# Patient Record
Sex: MALE | Race: WHITE | NOT HISPANIC OR LATINO | Employment: OTHER | URBAN - METROPOLITAN AREA
[De-identification: names, ages, dates, MRNs, and addresses within clinical notes are randomized per-mention and may not be internally consistent; named-entity substitution may affect disease eponyms.]

---

## 2017-02-06 RX ORDER — AMLODIPINE BESYLATE AND BENAZEPRIL HYDROCHLORIDE 5; 20 MG/1; MG/1
CAPSULE ORAL
Qty: 90 CAPSULE | Refills: 0 | Status: SHIPPED | OUTPATIENT
Start: 2017-02-06 | End: 2017-05-12 | Stop reason: SDUPTHER

## 2017-02-06 RX ORDER — PROPRANOLOL HYDROCHLORIDE 80 MG/1
CAPSULE, EXTENDED RELEASE ORAL
Qty: 90 CAPSULE | Refills: 0 | Status: SHIPPED | OUTPATIENT
Start: 2017-02-06 | End: 2017-05-12 | Stop reason: SDUPTHER

## 2017-02-06 RX ORDER — ATORVASTATIN CALCIUM 40 MG/1
TABLET, FILM COATED ORAL
Qty: 90 TABLET | Refills: 0 | Status: SHIPPED | OUTPATIENT
Start: 2017-02-06 | End: 2017-05-12 | Stop reason: SDUPTHER

## 2017-02-13 DIAGNOSIS — I10 BENIGN ESSENTIAL HYPERTENSION: Primary | ICD-10-CM

## 2017-02-13 DIAGNOSIS — E78.5 HYPERLIPIDEMIA, UNSPECIFIED HYPERLIPIDEMIA TYPE: ICD-10-CM

## 2017-02-15 LAB
ALBUMIN SERPL-MCNC: 4.3 G/DL (ref 3.5–5.2)
ALBUMIN/GLOB SERPL: 1.7 G/DL
ALP SERPL-CCNC: 75 U/L (ref 39–117)
ALT SERPL-CCNC: 23 U/L (ref 1–41)
AST SERPL-CCNC: 28 U/L (ref 1–40)
BILIRUB SERPL-MCNC: 1.5 MG/DL (ref 0.1–1.2)
BUN SERPL-MCNC: 12 MG/DL (ref 8–23)
BUN/CREAT SERPL: 14 (ref 7–25)
CALCIUM SERPL-MCNC: 9.8 MG/DL (ref 8.6–10.5)
CHLORIDE SERPL-SCNC: 99 MMOL/L (ref 98–107)
CHOLEST SERPL-MCNC: 138 MG/DL (ref 0–200)
CO2 SERPL-SCNC: 28.8 MMOL/L (ref 22–29)
CREAT SERPL-MCNC: 0.86 MG/DL (ref 0.76–1.27)
GLOBULIN SER CALC-MCNC: 2.6 GM/DL
GLUCOSE SERPL-MCNC: 104 MG/DL (ref 65–99)
HDLC SERPL-MCNC: 54 MG/DL (ref 40–60)
LDLC SERPL CALC-MCNC: 68 MG/DL (ref 0–100)
LDLC/HDLC SERPL: 1.26 {RATIO}
POTASSIUM SERPL-SCNC: 4.3 MMOL/L (ref 3.5–5.2)
PROT SERPL-MCNC: 6.9 G/DL (ref 6–8.5)
SODIUM SERPL-SCNC: 142 MMOL/L (ref 136–145)
TRIGL SERPL-MCNC: 79 MG/DL (ref 0–150)
VLDLC SERPL CALC-MCNC: 15.8 MG/DL (ref 5–40)

## 2017-02-22 ENCOUNTER — OFFICE VISIT (OUTPATIENT)
Dept: FAMILY MEDICINE CLINIC | Facility: CLINIC | Age: 77
End: 2017-02-22

## 2017-02-22 VITALS
HEIGHT: 68 IN | DIASTOLIC BLOOD PRESSURE: 72 MMHG | HEART RATE: 68 BPM | RESPIRATION RATE: 16 BRPM | SYSTOLIC BLOOD PRESSURE: 125 MMHG | BODY MASS INDEX: 36.68 KG/M2 | OXYGEN SATURATION: 95 % | TEMPERATURE: 98.2 F | WEIGHT: 242 LBS

## 2017-02-22 DIAGNOSIS — E78.00 HYPERCHOLESTEROLEMIA: ICD-10-CM

## 2017-02-22 DIAGNOSIS — N30.00 ACUTE CYSTITIS WITHOUT HEMATURIA: ICD-10-CM

## 2017-02-22 DIAGNOSIS — R82.90 BAD ODOR OF URINE: Primary | ICD-10-CM

## 2017-02-22 DIAGNOSIS — I10 BENIGN ESSENTIAL HYPERTENSION: ICD-10-CM

## 2017-02-22 DIAGNOSIS — E66.09 OBESITY DUE TO EXCESS CALORIES, UNSPECIFIED OBESITY SEVERITY: ICD-10-CM

## 2017-02-22 LAB
BILIRUB BLD-MCNC: NEGATIVE MG/DL
COLOR UR: YELLOW
GLUCOSE UR STRIP-MCNC: NEGATIVE MG/DL
KETONES UR QL: NEGATIVE
LEUKOCYTE EST, POC: ABNORMAL
NITRITE UR-MCNC: NEGATIVE MG/ML
PH UR: 5.5 [PH] (ref 5–8)
PROT UR STRIP-MCNC: NEGATIVE MG/DL
RBC # UR STRIP: ABNORMAL /UL
SP GR UR: 1.01 (ref 1–1.03)
UROBILINOGEN UR QL: NORMAL

## 2017-02-22 PROCEDURE — 99214 OFFICE O/P EST MOD 30 MIN: CPT | Performed by: INTERNAL MEDICINE

## 2017-02-22 PROCEDURE — 81003 URINALYSIS AUTO W/O SCOPE: CPT | Performed by: INTERNAL MEDICINE

## 2017-02-22 RX ORDER — NITROFURANTOIN 25; 75 MG/1; MG/1
100 CAPSULE ORAL 2 TIMES DAILY
Qty: 14 CAPSULE | Refills: 0 | Status: SHIPPED | OUTPATIENT
Start: 2017-02-22 | End: 2017-03-08

## 2017-02-26 PROBLEM — N30.00 ACUTE CYSTITIS: Status: ACTIVE | Noted: 2017-02-26

## 2017-02-26 NOTE — PROGRESS NOTES
Subjective   Junito Sorto is a 76 y.o. male. Patient is here today for   Chief Complaint   Patient presents with   • Hypertension     lab f/u   • Hyperlipidemia          Vitals:    02/22/17 0857   BP: 125/72   Pulse: 68   Resp: 16   Temp: 98.2 °F (36.8 °C)   SpO2: 95%       Past Medical History   Diagnosis Date   • Hyperlipidemia    • Hypertension    • Prostate cancer    • Skin cancer       No Known Allergies   Social History     Social History   • Marital status:      Spouse name: N/A   • Number of children: N/A   • Years of education: N/A     Occupational History   • Not on file.     Social History Main Topics   • Smoking status: Former Smoker   • Smokeless tobacco: Not on file   • Alcohol use Yes      Comment: SOCIAL   • Drug use: Not on file   • Sexual activity: Not on file     Other Topics Concern   • Not on file     Social History Narrative        Current Outpatient Prescriptions:   •  amLODIPine-benazepril (LOTREL 5-20) 5-20 MG per capsule, TAKE ONE CAPSULE BY MOUTH DAILY, Disp: 90 capsule, Rfl: 0  •  aspirin 81 MG tablet, Take  by mouth., Disp: , Rfl:   •  atorvastatin (LIPITOR) 40 MG tablet, TAKE ONE TABLET BY MOUTH DAILY, Disp: 90 tablet, Rfl: 0  •  Cholecalciferol (VITAMIN D3) 2000 UNITS tablet, Take  by mouth., Disp: , Rfl:   •  guaiFENesin (MUCINEX) 600 MG 12 hr tablet, Take 1,200 mg by mouth 2 (two) times a day., Disp: , Rfl:   •  Loratadine (LORADAMED PO), Take  by mouth., Disp: , Rfl:   •  propranolol LA (INDERAL LA) 80 MG 24 hr capsule, TAKE ONE CAPSULE BY MOUTH DAILY, Disp: 90 capsule, Rfl: 0  •  vitamin E 400 UNIT capsule, Take 400 Units by mouth daily., Disp: , Rfl:   •  nitrofurantoin, macrocrystal-monohydrate, (MACROBID) 100 MG capsule, Take 1 capsule by mouth 2 (Two) Times a Day., Disp: 14 capsule, Rfl: 0     Objective     HPI Comments: He is here today to follow-up his hypertension and hypercholesterolemia.    He notes urinary frequency.  He denies dysuria.    Hypertension      Hyperlipidemia          Review of Systems   Constitutional: Negative.    HENT: Negative.    Respiratory: Negative.    Cardiovascular: Negative.    Genitourinary: Positive for frequency.        He notes that his urine has bad odor.   Musculoskeletal: Negative.    Psychiatric/Behavioral: Negative.        Physical Exam   Constitutional: He is oriented to person, place, and time. He appears well-developed and well-nourished.   Neatly groomed, pleasant, BMI 36.   HENT:   Head: Normocephalic and atraumatic.   Cardiovascular: Normal rate and regular rhythm.    Pulmonary/Chest: Effort normal.   Neurological: He is alert and oriented to person, place, and time.   Psychiatric: He has a normal mood and affect. His behavior is normal. Thought content normal.   Nursing note and vitals reviewed.        Problem List Items Addressed This Visit        Cardiovascular and Mediastinum    Benign essential hypertension    Hypercholesterolemia       Digestive    Obesity       Genitourinary    Acute cystitis    Relevant Medications    nitrofurantoin, macrocrystal-monohydrate, (MACROBID) 100 MG capsule      Other Visit Diagnoses     Bad odor of urine    -  Primary    Relevant Orders    POC Urinalysis Dipstick, Automated (Completed)            PLAN  He has a urinary tract infection today.  I provided him with Macrobid.    His hypertension is well-controlled.    Assessment I asked him follow-up in 6 months and as needed.    He had a yearly Medicare wellness visit.  No Follow-up on file.

## 2017-03-08 ENCOUNTER — OFFICE VISIT (OUTPATIENT)
Dept: FAMILY MEDICINE CLINIC | Facility: CLINIC | Age: 77
End: 2017-03-08

## 2017-03-08 VITALS
OXYGEN SATURATION: 99 % | RESPIRATION RATE: 18 BRPM | WEIGHT: 242 LBS | DIASTOLIC BLOOD PRESSURE: 70 MMHG | HEIGHT: 68 IN | HEART RATE: 65 BPM | TEMPERATURE: 97.9 F | SYSTOLIC BLOOD PRESSURE: 108 MMHG | BODY MASS INDEX: 36.68 KG/M2

## 2017-03-08 DIAGNOSIS — J20.9 ACUTE BRONCHITIS, UNSPECIFIED ORGANISM: Primary | ICD-10-CM

## 2017-03-08 PROCEDURE — 99213 OFFICE O/P EST LOW 20 MIN: CPT | Performed by: NURSE PRACTITIONER

## 2017-03-08 RX ORDER — CEPHALEXIN 500 MG/1
500 CAPSULE ORAL 2 TIMES DAILY
Qty: 20 CAPSULE | Refills: 0 | Status: SHIPPED | OUTPATIENT
Start: 2017-03-08 | End: 2017-08-09

## 2017-03-08 RX ORDER — PROMETHAZINE HYDROCHLORIDE AND CODEINE PHOSPHATE 6.25; 1 MG/5ML; MG/5ML
5 SYRUP ORAL EVERY 4 HOURS PRN
Qty: 180 ML | Refills: 0 | Status: SHIPPED | OUTPATIENT
Start: 2017-03-08 | End: 2017-07-05

## 2017-03-08 NOTE — PROGRESS NOTES
Subjective   Junito Sorto is a 76 y.o. male.   Chief Complaint   Patient presents with   • Cough     PT STATES STARTED SATURDAY    • CHEST CONGESTION     Vitals:    03/08/17 1347   BP: 108/70   Pulse: 65   Resp: 18   Temp: 97.9 °F (36.6 °C)   SpO2: 99%     No LMP for male patient.    History of Present Illness  Junito is a patient of Dr Schulz who is here for an acute visit. He c/o productive cough with dark sputum and chest congestion for 6 days. He denies fever, chills, body aches, pleuritic CP. He has taken delsym and nyquil otc     The following portions of the patient's history were reviewed and updated as appropriate: allergies, current medications, past family history, past medical history, past social history, past surgical history and problem list.    Review of Systems   Constitutional: Negative for chills, fatigue and fever.   HENT: Negative for sinus pressure, sneezing and sore throat.    Respiratory: Positive for cough. Negative for choking, shortness of breath and wheezing.    Cardiovascular: Negative for chest pain, palpitations and leg swelling.   Musculoskeletal: Negative for arthralgias.       Objective   Physical Exam   Constitutional: Vital signs are normal. He appears well-developed and well-nourished. No distress.   HENT:   Right Ear: Tympanic membrane and ear canal normal.   Left Ear: Tympanic membrane and ear canal normal.   Nose: Mucosal edema and rhinorrhea present. Right sinus exhibits no maxillary sinus tenderness and no frontal sinus tenderness. Left sinus exhibits no maxillary sinus tenderness and no frontal sinus tenderness.   Mouth/Throat: Uvula is midline and oropharynx is clear and moist.   Cardiovascular: Normal rate and normal heart sounds.    Pulmonary/Chest: Effort normal and breath sounds normal. He has no wheezes. He has no rhonchi. He has no rales.   Neurological: He is alert.       Assessment/Plan   Junito was seen today for cough and chest congestion.    Diagnoses and  all orders for this visit:    Acute bronchitis, unspecified organism    Other orders  -     cephalexin (KEFLEX) 500 MG capsule; Take 1 capsule by mouth 2 (Two) Times a Day.  -     promethazine-codeine (PHENERGAN with CODEINE) 6.25-10 MG/5ML syrup; Take 5 mL by mouth Every 4 (Four) Hours As Needed for cough.    Rest and fluids  I recommend that he Discontinue Nyquil and Delsym due to his history of HTN and PAF.   Robitussin (plain) or mucinex (plain) otc for chest congestion  Follow up if your symptoms persist, worsen, or if you develop new symptoms

## 2017-03-13 ENCOUNTER — OFFICE VISIT (OUTPATIENT)
Dept: FAMILY MEDICINE CLINIC | Facility: CLINIC | Age: 77
End: 2017-03-13

## 2017-03-13 VITALS
HEIGHT: 68 IN | DIASTOLIC BLOOD PRESSURE: 82 MMHG | SYSTOLIC BLOOD PRESSURE: 134 MMHG | BODY MASS INDEX: 36.37 KG/M2 | WEIGHT: 240 LBS | RESPIRATION RATE: 16 BRPM | HEART RATE: 92 BPM | OXYGEN SATURATION: 94 %

## 2017-03-13 DIAGNOSIS — H10.9 BACTERIAL CONJUNCTIVITIS: Primary | ICD-10-CM

## 2017-03-13 PROCEDURE — 99213 OFFICE O/P EST LOW 20 MIN: CPT | Performed by: INTERNAL MEDICINE

## 2017-03-13 RX ORDER — CIPROFLOXACIN HYDROCHLORIDE 3.5 MG/ML
1 SOLUTION/ DROPS TOPICAL
Qty: 10 ML | Refills: 0 | Status: SHIPPED | OUTPATIENT
Start: 2017-03-13 | End: 2017-07-05

## 2017-03-13 NOTE — PROGRESS NOTES
Subjective   Junito Sorto is a 76 y.o. male. Patient is here today for   Chief Complaint   Patient presents with   • Cough     cough and congestion           Vitals:    03/13/17 1255   BP: 134/82   Pulse: 92   Resp: 16   SpO2: 94%       Past Medical History   Diagnosis Date   • Hyperlipidemia    • Hypertension    • Prostate cancer    • Skin cancer       No Known Allergies   Social History     Social History   • Marital status:      Spouse name: N/A   • Number of children: N/A   • Years of education: N/A     Occupational History   • Not on file.     Social History Main Topics   • Smoking status: Former Smoker   • Smokeless tobacco: Not on file   • Alcohol use Yes      Comment: SOCIAL   • Drug use: Not on file   • Sexual activity: Not on file     Other Topics Concern   • Not on file     Social History Narrative        Current Outpatient Prescriptions:   •  amLODIPine-benazepril (LOTREL 5-20) 5-20 MG per capsule, TAKE ONE CAPSULE BY MOUTH DAILY, Disp: 90 capsule, Rfl: 0  •  aspirin 81 MG tablet, Take  by mouth., Disp: , Rfl:   •  atorvastatin (LIPITOR) 40 MG tablet, TAKE ONE TABLET BY MOUTH DAILY, Disp: 90 tablet, Rfl: 0  •  cephalexin (KEFLEX) 500 MG capsule, Take 1 capsule by mouth 2 (Two) Times a Day., Disp: 20 capsule, Rfl: 0  •  Cholecalciferol (VITAMIN D3) 2000 UNITS tablet, Take  by mouth., Disp: , Rfl:   •  Dextromethorphan Polistirex (DELSYM PO), Take  by mouth., Disp: , Rfl:   •  guaiFENesin (MUCINEX) 600 MG 12 hr tablet, Take 1,200 mg by mouth 2 (two) times a day., Disp: , Rfl:   •  Loratadine (LORADAMED PO), Take  by mouth., Disp: , Rfl:   •  promethazine-codeine (PHENERGAN with CODEINE) 6.25-10 MG/5ML syrup, Take 5 mL by mouth Every 4 (Four) Hours As Needed for cough., Disp: 180 mL, Rfl: 0  •  propranolol LA (INDERAL LA) 80 MG 24 hr capsule, TAKE ONE CAPSULE BY MOUTH DAILY, Disp: 90 capsule, Rfl: 0  •  vitamin E 400 UNIT capsule, Take 400 Units by mouth daily., Disp: , Rfl:   •  ciprofloxacin  (CILOXAN) 0.3 % ophthalmic solution, Administer 1 drop into the left eye Every 2 (Two) Hours. While awake, Disp: 10 mL, Rfl: 0     Objective     HPI Comments: Please got a few days worth of anabiotic's to go after having been here on the eighth of this month to see Betzaida.  He is being treated for an acute bronchitis.    He developed some erythema and purulent drainage from his left eye.    Cough          Review of Systems   Constitutional: Negative.    HENT: Negative for facial swelling.         He is developed some erythema.  Discharge from his left eye.   Respiratory: Positive for cough.        Physical Exam   HENT:   Head: Normocephalic and atraumatic.   Eyes: Left eye exhibits no discharge.   He has some erythema and injection of his left  bulbar conjunctiva.  He has a little bit of purulent drainage as well.  He has no visual deficits.     Nursing note and vitals reviewed.        Problem List Items Addressed This Visit        Other    Bacterial conjunctivitis - Primary    Relevant Medications    ciprofloxacin (CILOXAN) 0.3 % ophthalmic solution            PLAN  He has bacterial conjunctivitis of the left eye.  I've sent a prescription for ciprofloxacin eyedrops 1 drop in the left eye every 2 hours except while asleep.  #10 pills with no refills.  No Follow-up on file.

## 2017-03-17 ENCOUNTER — TELEPHONE (OUTPATIENT)
Dept: FAMILY MEDICINE CLINIC | Facility: CLINIC | Age: 77
End: 2017-03-17

## 2017-03-17 NOTE — TELEPHONE ENCOUNTER
Spoke to patient and informed him that I could not find any previous notes from an ENT.  He said that he wanted to see Dr. Pittman and Dr. Srivastava group.  I advised him to call them and a referral will be placed.   ----- Message from Maricruz Hoang sent at 3/17/2017 10:30 AM EDT -----  PT SAID THAT DR NUNEZ WAS GOING TO LOOK IN HIS RECORDS TO FIND THE ENT HE SAW LAST. HE SAID HIS SITUATION HASNT GOT ANY BETTER AND WANTED TO KNOW IF DR NUNEZ HAD A CHANCE TO GET TO HIS FILE. HE SAID HE WOULD BE LOOKING FOR AN ENT TODAY AND ALSO WANTED TO KNOW IF HE COULD GET A REFERRAL ([facility] PREFERRED)

## 2017-03-30 ENCOUNTER — TRANSCRIBE ORDERS (OUTPATIENT)
Dept: ADMINISTRATIVE | Facility: HOSPITAL | Age: 77
End: 2017-03-30

## 2017-03-30 DIAGNOSIS — J32.0 CHRONIC MAXILLARY SINUSITIS: Primary | ICD-10-CM

## 2017-04-12 ENCOUNTER — HOSPITAL ENCOUNTER (OUTPATIENT)
Dept: CT IMAGING | Facility: HOSPITAL | Age: 77
Discharge: HOME | End: 2017-04-12
Attending: OTOLARYNGOLOGY | Admitting: OTOLARYNGOLOGY

## 2017-04-12 DIAGNOSIS — J32.0 CHRONIC MAXILLARY SINUSITIS: ICD-10-CM

## 2017-04-12 PROCEDURE — 70486 CT MAXILLOFACIAL W/O DYE: CPT

## 2017-05-12 RX ORDER — AMLODIPINE BESYLATE AND BENAZEPRIL HYDROCHLORIDE 5; 20 MG/1; MG/1
CAPSULE ORAL
Qty: 90 CAPSULE | Refills: 0 | Status: SHIPPED | OUTPATIENT
Start: 2017-05-12 | End: 2017-08-12 | Stop reason: SDUPTHER

## 2017-05-12 RX ORDER — ATORVASTATIN CALCIUM 40 MG/1
TABLET, FILM COATED ORAL
Qty: 90 TABLET | Refills: 0 | Status: SHIPPED | OUTPATIENT
Start: 2017-05-12 | End: 2017-08-09

## 2017-05-12 RX ORDER — PROPRANOLOL HYDROCHLORIDE 80 MG/1
CAPSULE, EXTENDED RELEASE ORAL
Qty: 90 CAPSULE | Refills: 0 | Status: SHIPPED | OUTPATIENT
Start: 2017-05-12 | End: 2017-08-12 | Stop reason: SDUPTHER

## 2017-07-05 ENCOUNTER — HOSPITAL ENCOUNTER (OUTPATIENT)
Dept: GENERAL RADIOLOGY | Facility: HOSPITAL | Age: 77
Discharge: HOME | End: 2017-07-05
Admitting: NURSE PRACTITIONER

## 2017-07-05 ENCOUNTER — TELEPHONE (OUTPATIENT)
Dept: FAMILY MEDICINE CLINIC | Facility: CLINIC | Age: 77
End: 2017-07-05

## 2017-07-05 ENCOUNTER — OFFICE VISIT (OUTPATIENT)
Dept: FAMILY MEDICINE CLINIC | Facility: CLINIC | Age: 77
End: 2017-07-05

## 2017-07-05 VITALS
SYSTOLIC BLOOD PRESSURE: 133 MMHG | HEIGHT: 68 IN | OXYGEN SATURATION: 95 % | TEMPERATURE: 98.3 F | RESPIRATION RATE: 16 BRPM | DIASTOLIC BLOOD PRESSURE: 83 MMHG | WEIGHT: 244.8 LBS | HEART RATE: 55 BPM | BODY MASS INDEX: 37.1 KG/M2

## 2017-07-05 DIAGNOSIS — M54.5 ACUTE LOW BACK PAIN, UNSPECIFIED BACK PAIN LATERALITY, WITH SCIATICA PRESENCE UNSPECIFIED: Primary | ICD-10-CM

## 2017-07-05 PROBLEM — H10.9 BACTERIAL CONJUNCTIVITIS: Status: RESOLVED | Noted: 2017-03-13 | Resolved: 2017-07-05

## 2017-07-05 PROBLEM — N30.00 ACUTE CYSTITIS: Status: RESOLVED | Noted: 2017-02-26 | Resolved: 2017-07-05

## 2017-07-05 PROCEDURE — 99214 OFFICE O/P EST MOD 30 MIN: CPT | Performed by: NURSE PRACTITIONER

## 2017-07-05 PROCEDURE — 72110 X-RAY EXAM L-2 SPINE 4/>VWS: CPT

## 2017-07-05 NOTE — PROGRESS NOTES
Subjective   Junito Sorto is a 76 y.o. male.   Chief Complaint   Patient presents with   • Back Pain     pt would like ref to [facility]      Vitals:    07/05/17 0936   BP: 133/83   Pulse: 55   Resp: 16   Temp: 98.3 °F (36.8 °C)   SpO2: 95%     No LMP for male patient.    History of Present Illness Back Pain: Patient presents for presents evaluation of low back problems.  Symptoms have been present for 2 weeks and include pain in lower back and down legs (aching in character; 1/10 in severity). it was 10/10 to begin with and has improved. He denies weakness, or numbness and tingling. Initial inciting event: none. Symptoms are worst: with walking. Alleviating factors identifiable by patient are sitting. Exacerbating factors identifiable by patient are standing. Treatments so far initiated by patient: Naproxen PRN Previous lower back problems: Cyst removed with Dr. Conley approximately 15 years ago.. Previous workup: none. Previous treatments: none. He is requesting a referral to Dr Conley.       The following portions of the patient's history were reviewed and updated as appropriate: allergies, current medications, past family history, past medical history, past social history, past surgical history and problem list.    Review of Systems   Constitutional: Negative for chills and fever.   HENT: Negative.    Eyes: Negative.    Respiratory: Negative.    Cardiovascular: Negative for chest pain, palpitations and leg swelling.   Genitourinary:        Reports that he has a history of prostate CA dx with needle bx in 2010  Has a follow up with urology in 2 weeks    Musculoskeletal: Positive for arthralgias and back pain. Negative for gait problem (Patient reports when pain is bad, he uses a cane.).       Objective   Physical Exam   Constitutional: He is oriented to person, place, and time. Vital signs are normal. He appears well-developed and well-nourished. No distress.   Cardiovascular: Normal rate,  regular rhythm and normal heart sounds.    Pulmonary/Chest: Effort normal and breath sounds normal.   Musculoskeletal:        Lumbar back: He exhibits pain. He exhibits no tenderness, no swelling and no spasm.   Neurological: He is alert and oriented to person, place, and time. Abnormal gait: slow gait    Skin: Skin is warm and dry. No rash noted.   Psychiatric: He has a normal mood and affect. His behavior is normal. Judgment and thought content normal.       Assessment/Plan   Junito was seen today for back pain.    Diagnoses and all orders for this visit:    Acute low back pain, unspecified back pain laterality, with sciatica presence unspecified  -     XR Spine Lumbar 4+ View  -     Ambulatory Referral to Orthopedic Surgery    Will check an xray and call with results  Suggest low back exercises and heat, tylenol or aleve if needed   Will refer to Dr Conley per patient request  Patient is to follow up if symptoms persist, worsen, or new symptoms develop.  Note entered by KOLBY Peterson student.  Reviewed by KOLBY Leung

## 2017-07-05 NOTE — TELEPHONE ENCOUNTER
Please call him and let him know that his xray showed degenerative changes of the spine but otherwise unremarkable  I put in a referral for Dr Conley. He may need an MRI if his symptoms persist or worsen

## 2017-07-26 ENCOUNTER — OFFICE VISIT (OUTPATIENT)
Dept: ORTHOPEDIC SURGERY | Facility: CLINIC | Age: 77
End: 2017-07-26

## 2017-07-26 VITALS — WEIGHT: 240 LBS | TEMPERATURE: 98.6 F | BODY MASS INDEX: 36.37 KG/M2 | HEIGHT: 68 IN

## 2017-07-26 DIAGNOSIS — M54.50 LUMBAR SPINE PAIN: Primary | ICD-10-CM

## 2017-07-26 DIAGNOSIS — M43.16 SPONDYLOLISTHESIS OF LUMBAR REGION: ICD-10-CM

## 2017-07-26 DIAGNOSIS — M48.061 SPINAL STENOSIS OF LUMBAR REGION: ICD-10-CM

## 2017-07-26 PROCEDURE — 99203 OFFICE O/P NEW LOW 30 MIN: CPT | Performed by: ORTHOPAEDIC SURGERY

## 2017-07-26 RX ORDER — TAMSULOSIN HYDROCHLORIDE 0.4 MG/1
1 CAPSULE ORAL 2 TIMES DAILY
COMMUNITY
End: 2019-12-03

## 2017-07-26 NOTE — PROGRESS NOTES
New patient or new problem visit    Chief Complaint   Patient presents with   • Lumbar Spine - Pain       HPI: He complains of back pain radiating to the right hip, not unlike pain he suffered an 2009 when I last saw him.  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  Now has the mild constant aching pain which is somewhat improved from the worst pain when he is scheduled.  Injections have helped in the past as above and he walks for exercise.    PFSH: See chart- reviewed    Review of Systems   Constitutional: Negative for chills, fever and unexpected weight change.   HENT: Positive for hearing loss and postnasal drip. Negative for trouble swallowing and voice change.    Eyes: Negative for visual disturbance.   Respiratory: Negative for cough and shortness of breath.    Cardiovascular: Negative for chest pain and leg swelling.   Gastrointestinal: Negative for abdominal pain, nausea and vomiting.   Endocrine: Negative for cold intolerance and heat intolerance.   Genitourinary: Negative for difficulty urinating, frequency and urgency.   Musculoskeletal: Positive for myalgias.   Skin: Negative for rash and wound.   Allergic/Immunologic: Negative for immunocompromised state.   Neurological: Negative for weakness and numbness.   Hematological: Does not bruise/bleed easily.   Psychiatric/Behavioral: Positive for sleep disturbance (sleep difficulties). Negative for dysphoric mood. The patient is not nervous/anxious.        PE: Constitutional: Vital signs above-noted.  Awake, alert and oriented    Psychiatric: Affect and insight do not appear grossly disturbed.    Pulmonary: Breathing is unlabored, color is good.    Skin: Warm, dry and normal turgor    Cardiac:  pedal pulses intact.  No edema.    Eyesight and hearing appear adequate for examination purposes      Musculoskeletal:  There is mild tenderness to percussion and palpation of the spine. Motion  appears undisturbed.  Posture is unremarkable to coronal and sagittal inspection.    The skin about the area is intact.  There is no palpable or visible deformity.  There is no local spasm.       Neurologic:   Reflexes are 2+ and symmetrical in the patellae and absent in the Achilles.   Motor function is undisturbed in quadriceps, EHL, and gastrocnemius      Sensation appears symmetrically intact to light touch   .  In the bilateral lower extremities there is no evidence of atrophy.   Clonus is absent..  Gait appears undisturbed.  SLR test negative      MEDICAL DECISION MAKING    XRAY: Plain film x-rays of lumbar spine from outside show retrolisthesis at L2-3 and slight spondylolisthesis at L4 5.  No comparison views are available.    Other: n/a    Impression: Lumbar spinal stenosis lumbar spondylolisthesis    Plan: I plan MRI with an eye toward epidural steroid injections since exercise and medication have failed to provide pain relief.  Both the test and injections have been scheduled.

## 2017-08-02 ENCOUNTER — HOSPITAL ENCOUNTER (OUTPATIENT)
Dept: MRI IMAGING | Facility: HOSPITAL | Age: 77
Discharge: HOME | End: 2017-08-02
Attending: ORTHOPAEDIC SURGERY | Admitting: ORTHOPAEDIC SURGERY

## 2017-08-02 DIAGNOSIS — M54.50 LUMBAR SPINE PAIN: ICD-10-CM

## 2017-08-02 PROCEDURE — 72148 MRI LUMBAR SPINE W/O DYE: CPT

## 2017-08-09 ENCOUNTER — ANESTHESIA (OUTPATIENT)
Dept: PAIN MEDICINE | Facility: HOSPITAL | Age: 77
End: 2017-08-09

## 2017-08-09 ENCOUNTER — HOSPITAL ENCOUNTER (OUTPATIENT)
Dept: GENERAL RADIOLOGY | Facility: HOSPITAL | Age: 77
Discharge: HOME | End: 2017-08-09

## 2017-08-09 ENCOUNTER — HOSPITAL ENCOUNTER (OUTPATIENT)
Dept: PAIN MEDICINE | Facility: HOSPITAL | Age: 77
Discharge: HOME | End: 2017-08-09
Attending: ORTHOPAEDIC SURGERY | Admitting: ORTHOPAEDIC SURGERY

## 2017-08-09 ENCOUNTER — ANESTHESIA EVENT (OUTPATIENT)
Dept: PAIN MEDICINE | Facility: HOSPITAL | Age: 77
End: 2017-08-09

## 2017-08-09 VITALS
DIASTOLIC BLOOD PRESSURE: 91 MMHG | SYSTOLIC BLOOD PRESSURE: 141 MMHG | TEMPERATURE: 97.9 F | RESPIRATION RATE: 16 BRPM | WEIGHT: 240 LBS | OXYGEN SATURATION: 100 % | HEART RATE: 59 BPM | BODY MASS INDEX: 36.37 KG/M2 | HEIGHT: 68 IN

## 2017-08-09 DIAGNOSIS — M54.50 LUMBAR SPINE PAIN: ICD-10-CM

## 2017-08-09 DIAGNOSIS — R52 PAIN: ICD-10-CM

## 2017-08-09 PROCEDURE — C1755 CATHETER, INTRASPINAL: HCPCS

## 2017-08-09 PROCEDURE — 0 IOPAMIDOL 41 % SOLUTION: Performed by: ANESTHESIOLOGY

## 2017-08-09 PROCEDURE — 77003 FLUOROGUIDE FOR SPINE INJECT: CPT

## 2017-08-09 PROCEDURE — 25010000002 METHYLPREDNISOLONE PER 80 MG: Performed by: ANESTHESIOLOGY

## 2017-08-09 RX ORDER — LIDOCAINE HYDROCHLORIDE 10 MG/ML
1 INJECTION, SOLUTION INFILTRATION; PERINEURAL ONCE
Status: DISCONTINUED | OUTPATIENT
Start: 2017-08-09 | End: 2017-08-10 | Stop reason: HOSPADM

## 2017-08-09 RX ORDER — METHYLPREDNISOLONE ACETATE 80 MG/ML
80 INJECTION, SUSPENSION INTRA-ARTICULAR; INTRALESIONAL; INTRAMUSCULAR; SOFT TISSUE ONCE
Status: COMPLETED | OUTPATIENT
Start: 2017-08-09 | End: 2017-08-09

## 2017-08-09 RX ORDER — SODIUM CHLORIDE 0.9 % (FLUSH) 0.9 %
1-10 SYRINGE (ML) INJECTION AS NEEDED
Status: DISCONTINUED | OUTPATIENT
Start: 2017-08-09 | End: 2017-08-10 | Stop reason: HOSPADM

## 2017-08-09 RX ORDER — ATORVASTATIN CALCIUM 40 MG/1
40 TABLET, FILM COATED ORAL DAILY
COMMUNITY
End: 2018-02-21

## 2017-08-09 RX ADMIN — METHYLPREDNISOLONE ACETATE 80 MG: 80 INJECTION, SUSPENSION INTRA-ARTICULAR; INTRALESIONAL; INTRAMUSCULAR; SOFT TISSUE at 12:54

## 2017-08-09 RX ADMIN — IOPAMIDOL 10 ML: 408 INJECTION, SOLUTION INTRATHECAL at 12:54

## 2017-08-09 NOTE — ANESTHESIA PROCEDURE NOTES
PAIN Epidural block    Start Time: 8/9/2017 12:47 PM  Stop Time: 8/9/2017 12:56 PM  Indication:at surgeon's request and procedure for pain  Performed By  Anesthesiologist: RENDER, PRABHAKAR RAY  Preanesthetic Checklist  Completed: patient identified, surgical consent, pre-op evaluation, timeout performed, risks and benefits discussed and monitors and equipment checked  Additional Notes  Post-Op Diagnosis Codes:     * Lumbar degenerative disc disease (M51.36)     * Lumbago (M54.5)     * Lumbar spondylosis (M47.816)     * Spondylolisthesis, lumbar region (M43.16)    Prep:  Pt Position:prone  Sterile Tech:sterile barrier, mask and gloves  Prep:chlorhexidine gluconate and isopropyl alcohol  Monitoring:blood pressure monitoring, continuous pulse oximetry and EKG  Procedure:  Approach:right paramedian  Guidance: fluoroscopy  Location:lumbar  Level:4-5  Needle Type:Tuohy  Needle Gauge:20 G  Aspiration:negative  Test Dose:negative  Medications:  Depomedrol:80 mg  Preservative Free Saline:2mL  Isovue:2mL  Comments:Lumbar epidural steroid injections performed at the L45 level on the right.  There is good loss resistance.  2 cc of Isovue were injected demonstrating cranial caudal spread of contrast media.  80 mg of Depo-Medrol were injected and the needle was withdrawn.  Post Assessment:  Pt Tolerance:patient tolerated the procedure well with no apparent complications  Complications:no

## 2017-08-09 NOTE — NURSING NOTE
1330 notified pt of afib on monitor, pt states has hx of it but chooses not to take bld thinner prescribed by cardiologist r/t bruising. Encouraged pt to follow up with MD r/t this. Dr Tabares shown tracy lawson, agrees to have pt report  To family md at next weeks appt. Strip given. Pt seemed open to address this with MD. vvs amb to lobby

## 2017-08-09 NOTE — H&P
[facility]    History and Physical    Patient Name: Junito Sorto  :  1940  MRN:  [id number]  Date of Admission: 2017    Subjective     Patient is a 76 y.o. male presents with chief complaint of chronic low back, hips: right and buttock pain.  Onset of symptoms was gradual starting several years ago.  Symptoms are associated/aggravated by nothing in particular. Symptoms improve with injection  He is experiencing low back pain is similar to what he had approximately 10 years ago.  This responded quite well to epidural steroid injections at that time.  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  The following portions of the patients history were reviewed and updated as appropriate: current medications, allergies, past medical history, past surgical history, past family history, past social history and problem list                Objective     Past Medical History:   Past Medical History:   Diagnosis Date   • Hyperlipidemia    • Hypertension    • Low back pain    • Prostate cancer    • Skin cancer      Past Surgical History:   Past Surgical History:   Procedure Laterality Date   • PROSTATE SURGERY  2014   • SKIN CANCER EXCISION      multiple, over that last few years   • TONSILLECTOMY      around 1950     Family History:   Family History   Problem Relation Age of Onset   • Cancer Mother      pancreatic   • Cancer Father      prostate, bladder, colon, lymphoma, leukemia   • Cancer Brother      prostate   • Cancer Paternal Grandfather      prostate     Social History:   Social History   Substance Use Topics   • Smoking status: Former Smoker     Types: Pipe   • Smokeless tobacco: Former User      Comment: only in college   • Alcohol use Yes      Comment: SOCIAL       Vital Signs Range for the last 24 hours  Temperature: Temp:  [36.6 °C (97.9 °F)] 36.6 °C (97.9 °F)   Temp Source: Temp  "src: Oral   BP: BP: (146)/(78) 146/78   Pulse: Heart Rate:  [58] 58   Respirations: Resp:  [16] 16   SPO2: SpO2:  [96 %] 96 %   O2 Amount (l/min):     O2 Devices O2 Device: room air   Weight: Weight:  [240 lb (109 kg)] 240 lb (109 kg)     Flowsheet Rows         First Filed Value    Admission Height  68\" (172.7 cm) Documented at 08/09/2017 1226    Admission Weight  240 lb (109 kg) Documented at 08/09/2017 1226          --------------------------------------------------------------------------------    Current Outpatient Prescriptions   Medication Sig Dispense Refill   • amLODIPine-benazepril (LOTREL 5-20) 5-20 MG per capsule TAKE ONE CAPSULE BY MOUTH DAILY 90 capsule 0   • aspirin 81 MG tablet Take  by mouth.     • atorvastatin (LIPITOR) 40 MG tablet Take 40 mg by mouth Daily.     • Cholecalciferol (VITAMIN D3) 2000 UNITS tablet Take  by mouth.     • guaiFENesin (MUCINEX) 600 MG 12 hr tablet Take 1,200 mg by mouth 2 (two) times a day.     • Loratadine (LORADAMED PO) Take  by mouth.     • propranolol LA (INDERAL LA) 80 MG 24 hr capsule TAKE ONE CAPSULE BY MOUTH DAILY 90 capsule 0   • tamsulosin (FLOMAX) 0.4 MG capsule 24 hr capsule Take 1 capsule by mouth Every Night.     • vitamin E 400 UNIT capsule Take 400 Units by mouth daily.       No current facility-administered medications for this encounter.        --------------------------------------------------------------------------------  Assessment/Plan      Anesthesia Evaluation     NPO Solid Status: > 8 hours  NPO Liquid Status: > 8 hours     Airway   Mallampati: II  TM distance: >3 FB  no difficulty expected  Dental      Pulmonary - negative pulmonary ROS and normal exam   (-) wheezes  Cardiovascular     Rhythm: regular    (+) hypertension, dysrhythmias, hyperlipidemia  (-) murmur, peripheral edema    PE comment: Lower extremities are warm.  There is trace pitting edema bilaterally, this is equal.  Dorsal pedal pulses are palpable.    Neuro/Psych- negative " ROS    PE Comment: Denies any sensory deficits, was unable to elicit any deficits in the lumbar dermatomes.  GI/Hepatic/Renal/Endo    (+) obesity, morbid obesity,     Musculoskeletal     (-) straight leg test  Abdominal    Substance History      OB/GYN          Other                                   Diagnosis and Plan    Treatment Plan  ASA 3      Procedures: Lumbar Epidural Steroid Injection(LESI), With fluoroscopy, Without sedation              Diagnosis     * Lumbar degenerative disc disease [M51.36]     * Lumbago [M54.5]     * Lumbar spondylosis [M47.816]     * Spondylolisthesis, lumbar region [M43.16]

## 2017-08-15 RX ORDER — PROPRANOLOL HYDROCHLORIDE 80 MG/1
CAPSULE, EXTENDED RELEASE ORAL
Qty: 90 CAPSULE | Refills: 1 | Status: SHIPPED | OUTPATIENT
Start: 2017-08-15 | End: 2018-02-03 | Stop reason: SDUPTHER

## 2017-08-15 RX ORDER — AMLODIPINE BESYLATE AND BENAZEPRIL HYDROCHLORIDE 5; 20 MG/1; MG/1
CAPSULE ORAL
Qty: 90 CAPSULE | Refills: 1 | Status: SHIPPED | OUTPATIENT
Start: 2017-08-15 | End: 2018-02-03 | Stop reason: SDUPTHER

## 2017-08-15 RX ORDER — ATORVASTATIN CALCIUM 40 MG/1
TABLET, FILM COATED ORAL
Qty: 90 TABLET | Refills: 1 | Status: SHIPPED | OUTPATIENT
Start: 2017-08-15 | End: 2017-08-30

## 2017-08-16 DIAGNOSIS — I10 BENIGN ESSENTIAL HYPERTENSION: Primary | ICD-10-CM

## 2017-08-16 DIAGNOSIS — E78.5 HYPERLIPIDEMIA, UNSPECIFIED HYPERLIPIDEMIA TYPE: ICD-10-CM

## 2017-08-22 LAB
ALBUMIN SERPL-MCNC: 4 G/DL (ref 3.5–5.2)
ALBUMIN/GLOB SERPL: 1.7 G/DL
ALP SERPL-CCNC: 65 U/L (ref 39–117)
ALT SERPL-CCNC: 19 U/L (ref 1–41)
AST SERPL-CCNC: 20 U/L (ref 1–40)
BASOPHILS # BLD AUTO: 0.01 10*3/MM3 (ref 0–0.2)
BASOPHILS NFR BLD AUTO: 0.1 % (ref 0–1.5)
BILIRUB SERPL-MCNC: 1.3 MG/DL (ref 0.1–1.2)
BUN SERPL-MCNC: 15 MG/DL (ref 8–23)
BUN/CREAT SERPL: 20 (ref 7–25)
CALCIUM SERPL-MCNC: 9.7 MG/DL (ref 8.6–10.5)
CHLORIDE SERPL-SCNC: 105 MMOL/L (ref 98–107)
CHOLEST SERPL-MCNC: 122 MG/DL (ref 0–200)
CO2 SERPL-SCNC: 27 MMOL/L (ref 22–29)
CREAT SERPL-MCNC: 0.75 MG/DL (ref 0.76–1.27)
EOSINOPHIL # BLD AUTO: 0.29 10*3/MM3 (ref 0–0.7)
EOSINOPHIL NFR BLD AUTO: 4 % (ref 0.3–6.2)
ERYTHROCYTE [DISTWIDTH] IN BLOOD BY AUTOMATED COUNT: 14.1 % (ref 11.5–14.5)
GLOBULIN SER CALC-MCNC: 2.4 GM/DL
GLUCOSE SERPL-MCNC: 94 MG/DL (ref 65–99)
HCT VFR BLD AUTO: 45.9 % (ref 40.4–52.2)
HDLC SERPL-MCNC: 57 MG/DL (ref 40–60)
HGB BLD-MCNC: 14.4 G/DL (ref 13.7–17.6)
IMM GRANULOCYTES # BLD: 0.03 10*3/MM3 (ref 0–0.03)
IMM GRANULOCYTES NFR BLD: 0.4 % (ref 0–0.5)
LDLC SERPL CALC-MCNC: 54 MG/DL (ref 0–100)
LDLC/HDLC SERPL: 0.94 {RATIO}
LYMPHOCYTES # BLD AUTO: 1.58 10*3/MM3 (ref 0.9–4.8)
LYMPHOCYTES NFR BLD AUTO: 21.8 % (ref 19.6–45.3)
MCH RBC QN AUTO: 29.8 PG (ref 27–32.7)
MCHC RBC AUTO-ENTMCNC: 31.4 G/DL (ref 32.6–36.4)
MCV RBC AUTO: 95 FL (ref 79.8–96.2)
MONOCYTES # BLD AUTO: 0.53 10*3/MM3 (ref 0.2–1.2)
MONOCYTES NFR BLD AUTO: 7.3 % (ref 5–12)
NEUTROPHILS # BLD AUTO: 4.8 10*3/MM3 (ref 1.9–8.1)
NEUTROPHILS NFR BLD AUTO: 66.4 % (ref 42.7–76)
PLATELET # BLD AUTO: 171 10*3/MM3 (ref 140–500)
POTASSIUM SERPL-SCNC: 3.9 MMOL/L (ref 3.5–5.2)
PROT SERPL-MCNC: 6.4 G/DL (ref 6–8.5)
RBC # BLD AUTO: 4.83 10*6/MM3 (ref 4.6–6)
SODIUM SERPL-SCNC: 144 MMOL/L (ref 136–145)
TRIGL SERPL-MCNC: 56 MG/DL (ref 0–150)
VLDLC SERPL CALC-MCNC: 11.2 MG/DL (ref 5–40)
WBC # BLD AUTO: 7.24 10*3/MM3 (ref 4.5–10.7)

## 2017-08-30 ENCOUNTER — OFFICE VISIT (OUTPATIENT)
Dept: FAMILY MEDICINE CLINIC | Facility: CLINIC | Age: 77
End: 2017-08-30

## 2017-08-30 VITALS
OXYGEN SATURATION: 97 % | HEIGHT: 68 IN | WEIGHT: 241 LBS | HEART RATE: 57 BPM | SYSTOLIC BLOOD PRESSURE: 130 MMHG | TEMPERATURE: 98 F | BODY MASS INDEX: 36.53 KG/M2 | DIASTOLIC BLOOD PRESSURE: 74 MMHG | RESPIRATION RATE: 16 BRPM

## 2017-08-30 DIAGNOSIS — E66.09 OBESITY DUE TO EXCESS CALORIES, UNSPECIFIED OBESITY SEVERITY: ICD-10-CM

## 2017-08-30 DIAGNOSIS — I10 BENIGN ESSENTIAL HYPERTENSION: ICD-10-CM

## 2017-08-30 DIAGNOSIS — I48.0 PAROXYSMAL ATRIAL FIBRILLATION (HCC): Primary | ICD-10-CM

## 2017-08-30 DIAGNOSIS — E78.00 HYPERCHOLESTEROLEMIA: ICD-10-CM

## 2017-08-30 PROCEDURE — 99214 OFFICE O/P EST MOD 30 MIN: CPT | Performed by: INTERNAL MEDICINE

## 2017-09-04 NOTE — PROGRESS NOTES
Subjective   Junito Sorto is a 76 y.o. male. Patient is here today for   Chief Complaint   Patient presents with   • Hypertension     lab f/u   • Hyperlipidemia   • Hyperglycemia          Vitals:    08/30/17 0922   BP: 130/74   Pulse: 57   Resp: 16   Temp: 98 °F (36.7 °C)   SpO2: 97%       Past Medical History:   Diagnosis Date   • Hyperlipidemia    • Hypertension    • Low back pain    • Prostate cancer    • Skin cancer       No Known Allergies   Social History     Social History   • Marital status:      Spouse name: N/A   • Number of children: N/A   • Years of education: N/A     Occupational History   • Not on file.     Social History Main Topics   • Smoking status: Former Smoker     Types: Pipe   • Smokeless tobacco: Former User      Comment: only in college   • Alcohol use Yes      Comment: SOCIAL   • Drug use: No   • Sexual activity: Defer     Other Topics Concern   • Not on file     Social History Narrative        Current Outpatient Prescriptions:   •  amLODIPine-benazepril (LOTREL 5-20) 5-20 MG per capsule, TAKE ONE CAPSULE BY MOUTH DAILY, Disp: 90 capsule, Rfl: 1  •  aspirin 81 MG tablet, Take  by mouth., Disp: , Rfl:   •  atorvastatin (LIPITOR) 40 MG tablet, Take 40 mg by mouth Daily., Disp: , Rfl:   •  Cholecalciferol (VITAMIN D3) 2000 UNITS tablet, Take  by mouth., Disp: , Rfl:   •  guaiFENesin (MUCINEX) 600 MG 12 hr tablet, Take 1,200 mg by mouth 2 (two) times a day., Disp: , Rfl:   •  Loratadine (LORADAMED PO), Take  by mouth., Disp: , Rfl:   •  propranolol LA (INDERAL LA) 80 MG 24 hr capsule, TAKE ONE CAPSULE BY MOUTH DAILY, Disp: 90 capsule, Rfl: 1  •  tamsulosin (FLOMAX) 0.4 MG capsule 24 hr capsule, Take 1 capsule by mouth Every Night., Disp: , Rfl:      Objective     HPI Comments:   He notes occasional irregular heartbeats.    He does have atrial fibrillation.  He denies any chest pain or dyspnea.  He has been advised by me and his cardiologist to take medication for anticoagulation in  light of his increased risk for emboli.  He does take an aspirin 81 mg daily.    Hypertension   Associated symptoms include palpitations. Pertinent negatives include no chest pain.   Hyperlipidemia   Pertinent negatives include no chest pain.   Hyperglycemia   Pertinent negatives include no chest pain.        Review of Systems   Constitutional: Negative.    HENT: Negative.    Respiratory: Negative.    Cardiovascular: Positive for palpitations. Negative for chest pain and leg swelling.   Psychiatric/Behavioral: Negative.        Physical Exam   Constitutional: He is oriented to person, place, and time. He appears well-developed and well-nourished.   Pleasant, neatly groomed, BMI 36.   HENT:   Head: Normocephalic and atraumatic.   Cardiovascular:   He has an irregularly irregular rhythm with a regular rate.   Pulmonary/Chest: Effort normal.   Neurological: He is alert and oriented to person, place, and time.   Psychiatric: He has a normal mood and affect. His behavior is normal.   Nursing note and vitals reviewed.        Problem List Items Addressed This Visit        Cardiovascular and Mediastinum    Paroxysmal atrial fibrillation - Primary    Relevant Orders    ECG 12 Lead    Benign essential hypertension    Hypercholesterolemia       Digestive    Obesity            PLAN   atrial fibrillation with a regular rate.  He will continue to take an 81 mg aspirin daily for anticoagulation.  He refuses to take other potentially more appropriate anticoagulation.    His hypertension is well-controlled.    His hypercholesterolemia is well-controlled.    He ought follow-up once yearly for a Medicare wellness visit.    I would like to see him back in about 6 months.  About a week before that visit, I would like him to get following labs: Lipid profile, comprehensive metabolic panel, CBC, and vitamin D level.    He is obese and I have encouraged weight loss via regular aerobic activity and decrease caloric intake.  No Follow-up on  file.

## 2017-10-09 ENCOUNTER — TRANSCRIBE ORDERS (OUTPATIENT)
Dept: ADMINISTRATIVE | Facility: HOSPITAL | Age: 77
End: 2017-10-09

## 2017-10-09 ENCOUNTER — LAB (OUTPATIENT)
Dept: LAB | Facility: HOSPITAL | Age: 77
End: 2017-10-09
Attending: UROLOGY

## 2017-10-09 DIAGNOSIS — C61 PROSTATE CA (HCC): ICD-10-CM

## 2017-10-09 DIAGNOSIS — C61 PROSTATE CA (HCC): Primary | ICD-10-CM

## 2017-10-09 LAB — PSA SERPL-MCNC: 4.69 NG/ML (ref 0–4)

## 2017-10-09 PROCEDURE — 36415 COLL VENOUS BLD VENIPUNCTURE: CPT

## 2017-10-09 PROCEDURE — 84153 ASSAY OF PSA TOTAL: CPT

## 2018-02-05 RX ORDER — AMLODIPINE BESYLATE AND BENAZEPRIL HYDROCHLORIDE 5; 20 MG/1; MG/1
CAPSULE ORAL
Qty: 90 CAPSULE | Refills: 0 | Status: SHIPPED | OUTPATIENT
Start: 2018-02-05 | End: 2018-05-01 | Stop reason: SDUPTHER

## 2018-02-05 RX ORDER — ATORVASTATIN CALCIUM 40 MG/1
TABLET, FILM COATED ORAL
Qty: 90 TABLET | Refills: 0 | Status: SHIPPED | OUTPATIENT
Start: 2018-02-05 | End: 2018-05-01 | Stop reason: SDUPTHER

## 2018-02-05 RX ORDER — PROPRANOLOL HYDROCHLORIDE 80 MG/1
CAPSULE, EXTENDED RELEASE ORAL
Qty: 90 CAPSULE | Refills: 0 | Status: SHIPPED | OUTPATIENT
Start: 2018-02-05 | End: 2018-05-01 | Stop reason: SDUPTHER

## 2018-02-13 ENCOUNTER — TRANSCRIBE ORDERS (OUTPATIENT)
Dept: PAIN MEDICINE | Facility: HOSPITAL | Age: 78
End: 2018-02-13

## 2018-02-13 DIAGNOSIS — M54.50 LUMBAR SPINE PAIN: Primary | ICD-10-CM

## 2018-02-16 DIAGNOSIS — E55.9 VITAMIN D DEFICIENCY: ICD-10-CM

## 2018-02-16 DIAGNOSIS — Z79.899 LONG TERM USE OF DRUG: ICD-10-CM

## 2018-02-16 DIAGNOSIS — E78.5 HYPERLIPIDEMIA, UNSPECIFIED HYPERLIPIDEMIA TYPE: Primary | ICD-10-CM

## 2018-02-21 ENCOUNTER — HOSPITAL ENCOUNTER (OUTPATIENT)
Dept: PAIN MEDICINE | Facility: HOSPITAL | Age: 78
Discharge: HOME | End: 2018-02-21
Admitting: ORTHOPAEDIC SURGERY

## 2018-02-21 ENCOUNTER — ANESTHESIA EVENT (OUTPATIENT)
Dept: PAIN MEDICINE | Facility: HOSPITAL | Age: 78
End: 2018-02-21

## 2018-02-21 ENCOUNTER — HOSPITAL ENCOUNTER (OUTPATIENT)
Dept: GENERAL RADIOLOGY | Facility: HOSPITAL | Age: 78
Discharge: HOME | End: 2018-02-21

## 2018-02-21 ENCOUNTER — ANESTHESIA (OUTPATIENT)
Dept: PAIN MEDICINE | Facility: HOSPITAL | Age: 78
End: 2018-02-21

## 2018-02-21 VITALS
OXYGEN SATURATION: 98 % | HEART RATE: 50 BPM | RESPIRATION RATE: 16 BRPM | TEMPERATURE: 98 F | SYSTOLIC BLOOD PRESSURE: 153 MMHG | DIASTOLIC BLOOD PRESSURE: 80 MMHG

## 2018-02-21 DIAGNOSIS — M54.50 LUMBAR SPINE PAIN: ICD-10-CM

## 2018-02-21 DIAGNOSIS — R52 PAIN: ICD-10-CM

## 2018-02-21 PROCEDURE — C1755 CATHETER, INTRASPINAL: HCPCS

## 2018-02-21 PROCEDURE — 77003 FLUOROGUIDE FOR SPINE INJECT: CPT

## 2018-02-21 PROCEDURE — 25010000002 METHYLPREDNISOLONE PER 80 MG: Performed by: ANESTHESIOLOGY

## 2018-02-21 PROCEDURE — 0 IOPAMIDOL 41 % SOLUTION: Performed by: ANESTHESIOLOGY

## 2018-02-21 RX ORDER — LIDOCAINE HYDROCHLORIDE 10 MG/ML
1 INJECTION, SOLUTION INFILTRATION; PERINEURAL ONCE AS NEEDED
Status: DISCONTINUED | OUTPATIENT
Start: 2018-02-21 | End: 2018-02-22 | Stop reason: HOSPADM

## 2018-02-21 RX ORDER — SODIUM CHLORIDE 0.9 % (FLUSH) 0.9 %
1-10 SYRINGE (ML) INJECTION AS NEEDED
Status: DISCONTINUED | OUTPATIENT
Start: 2018-02-21 | End: 2018-02-22 | Stop reason: HOSPADM

## 2018-02-21 RX ORDER — FENTANYL CITRATE 50 UG/ML
50 INJECTION, SOLUTION INTRAMUSCULAR; INTRAVENOUS AS NEEDED
Status: DISCONTINUED | OUTPATIENT
Start: 2018-02-21 | End: 2018-02-22 | Stop reason: HOSPADM

## 2018-02-21 RX ORDER — MIDAZOLAM HYDROCHLORIDE 1 MG/ML
1 INJECTION INTRAMUSCULAR; INTRAVENOUS AS NEEDED
Status: DISCONTINUED | OUTPATIENT
Start: 2018-02-21 | End: 2018-02-22 | Stop reason: HOSPADM

## 2018-02-21 RX ORDER — METHYLPREDNISOLONE ACETATE 80 MG/ML
80 INJECTION, SUSPENSION INTRA-ARTICULAR; INTRALESIONAL; INTRAMUSCULAR; SOFT TISSUE ONCE
Status: COMPLETED | OUTPATIENT
Start: 2018-02-21 | End: 2018-02-21

## 2018-02-21 RX ADMIN — METHYLPREDNISOLONE ACETATE 80 MG: 80 INJECTION, SUSPENSION INTRA-ARTICULAR; INTRALESIONAL; INTRAMUSCULAR; SOFT TISSUE at 14:38

## 2018-02-21 RX ADMIN — IOPAMIDOL 3 ML: 408 INJECTION, SOLUTION INTRATHECAL at 14:38

## 2018-02-21 NOTE — ANESTHESIA PROCEDURE NOTES
PAIN Epidural block    Patient location during procedure: pain clinic  Start Time: 2/21/2018 2:29 PM  Stop Time: 2/21/2018 2:42 PM  Indication:at surgeon's request and procedure for pain  Performed By  Anesthesiologist: HUSSEIN MACKEY  Preanesthetic Checklist  Completed: patient identified, site marked, surgical consent, pre-op evaluation, timeout performed, IV checked, risks and benefits discussed and monitors and equipment checked  Additional Notes  Post-Op Diagnosis Codes:     * Lumbar degenerative disc disease (M51.36)     * Lumbago (M54.5)     * Spondylolisthesis, lumbar region (M43.16)  Prep:  Pt Position:prone  Sterile Tech:cap, gloves, mask and sterile barrier  Prep:chlorhexidine gluconate and isopropyl alcohol  Monitoring:blood pressure monitoring, continuous pulse oximetry and EKG  Procedure:  Sedation: no   Approach:left paramedian  Guidance: fluoroscopy  Location:lumbar  Level:4-5  Needle Type:Anisa  Needle Gauge:17 G  Cath Depth at skin:8 cm  Aspiration:negative  Test Dose:negative  Medications:  Depomedrol:80 mg    Post Assessment:  Dressing:occlusive dressing applied  Pt Tolerance:patient tolerated the procedure well with no apparent complications  Complications:no

## 2018-02-21 NOTE — H&P
H&P  Date of Service: 2017 12:42 PM  Tomas Tabares MD   Anesthesiology   Expand All Collapse All    []Hide copied text  []Ava for attribution information     [facility]     History and Physical     Patient Name: Junito Sorto  :  1940  MRN:  [id number]  Date of Admission: 2017        Subjective         Patient is a 76 y.o. male presents with chief complaint of chronic low back, hips: right and buttock pain.  Onset of symptoms was gradual starting several years ago.  Symptoms are associated/aggravated by nothing in particular. Symptoms improve with injection  He is experiencing low back pain is similar to what he had approximately 10 years ago.  This responded quite well to epidural steroid injections at that time.  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  The following portions of the patients history were reviewed and updated as appropriate: current medications, allergies, past medical history, past surgical history, past family history, past social history and problem list                          Objective         Past Medical History:    Medical History         Past Medical History:   Diagnosis Date   • Hyperlipidemia     • Hypertension     • Low back pain     • Prostate cancer     • Skin cancer           Past Surgical History:    Surgical History          Past Surgical History:   Procedure Laterality Date   • PROSTATE SURGERY   2014   • SKIN CANCER EXCISION         multiple, over that last few years   • TONSILLECTOMY         around 1950         Family History:          Family History   Problem Relation Age of Onset   • Cancer Mother         pancreatic   • Cancer Father         prostate, bladder, colon, lymphoma, leukemia   • Cancer Brother         prostate   • Cancer Paternal Grandfather         prostate      Social History:           Social History   Substance  "Use Topics   • Smoking status: Former Smoker       Types: Pipe   • Smokeless tobacco: Former User         Comment: only in college   • Alcohol use Yes          Comment: SOCIAL         Vital Signs Range for the last 24 hours  Temperature: Temp:  [36.6 °C (97.9 °F)] 36.6 °C (97.9 °F)   Temp Source: Temp src: Oral   BP: BP: (146)/(78) 146/78   Pulse: Heart Rate:  [58] 58   Respirations: Resp:  [16] 16   SPO2: SpO2:  [96 %] 96 %   O2 Amount (l/min):    O2 Devices O2 Device: room air   Weight: Weight:  [240 lb (109 kg)] 240 lb (109 kg)      Flowsheet Rows           First Filed Value     Admission Height   68\" (172.7 cm) Documented at 08/09/2017 1226     Admission Weight   240 lb (109 kg) Documented at 08/09/2017 1226            --------------------------------------------------------------------------------      Current Medications           Current Outpatient Prescriptions   Medication Sig Dispense Refill   • amLODIPine-benazepril (LOTREL 5-20) 5-20 MG per capsule TAKE ONE CAPSULE BY MOUTH DAILY 90 capsule 0   • aspirin 81 MG tablet Take  by mouth.       • atorvastatin (LIPITOR) 40 MG tablet Take 40 mg by mouth Daily.       • Cholecalciferol (VITAMIN D3) 2000 UNITS tablet Take  by mouth.       • guaiFENesin (MUCINEX) 600 MG 12 hr tablet Take 1,200 mg by mouth 2 (two) times a day.       • Loratadine (LORADAMED PO) Take  by mouth.       • propranolol LA (INDERAL LA) 80 MG 24 hr capsule TAKE ONE CAPSULE BY MOUTH DAILY 90 capsule 0   • tamsulosin (FLOMAX) 0.4 MG capsule 24 hr capsule Take 1 capsule by mouth Every Night.       • vitamin E 400 UNIT capsule Take 400 Units by mouth daily.          No current facility-administered medications for this encounter.             --------------------------------------------------------------------------------     Assessment/Plan           Anesthesia Evaluation  NPO Solid Status: > 8 hours  NPO Liquid Status: > 8 hours      Airway   Mallampati: II  TM distance: >3 FB  no difficulty " expected  Dental      Pulmonary - negative pulmonary ROS and normal exam   (-) wheezes  Cardiovascular     Rhythm: regular  (+) hypertension, dysrhythmias, hyperlipidemia  (-) murmur, peripheral edema    PE comment: Lower extremities are warm.  There is trace pitting edema bilaterally, this is equal.  Dorsal pedal pulses are palpable.    Neuro/Psych- negative ROS    PE Comment: Denies any sensory deficits, was unable to elicit any deficits in the lumbar dermatomes.  GI/Hepatic/Renal/Endo    (+) obesity, morbid obesity,     Musculoskeletal     (-) straight leg test  Abdominal    Substance History  OB/GYN       Other                                        Diagnosis and Plan     Treatment Plan  ASA 3      Procedures: Lumbar Epidural Steroid Injection(LESI), With fluoroscopy, Without sedation           Diagnosis     * Lumbar degenerative disc disease [M51.36]     * Lumbago [M54.5]     * Lumbar spondylosis [M47.816]     * Spondylolisthesis, lumbar region [M43.16]                                            Good pain relief of over 50% for 6 months

## 2018-02-28 LAB
25(OH)D3+25(OH)D2 SERPL-MCNC: 35 NG/ML (ref 30–100)
ALBUMIN SERPL-MCNC: 4 G/DL (ref 3.5–5.2)
ALBUMIN/GLOB SERPL: 1.7 G/DL
ALP SERPL-CCNC: 74 U/L (ref 39–117)
ALT SERPL-CCNC: 18 U/L (ref 1–41)
AST SERPL-CCNC: 20 U/L (ref 1–40)
BASOPHILS # BLD AUTO: 0.03 10*3/MM3 (ref 0–0.2)
BASOPHILS NFR BLD AUTO: 0.4 % (ref 0–1.5)
BILIRUB SERPL-MCNC: 1.4 MG/DL (ref 0.1–1.2)
BUN SERPL-MCNC: 17 MG/DL (ref 8–23)
BUN/CREAT SERPL: 20 (ref 7–25)
CALCIUM SERPL-MCNC: 9.6 MG/DL (ref 8.6–10.5)
CHLORIDE SERPL-SCNC: 103 MMOL/L (ref 98–107)
CHOLEST SERPL-MCNC: 130 MG/DL (ref 0–200)
CO2 SERPL-SCNC: 28.9 MMOL/L (ref 22–29)
CREAT SERPL-MCNC: 0.85 MG/DL (ref 0.76–1.27)
EOSINOPHIL # BLD AUTO: 0.23 10*3/MM3 (ref 0–0.7)
EOSINOPHIL NFR BLD AUTO: 3.1 % (ref 0.3–6.2)
ERYTHROCYTE [DISTWIDTH] IN BLOOD BY AUTOMATED COUNT: 13.5 % (ref 11.5–14.5)
GFR SERPLBLD CREATININE-BSD FMLA CKD-EPI: 106 ML/MIN/1.73
GFR SERPLBLD CREATININE-BSD FMLA CKD-EPI: 87 ML/MIN/1.73
GLOBULIN SER CALC-MCNC: 2.4 GM/DL
GLUCOSE SERPL-MCNC: 92 MG/DL (ref 65–99)
HCT VFR BLD AUTO: 47.8 % (ref 40.4–52.2)
HDLC SERPL-MCNC: 57 MG/DL (ref 40–60)
HGB BLD-MCNC: 15.4 G/DL (ref 13.7–17.6)
IMM GRANULOCYTES # BLD: 0 10*3/MM3 (ref 0–0.03)
IMM GRANULOCYTES NFR BLD: 0 % (ref 0–0.5)
LDLC SERPL CALC-MCNC: 61 MG/DL (ref 0–100)
LDLC/HDLC SERPL: 1.07 {RATIO}
LYMPHOCYTES # BLD AUTO: 1.69 10*3/MM3 (ref 0.9–4.8)
LYMPHOCYTES NFR BLD AUTO: 22.6 % (ref 19.6–45.3)
MCH RBC QN AUTO: 29.8 PG (ref 27–32.7)
MCHC RBC AUTO-ENTMCNC: 32.2 G/DL (ref 32.6–36.4)
MCV RBC AUTO: 92.5 FL (ref 79.8–96.2)
MONOCYTES # BLD AUTO: 0.65 10*3/MM3 (ref 0.2–1.2)
MONOCYTES NFR BLD AUTO: 8.7 % (ref 5–12)
NEUTROPHILS # BLD AUTO: 4.89 10*3/MM3 (ref 1.9–8.1)
NEUTROPHILS NFR BLD AUTO: 65.2 % (ref 42.7–76)
PLATELET # BLD AUTO: 197 10*3/MM3 (ref 140–500)
POTASSIUM SERPL-SCNC: 4.2 MMOL/L (ref 3.5–5.2)
PROT SERPL-MCNC: 6.4 G/DL (ref 6–8.5)
RBC # BLD AUTO: 5.17 10*6/MM3 (ref 4.6–6)
SODIUM SERPL-SCNC: 144 MMOL/L (ref 136–145)
TRIGL SERPL-MCNC: 59 MG/DL (ref 0–150)
VLDLC SERPL CALC-MCNC: 11.8 MG/DL (ref 5–40)
WBC # BLD AUTO: 7.49 10*3/MM3 (ref 4.5–10.7)

## 2018-03-07 ENCOUNTER — OFFICE VISIT (OUTPATIENT)
Dept: FAMILY MEDICINE CLINIC | Facility: CLINIC | Age: 78
End: 2018-03-07

## 2018-03-07 VITALS
TEMPERATURE: 97.7 F | BODY MASS INDEX: 36.74 KG/M2 | SYSTOLIC BLOOD PRESSURE: 136 MMHG | HEART RATE: 57 BPM | HEIGHT: 68 IN | RESPIRATION RATE: 16 BRPM | WEIGHT: 242.4 LBS | OXYGEN SATURATION: 97 % | DIASTOLIC BLOOD PRESSURE: 77 MMHG

## 2018-03-07 DIAGNOSIS — I48.0 PAROXYSMAL ATRIAL FIBRILLATION (HCC): ICD-10-CM

## 2018-03-07 DIAGNOSIS — Z23 NEED FOR VACCINATION: Primary | ICD-10-CM

## 2018-03-07 DIAGNOSIS — E78.00 HYPERCHOLESTEROLEMIA: ICD-10-CM

## 2018-03-07 DIAGNOSIS — I10 BENIGN ESSENTIAL HYPERTENSION: ICD-10-CM

## 2018-03-07 PROCEDURE — 90670 PCV13 VACCINE IM: CPT | Performed by: INTERNAL MEDICINE

## 2018-03-07 PROCEDURE — 99214 OFFICE O/P EST MOD 30 MIN: CPT | Performed by: INTERNAL MEDICINE

## 2018-03-07 PROCEDURE — G0009 ADMIN PNEUMOCOCCAL VACCINE: HCPCS | Performed by: INTERNAL MEDICINE

## 2018-03-07 NOTE — PROGRESS NOTES
Subjective   Junito Sorto is a 77 y.o. male. Patient is here today for   Chief Complaint   Patient presents with   • Follow-up     HYPERTENSION          Vitals:    03/07/18 0821   BP: 136/77   Pulse: 57   Resp: 16   Temp: 97.7 °F (36.5 °C)   SpO2: 97%       Past Medical History:   Diagnosis Date   • Hyperlipidemia    • Hypertension    • Low back pain    • Prostate cancer    • Skin cancer       No Known Allergies   Social History     Social History   • Marital status:      Spouse name: N/A   • Number of children: N/A   • Years of education: N/A     Occupational History   • Not on file.     Social History Main Topics   • Smoking status: Former Smoker     Types: Pipe   • Smokeless tobacco: Former User      Comment: only in college   • Alcohol use Yes      Comment: SOCIAL   • Drug use: No   • Sexual activity: Defer     Other Topics Concern   • Not on file     Social History Narrative        Current Outpatient Prescriptions:   •  albuterol (PROVENTIL HFA;VENTOLIN HFA) 108 (90 Base) MCG/ACT inhaler, Inhale 2 puffs Every 4 (Four) Hours As Needed for Wheezing., Disp: 1 inhaler, Rfl: 0  •  amLODIPine-benazepril (LOTREL 5-20) 5-20 MG per capsule, TAKE ONE CAPSULE BY MOUTH DAILY, Disp: 90 capsule, Rfl: 0  •  aspirin 81 MG tablet, Take  by mouth., Disp: , Rfl:   •  atorvastatin (LIPITOR) 40 MG tablet, TAKE ONE TABLET BY MOUTH DAILY, Disp: 90 tablet, Rfl: 0  •  Cholecalciferol (VITAMIN D3) 2000 UNITS tablet, Take  by mouth., Disp: , Rfl:   •  guaiFENesin (MUCINEX) 600 MG 12 hr tablet, Take 1,200 mg by mouth 2 (two) times a day., Disp: , Rfl:   •  Loratadine (LORADAMED PO), Take  by mouth., Disp: , Rfl:   •  propranolol LA (INDERAL LA) 80 MG 24 hr capsule, TAKE ONE CAPSULE BY MOUTH DAILY, Disp: 90 capsule, Rfl: 0  •  tamsulosin (FLOMAX) 0.4 MG capsule 24 hr capsule, Take 1 capsule by mouth Every Night., Disp: , Rfl:   •  vitamin E 100 UNIT capsule, Take 100 Units by mouth Daily., Disp: , Rfl:      Objective     HPI  Comments: He is here follow-up on hypertension, and hypercholesterolemia.    Dr. Sergio Bedolla follows him for prostate cancer.    He has paroxysmal atrial fibrillation and as at risk for emboli.  He refuses to be on medication to manage this possibility (he refuses warfarin, xarelto etc.).    He has chronic lumbar spine pain with right lower extremity radicular pain.  He has had good success for management of this pain with lumbar epidural steroid injections.  He is not having pain now but if he does have pain in the future, I asked him to call me and let me know so I can refer him to the pain clinic.       Review of Systems   Constitutional: Negative.    HENT: Negative.    Respiratory: Negative.    Cardiovascular: Negative.    Musculoskeletal: Negative.    Psychiatric/Behavioral: Negative.        Physical Exam   Constitutional: He is oriented to person, place, and time. He appears well-developed and well-nourished.   HENT:   Head: Normocephalic and atraumatic.   Neck:   No carotid bruits.   Cardiovascular: Normal rate, regular rhythm and normal heart sounds.    No murmur heard.  Pulmonary/Chest: Effort normal and breath sounds normal.   Neurological: He is alert and oriented to person, place, and time.   Psychiatric: He has a normal mood and affect. His behavior is normal.   Nursing note and vitals reviewed.        Problem List Items Addressed This Visit        Cardiovascular and Mediastinum    Paroxysmal atrial fibrillation    Benign essential hypertension    Hypercholesterolemia - Primary            PLAN  He has paroxysmal atrial fibrillation and is currently in a normal sinus rhythm.    His hypertension is well-controlled.    His hypercholesterolemia is well-controlled on atorvastatin 40 mg once daily.    I asked him to follow-up in 6 months.  Usually he should have some labs prior to that visit: Lipid profile, comprehensive metabolic panel.    If he were to call and leave a message that he needed attention  regarding his back pain and right lower extremity radicular pain, I would refer him to pain clinic.  No Follow-up on file.

## 2018-05-01 RX ORDER — PROPRANOLOL HYDROCHLORIDE 80 MG/1
CAPSULE, EXTENDED RELEASE ORAL
Qty: 90 CAPSULE | Refills: 0 | OUTPATIENT
Start: 2018-05-01

## 2018-05-01 RX ORDER — ATORVASTATIN CALCIUM 40 MG/1
TABLET, FILM COATED ORAL
Qty: 90 TABLET | Refills: 0 | OUTPATIENT
Start: 2018-05-01

## 2018-05-01 RX ORDER — AMLODIPINE BESYLATE AND BENAZEPRIL HYDROCHLORIDE 5; 20 MG/1; MG/1
CAPSULE ORAL
Qty: 90 CAPSULE | Refills: 0 | Status: SHIPPED | OUTPATIENT
Start: 2018-05-01 | End: 2018-08-04 | Stop reason: SDUPTHER

## 2018-05-01 RX ORDER — ATORVASTATIN CALCIUM 40 MG/1
TABLET, FILM COATED ORAL
Qty: 90 TABLET | Refills: 0 | Status: SHIPPED | OUTPATIENT
Start: 2018-05-01 | End: 2018-08-04 | Stop reason: SDUPTHER

## 2018-05-01 RX ORDER — AMLODIPINE BESYLATE AND BENAZEPRIL HYDROCHLORIDE 5; 20 MG/1; MG/1
CAPSULE ORAL
Qty: 90 CAPSULE | Refills: 0 | OUTPATIENT
Start: 2018-05-01

## 2018-05-01 RX ORDER — PROPRANOLOL HYDROCHLORIDE 80 MG/1
CAPSULE, EXTENDED RELEASE ORAL
Qty: 90 CAPSULE | Refills: 0 | Status: SHIPPED | OUTPATIENT
Start: 2018-05-01 | End: 2018-08-04 | Stop reason: SDUPTHER

## 2018-08-06 RX ORDER — ATORVASTATIN CALCIUM 40 MG/1
TABLET, FILM COATED ORAL
Qty: 90 TABLET | Refills: 0 | Status: SHIPPED | OUTPATIENT
Start: 2018-08-06 | End: 2018-11-01 | Stop reason: SDUPTHER

## 2018-08-06 RX ORDER — AMLODIPINE BESYLATE AND BENAZEPRIL HYDROCHLORIDE 5; 20 MG/1; MG/1
CAPSULE ORAL
Qty: 90 CAPSULE | Refills: 0 | Status: SHIPPED | OUTPATIENT
Start: 2018-08-06 | End: 2018-11-01 | Stop reason: SDUPTHER

## 2018-08-06 RX ORDER — PROPRANOLOL HYDROCHLORIDE 80 MG/1
CAPSULE, EXTENDED RELEASE ORAL
Qty: 90 CAPSULE | Refills: 0 | Status: SHIPPED | OUTPATIENT
Start: 2018-08-06 | End: 2018-11-01 | Stop reason: SDUPTHER

## 2018-09-18 DIAGNOSIS — E78.5 HYPERLIPIDEMIA, UNSPECIFIED HYPERLIPIDEMIA TYPE: ICD-10-CM

## 2018-09-18 DIAGNOSIS — E78.5 HYPERLIPIDEMIA, UNSPECIFIED HYPERLIPIDEMIA TYPE: Primary | ICD-10-CM

## 2018-09-19 ENCOUNTER — OFFICE VISIT (OUTPATIENT)
Dept: FAMILY MEDICINE CLINIC | Facility: CLINIC | Age: 78
End: 2018-09-19

## 2018-09-19 VITALS
HEART RATE: 89 BPM | HEIGHT: 68 IN | BODY MASS INDEX: 36.65 KG/M2 | OXYGEN SATURATION: 96 % | DIASTOLIC BLOOD PRESSURE: 80 MMHG | RESPIRATION RATE: 16 BRPM | SYSTOLIC BLOOD PRESSURE: 130 MMHG | TEMPERATURE: 97.6 F | WEIGHT: 241.8 LBS

## 2018-09-19 DIAGNOSIS — J31.0 RHINOSINUSITIS: Primary | ICD-10-CM

## 2018-09-19 DIAGNOSIS — J32.9 RHINOSINUSITIS: Primary | ICD-10-CM

## 2018-09-19 DIAGNOSIS — I10 BENIGN ESSENTIAL HYPERTENSION: ICD-10-CM

## 2018-09-19 LAB
ALBUMIN SERPL-MCNC: 4.3 G/DL (ref 3.5–5.2)
ALBUMIN/GLOB SERPL: 1.7 G/DL
ALP SERPL-CCNC: 78 U/L (ref 39–117)
ALT SERPL-CCNC: 24 U/L (ref 1–41)
AST SERPL-CCNC: 24 U/L (ref 1–40)
BILIRUB SERPL-MCNC: 1.4 MG/DL (ref 0.1–1.2)
BUN SERPL-MCNC: 10 MG/DL (ref 8–23)
BUN/CREAT SERPL: 12 (ref 7–25)
CALCIUM SERPL-MCNC: 9.7 MG/DL (ref 8.6–10.5)
CHLORIDE SERPL-SCNC: 102 MMOL/L (ref 98–107)
CHOLEST SERPL-MCNC: 118 MG/DL (ref 0–200)
CO2 SERPL-SCNC: 28.5 MMOL/L (ref 22–29)
CREAT SERPL-MCNC: 0.83 MG/DL (ref 0.76–1.27)
GLOBULIN SER CALC-MCNC: 2.6 GM/DL
GLUCOSE SERPL-MCNC: 100 MG/DL (ref 65–99)
HDLC SERPL-MCNC: 47 MG/DL (ref 40–60)
LDLC SERPL CALC-MCNC: 57 MG/DL (ref 0–100)
LDLC/HDLC SERPL: 1.21 {RATIO}
POTASSIUM SERPL-SCNC: 4.2 MMOL/L (ref 3.5–5.2)
PROT SERPL-MCNC: 6.9 G/DL (ref 6–8.5)
SODIUM SERPL-SCNC: 143 MMOL/L (ref 136–145)
TRIGL SERPL-MCNC: 70 MG/DL (ref 0–150)
VLDLC SERPL CALC-MCNC: 14 MG/DL (ref 5–40)

## 2018-09-19 PROCEDURE — 99214 OFFICE O/P EST MOD 30 MIN: CPT | Performed by: INTERNAL MEDICINE

## 2018-09-19 RX ORDER — PROMETHAZINE HYDROCHLORIDE AND CODEINE PHOSPHATE 6.25; 1 MG/5ML; MG/5ML
5 SYRUP ORAL EVERY 4 HOURS PRN
Qty: 180 ML | Refills: 0 | Status: SHIPPED | OUTPATIENT
Start: 2018-09-19 | End: 2019-01-21

## 2018-09-19 RX ORDER — AMOXICILLIN AND CLAVULANATE POTASSIUM 875; 125 MG/1; MG/1
1 TABLET, FILM COATED ORAL 2 TIMES DAILY
Qty: 14 TABLET | Refills: 0 | Status: SHIPPED | OUTPATIENT
Start: 2018-09-19 | End: 2018-09-26

## 2018-09-23 PROBLEM — J32.9 RHINOSINUSITIS: Status: ACTIVE | Noted: 2018-09-23

## 2018-09-23 PROBLEM — J31.0 RHINOSINUSITIS: Status: ACTIVE | Noted: 2018-09-23

## 2018-09-23 NOTE — PROGRESS NOTES
Subjective   Junito Sorto is a 77 y.o. male. Patient is here today for   Chief Complaint   Patient presents with   • Cough     pt states has been going on sep 1st    • Ear Fullness   • nasal drainage   • chest congestion   • sneezing          Vitals:    09/19/18 1303   BP: 130/80   Pulse: 89   Resp: 16   Temp: 97.6 °F (36.4 °C)   SpO2: 96%       Past Medical History:   Diagnosis Date   • Hyperlipidemia    • Hypertension    • Low back pain    • Prostate cancer (CMS/HCC)    • Skin cancer       No Known Allergies   Social History     Social History   • Marital status:      Spouse name: N/A   • Number of children: N/A   • Years of education: N/A     Occupational History   • Not on file.     Social History Main Topics   • Smoking status: Former Smoker     Types: Pipe   • Smokeless tobacco: Former User      Comment: only in college   • Alcohol use Yes      Comment: SOCIAL   • Drug use: No   • Sexual activity: Defer     Other Topics Concern   • Not on file     Social History Narrative   • No narrative on file        Current Outpatient Prescriptions:   •  albuterol (PROVENTIL HFA;VENTOLIN HFA) 108 (90 Base) MCG/ACT inhaler, Inhale 2 puffs Every 4 (Four) Hours As Needed for Wheezing., Disp: 1 inhaler, Rfl: 0  •  amLODIPine-benazepril (LOTREL 5-20) 5-20 MG per capsule, TAKE ONE CAPSULE BY MOUTH DAILY, Disp: 90 capsule, Rfl: 0  •  amoxicillin-clavulanate (AUGMENTIN) 875-125 MG per tablet, Take 1 tablet by mouth 2 (Two) Times a Day., Disp: 14 tablet, Rfl: 0  •  aspirin 81 MG tablet, Take  by mouth., Disp: , Rfl:   •  atorvastatin (LIPITOR) 40 MG tablet, TAKE ONE TABLET BY MOUTH DAILY, Disp: 90 tablet, Rfl: 0  •  Cholecalciferol (VITAMIN D3) 2000 UNITS tablet, Take  by mouth., Disp: , Rfl:   •  guaiFENesin (MUCINEX) 600 MG 12 hr tablet, Take 1,200 mg by mouth 2 (two) times a day., Disp: , Rfl:   •  Loratadine (LORADAMED PO), Take  by mouth., Disp: , Rfl:   •  promethazine-codeine (PHENERGAN with CODEINE) 6.25-10  MG/5ML syrup, Take 5 mL by mouth Every 4 (Four) Hours As Needed for Cough., Disp: 180 mL, Rfl: 0  •  propranolol LA (INDERAL LA) 80 MG 24 hr capsule, TAKE ONE CAPSULE BY MOUTH DAILY, Disp: 90 capsule, Rfl: 0  •  tamsulosin (FLOMAX) 0.4 MG capsule 24 hr capsule, Take 1 capsule by mouth Every Night., Disp: , Rfl:   •  vitamin E 100 UNIT capsule, Take 100 Units by mouth Daily., Disp: , Rfl:      Objective     He has had a productive cough, nasal congestion, or sore throat etc. since the first of this month.    He denies any fevers or chills or dyspnea.      Cough     Ear Fullness    Associated symptoms include coughing.        Review of Systems   Constitutional: Negative.    HENT: Negative.    Respiratory: Positive for cough.    Cardiovascular: Negative.    Musculoskeletal: Negative.        Physical Exam   Constitutional: He is oriented to person, place, and time. He appears well-developed and well-nourished.   Pleasant, neatly groomed, BMI 36.   HENT:   Head: Normocephalic and atraumatic.   Mouth/Throat: Oropharynx is clear and moist.   He has pain on palpation of the maxillary sinuses.   Cardiovascular: Normal rate, regular rhythm and normal heart sounds.  Exam reveals no friction rub.    No murmur heard.  Pulmonary/Chest: Effort normal.   He has some coarse rhonchi which clear with coughing bilaterally.   Neurological: He is alert and oriented to person, place, and time.   Psychiatric: He has a normal mood and affect. His behavior is normal.   Nursing note and vitals reviewed.        Problem List Items Addressed This Visit        Cardiovascular and Mediastinum    Benign essential hypertension       Respiratory    Rhinosinusitis - Primary            PLAN  His hypertension is well-controlled.    He has rhinosinusitis which I believe is bacterial.  Given a prescription for Augmentin.  For the cough, he may use Phenergan with codeine cough syrup when necessary.  I cautioned him to take this sparingly.  It's main benefit  would be to decrease his coughing at night so he can sleep soundly.  I cautioned him that it may make it difficult for him to urinate, and make him constipated.  In that case, he will not use it.    Follow-up as previously arranged.  No Follow-up on file.

## 2018-09-26 ENCOUNTER — OFFICE VISIT (OUTPATIENT)
Dept: FAMILY MEDICINE CLINIC | Facility: CLINIC | Age: 78
End: 2018-09-26

## 2018-09-26 VITALS
TEMPERATURE: 97.7 F | SYSTOLIC BLOOD PRESSURE: 118 MMHG | RESPIRATION RATE: 16 BRPM | HEART RATE: 56 BPM | OXYGEN SATURATION: 95 % | WEIGHT: 241 LBS | HEIGHT: 68 IN | DIASTOLIC BLOOD PRESSURE: 84 MMHG | BODY MASS INDEX: 36.53 KG/M2

## 2018-09-26 DIAGNOSIS — E78.00 HYPERCHOLESTEROLEMIA: ICD-10-CM

## 2018-09-26 DIAGNOSIS — Z23 NEED FOR VACCINATION: Primary | ICD-10-CM

## 2018-09-26 DIAGNOSIS — I10 BENIGN ESSENTIAL HYPERTENSION: ICD-10-CM

## 2018-09-26 DIAGNOSIS — I48.0 PAROXYSMAL ATRIAL FIBRILLATION (HCC): ICD-10-CM

## 2018-09-26 PROCEDURE — G0008 ADMIN INFLUENZA VIRUS VAC: HCPCS | Performed by: INTERNAL MEDICINE

## 2018-09-26 PROCEDURE — 90662 IIV NO PRSV INCREASED AG IM: CPT | Performed by: INTERNAL MEDICINE

## 2018-09-26 PROCEDURE — 99213 OFFICE O/P EST LOW 20 MIN: CPT | Performed by: INTERNAL MEDICINE

## 2018-09-26 RX ORDER — BETAMETHASONE DIPROPIONATE 0.5 MG/G
1 CREAM TOPICAL AS NEEDED
COMMUNITY
End: 2019-07-22

## 2018-09-26 RX ORDER — DESOXIMETASONE 2.5 MG/G
1 CREAM TOPICAL AS NEEDED
COMMUNITY
End: 2019-07-22
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
## 2018-10-08 ENCOUNTER — TELEPHONE (OUTPATIENT)
Dept: FAMILY MEDICINE CLINIC | Facility: CLINIC | Age: 78
End: 2018-10-08

## 2018-10-08 DIAGNOSIS — R13.10 DYSPHAGIA, UNSPECIFIED TYPE: Primary | ICD-10-CM

## 2018-10-08 NOTE — TELEPHONE ENCOUNTER
Per Dr. Jazz Barrett Gastroenterologist Swallowing difficulties is a job for gastro. Dx: Dysphagia  ----- Message from Maricruz Robison sent at 10/5/2018  9:51 AM EDT -----  PATIENT CALLED AND SAID THAT DR NUNEZ ASKED ABOUT HOW THE PT SWALLOW AND SINCE HIS LAST VISIT HE HAS HAD A FEW EPISODES. PT WANTED TO KNOW IF DR NUNEZ COULD RECOMMINED A THROAT DR FOR HIM.     PLEASE CALL PT BACK [phone]

## 2018-10-09 ENCOUNTER — OFFICE VISIT (OUTPATIENT)
Dept: GASTROENTEROLOGY | Facility: CLINIC | Age: 78
End: 2018-10-09

## 2018-10-09 VITALS
BODY MASS INDEX: 36.68 KG/M2 | DIASTOLIC BLOOD PRESSURE: 78 MMHG | WEIGHT: 242 LBS | SYSTOLIC BLOOD PRESSURE: 118 MMHG | HEIGHT: 68 IN | TEMPERATURE: 98.3 F

## 2018-10-09 DIAGNOSIS — R13.19 ESOPHAGEAL DYSPHAGIA: Primary | ICD-10-CM

## 2018-10-09 PROCEDURE — 99203 OFFICE O/P NEW LOW 30 MIN: CPT | Performed by: INTERNAL MEDICINE

## 2018-10-17 ENCOUNTER — HOSPITAL ENCOUNTER (OUTPATIENT)
Dept: GENERAL RADIOLOGY | Facility: HOSPITAL | Age: 78
Discharge: HOME | End: 2018-10-17
Attending: INTERNAL MEDICINE | Admitting: INTERNAL MEDICINE

## 2018-10-17 DIAGNOSIS — R13.19 ESOPHAGEAL DYSPHAGIA: ICD-10-CM

## 2018-10-17 PROCEDURE — 63710000001 BARIUM SULFATE 700 MG TABLET: Performed by: INTERNAL MEDICINE

## 2018-10-17 PROCEDURE — A9270 NON-COVERED ITEM OR SERVICE: HCPCS | Performed by: INTERNAL MEDICINE

## 2018-10-17 PROCEDURE — 63710000001 SOD BICARB-CITRIC ACID-SIMETHICONE 2.21-1.53-0.04 G PACK: Performed by: INTERNAL MEDICINE

## 2018-10-17 PROCEDURE — 63710000001 BARIUM SULFATE 96 % RECONSTITUTED SUSPENSION: Performed by: INTERNAL MEDICINE

## 2018-10-17 PROCEDURE — 63710000001 BARIUM SULFATE 98 % RECONSTITUTED SUSPENSION: Performed by: INTERNAL MEDICINE

## 2018-10-17 PROCEDURE — 74220 X-RAY XM ESOPHAGUS 1CNTRST: CPT

## 2018-10-17 RX ADMIN — BARIUM SULFATE 700 MG: 700 TABLET ORAL at 08:42

## 2018-10-17 RX ADMIN — BARIUM SULFATE 135 ML: 980 POWDER, FOR SUSPENSION ORAL at 08:42

## 2018-10-17 RX ADMIN — ANTACID/ANTIFLATULENT 1 TABLET: 380; 550; 10; 10 GRANULE, EFFERVESCENT ORAL at 08:41

## 2018-10-17 RX ADMIN — BARIUM SULFATE 183 ML: 960 POWDER, FOR SUSPENSION ORAL at 08:42

## 2018-10-23 NOTE — PROGRESS NOTES
Chief Complaint   Patient presents with   • Difficulty Swallowing     Referral     Junito Sorto is a 78 y.o. male who presents with a history of intermittent dysphagia   HPI     Patient 78-year-old male with history of coronary artery disease, atrial fibrillation, hyperlipidemia, hypertension and GERD presenting with intermittent dysphagia.  Patient reports dysphagia to solids and occasionally liquids but an intermittent basis.  Patient's never had food getting caught but food feels like it gets stuck and then passes with time.  Patient reports no chest pain or palpitations no myalgias or arthralgias no dysuria polyuria no skin rashes skin lesions.  Patient also noted on labs mildly elevated bilirubin.  Patient here for further recommendations.  Patient currently not on any medications for reflux.    Past Medical History:   Diagnosis Date   • Colon polyp    • Coronary artery disease Few years    Afib   • GERD (gastroesophageal reflux disease) 2013    Mentioned as possible   • Hyperlipidemia    • Hypertension    • Low back pain    • Prostate cancer (CMS/HCC)    • Skin cancer        Current Outpatient Prescriptions:   •  amLODIPine-benazepril (LOTREL 5-20) 5-20 MG per capsule, TAKE ONE CAPSULE BY MOUTH DAILY, Disp: 90 capsule, Rfl: 0  •  aspirin 81 MG tablet, Take  by mouth., Disp: , Rfl:   •  atorvastatin (LIPITOR) 40 MG tablet, TAKE ONE TABLET BY MOUTH DAILY, Disp: 90 tablet, Rfl: 0  •  betamethasone dipropionate (DIPROLENE) 0.05 % cream, Apply  topically to the appropriate area as directed As Needed., Disp: , Rfl:   •  Cholecalciferol (VITAMIN D3) 2000 UNITS tablet, Take  by mouth., Disp: , Rfl:   •  desoximetasone (TOPICORT) 0.25 % cream, Apply  topically to the appropriate area as directed 2 (Two) Times a Day As Needed for Irritation., Disp: , Rfl:   •  guaiFENesin (MUCINEX) 600 MG 12 hr tablet, Take 400 mg by mouth 2 (Two) Times a Day., Disp: , Rfl:   •  loratadine (LORADAMED) 10 MG tablet, Take 1 tablet by  mouth Daily., Disp: , Rfl:   •  promethazine-codeine (PHENERGAN with CODEINE) 6.25-10 MG/5ML syrup, Take 5 mL by mouth Every 4 (Four) Hours As Needed for Cough., Disp: 180 mL, Rfl: 0  •  propranolol LA (INDERAL LA) 80 MG 24 hr capsule, TAKE ONE CAPSULE BY MOUTH DAILY, Disp: 90 capsule, Rfl: 0  •  tamsulosin (FLOMAX) 0.4 MG capsule 24 hr capsule, Take 2 capsules by mouth Every Night., Disp: , Rfl:   •  vitamin E 100 UNIT capsule, Take 400 Units by mouth Daily., Disp: , Rfl:   No Known Allergies  Social History     Social History   • Marital status:      Spouse name: N/A   • Number of children: N/A   • Years of education: N/A     Occupational History   • Not on file.     Social History Main Topics   • Smoking status: Former Smoker     Types: Pipe   • Smokeless tobacco: Former User      Comment: only in college   • Alcohol use Yes     3 Glasses of wine per week      Comment: SOCIAL   • Drug use: No   • Sexual activity: Not Currently     Partners: Female     Birth control/ protection: Surgical, None      Comment: 50 years ago     Other Topics Concern   • Not on file     Social History Narrative   • No narrative on file     Family History   Problem Relation Age of Onset   • Cancer Mother         pancreatic   • Cancer Father         prostate, bladder, colon, lymphoma, leukemia   • Cancer Brother         prostate   • Cancer Paternal Grandfather         prostate     Review of Systems   Constitutional: Negative.    HENT: Positive for trouble swallowing. Negative for congestion, dental problem, drooling, ear pain, facial swelling, hearing loss, nosebleeds, postnasal drip, sinus pain, sinus pressure, sore throat, tinnitus and voice change.    Eyes: Negative.    Respiratory: Negative.    Cardiovascular: Negative.    Gastrointestinal: Negative.    Musculoskeletal: Negative.    Skin: Negative.    Allergic/Immunologic: Negative.    Hematological: Negative.      Vitals:    10/09/18 1531   BP: 118/78   Temp: 98.3 °F (36.8 °C)      Physical Exam   Constitutional: He is oriented to person, place, and time. He appears well-developed and well-nourished.   HENT:   Head: Normocephalic and atraumatic.   Eyes: Pupils are equal, round, and reactive to light. Conjunctivae and EOM are normal. No scleral icterus.   Neck: Normal range of motion. No tracheal deviation present.   Cardiovascular: Normal rate and regular rhythm.  Exam reveals no gallop and no friction rub.    No murmur heard.  Pulmonary/Chest: Effort normal and breath sounds normal. No respiratory distress. He has no rales.   Abdominal: Soft. Bowel sounds are normal. He exhibits no distension and no mass. There is no tenderness. There is no rebound and no guarding.   Musculoskeletal: Normal range of motion. He exhibits no edema.   Lymphadenopathy:     He has no cervical adenopathy.   Neurological: He is alert and oriented to person, place, and time. No cranial nerve deficit.   Skin: Skin is warm and dry.   Psychiatric: He has a normal mood and affect. His behavior is normal.   Nursing note and vitals reviewed.    Diagnoses and all orders for this visit:    Esophageal dysphagia  -     FL Esophagram Complete; Future  -     Case Request; Standing  -     Case Request    Other orders  -     Follow Anesthesia Guidelines / Standing Orders; Future  -     Implement Anesthesia orders day of procedure.; Standing  -     Obtain informed consent; Standing    Is an 78-year-old male with history hypertension, coronary artery disease and hyperlipidemia presenting with intermittent dysphagia and elevated bilirubin.  We will arrange esophagram and EGD to follow to evaluate these changes.  We will also plan on testing patient's labs particularly for Gilbert's the setting of elevated isolated total bilirubin.  We'll follow-up clinically based on findings at endoscopy and labs.

## 2018-10-31 ENCOUNTER — HOSPITAL ENCOUNTER (OUTPATIENT)
Facility: HOSPITAL | Age: 78
Setting detail: HOSPITAL OUTPATIENT SURGERY
Discharge: HOME | End: 2018-10-31
Attending: INTERNAL MEDICINE | Admitting: INTERNAL MEDICINE

## 2018-10-31 ENCOUNTER — ANESTHESIA EVENT (OUTPATIENT)
Dept: GASTROENTEROLOGY | Facility: HOSPITAL | Age: 78
End: 2018-10-31

## 2018-10-31 ENCOUNTER — ANESTHESIA (OUTPATIENT)
Dept: GASTROENTEROLOGY | Facility: HOSPITAL | Age: 78
End: 2018-10-31

## 2018-10-31 VITALS
OXYGEN SATURATION: 94 % | BODY MASS INDEX: 36.42 KG/M2 | WEIGHT: 240.31 LBS | SYSTOLIC BLOOD PRESSURE: 126 MMHG | HEIGHT: 68 IN | TEMPERATURE: 98.4 F | DIASTOLIC BLOOD PRESSURE: 102 MMHG | HEART RATE: 53 BPM | RESPIRATION RATE: 16 BRPM

## 2018-10-31 DIAGNOSIS — R13.19 ESOPHAGEAL DYSPHAGIA: ICD-10-CM

## 2018-10-31 LAB
CYTO UR: NORMAL
LAB AP CASE REPORT: NORMAL
PATH REPORT.FINAL DX SPEC: NORMAL
PATH REPORT.GROSS SPEC: NORMAL

## 2018-10-31 PROCEDURE — 88312 SPECIAL STAINS GROUP 1: CPT | Performed by: INTERNAL MEDICINE

## 2018-10-31 PROCEDURE — 43239 EGD BIOPSY SINGLE/MULTIPLE: CPT | Performed by: INTERNAL MEDICINE

## 2018-10-31 PROCEDURE — 88305 TISSUE EXAM BY PATHOLOGIST: CPT | Performed by: INTERNAL MEDICINE

## 2018-10-31 PROCEDURE — 25010000002 PROPOFOL 10 MG/ML EMULSION: Performed by: ANESTHESIOLOGY

## 2018-10-31 RX ORDER — LIDOCAINE HYDROCHLORIDE 10 MG/ML
0.5 INJECTION, SOLUTION INFILTRATION; PERINEURAL ONCE AS NEEDED
Status: DISCONTINUED | OUTPATIENT
Start: 2018-10-31 | End: 2018-10-31 | Stop reason: HOSPADM

## 2018-10-31 RX ORDER — PANTOPRAZOLE SODIUM 40 MG/1
40 TABLET, DELAYED RELEASE ORAL DAILY
Qty: 90 TABLET | Refills: 3 | Status: SHIPPED | OUTPATIENT
Start: 2018-10-31 | End: 2019-07-03

## 2018-10-31 RX ORDER — SODIUM CHLORIDE 0.9 % (FLUSH) 0.9 %
3 SYRINGE (ML) INJECTION AS NEEDED
Status: DISCONTINUED | OUTPATIENT
Start: 2018-10-31 | End: 2018-10-31 | Stop reason: HOSPADM

## 2018-10-31 RX ORDER — PROPOFOL 10 MG/ML
VIAL (ML) INTRAVENOUS AS NEEDED
Status: DISCONTINUED | OUTPATIENT
Start: 2018-10-31 | End: 2018-10-31 | Stop reason: SURG

## 2018-10-31 RX ORDER — SODIUM CHLORIDE, SODIUM LACTATE, POTASSIUM CHLORIDE, CALCIUM CHLORIDE 600; 310; 30; 20 MG/100ML; MG/100ML; MG/100ML; MG/100ML
1000 INJECTION, SOLUTION INTRAVENOUS CONTINUOUS
Status: DISCONTINUED | OUTPATIENT
Start: 2018-10-31 | End: 2018-10-31 | Stop reason: HOSPADM

## 2018-10-31 RX ORDER — SUCRALFATE 1 G/1
1 TABLET ORAL 4 TIMES DAILY
Qty: 120 TABLET | Refills: 0 | Status: SHIPPED | OUTPATIENT
Start: 2018-10-31 | End: 2018-11-26 | Stop reason: SDUPTHER

## 2018-10-31 RX ORDER — PROPOFOL 10 MG/ML
VIAL (ML) INTRAVENOUS CONTINUOUS PRN
Status: DISCONTINUED | OUTPATIENT
Start: 2018-10-31 | End: 2018-10-31 | Stop reason: SURG

## 2018-10-31 RX ADMIN — PROPOFOL 100 MG: 10 INJECTION, EMULSION INTRAVENOUS at 07:56

## 2018-10-31 RX ADMIN — SODIUM CHLORIDE, POTASSIUM CHLORIDE, SODIUM LACTATE AND CALCIUM CHLORIDE 1000 ML: 600; 310; 30; 20 INJECTION, SOLUTION INTRAVENOUS at 07:12

## 2018-10-31 RX ADMIN — PROPOFOL 100 MCG/KG/MIN: 10 INJECTION, EMULSION INTRAVENOUS at 07:56

## 2018-10-31 NOTE — ANESTHESIA POSTPROCEDURE EVALUATION
Patient: Junito Sorto    Procedure Summary     Date:  10/31/18 Room / Location:   [facility]    Anesthesia Start:  0754 Anesthesia Stop:  0816    Procedure:  egd with bx (N/A Esophagus) Diagnosis:       Esophageal dysphagia      Gastritis      Esophagitis      Hiatal hernia      (Esophageal dysphagia [R13.10])    Surgeon:  Steven Cadena MD Provider:  Emilee Carmen MD    Anesthesia Type:  MAC ASA Status:  3          Anesthesia Type: MAC  Last vitals  BP   153/90 (10/31/18 0705)   Temp   36.9 °C (98.4 °F) (10/31/18 0705)   Pulse   67 (10/31/18 0705)   Resp   16 (10/31/18 0705)     SpO2   99 % (10/31/18 0705)     Post Anesthesia Care and Evaluation    Patient location during evaluation: bedside  Patient participation: complete - patient participated  Level of consciousness: awake  Pain management: adequate  Airway patency: patent  Anesthetic complications: No anesthetic complications    Cardiovascular status: acceptable  Respiratory status: acceptable  Hydration status: acceptable

## 2018-10-31 NOTE — ANESTHESIA PREPROCEDURE EVALUATION
Anesthesia Evaluation                  Airway   Mallampati: III  TM distance: >3 FB  Neck ROM: full  No difficulty expected  Dental      Comment: Permanent implants      Pulmonary - normal exam   Cardiovascular - normal exam    (+) hypertension, CAD, dysrhythmias Atrial Fib,       Neuro/Psych  GI/Hepatic/Renal/Endo    (+) obesity,       Musculoskeletal     Abdominal    Substance History      OB/GYN          Other      history of cancer                  Anesthesia Plan    ASA 3     MAC     intravenous induction   Anesthetic plan, all risks, benefits, and alternatives have been provided, discussed and informed consent has been obtained with: patient.

## 2018-11-01 RX ORDER — AMLODIPINE BESYLATE AND BENAZEPRIL HYDROCHLORIDE 5; 20 MG/1; MG/1
CAPSULE ORAL
Qty: 90 CAPSULE | Refills: 2 | Status: SHIPPED | OUTPATIENT
Start: 2018-11-01 | End: 2019-07-03

## 2018-11-01 RX ORDER — PROPRANOLOL HYDROCHLORIDE 80 MG/1
CAPSULE, EXTENDED RELEASE ORAL
Qty: 90 CAPSULE | Refills: 2 | Status: SHIPPED | OUTPATIENT
Start: 2018-11-01 | End: 2019-07-03

## 2018-11-01 RX ORDER — ATORVASTATIN CALCIUM 40 MG/1
TABLET, FILM COATED ORAL
Qty: 90 TABLET | Refills: 2 | Status: SHIPPED | OUTPATIENT
Start: 2018-11-01 | End: 2019-07-03

## 2018-11-06 ENCOUNTER — TELEPHONE (OUTPATIENT)
Dept: GASTROENTEROLOGY | Facility: CLINIC | Age: 78
End: 2018-11-06

## 2018-11-06 NOTE — TELEPHONE ENCOUNTER
----- Message from Steven Cadena MD sent at 10/30/2018  8:31 AM EDT -----  Esophagram with mild narrowing consistent with reflux.  Proceed with EGD tomorrow to dilate and improve motility.

## 2018-11-13 ENCOUNTER — TELEPHONE (OUTPATIENT)
Dept: GASTROENTEROLOGY | Facility: CLINIC | Age: 78
End: 2018-11-13

## 2018-11-13 NOTE — TELEPHONE ENCOUNTER
----- Message from Steven Cadena MD sent at 11/12/2018 12:37 PM EST -----  Patient with gastritis and esophagitis H. pylori negative.  Continue medications and follow up in 3 months to see if improvement if not will change PPI.

## 2018-11-13 NOTE — TELEPHONE ENCOUNTER
Called pt... No answer; left a message that per Dr Cadena: biopsies from his upper scope showed gastritis (inflammation of the stomach) and esophagitis (inflammation of the esophagus) but were negative H. Pylori. Advised that MD recommends to continue the pantoprazole and Carafate and follow-up in 3 months (near the end of January) to see if symptoms have improved. Advised that if they have not, will change meds. Pt to call back to make appt.

## 2018-11-26 RX ORDER — SUCRALFATE 1 G/1
TABLET ORAL
Qty: 120 TABLET | Refills: 1 | Status: SHIPPED | OUTPATIENT
Start: 2018-11-26 | End: 2019-07-03

## 2018-11-26 NOTE — TELEPHONE ENCOUNTER
----- Message from Karoline Sears sent at 11/26/2018  8:09 AM EST -----  Regarding: appt  I scheduled pt appt with Dr Cadena and he is out of [facility] so he is having his pharmacy send over a request. Thx

## 2019-01-09 ENCOUNTER — OFFICE VISIT (OUTPATIENT)
Dept: FAMILY MEDICINE CLINIC | Facility: CLINIC | Age: 79
End: 2019-01-09

## 2019-01-09 VITALS
WEIGHT: 242.2 LBS | BODY MASS INDEX: 36.71 KG/M2 | OXYGEN SATURATION: 98 % | HEIGHT: 68 IN | DIASTOLIC BLOOD PRESSURE: 76 MMHG | RESPIRATION RATE: 16 BRPM | HEART RATE: 56 BPM | SYSTOLIC BLOOD PRESSURE: 116 MMHG | TEMPERATURE: 98 F

## 2019-01-09 DIAGNOSIS — H61.21 IMPACTED CERUMEN OF RIGHT EAR: Primary | ICD-10-CM

## 2019-01-09 PROCEDURE — 99213 OFFICE O/P EST LOW 20 MIN: CPT | Performed by: INTERNAL MEDICINE

## 2019-01-11 DIAGNOSIS — R73.9 HYPERGLYCEMIA: ICD-10-CM

## 2019-01-11 DIAGNOSIS — E78.00 HYPERCHOLESTEROLEMIA: Primary | ICD-10-CM

## 2019-01-13 PROBLEM — H61.21 IMPACTED CERUMEN OF RIGHT EAR: Status: ACTIVE | Noted: 2019-01-13

## 2019-01-13 NOTE — PROGRESS NOTES
Subjective   Junito Sorto is a 78 y.o. male. Patient is here today for   Chief Complaint   Patient presents with   • Hearing Loss     PT STATES HAVING ISSUES HEARING last night right ear           Vitals:    01/09/19 1016   BP: 116/76   Pulse: 56   Resp: 16   Temp: 98 °F (36.7 °C)   SpO2: 98%       Past Medical History:   Diagnosis Date   • Colon polyp    • Coronary artery disease Few years    Afib   • GERD (gastroesophageal reflux disease) 2013    Mentioned as possible   • Hyperlipidemia    • Hypertension    • Low back pain    • Prostate cancer (CMS/HCC)    • Skin cancer       No Known Allergies   Social History     Socioeconomic History   • Marital status:      Spouse name: Not on file   • Number of children: Not on file   • Years of education: Not on file   • Highest education level: Not on file   Social Needs   • Financial resource strain: Not on file   • Food insecurity - worry: Not on file   • Food insecurity - inability: Not on file   • Transportation needs - medical: Not on file   • Transportation needs - non-medical: Not on file   Occupational History   • Not on file   Tobacco Use   • Smoking status: Former Smoker     Types: Pipe   • Smokeless tobacco: Former User   • Tobacco comment: only in college   Substance and Sexual Activity   • Alcohol use: Yes     Types: 3 Glasses of wine per week     Comment: SOCIAL   • Drug use: No   • Sexual activity: Not Currently     Partners: Female     Birth control/protection: Surgical, None     Comment: 50 years ago   Other Topics Concern   • Not on file   Social History Narrative   • Not on file        Current Outpatient Medications:   •  amLODIPine-benazepril (LOTREL 5-20) 5-20 MG per capsule, TAKE ONE CAPSULE BY MOUTH DAILY, Disp: 90 capsule, Rfl: 2  •  aspirin 81 MG tablet, Take  by mouth., Disp: , Rfl:   •  atorvastatin (LIPITOR) 40 MG tablet, TAKE ONE TABLET BY MOUTH DAILY, Disp: 90 tablet, Rfl: 2  •  betamethasone dipropionate (DIPROLENE) 0.05 % cream,  Apply  topically to the appropriate area as directed As Needed., Disp: , Rfl:   •  Cholecalciferol (VITAMIN D3) 2000 UNITS tablet, Take  by mouth., Disp: , Rfl:   •  desoximetasone (TOPICORT) 0.25 % cream, Apply  topically to the appropriate area as directed 2 (Two) Times a Day As Needed for Irritation., Disp: , Rfl:   •  guaiFENesin (MUCINEX) 600 MG 12 hr tablet, Take 400 mg by mouth 2 (Two) Times a Day., Disp: , Rfl:   •  loratadine (LORADAMED) 10 MG tablet, Take 1 tablet by mouth Daily., Disp: , Rfl:   •  pantoprazole (PROTONIX) 40 MG EC tablet, Take 1 tablet by mouth Daily., Disp: 90 tablet, Rfl: 3  •  propranolol LA (INDERAL LA) 80 MG 24 hr capsule, TAKE ONE CAPSULE BY MOUTH DAILY, Disp: 90 capsule, Rfl: 2  •  sucralfate (CARAFATE) 1 g tablet, TAKE ONE TABLET BY MOUTH FOUR TIMES A DAY DISSOLVE IN 10 ML OF WATER, Disp: 120 tablet, Rfl: 1  •  tamsulosin (FLOMAX) 0.4 MG capsule 24 hr capsule, Take 2 capsules by mouth Every Night., Disp: , Rfl:   •  vitamin E 100 UNIT capsule, Take 400 Units by mouth Daily., Disp: , Rfl:   •  promethazine-codeine (PHENERGAN with CODEINE) 6.25-10 MG/5ML syrup, Take 5 mL by mouth Every 4 (Four) Hours As Needed for Cough., Disp: 180 mL, Rfl: 0     Objective     He notes diminished hearing in his right ear.         Review of Systems   Constitutional: Negative.    HENT: Positive for hearing loss. Negative for ear discharge and ear pain.    Respiratory: Negative.    Cardiovascular: Negative.    Musculoskeletal: Negative.    Psychiatric/Behavioral: Negative.        Physical Exam   Constitutional: He is oriented to person, place, and time. He appears well-developed and well-nourished.   HENT:   Head: Normocephalic and atraumatic.   He had a cerumen impaction in his right ear.  This was cleared with irrigation and forceps.   Neurological: He is alert and oriented to person, place, and time.   Psychiatric: He has a normal mood and affect. His behavior is normal.   Nursing note and vitals  reviewed.        Problem List Items Addressed This Visit        Nervous and Auditory    Impacted cerumen of right ear - Primary            PLAN  Cerumen impaction was relieved with irrigation and forceps.    Follow-up as previously arranged.  No Follow-up on file.

## 2019-01-14 LAB
ALBUMIN SERPL-MCNC: 4.3 G/DL (ref 3.5–5.2)
ALBUMIN/GLOB SERPL: 1.5 G/DL
ALP SERPL-CCNC: 84 U/L (ref 39–117)
ALT SERPL-CCNC: 25 U/L (ref 1–41)
APPEARANCE UR: CLEAR
AST SERPL-CCNC: 24 U/L (ref 1–40)
BACTERIA #/AREA URNS HPF: NORMAL /HPF
BASOPHILS # BLD AUTO: 0.03 10*3/MM3 (ref 0–0.2)
BASOPHILS NFR BLD AUTO: 0.5 % (ref 0–1.5)
BILIRUB SERPL-MCNC: 1.2 MG/DL (ref 0.1–1.2)
BILIRUB UR QL STRIP: NEGATIVE
BUN SERPL-MCNC: 11 MG/DL (ref 8–23)
BUN/CREAT SERPL: 14.9 (ref 7–25)
CALCIUM SERPL-MCNC: 10.2 MG/DL (ref 8.6–10.5)
CASTS URNS MICRO: NORMAL
CHLORIDE SERPL-SCNC: 104 MMOL/L (ref 98–107)
CHOLEST SERPL-MCNC: 132 MG/DL (ref 0–200)
CO2 SERPL-SCNC: 29.7 MMOL/L (ref 22–29)
COLOR UR: YELLOW
CREAT SERPL-MCNC: 0.74 MG/DL (ref 0.76–1.27)
EOSINOPHIL # BLD AUTO: 0.49 10*3/MM3 (ref 0–0.7)
EOSINOPHIL NFR BLD AUTO: 7.9 % (ref 0.3–6.2)
EPI CELLS #/AREA URNS HPF: NORMAL /HPF
ERYTHROCYTE [DISTWIDTH] IN BLOOD BY AUTOMATED COUNT: 13.6 % (ref 11.5–14.5)
GLOBULIN SER CALC-MCNC: 2.9 GM/DL
GLUCOSE SERPL-MCNC: 100 MG/DL (ref 65–99)
GLUCOSE UR QL: NEGATIVE
HBA1C MFR BLD: 5.9 % (ref 4.8–5.6)
HCT VFR BLD AUTO: 45.8 % (ref 40.4–52.2)
HDLC SERPL-MCNC: 48 MG/DL (ref 40–60)
HGB BLD-MCNC: 14.6 G/DL (ref 13.7–17.6)
HGB UR QL STRIP: NEGATIVE
IMM GRANULOCYTES # BLD AUTO: 0 10*3/MM3 (ref 0–0.03)
IMM GRANULOCYTES NFR BLD AUTO: 0 % (ref 0–0.5)
KETONES UR QL STRIP: NEGATIVE
LDLC SERPL CALC-MCNC: 70 MG/DL (ref 0–100)
LDLC/HDLC SERPL: 1.47 {RATIO}
LEUKOCYTE ESTERASE UR QL STRIP: NEGATIVE
LYMPHOCYTES # BLD AUTO: 1.48 10*3/MM3 (ref 0.9–4.8)
LYMPHOCYTES NFR BLD AUTO: 23.8 % (ref 19.6–45.3)
MCH RBC QN AUTO: 29.7 PG (ref 27–32.7)
MCHC RBC AUTO-ENTMCNC: 31.9 G/DL (ref 32.6–36.4)
MCV RBC AUTO: 93.1 FL (ref 79.8–96.2)
MONOCYTES # BLD AUTO: 0.41 10*3/MM3 (ref 0.2–1.2)
MONOCYTES NFR BLD AUTO: 6.6 % (ref 5–12)
NEUTROPHILS # BLD AUTO: 3.81 10*3/MM3 (ref 1.9–8.1)
NEUTROPHILS NFR BLD AUTO: 61.2 % (ref 42.7–76)
NITRITE UR QL STRIP: NEGATIVE
PH UR STRIP: 6.5 [PH] (ref 5–8)
PLATELET # BLD AUTO: 208 10*3/MM3 (ref 140–500)
POTASSIUM SERPL-SCNC: 4.2 MMOL/L (ref 3.5–5.2)
PROT SERPL-MCNC: 7.2 G/DL (ref 6–8.5)
PROT UR QL STRIP: NEGATIVE
RBC # BLD AUTO: 4.92 10*6/MM3 (ref 4.6–6)
RBC #/AREA URNS HPF: NORMAL /HPF
SODIUM SERPL-SCNC: 145 MMOL/L (ref 136–145)
SP GR UR: 1.01 (ref 1–1.03)
TRIGL SERPL-MCNC: 68 MG/DL (ref 0–150)
UROBILINOGEN UR STRIP-MCNC: (no result) MG/DL
VLDLC SERPL CALC-MCNC: 13.6 MG/DL (ref 5–40)
WBC # BLD AUTO: 6.22 10*3/MM3 (ref 4.5–10.7)
WBC #/AREA URNS HPF: NORMAL /HPF

## 2019-01-21 ENCOUNTER — OFFICE VISIT (OUTPATIENT)
Dept: FAMILY MEDICINE CLINIC | Facility: CLINIC | Age: 79
End: 2019-01-21

## 2019-01-21 VITALS
SYSTOLIC BLOOD PRESSURE: 130 MMHG | HEIGHT: 68 IN | WEIGHT: 243.2 LBS | BODY MASS INDEX: 36.86 KG/M2 | TEMPERATURE: 97.6 F | HEART RATE: 56 BPM | DIASTOLIC BLOOD PRESSURE: 70 MMHG | RESPIRATION RATE: 16 BRPM | OXYGEN SATURATION: 96 %

## 2019-01-21 DIAGNOSIS — I10 BENIGN ESSENTIAL HYPERTENSION: ICD-10-CM

## 2019-01-21 DIAGNOSIS — E78.00 HYPERCHOLESTEROLEMIA: ICD-10-CM

## 2019-01-21 DIAGNOSIS — I48.0 PAROXYSMAL ATRIAL FIBRILLATION (HCC): Primary | ICD-10-CM

## 2019-01-21 PROCEDURE — 99214 OFFICE O/P EST MOD 30 MIN: CPT | Performed by: INTERNAL MEDICINE

## 2019-01-21 RX ORDER — METOPROLOL SUCCINATE 50 MG/1
50 TABLET, EXTENDED RELEASE ORAL DAILY
Qty: 90 TABLET | Refills: 3 | Status: SHIPPED | OUTPATIENT
Start: 2019-01-21 | End: 2019-05-29

## 2019-01-21 NOTE — PROGRESS NOTES
Subjective   Junito Sorto is a 78 y.o. male. Patient is here today for   Chief Complaint   Patient presents with   • Follow-up     hypercholesterolemia           Vitals:    01/21/19 0844   BP: 130/70   Pulse: 56   Resp: 16   Temp: 97.6 °F (36.4 °C)   SpO2: 96%       Past Medical History:   Diagnosis Date   • Colon polyp    • Coronary artery disease Few years    Afib   • GERD (gastroesophageal reflux disease) 2013    Mentioned as possible   • Hyperlipidemia    • Hypertension    • Low back pain    • Prostate cancer (CMS/HCC)    • Skin cancer       No Known Allergies   Social History     Socioeconomic History   • Marital status:      Spouse name: Not on file   • Number of children: Not on file   • Years of education: Not on file   • Highest education level: Not on file   Social Needs   • Financial resource strain: Not on file   • Food insecurity - worry: Not on file   • Food insecurity - inability: Not on file   • Transportation needs - medical: Not on file   • Transportation needs - non-medical: Not on file   Occupational History   • Not on file   Tobacco Use   • Smoking status: Former Smoker     Types: Pipe   • Smokeless tobacco: Former User   • Tobacco comment: only in college   Substance and Sexual Activity   • Alcohol use: Yes     Types: 3 Glasses of wine per week     Comment: SOCIAL   • Drug use: No   • Sexual activity: Not Currently     Partners: Female     Birth control/protection: Surgical, None     Comment: 50 years ago   Other Topics Concern   • Not on file   Social History Narrative   • Not on file        Current Outpatient Medications:   •  amLODIPine-benazepril (LOTREL 5-20) 5-20 MG per capsule, TAKE ONE CAPSULE BY MOUTH DAILY, Disp: 90 capsule, Rfl: 2  •  aspirin 81 MG tablet, Take  by mouth., Disp: , Rfl:   •  atorvastatin (LIPITOR) 40 MG tablet, TAKE ONE TABLET BY MOUTH DAILY, Disp: 90 tablet, Rfl: 2  •  betamethasone dipropionate (DIPROLENE) 0.05 % cream, Apply  topically to the  appropriate area as directed As Needed., Disp: , Rfl:   •  desoximetasone (TOPICORT) 0.25 % cream, Apply  topically to the appropriate area as directed 2 (Two) Times a Day As Needed for Irritation., Disp: , Rfl:   •  pantoprazole (PROTONIX) 40 MG EC tablet, Take 1 tablet by mouth Daily., Disp: 90 tablet, Rfl: 3  •  propranolol LA (INDERAL LA) 80 MG 24 hr capsule, TAKE ONE CAPSULE BY MOUTH DAILY, Disp: 90 capsule, Rfl: 2  •  sucralfate (CARAFATE) 1 g tablet, TAKE ONE TABLET BY MOUTH FOUR TIMES A DAY DISSOLVE IN 10 ML OF WATER, Disp: 120 tablet, Rfl: 1  •  tamsulosin (FLOMAX) 0.4 MG capsule 24 hr capsule, Take 2 capsules by mouth Every Night., Disp: , Rfl:   •  metoprolol succinate XL (TOPROL-XL) 50 MG 24 hr tablet, Take 1 tablet by mouth Daily., Disp: 90 tablet, Rfl: 3     Objective     He is here to follow-up on his hypercholesterolemia, hypertension, and atrial fibrillation.    He tells me that the cost on his propranolol was going up.  He like to try a less expensive alternative medication.         Review of Systems   Constitutional: Negative.    HENT: Negative.    Respiratory: Negative.    Cardiovascular: Negative.    Musculoskeletal: Negative.    Psychiatric/Behavioral: Negative.        Physical Exam   Constitutional: He is oriented to person, place, and time. He appears well-developed and well-nourished.   Pleasant, neatly groomed, BMI 36.   HENT:   Head: Normocephalic and atraumatic.   Cardiovascular:   Irregularly irregular rhythm with regular rate   Pulmonary/Chest: Effort normal and breath sounds normal.   Abdominal: No hernia.   Neurological: He is alert and oriented to person, place, and time.   Psychiatric: He has a normal mood and affect. His behavior is normal.   Nursing note and vitals reviewed.        Problem List Items Addressed This Visit        Cardiovascular and Mediastinum    Paroxysmal atrial fibrillation (CMS/HCC) - Primary    Relevant Medications    metoprolol succinate XL (TOPROL-XL) 50 MG  24 hr tablet    Benign essential hypertension    Relevant Medications    metoprolol succinate XL (TOPROL-XL) 50 MG 24 hr tablet    Hypercholesterolemia            PLAN  His hypertension is well-controlled.    He has excellent results on atorvastatin 40 mg daily for management of his hypercholesterolemia.  His LDL cholesterol is 70.    He has paroxysmal atrial fibrillation.  His heart rhythm today is atrial fibrillation with a regular rate.  I gave him a prescription for metoprolol ER 50 mg like him to try basis.    He should discontinue his propranolol.    Follow-up one month.      No Follow-up on file.

## 2019-02-05 ENCOUNTER — OFFICE VISIT (OUTPATIENT)
Dept: GASTROENTEROLOGY | Facility: CLINIC | Age: 79
End: 2019-02-05

## 2019-02-05 VITALS
HEIGHT: 68 IN | DIASTOLIC BLOOD PRESSURE: 78 MMHG | SYSTOLIC BLOOD PRESSURE: 122 MMHG | WEIGHT: 240.4 LBS | BODY MASS INDEX: 36.43 KG/M2 | TEMPERATURE: 98.2 F

## 2019-02-05 DIAGNOSIS — K21.00 GASTROESOPHAGEAL REFLUX DISEASE WITH ESOPHAGITIS: Primary | ICD-10-CM

## 2019-02-05 DIAGNOSIS — K44.9 HIATAL HERNIA: ICD-10-CM

## 2019-02-05 PROCEDURE — 99212 OFFICE O/P EST SF 10 MIN: CPT | Performed by: INTERNAL MEDICINE

## 2019-02-05 NOTE — PROGRESS NOTES
Chief Complaint   Patient presents with   • Follow-up       Junito Sorto is a  78 y.o. male here for a follow up visit for dysphasia.    HPI     Patient 78-year-old male with history of hypertension, hyperlipidemia and GERD with esophagitis and hiatal hernia found on endoscopy.  Patient complaining of dysphagia but since endoscopy though symptoms have resolved.  Patient on proton pump inhibitor and Carafate doing well so far.  Patient denies nausea vomiting no chest pain or heartburn noted.    Past Medical History:   Diagnosis Date   • Colon polyp    • Coronary artery disease Few years    Afib   • GERD (gastroesophageal reflux disease) 2013    Mentioned as possible   • Hyperlipidemia    • Hypertension    • Low back pain    • Prostate cancer (CMS/HCC)    • Skin cancer          Current Outpatient Medications:   •  amLODIPine-benazepril (LOTREL 5-20) 5-20 MG per capsule, TAKE ONE CAPSULE BY MOUTH DAILY, Disp: 90 capsule, Rfl: 2  •  aspirin 81 MG tablet, Take  by mouth., Disp: , Rfl:   •  atorvastatin (LIPITOR) 40 MG tablet, TAKE ONE TABLET BY MOUTH DAILY, Disp: 90 tablet, Rfl: 2  •  betamethasone dipropionate (DIPROLENE) 0.05 % cream, Apply  topically to the appropriate area as directed As Needed., Disp: , Rfl:   •  desoximetasone (TOPICORT) 0.25 % cream, Apply  topically to the appropriate area as directed As Needed for Irritation., Disp: , Rfl:   •  pantoprazole (PROTONIX) 40 MG EC tablet, Take 1 tablet by mouth Daily., Disp: 90 tablet, Rfl: 3  •  propranolol LA (INDERAL LA) 80 MG 24 hr capsule, TAKE ONE CAPSULE BY MOUTH DAILY, Disp: 90 capsule, Rfl: 2  •  tamsulosin (FLOMAX) 0.4 MG capsule 24 hr capsule, Take 2 capsules by mouth Every Night., Disp: , Rfl:   •  metoprolol succinate XL (TOPROL-XL) 50 MG 24 hr tablet, Take 1 tablet by mouth Daily., Disp: 90 tablet, Rfl: 3  •  sucralfate (CARAFATE) 1 g tablet, TAKE ONE TABLET BY MOUTH FOUR TIMES A DAY DISSOLVE IN 10 ML OF WATER, Disp: 120 tablet, Rfl: 1    No Known  Allergies    Social History     Socioeconomic History   • Marital status:      Spouse name: Not on file   • Number of children: Not on file   • Years of education: Not on file   • Highest education level: Not on file   Social Needs   • Financial resource strain: Not on file   • Food insecurity - worry: Not on file   • Food insecurity - inability: Not on file   • Transportation needs - medical: Not on file   • Transportation needs - non-medical: Not on file   Occupational History   • Not on file   Tobacco Use   • Smoking status: Former Smoker     Types: Pipe   • Smokeless tobacco: Never Used   • Tobacco comment: only in college   Substance and Sexual Activity   • Alcohol use: Yes     Types: 3 Glasses of wine per week     Comment: SOCIAL   • Drug use: No   • Sexual activity: Not Currently     Partners: Female     Birth control/protection: Surgical, None     Comment: 50 years ago   Other Topics Concern   • Not on file   Social History Narrative   • Not on file       Family History   Problem Relation Age of Onset   • Cancer Mother         pancreatic   • Liver disease Mother    • Cancer Father         prostate, bladder, colon, lymphoma, leukemia   • Colon cancer Father    • Cancer Brother         prostate   • Cancer Paternal Grandfather         prostate       Review of Systems   Constitutional: Negative.    HENT: Negative for sore throat, tinnitus, trouble swallowing and voice change.    Respiratory: Negative.    Cardiovascular: Negative.    Gastrointestinal: Negative.    Musculoskeletal: Positive for arthralgias. Negative for gait problem.   Skin: Negative.    Hematological: Negative.        Vitals:    02/05/19 1323   BP: 122/78   Temp: 98.2 °F (36.8 °C)       Physical Exam   Constitutional: He is oriented to person, place, and time. He appears well-developed and well-nourished.   HENT:   Head: Normocephalic and atraumatic.   Eyes: Pupils are equal, round, and reactive to light. No scleral icterus.    Cardiovascular: Normal rate, regular rhythm and normal heart sounds.   Pulmonary/Chest: Effort normal and breath sounds normal.   Abdominal: Soft. Bowel sounds are normal. He exhibits no distension and no mass. There is no tenderness. No hernia.   Neurological: He is alert and oriented to person, place, and time.   Skin: Skin is warm and dry. No rash noted.   Psychiatric: He has a normal mood and affect. His behavior is normal.   Vitals reviewed.      Orders Only on 01/11/2019   Component Date Value Ref Range Status   • Specific Gravity, UA 01/14/2019 1.013  1.005 - 1.030 Final   • pH, UA 01/14/2019 6.5  5.0 - 8.0 Final   • Color, UA 01/14/2019 Yellow   Final   • Appearance, UA 01/14/2019 Clear  Clear Final   • Leukocytes, UA 01/14/2019 Negative  Negative Final   • Protein 01/14/2019 Negative  Negative Final   • Glucose, UA 01/14/2019 Negative  Negative Final   • Ketones 01/14/2019 Negative  Negative Final   • Blood, UA 01/14/2019 Negative  Negative Final   • Bilirubin, UA 01/14/2019 Negative  Negative Final   • Urobilinogen, UA 01/14/2019 Comment   Final   • Nitrite, UA 01/14/2019 Negative  Negative Final   • Hemoglobin A1C 01/14/2019 5.90* 4.80 - 5.60 % Final   • Total Cholesterol 01/14/2019 132  0 - 200 mg/dL Final   • Triglycerides 01/14/2019 68  0 - 150 mg/dL Final   • HDL Cholesterol 01/14/2019 48  40 - 60 mg/dL Final   • VLDL Cholesterol 01/14/2019 13.6  5 - 40 mg/dL Final   • LDL Cholesterol  01/14/2019 70  0 - 100 mg/dL Final   • LDL/HDL Ratio 01/14/2019 1.47   Final   • Glucose 01/14/2019 100* 65 - 99 mg/dL Final   • BUN 01/14/2019 11  8 - 23 mg/dL Final   • Creatinine 01/14/2019 0.74* 0.76 - 1.27 mg/dL Final   • eGFR Non African Am 01/14/2019 102  >60 mL/min/1.73 Final   • eGFR African Am 01/14/2019 124  >60 mL/min/1.73 Final   • BUN/Creatinine Ratio 01/14/2019 14.9  7.0 - 25.0 Final   • Sodium 01/14/2019 145  136 - 145 mmol/L Final   • Potassium 01/14/2019 4.2  3.5 - 5.2 mmol/L Final   • Chloride  01/14/2019 104  98 - 107 mmol/L Final   • Total CO2 01/14/2019 29.7* 22.0 - 29.0 mmol/L Final   • Calcium 01/14/2019 10.2  8.6 - 10.5 mg/dL Final   • Total Protein 01/14/2019 7.2  6.0 - 8.5 g/dL Final   • Albumin 01/14/2019 4.30  3.50 - 5.20 g/dL Final   • Globulin 01/14/2019 2.9  gm/dL Final   • A/G Ratio 01/14/2019 1.5  g/dL Final   • Total Bilirubin 01/14/2019 1.2  0.1 - 1.2 mg/dL Final   • Alkaline Phosphatase 01/14/2019 84  39 - 117 U/L Final   • AST (SGOT) 01/14/2019 24  1 - 40 U/L Final   • ALT (SGPT) 01/14/2019 25  1 - 41 U/L Final   • WBC 01/14/2019 6.22  4.50 - 10.70 10*3/mm3 Final   • RBC 01/14/2019 4.92  4.60 - 6.00 10*6/mm3 Final   • Hemoglobin 01/14/2019 14.6  13.7 - 17.6 g/dL Final   • Hematocrit 01/14/2019 45.8  40.4 - 52.2 % Final   • MCV 01/14/2019 93.1  79.8 - 96.2 fL Final   • MCH 01/14/2019 29.7  27.0 - 32.7 pg Final   • MCHC 01/14/2019 31.9* 32.6 - 36.4 g/dL Final   • RDW 01/14/2019 13.6  11.5 - 14.5 % Final   • Platelets 01/14/2019 208  140 - 500 10*3/mm3 Final   • Neutrophil Rel % 01/14/2019 61.2  42.7 - 76.0 % Final   • Lymphocyte Rel % 01/14/2019 23.8  19.6 - 45.3 % Final   • Monocyte Rel % 01/14/2019 6.6  5.0 - 12.0 % Final   • Eosinophil Rel % 01/14/2019 7.9* 0.3 - 6.2 % Final   • Basophil Rel % 01/14/2019 0.5  0.0 - 1.5 % Final   • Neutrophils Absolute 01/14/2019 3.81  1.90 - 8.10 10*3/mm3 Final   • Lymphocytes Absolute 01/14/2019 1.48  0.90 - 4.80 10*3/mm3 Final   • Monocytes Absolute 01/14/2019 0.41  0.20 - 1.20 10*3/mm3 Final   • Eosinophils Absolute 01/14/2019 0.49  0.00 - 0.70 10*3/mm3 Final   • Basophils Absolute 01/14/2019 0.03  0.00 - 0.20 10*3/mm3 Final   • Immature Granulocyte Rel % 01/14/2019 0.0  0.0 - 0.5 % Final   • Immature Grans Absolute 01/14/2019 0.00  0.00 - 0.03 10*3/mm3 Final   • WBC, UA 01/14/2019 0-2  /HPF Final   • RBC, UA 01/14/2019 0-2  /HPF Final   • Epithelial Cells (non renal) 01/14/2019 Comment  /HPF Final   • Cast Type 01/14/2019 Comment   Final   •  Bacteria, UA 01/14/2019 Comment  None Seen /HPF Final       Junito was seen today for follow-up.    Diagnoses and all orders for this visit:    Gastroesophageal reflux disease with esophagitis    Hiatal hernia      Patient 78-year-old male with history of dysphagia status post EGD revealing reflux esophagitis and hiatal hernia.  Since endoscopy patient reports symptom-free.  At this point okay to DC the sucralfate continue Protonix indefinitely.  We'll monitor clinically patient instructed to follow-up in 1 year.

## 2019-05-20 ENCOUNTER — TELEPHONE (OUTPATIENT)
Dept: ORTHOPEDIC SURGERY | Facility: CLINIC | Age: 79
End: 2019-05-20

## 2019-05-20 DIAGNOSIS — M54.50 LUMBAR SPINE PAIN: Primary | ICD-10-CM

## 2019-05-29 ENCOUNTER — HOSPITAL ENCOUNTER (OUTPATIENT)
Dept: GENERAL RADIOLOGY | Facility: HOSPITAL | Age: 79
Discharge: HOME | End: 2019-05-29

## 2019-05-29 ENCOUNTER — ANESTHESIA EVENT (OUTPATIENT)
Dept: PAIN MEDICINE | Facility: HOSPITAL | Age: 79
End: 2019-05-29

## 2019-05-29 ENCOUNTER — HOSPITAL ENCOUNTER (OUTPATIENT)
Dept: PAIN MEDICINE | Facility: HOSPITAL | Age: 79
Discharge: HOME | End: 2019-05-29
Admitting: ANESTHESIOLOGY

## 2019-05-29 ENCOUNTER — ANESTHESIA (OUTPATIENT)
Dept: PAIN MEDICINE | Facility: HOSPITAL | Age: 79
End: 2019-05-29

## 2019-05-29 VITALS
HEIGHT: 68 IN | TEMPERATURE: 97.7 F | SYSTOLIC BLOOD PRESSURE: 132 MMHG | BODY MASS INDEX: 35.92 KG/M2 | DIASTOLIC BLOOD PRESSURE: 89 MMHG | RESPIRATION RATE: 16 BRPM | WEIGHT: 237 LBS | OXYGEN SATURATION: 96 % | HEART RATE: 53 BPM

## 2019-05-29 DIAGNOSIS — M51.36 DDD (DEGENERATIVE DISC DISEASE), LUMBAR: Primary | ICD-10-CM

## 2019-05-29 DIAGNOSIS — R52 PAIN: ICD-10-CM

## 2019-05-29 PROCEDURE — 77003 FLUOROGUIDE FOR SPINE INJECT: CPT

## 2019-05-29 PROCEDURE — 0 IOPAMIDOL 41 % SOLUTION: Performed by: ANESTHESIOLOGY

## 2019-05-29 PROCEDURE — C1755 CATHETER, INTRASPINAL: HCPCS

## 2019-05-29 PROCEDURE — 25010000002 METHYLPREDNISOLONE PER 80 MG: Performed by: ANESTHESIOLOGY

## 2019-05-29 RX ORDER — MIDAZOLAM HYDROCHLORIDE 1 MG/ML
1 INJECTION INTRAMUSCULAR; INTRAVENOUS
Status: DISCONTINUED | OUTPATIENT
Start: 2019-05-29 | End: 2019-05-30 | Stop reason: HOSPADM

## 2019-05-29 RX ORDER — LIDOCAINE HYDROCHLORIDE 10 MG/ML
1 INJECTION, SOLUTION INFILTRATION; PERINEURAL ONCE
Status: DISCONTINUED | OUTPATIENT
Start: 2019-05-29 | End: 2019-05-30 | Stop reason: HOSPADM

## 2019-05-29 RX ORDER — METHYLPREDNISOLONE ACETATE 80 MG/ML
80 INJECTION, SUSPENSION INTRA-ARTICULAR; INTRALESIONAL; INTRAMUSCULAR; SOFT TISSUE ONCE
Status: COMPLETED | OUTPATIENT
Start: 2019-05-29 | End: 2019-05-29

## 2019-05-29 RX ORDER — FENTANYL CITRATE 50 UG/ML
50 INJECTION, SOLUTION INTRAMUSCULAR; INTRAVENOUS AS NEEDED
Status: DISCONTINUED | OUTPATIENT
Start: 2019-05-29 | End: 2019-05-30 | Stop reason: HOSPADM

## 2019-05-29 RX ADMIN — METHYLPREDNISOLONE ACETATE 80 MG: 80 INJECTION, SUSPENSION INTRA-ARTICULAR; INTRALESIONAL; INTRAMUSCULAR; SOFT TISSUE at 09:45

## 2019-05-29 RX ADMIN — IOPAMIDOL 10 ML: 408 INJECTION, SOLUTION INTRATHECAL at 09:45

## 2019-05-29 NOTE — H&P
[facility]    History and Physical    Patient Name: Junito Sorto  :  1940  MRN:  [id number]  Date of Admission: 2019    Subjective     Patient is a 78 y.o. male presents with chief complaint of chronic, intermitent, moderate, 1-8/10, due to arthritis, crushing, deep low back and leg: right pain.  Onset of symptoms was gradual starting several years ago.  Symptoms are associated/aggravated by sitting, twisting or walking for more than a few minutes. Symptoms improve with pain medication and injection. LESI in past with good results.    The following portions of the patients history were reviewed and updated as appropriate: current medications, allergies, past medical history, past surgical history, past family history, past social history and problem list                Objective     Past Medical History:   Past Medical History:   Diagnosis Date   • Colon polyp    • Coronary artery disease Few years    Afib   • GERD (gastroesophageal reflux disease) 2013    Mentioned as possible   • Hyperlipidemia    • Hypertension    • Low back pain    • Prostate cancer (CMS/HCC)    • Skin cancer      Past Surgical History:   Past Surgical History:   Procedure Laterality Date   • COLONOSCOPY  Several   • ENDOSCOPY N/A 10/31/2018    LA Grade C reflux esophagitis, HH, erythematous mucosa in the antrum. Path: Gastritis, esophagitis.    • EPIDURAL BLOCK     • PROSTATE SURGERY  2014   • SKIN CANCER EXCISION      multiple, over that last few years   • TONSILLECTOMY      around 1950   • UPPER GASTROINTESTINAL ENDOSCOPY       Family History:   Family History   Problem Relation Age of Onset   • Cancer Mother         pancreatic   • Liver disease Mother    • Cancer Father         prostate, bladder, colon, lymphoma, leukemia   • Colon cancer Father    • Cancer Brother         prostate   • Cancer Paternal Grandfather         prostate     Social History:   Social History     Tobacco Use   • Smoking status: Former  Smoker     Types: Pipe   • Smokeless tobacco: Never Used   • Tobacco comment: only in college   Substance Use Topics   • Alcohol use: Yes     Types: 3 Glasses of wine per week     Comment: SOCIAL   • Drug use: No       Vital Signs Range for the last 24 hours  Temperature:     Temp Source:     BP: BP: ()/()    Pulse:     Respirations:     SPO2:     O2 Amount (l/min):     O2 Devices     Weight:           --------------------------------------------------------------------------------    Current Outpatient Medications   Medication Sig Dispense Refill   • amLODIPine-benazepril (LOTREL 5-20) 5-20 MG per capsule TAKE ONE CAPSULE BY MOUTH DAILY 90 capsule 2   • aspirin 81 MG tablet Take  by mouth.     • atorvastatin (LIPITOR) 40 MG tablet TAKE ONE TABLET BY MOUTH DAILY 90 tablet 2   • betamethasone dipropionate (DIPROLENE) 0.05 % cream Apply  topically to the appropriate area as directed As Needed.     • desoximetasone (TOPICORT) 0.25 % cream Apply  topically to the appropriate area as directed As Needed for Irritation.     • metoprolol succinate XL (TOPROL-XL) 50 MG 24 hr tablet Take 1 tablet by mouth Daily. 90 tablet 3   • pantoprazole (PROTONIX) 40 MG EC tablet Take 1 tablet by mouth Daily. 90 tablet 3   • propranolol LA (INDERAL LA) 80 MG 24 hr capsule TAKE ONE CAPSULE BY MOUTH DAILY 90 capsule 2   • sucralfate (CARAFATE) 1 g tablet TAKE ONE TABLET BY MOUTH FOUR TIMES A DAY DISSOLVE IN 10 ML OF WATER 120 tablet 1   • tamsulosin (FLOMAX) 0.4 MG capsule 24 hr capsule Take 2 capsules by mouth Every Night.       Current Facility-Administered Medications   Medication Dose Route Frequency Provider Last Rate Last Dose   • fentaNYL citrate (PF) (SUBLIMAZE) injection 50 mcg  50 mcg Intravenous PRN Andrew Guadalupe MD       • iopamidol (ISOVUE-M 200) injection 41%  3 mL Epidural Once in imaging Andrew Guadalupe MD       • lidocaine (XYLOCAINE) 1 % injection 1 mL  1 mL Intradermal Once Andrew Guadalupe MD       •  methylPREDNISolone acetate (DEPO-medrol) injection 80 mg  80 mg Intramuscular Once Andrew Guadalupe MD       • midazolam (VERSED) injection 1 mg  1 mg Intravenous Q5 Min PRN Andrew Guadalupe MD           --------------------------------------------------------------------------------  Assessment/Plan      Anesthesia Evaluation           Pain impairs ability to perform ADLs: Dressing, Driving and Sleeping  Modalities previously tried to control pain with limited effectiveness within the last 4-6 weeks: Rest and OTC medications     Airway   Mallampati: II  Dental      Pulmonary    Cardiovascular     (+) hypertension, CAD, dysrhythmias, CHF,       Neuro/Psych  GI/Hepatic/Renal/Endo    (+) obesity,       Musculoskeletal     Abdominal    Substance History      OB/GYN          Other                 Diagnosis and Plan    Treatment Plan  ASA 3      Procedures: Lumbar Epidural Steroid Injection(LESI), With fluoroscopy,       Anesthetic plan and risks discussed with patient.          Diagnosis     * Lumbar degenerative disc disease [M51.36]     * Lumbago [M54.5]     * Spondylolisthesis of lumbar region [M43.16]            CHIEF COMPLAINT:       HISTORY OF PRESENT ILLNESS:      PAST MEDICAL HISTORY:  Current Outpatient Medications on File Prior to Encounter   Medication Sig Dispense Refill   • amLODIPine-benazepril (LOTREL 5-20) 5-20 MG per capsule TAKE ONE CAPSULE BY MOUTH DAILY 90 capsule 2   • aspirin 81 MG tablet Take  by mouth.     • atorvastatin (LIPITOR) 40 MG tablet TAKE ONE TABLET BY MOUTH DAILY 90 tablet 2   • betamethasone dipropionate (DIPROLENE) 0.05 % cream Apply  topically to the appropriate area as directed As Needed.     • desoximetasone (TOPICORT) 0.25 % cream Apply  topically to the appropriate area as directed As Needed for Irritation.     • metoprolol succinate XL (TOPROL-XL) 50 MG 24 hr tablet Take 1 tablet by mouth Daily. 90 tablet 3   • pantoprazole (PROTONIX) 40 MG EC tablet Take 1 tablet by mouth  Daily. 90 tablet 3   • propranolol LA (INDERAL LA) 80 MG 24 hr capsule TAKE ONE CAPSULE BY MOUTH DAILY 90 capsule 2   • sucralfate (CARAFATE) 1 g tablet TAKE ONE TABLET BY MOUTH FOUR TIMES A DAY DISSOLVE IN 10 ML OF WATER 120 tablet 1   • tamsulosin (FLOMAX) 0.4 MG capsule 24 hr capsule Take 2 capsules by mouth Every Night.       No current facility-administered medications on file prior to encounter.        Past Medical History:   Diagnosis Date   • Colon polyp    • Coronary artery disease Few years    Afib   • GERD (gastroesophageal reflux disease) 2013    Mentioned as possible   • Hyperlipidemia    • Hypertension    • Low back pain    • Prostate cancer (CMS/HCC)    • Skin cancer          SOCIAL HISTORY:  No tobacco    REVIEW OF SYSTEMS:  No hematologic infectious or constitutional symptoms  Other review of systems non-contributory    PHYSICAL EXAM:  There were no vitals taken for this visit.  Well-developed well-nourished no acute distress  Extra ocular movements intact  Mallampati class 2 airway  Cardiac:  Regular rate and rhythm  Lungs:  Clear to auscultation bilaterally with good effort  Alert and oriented ×3  Deep tendon reflexes normal in the bilateral patella  Negative straight leg raise bilaterally  5 out of 5 strength bilateral upper and lower extremities  Lumbar spine without obvious deformities ecchymoses  Lumbar spine nontender to palpation      DIAGNOSIS:  Post-Op Diagnosis Codes:     * Lumbar degenerative disc disease [M51.36]     * Lumbago [M54.5]     * Spondylolisthesis of lumbar region [M43.16]    PLAN:  1.  Lumbar 4 epidural steroid injections, up to 3, spaced 1-2 weeks apart.  If pain control is acceptable after 1 or 2 injections, it was discussed with the patient that they may return for the subsequent injections if and when their pain returns.  The risks were discussed with the patient including failure of relief, worsening pain, Headache (post dural puncture headache), bleeding (epidural  hematoma) and infection (epidural abscess or skin infection).  2.  Physical therapy exercises at home as prescribed by physical therapy or from the pain clinic handout (given to the patient).  Continuation of these exercises every day, or multiple times per week, even when the patient has good pain relief, was stressed to the patient as a preventative measure to decrease the frequency and severity of future pain episodes.  3.  Continue pain medicines as already prescribed.  If patient not currently taking any, it is recommended to begin Acetaminophen 1000 mg po q 8 hours.  If other medicines containing Acetaminophen are currently prescribed, maintain daily dose at 3000 mg.    4.  If they can tolerate NSAIDS, it is recommended to take Ibuprofen 600 mg po q 6 hours for 7 days during pain exacerbations.  Alternatively, they may substitute an NSAID of their choice (e.g. Aleve).  This may be taken at the same time as Acetaminophen.  5.  Heat and ice to the affected area as tolerated for pain control.  It was discussed that heating pads can cause burns.  6.  Daily low impact exercise such as walking or water exercise was recommended to maintain overall health and aid in weight control.   7.  Follow up as needed for subsequent injections.  8.  Patient was counseled to abstain from tobacco products.    Target : L 4-5

## 2019-05-29 NOTE — ANESTHESIA PROCEDURE NOTES
PAIN Epidural block    Pre-sedation assessment completed: 5/29/2019 9:38 AM    Patient reassessed immediately prior to procedure    Patient location during procedure: pain clinic  Start Time: 5/29/2019 9:38 AM  Stop Time: 5/29/2019 9:46 AM  Indication:at surgeon's request and procedure for pain  Performed By  Anesthesiologist: Andrew Guadalupe MD  Preanesthetic Checklist  Completed: patient identified, site marked, surgical consent, pre-op evaluation, timeout performed, IV checked, risks and benefits discussed and monitors and equipment checked  Additional Notes  Dx:  Post-Op Diagnosis Codes:     * Lumbar degenerative disc disease (M51.36)     * Lumbago (M54.5)     * Spondylolisthesis of lumbar region (M43.16)  80 mg depomedrol in epid    Plan : return to clinic as needed  Prep:  Pt Position:prone (prone)  Sterile Tech:cap, gloves, mask and sterile barrier  Prep:chlorhexidine gluconate and isopropyl alcohol  Monitoring:blood pressure monitoring, EKG and continuous pulse oximetry  Procedure:Sedation: no     Approach:midline  Guidance: fluoroscopy and c arm pa and lat and loss of resistance  Location:lumbar  Level:4-5 (interlaminar)  Needle Type:Aerospikeanamaria  Needle Gauge:20  Aspiration:negative  Medications:  Depomedrol:80 mg  Preservative Free Saline:3mL  Isovue:2mL    Post Assessment:  Post-procedure: bandaid.  Pt Tolerance:patient tolerated the procedure well with no apparent complications  Complications:no

## 2019-06-24 ENCOUNTER — HOSPITAL ENCOUNTER (OUTPATIENT)
Facility: HOSPITAL | Age: 79
Setting detail: SURGERY ADMIT
End: 2019-06-24
Attending: UROLOGY | Admitting: UROLOGY

## 2019-07-03 ENCOUNTER — APPOINTMENT (OUTPATIENT)
Dept: PREADMISSION TESTING | Facility: HOSPITAL | Age: 79
End: 2019-07-03

## 2019-07-03 VITALS
TEMPERATURE: 97.6 F | HEIGHT: 68 IN | HEART RATE: 63 BPM | WEIGHT: 235 LBS | RESPIRATION RATE: 20 BRPM | SYSTOLIC BLOOD PRESSURE: 133 MMHG | BODY MASS INDEX: 35.61 KG/M2 | OXYGEN SATURATION: 94 % | DIASTOLIC BLOOD PRESSURE: 80 MMHG

## 2019-07-03 LAB
ANION GAP SERPL CALCULATED.3IONS-SCNC: 9.6 MMOL/L (ref 5–15)
BUN BLD-MCNC: 13 MG/DL (ref 8–23)
BUN/CREAT SERPL: 20 (ref 7–25)
CALCIUM SPEC-SCNC: 9.4 MG/DL (ref 8.6–10.5)
CHLORIDE SERPL-SCNC: 107 MMOL/L (ref 98–107)
CO2 SERPL-SCNC: 25.4 MMOL/L (ref 22–29)
CREAT BLD-MCNC: 0.65 MG/DL (ref 0.76–1.27)
DEPRECATED RDW RBC AUTO: 43.9 FL (ref 37–54)
ERYTHROCYTE [DISTWIDTH] IN BLOOD BY AUTOMATED COUNT: 13.4 % (ref 12.3–15.4)
GFR SERPL CREATININE-BSD FRML MDRD: 119 ML/MIN/1.73
GLUCOSE BLD-MCNC: 107 MG/DL (ref 65–99)
HCT VFR BLD AUTO: 43.8 % (ref 37.5–51)
HGB BLD-MCNC: 14 G/DL (ref 13–17.7)
MCH RBC QN AUTO: 28.6 PG (ref 26.6–33)
MCHC RBC AUTO-ENTMCNC: 32 G/DL (ref 31.5–35.7)
MCV RBC AUTO: 89.6 FL (ref 79–97)
PLATELET # BLD AUTO: 176 10*3/MM3 (ref 140–450)
PMV BLD AUTO: 11.1 FL (ref 6–12)
POTASSIUM BLD-SCNC: 3.8 MMOL/L (ref 3.5–5.2)
RBC # BLD AUTO: 4.89 10*6/MM3 (ref 4.14–5.8)
SODIUM BLD-SCNC: 142 MMOL/L (ref 136–145)
WBC NRBC COR # BLD: 5.85 10*3/MM3 (ref 3.4–10.8)

## 2019-07-03 PROCEDURE — 85027 COMPLETE CBC AUTOMATED: CPT | Performed by: UROLOGY

## 2019-07-03 PROCEDURE — 93010 ELECTROCARDIOGRAM REPORT: CPT | Performed by: INTERNAL MEDICINE

## 2019-07-03 PROCEDURE — 80048 BASIC METABOLIC PNL TOTAL CA: CPT | Performed by: UROLOGY

## 2019-07-03 PROCEDURE — 36415 COLL VENOUS BLD VENIPUNCTURE: CPT

## 2019-07-03 PROCEDURE — 93005 ELECTROCARDIOGRAM TRACING: CPT

## 2019-07-03 RX ORDER — AMLODIPINE BESYLATE AND BENAZEPRIL HYDROCHLORIDE 5; 20 MG/1; MG/1
1 CAPSULE ORAL EVERY MORNING
COMMUNITY
End: 2019-08-20 | Stop reason: SDUPTHER

## 2019-07-03 RX ORDER — PANTOPRAZOLE SODIUM 40 MG/1
40 TABLET, DELAYED RELEASE ORAL EVERY MORNING
COMMUNITY
End: 2019-12-03

## 2019-07-03 NOTE — DISCHARGE INSTRUCTIONS
Take the following medications the morning of surgery with a small sip of water:    Amlodipine   Propranolol   protonix    General Instructions:  • Do not eat or drink anything after midnight the night before surgery.  • Infants may have breast milk up to four hours before surgery.  • Infants drinking formula may drink formula up to six hours before surgery.   • Patients who avoid smoking, chewing tobacco and alcohol for 4 weeks prior to surgery have a reduced risk of post-operative complications.  Quit smoking as many days before surgery as you can.  • Do not smoke, use chewing tobacco or drink alcohol the day of surgery.   • If applicable bring your C-PAP/ BI-PAP machine.  • Bring any papers given to you in the doctor’s office.  • Wear clean comfortable clothes and socks.  • Do not wear contact lenses, false eyelashes or make-up.  Bring a case for your glasses.   • Bring crutches or walker if applicable.  • Remove all piercings.  Leave jewelry and any other valuables at home.  • Hair extensions with metal clips must be removed prior to surgery.  • The Pre-Admission Testing nurse will instruct you to bring medications if unable to obtain an accurate list in Pre-Admission Testing.        If you were given a blood bank ID arm band remember to bring it with you the day of surgery.    Preventing a Surgical Site Infection:  • For 2 to 3 days before surgery, avoid shaving with a razor because the razor can irritate skin and make it easier to develop an infection.    • Any areas of open skin can increase the risk of a post-operative wound infection by allowing bacteria to enter and travel throughout the body.  Notify your surgeon if you have any skin wounds / rashes even if it is not near the expected surgical site.  The area will need assessed to determine if surgery should be delayed until it is healed.  • The night prior to surgery sleep in a clean bed with clean clothing.  Do not allow pets to sleep with  you.  • Shower on the morning of surgery using a fresh bar of anti-bacterial soap (such as Dial) and clean washcloth.  Dry with a clean towel and dress in clean clothing.  • Ask your surgeon if you will be receiving antibiotics prior to surgery.  • Make sure you, your family, and all healthcare providers clean their hands with soap and water or an alcohol based hand  before caring for you or your wound.    Day of surgery:7/10/19   1000  Upon arrival, a Pre-op nurse and Anesthesiologist will review your health history, obtain vital signs, and answer questions you may have.  The only belongings needed at this time will be your home medications and if applicable your C-PAP/BI-PAP machine.  If you are staying overnight your family can leave the rest of your belongings in the car and bring them to your room later.  A Pre-op nurse will start an IV and you may receive medication in preparation for surgery, including something to help you relax.  Your family will be able to see you in the Pre-op area.  While you are in surgery your family should notify the waiting room  if they leave the waiting room area and provide a contact phone number.    Please be aware that surgery does come with discomfort.  We want to make every effort to control your discomfort so please discuss any uncontrolled symptoms with your nurse.   Your doctor will most likely have prescribed pain medications.      If you are going home after surgery you will receive individualized written care instructions before being discharged.  A responsible adult must drive you to and from the hospital on the day of your surgery and stay with you for 24 hours.    If you are staying overnight following surgery, you will be transported to your hospital room following the recovery period.  [facility] has all private rooms.    You have received a list of surgical assistants for your reference.  If you have any questions please call  Pre-Admission Testing at [phone].  Deductibles and co-payments are collected on the day of service. Please be prepared to pay the required co-pay, deductible or deposit on the day of service as defined by your plan.

## 2019-07-16 DIAGNOSIS — E78.00 HYPERCHOLESTEROLEMIA: Primary | ICD-10-CM

## 2019-07-16 DIAGNOSIS — R73.9 HYPERGLYCEMIA: ICD-10-CM

## 2019-07-17 LAB
ALBUMIN SERPL-MCNC: 4.3 G/DL (ref 3.5–5.2)
ALBUMIN/GLOB SERPL: 2.2 G/DL
ALP SERPL-CCNC: 77 U/L (ref 39–117)
ALT SERPL-CCNC: 19 U/L (ref 1–41)
APPEARANCE UR: CLEAR
AST SERPL-CCNC: 23 U/L (ref 1–40)
BACTERIA #/AREA URNS HPF: NORMAL /HPF
BASOPHILS # BLD AUTO: 0.04 10*3/MM3 (ref 0–0.2)
BASOPHILS NFR BLD AUTO: 0.7 % (ref 0–1.5)
BILIRUB SERPL-MCNC: 1.5 MG/DL (ref 0.2–1.2)
BILIRUB UR QL STRIP: NEGATIVE
BUN SERPL-MCNC: 13 MG/DL (ref 8–23)
BUN/CREAT SERPL: 17.6 (ref 7–25)
CALCIUM SERPL-MCNC: 9.4 MG/DL (ref 8.6–10.5)
CASTS URNS MICRO: NORMAL
CHLORIDE SERPL-SCNC: 103 MMOL/L (ref 98–107)
CHOLEST SERPL-MCNC: 119 MG/DL (ref 0–200)
CO2 SERPL-SCNC: 28.9 MMOL/L (ref 22–29)
COLOR UR: YELLOW
CREAT SERPL-MCNC: 0.74 MG/DL (ref 0.76–1.27)
EOSINOPHIL # BLD AUTO: 0.51 10*3/MM3 (ref 0–0.4)
EOSINOPHIL NFR BLD AUTO: 8.7 % (ref 0.3–6.2)
EPI CELLS #/AREA URNS HPF: NORMAL /HPF
ERYTHROCYTE [DISTWIDTH] IN BLOOD BY AUTOMATED COUNT: 13.8 % (ref 12.3–15.4)
GLOBULIN SER CALC-MCNC: 2 GM/DL
GLUCOSE SERPL-MCNC: 105 MG/DL (ref 65–99)
GLUCOSE UR QL: NEGATIVE
HBA1C MFR BLD: 6.1 % (ref 4.8–5.6)
HCT VFR BLD AUTO: 45.5 % (ref 37.5–51)
HDLC SERPL-MCNC: 49 MG/DL (ref 40–60)
HGB BLD-MCNC: 14.4 G/DL (ref 13–17.7)
HGB UR QL STRIP: NEGATIVE
IMM GRANULOCYTES # BLD AUTO: 0.01 10*3/MM3 (ref 0–0.05)
IMM GRANULOCYTES NFR BLD AUTO: 0.2 % (ref 0–0.5)
KETONES UR QL STRIP: NEGATIVE
LDLC SERPL CALC-MCNC: 57 MG/DL (ref 0–100)
LDLC/HDLC SERPL: 1.16 {RATIO}
LEUKOCYTE ESTERASE UR QL STRIP: NEGATIVE
LYMPHOCYTES # BLD AUTO: 1.47 10*3/MM3 (ref 0.7–3.1)
LYMPHOCYTES NFR BLD AUTO: 25 % (ref 19.6–45.3)
MCH RBC QN AUTO: 28.7 PG (ref 26.6–33)
MCHC RBC AUTO-ENTMCNC: 31.6 G/DL (ref 31.5–35.7)
MCV RBC AUTO: 90.8 FL (ref 79–97)
MONOCYTES # BLD AUTO: 0.43 10*3/MM3 (ref 0.1–0.9)
MONOCYTES NFR BLD AUTO: 7.3 % (ref 5–12)
NEUTROPHILS # BLD AUTO: 3.43 10*3/MM3 (ref 1.7–7)
NEUTROPHILS NFR BLD AUTO: 58.1 % (ref 42.7–76)
NITRITE UR QL STRIP: NEGATIVE
NRBC BLD AUTO-RTO: 0 /100 WBC (ref 0–0.2)
PH UR STRIP: 7 [PH] (ref 5–8)
PLATELET # BLD AUTO: 185 10*3/MM3 (ref 140–450)
POTASSIUM SERPL-SCNC: 4.1 MMOL/L (ref 3.5–5.2)
PROT SERPL-MCNC: 6.3 G/DL (ref 6–8.5)
PROT UR QL STRIP: NEGATIVE
RBC # BLD AUTO: 5.01 10*6/MM3 (ref 4.14–5.8)
RBC #/AREA URNS HPF: NORMAL /HPF
SODIUM SERPL-SCNC: 143 MMOL/L (ref 136–145)
SP GR UR: 1.02 (ref 1–1.03)
TRIGL SERPL-MCNC: 65 MG/DL (ref 0–150)
UROBILINOGEN UR STRIP-MCNC: (no result) MG/DL
VLDLC SERPL CALC-MCNC: 13 MG/DL
WBC # BLD AUTO: 5.89 10*3/MM3 (ref 3.4–10.8)
WBC #/AREA URNS HPF: NORMAL /HPF

## 2019-07-22 ENCOUNTER — OFFICE VISIT (OUTPATIENT)
Dept: FAMILY MEDICINE CLINIC | Facility: CLINIC | Age: 79
End: 2019-07-22

## 2019-07-22 VITALS
OXYGEN SATURATION: 98 % | HEIGHT: 66 IN | DIASTOLIC BLOOD PRESSURE: 74 MMHG | HEART RATE: 68 BPM | WEIGHT: 232 LBS | RESPIRATION RATE: 16 BRPM | SYSTOLIC BLOOD PRESSURE: 110 MMHG | BODY MASS INDEX: 37.28 KG/M2

## 2019-07-22 DIAGNOSIS — I48.0 PAROXYSMAL ATRIAL FIBRILLATION (HCC): Primary | ICD-10-CM

## 2019-07-22 DIAGNOSIS — I10 BENIGN ESSENTIAL HYPERTENSION: ICD-10-CM

## 2019-07-22 DIAGNOSIS — E66.09 CLASS 2 OBESITY DUE TO EXCESS CALORIES WITHOUT SERIOUS COMORBIDITY WITH BODY MASS INDEX (BMI) OF 37.0 TO 37.9 IN ADULT: ICD-10-CM

## 2019-07-22 DIAGNOSIS — E78.00 HYPERCHOLESTEROLEMIA: ICD-10-CM

## 2019-07-22 PROCEDURE — 99214 OFFICE O/P EST MOD 30 MIN: CPT | Performed by: INTERNAL MEDICINE

## 2019-07-22 RX ORDER — ATORVASTATIN CALCIUM 40 MG/1
TABLET, FILM COATED ORAL
Qty: 90 TABLET | Refills: 1 | Status: SHIPPED | OUTPATIENT
Start: 2019-07-22 | End: 2019-09-04

## 2019-07-23 NOTE — PROGRESS NOTES
Subjective   Junito Sorto is a 78 y.o. male. Patient is here today for   Chief Complaint   Patient presents with   • Follow-up     HYPERCHOLESTEROLEMIA          Vitals:    07/22/19 0941   BP: 110/74   Pulse: 68   Resp: 16   SpO2: 98%       Past Medical History:   Diagnosis Date   • Arthritis    • At risk for obstructive sleep apnea     5   • Atrial fibrillation (CMS/HCC)     not followed by Cardiologist just PCP   • BPH (benign prostatic hyperplasia)    • Colon polyp    • Coronary artery disease Few years    Afib   • Eczema    • GERD (gastroesophageal reflux disease) 2013    Mentioned as possible   • Hiatal hernia    • Hyperlipidemia    • Hypertension    • Low back pain    • Prostate cancer (CMS/HCC)    • Skin cancer       No Known Allergies   Social History     Socioeconomic History   • Marital status:      Spouse name: Not on file   • Number of children: Not on file   • Years of education: Not on file   • Highest education level: Not on file   Tobacco Use   • Smoking status: Former Smoker     Types: Pipe   • Smokeless tobacco: Never Used   • Tobacco comment: only in college   Substance and Sexual Activity   • Alcohol use: Yes     Types: 3 Glasses of wine per week   • Drug use: No        Current Outpatient Medications:   •  amLODIPine-benazepril (LOTREL 5-20) 5-20 MG per capsule, Take 1 capsule by mouth Every Morning., Disp: , Rfl:   •  aspirin 81 MG tablet, Take 81 mg by mouth Daily., Disp: , Rfl:   •  atorvastatin (LIPITOR) 40 MG tablet, TAKE ONE TABLET BY MOUTH DAILY, Disp: 90 tablet, Rfl: 1  •  pantoprazole (PROTONIX) 40 MG EC tablet, Take 40 mg by mouth Every Morning., Disp: , Rfl:   •  propranolol XL (INDERAL XL) 80 MG 24 hr capsule, Take 80 mg by mouth Every Morning., Disp: , Rfl:   •  tamsulosin (FLOMAX) 0.4 MG capsule 24 hr capsule, Take 1 capsule by mouth 2 (Two) Times a Day., Disp: , Rfl:      Objective     This patient has atrial fibrillation.  This is been evaluated by cardiology.  He has  elected not to treat this issue.  He does not take an anticoagulant..    He was seen recently by specialist and was to have a procedure done however this procedure was canceled because of his atrial fibrillation.    He is here to review his hypercholesterolemia and hypertension.          Review of Systems   Constitutional: Negative.    HENT: Negative.    Respiratory: Negative.  Negative for cough, choking and chest tightness.    Cardiovascular: Negative.  Negative for chest pain, palpitations and leg swelling.   Musculoskeletal: Negative.    Psychiatric/Behavioral: Negative.        Physical Exam   Constitutional: He is oriented to person, place, and time. He appears well-developed and well-nourished.   Pleasant, neatly groomed, BMI 37.   HENT:   Head: Normocephalic and atraumatic.   Cardiovascular: Normal rate and normal heart sounds.   He has an irregularly irregular rhythm with regular rate.   Pulmonary/Chest: Effort normal and breath sounds normal.   Neurological: He is alert and oriented to person, place, and time.   Psychiatric: He has a normal mood and affect. His behavior is normal. Thought content normal.   Nursing note and vitals reviewed.        Problem List Items Addressed This Visit        Cardiovascular and Mediastinum    Paroxysmal atrial fibrillation (CMS/HCC) - Primary    Benign essential hypertension    Hypercholesterolemia       Digestive    Obesity            PLAN  His hypertension is well controlled.    Hypercholesterolemia is well controlled.    He is obese and of encouraged weight loss with decrease caloric intake.    He paroxysmal atrial fibrillation.  This poses a risk for stroke from embolic event on a daily basis.  He is elected not to treat this issue.  In any surgical event under general anesthesia with therefore have a slightly elevated risk of an adverse outcome but I do not believe that this would be prohibitive.  No Follow-up on file.

## 2019-08-19 RX ORDER — AMLODIPINE BESYLATE AND BENAZEPRIL HYDROCHLORIDE 5; 20 MG/1; MG/1
CAPSULE ORAL
Qty: 90 CAPSULE | Refills: 1 | OUTPATIENT
Start: 2019-08-19

## 2019-08-19 RX ORDER — PROPRANOLOL HYDROCHLORIDE 80 MG/1
CAPSULE, EXTENDED RELEASE ORAL
Qty: 90 CAPSULE | Refills: 1 | OUTPATIENT
Start: 2019-08-19

## 2019-08-19 NOTE — TELEPHONE ENCOUNTER
Was discontinued by another physician and has not been filled since 05/21/19 needs to be seen by physician if going to restart it.

## 2019-08-20 ENCOUNTER — TELEPHONE (OUTPATIENT)
Dept: FAMILY MEDICINE CLINIC | Facility: CLINIC | Age: 79
End: 2019-08-20

## 2019-08-20 RX ORDER — PROPRANOLOL HYDROCHLORIDE 80 MG/1
CAPSULE, EXTENDED RELEASE ORAL
Qty: 90 CAPSULE | Refills: 1 | OUTPATIENT
Start: 2019-08-20

## 2019-08-20 RX ORDER — AMLODIPINE BESYLATE AND BENAZEPRIL HYDROCHLORIDE 5; 20 MG/1; MG/1
CAPSULE ORAL
Qty: 90 CAPSULE | Refills: 1 | OUTPATIENT
Start: 2019-08-20

## 2019-08-20 RX ORDER — AMLODIPINE BESYLATE AND BENAZEPRIL HYDROCHLORIDE 5; 20 MG/1; MG/1
1 CAPSULE ORAL EVERY MORNING
Qty: 90 CAPSULE | Refills: 1 | Status: SHIPPED | OUTPATIENT
Start: 2019-08-20 | End: 2020-02-24

## 2019-08-20 NOTE — TELEPHONE ENCOUNTER
Per DR. NUNEZ OK TO REFILL. PT HAD IS SENT TO [facility].     ----- Message from Stephanie Sanchez sent at 8/20/2019 10:29 AM EDT -----  Contact: [phone]  WE DENIED AMLODIPINE AND PROPANOLOL- STATING THAT ANOTHER OFFICE D/C THEM BUT PATIENT SAYS HE HAS NEVER BEEN OFF OF THERES MDS-  HAS BEEN ON THEM FOR YEARS -ONLY MED HE HAS EVER BEEN OFF OF WAS ASPIRIN-     HE IS LEAVING FOR VACATION THURS AND IF HE DOES NOT GET OK ON  REFILLS  TODAY HE WILL BE OFF OF THEM BECAUSE HE  WILL BE OUT OF THEM-SAW ENRIQUE IN July. PLEASE ADVISE

## 2019-09-04 ENCOUNTER — APPOINTMENT (OUTPATIENT)
Dept: PREADMISSION TESTING | Facility: HOSPITAL | Age: 79
End: 2019-09-04

## 2019-09-04 VITALS
HEART RATE: 54 BPM | WEIGHT: 234.5 LBS | OXYGEN SATURATION: 96 % | DIASTOLIC BLOOD PRESSURE: 81 MMHG | TEMPERATURE: 97.1 F | HEIGHT: 66 IN | SYSTOLIC BLOOD PRESSURE: 144 MMHG | RESPIRATION RATE: 20 BRPM | BODY MASS INDEX: 37.69 KG/M2

## 2019-09-04 LAB
ANION GAP SERPL CALCULATED.3IONS-SCNC: 10 MMOL/L (ref 5–15)
BUN BLD-MCNC: 11 MG/DL (ref 8–23)
BUN/CREAT SERPL: 16.9 (ref 7–25)
CALCIUM SPEC-SCNC: 9.6 MG/DL (ref 8.6–10.5)
CHLORIDE SERPL-SCNC: 104 MMOL/L (ref 98–107)
CO2 SERPL-SCNC: 27 MMOL/L (ref 22–29)
CREAT BLD-MCNC: 0.65 MG/DL (ref 0.76–1.27)
DEPRECATED RDW RBC AUTO: 45.1 FL (ref 37–54)
ERYTHROCYTE [DISTWIDTH] IN BLOOD BY AUTOMATED COUNT: 13.3 % (ref 12.3–15.4)
GFR SERPL CREATININE-BSD FRML MDRD: 119 ML/MIN/1.73
GLUCOSE BLD-MCNC: 108 MG/DL (ref 65–99)
HCT VFR BLD AUTO: 43.2 % (ref 37.5–51)
HGB BLD-MCNC: 13.7 G/DL (ref 13–17.7)
MCH RBC QN AUTO: 29.2 PG (ref 26.6–33)
MCHC RBC AUTO-ENTMCNC: 31.7 G/DL (ref 31.5–35.7)
MCV RBC AUTO: 92.1 FL (ref 79–97)
PLATELET # BLD AUTO: 183 10*3/MM3 (ref 140–450)
PMV BLD AUTO: 10.8 FL (ref 6–12)
POTASSIUM BLD-SCNC: 4.1 MMOL/L (ref 3.5–5.2)
RBC # BLD AUTO: 4.69 10*6/MM3 (ref 4.14–5.8)
SODIUM BLD-SCNC: 141 MMOL/L (ref 136–145)
WBC NRBC COR # BLD: 6.38 10*3/MM3 (ref 3.4–10.8)

## 2019-09-04 PROCEDURE — 36415 COLL VENOUS BLD VENIPUNCTURE: CPT

## 2019-09-04 PROCEDURE — 85027 COMPLETE CBC AUTOMATED: CPT | Performed by: UROLOGY

## 2019-09-04 PROCEDURE — 80048 BASIC METABOLIC PNL TOTAL CA: CPT | Performed by: UROLOGY

## 2019-09-04 RX ORDER — CEPHALEXIN 500 MG/1
500 CAPSULE ORAL 2 TIMES DAILY
Status: ON HOLD | COMMUNITY
Start: 2019-09-03 | End: 2019-09-13

## 2019-09-04 RX ORDER — ATORVASTATIN CALCIUM 40 MG/1
40 TABLET, FILM COATED ORAL NIGHTLY
COMMUNITY
End: 2020-01-16

## 2019-09-04 NOTE — DISCHARGE INSTRUCTIONS
Take the following medications the morning of surgery with a small sip of water: AMLODIPINE, PROTONIX, PROPRANOLOL AM OF SURGERY    PLEASE ARRIVE AT THE  HOSPITAL AT: 0800 AM    General Instructions:  • Do not eat or drink anything after midnight the night before surgery.  • Infants may have breast milk up to four hours before surgery.  • Infants drinking formula may drink formula up to six hours before surgery.   • Patients who avoid smoking, chewing tobacco and alcohol for 4 weeks prior to surgery have a reduced risk of post-operative complications.  Quit smoking as many days before surgery as you can.  • Do not smoke, use chewing tobacco or drink alcohol the day of surgery.   • If applicable bring your C-PAP/ BI-PAP machine.  • Bring any papers given to you in the doctor’s office.  • Wear clean comfortable clothes and socks.  • Do not wear contact lenses, false eyelashes or make-up.  Bring a case for your glasses.   • Bring crutches or walker if applicable.  • Remove all piercings.  Leave jewelry and any other valuables at home.  • Hair extensions with metal clips must be removed prior to surgery.  • The Pre-Admission Testing nurse will instruct you to bring medications if unable to obtain an accurate list in Pre-Admission Testing.        If you were given a blood bank ID arm band remember to bring it with you the day of surgery.    Preventing a Surgical Site Infection:  • For 2 to 3 days before surgery, avoid shaving with a razor because the razor can irritate skin and make it easier to develop an infection.    • Any areas of open skin can increase the risk of a post-operative wound infection by allowing bacteria to enter and travel throughout the body.  Notify your surgeon if you have any skin wounds / rashes even if it is not near the expected surgical site.  The area will need assessed to determine if surgery should be delayed until it is healed.  • The night prior to surgery sleep in a clean bed with clean  clothing.  Do not allow pets to sleep with you.  • Shower on the morning of surgery using a fresh bar of anti-bacterial soap (such as Dial) and clean washcloth.  Dry with a clean towel and dress in clean clothing.  • Ask your surgeon if you will be receiving antibiotics prior to surgery.  • Make sure you, your family, and all healthcare providers clean their hands with soap and water or an alcohol based hand  before caring for you or your wound.    Day of surgery:  Upon arrival, a Pre-op nurse and Anesthesiologist will review your health history, obtain vital signs, and answer questions you may have.  The only belongings needed at this time will be your home medications and if applicable your C-PAP/BI-PAP machine.  If you are staying overnight your family can leave the rest of your belongings in the car and bring them to your room later.  A Pre-op nurse will start an IV and you may receive medication in preparation for surgery, including something to help you relax.  Your family will be able to see you in the Pre-op area.  While you are in surgery your family should notify the waiting room  if they leave the waiting room area and provide a contact phone number.    Please be aware that surgery does come with discomfort.  We want to make every effort to control your discomfort so please discuss any uncontrolled symptoms with your nurse.   Your doctor will most likely have prescribed pain medications.      If you are going home after surgery you will receive individualized written care instructions before being discharged.  A responsible adult must drive you to and from the hospital on the day of your surgery and stay with you for 24 hours.    If you are staying overnight following surgery, you will be transported to your hospital room following the recovery period.  [facility] has all private rooms.    You have received a list of surgical assistants for your reference.  If you have  any questions please call Pre-Admission Testing at [phone].  Deductibles and co-payments are collected on the day of service. Please be prepared to pay the required co-pay, deductible or deposit on the day of service as defined by your plan.

## 2019-09-11 ENCOUNTER — ANESTHESIA (OUTPATIENT)
Dept: PERIOP | Facility: HOSPITAL | Age: 79
End: 2019-09-11

## 2019-09-11 ENCOUNTER — HOSPITAL ENCOUNTER (OUTPATIENT)
Facility: HOSPITAL | Age: 79
Discharge: HOME | End: 2019-09-13
Attending: UROLOGY | Admitting: UROLOGY

## 2019-09-11 ENCOUNTER — ANESTHESIA EVENT (OUTPATIENT)
Dept: PERIOP | Facility: HOSPITAL | Age: 79
End: 2019-09-11

## 2019-09-11 DIAGNOSIS — C61 PROSTATE CANCER (HCC): ICD-10-CM

## 2019-09-11 PROBLEM — N32.0 BLADDER OUTLET OBSTRUCTION: Status: ACTIVE | Noted: 2019-09-11

## 2019-09-11 PROCEDURE — 25010000002 PROPOFOL 10 MG/ML EMULSION: Performed by: NURSE ANESTHETIST, CERTIFIED REGISTERED

## 2019-09-11 PROCEDURE — 25010000002 DEXAMETHASONE PER 1 MG: Performed by: NURSE ANESTHETIST, CERTIFIED REGISTERED

## 2019-09-11 PROCEDURE — 25010000002 HYDROMORPHONE PER 4 MG: Performed by: NURSE ANESTHETIST, CERTIFIED REGISTERED

## 2019-09-11 PROCEDURE — 25010000002 LEVOFLOXACIN PER 250 MG: Performed by: UROLOGY

## 2019-09-11 PROCEDURE — 25010000002 FENTANYL CITRATE (PF) 100 MCG/2ML SOLUTION: Performed by: NURSE ANESTHETIST, CERTIFIED REGISTERED

## 2019-09-11 PROCEDURE — G0378 HOSPITAL OBSERVATION PER HR: HCPCS

## 2019-09-11 PROCEDURE — 88305 TISSUE EXAM BY PATHOLOGIST: CPT | Performed by: UROLOGY

## 2019-09-11 PROCEDURE — A9270 NON-COVERED ITEM OR SERVICE: HCPCS | Performed by: UROLOGY

## 2019-09-11 PROCEDURE — 25010000002 ONDANSETRON PER 1 MG: Performed by: NURSE ANESTHETIST, CERTIFIED REGISTERED

## 2019-09-11 PROCEDURE — 63710000001 HYDROCODONE-ACETAMINOPHEN 5-325 MG TABLET: Performed by: UROLOGY

## 2019-09-11 PROCEDURE — 25010000002 HYDROMORPHONE PER 4 MG: Performed by: UROLOGY

## 2019-09-11 RX ORDER — FENTANYL CITRATE 50 UG/ML
INJECTION, SOLUTION INTRAMUSCULAR; INTRAVENOUS AS NEEDED
Status: DISCONTINUED | OUTPATIENT
Start: 2019-09-11 | End: 2019-09-11 | Stop reason: SURG

## 2019-09-11 RX ORDER — PANTOPRAZOLE SODIUM 40 MG/1
40 TABLET, DELAYED RELEASE ORAL EVERY MORNING
Status: DISCONTINUED | OUTPATIENT
Start: 2019-09-12 | End: 2019-09-13 | Stop reason: HOSPADM

## 2019-09-11 RX ORDER — PROPOFOL 10 MG/ML
VIAL (ML) INTRAVENOUS AS NEEDED
Status: DISCONTINUED | OUTPATIENT
Start: 2019-09-11 | End: 2019-09-11 | Stop reason: SURG

## 2019-09-11 RX ORDER — EPHEDRINE SULFATE 50 MG/ML
5 INJECTION, SOLUTION INTRAVENOUS ONCE AS NEEDED
Status: DISCONTINUED | OUTPATIENT
Start: 2019-09-11 | End: 2019-09-11 | Stop reason: HOSPADM

## 2019-09-11 RX ORDER — LIDOCAINE HYDROCHLORIDE 20 MG/ML
INJECTION, SOLUTION INFILTRATION; PERINEURAL AS NEEDED
Status: DISCONTINUED | OUTPATIENT
Start: 2019-09-11 | End: 2019-09-11 | Stop reason: SURG

## 2019-09-11 RX ORDER — DEXAMETHASONE SODIUM PHOSPHATE 10 MG/ML
INJECTION INTRAMUSCULAR; INTRAVENOUS AS NEEDED
Status: DISCONTINUED | OUTPATIENT
Start: 2019-09-11 | End: 2019-09-11 | Stop reason: SURG

## 2019-09-11 RX ORDER — PROMETHAZINE HYDROCHLORIDE 25 MG/1
25 TABLET ORAL ONCE AS NEEDED
Status: DISCONTINUED | OUTPATIENT
Start: 2019-09-11 | End: 2019-09-11 | Stop reason: HOSPADM

## 2019-09-11 RX ORDER — EPHEDRINE SULFATE 50 MG/ML
INJECTION, SOLUTION INTRAVENOUS AS NEEDED
Status: DISCONTINUED | OUTPATIENT
Start: 2019-09-11 | End: 2019-09-11 | Stop reason: SURG

## 2019-09-11 RX ORDER — PROMETHAZINE HYDROCHLORIDE 25 MG/1
25 SUPPOSITORY RECTAL ONCE AS NEEDED
Status: DISCONTINUED | OUTPATIENT
Start: 2019-09-11 | End: 2019-09-11 | Stop reason: HOSPADM

## 2019-09-11 RX ORDER — HYDROMORPHONE HYDROCHLORIDE 1 MG/ML
0.5 INJECTION, SOLUTION INTRAMUSCULAR; INTRAVENOUS; SUBCUTANEOUS
Status: DISCONTINUED | OUTPATIENT
Start: 2019-09-11 | End: 2019-09-11 | Stop reason: HOSPADM

## 2019-09-11 RX ORDER — LABETALOL HYDROCHLORIDE 5 MG/ML
5 INJECTION, SOLUTION INTRAVENOUS
Status: DISCONTINUED | OUTPATIENT
Start: 2019-09-11 | End: 2019-09-11 | Stop reason: HOSPADM

## 2019-09-11 RX ORDER — LEVOFLOXACIN 5 MG/ML
500 INJECTION, SOLUTION INTRAVENOUS EVERY 24 HOURS
Status: DISCONTINUED | OUTPATIENT
Start: 2019-09-12 | End: 2019-09-13 | Stop reason: HOSPADM

## 2019-09-11 RX ORDER — ONDANSETRON 2 MG/ML
4 INJECTION INTRAMUSCULAR; INTRAVENOUS EVERY 6 HOURS PRN
Status: DISCONTINUED | OUTPATIENT
Start: 2019-09-11 | End: 2019-09-13 | Stop reason: HOSPADM

## 2019-09-11 RX ORDER — PROMETHAZINE HYDROCHLORIDE 25 MG/ML
6.25 INJECTION, SOLUTION INTRAMUSCULAR; INTRAVENOUS
Status: DISCONTINUED | OUTPATIENT
Start: 2019-09-11 | End: 2019-09-11 | Stop reason: HOSPADM

## 2019-09-11 RX ORDER — PROPRANOLOL HYDROCHLORIDE 80 MG/1
80 CAPSULE, EXTENDED RELEASE ORAL DAILY
Status: DISCONTINUED | OUTPATIENT
Start: 2019-09-12 | End: 2019-09-13 | Stop reason: HOSPADM

## 2019-09-11 RX ORDER — SODIUM CHLORIDE 0.9 % (FLUSH) 0.9 %
3 SYRINGE (ML) INJECTION EVERY 12 HOURS SCHEDULED
Status: DISCONTINUED | OUTPATIENT
Start: 2019-09-11 | End: 2019-09-11 | Stop reason: HOSPADM

## 2019-09-11 RX ORDER — FAMOTIDINE 10 MG/ML
20 INJECTION, SOLUTION INTRAVENOUS ONCE
Status: COMPLETED | OUTPATIENT
Start: 2019-09-11 | End: 2019-09-11

## 2019-09-11 RX ORDER — LEVOFLOXACIN 5 MG/ML
500 INJECTION, SOLUTION INTRAVENOUS ONCE
Status: COMPLETED | OUTPATIENT
Start: 2019-09-11 | End: 2019-09-11

## 2019-09-11 RX ORDER — IPRATROPIUM BROMIDE AND ALBUTEROL SULFATE 2.5; .5 MG/3ML; MG/3ML
3 SOLUTION RESPIRATORY (INHALATION) ONCE AS NEEDED
Status: DISCONTINUED | OUTPATIENT
Start: 2019-09-11 | End: 2019-09-11 | Stop reason: HOSPADM

## 2019-09-11 RX ORDER — SODIUM CHLORIDE 0.9 % (FLUSH) 0.9 %
3-10 SYRINGE (ML) INJECTION AS NEEDED
Status: DISCONTINUED | OUTPATIENT
Start: 2019-09-11 | End: 2019-09-11 | Stop reason: HOSPADM

## 2019-09-11 RX ORDER — PROMETHAZINE HYDROCHLORIDE 25 MG/ML
12.5 INJECTION, SOLUTION INTRAMUSCULAR; INTRAVENOUS ONCE AS NEEDED
Status: DISCONTINUED | OUTPATIENT
Start: 2019-09-11 | End: 2019-09-11 | Stop reason: HOSPADM

## 2019-09-11 RX ORDER — SENNA AND DOCUSATE SODIUM 50; 8.6 MG/1; MG/1
2 TABLET, FILM COATED ORAL NIGHTLY
Status: DISCONTINUED | OUTPATIENT
Start: 2019-09-11 | End: 2019-09-13 | Stop reason: HOSPADM

## 2019-09-11 RX ORDER — SODIUM CHLORIDE 9 MG/ML
75 INJECTION, SOLUTION INTRAVENOUS CONTINUOUS
Status: DISCONTINUED | OUTPATIENT
Start: 2019-09-11 | End: 2019-09-13 | Stop reason: HOSPADM

## 2019-09-11 RX ORDER — MAGNESIUM HYDROXIDE 1200 MG/15ML
LIQUID ORAL AS NEEDED
Status: DISCONTINUED | OUTPATIENT
Start: 2019-09-11 | End: 2019-09-11 | Stop reason: HOSPADM

## 2019-09-11 RX ORDER — HYDROMORPHONE HYDROCHLORIDE 1 MG/ML
0.5 INJECTION, SOLUTION INTRAMUSCULAR; INTRAVENOUS; SUBCUTANEOUS
Status: DISCONTINUED | OUTPATIENT
Start: 2019-09-11 | End: 2019-09-13 | Stop reason: HOSPADM

## 2019-09-11 RX ORDER — ONDANSETRON 4 MG/1
4 TABLET, FILM COATED ORAL EVERY 6 HOURS PRN
Status: DISCONTINUED | OUTPATIENT
Start: 2019-09-11 | End: 2019-09-13 | Stop reason: HOSPADM

## 2019-09-11 RX ORDER — HYDROCODONE BITARTRATE AND ACETAMINOPHEN 7.5; 325 MG/1; MG/1
1 TABLET ORAL ONCE AS NEEDED
Status: DISCONTINUED | OUTPATIENT
Start: 2019-09-11 | End: 2019-09-11 | Stop reason: HOSPADM

## 2019-09-11 RX ORDER — FLUMAZENIL 0.1 MG/ML
0.2 INJECTION INTRAVENOUS AS NEEDED
Status: DISCONTINUED | OUTPATIENT
Start: 2019-09-11 | End: 2019-09-11 | Stop reason: HOSPADM

## 2019-09-11 RX ORDER — NALOXONE HCL 0.4 MG/ML
0.2 VIAL (ML) INJECTION AS NEEDED
Status: DISCONTINUED | OUTPATIENT
Start: 2019-09-11 | End: 2019-09-11 | Stop reason: HOSPADM

## 2019-09-11 RX ORDER — ONDANSETRON 2 MG/ML
INJECTION INTRAMUSCULAR; INTRAVENOUS AS NEEDED
Status: DISCONTINUED | OUTPATIENT
Start: 2019-09-11 | End: 2019-09-11 | Stop reason: SURG

## 2019-09-11 RX ORDER — HYDRALAZINE HYDROCHLORIDE 20 MG/ML
5 INJECTION INTRAMUSCULAR; INTRAVENOUS
Status: DISCONTINUED | OUTPATIENT
Start: 2019-09-11 | End: 2019-09-11 | Stop reason: HOSPADM

## 2019-09-11 RX ORDER — FENTANYL CITRATE 50 UG/ML
50 INJECTION, SOLUTION INTRAMUSCULAR; INTRAVENOUS
Status: DISCONTINUED | OUTPATIENT
Start: 2019-09-11 | End: 2019-09-11 | Stop reason: HOSPADM

## 2019-09-11 RX ORDER — SODIUM CHLORIDE, SODIUM LACTATE, POTASSIUM CHLORIDE, CALCIUM CHLORIDE 600; 310; 30; 20 MG/100ML; MG/100ML; MG/100ML; MG/100ML
9 INJECTION, SOLUTION INTRAVENOUS CONTINUOUS
Status: DISCONTINUED | OUTPATIENT
Start: 2019-09-11 | End: 2019-09-11

## 2019-09-11 RX ORDER — ONDANSETRON 2 MG/ML
4 INJECTION INTRAMUSCULAR; INTRAVENOUS ONCE AS NEEDED
Status: DISCONTINUED | OUTPATIENT
Start: 2019-09-11 | End: 2019-09-11 | Stop reason: HOSPADM

## 2019-09-11 RX ORDER — DIPHENHYDRAMINE HCL 25 MG
25 CAPSULE ORAL
Status: DISCONTINUED | OUTPATIENT
Start: 2019-09-11 | End: 2019-09-11 | Stop reason: HOSPADM

## 2019-09-11 RX ORDER — LIDOCAINE HYDROCHLORIDE 10 MG/ML
0.5 INJECTION, SOLUTION EPIDURAL; INFILTRATION; INTRACAUDAL; PERINEURAL ONCE AS NEEDED
Status: DISCONTINUED | OUTPATIENT
Start: 2019-09-11 | End: 2019-09-11 | Stop reason: HOSPADM

## 2019-09-11 RX ORDER — HYDROCODONE BITARTRATE AND ACETAMINOPHEN 5; 325 MG/1; MG/1
1 TABLET ORAL EVERY 4 HOURS PRN
Status: DISCONTINUED | OUTPATIENT
Start: 2019-09-11 | End: 2019-09-13 | Stop reason: HOSPADM

## 2019-09-11 RX ORDER — NALOXONE HCL 0.4 MG/ML
0.1 VIAL (ML) INJECTION
Status: DISCONTINUED | OUTPATIENT
Start: 2019-09-11 | End: 2019-09-13 | Stop reason: HOSPADM

## 2019-09-11 RX ORDER — OXYCODONE AND ACETAMINOPHEN 7.5; 325 MG/1; MG/1
1 TABLET ORAL ONCE AS NEEDED
Status: DISCONTINUED | OUTPATIENT
Start: 2019-09-11 | End: 2019-09-11 | Stop reason: HOSPADM

## 2019-09-11 RX ORDER — ACETAMINOPHEN 325 MG/1
650 TABLET ORAL ONCE AS NEEDED
Status: DISCONTINUED | OUTPATIENT
Start: 2019-09-11 | End: 2019-09-11 | Stop reason: HOSPADM

## 2019-09-11 RX ORDER — DIPHENHYDRAMINE HYDROCHLORIDE 50 MG/ML
12.5 INJECTION INTRAMUSCULAR; INTRAVENOUS
Status: DISCONTINUED | OUTPATIENT
Start: 2019-09-11 | End: 2019-09-11 | Stop reason: HOSPADM

## 2019-09-11 RX ADMIN — EPHEDRINE SULFATE 5 MG: 50 INJECTION INTRAMUSCULAR; INTRAVENOUS; SUBCUTANEOUS at 11:00

## 2019-09-11 RX ADMIN — FENTANYL CITRATE 25 MCG: 50 INJECTION INTRAMUSCULAR; INTRAVENOUS at 10:23

## 2019-09-11 RX ADMIN — EPHEDRINE SULFATE 5 MG: 50 INJECTION INTRAMUSCULAR; INTRAVENOUS; SUBCUTANEOUS at 10:56

## 2019-09-11 RX ADMIN — LIDOCAINE HYDROCHLORIDE 100 MG: 20 INJECTION, SOLUTION INFILTRATION; PERINEURAL at 10:19

## 2019-09-11 RX ADMIN — DEXAMETHASONE SODIUM PHOSPHATE 4 MG: 10 INJECTION INTRAMUSCULAR; INTRAVENOUS at 10:23

## 2019-09-11 RX ADMIN — FENTANYL CITRATE 25 MCG: 50 INJECTION INTRAMUSCULAR; INTRAVENOUS at 10:49

## 2019-09-11 RX ADMIN — SODIUM CHLORIDE, POTASSIUM CHLORIDE, SODIUM LACTATE AND CALCIUM CHLORIDE 9 ML/HR: 600; 310; 30; 20 INJECTION, SOLUTION INTRAVENOUS at 08:44

## 2019-09-11 RX ADMIN — ONDANSETRON 4 MG: 2 INJECTION INTRAMUSCULAR; INTRAVENOUS at 10:23

## 2019-09-11 RX ADMIN — LEVOFLOXACIN 500 MG: 5 INJECTION, SOLUTION INTRAVENOUS at 09:41

## 2019-09-11 RX ADMIN — PROPOFOL 200 MG: 10 INJECTION, EMULSION INTRAVENOUS at 10:19

## 2019-09-11 RX ADMIN — HYDROMORPHONE HYDROCHLORIDE 0.5 MG: 1 INJECTION, SOLUTION INTRAMUSCULAR; INTRAVENOUS; SUBCUTANEOUS at 15:59

## 2019-09-11 RX ADMIN — HYDROMORPHONE HYDROCHLORIDE 0.5 MG: 1 INJECTION, SOLUTION INTRAMUSCULAR; INTRAVENOUS; SUBCUTANEOUS at 12:00

## 2019-09-11 RX ADMIN — FAMOTIDINE 20 MG: 10 INJECTION, SOLUTION INTRAVENOUS at 08:48

## 2019-09-11 RX ADMIN — EPHEDRINE SULFATE 5 MG: 50 INJECTION INTRAMUSCULAR; INTRAVENOUS; SUBCUTANEOUS at 10:32

## 2019-09-11 RX ADMIN — EPHEDRINE SULFATE 10 MG: 50 INJECTION INTRAMUSCULAR; INTRAVENOUS; SUBCUTANEOUS at 11:13

## 2019-09-11 RX ADMIN — SODIUM CHLORIDE 100 ML/HR: 9 INJECTION, SOLUTION INTRAVENOUS at 12:20

## 2019-09-11 RX ADMIN — FENTANYL CITRATE 25 MCG: 50 INJECTION INTRAMUSCULAR; INTRAVENOUS at 10:32

## 2019-09-11 RX ADMIN — HYDROCODONE BITARTRATE AND ACETAMINOPHEN 1 TABLET: 5; 325 TABLET ORAL at 13:43

## 2019-09-11 RX ADMIN — EPHEDRINE SULFATE 5 MG: 50 INJECTION INTRAMUSCULAR; INTRAVENOUS; SUBCUTANEOUS at 11:05

## 2019-09-11 RX ADMIN — HYDROMORPHONE HYDROCHLORIDE 0.5 MG: 1 INJECTION, SOLUTION INTRAMUSCULAR; INTRAVENOUS; SUBCUTANEOUS at 23:38

## 2019-09-11 NOTE — PLAN OF CARE
Problem: Patient Care Overview  Goal: Plan of Care Review  Outcome: Ongoing (interventions implemented as appropriate)   09/11/19 1641   Coping/Psychosocial   Plan of Care Reviewed With patient   Plan of Care Review   Progress no change   OTHER   Outcome Summary Pt doing well today. VSS. Pt complaining of some pressure. Did complain of some pain. Gave norco once and IV dilaudid once. Sats did drop with dilaudid. O2 placed on pt. TURP running moderately. Will continue to monitor.      Goal: Individualization and Mutuality  Outcome: Ongoing (interventions implemented as appropriate)   09/11/19 1152   Mutuality/Individual Preferences   What Information Would Help Us Give You More Personalized Care? [place]   Individualization   Patient Specific Preferences Pt goes by either Junito or Compa       Problem: Pain, Chronic (Adult)  Goal: Acceptable Pain/Comfort Level and Functional Ability  Outcome: Ongoing (interventions implemented as appropriate)   09/11/19 1641   Pain, Chronic (Adult)   Acceptable Pain/Comfort Level and Functional Ability making progress toward outcome       Problem: Fall Risk (Adult)  Goal: Absence of Fall  Outcome: Ongoing (interventions implemented as appropriate)   09/11/19 1641   Fall Risk (Adult)   Absence of Fall making progress toward outcome

## 2019-09-11 NOTE — ANESTHESIA PREPROCEDURE EVALUATION
Anesthesia Evaluation     Patient summary reviewed and Nursing notes reviewed   no history of anesthetic complications:  NPO Solid Status: > 8 hours  NPO Liquid Status: > 2 hours           Airway   Mallampati: III  TM distance: >3 FB  Neck ROM: full  No difficulty expected  Dental - normal exam     Pulmonary     breath sounds clear to auscultation  Cardiovascular   Exercise tolerance: good (4-7 METS)    ECG reviewed  Beta blocker given within 24 hours of surgery  Rhythm: regular  Rate: normal    (+) hypertension, dysrhythmias Atrial Fib,       Neuro/Psych  GI/Hepatic/Renal/Endo    (+) obesity,  hiatal hernia, GERD,      Musculoskeletal     Abdominal     Abdomen: soft.   Substance History      OB/GYN          Other   (+) arthritis   history of cancer                    Anesthesia Plan    ASA 3     general     intravenous induction   Anesthetic plan, all risks, benefits, and alternatives have been provided, discussed and informed consent has been obtained with: patient.    Plan discussed with CRNA.

## 2019-09-11 NOTE — ANESTHESIA POSTPROCEDURE EVALUATION
Patient: Junito Sorto    Procedure Summary     Date:  09/11/19 Room / Location:  [facility] OR 01 / [facility] MAIN OR    Anesthesia Start:  1011 Anesthesia Stop:  1128    Procedure:  CYSTOSCOPY TRANSURETHRAL RESECTION OF PROSTATE (N/A ) Diagnosis:       Prostate cancer (CMS/HCC)      Bladder outlet obstruction      (Bladder Outlet Obstruction)    Surgeon:  Chip Bedolla MD Provider:  Servando Ramos MD    Anesthesia Type:  general ASA Status:  3          Anesthesia Type: general  Last vitals  BP   121/75 (09/11/19 1145)   Temp   36.3 °C (97.4 °F) (09/11/19 1122)   Pulse   71 (09/11/19 1145)   Resp   16 (09/11/19 1145)     SpO2   98 % (09/11/19 1145)     Post Anesthesia Care and Evaluation    Patient location during evaluation: PACU  Patient participation: complete - patient participated  Level of consciousness: awake and alert  Pain management: adequate  Airway patency: patent  Anesthetic complications: No anesthetic complications    Cardiovascular status: acceptable  Respiratory status: acceptable  Hydration status: acceptable    Comments: --------------------            09/11/19               1145     --------------------   BP:       121/75     Pulse:      71       Resp:       16       Temp:                SpO2:      98%      --------------------

## 2019-09-11 NOTE — OP NOTE
CYSTOSCOPY TRANSURETHRAL RESECTION OF PROSTATE  Procedure Note    Junito Sorto  9/11/2019    Pre-op Diagnosis:   Bladder Outlet Obstruction    Post-op Diagnosis:     Post-Op Diagnosis Codes:     * Prostate cancer (CMS/[facility]) [C61]     * Bladder outlet obstruction [N32.0]    Procedure(s):  CYSTOSCOPY TRANSURETHRAL RESECTION OF PROSTATE    Surgeon(s):  Chip Bedolla MD    Anesthesia: General    Staff:   Circulator: Meg Davis RN  Scrub Person: Twin Coates  Orientee: Annabelle Landaverde    Estimated Blood Loss: 100ml    Specimens:                  Order Name Source Comment Collection Info Order Time   TISSUE PATHOLOGY EXAM Prostate  Collected By: Chip Bedolla MD 9/11/2019 11:05 AM         Drains:   Urethral Catheter 24 Fr. (Active)   Daily Indications Placement by  physician 9/11/2019 11:22 AM   Site Assessment Clean;Skin intact 9/11/2019 11:22 AM   Collection Container Standard drainage bag 9/11/2019 11:22 AM   Securement Method Securing device 9/11/2019 11:22 AM       Findings: Bladder outlet obstruction with very tight bladder neck and moderate apical disease.  Multiple cystic cavities in the prostate consistent with prior radiofrequency ablation.    Complications: None    Indications: 70-year-old gentleman with prostate carcinoma watchful management now about 5 years and it with a stable PSA of [place] 3+3 disease.  He has a history of bladder outlet obstruction.  In 2014 he underwent redo frequency ablation of his prostate with good results but he has persistent incomplete emptying and frequency.  Cystoscopy shows a very tight bladder neck and still some persistent lateral lobe obstruction.  No median lobe is noted.  Moderate recollection bladder was noted.  Now presents for TURP.    Procedure: She was taken out proceed given general anesthesia.  Placed in lithotomy.  Prepped and draped in sterile fashion.  Rectal drape was placed.  Cystoscopy was performed and the resectoscope was  placed.  ACMI gyrus resectoscope was utilized.  The bladder neck was widely resected which was the bulk of his obstruction and it was very high and elevated making it very difficult to get his resectoscope in.  Once we resected this open he was wide open at the bladder neck and then we resected the lateral lobes in a Rake fashion.  We got into multiple cystic cavities in the lateral lobes from his previous TUNA procedure.  I resected apically about as far as I felt comfortable at the level of the verumontanum.  Noted tissue on his left side that was suspicious for prostate carcinoma.  Hemostasis was achieved.  All the chips were irrigated out.  Urethral instillation of lidocaine was placed and then a 24 Scottish three-way catheter was placed.  He was awoken taken to recovery stable condition.      Chip Bedolla MD     Date: 9/11/2019  Time: 12:01 PM

## 2019-09-11 NOTE — ANESTHESIA PROCEDURE NOTES
Airway  Urgency: elective    Date/Time: 9/11/2019 10:21 AM  Airway not difficult    General Information and Staff    Patient location during procedure: OR  Anesthesiologist: Servando Ramos MD  CRNA: Yesi Garrett CRNA    Indications and Patient Condition  Indications for airway management: airway protection    Preoxygenated: yes  Mask difficulty assessment: 0 - not attempted    Final Airway Details  Final airway type: supraglottic airway      Successful airway: classic  Size 5    Number of attempts at approach: 1  Assessment: lips, teeth, and gum same as pre-op    Additional Comments  Atraumatic.

## 2019-09-11 NOTE — H&P
[facility] Surgical [facility] and Physical    Patient Care Team:  Fan Schulz MD as PCP - General  Calles, Rodriguez Sexton MD as PCP - Claims Attributed  Steven Cadena MD as Consulting Physician (Gastroenterology)    Chief complaint bladder outlet obstruction    Subjective     Patient is a 78 y.o. male presents with prostate cancer on watchful waiting for TURP due to obstructing outlet and inability to improve with medical management.     Review of Systems   Pertinent items are noted in HPI    Past Medical History:   Diagnosis Date   • Arthritis    • At risk for obstructive sleep apnea     5   • Atrial fibrillation (CMS/HCC)     not followed by Cardiologist just PCP   • BPH (benign prostatic hyperplasia)    • Colon polyp    • Coronary artery disease Few years    Afib   • Eczema    • GERD (gastroesophageal reflux disease) 2013    Mentioned as possible   • Hiatal hernia    • Hyperlipidemia    • Hypertension    • Low back pain    • Prostate cancer (CMS/HCC)    • Skin cancer      Past Surgical History:   Procedure Laterality Date   • CATARACT EXTRACTION, BILATERAL     • COLONOSCOPY  Several   • ENDOSCOPY N/A 10/31/2018    LA Grade C reflux esophagitis, HH, erythematous mucosa in the antrum. Path: Gastritis, esophagitis.    • EPIDURAL BLOCK     • ESOPHAGEAL DILATATION     • PROSTATE SURGERY  11/06/2014   • SKIN CANCER EXCISION      multiple, over that last few years   • SKIN CANCER EXCISION Right 09/03/2019    FACE,    • TONSILLECTOMY      around 1950   • UPPER GASTROINTESTINAL ENDOSCOPY  2013     Family History   Problem Relation Age of Onset   • Cancer Mother         pancreatic   • Liver disease Mother    • Cancer Father         prostate, bladder, colon, lymphoma, leukemia   • Colon cancer Father    • Cancer Brother         prostate   • Cancer Paternal Grandfather         prostate   • Malig Hyperthermia Neg Hx      Social History     Tobacco Use   • Smoking status: Former Smoker     Types: Pipe   •  "Smokeless tobacco: Never Used   • Tobacco comment: only in college   Substance Use Topics   • Alcohol use: Yes     Types: 3 Glasses of wine per week     Comment: SOCIALLY   • Drug use: No       Meds:  Medications Prior to Admission   Medication Sig Dispense Refill Last Dose   • amLODIPine-benazepril (LOTREL 5-20) 5-20 MG per capsule Take 1 capsule by mouth Every Morning. 90 capsule 1 2019 at 0600   • atorvastatin (LIPITOR) 40 MG tablet Take 40 mg by mouth Every Night.   9/10/2019 at 2100   • pantoprazole (PROTONIX) 40 MG EC tablet Take 40 mg by mouth Every Morning.   2019 at 0600   • propranolol XL (INDERAL XL) 80 MG 24 hr capsule Take 1 capsule by mouth Every Morning. 90 capsule 1 2019 at 0600   • tamsulosin (FLOMAX) 0.4 MG capsule 24 hr capsule Take 1 capsule by mouth 2 (Two) Times a Day.   9/10/2019 at 2100   • aspirin 81 MG tablet Take 81 mg by mouth Daily. LAST DOSE 19   • [] cephalexin (KEFLEX) 500 MG capsule Take 500 mg by mouth 2 (Two) Times a Day.          Allergies:  Patient has no known allergies.    Debilities:  none    Objective     Vital Signs  Temp:  [97.7 °F (36.5 °C)] 97.7 °F (36.5 °C)  Heart Rate:  [58] 58  Resp:  [18] 18  BP: (136)/(90) 136/90  No intake or output data in the 24 hours ending 19 1009  Flowsheet Rows      First Filed Value   Admission Height  167.6 cm (66\") Documented at 2019   Admission Weight  106 kg (233 lb 7 oz) Documented at 2019 0818           Physical Exam:      General Appearance:    Alert, cooperative, in no acute distress   Head:    Normocephalic, without obvious abnormality, atraumatic   Eyes:            Lids and lashes normal, conjunctivae and sclerae normal, no   icterus, no pallor, corneas clear, PERRLA   Ears:    Ears appear intact with no abnormalities noted   Throat:   No oral lesions, no thrush, oral mucosa moist   Neck:   No adenopathy, supple, trachea midline, no thyromegaly, no   carotid bruit, no JVD "   Back:     No kyphosis present, no scoliosis present, no skin lesions,      erythema or scars, no tenderness to percussion or                   palpation,   range of motion normal   Lungs:     Clear to auscultation,respirations regular, even and                  unlabored    Heart:    Regular rhythm and normal rate, normal S1 and S2, no            murmur, no gallop, no rub, no click   Chest Wall:    No abnormalities observed   Abdomen:     Normal bowel sounds, no masses, no organomegaly, soft        non-tender, non-distended, no guarding, no rebound                tenderness   Rectal:     Deferred   Extremities:   Moves all extremities well, no edema, no cyanosis, no             redness   Pulses:   Pulses palpable and equal bilaterally   Skin:   No bleeding, bruising or rash   Lymph nodes:   No palpable adenopathy   Neurologic:   Cranial nerves 2 - 12 grossly intact, sensation intact, DTR       present and equal bilaterally     Results Review:    I reviewed the patient's new clinical results.  Results for orders placed or performed in visit on 09/04/19   CBC (No Diff)   Result Value Ref Range    WBC 6.38 3.40 - 10.80 10*3/mm3    RBC 4.69 4.14 - 5.80 10*6/mm3    Hemoglobin 13.7 13.0 - 17.7 g/dL    Hematocrit 43.2 37.5 - 51.0 %    MCV 92.1 79.0 - 97.0 fL    MCH 29.2 26.6 - 33.0 pg    MCHC 31.7 31.5 - 35.7 g/dL    RDW 13.3 12.3 - 15.4 %    RDW-SD 45.1 37.0 - 54.0 fl    MPV 10.8 6.0 - 12.0 fL    Platelets 183 140 - 450 10*3/mm3   Basic Metabolic Panel   Result Value Ref Range    Glucose 108 (H) 65 - 99 mg/dL    BUN 11 8 - 23 mg/dL    Creatinine 0.65 (L) 0.76 - 1.27 mg/dL    Sodium 141 136 - 145 mmol/L    Potassium 4.1 3.5 - 5.2 mmol/L    Chloride 104 98 - 107 mmol/L    CO2 27.0 22.0 - 29.0 mmol/L    Calcium 9.6 8.6 - 10.5 mg/dL    eGFR Non African Amer 119 >60 mL/min/1.73    BUN/Creatinine Ratio 16.9 7.0 - 25.0    Anion Gap 10.0 5.0 - 15.0 mmol/L        Assessment:  Bladder Outlet Obstruction    Plan:    TURP    I  discussed the patients findings and my recommendations with patient.   Risks, complications, outcomes and alternatives discussed with the patient at the bedside and office.    Chip Bedolla MD  09/11/19  10:09 AM

## 2019-09-12 LAB
ANION GAP SERPL CALCULATED.3IONS-SCNC: 9.4 MMOL/L (ref 5–15)
BUN BLD-MCNC: 11 MG/DL (ref 8–23)
BUN/CREAT SERPL: 20.4 (ref 7–25)
CALCIUM SPEC-SCNC: 9.1 MG/DL (ref 8.6–10.5)
CHLORIDE SERPL-SCNC: 101 MMOL/L (ref 98–107)
CO2 SERPL-SCNC: 25.6 MMOL/L (ref 22–29)
CREAT BLD-MCNC: 0.54 MG/DL (ref 0.76–1.27)
DEPRECATED RDW RBC AUTO: 43.6 FL (ref 37–54)
ERYTHROCYTE [DISTWIDTH] IN BLOOD BY AUTOMATED COUNT: 13.2 % (ref 12.3–15.4)
GFR SERPL CREATININE-BSD FRML MDRD: 147 ML/MIN/1.73
GLUCOSE BLD-MCNC: 139 MG/DL (ref 65–99)
HCT VFR BLD AUTO: 42.4 % (ref 37.5–51)
HGB BLD-MCNC: 13.7 G/DL (ref 13–17.7)
MCH RBC QN AUTO: 29.1 PG (ref 26.6–33)
MCHC RBC AUTO-ENTMCNC: 32.3 G/DL (ref 31.5–35.7)
MCV RBC AUTO: 90.2 FL (ref 79–97)
PLATELET # BLD AUTO: 190 10*3/MM3 (ref 140–450)
PMV BLD AUTO: 11.1 FL (ref 6–12)
POTASSIUM BLD-SCNC: 4 MMOL/L (ref 3.5–5.2)
RBC # BLD AUTO: 4.7 10*6/MM3 (ref 4.14–5.8)
SODIUM BLD-SCNC: 136 MMOL/L (ref 136–145)
WBC NRBC COR # BLD: 8.31 10*3/MM3 (ref 3.4–10.8)

## 2019-09-12 PROCEDURE — 80048 BASIC METABOLIC PNL TOTAL CA: CPT | Performed by: UROLOGY

## 2019-09-12 PROCEDURE — 85027 COMPLETE CBC AUTOMATED: CPT | Performed by: UROLOGY

## 2019-09-12 PROCEDURE — 63710000001 PANTOPRAZOLE 40 MG TABLET DELAYED-RELEASE: Performed by: UROLOGY

## 2019-09-12 PROCEDURE — 63710000001 SENNA-DOCUSATE 8.6-50 MG TABLET: Performed by: UROLOGY

## 2019-09-12 PROCEDURE — G0378 HOSPITAL OBSERVATION PER HR: HCPCS

## 2019-09-12 PROCEDURE — 94799 UNLISTED PULMONARY SVC/PX: CPT

## 2019-09-12 PROCEDURE — 25010000002 LEVOFLOXACIN PER 250 MG: Performed by: UROLOGY

## 2019-09-12 PROCEDURE — A9270 NON-COVERED ITEM OR SERVICE: HCPCS | Performed by: UROLOGY

## 2019-09-12 RX ADMIN — SODIUM CHLORIDE 100 ML/HR: 9 INJECTION, SOLUTION INTRAVENOUS at 08:59

## 2019-09-12 RX ADMIN — LEVOFLOXACIN 500 MG: 5 INJECTION, SOLUTION INTRAVENOUS at 08:56

## 2019-09-12 RX ADMIN — PANTOPRAZOLE SODIUM 40 MG: 40 TABLET, DELAYED RELEASE ORAL at 07:00

## 2019-09-12 RX ADMIN — SENNOSIDES AND DOCUSATE SODIUM 2 TABLET: 8.6; 5 TABLET ORAL at 20:50

## 2019-09-12 NOTE — PLAN OF CARE
Problem: Patient Care Overview  Goal: Plan of Care Review   09/12/19 1412   Coping/Psychosocial   Plan of Care Reviewed With patient;spouse   Plan of Care Review   Progress improving   OTHER   Outcome Summary pt has had an uneventful day. TURP continues with NS, urine is very light pink with some yellow. pain is controlled has not asked for any prn medications. room air. has not wanted to get up to ambulate continue to encourage to do so. no complaints at this time will continue to monitor.

## 2019-09-12 NOTE — PROGRESS NOTES
"  [facility] Progress Note    Chief Complaint: Hematuria    He is a little confused today so I had some loose hallucinations.  Urine is starting to clear up just very little blood-tinged at this point.    Review of Systems:    Review of systems could not be obtained due to  patient confusion.          Vital Signs  /68 (BP Location: Right arm, Patient Position: Lying)   Pulse 60   Temp 97.4 °F (36.3 °C) (Oral)   Resp 16   Ht 167.6 cm (66\")   Wt 106 kg (233 lb 7 oz)   SpO2 93%   BMI 37.68 kg/m²     Physical Exam:   No exam performed today,     Results Review:     I reviewed the patient's new clinical results.  Lab Results (last 24 hours)     Procedure Component Value Units Date/Time    Basic Metabolic Panel [[id number]]  (Abnormal) Collected:  09/12/19 0619    Specimen:  Blood Updated:  09/12/19 0704     Glucose 139 mg/dL      BUN 11 mg/dL      Creatinine 0.54 mg/dL      Sodium 136 mmol/L      Potassium 4.0 mmol/L      Chloride 101 mmol/L      CO2 25.6 mmol/L      Calcium 9.1 mg/dL      eGFR Non African Amer 147 mL/min/1.73      BUN/Creatinine Ratio 20.4     Anion Gap 9.4 mmol/L     Narrative:       GFR Normal >60  Chronic Kidney Disease <60  Kidney Failure <15    CBC (No Diff) [[id number]]  (Normal) Collected:  09/12/19 0619    Specimen:  Blood Updated:  09/12/19 0638     WBC 8.31 10*3/mm3      RBC 4.70 10*6/mm3      Hemoglobin 13.7 g/dL      Hematocrit 42.4 %      MCV 90.2 fL      MCH 29.1 pg      MCHC 32.3 g/dL      RDW 13.2 %      RDW-SD 43.6 fl      MPV 11.1 fL      Platelets 190 10*3/mm3         Imaging Results (last 24 hours)     ** No results found for the last 24 hours. **          Medication Review:   I have personally reviewed    Current Facility-Administered Medications:   •  amLODIPine (NORVASC) 5 mg, lisinopril (PRINIVIL,ZESTRIL) 20 mg, , Oral, Q24H, Chip Bedolla MD  •  HYDROcodone-acetaminophen (NORCO) 5-325 MG per tablet 1 tablet, 1 tablet, Oral, Q4H PRN, Chip Bedolla, " MD, 1 tablet at 09/11/19 1343  •  HYDROmorphone (DILAUDID) injection 0.5 mg, 0.5 mg, Intravenous, Q2H PRN, 0.5 mg at 09/11/19 2338 **AND** naloxone (NARCAN) injection 0.1 mg, 0.1 mg, Intravenous, Q5 Min PRN, Chip Bedolla MD  •  levoFLOXacin (LEVAQUIN) 500 mg/100 mL D5W (premix) (LEVAQUIN) 500 mg, 500 mg, Intravenous, Q24H, Chip Bedolla MD, Stopped at 09/12/19 1158  •  ondansetron (ZOFRAN) tablet 4 mg, 4 mg, Oral, Q6H PRN **OR** ondansetron (ZOFRAN) injection 4 mg, 4 mg, Intravenous, Q6H PRN, Chip Bedolla MD  •  pantoprazole (PROTONIX) EC tablet 40 mg, 40 mg, Oral, QAM, Chip Bedolla MD, 40 mg at 09/12/19 0700  •  propranolol LA (INDERAL LA) 24 hr capsule 80 mg, 80 mg, Oral, Daily, Chip Bedolla MD  •  sennosides-docusate sodium (SENOKOT-S) 8.6-50 MG tablet 2 tablet, 2 tablet, Oral, Nightly, Chip Bedolla MD  •  sodium chloride 0.9 % infusion, 100 mL/hr, Intravenous, Continuous, Chip Bedolla MD, Last Rate: 100 mL/hr at 09/12/19 1817, 100 mL/hr at 09/12/19 1817    Allergies:    Patient has no known allergies.    Assessment:    Active Problems:  Patient Active Problem List   Diagnosis   • Hyperglycemia   • Obesity   • Atopic rhinitis   • Paroxysmal atrial fibrillation (CMS/HCC)   • Benign essential hypertension   • Hypercholesterolemia   • Rhinosinusitis   • Esophageal dysphagia   • Impacted cerumen of right ear   • Hiatal hernia   • Gastroesophageal reflux disease with esophagitis   • Prostate cancer (CMS/HCC)   • Bladder outlet obstruction       Postop day 1 TURP    Plan:    Weight is pathology.  Wean his irrigations down hopefully catheter out tomorrow morning.      Chip Bedolla MD    9/12/2019  7:32 PM

## 2019-09-12 NOTE — PLAN OF CARE
Problem: Patient Care Overview  Goal: Plan of Care Review  Outcome: Ongoing (interventions implemented as appropriate)   09/12/19 0616   Coping/Psychosocial   Plan of Care Reviewed With patient   Plan of Care Review   Progress no change   OTHER   Outcome Summary TURP running. OP is light pink. Pain is controlled. IVF continued. Moderate leg edema. Did not need O2 lastnight.

## 2019-09-12 NOTE — NURSING NOTE
"Falls Prevention Teach-Back Education     Junito Sorto is a 78 y.o. male admitted to [facility] on 9/11/2019  7:56 AM. The encounter diagnosis was Prostate cancer (CMS/[facility]).    Fall Risk Assessment  [facility] High Risk Falls Assessment (If Fall score is >/=13, add the Fall Risk CPG to the care plan)   Fallen in past 6 months: 0--> No  Mental Status: 0--> no mental status change  Elimination: 0--> No elimination issues  Mobility: 2--> Requires assistance- transfer, walker, etc.  Medications: 1--> Narcotics  Nurses' Clinical Judgement: 6  Total Fall Risk Score: 11  Total Fall Risk Score: 11    The patient viewed the informational video on strategies to prevent falling while hospitalized entitled \"Patient Safety: Protecting Yourself in the Hospital (Part 3)\".  The patient was able to teach back at least three things that can be done to lessen the risk for falling while in the hospital.  Additionally, the patient was able to verbalize what they need to do before getting out of bed in an effort to prevent a fall.    Nurse: Glo Curtis RN  "

## 2019-09-13 VITALS
TEMPERATURE: 97 F | HEART RATE: 60 BPM | HEIGHT: 66 IN | OXYGEN SATURATION: 96 % | RESPIRATION RATE: 16 BRPM | DIASTOLIC BLOOD PRESSURE: 82 MMHG | WEIGHT: 233.44 LBS | BODY MASS INDEX: 37.52 KG/M2 | SYSTOLIC BLOOD PRESSURE: 130 MMHG

## 2019-09-13 LAB
CYTO UR: NORMAL
LAB AP CASE REPORT: NORMAL
LAB AP DIAGNOSIS COMMENT: NORMAL
LAB AP SYNOPTIC CHECKLIST: NORMAL
PATH REPORT.FINAL DX SPEC: NORMAL
PATH REPORT.GROSS SPEC: NORMAL

## 2019-09-13 PROCEDURE — G0378 HOSPITAL OBSERVATION PER HR: HCPCS

## 2019-09-13 RX ORDER — SENNA AND DOCUSATE SODIUM 50; 8.6 MG/1; MG/1
2 TABLET, FILM COATED ORAL NIGHTLY
Qty: 60 TABLET | Refills: 0 | Status: SHIPPED | OUTPATIENT
Start: 2019-09-13 | End: 2019-12-03

## 2019-09-13 RX ORDER — CEPHALEXIN 500 MG/1
500 CAPSULE ORAL 3 TIMES DAILY
Qty: 21 CAPSULE | Refills: 0 | Status: SHIPPED | OUTPATIENT
Start: 2019-09-13 | End: 2019-09-20

## 2019-09-13 NOTE — PLAN OF CARE
Problem: Patient Care Overview  Goal: Plan of Care Review  Outcome: Ongoing (interventions implemented as appropriate)   09/13/19 0801   Coping/Psychosocial   Plan of Care Reviewed With patient   Plan of Care Review   Progress no change   OTHER   Outcome Summary Per two previous nursing notes, pt had an eventful night. He became argumentative, uncooperative, delusional, demanding and irrational. Called his wife to tell her he fell out of bed and there was smoke/dust everywhere. Both which were untrue. He apoligized this morning but then stated since it was so important to wait until 6 to take out cath we will wait until after 6. Cath removed and already voided. He answers orientation questions correctly... His mood is very different than when I previously took care of him, this new onset confusion has his wife very bothered by his condition. VSS. No IV access due to patient removal.

## 2019-09-13 NOTE — PROGRESS NOTES
Discharge Planning Assessment  [facility]     Patient Name: Junito Sorto  MRN: [id number]  Today's Date: 9/13/2019    Admit Date: 9/11/2019    Discharge Needs Assessment     Row Name 09/13/19 2089       Living Environment    Lives With  spouse    Current Living Arrangements  home/apartment/condo    Potentially Unsafe Housing Conditions  other (see comments) no concerns     Primary Care Provided by  self    Provides Primary Care For  no one    Family Caregiver if Needed  spouse    Quality of Family Relationships  involved;helpful;supportive    Able to Return to Prior Arrangements  yes       Resource/Environmental Concerns    Resource/Environmental Concerns  none    Transportation Concerns  car, none       Transition Planning    Patient/Family Anticipates Transition to  home with family    Patient/Family Anticipated Services at Transition  none    Transportation Anticipated  family or friend will provide       Discharge Needs Assessment    Readmission Within the Last 30 Days  no previous admission in last 30 days    Concerns to be Addressed  no discharge needs identified;denies needs/concerns at this time    Equipment Currently Used at Home  cane, straight;grab bar;shower chair    Anticipated Changes Related to Illness  none    Equipment Needed After Discharge  none    Offered/Gave Vendor List  yes        Discharge Plan     Row Name 09/13/19 3885       Plan    Plan  Home with spouse     Patient/Family in Agreement with Plan  yes    Plan Comments  CCP met with patient and patient's spouse, Lolita at bedside. CCP role explained and discharge planning discussed. BROOKS noted and face sheet verified. Patient's PCP is Dr. Schulz. Patient lives with his spouse, with two steps to the entrance of his home with handrail present. Patient has steps leading to the upstairs but his bedroom and bathroom are on the main level. Patient is independent with his ADLS and does not use DME but has a cane if needed. Patient has not  used home health or been to SNF. Patient's preferred pharmacy is [facility] in [place]; patient denies having trouble obtaining his medications. Patient and patient's spouse state the plan is to return home upon discharge needs and denies any HH/PT needs. Patient's spouse states they have a good support system through neighbors and Zoroastrian friends. Spouse to transport home. Vianey Mcconnell CSW            Destination      No service coordination in this encounter.      Durable Medical Equipment      No service coordination in this encounter.      Dialysis/Infusion      No service coordination in this encounter.      Home Medical Care      No service coordination in this encounter.      Therapy      No service coordination in this encounter.      Community Resources      No service coordination in this encounter.          Demographic Summary     Row Name 09/13/19 1338       General Information    Admission Type  observation    Arrived From  emergency department    Required Notices Provided  Observation Status Notice    Referral Source  admission list    Reason for Consult  discharge planning    Preferred Language  English     Used During This Interaction  no        Functional Status     Row Name 09/13/19 133       Functional Status    Usual Activity Tolerance  good    Current Activity Tolerance  good       Functional Status, IADL    Medications  independent    Meal Preparation  independent    Housekeeping  independent    Laundry  independent    Shopping  independent       Mental Status    General Appearance WDL  WDL       Mental Status Summary    Recent Changes in Mental Status/Cognitive Functioning  no changes        Psychosocial    No documentation.       Abuse/Neglect    No documentation.       Legal    No documentation.       Substance Abuse    No documentation.       Patient Forms     Row Name 09/13/19 7124       Patient Forms    Provider Choice List  Delivered    Delivered to  Patient    Method of delivery   In person            MAXI LugoW

## 2019-09-13 NOTE — NURSING NOTE
"Pt asking staff numerous times to take down TURP and not to hang IVF because he is going home. 2220 Pt found on side of bed holding catheter close to penis, with right forearm IV pulled out, dripping blood ..stating \"I am going home might as well take out catheter\" Explained to him he is not going home until DR orders and not to pull out catheter.. Pt refusing to get new IV, IVF @75 stopped. Pt on regular diet and drinking water. Pt saying pictures are moving and dust and smoke are in the room. Spoke with wife she is on her way. Now requesting to speak to an . Continuing to monitor.   "

## 2019-09-13 NOTE — DISCHARGE SUMMARY
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  He had a previous radio frequency ablation of his prostatic urethra many years ago but his disease is been progressive.  Now presents for TURP.  Hospital Course  Patient is a 78 y.o. male presented with bladder outlet obstruction.  He underwent transurethral resection of prostate.  His prostate catheter then was removed on postop day #2.  He is voiding well.  His urine is progressively clearing.  He is having good volumes and minimal restricted activity.  His pathology shows Jerry 3+3 carcinoma the prostate in 20% of the cores.  He will be sent home today with oral antibiotics and restricted activity for the next 2 weeks..  He had some transient hallucinations at nighttime that seemed to resolve promptly in the morning.    Procedures Performed  Procedure(s):  CYSTOSCOPY TRANSURETHRAL RESECTION OF PROSTATE       Consults:   Consults     No orders found from 8/13/2019 to 9/12/2019.          Pertinent Test Results: labs: Routine      Condition on Discharge: Excellent    Vital Signs  Temp:  [96.7 °F (35.9 °C)-98.3 °F (36.8 °C)] 97 °F (36.1 °C)  Heart Rate:  [57-72] 60  Resp:  [16-18] 16  BP: (130-153)/(78-86) 130/82    Physical Exam:   Joann testes up in the mind scrotum all healing well.    Discharge Disposition  Home or Self Care    Discharge Medications     Discharge Medications      New Medications      Instructions Start Date   sennosides-docusate sodium 8.6-50 MG tablet  Commonly known as:  SENOKOT-S   2 tablets, Oral, Nightly         Changes to Medications      Instructions Start Date   cephalexin 500 MG capsule  Commonly known as:  KEFLEX  What changed:  when to  take this   500 mg, Oral, 3 Times Daily         Continue These Medications      Instructions Start Date   amLODIPine-benazepril 5-20 MG per capsule  Commonly known as:  LOTREL 5-20   1 capsule, Oral, Every Morning      atorvastatin 40 MG tablet  Commonly known as:  LIPITOR   40 mg, Oral, Nightly      pantoprazole 40 MG EC tablet  Commonly known as:  PROTONIX   40 mg, Oral, Every Morning      propranolol XL 80 MG 24 hr capsule  Commonly known as:  INDERAL XL   80 mg, Oral, Every Morning      tamsulosin 0.4 MG capsule 24 hr capsule  Commonly known as:  FLOMAX   1 capsule, Oral, 2 Times Daily         Stop These Medications    aspirin 81 MG tablet            Discharge Diet: Regular    Activity at Discharge: As tolerated and outlined to the patient with his family present.    Follow-up Appointments  Future Appointments   Date Time Provider Department Center   11/26/2019  9:00 AM [facility] None   12/3/2019 10:15 AM Fan Schulz MD [facility] None     Additional Instructions for the Follow-ups that You Need to Schedule     Discharge Follow-up with Specified Provider: clarice main; 2 Weeks   As directed      To:  clarice main    Follow Up:  2 Weeks               Test Results Pending at Discharge       Chip Main MD  09/13/19  4:50 PM    Time: Discharge 15 min

## 2019-09-13 NOTE — PLAN OF CARE
Problem: Patient Care Overview  Goal: Plan of Care Review   09/13/19 1416   Coping/Psychosocial   Plan of Care Reviewed With patient   Plan of Care Review   Progress no change   OTHER   Outcome Summary pt was AAOx4 this am, only recalls a little of what happened on night shift. Very apologetic about the situation. pt is very eager to go home. is voiding on his own. has ambulated in the gracia with his wife. seems to have a little confusion but easily gets reoriented. pt is aaox4 at this time. no complaints at this time. VSS will continue to monitor.

## 2019-09-13 NOTE — NURSING NOTE
Notified DR. Walton up in room yelling for me to move out of his way carrying cath bag asking for me to cut tubes so he can go to ER that will take it out of him. OP is now back to bright red.     Wife has arrived and talked patient out of leaving.

## 2019-09-16 NOTE — PROGRESS NOTES
Case Management Discharge Note    Final Note: Home, no d/c needs identified. [facility]     Destination      No service has been selected for the patient.      Durable Medical Equipment      No service has been selected for the patient.      Dialysis/Infusion      No service has been selected for the patient.      Home Medical Care      No service has been selected for the patient.      Therapy      No service has been selected for the patient.      Community Resources      No service has been selected for the patient.        Transportation Services  Private: Car    Final Discharge Disposition Code: 01 - home or self-care

## 2019-09-19 ENCOUNTER — NURSE TRIAGE (OUTPATIENT)
Dept: CALL CENTER | Facility: HOSPITAL | Age: 79
End: 2019-09-19

## 2019-09-19 NOTE — TELEPHONE ENCOUNTER
Caller states that he had a TURP on 9/11/19.  He was discharged 9/13/19.  He states that late yesterday evening he had a period of blood in his urine, small clots or piece's of tissue, and decreased urine.  Caller states that now his urine is clear and he is voiding normally.  Instructed caller to call his urologist and speak with a nurse, verbalized understanding.  Blood was medium in color, not dark, not bright.    Reason for Disposition  • [1] Caller has NON-URGENT question AND [2] triager unable to answer question    Additional Information  • Negative: Sounds like a life-threatening emergency to the triager  • Negative: Chest pain  • Negative: Difficulty breathing  • Negative: Surgical incision symptoms and questions  • Negative: [1] Discomfort (pain, burning or stinging) when passing urine AND [2] male  • Negative: [1] Discomfort (pain, burning or stinging) when passing urine AND [2] female  • Negative: Constipation  • Negative: New or worsening leg (calf, thigh) pain  • Negative: New or worsening leg swelling  • Negative: Dizziness is severe, or persists > 24 hours after surgery  • Negative: Pain, redness, swelling, or pus at IV Site  • Negative: Symptoms arising from use of a urinary catheter (Venegas or Coude)  • Negative: Cast problems or questions  • Negative: Medication question  • Negative: [1] Widespread rash AND [2] bright red, sunburn-like  • Negative: [1] SEVERE headache AND [2] after spinal (epidural) anesthesia  • Negative: [1] Vomiting AND [2] persists > 4 hours  • Negative: [1] Vomiting AND [2] abdomen looks much more swollen than usual  • Negative: [1] Drinking very little AND [2] dehydration suspected (e.g., no urine > 12 hours, very dry mouth, very lightheaded)  • Negative: Patient sounds very sick or weak to the triager  • Negative: Sounds like a serious complication to the triager  • Negative: Fever > 100.5 F (38.1 C)  • Negative: [1] SEVERE post-op pain (e.g., excruciating, pain scale 8-10)  "AND [2] not controlled with pain medications  • Negative: [1] Caller has URGENT question AND [2] triager unable to answer question  • Negative: [1] Headache AND [2] after spinal (epidural) anesthesia AND [3] not severe  • Negative: Fever present > 3 days (72 hours)  • Negative: [1] MILD-MODERATE post-op pain (e.g., pain scale 1-7) AND [2] not controlled with pain medications    Answer Assessment - Initial Assessment Questions  1. SYMPTOM: \"What's the main symptom you're concerned about?\" (e.g., pain, fever, vomiting)      Period of small clots and blood in urine.  2. ONSET: \"When did ________  start?\"      Last night  3. SURGERY: \"What surgery was performed?\"      TURP  4. DATE of SURGERY: \"When was surgery performed?\"       9/11    5. ANESTHESIA: \" What type of anesthesia did you have?\" (e.g., general, spinal, epidural, local)      general  6. PAIN: \"Is there any pain?\" If so, ask: \"How bad is it?\"  (Scale 1-10; or mild, moderate, severe)      no  7. FEVER: \"Do you have a fever?\" If so, ask: \"What is your temperature, how was it measured, and when did it start?\"      no  8. VOMITING: \"Is there any vomiting?\" If yes, ask: \"How many times?\"      no  9. BLEEDING: \"Is there any bleeding?\" If so, ask: \"How much?\" and \"Where?\"      Not now  10. OTHER SYMPTOMS: \"Do you have any other symptoms?\" (e.g., drainage from wound, painful urination, constipation)        Had small clots    Protocols used: POST-OP SYMPTOMS AND QUESTIONS-ADULT-      "

## 2019-10-24 RX ORDER — PANTOPRAZOLE SODIUM 40 MG/1
40 TABLET, DELAYED RELEASE ORAL DAILY
Qty: 90 TABLET | Refills: 0 | Status: SHIPPED | OUTPATIENT
Start: 2019-10-24 | End: 2019-12-03

## 2019-11-25 DIAGNOSIS — E78.00 HYPERCHOLESTEROLEMIA: Primary | ICD-10-CM

## 2019-11-27 LAB
ALBUMIN SERPL-MCNC: 4.2 G/DL (ref 3.5–5.2)
ALBUMIN/GLOB SERPL: 1.8 G/DL
ALP SERPL-CCNC: 91 U/L (ref 39–117)
ALT SERPL-CCNC: 27 U/L (ref 1–41)
AST SERPL-CCNC: 30 U/L (ref 1–40)
BASOPHILS # BLD AUTO: 0.05 10*3/MM3 (ref 0–0.2)
BASOPHILS NFR BLD AUTO: 0.7 % (ref 0–1.5)
BILIRUB SERPL-MCNC: 1.2 MG/DL (ref 0.2–1.2)
BUN SERPL-MCNC: 10 MG/DL (ref 8–23)
BUN/CREAT SERPL: 13.7 (ref 7–25)
CALCIUM SERPL-MCNC: 9.7 MG/DL (ref 8.6–10.5)
CHLORIDE SERPL-SCNC: 101 MMOL/L (ref 98–107)
CHOLEST SERPL-MCNC: 126 MG/DL (ref 0–200)
CO2 SERPL-SCNC: 29.5 MMOL/L (ref 22–29)
CREAT SERPL-MCNC: 0.73 MG/DL (ref 0.76–1.27)
EOSINOPHIL # BLD AUTO: 0.53 10*3/MM3 (ref 0–0.4)
EOSINOPHIL NFR BLD AUTO: 7 % (ref 0.3–6.2)
ERYTHROCYTE [DISTWIDTH] IN BLOOD BY AUTOMATED COUNT: 13.1 % (ref 12.3–15.4)
GLOBULIN SER CALC-MCNC: 2.3 GM/DL
GLUCOSE SERPL-MCNC: 98 MG/DL (ref 65–99)
HCT VFR BLD AUTO: 44 % (ref 37.5–51)
HDLC SERPL-MCNC: 50 MG/DL (ref 40–60)
HGB BLD-MCNC: 14.7 G/DL (ref 13–17.7)
IMM GRANULOCYTES # BLD AUTO: 0.03 10*3/MM3 (ref 0–0.05)
IMM GRANULOCYTES NFR BLD AUTO: 0.4 % (ref 0–0.5)
LDLC SERPL CALC-MCNC: 62 MG/DL (ref 0–100)
LDLC/HDLC SERPL: 1.23 {RATIO}
LYMPHOCYTES # BLD AUTO: 1.41 10*3/MM3 (ref 0.7–3.1)
LYMPHOCYTES NFR BLD AUTO: 18.5 % (ref 19.6–45.3)
MCH RBC QN AUTO: 29.8 PG (ref 26.6–33)
MCHC RBC AUTO-ENTMCNC: 33.4 G/DL (ref 31.5–35.7)
MCV RBC AUTO: 89.2 FL (ref 79–97)
MONOCYTES # BLD AUTO: 0.62 10*3/MM3 (ref 0.1–0.9)
MONOCYTES NFR BLD AUTO: 8.1 % (ref 5–12)
NEUTROPHILS # BLD AUTO: 4.98 10*3/MM3 (ref 1.7–7)
NEUTROPHILS NFR BLD AUTO: 65.3 % (ref 42.7–76)
NRBC BLD AUTO-RTO: 0 /100 WBC (ref 0–0.2)
PLATELET # BLD AUTO: 201 10*3/MM3 (ref 140–450)
POTASSIUM SERPL-SCNC: 4.5 MMOL/L (ref 3.5–5.2)
PROT SERPL-MCNC: 6.5 G/DL (ref 6–8.5)
RBC # BLD AUTO: 4.93 10*6/MM3 (ref 4.14–5.8)
SODIUM SERPL-SCNC: 142 MMOL/L (ref 136–145)
TRIGL SERPL-MCNC: 72 MG/DL (ref 0–150)
VLDLC SERPL CALC-MCNC: 14.4 MG/DL
WBC # BLD AUTO: 7.62 10*3/MM3 (ref 3.4–10.8)

## 2019-12-03 ENCOUNTER — OFFICE VISIT (OUTPATIENT)
Dept: FAMILY MEDICINE CLINIC | Facility: CLINIC | Age: 79
End: 2019-12-03

## 2019-12-03 DIAGNOSIS — I48.0 PAROXYSMAL ATRIAL FIBRILLATION (HCC): ICD-10-CM

## 2019-12-03 DIAGNOSIS — E78.00 HYPERCHOLESTEROLEMIA: Primary | ICD-10-CM

## 2019-12-03 DIAGNOSIS — I10 BENIGN ESSENTIAL HYPERTENSION: ICD-10-CM

## 2019-12-03 PROCEDURE — 99214 OFFICE O/P EST MOD 30 MIN: CPT | Performed by: INTERNAL MEDICINE

## 2019-12-03 NOTE — PROGRESS NOTES
Subjective   Junito Sorto is a 79 y.o. male. Patient is here today for   Chief Complaint   Patient presents with   • Hyperlipidemia   • Hypertension          Vitals:    12/03/19 1014   BP: 122/84   Pulse: 53   Resp: 18   Temp: 98.2 °F (36.8 °C)   SpO2: 98%     Body mass index is 42.34 kg/m².      Past Medical History:   Diagnosis Date   • Arthritis    • At risk for obstructive sleep apnea     5   • Atrial fibrillation (CMS/HCC)     not followed by Cardiologist just PCP   • Bladder outlet obstruction 9/11/2019   • BPH (benign prostatic hyperplasia)    • Colon polyp    • Coronary artery disease Few years    Afib   • Eczema    • GERD (gastroesophageal reflux disease) 2013    Mentioned as possible   • Hiatal hernia    • Hyperlipidemia    • Hypertension    • Low back pain    • Prostate cancer (CMS/HCC)    • Skin cancer       No Known Allergies   Social History     Socioeconomic History   • Marital status:      Spouse name: Not on file   • Number of children: Not on file   • Years of education: Not on file   • Highest education level: Not on file   Tobacco Use   • Smoking status: Former Smoker     Types: Pipe   • Smokeless tobacco: Never Used   • Tobacco comment: only in college   Substance and Sexual Activity   • Alcohol use: Yes     Types: 3 Glasses of wine per week     Comment: SOCIALLY   • Drug use: No   • Sexual activity: Defer     Comment: 50 years ago        Current Outpatient Medications:   •  amLODIPine-benazepril (LOTREL 5-20) 5-20 MG per capsule, Take 1 capsule by mouth Every Morning., Disp: 90 capsule, Rfl: 1  •  aspirin 81 MG oral suspension, , Disp: , Rfl:   •  atorvastatin (LIPITOR) 40 MG tablet, Take 40 mg by mouth Every Night., Disp: , Rfl:   •  propranolol XL (INDERAL XL) 80 MG 24 hr capsule, Take 1 capsule by mouth Every Morning., Disp: 90 capsule, Rfl: 1     Objective      He tells me that he feels well.  He has paroxysmal atrial fibrillation and refuses to use any anticoagulation.              Review of Systems   Constitutional: Negative.    HENT: Negative.    Respiratory: Negative.    Cardiovascular: Negative.    Musculoskeletal: Negative.    Psychiatric/Behavioral: Negative.        Physical Exam   Constitutional: He is oriented to person, place, and time. He appears well-developed and well-nourished.   HENT:   Head: Normocephalic and atraumatic.   Cardiovascular: Normal rate and normal heart sounds.   He has an irregularly irregular rhythm with regular rate.   Pulmonary/Chest: Effort normal and breath sounds normal.   Neurological: He is alert and oriented to person, place, and time.   Skin: Capillary refill takes less than 2 seconds.   Psychiatric: He has a normal mood and affect. His behavior is normal. Thought content normal.   Nursing note and vitals reviewed.        Problem List Items Addressed This Visit        Cardiovascular and Mediastinum    Paroxysmal atrial fibrillation (CMS/HCC)    Benign essential hypertension    Hypercholesterolemia - Primary            PLAN    He has atrial fibrillation with a controlled ventricular response.  He is not going to be taking anticoagulation by his own choice.    He has hypertension which is well controlled.    He has hypercholesterolemia which is well controlled.    I asked him to follow-up in about 4 months .  No Follow-up on file.

## 2019-12-05 VITALS
HEART RATE: 53 BPM | TEMPERATURE: 98.2 F | WEIGHT: 233.69 LBS | OXYGEN SATURATION: 98 % | DIASTOLIC BLOOD PRESSURE: 84 MMHG | BODY MASS INDEX: 37.56 KG/M2 | SYSTOLIC BLOOD PRESSURE: 122 MMHG | RESPIRATION RATE: 18 BRPM | HEIGHT: 66 IN

## 2020-01-16 RX ORDER — ATORVASTATIN CALCIUM 40 MG/1
TABLET, FILM COATED ORAL
Qty: 90 TABLET | Refills: 0 | Status: SHIPPED | OUTPATIENT
Start: 2020-01-16 | End: 2020-04-13

## 2020-02-24 RX ORDER — PROPRANOLOL HYDROCHLORIDE 80 MG/1
CAPSULE, EXTENDED RELEASE ORAL
Qty: 90 CAPSULE | Refills: 1 | Status: SHIPPED | OUTPATIENT
Start: 2020-02-24 | End: 2020-08-10

## 2020-02-24 RX ORDER — AMLODIPINE BESYLATE AND BENAZEPRIL HYDROCHLORIDE 5; 20 MG/1; MG/1
CAPSULE ORAL
Qty: 90 CAPSULE | Refills: 1 | Status: SHIPPED | OUTPATIENT
Start: 2020-02-24 | End: 2020-08-10

## 2020-04-13 RX ORDER — ATORVASTATIN CALCIUM 40 MG/1
TABLET, FILM COATED ORAL
Qty: 90 TABLET | Refills: 0 | Status: SHIPPED | OUTPATIENT
Start: 2020-04-13 | End: 2020-07-10

## 2020-06-02 DIAGNOSIS — I10 BENIGN ESSENTIAL HYPERTENSION: Primary | ICD-10-CM

## 2020-06-02 DIAGNOSIS — E78.00 HYPERCHOLESTEROLEMIA: ICD-10-CM

## 2020-06-04 ENCOUNTER — RESULTS ENCOUNTER (OUTPATIENT)
Dept: FAMILY MEDICINE CLINIC | Facility: CLINIC | Age: 80
End: 2020-06-04

## 2020-06-04 DIAGNOSIS — I10 BENIGN ESSENTIAL HYPERTENSION: ICD-10-CM

## 2020-06-04 DIAGNOSIS — E78.00 HYPERCHOLESTEROLEMIA: ICD-10-CM

## 2020-06-04 LAB
ALBUMIN SERPL-MCNC: 4.1 G/DL (ref 3.5–5.2)
ALBUMIN/GLOB SERPL: 1.6 G/DL
ALP SERPL-CCNC: 80 U/L (ref 39–117)
ALT SERPL-CCNC: 22 U/L (ref 1–41)
AST SERPL-CCNC: 22 U/L (ref 1–40)
BASOPHILS # BLD AUTO: 0.05 10*3/MM3 (ref 0–0.2)
BASOPHILS NFR BLD AUTO: 0.9 % (ref 0–1.5)
BILIRUB SERPL-MCNC: 1.6 MG/DL (ref 0.2–1.2)
BUN SERPL-MCNC: 14 MG/DL (ref 8–23)
BUN/CREAT SERPL: 17.9 (ref 7–25)
CALCIUM SERPL-MCNC: 9.7 MG/DL (ref 8.6–10.5)
CHLORIDE SERPL-SCNC: 102 MMOL/L (ref 98–107)
CHOLEST SERPL-MCNC: 116 MG/DL (ref 0–200)
CO2 SERPL-SCNC: 27.6 MMOL/L (ref 22–29)
CREAT SERPL-MCNC: 0.78 MG/DL (ref 0.76–1.27)
EOSINOPHIL # BLD AUTO: 0.36 10*3/MM3 (ref 0–0.4)
EOSINOPHIL NFR BLD AUTO: 6.3 % (ref 0.3–6.2)
ERYTHROCYTE [DISTWIDTH] IN BLOOD BY AUTOMATED COUNT: 13.6 % (ref 12.3–15.4)
GLOBULIN SER CALC-MCNC: 2.5 GM/DL
GLUCOSE SERPL-MCNC: 111 MG/DL (ref 65–99)
HCT VFR BLD AUTO: 43.7 % (ref 37.5–51)
HDLC SERPL-MCNC: 48 MG/DL (ref 40–60)
HGB BLD-MCNC: 14.9 G/DL (ref 13–17.7)
IMM GRANULOCYTES # BLD AUTO: 0.02 10*3/MM3 (ref 0–0.05)
IMM GRANULOCYTES NFR BLD AUTO: 0.4 % (ref 0–0.5)
LDLC SERPL CALC-MCNC: 56 MG/DL (ref 0–100)
LDLC/HDLC SERPL: 1.17 {RATIO}
LYMPHOCYTES # BLD AUTO: 1.29 10*3/MM3 (ref 0.7–3.1)
LYMPHOCYTES NFR BLD AUTO: 22.6 % (ref 19.6–45.3)
MCH RBC QN AUTO: 30.4 PG (ref 26.6–33)
MCHC RBC AUTO-ENTMCNC: 34.1 G/DL (ref 31.5–35.7)
MCV RBC AUTO: 89.2 FL (ref 79–97)
MONOCYTES # BLD AUTO: 0.5 10*3/MM3 (ref 0.1–0.9)
MONOCYTES NFR BLD AUTO: 8.8 % (ref 5–12)
NEUTROPHILS # BLD AUTO: 3.49 10*3/MM3 (ref 1.7–7)
NEUTROPHILS NFR BLD AUTO: 61 % (ref 42.7–76)
NRBC BLD AUTO-RTO: 0.2 /100 WBC (ref 0–0.2)
PLATELET # BLD AUTO: 182 10*3/MM3 (ref 140–450)
POTASSIUM SERPL-SCNC: 4 MMOL/L (ref 3.5–5.2)
PROT SERPL-MCNC: 6.6 G/DL (ref 6–8.5)
RBC # BLD AUTO: 4.9 10*6/MM3 (ref 4.14–5.8)
SODIUM SERPL-SCNC: 138 MMOL/L (ref 136–145)
TRIGL SERPL-MCNC: 59 MG/DL (ref 0–150)
VLDLC SERPL CALC-MCNC: 11.8 MG/DL
WBC # BLD AUTO: 5.71 10*3/MM3 (ref 3.4–10.8)

## 2020-06-10 ENCOUNTER — OFFICE VISIT (OUTPATIENT)
Dept: FAMILY MEDICINE CLINIC | Facility: CLINIC | Age: 80
End: 2020-06-10

## 2020-06-10 VITALS
HEART RATE: 58 BPM | BODY MASS INDEX: 37.57 KG/M2 | TEMPERATURE: 98.2 F | HEIGHT: 66 IN | WEIGHT: 233.8 LBS | OXYGEN SATURATION: 99 % | DIASTOLIC BLOOD PRESSURE: 80 MMHG | SYSTOLIC BLOOD PRESSURE: 108 MMHG | RESPIRATION RATE: 18 BRPM

## 2020-06-10 DIAGNOSIS — E78.00 HYPERCHOLESTEROLEMIA: ICD-10-CM

## 2020-06-10 DIAGNOSIS — I10 BENIGN ESSENTIAL HYPERTENSION: Primary | ICD-10-CM

## 2020-06-10 DIAGNOSIS — I48.0 PAROXYSMAL ATRIAL FIBRILLATION (HCC): ICD-10-CM

## 2020-06-10 PROCEDURE — 99213 OFFICE O/P EST LOW 20 MIN: CPT | Performed by: INTERNAL MEDICINE

## 2020-06-10 RX ORDER — LORATADINE 10 MG/1
TABLET ORAL
COMMUNITY
Start: 2020-05-06 | End: 2020-12-10

## 2020-06-10 RX ORDER — ZINC GLUCONATE 50 MG
TABLET ORAL
COMMUNITY
Start: 2020-05-20 | End: 2022-02-23

## 2020-06-10 RX ORDER — ASCORBIC ACID 500 MG
500 TABLET ORAL DAILY
COMMUNITY
End: 2022-02-23

## 2020-06-16 NOTE — PROGRESS NOTES
Subjective   Junito Sorto is a 79 y.o. male. Patient is here today for   Chief Complaint   Patient presents with   • Follow-up     labs- hypertension and hyperlipidemia.           Vitals:    06/10/20 1024   BP: 108/80   Pulse: 58   Resp: 18   Temp: 98.2 °F (36.8 °C)   SpO2: 99%     Body mass index is 37.75 kg/m².      Past Medical History:   Diagnosis Date   • Arthritis    • At risk for obstructive sleep apnea     5   • Atrial fibrillation (CMS/HCC)     not followed by Cardiologist just PCP   • Bladder outlet obstruction 9/11/2019   • BPH (benign prostatic hyperplasia)    • Colon polyp    • Coronary artery disease Few years    Afib   • Eczema    • GERD (gastroesophageal reflux disease) 2013    Mentioned as possible   • Hiatal hernia    • Hyperlipidemia    • Hypertension    • Low back pain    • Prostate cancer (CMS/HCC)    • Skin cancer       No Known Allergies   Social History     Socioeconomic History   • Marital status:      Spouse name: Not on file   • Number of children: Not on file   • Years of education: Not on file   • Highest education level: Not on file   Tobacco Use   • Smoking status: Former Smoker     Types: Pipe   • Smokeless tobacco: Never Used   • Tobacco comment: only in college   Substance and Sexual Activity   • Alcohol use: Yes     Types: 3 Glasses of wine per week     Comment: SOCIALLY   • Drug use: No   • Sexual activity: Defer     Comment: 50 years ago        Current Outpatient Medications:   •  amLODIPine-benazepril (LOTREL 5-20) 5-20 MG per capsule, TAKE ONE CAPSULE BY MOUTH EVERY MORNING, Disp: 90 capsule, Rfl: 1  •  aspirin 81 MG oral suspension, , Disp: , Rfl:   •  atorvastatin (LIPITOR) 40 MG tablet, TAKE ONE TABLET BY MOUTH DAILY, Disp: 90 tablet, Rfl: 0  •  loratadine (CLARITIN) 10 MG tablet, , Disp: , Rfl:   •  propranolol LA (INDERAL LA) 80 MG 24 hr capsule, TAKE ONE CAPSULE BY MOUTH EVERY MORNING, Disp: 90 capsule, Rfl: 1  •  vitamin C (ASCORBIC ACID) 500 MG tablet, Take  500 mg by mouth Daily., Disp: , Rfl:   •  zinc gluconate 50 MG tablet, , Disp: , Rfl:      Objective     This patient has some dysphasia managed by Dr. Cadena.    2 check his blood pressure and review labs done last week.       Review of Systems   Constitutional: Negative.    HENT: Negative.    Respiratory: Negative.    Cardiovascular: Negative.    Musculoskeletal: Negative.    Psychiatric/Behavioral: Negative.        Physical Exam   Constitutional: He is oriented to person, place, and time. He appears well-developed and well-nourished.   Cardiovascular: Normal rate, regular rhythm and normal heart sounds.   Pulmonary/Chest: Effort normal and breath sounds normal.   Neurological: He is alert and oriented to person, place, and time.   Psychiatric: He has a normal mood and affect. His behavior is normal.   Nursing note and vitals reviewed.        Problem List Items Addressed This Visit        Cardiovascular and Mediastinum    Paroxysmal atrial fibrillation (CMS/HCC)    Benign essential hypertension - Primary    Hypercholesterolemia            PLAN  His hypertension is well controlled.    His hypercholesterolemia is well controlled.    He has paroxysmal atrial fibrillation.  He is not interested in taking anticoagulation for this.    Follows up with his gastroenterologist regarding his occasional dysphasia which is required esophageal dilatations in the past.  He is not having any significant dysphagia at this time.    I would like to see him back in about 6 months.  Prior to that visit, he should have the following labs done: Lipid profile, comprehensive metabolic panel, CBC, urinalysis.    He should follow-up for a Medicare wellness visit once yearly.  No follow-ups on file.

## 2020-07-10 RX ORDER — ATORVASTATIN CALCIUM 40 MG/1
TABLET, FILM COATED ORAL
Qty: 90 TABLET | Refills: 0 | Status: SHIPPED | OUTPATIENT
Start: 2020-07-10 | End: 2020-10-05

## 2020-08-10 RX ORDER — AMLODIPINE BESYLATE AND BENAZEPRIL HYDROCHLORIDE 5; 20 MG/1; MG/1
CAPSULE ORAL
Qty: 90 CAPSULE | Refills: 0 | Status: SHIPPED | OUTPATIENT
Start: 2020-08-10 | End: 2020-11-04

## 2020-08-10 RX ORDER — PROPRANOLOL HYDROCHLORIDE 80 MG/1
CAPSULE, EXTENDED RELEASE ORAL
Qty: 90 CAPSULE | Refills: 0 | Status: SHIPPED | OUTPATIENT
Start: 2020-08-10 | End: 2020-11-04

## 2020-10-05 RX ORDER — ATORVASTATIN CALCIUM 40 MG/1
TABLET, FILM COATED ORAL
Qty: 90 TABLET | Refills: 3 | Status: SHIPPED | OUTPATIENT
Start: 2020-10-05 | End: 2021-09-28

## 2020-11-04 RX ORDER — PROPRANOLOL HYDROCHLORIDE 80 MG/1
CAPSULE, EXTENDED RELEASE ORAL
Qty: 90 CAPSULE | Refills: 3 | Status: SHIPPED | OUTPATIENT
Start: 2020-11-04 | End: 2021-11-01

## 2020-11-04 RX ORDER — AMLODIPINE BESYLATE AND BENAZEPRIL HYDROCHLORIDE 5; 20 MG/1; MG/1
CAPSULE ORAL
Qty: 90 CAPSULE | Refills: 3 | Status: SHIPPED | OUTPATIENT
Start: 2020-11-04 | End: 2021-11-01

## 2020-11-30 DIAGNOSIS — I10 BENIGN ESSENTIAL HYPERTENSION: Primary | ICD-10-CM

## 2020-11-30 DIAGNOSIS — E78.00 HYPERCHOLESTEROLEMIA: ICD-10-CM

## 2020-11-30 DIAGNOSIS — R73.9 HYPERGLYCEMIA: ICD-10-CM

## 2020-12-08 LAB
ALBUMIN SERPL-MCNC: 4.1 G/DL (ref 3.5–5.2)
ALBUMIN/GLOB SERPL: 1.7 G/DL
ALP SERPL-CCNC: 101 U/L (ref 39–117)
ALT SERPL-CCNC: 27 U/L (ref 1–41)
AST SERPL-CCNC: 29 U/L (ref 1–40)
BASOPHILS # BLD AUTO: 0.04 10*3/MM3 (ref 0–0.2)
BASOPHILS NFR BLD AUTO: 0.6 % (ref 0–1.5)
BILIRUB SERPL-MCNC: 1.4 MG/DL (ref 0–1.2)
BUN SERPL-MCNC: 13 MG/DL (ref 8–23)
BUN/CREAT SERPL: 16.3 (ref 7–25)
CALCIUM SERPL-MCNC: 9.8 MG/DL (ref 8.6–10.5)
CHLORIDE SERPL-SCNC: 100 MMOL/L (ref 98–107)
CHOLEST SERPL-MCNC: 121 MG/DL (ref 0–200)
CO2 SERPL-SCNC: 34 MMOL/L (ref 22–29)
CREAT SERPL-MCNC: 0.8 MG/DL (ref 0.76–1.27)
EOSINOPHIL # BLD AUTO: 0.49 10*3/MM3 (ref 0–0.4)
EOSINOPHIL NFR BLD AUTO: 7 % (ref 0.3–6.2)
ERYTHROCYTE [DISTWIDTH] IN BLOOD BY AUTOMATED COUNT: 12.9 % (ref 12.3–15.4)
GLOBULIN SER CALC-MCNC: 2.4 GM/DL
GLUCOSE SERPL-MCNC: 106 MG/DL (ref 65–99)
HBA1C MFR BLD: 5.9 % (ref 4.8–5.6)
HCT VFR BLD AUTO: 46.1 % (ref 37.5–51)
HDLC SERPL-MCNC: 47 MG/DL (ref 40–60)
HGB BLD-MCNC: 15.5 G/DL (ref 13–17.7)
IMM GRANULOCYTES # BLD AUTO: 0.02 10*3/MM3 (ref 0–0.05)
IMM GRANULOCYTES NFR BLD AUTO: 0.3 % (ref 0–0.5)
LDLC SERPL CALC-MCNC: 59 MG/DL (ref 0–100)
LDLC/HDLC SERPL: 1.26 {RATIO}
LYMPHOCYTES # BLD AUTO: 1.32 10*3/MM3 (ref 0.7–3.1)
LYMPHOCYTES NFR BLD AUTO: 18.8 % (ref 19.6–45.3)
MCH RBC QN AUTO: 30.2 PG (ref 26.6–33)
MCHC RBC AUTO-ENTMCNC: 33.6 G/DL (ref 31.5–35.7)
MCV RBC AUTO: 89.7 FL (ref 79–97)
MONOCYTES # BLD AUTO: 0.53 10*3/MM3 (ref 0.1–0.9)
MONOCYTES NFR BLD AUTO: 7.5 % (ref 5–12)
NEUTROPHILS # BLD AUTO: 4.64 10*3/MM3 (ref 1.7–7)
NEUTROPHILS NFR BLD AUTO: 65.8 % (ref 42.7–76)
NRBC BLD AUTO-RTO: 0 /100 WBC (ref 0–0.2)
PLATELET # BLD AUTO: 230 10*3/MM3 (ref 140–450)
POTASSIUM SERPL-SCNC: 4.6 MMOL/L (ref 3.5–5.2)
PROT SERPL-MCNC: 6.5 G/DL (ref 6–8.5)
RBC # BLD AUTO: 5.14 10*6/MM3 (ref 4.14–5.8)
SODIUM SERPL-SCNC: 140 MMOL/L (ref 136–145)
TRIGL SERPL-MCNC: 74 MG/DL (ref 0–150)
UNABLE TO VOID: NORMAL
VLDLC SERPL CALC-MCNC: 15 MG/DL (ref 5–40)
WBC # BLD AUTO: 7.04 10*3/MM3 (ref 3.4–10.8)

## 2020-12-10 ENCOUNTER — OFFICE VISIT (OUTPATIENT)
Dept: FAMILY MEDICINE CLINIC | Facility: CLINIC | Age: 80
End: 2020-12-10

## 2020-12-10 VITALS
HEIGHT: 66 IN | OXYGEN SATURATION: 99 % | RESPIRATION RATE: 18 BRPM | WEIGHT: 233.8 LBS | DIASTOLIC BLOOD PRESSURE: 80 MMHG | BODY MASS INDEX: 37.57 KG/M2 | TEMPERATURE: 96.8 F | SYSTOLIC BLOOD PRESSURE: 150 MMHG | HEART RATE: 62 BPM

## 2020-12-10 DIAGNOSIS — I10 BENIGN ESSENTIAL HYPERTENSION: Primary | ICD-10-CM

## 2020-12-10 DIAGNOSIS — I10 BENIGN ESSENTIAL HYPERTENSION: ICD-10-CM

## 2020-12-10 DIAGNOSIS — E78.00 HYPERCHOLESTEROLEMIA: ICD-10-CM

## 2020-12-10 DIAGNOSIS — M54.31 SCIATICA OF RIGHT SIDE: Primary | ICD-10-CM

## 2020-12-10 LAB
APPEARANCE UR: CLEAR
BACTERIA #/AREA URNS HPF: NORMAL /HPF
BILIRUB UR QL STRIP: NEGATIVE
CASTS URNS MICRO: NORMAL
COLOR UR: YELLOW
EPI CELLS #/AREA URNS HPF: NORMAL /HPF
GLUCOSE UR QL: NEGATIVE
HGB UR QL STRIP: NEGATIVE
KETONES UR QL STRIP: NEGATIVE
LEUKOCYTE ESTERASE UR QL STRIP: NEGATIVE
NITRITE UR QL STRIP: NEGATIVE
PH UR STRIP: 6 [PH] (ref 5–8)
PROT UR QL STRIP: NEGATIVE
RBC #/AREA URNS HPF: NORMAL /HPF
SP GR UR: 1.01 (ref 1–1.03)
UROBILINOGEN UR STRIP-MCNC: NORMAL MG/DL
WBC #/AREA URNS HPF: NORMAL /HPF

## 2020-12-10 PROCEDURE — 99213 OFFICE O/P EST LOW 20 MIN: CPT | Performed by: INTERNAL MEDICINE

## 2020-12-10 NOTE — PROGRESS NOTES
Subjective   Junito Sorto is a 80 y.o. male. Patient is here today for   Chief Complaint   Patient presents with   • Hyperlipidemia     lab f/u.    • Hypertension   • Hyperglycemia          Vitals:    12/10/20 0918   BP: 150/80   Pulse: 62   Resp: 18   Temp: 96.8 °F (36 °C)   SpO2: 99%     Body mass index is 37.75 kg/m².      Past Medical History:   Diagnosis Date   • Arthritis    • At risk for obstructive sleep apnea     5   • Atrial fibrillation (CMS/HCC)     not followed by Cardiologist just PCP   • Bladder outlet obstruction 9/11/2019   • BPH (benign prostatic hyperplasia)    • Colon polyp    • Coronary artery disease Few years    Afib   • Eczema    • GERD (gastroesophageal reflux disease) 2013    Mentioned as possible   • Hiatal hernia    • Hyperlipidemia    • Hypertension    • Low back pain    • Prostate cancer (CMS/HCC)    • Skin cancer       No Known Allergies   Social History     Socioeconomic History   • Marital status:      Spouse name: Not on file   • Number of children: Not on file   • Years of education: Not on file   • Highest education level: Not on file   Tobacco Use   • Smoking status: Former Smoker     Types: Pipe   • Smokeless tobacco: Never Used   • Tobacco comment: only in college   Substance and Sexual Activity   • Alcohol use: Yes     Types: 3 Glasses of wine per week     Comment: SOCIALLY   • Drug use: No   • Sexual activity: Defer     Comment: 50 years ago        Current Outpatient Medications:   •  amLODIPine-benazepril (LOTREL 5-20) 5-20 MG per capsule, TAKE ONE CAPSULE BY MOUTH EVERY MORNING, Disp: 90 capsule, Rfl: 3  •  aspirin 81 MG oral suspension, , Disp: , Rfl:   •  atorvastatin (LIPITOR) 40 MG tablet, TAKE ONE TABLET BY MOUTH DAILY, Disp: 90 tablet, Rfl: 3  •  propranolol LA (INDERAL LA) 80 MG 24 hr capsule, TAKE ONE CAPSULE BY MOUTH EVERY MORNING, Disp: 90 capsule, Rfl: 3  •  vitamin C (ASCORBIC ACID) 500 MG tablet, Take 500 mg by mouth Daily., Disp: , Rfl:   •  zinc  gluconate 50 MG tablet, , Disp: , Rfl:      Objective     This patient is here to follow-up on labs from last week.    He tells me that he feels well, except that he has some chronic lumbar spine pain with right lower extremity radicular pain (sciatica).    He has had some good luck with lumbar epidural steroid injections at [facility] pain management.  He requested referral.        Hyperlipidemia  Pertinent negatives include no chest pain or leg pain.   Hypertension  Pertinent negatives include no chest pain or headaches.   Hyperglycemia  Pertinent negatives include no abdominal pain, chest pain, fever, headaches, numbness or weakness.   Back Pain  The current episode started more than 1 year ago. The problem occurs constantly. The problem has been waxing and waning since onset. The pain is present in the lumbar spine. The quality of the pain is described as aching. The pain radiates to the right thigh. The pain is at a severity of 5/10. The pain is the same all the time. The symptoms are aggravated by position, standing and stress. Stiffness is present in the morning. Pertinent negatives include no abdominal pain, bladder incontinence, bowel incontinence, chest pain, dysuria, fever, headaches, leg pain, numbness, paresis, paresthesias, pelvic pain, perianal numbness, tingling, weakness or weight loss. Risk factors include history of cancer, lack of exercise, obesity and sedentary lifestyle.        Review of Systems   Constitutional: Negative.  Negative for fever and weight loss.   HENT: Negative.    Respiratory: Negative.    Cardiovascular: Negative.  Negative for chest pain.   Gastrointestinal: Negative for abdominal pain and bowel incontinence.   Genitourinary: Negative for bladder incontinence, dysuria and pelvic pain.   Musculoskeletal: Positive for back pain.   Neurological: Negative for tingling, weakness, numbness, headaches and paresthesias.   Psychiatric/Behavioral: Negative.        Physical Exam  Vitals  signs and nursing note reviewed.   Constitutional:       Appearance: Normal appearance.      Comments: Pleasant, neatly groomed, in no distress.   Neck:      Vascular: No carotid bruit.   Cardiovascular:      Rate and Rhythm: Normal rate and regular rhythm.      Heart sounds: Normal heart sounds. No murmur. No gallop.    Pulmonary:      Effort: No respiratory distress.      Breath sounds: Normal breath sounds. No wheezing or rales.   Neurological:      Mental Status: He is alert and oriented to person, place, and time.   Psychiatric:         Mood and Affect: Mood normal.         Behavior: Behavior normal.         Thought Content: Thought content normal.           Problems Addressed this Visit        Cardiovascular and Mediastinum    Benign essential hypertension    Hypercholesterolemia       Nervous and Auditory    Sciatica of right side - Primary    Relevant Orders    Ambulatory Referral to Pain Management      Diagnoses       Codes Comments    Sciatica of right side    -  Primary ICD-10-CM: M54.31  ICD-9-CM: 724.3     Hypercholesterolemia     ICD-10-CM: E78.00  ICD-9-CM: 272.0     Benign essential hypertension     ICD-10-CM: I10  ICD-9-CM: 401.1             PLAN  His hypercholesterolemia is well controlled atorvastatin 40 mg daily.    He has essential hypertension which is well controlled.    I have made a referral for him to follow-up with [facility] pain clinic for their management of his lumbar spine pain with right lower extremity sciatica.    I asked him to follow-up in about 4 months.  Fasting labs prior to that visit should include: Lipid profile, comprehensive metabolic panel, CBC, urinalysis,  No follow-ups on file.

## 2020-12-23 ENCOUNTER — HOSPITAL ENCOUNTER (OUTPATIENT)
Dept: PAIN MEDICINE | Facility: HOSPITAL | Age: 80
Discharge: HOME | End: 2020-12-23

## 2020-12-23 ENCOUNTER — ANESTHESIA (OUTPATIENT)
Dept: PAIN MEDICINE | Facility: HOSPITAL | Age: 80
End: 2020-12-23

## 2020-12-23 ENCOUNTER — HOSPITAL ENCOUNTER (OUTPATIENT)
Dept: GENERAL RADIOLOGY | Facility: HOSPITAL | Age: 80
Discharge: HOME | End: 2020-12-23

## 2020-12-23 ENCOUNTER — ANESTHESIA EVENT (OUTPATIENT)
Dept: PAIN MEDICINE | Facility: HOSPITAL | Age: 80
End: 2020-12-23

## 2020-12-23 VITALS
HEART RATE: 45 BPM | RESPIRATION RATE: 16 BRPM | WEIGHT: 230 LBS | HEIGHT: 66 IN | DIASTOLIC BLOOD PRESSURE: 92 MMHG | BODY MASS INDEX: 36.96 KG/M2 | TEMPERATURE: 97.3 F | OXYGEN SATURATION: 98 % | SYSTOLIC BLOOD PRESSURE: 141 MMHG

## 2020-12-23 DIAGNOSIS — M54.31 SCIATICA OF RIGHT SIDE: Primary | ICD-10-CM

## 2020-12-23 DIAGNOSIS — R52 PAIN: ICD-10-CM

## 2020-12-23 PROCEDURE — 0 IOPAMIDOL 41 % SOLUTION: Performed by: ANESTHESIOLOGY

## 2020-12-23 PROCEDURE — 77003 FLUOROGUIDE FOR SPINE INJECT: CPT

## 2020-12-23 PROCEDURE — C1755 CATHETER, INTRASPINAL: HCPCS

## 2020-12-23 PROCEDURE — 25010000002 METHYLPREDNISOLONE PER 80 MG: Performed by: ANESTHESIOLOGY

## 2020-12-23 RX ORDER — FENTANYL CITRATE 50 UG/ML
50 INJECTION, SOLUTION INTRAMUSCULAR; INTRAVENOUS AS NEEDED
Status: DISCONTINUED | OUTPATIENT
Start: 2020-12-23 | End: 2020-12-24 | Stop reason: HOSPADM

## 2020-12-23 RX ORDER — LIDOCAINE HYDROCHLORIDE 10 MG/ML
1 INJECTION, SOLUTION INFILTRATION; PERINEURAL ONCE AS NEEDED
Status: DISCONTINUED | OUTPATIENT
Start: 2020-12-23 | End: 2020-12-24 | Stop reason: HOSPADM

## 2020-12-23 RX ORDER — SODIUM CHLORIDE 0.9 % (FLUSH) 0.9 %
1-10 SYRINGE (ML) INJECTION AS NEEDED
Status: DISCONTINUED | OUTPATIENT
Start: 2020-12-23 | End: 2020-12-24 | Stop reason: HOSPADM

## 2020-12-23 RX ORDER — METHYLPREDNISOLONE ACETATE 80 MG/ML
80 INJECTION, SUSPENSION INTRA-ARTICULAR; INTRALESIONAL; INTRAMUSCULAR; SOFT TISSUE ONCE
Status: COMPLETED | OUTPATIENT
Start: 2020-12-23 | End: 2020-12-23

## 2020-12-23 RX ORDER — MIDAZOLAM HYDROCHLORIDE 1 MG/ML
1 INJECTION INTRAMUSCULAR; INTRAVENOUS AS NEEDED
Status: DISCONTINUED | OUTPATIENT
Start: 2020-12-23 | End: 2020-12-24 | Stop reason: HOSPADM

## 2020-12-23 RX ADMIN — IOPAMIDOL 2 ML: 408 INJECTION, SOLUTION INTRATHECAL at 12:38

## 2020-12-23 RX ADMIN — METHYLPREDNISOLONE ACETATE 80 MG: 80 INJECTION, SUSPENSION INTRA-ARTICULAR; INTRALESIONAL; INTRAMUSCULAR; SOFT TISSUE at 12:38

## 2020-12-23 NOTE — ANESTHESIA PROCEDURE NOTES
PAIN Epidural block    Pre-sedation assessment completed: 12/23/2020 12:25 PM    Patient reassessed immediately prior to procedure    Patient location during procedure: pain clinic  Indication:procedure for pain  Performed By  Anesthesiologist: Cuong Bedolla MD  Preanesthetic Checklist  Completed: patient identified, site marked, surgical consent, pre-op evaluation, timeout performed, IV checked, risks and benefits discussed and monitors and equipment checked  Additional Notes  Performed under fluoroscopy  Post-Op Diagnosis Codes:     * Lumbar degenerative disc disease (M51.36)     * Lumbar radiculopathy (M54.16)     * Spondylolisthesis of lumbar region (M43.16)  Prep:  Pt Position:prone  Sterile Tech:cap, gloves, mask and sterile barrier  Prep:chlorhexidine gluconate and isopropyl alcohol  Monitoring:blood pressure monitoring, continuous pulse oximetry and EKG  Procedure:Sedation: no     Approach:right paramedian  Guidance: fluoroscopy  Location:lumbar (Intralaminar)  Level:4-5  Needle Type:Tuohy  Needle Gauge:20 G  Aspiration:negative  Medications:  Depomedrol:80 mg  Preservative Free Saline:4mL  Isovue:3mL  Comments:Needle placement confirmed with epidural spread of contrast injection.  Post Assessment:  Post-procedure: Bandaid.  Pt Tolerance:patient tolerated the procedure well with no apparent complications  Complications:no

## 2020-12-23 NOTE — H&P
[facility]    History and Physical    Patient Name: Junito Sorto  :  1940  MRN:  [id number]  Date of Admission: 2020    Subjective     Patient is a 80 y.o. male presents with chief complaint of chronic low back and right hip pain.  Onset of the current symptoms was gradual starting a few months ago.  Symptoms are associated/aggravated by sitting, twisting or walking for more than a few minutes. Symptoms improve with pain medication and injection. His last epidural was in May of 2019 with good results, here today to start a new series..    The following portions of the patients history were reviewed and updated as appropriate: current medications, allergies, past medical history, past surgical history, past family history, past social history and problem list                Objective     Past Medical History:   Past Medical History:   Diagnosis Date   • Arthritis    • At risk for obstructive sleep apnea     5   • Atrial fibrillation (CMS/HCC)     not followed by Cardiologist just PCP   • Bladder outlet obstruction 2019   • BPH (benign prostatic hyperplasia)    • Colon polyp    • Coronary artery disease Few years    Afib   • Eczema    • GERD (gastroesophageal reflux disease) 2013    Mentioned as possible   • Hiatal hernia    • Hyperlipidemia    • Hypertension    • Low back pain    • Prostate cancer (CMS/HCC)    • Skin cancer      Past Surgical History:   Past Surgical History:   Procedure Laterality Date   • CATARACT EXTRACTION, BILATERAL     • COLONOSCOPY  Several   • CYSTOSCOPY TRANSURETHRAL RESECTION OF PROSTATE N/A 2019    Procedure: CYSTOSCOPY TRANSURETHRAL RESECTION OF PROSTATE;  Surgeon: Chip Bedolla MD;  Location: [facility];  Service: Urology   • ENDOSCOPY N/A 10/31/2018    LA Grade C reflux esophagitis, HH, erythematous mucosa in the antrum. Path: Gastritis, esophagitis.    • EPIDURAL BLOCK     • ESOPHAGEAL DILATATION     • PROSTATE SURGERY  2014   • SKIN  CANCER EXCISION      multiple, over that last few years   • SKIN CANCER EXCISION Right 09/03/2019    FACE,    • TONSILLECTOMY      around 1950   • UPPER GASTROINTESTINAL ENDOSCOPY  2013     Family History:   Family History   Problem Relation Age of Onset   • Cancer Mother         pancreatic   • Liver disease Mother    • Cancer Father         prostate, bladder, colon, lymphoma, leukemia   • Colon cancer Father    • Cancer Brother         prostate   • Cancer Paternal Grandfather         prostate   • Malig Hyperthermia Neg Hx      Social History:   Social History     Tobacco Use   • Smoking status: Former Smoker     Types: Pipe   • Smokeless tobacco: Never Used   • Tobacco comment: only in college   Substance Use Topics   • Alcohol use: Yes     Types: 3 Glasses of wine per week     Comment: SOCIALLY   • Drug use: No       Vital Signs Range for the last 24 hours  Temperature:     Temp Source:     BP:     Pulse:     Respirations:     SPO2:     O2 Amount (l/min):     O2 Devices     Weight:           --------------------------------------------------------------------------------    Current Outpatient Medications   Medication Sig Dispense Refill   • amLODIPine-benazepril (LOTREL 5-20) 5-20 MG per capsule TAKE ONE CAPSULE BY MOUTH EVERY MORNING 90 capsule 3   • aspirin 81 MG oral suspension      • atorvastatin (LIPITOR) 40 MG tablet TAKE ONE TABLET BY MOUTH DAILY 90 tablet 3   • propranolol LA (INDERAL LA) 80 MG 24 hr capsule TAKE ONE CAPSULE BY MOUTH EVERY MORNING 90 capsule 3   • vitamin C (ASCORBIC ACID) 500 MG tablet Take 500 mg by mouth Daily.     • zinc gluconate 50 MG tablet        No current facility-administered medications for this encounter.        --------------------------------------------------------------------------------  Assessment/Plan      Anesthesia Evaluation           Pain impairs ability to perform ADLs: Dressing, Driving and Sleeping  Modalities previously tried to control pain with limited  effectiveness within the last 4-6 weeks: Rest and OTC medications     Airway   Mallampati: II  Dental - normal exam     Pulmonary - normal exam   Cardiovascular - normal exam    (+) hypertension, CAD, dysrhythmias, CHF ,       Neuro/Psych  normal reflexes and symmetric  (-) strength not normal in all 4 extremities, left straight leg raise test, right straight leg raise test  GI/Hepatic/Renal/Endo    (+) obesity,       Musculoskeletal     Abdominal    Substance History      OB/GYN          Other                   Diagnosis and Plan      Treatment Plan  ASA 3      Procedures: Lumbar Epidural Steroid Injection(LESI), With fluoroscopy,       Anesthetic plan and risks discussed with patient.          Diagnosis     * Lumbar degenerative disc disease [M51.36]     * Lumbar radiculopathy [M54.16]     * Spondylolisthesis of lumbar region [M43.16]            CHIEF COMPLAINT:       HISTORY OF PRESENT ILLNESS:      PAST MEDICAL HISTORY:  Current Outpatient Medications on File Prior to Encounter   Medication Sig Dispense Refill   • amLODIPine-benazepril (LOTREL 5-20) 5-20 MG per capsule TAKE ONE CAPSULE BY MOUTH EVERY MORNING 90 capsule 3   • aspirin 81 MG oral suspension      • atorvastatin (LIPITOR) 40 MG tablet TAKE ONE TABLET BY MOUTH DAILY 90 tablet 3   • propranolol LA (INDERAL LA) 80 MG 24 hr capsule TAKE ONE CAPSULE BY MOUTH EVERY MORNING 90 capsule 3   • vitamin C (ASCORBIC ACID) 500 MG tablet Take 500 mg by mouth Daily.     • zinc gluconate 50 MG tablet        No current facility-administered medications on file prior to encounter.        Past Medical History:   Diagnosis Date   • Arthritis    • At risk for obstructive sleep apnea     5   • Atrial fibrillation (CMS/HCC)     not followed by Cardiologist just PCP   • Bladder outlet obstruction 9/11/2019   • BPH (benign prostatic hyperplasia)    • Colon polyp    • Coronary artery disease Few years    Afib   • Eczema    • GERD (gastroesophageal reflux disease) 2013     Mentioned as possible   • Hiatal hernia    • Hyperlipidemia    • Hypertension    • Low back pain    • Prostate cancer (CMS/HCC)    • Skin cancer          SOCIAL HISTORY:  No tobacco    REVIEW OF SYSTEMS:  No hematologic infectious or constitutional symptoms  Other review of systems non-contributory    PHYSICAL EXAM:  There were no vitals taken for this visit.  Well-developed well-nourished no acute distress  Extra ocular movements intact  Mallampati class 2 airway  Cardiac:  Regular rate and rhythm  Lungs:  Clear to auscultation bilaterally with good effort  Alert and oriented ×3  Deep tendon reflexes normal in the bilateral patella  Negative straight leg raise bilaterally  5 out of 5 strength bilateral upper and lower extremities  Lumbar spine without obvious deformities ecchymoses  Lumbar spine nontender to palpation      DIAGNOSIS:  Post-Op Diagnosis Codes:     * Lumbar degenerative disc disease [M51.36]     * Lumbar radiculopathy [M54.16]     * Spondylolisthesis of lumbar region [M43.16]    PLAN:  1.  Lumbar 4 epidural steroid injections, up to 3, spaced 1-2 weeks apart.  If pain control is acceptable after 1 or 2 injections, it was discussed with the patient that they may return for the subsequent injections if and when their pain returns.  The risks were discussed with the patient including failure of relief, worsening pain, Headache (post dural puncture headache), bleeding (epidural hematoma) and infection (epidural abscess or skin infection).  2.  Physical therapy exercises at home as prescribed by physical therapy or from the pain clinic handout (given to the patient).  Continuation of these exercises every day, or multiple times per week, even when the patient has good pain relief, was stressed to the patient as a preventative measure to decrease the frequency and severity of future pain episodes.  3.  Continue pain medicines as already prescribed.  If patient not currently taking any, it is recommended to  begin Acetaminophen 1000 mg po q 8 hours.  If other medicines containing Acetaminophen are currently prescribed, maintain daily dose at 3000 mg.    4.  If they can tolerate NSAIDS, it is recommended to take Ibuprofen 600 mg po q 6 hours for 7 days during pain exacerbations.  Alternatively, they may substitute an NSAID of their choice (e.g. Aleve).  This may be taken at the same time as Acetaminophen.  5.  Heat and ice to the affected area as tolerated for pain control.  It was discussed that heating pads can cause burns.  6.  Daily low impact exercise such as walking or water exercise was recommended to maintain overall health and aid in weight control.   7.  Follow up as needed for subsequent injections.  8.  Patient was counseled to abstain from tobacco products.    [employer] : L 4-5

## 2021-02-15 ENCOUNTER — LAB (OUTPATIENT)
Dept: LAB | Facility: HOSPITAL | Age: 81
End: 2021-02-15

## 2021-02-15 ENCOUNTER — TRANSCRIBE ORDERS (OUTPATIENT)
Dept: ADMINISTRATIVE | Facility: HOSPITAL | Age: 81
End: 2021-02-15

## 2021-02-15 DIAGNOSIS — C61 MALIGNANT NEOPLASM OF PROSTATE (HCC): Primary | ICD-10-CM

## 2021-02-15 DIAGNOSIS — C61 MALIGNANT NEOPLASM OF PROSTATE (HCC): ICD-10-CM

## 2021-02-15 LAB — PSA SERPL-MCNC: 3.62 NG/ML (ref 0–4)

## 2021-02-15 PROCEDURE — 36415 COLL VENOUS BLD VENIPUNCTURE: CPT

## 2021-02-15 PROCEDURE — 84153 ASSAY OF PSA TOTAL: CPT

## 2021-03-04 DIAGNOSIS — I10 BENIGN ESSENTIAL HYPERTENSION: Primary | ICD-10-CM

## 2021-03-04 DIAGNOSIS — E78.00 HYPERCHOLESTEROLEMIA: ICD-10-CM

## 2021-04-02 LAB
ALBUMIN SERPL-MCNC: 4 G/DL (ref 3.5–5.2)
ALBUMIN/GLOB SERPL: 1.5 G/DL
ALP SERPL-CCNC: 94 U/L (ref 39–117)
ALT SERPL-CCNC: 21 U/L (ref 1–41)
APPEARANCE UR: CLEAR
AST SERPL-CCNC: 24 U/L (ref 1–40)
BACTERIA #/AREA URNS HPF: ABNORMAL /HPF
BASOPHILS # BLD AUTO: 0.05 10*3/MM3 (ref 0–0.2)
BASOPHILS NFR BLD AUTO: 0.7 % (ref 0–1.5)
BILIRUB SERPL-MCNC: 1.4 MG/DL (ref 0–1.2)
BILIRUB UR QL STRIP: NEGATIVE
BUN SERPL-MCNC: 12 MG/DL (ref 8–23)
BUN/CREAT SERPL: 17.9 (ref 7–25)
CALCIUM SERPL-MCNC: 9.7 MG/DL (ref 8.6–10.5)
CASTS URNS MICRO: ABNORMAL
CHLORIDE SERPL-SCNC: 101 MMOL/L (ref 98–107)
CHOLEST SERPL-MCNC: 111 MG/DL (ref 0–200)
CO2 SERPL-SCNC: 32 MMOL/L (ref 22–29)
COLOR UR: YELLOW
CREAT SERPL-MCNC: 0.67 MG/DL (ref 0.76–1.27)
EOSINOPHIL # BLD AUTO: 0.52 10*3/MM3 (ref 0–0.4)
EOSINOPHIL NFR BLD AUTO: 7.2 % (ref 0.3–6.2)
EPI CELLS #/AREA URNS HPF: ABNORMAL /HPF
ERYTHROCYTE [DISTWIDTH] IN BLOOD BY AUTOMATED COUNT: 13.1 % (ref 12.3–15.4)
GLOBULIN SER CALC-MCNC: 2.6 GM/DL
GLUCOSE SERPL-MCNC: 99 MG/DL (ref 65–99)
GLUCOSE UR QL: NEGATIVE
HCT VFR BLD AUTO: 45 % (ref 37.5–51)
HDLC SERPL-MCNC: 48 MG/DL (ref 40–60)
HGB BLD-MCNC: 15 G/DL (ref 13–17.7)
HGB UR QL STRIP: NEGATIVE
IMM GRANULOCYTES # BLD AUTO: 0.03 10*3/MM3 (ref 0–0.05)
IMM GRANULOCYTES NFR BLD AUTO: 0.4 % (ref 0–0.5)
KETONES UR QL STRIP: NEGATIVE
LDLC SERPL CALC-MCNC: 50 MG/DL (ref 0–100)
LDLC/HDLC SERPL: 1.07 {RATIO}
LEUKOCYTE ESTERASE UR QL STRIP: ABNORMAL
LYMPHOCYTES # BLD AUTO: 1.23 10*3/MM3 (ref 0.7–3.1)
LYMPHOCYTES NFR BLD AUTO: 17.1 % (ref 19.6–45.3)
MCH RBC QN AUTO: 30.8 PG (ref 26.6–33)
MCHC RBC AUTO-ENTMCNC: 33.3 G/DL (ref 31.5–35.7)
MCV RBC AUTO: 92.4 FL (ref 79–97)
MONOCYTES # BLD AUTO: 0.52 10*3/MM3 (ref 0.1–0.9)
MONOCYTES NFR BLD AUTO: 7.2 % (ref 5–12)
NEUTROPHILS # BLD AUTO: 4.84 10*3/MM3 (ref 1.7–7)
NEUTROPHILS NFR BLD AUTO: 67.4 % (ref 42.7–76)
NITRITE UR QL STRIP: NEGATIVE
NRBC BLD AUTO-RTO: 0 /100 WBC (ref 0–0.2)
PH UR STRIP: 6.5 [PH] (ref 5–8)
PLATELET # BLD AUTO: 217 10*3/MM3 (ref 140–450)
POTASSIUM SERPL-SCNC: 4.6 MMOL/L (ref 3.5–5.2)
PROT SERPL-MCNC: 6.6 G/DL (ref 6–8.5)
PROT UR QL STRIP: NEGATIVE
RBC # BLD AUTO: 4.87 10*6/MM3 (ref 4.14–5.8)
RBC #/AREA URNS HPF: ABNORMAL /HPF
SODIUM SERPL-SCNC: 143 MMOL/L (ref 136–145)
SP GR UR: 1.02 (ref 1–1.03)
TRIGL SERPL-MCNC: 58 MG/DL (ref 0–150)
UROBILINOGEN UR STRIP-MCNC: ABNORMAL MG/DL
VLDLC SERPL CALC-MCNC: 13 MG/DL (ref 5–40)
WBC # BLD AUTO: 7.19 10*3/MM3 (ref 3.4–10.8)
WBC #/AREA URNS HPF: ABNORMAL /HPF

## 2021-04-08 ENCOUNTER — OFFICE VISIT (OUTPATIENT)
Dept: FAMILY MEDICINE CLINIC | Facility: CLINIC | Age: 81
End: 2021-04-08

## 2021-04-08 VITALS
SYSTOLIC BLOOD PRESSURE: 130 MMHG | DIASTOLIC BLOOD PRESSURE: 86 MMHG | WEIGHT: 234.8 LBS | RESPIRATION RATE: 18 BRPM | TEMPERATURE: 97.1 F | OXYGEN SATURATION: 97 % | BODY MASS INDEX: 37.73 KG/M2 | HEART RATE: 69 BPM | HEIGHT: 66 IN

## 2021-04-08 DIAGNOSIS — I48.0 PAROXYSMAL ATRIAL FIBRILLATION (HCC): ICD-10-CM

## 2021-04-08 DIAGNOSIS — I10 BENIGN ESSENTIAL HYPERTENSION: ICD-10-CM

## 2021-04-08 DIAGNOSIS — E78.00 HYPERCHOLESTEROLEMIA: Primary | ICD-10-CM

## 2021-04-08 PROCEDURE — 99214 OFFICE O/P EST MOD 30 MIN: CPT | Performed by: INTERNAL MEDICINE

## 2021-04-08 NOTE — PROGRESS NOTES
Subjective   Junito Sorto is a 80 y.o. male. Patient is here today for   Chief Complaint   Patient presents with   • Hypertension     HYPERCHOLESTEROLEMIA- FOLLOW UP LABS          Vitals:    04/08/21 1011   BP: 130/86   Pulse: 69   Resp: 18   Temp: 97.1 °F (36.2 °C)   SpO2: 97%     Body mass index is 37.92 kg/m².      Past Medical History:   Diagnosis Date   • Arthritis    • At risk for obstructive sleep apnea     5   • Atrial fibrillation (CMS/HCC)     not followed by Cardiologist just PCP   • Bladder outlet obstruction 9/11/2019   • BPH (benign prostatic hyperplasia)    • Colon polyp    • Coronary artery disease Few years    Afib   • Eczema    • GERD (gastroesophageal reflux disease) 2013    Mentioned as possible   • Hiatal hernia    • Hyperlipidemia    • Hypertension    • Low back pain    • Prostate cancer (CMS/HCC)    • Skin cancer       Allergies   Allergen Reactions   • Dilaudid [Hydromorphone Hcl] Hallucinations      Social History     Socioeconomic History   • Marital status:      Spouse name: Not on file   • Number of children: Not on file   • Years of education: Not on file   • Highest education level: Not on file   Tobacco Use   • Smoking status: Former Smoker     Types: Pipe   • Smokeless tobacco: Never Used   • Tobacco comment: only in college   Substance and Sexual Activity   • Alcohol use: Yes     Types: 3 Glasses of wine per week     Comment: SOCIALLY   • Drug use: No   • Sexual activity: Defer     Comment: 50 years ago        Current Outpatient Medications:   •  amLODIPine-benazepril (LOTREL 5-20) 5-20 MG per capsule, TAKE ONE CAPSULE BY MOUTH EVERY MORNING, Disp: 90 capsule, Rfl: 3  •  aspirin 81 MG oral suspension, , Disp: , Rfl:   •  atorvastatin (LIPITOR) 40 MG tablet, TAKE ONE TABLET BY MOUTH DAILY, Disp: 90 tablet, Rfl: 3  •  propranolol LA (INDERAL LA) 80 MG 24 hr capsule, TAKE ONE CAPSULE BY MOUTH EVERY MORNING, Disp: 90 capsule, Rfl: 3  •  vitamin C (ASCORBIC ACID) 500 MG  tablet, Take 500 mg by mouth Daily., Disp: , Rfl:   •  zinc gluconate 50 MG tablet, , Disp: , Rfl:      Objective     He is here to follow-up on labs from last week and hypertension.    He tells me that he feels well actually although reviewing his review systems prior to today's visit suggested that he was having some back pain.    Hypertension         Review of Systems   Constitutional: Negative.    HENT: Negative.    Respiratory: Negative.    Cardiovascular: Negative.    Musculoskeletal: Negative.    Psychiatric/Behavioral: Negative.        Physical Exam  Vitals and nursing note reviewed.   Constitutional:       Appearance: Normal appearance.      Comments: Pleasant, neatly groomed, no distress.   Cardiovascular:      Rate and Rhythm: Regular rhythm.      Pulses: Normal pulses.      Heart sounds: Normal heart sounds.   Pulmonary:      Effort: No respiratory distress.      Breath sounds: Normal breath sounds. No wheezing or rales.   Neurological:      Mental Status: He is alert and oriented to person, place, and time.   Psychiatric:         Mood and Affect: Mood normal.         Thought Content: Thought content normal.           Problems Addressed this Visit        Cardiac and Vasculature    Paroxysmal atrial fibrillation (CMS/HCC)    Benign essential hypertension    Hypercholesterolemia - Primary      Diagnoses       Codes Comments    Hypercholesterolemia    -  Primary ICD-10-CM: E78.00  ICD-9-CM: 272.0     Benign essential hypertension     ICD-10-CM: I10  ICD-9-CM: 401.1     Paroxysmal atrial fibrillation (CMS/HCC)     ICD-10-CM: I48.0  ICD-9-CM: 427.31             PLAN  He has a history of paroxysmal atrial fibrillation.  It is been suggested to him and has been suggested by his cardiologist and myself to take a medication for this history to avoid strokes of course.  In the past he has refused to take medication for anticoagulation.  We did not discuss that today.  Nevertheless, his heart rhythm today is  regular he is not in atrial fibrillation.    Hypertension is well controlled.    His hypercholesterolemia with great control: Atorvastatin 40 mg daily.    I asked him to follow-up in about 6 months to see how is getting along.  No follow-ups on file.

## 2021-06-14 ENCOUNTER — OFFICE VISIT (OUTPATIENT)
Dept: FAMILY MEDICINE CLINIC | Facility: CLINIC | Age: 81
End: 2021-06-14

## 2021-06-14 VITALS
BODY MASS INDEX: 37.92 KG/M2 | HEART RATE: 46 BPM | RESPIRATION RATE: 18 BRPM | HEIGHT: 66 IN | SYSTOLIC BLOOD PRESSURE: 136 MMHG | DIASTOLIC BLOOD PRESSURE: 80 MMHG | TEMPERATURE: 99.1 F

## 2021-06-14 DIAGNOSIS — I48.0 PAROXYSMAL ATRIAL FIBRILLATION (HCC): ICD-10-CM

## 2021-06-14 DIAGNOSIS — J30.9 ALLERGIC RHINITIS, UNSPECIFIED SEASONALITY, UNSPECIFIED TRIGGER: Primary | ICD-10-CM

## 2021-06-14 PROCEDURE — 99214 OFFICE O/P EST MOD 30 MIN: CPT | Performed by: INTERNAL MEDICINE

## 2021-06-14 RX ORDER — DIPHENOXYLATE HYDROCHLORIDE AND ATROPINE SULFATE 2.5; .025 MG/1; MG/1
1 TABLET ORAL DAILY
COMMUNITY
End: 2022-02-23

## 2021-06-14 NOTE — PROGRESS NOTES
Subjective   Junito Sorto is a 80 y.o. male. Patient is here today for   Chief Complaint   Patient presents with   • Cough     SOME SOB BUT FEELING BETTER          Vitals:    06/14/21 1133   BP: 136/80   Pulse: (!) 46   Resp: 18   Temp: 99.1 °F (37.3 °C)     Body mass index is 37.92 kg/m².      Past Medical History:   Diagnosis Date   • Arthritis    • At risk for obstructive sleep apnea     5   • Atrial fibrillation (CMS/HCC)     not followed by Cardiologist just PCP   • Bladder outlet obstruction 9/11/2019   • BPH (benign prostatic hyperplasia)    • Colon polyp    • Coronary artery disease Few years    Afib   • Eczema    • GERD (gastroesophageal reflux disease) 2013    Mentioned as possible   • Hiatal hernia    • Hyperlipidemia    • Hypertension    • Low back pain    • Prostate cancer (CMS/HCC)    • Skin cancer       Allergies   Allergen Reactions   • Dilaudid [Hydromorphone Hcl] Hallucinations      Social History     Socioeconomic History   • Marital status:      Spouse name: Not on file   • Number of children: Not on file   • Years of education: Not on file   • Highest education level: Not on file   Tobacco Use   • Smoking status: Former Smoker     Types: Pipe   • Smokeless tobacco: Never Used   • Tobacco comment: only in college   Substance and Sexual Activity   • Alcohol use: Yes     Types: 3 Glasses of wine per week     Comment: SOCIALLY   • Drug use: No   • Sexual activity: Defer     Comment: 50 years ago        Current Outpatient Medications:   •  amLODIPine-benazepril (LOTREL 5-20) 5-20 MG per capsule, TAKE ONE CAPSULE BY MOUTH EVERY MORNING, Disp: 90 capsule, Rfl: 3  •  aspirin 81 MG oral suspension, , Disp: , Rfl:   •  atorvastatin (LIPITOR) 40 MG tablet, TAKE ONE TABLET BY MOUTH DAILY, Disp: 90 tablet, Rfl: 3  •  multivitamin (MULTI-VITAMIN DAILY PO), Take 1 tablet by mouth Daily., Disp: , Rfl:   •  propranolol LA (INDERAL LA) 80 MG 24 hr capsule, TAKE ONE CAPSULE BY MOUTH EVERY MORNING,  Disp: 90 capsule, Rfl: 3  •  vitamin C (ASCORBIC ACID) 500 MG tablet, Take 500 mg by mouth Daily., Disp: , Rfl:   •  zinc gluconate 50 MG tablet, , Disp: , Rfl:      Objective     Few days ago this patient had a cough and a runny nose and cough was productive.  He had some wheezing.  This started about 5 days ago and now the symptoms seem to resolved.    He denies any shortness of breath, fevers, chills.    Shortness of Breath  This is a recurrent problem. The current episode started more than 1 month ago. The problem occurs intermittently. The problem has been gradually worsening. Associated symptoms include coryza, rhinorrhea, sputum production and wheezing. Pertinent negatives include no abdominal pain, chest pain, claudication, ear pain, fever, headaches, hemoptysis, leg pain, leg swelling, neck pain, PND, rash, sore throat, swollen glands, syncope or vomiting. The symptoms are aggravated by exercise, any activity and pollens.        Review of Systems   Constitutional: Negative for chills and fever.   HENT: Positive for rhinorrhea. Negative for ear pain and sore throat.    Respiratory: Positive for sputum production and wheezing. Negative for hemoptysis and shortness of breath.    Cardiovascular: Negative for chest pain, claudication, leg swelling, syncope and PND.   Gastrointestinal: Negative for abdominal pain and vomiting.   Musculoskeletal: Negative for neck pain.   Skin: Negative for rash.   Neurological: Negative for headaches.       Physical Exam  Constitutional:       Appearance: Normal appearance.   HENT:      Head: Normocephalic and atraumatic.   Cardiovascular:      Rate and Rhythm: Normal rate.      Heart sounds: Normal heart sounds. No murmur heard.   No gallop.       Comments: He has an irregularly irregular rhythm with regular rate.  Pulmonary:      Effort: No respiratory distress.      Breath sounds: Normal breath sounds. No wheezing or rales.   Neurological:      General: No focal deficit  present.      Mental Status: He is alert and oriented to person, place, and time.   Psychiatric:         Mood and Affect: Mood normal.         Behavior: Behavior normal.         Thought Content: Thought content normal.           Problems Addressed this Visit        Allergies and Adverse Reactions    Allergic rhinitis - Primary       Cardiac and Vasculature    Paroxysmal atrial fibrillation (CMS/HCC)      Diagnoses       Codes Comments    Allergic rhinitis, unspecified seasonality, unspecified trigger    -  Primary ICD-10-CM: J30.9  ICD-9-CM: 477.9     Paroxysmal atrial fibrillation (CMS/HCC)     ICD-10-CM: I48.0  ICD-9-CM: 427.31             PLAN  He is feeling better.  Either he had a viral URI with me is gradually improving I think the most likely scenario or his allergies have been transiently worse.  He does take loratadine over-the-counter as needed.  Takes guaifenesin on top of that as needed.    In any case, his symptoms have basically resolved now.  No follow-ups on file.

## 2021-09-28 RX ORDER — ATORVASTATIN CALCIUM 40 MG/1
TABLET, FILM COATED ORAL
Qty: 90 TABLET | Refills: 3 | Status: SHIPPED | OUTPATIENT
Start: 2021-09-28 | End: 2022-09-26

## 2021-10-08 DIAGNOSIS — E78.00 HYPERCHOLESTEROLEMIA: Primary | ICD-10-CM

## 2021-10-12 LAB
ALBUMIN SERPL-MCNC: 4.3 G/DL (ref 3.5–5.2)
ALBUMIN/GLOB SERPL: 2.2 G/DL
ALP SERPL-CCNC: 86 U/L (ref 39–117)
ALT SERPL-CCNC: 23 U/L (ref 1–41)
APPEARANCE UR: CLEAR
AST SERPL-CCNC: 26 U/L (ref 1–40)
BACTERIA #/AREA URNS HPF: NORMAL /HPF
BASOPHILS # BLD AUTO: 0.03 10*3/MM3 (ref 0–0.2)
BASOPHILS NFR BLD AUTO: 0.5 % (ref 0–1.5)
BILIRUB SERPL-MCNC: 1.3 MG/DL (ref 0–1.2)
BILIRUB UR QL STRIP: NEGATIVE
BUN SERPL-MCNC: 12 MG/DL (ref 8–23)
BUN/CREAT SERPL: 15.8 (ref 7–25)
CALCIUM SERPL-MCNC: 10 MG/DL (ref 8.6–10.5)
CASTS URNS MICRO: NORMAL
CHLORIDE SERPL-SCNC: 102 MMOL/L (ref 98–107)
CHOLEST SERPL-MCNC: 134 MG/DL (ref 0–200)
CO2 SERPL-SCNC: 29.8 MMOL/L (ref 22–29)
COLOR UR: YELLOW
CREAT SERPL-MCNC: 0.76 MG/DL (ref 0.76–1.27)
EOSINOPHIL # BLD AUTO: 0.35 10*3/MM3 (ref 0–0.4)
EOSINOPHIL NFR BLD AUTO: 5.5 % (ref 0.3–6.2)
EPI CELLS #/AREA URNS HPF: NORMAL /HPF
ERYTHROCYTE [DISTWIDTH] IN BLOOD BY AUTOMATED COUNT: 13.7 % (ref 12.3–15.4)
GLOBULIN SER CALC-MCNC: 2 GM/DL
GLUCOSE SERPL-MCNC: 104 MG/DL (ref 65–99)
GLUCOSE UR QL: NEGATIVE
HCT VFR BLD AUTO: 43.6 % (ref 37.5–51)
HDLC SERPL-MCNC: 51 MG/DL (ref 40–60)
HGB BLD-MCNC: 14.7 G/DL (ref 13–17.7)
HGB UR QL STRIP: NEGATIVE
IMM GRANULOCYTES # BLD AUTO: 0.02 10*3/MM3 (ref 0–0.05)
IMM GRANULOCYTES NFR BLD AUTO: 0.3 % (ref 0–0.5)
KETONES UR QL STRIP: NEGATIVE
LDLC SERPL CALC-MCNC: 68 MG/DL (ref 0–100)
LDLC/HDLC SERPL: 1.34 {RATIO}
LEUKOCYTE ESTERASE UR QL STRIP: ABNORMAL
LYMPHOCYTES # BLD AUTO: 1.35 10*3/MM3 (ref 0.7–3.1)
LYMPHOCYTES NFR BLD AUTO: 21.1 % (ref 19.6–45.3)
MCH RBC QN AUTO: 29.8 PG (ref 26.6–33)
MCHC RBC AUTO-ENTMCNC: 33.7 G/DL (ref 31.5–35.7)
MCV RBC AUTO: 88.3 FL (ref 79–97)
MONOCYTES # BLD AUTO: 0.58 10*3/MM3 (ref 0.1–0.9)
MONOCYTES NFR BLD AUTO: 9 % (ref 5–12)
NEUTROPHILS # BLD AUTO: 4.08 10*3/MM3 (ref 1.7–7)
NEUTROPHILS NFR BLD AUTO: 63.6 % (ref 42.7–76)
NITRITE UR QL STRIP: NEGATIVE
NRBC BLD AUTO-RTO: 0 /100 WBC (ref 0–0.2)
PH UR STRIP: 6 [PH] (ref 5–8)
PLATELET # BLD AUTO: 191 10*3/MM3 (ref 140–450)
POTASSIUM SERPL-SCNC: 4.3 MMOL/L (ref 3.5–5.2)
PROT SERPL-MCNC: 6.3 G/DL (ref 6–8.5)
PROT UR QL STRIP: NEGATIVE
RBC # BLD AUTO: 4.94 10*6/MM3 (ref 4.14–5.8)
RBC #/AREA URNS HPF: NORMAL /HPF
SODIUM SERPL-SCNC: 142 MMOL/L (ref 136–145)
SP GR UR: 1.01 (ref 1–1.03)
TRIGL SERPL-MCNC: 73 MG/DL (ref 0–150)
UROBILINOGEN UR STRIP-MCNC: ABNORMAL MG/DL
VLDLC SERPL CALC-MCNC: 15 MG/DL (ref 5–40)
WBC # BLD AUTO: 6.41 10*3/MM3 (ref 3.4–10.8)
WBC #/AREA URNS HPF: NORMAL /HPF

## 2021-10-15 ENCOUNTER — OFFICE VISIT (OUTPATIENT)
Dept: FAMILY MEDICINE CLINIC | Facility: CLINIC | Age: 81
End: 2021-10-15

## 2021-10-15 VITALS
HEART RATE: 57 BPM | SYSTOLIC BLOOD PRESSURE: 130 MMHG | TEMPERATURE: 97.1 F | BODY MASS INDEX: 36.31 KG/M2 | WEIGHT: 239.6 LBS | RESPIRATION RATE: 18 BRPM | HEIGHT: 68 IN | DIASTOLIC BLOOD PRESSURE: 82 MMHG | OXYGEN SATURATION: 97 %

## 2021-10-15 DIAGNOSIS — I10 BENIGN ESSENTIAL HYPERTENSION: ICD-10-CM

## 2021-10-15 DIAGNOSIS — I48.0 PAROXYSMAL ATRIAL FIBRILLATION (HCC): Primary | ICD-10-CM

## 2021-10-15 DIAGNOSIS — E78.00 HYPERCHOLESTEROLEMIA: ICD-10-CM

## 2021-10-15 PROCEDURE — 99214 OFFICE O/P EST MOD 30 MIN: CPT | Performed by: INTERNAL MEDICINE

## 2021-10-15 NOTE — PROGRESS NOTES
Subjective   Junito Sorto is a 81 y.o. male. Patient is here today for   Chief Complaint   Patient presents with   • Hypertension     lab follow up           Vitals:    10/15/21 0953   BP: 130/82   Pulse: 57   Resp: 18   Temp: 97.1 °F (36.2 °C)   SpO2: 97%     Body mass index is 36.44 kg/m².      Past Medical History:   Diagnosis Date   • Arthritis    • At risk for obstructive sleep apnea     5   • Atrial fibrillation (HCC)     not followed by Cardiologist just PCP   • Bladder outlet obstruction 9/11/2019   • BPH (benign prostatic hyperplasia)    • Colon polyp    • Coronary artery disease Few years    Afib   • Eczema    • GERD (gastroesophageal reflux disease) 2013    Mentioned as possible   • Hiatal hernia    • Hyperlipidemia    • Hypertension    • Low back pain    • Prostate cancer (HCC)    • Skin cancer       Allergies   Allergen Reactions   • Dilaudid [Hydromorphone Hcl] Hallucinations      Social History     Socioeconomic History   • Marital status:    Tobacco Use   • Smoking status: Former Smoker     Types: Pipe   • Smokeless tobacco: Never Used   • Tobacco comment: only in college   Vaping Use   • Vaping Use: Never used   Substance and Sexual Activity   • Alcohol use: Yes     Types: 3 Glasses of wine per week     Comment: SOCIALLY   • Drug use: No   • Sexual activity: Defer     Comment: 50 years ago        Current Outpatient Medications:   •  amLODIPine-benazepril (LOTREL 5-20) 5-20 MG per capsule, TAKE ONE CAPSULE BY MOUTH EVERY MORNING, Disp: 90 capsule, Rfl: 3  •  aspirin 81 MG oral suspension, , Disp: , Rfl:   •  atorvastatin (LIPITOR) 40 MG tablet, TAKE ONE TABLET BY MOUTH DAILY, Disp: 90 tablet, Rfl: 3  •  fluticasone (Flonase) 50 MCG/ACT nasal spray, 2 sprays into the nostril(s) as directed by provider Daily., Disp: 15.8 mL, Rfl: 0  •  guaiFENesin 200 MG tablet, Take 400 mg by mouth Every 4 (Four) Hours As Needed for Cough., Disp: , Rfl:   •  multivitamin (MULTI-VITAMIN DAILY PO), Take 1  tablet by mouth Daily., Disp: , Rfl:   •  propranolol LA (INDERAL LA) 80 MG 24 hr capsule, TAKE ONE CAPSULE BY MOUTH EVERY MORNING, Disp: 90 capsule, Rfl: 3  •  vitamin C (ASCORBIC ACID) 500 MG tablet, Take 500 mg by mouth Daily., Disp: , Rfl:   •  zinc gluconate 50 MG tablet, , Disp: , Rfl:      Objective     He is here to follow-up on hypertension and some labs he had done last year.    He tells me he feels well.    Hypertension         Review of Systems   Constitutional: Negative.    HENT: Negative.    Respiratory: Negative.    Cardiovascular: Negative.    Musculoskeletal: Negative.    Psychiatric/Behavioral: Negative.        Physical Exam  Vitals and nursing note reviewed.   Constitutional:       Appearance: Normal appearance. He is obese.      Comments: Pleasant, neatly groomed, in no distress.   Cardiovascular:      Rate and Rhythm: Rhythm irregular.      Heart sounds: Normal heart sounds. No murmur heard.  No gallop.    Pulmonary:      Effort: No respiratory distress.      Breath sounds: Normal breath sounds. No wheezing or rales.   Neurological:      Mental Status: He is alert and oriented to person, place, and time.   Psychiatric:         Mood and Affect: Mood normal.         Behavior: Behavior normal.         Thought Content: Thought content normal.           Problems Addressed this Visit        Cardiac and Vasculature    Paroxysmal atrial fibrillation (HCC) - Primary    Benign essential hypertension    Hypercholesterolemia      Diagnoses       Codes Comments    Paroxysmal atrial fibrillation (HCC)    -  Primary ICD-10-CM: I48.0  ICD-9-CM: 427.31     Hypercholesterolemia     ICD-10-CM: E78.00  ICD-9-CM: 272.0     Benign essential hypertension     ICD-10-CM: I10  ICD-9-CM: 401.1             PLAN  He and I reviewed his labs. He has excellent control of his hypercholesterolemia.    He has atrial fibrillation today with controlled ventricular response. He refuses to take anticoagulation. He does not feel that  the potential benefits justify the potential risks.    His hypertension is well controlled.    I asked him to follow-up in about 6 months. Fasting labs prior to that visit should include: Lipid profile, comprehensive metabolic panel, CBC, urinalysis.  No follow-ups on file.  Answers for HPI/ROS submitted by the patient on 10/13/2021  What is the primary reason for your visit?: Other  Please describe your symptoms.: 6month check up and blood test review. Feel generally good but stamina declining., Back pain almost to next injection level.  Have you had these symptoms before?: Yes  How long have you been having these symptoms?: Greater than 2 weeks  Please list any medications you are currently taking for this condition.: Same Rx as usual with naproxen as needed. Taking for years.  Please describe any probable cause for these symptoms. : .? Aging back

## 2021-11-01 RX ORDER — PROPRANOLOL HYDROCHLORIDE 80 MG/1
CAPSULE, EXTENDED RELEASE ORAL
Qty: 90 CAPSULE | Refills: 3 | Status: SHIPPED | OUTPATIENT
Start: 2021-11-01 | End: 2022-04-04 | Stop reason: SDUPTHER

## 2021-11-01 RX ORDER — AMLODIPINE BESYLATE AND BENAZEPRIL HYDROCHLORIDE 5; 20 MG/1; MG/1
CAPSULE ORAL
Qty: 90 CAPSULE | Refills: 3 | Status: SHIPPED | OUTPATIENT
Start: 2021-11-01 | End: 2022-03-09 | Stop reason: HOSPADM

## 2021-12-15 ENCOUNTER — ANESTHESIA EVENT (OUTPATIENT)
Dept: PAIN MEDICINE | Facility: HOSPITAL | Age: 81
End: 2021-12-15

## 2021-12-15 ENCOUNTER — HOSPITAL ENCOUNTER (OUTPATIENT)
Dept: PAIN MEDICINE | Facility: HOSPITAL | Age: 81
Discharge: HOME | End: 2021-12-15

## 2021-12-15 ENCOUNTER — HOSPITAL ENCOUNTER (OUTPATIENT)
Dept: GENERAL RADIOLOGY | Facility: HOSPITAL | Age: 81
Discharge: HOME | End: 2021-12-15

## 2021-12-15 ENCOUNTER — ANESTHESIA (OUTPATIENT)
Dept: PAIN MEDICINE | Facility: HOSPITAL | Age: 81
End: 2021-12-15

## 2021-12-15 VITALS
TEMPERATURE: 97.3 F | SYSTOLIC BLOOD PRESSURE: 139 MMHG | OXYGEN SATURATION: 94 % | RESPIRATION RATE: 16 BRPM | HEART RATE: 75 BPM | DIASTOLIC BLOOD PRESSURE: 97 MMHG

## 2021-12-15 DIAGNOSIS — R52 PAIN: ICD-10-CM

## 2021-12-15 DIAGNOSIS — M43.16 SPONDYLOLISTHESIS OF LUMBAR REGION: Primary | ICD-10-CM

## 2021-12-15 PROCEDURE — 25010000002 METHYLPREDNISOLONE PER 80 MG: Performed by: ANESTHESIOLOGY

## 2021-12-15 PROCEDURE — 77003 FLUOROGUIDE FOR SPINE INJECT: CPT

## 2021-12-15 PROCEDURE — 0 IOPAMIDOL 41 % SOLUTION: Performed by: ANESTHESIOLOGY

## 2021-12-15 RX ORDER — METHYLPREDNISOLONE ACETATE 80 MG/ML
80 INJECTION, SUSPENSION INTRA-ARTICULAR; INTRALESIONAL; INTRAMUSCULAR; SOFT TISSUE ONCE
Status: COMPLETED | OUTPATIENT
Start: 2021-12-15 | End: 2021-12-15

## 2021-12-15 RX ORDER — LIDOCAINE HYDROCHLORIDE 10 MG/ML
1 INJECTION, SOLUTION INFILTRATION; PERINEURAL ONCE AS NEEDED
Status: DISCONTINUED | OUTPATIENT
Start: 2021-12-15 | End: 2021-12-16 | Stop reason: HOSPADM

## 2021-12-15 RX ORDER — MIDAZOLAM HYDROCHLORIDE 1 MG/ML
1 INJECTION INTRAMUSCULAR; INTRAVENOUS AS NEEDED
Status: DISCONTINUED | OUTPATIENT
Start: 2021-12-15 | End: 2021-12-16 | Stop reason: HOSPADM

## 2021-12-15 RX ORDER — SODIUM CHLORIDE 0.9 % (FLUSH) 0.9 %
1-10 SYRINGE (ML) INJECTION AS NEEDED
Status: DISCONTINUED | OUTPATIENT
Start: 2021-12-15 | End: 2021-12-16 | Stop reason: HOSPADM

## 2021-12-15 RX ORDER — FENTANYL CITRATE 50 UG/ML
50 INJECTION, SOLUTION INTRAMUSCULAR; INTRAVENOUS AS NEEDED
Status: DISCONTINUED | OUTPATIENT
Start: 2021-12-15 | End: 2021-12-16 | Stop reason: HOSPADM

## 2021-12-15 RX ORDER — METHYLPREDNISOLONE ACETATE 80 MG/ML
INJECTION, SUSPENSION INTRA-ARTICULAR; INTRALESIONAL; INTRAMUSCULAR; SOFT TISSUE
Status: COMPLETED | OUTPATIENT
Start: 2021-12-15 | End: 2021-12-15

## 2021-12-15 RX ADMIN — METHYLPREDNISOLONE ACETATE 80 MG: 80 INJECTION, SUSPENSION INTRA-ARTICULAR; INTRALESIONAL; INTRAMUSCULAR; SOFT TISSUE at 10:55

## 2021-12-15 RX ADMIN — IOPAMIDOL 2 ML: 408 INJECTION, SOLUTION INTRATHECAL at 10:54

## 2021-12-15 RX ADMIN — METHYLPREDNISOLONE ACETATE 80 MG: 80 INJECTION, SUSPENSION INTRA-ARTICULAR; INTRALESIONAL; INTRAMUSCULAR; SOFT TISSUE at 10:46

## 2021-12-15 NOTE — H&P
[facility]    History and Physical    Patient Name: Junito Sorto  :  1940  MRN:  [id number]  Date of Admission: 12/15/2021    Subjective     Patient is a 81 y.o. male presents with chief complaint of chronic, severe low back and hips: right pain.  Onset of symptoms was gradual starting many year ago.  Symptoms are associated/aggravated by activity. Symptoms improve with injection - more than 50% relief following prior lesi 20.  Pt does report the pain has changed in the last year..  the epidurals do not provide as profound or as immediate relief as they once did.  He would like to see an orthopedic surgeon for further evaluation.  Today he presents for lesi.      The following portions of the patients history were reviewed and updated as appropriate: current medications, allergies, past medical history, past surgical history, past family history, past social history and problem list                Objective     Past Medical History:   Past Medical History:   Diagnosis Date   • Arthritis    • At risk for obstructive sleep apnea     5   • Atrial fibrillation (HCC)     not followed by Cardiologist just PCP   • Bladder outlet obstruction 2019   • BPH (benign prostatic hyperplasia)    • Colon polyp    • Coronary artery disease Few years    Afib   • Eczema    • GERD (gastroesophageal reflux disease) 2013    Mentioned as possible   • Hiatal hernia    • Hyperlipidemia    • Hypertension    • Low back pain    • Prostate cancer (HCC)    • Skin cancer      Past Surgical History:   Past Surgical History:   Procedure Laterality Date   • CATARACT EXTRACTION, BILATERAL     • COLONOSCOPY  Several   • CYSTOSCOPY TRANSURETHRAL RESECTION OF PROSTATE N/A 2019    Procedure: CYSTOSCOPY TRANSURETHRAL RESECTION OF PROSTATE;  Surgeon: Chip Bedolla MD;  Location: [facility];  Service: Urology   • ENDOSCOPY N/A 10/31/2018    LA Grade C reflux esophagitis, HH, erythematous mucosa in the antrum.  Path: Gastritis, esophagitis.    • EPIDURAL BLOCK     • ESOPHAGEAL DILATATION     • PROSTATE SURGERY  11/06/2014   • SKIN CANCER EXCISION      multiple, over that last few years   • SKIN CANCER EXCISION Right 09/03/2019    FACE,    • TONSILLECTOMY      around 1950   • UPPER GASTROINTESTINAL ENDOSCOPY  2013     Family History:   Family History   Problem Relation Age of Onset   • Cancer Mother         pancreatic   • Liver disease Mother    • Cancer Father         prostate, bladder, colon, lymphoma, leukemia   • Colon cancer Father    • Cancer Brother         prostate   • Cancer Paternal Grandfather         prostate   • Malig Hyperthermia Neg Hx      Social History:   Social History     Socioeconomic History   • Marital status:    Tobacco Use   • Smoking status: Former Smoker     Types: Pipe   • Smokeless tobacco: Never Used   • Tobacco comment: only in college   Vaping Use   • Vaping Use: Never used   Substance and Sexual Activity   • Alcohol use: Yes     Types: 3 Glasses of wine per week     Comment: SOCIALLY   • Drug use: No   • Sexual activity: Defer     Comment: 50 years ago       Vital Signs Range for the last 24 hours  Temperature:     Temp Source:     BP:     Pulse:     Respirations:     SPO2:     O2 Amount (l/min):     O2 Devices     Weight:           --------------------------------------------------------------------------------    Current Outpatient Medications   Medication Sig Dispense Refill   • amLODIPine-benazepril (LOTREL 5-20) 5-20 MG per capsule TAKE ONE CAPSULE BY MOUTH EVERY MORNING 90 capsule 3   • aspirin 81 MG oral suspension      • atorvastatin (LIPITOR) 40 MG tablet TAKE ONE TABLET BY MOUTH DAILY 90 tablet 3   • fluticasone (Flonase) 50 MCG/ACT nasal spray 2 sprays into the nostril(s) as directed by provider Daily. 15.8 mL 0   • guaiFENesin 200 MG tablet Take 400 mg by mouth Every 4 (Four) Hours As Needed for Cough.     • multivitamin (MULTI-VITAMIN DAILY PO) Take 1 tablet by mouth  Daily.     • propranolol LA (INDERAL LA) 80 MG 24 hr capsule TAKE ONE CAPSULE BY MOUTH EVERY MORNING 90 capsule 3   • vitamin C (ASCORBIC ACID) 500 MG tablet Take 500 mg by mouth Daily.     • zinc gluconate 50 MG tablet        No current facility-administered medications for this encounter.       --------------------------------------------------------------------------------  Assessment/Plan      Anesthesia Evaluation     Patient summary reviewed and Nursing notes reviewed   no history of anesthetic complications:               Airway   Mallampati: II  Dental      Pulmonary    (+) a smoker Former,   Cardiovascular   Exercise tolerance: good (4-7 METS)    (+) hypertension, CAD, dysrhythmias Atrial Fib, hyperlipidemia,       Neuro/Psych  (+) numbness,     GI/Hepatic/Renal/Endo    (+) obesity,  hiatal hernia, GERD,      Musculoskeletal     Abdominal    Substance History - negative use     OB/GYN negative ob/gyn ROS         Other   arthritis,    history of cancer               Diagnosis and Plan    Treatment Plan  ASA 3   Patient has had previous injection/procedure   Procedures: Lumbar Epidural Steroid Injection(LESI), With fluoroscopy,       Anesthetic plan and risks discussed with patient.          Diagnosis     * DDD (degenerative disc disease), lumbar [M51.36]     * Lumbar radiculopathy [M54.16]     * Spondylolisthesis, lumbar region [M43.16]

## 2022-01-19 ENCOUNTER — OFFICE VISIT (OUTPATIENT)
Dept: FAMILY MEDICINE CLINIC | Facility: CLINIC | Age: 82
End: 2022-01-19

## 2022-01-19 VITALS
HEART RATE: 62 BPM | HEIGHT: 68 IN | SYSTOLIC BLOOD PRESSURE: 120 MMHG | BODY MASS INDEX: 34.1 KG/M2 | RESPIRATION RATE: 18 BRPM | DIASTOLIC BLOOD PRESSURE: 84 MMHG | TEMPERATURE: 96.9 F | WEIGHT: 225 LBS | OXYGEN SATURATION: 96 %

## 2022-01-19 DIAGNOSIS — H65.112 ACUTE MUCOID OTITIS MEDIA OF LEFT EAR: Primary | ICD-10-CM

## 2022-01-19 DIAGNOSIS — R05.9 COUGH: ICD-10-CM

## 2022-01-19 PROBLEM — H61.21 IMPACTED CERUMEN OF RIGHT EAR: Status: RESOLVED | Noted: 2019-01-13 | Resolved: 2022-01-19

## 2022-01-19 PROCEDURE — 99213 OFFICE O/P EST LOW 20 MIN: CPT | Performed by: NURSE PRACTITIONER

## 2022-01-19 RX ORDER — PREDNISONE 20 MG/1
20 TABLET ORAL 2 TIMES DAILY
Qty: 8 TABLET | Refills: 0 | Status: SHIPPED | OUTPATIENT
Start: 2022-01-19 | End: 2022-01-23

## 2022-01-19 RX ORDER — BENZONATATE 100 MG/1
100 CAPSULE ORAL 2 TIMES DAILY PRN
Qty: 20 CAPSULE | Refills: 0 | Status: SHIPPED | OUTPATIENT
Start: 2022-01-19 | End: 2022-02-23

## 2022-01-19 RX ORDER — AMOXICILLIN AND CLAVULANATE POTASSIUM 875; 125 MG/1; MG/1
1 TABLET, FILM COATED ORAL 2 TIMES DAILY
Qty: 20 TABLET | Refills: 0 | Status: SHIPPED | OUTPATIENT
Start: 2022-01-19 | End: 2022-01-29

## 2022-01-19 NOTE — PROGRESS NOTES
Subjective     Junito Sorto is a 81 y.o.. male.     Cough  This is a new problem. Episode onset: 10 DAYS. The problem has been unchanged. The cough is non-productive. Associated symptoms include nasal congestion, postnasal drip, rhinorrhea and shortness of breath (chronic, not new, no changes). Pertinent negatives include no chills, ear congestion, ear pain, fever, headaches or sore throat. Treatments tried: cough syrup otc, mucinex, loratadine.       The following portions of the patient's history were reviewed and updated as appropriate: allergies, current medications, past family history, past medical history, past social history, past surgical history and problem list.    Past Medical History:   Diagnosis Date   • Arthritis    • At risk for obstructive sleep apnea     5   • Atrial fibrillation (HCC)     not followed by Cardiologist just PCP   • Bladder outlet obstruction 9/11/2019   • BPH (benign prostatic hyperplasia)    • Colon polyp    • Coronary artery disease Few years    Afib   • Eczema    • GERD (gastroesophageal reflux disease) 2013    Mentioned as possible   • Hiatal hernia    • Hyperlipidemia    • Hypertension    • Low back pain    • Prostate cancer (HCC)    • Skin cancer        Past Surgical History:   Procedure Laterality Date   • CATARACT EXTRACTION, BILATERAL     • COLONOSCOPY  Several   • CYSTOSCOPY TRANSURETHRAL RESECTION OF PROSTATE N/A 9/11/2019    Procedure: CYSTOSCOPY TRANSURETHRAL RESECTION OF PROSTATE;  Surgeon: Chip Bedolla MD;  Location: [facility];  Service: Urology   • ENDOSCOPY N/A 10/31/2018    LA Grade C reflux esophagitis, HH, erythematous mucosa in the antrum. Path: Gastritis, esophagitis.    • EPIDURAL BLOCK     • ESOPHAGEAL DILATATION     • PROSTATE SURGERY  11/06/2014   • SKIN CANCER EXCISION      multiple, over that last few years   • SKIN CANCER EXCISION Right 09/03/2019    FACE,    • TONSILLECTOMY      around 1950   • UPPER GASTROINTESTINAL ENDOSCOPY  2013  "      Review of Systems   Constitutional: Negative for chills and fever.   HENT: Positive for congestion, postnasal drip and rhinorrhea. Negative for ear pain and sore throat.    Respiratory: Positive for cough and shortness of breath (chronic, not new, no changes).    Gastrointestinal: Negative for diarrhea, nausea and vomiting.   Neurological: Negative for headaches.       Allergies   Allergen Reactions   • Dilaudid [Hydromorphone Hcl] Hallucinations       Objective     Vitals:    01/19/22 1157   BP: 120/84   BP Location: Right arm   Patient Position: Sitting   Pulse: 62   Resp: 18   Temp: 96.9 °F (36.1 °C)   TempSrc: Oral   SpO2: 96%   Weight: 102 kg (225 lb)   Height: 172.7 cm (67.99\")     Body mass index is 34.22 kg/m².    Physical Exam  Vitals reviewed.   HENT:      Head: Normocephalic.      Right Ear: Tympanic membrane normal. No middle ear effusion. Tympanic membrane is not erythematous.      Left Ear: A middle ear effusion (cloudy, yellow) is present. Tympanic membrane is erythematous.      Nose: Congestion present.      Mouth/Throat:      Mouth: Mucous membranes are moist.      Pharynx: Oropharyngeal exudate (clear pnd) present. No pharyngeal swelling or posterior oropharyngeal erythema.   Eyes:      Pupils: Pupils are equal, round, and reactive to light.   Cardiovascular:      Rate and Rhythm: Normal rate and regular rhythm.   Pulmonary:      Effort: Pulmonary effort is normal. No respiratory distress.      Breath sounds: Normal breath sounds. No stridor. No wheezing, rhonchi or rales.   Musculoskeletal:         General: Normal range of motion.   Lymphadenopathy:      Cervical: No cervical adenopathy.   Neurological:      Mental Status: He is alert and oriented to person, place, and time.   Psychiatric:         Behavior: Behavior normal.           Current Outpatient Medications:   •  amLODIPine-benazepril (LOTREL 5-20) 5-20 MG per capsule, TAKE ONE CAPSULE BY MOUTH EVERY MORNING, Disp: 90 capsule, Rfl: " 3  •  atorvastatin (LIPITOR) 40 MG tablet, TAKE ONE TABLET BY MOUTH DAILY, Disp: 90 tablet, Rfl: 3  •  fluticasone (Flonase) 50 MCG/ACT nasal spray, 2 sprays into the nostril(s) as directed by provider Daily., Disp: 15.8 mL, Rfl: 0  •  guaiFENesin 200 MG tablet, Take 400 mg by mouth Every 4 (Four) Hours As Needed for Cough., Disp: , Rfl:   •  multivitamin (MULTI-VITAMIN DAILY PO), Take 1 tablet by mouth Daily., Disp: , Rfl:   •  propranolol LA (INDERAL LA) 80 MG 24 hr capsule, TAKE ONE CAPSULE BY MOUTH EVERY MORNING, Disp: 90 capsule, Rfl: 3  •  amoxicillin-clavulanate (Augmentin) 875-125 MG per tablet, Take 1 tablet by mouth 2 (Two) Times a Day for 10 days., Disp: 20 tablet, Rfl: 0  •  aspirin 81 MG oral suspension, , Disp: , Rfl:   •  benzonatate (Tessalon Perles) 100 MG capsule, Take 1 capsule by mouth 2 (Two) Times a Day As Needed for Cough., Disp: 20 capsule, Rfl: 0  •  predniSONE (DELTASONE) 20 MG tablet, Take 1 tablet by mouth 2 (Two) Times a Day for 4 days., Disp: 8 tablet, Rfl: 0  •  vitamin C (ASCORBIC ACID) 500 MG tablet, Take 500 mg by mouth Daily., Disp: , Rfl:   •  zinc gluconate 50 MG tablet, , Disp: , Rfl:       Assessment/Plan   Diagnoses and all orders for this visit:    1. Acute mucoid otitis media of left ear (Primary)  -     amoxicillin-clavulanate (Augmentin) 875-125 MG per tablet; Take 1 tablet by mouth 2 (Two) Times a Day for 10 days.  Dispense: 20 tablet; Refill: 0    2. Cough  -     COVID-19,[facility] ROUTINE, NP/OP SWAB IN TRANSPORT MEDIA OR ESWAB 72 HR TAT - Swab, Nasopharynx  -     predniSONE (DELTASONE) 20 MG tablet; Take 1 tablet by mouth 2 (Two) Times a Day for 4 days.  Dispense: 8 tablet; Refill: 0  -     benzonatate (Tessalon Perles) 100 MG capsule; Take 1 capsule by mouth 2 (Two) Times a Day As Needed for Cough.  Dispense: 20 capsule; Refill: 0        Patient Instructions   Drink plenty of fluids-water preferably, eat a heart healthy diet, get plenty of sleep and do warm salt water  gargles twice a day until feeling better; pt informed to stay in quarantine until results of covid19 testing back. Pt verb. Understanding.      Return if symptoms worsen or fail to improve.

## 2022-01-20 LAB
LABCORP SARS-COV-2, NAA 2 DAY TAT: NORMAL
SARS-COV-2 RNA RESP QL NAA+PROBE: NOT DETECTED

## 2022-02-23 ENCOUNTER — OFFICE VISIT (OUTPATIENT)
Dept: GASTROENTEROLOGY | Facility: CLINIC | Age: 82
End: 2022-02-23

## 2022-02-23 VITALS — HEIGHT: 68 IN | BODY MASS INDEX: 34.74 KG/M2 | WEIGHT: 229.2 LBS | TEMPERATURE: 97.2 F

## 2022-02-23 DIAGNOSIS — R13.10 DYSPHAGIA, UNSPECIFIED TYPE: Primary | ICD-10-CM

## 2022-02-23 DIAGNOSIS — R10.13 DYSPEPSIA: ICD-10-CM

## 2022-02-23 DIAGNOSIS — Z87.19 HISTORY OF ESOPHAGITIS: ICD-10-CM

## 2022-02-23 PROCEDURE — 99214 OFFICE O/P EST MOD 30 MIN: CPT | Performed by: NURSE PRACTITIONER

## 2022-02-23 RX ORDER — LORATADINE 10 MG/1
1 TABLET ORAL DAILY PRN
COMMUNITY
Start: 2022-10-22

## 2022-02-23 RX ORDER — PANTOPRAZOLE SODIUM 40 MG/1
40 TABLET, DELAYED RELEASE ORAL 2 TIMES DAILY
Qty: 60 TABLET | Refills: 5 | Status: SHIPPED | OUTPATIENT
Start: 2022-02-23 | End: 2022-03-15 | Stop reason: ALTCHOICE

## 2022-02-23 NOTE — PROGRESS NOTES
Chief Complaint   Patient presents with   • Difficulty Swallowing       HPI    Junito Sorto is a  81 y.o. male here for a follow up visit for dysphagia.    This patient has a history of GERD with esophagitis and hiatal hernia found on endoscopy.    Follows w/ Dr. Cadena, new to me.    Reviewed EGD 2018 Grade C reflux esophagitis, small hiatal hernia, gastritis.    For several months of esophageal dysphagia with solid foods relieved with drinking water.  Daily dyspepsia.  Occasional globus sensation.  No nausea, vomiting, poor appetite.  Intentional weight loss as he has been eating smaller portion to meals in an attempt to lose weight.  Current BMI 34.86.    No lower GI complaints.    Past Medical History:   Diagnosis Date   • Arthritis    • At risk for obstructive sleep apnea     5   • Atrial fibrillation (HCC)     not followed by Cardiologist just PCP   • Bladder outlet obstruction 9/11/2019   • BPH (benign prostatic hyperplasia)    • Colon polyp    • Coronary artery disease Few years    Afib   • Dysphagia    • Eczema    • GERD (gastroesophageal reflux disease) 2013    Mentioned as possible   • Hiatal hernia    • Hyperlipidemia    • Hypertension    • Low back pain    • Prostate cancer (HCC)    • Skin cancer        Past Surgical History:   Procedure Laterality Date   • CATARACT EXTRACTION, BILATERAL     • COLONOSCOPY  unknown   • CYSTOSCOPY TRANSURETHRAL RESECTION OF PROSTATE N/A 9/11/2019    Procedure: CYSTOSCOPY TRANSURETHRAL RESECTION OF PROSTATE;  Surgeon: Chip Bedolla MD;  Location: [facility];  Service: Urology   • ENDOSCOPY N/A 10/31/2018    LA Grade C reflux esophagitis, HH, erythematous mucosa in the antrum. Path: Gastritis, esophagitis.    • EPIDURAL BLOCK     • ESOPHAGEAL DILATATION     • PROSTATE SURGERY  11/06/2014   • SKIN CANCER EXCISION      multiple, over that last few years   • SKIN CANCER EXCISION Right 09/03/2019    FACE,    • TONSILLECTOMY      around 1950   • UPPER  GASTROINTESTINAL ENDOSCOPY  2013       Scheduled Meds:     Continuous Infusions: No current facility-administered medications for this visit.      PRN Meds:     Allergies   Allergen Reactions   • Dilaudid [Hydromorphone Hcl] Hallucinations       Social History     Socioeconomic History   • Marital status:    Tobacco Use   • Smoking status: Former Smoker     Types: Pipe   • Smokeless tobacco: Never Used   • Tobacco comment: only in college   Vaping Use   • Vaping Use: Never used   Substance and Sexual Activity   • Alcohol use: Yes     Types: 3 Glasses of wine per week     Comment: SOCIALLY   • Drug use: No   • Sexual activity: Defer     Comment: 50 years ago       Family History   Problem Relation Age of Onset   • Cancer Mother         pancreatic   • Liver disease Mother    • Cancer Father         prostate, bladder, colon, lymphoma, leukemia   • Colon cancer Father    • Cancer Brother         prostate   • Cancer Paternal Grandfather         prostate   • Malig Hyperthermia Neg Hx        Review of Systems   Constitutional: Negative for activity change, appetite change, fatigue, fever and unexpected weight change.   HENT: Positive for trouble swallowing.    Respiratory: Negative for apnea, cough, choking, chest tightness, shortness of breath and wheezing.    Cardiovascular: Negative for chest pain, palpitations and leg swelling.   Gastrointestinal: Negative for abdominal distention, abdominal pain, anal bleeding, blood in stool, constipation, diarrhea, nausea, rectal pain and vomiting.       Vitals:    02/23/22 0932   Temp: 97.2 °F (36.2 °C)       Physical Exam  Constitutional:       Appearance: He is well-developed.   Abdominal:      General: Bowel sounds are normal. There is no distension.      Palpations: Abdomen is soft. There is no mass.      Tenderness: There is no abdominal tenderness. There is no guarding.      Hernia: No hernia is present.   Skin:     General: Skin is warm and dry.      Capillary Refill:  Capillary refill takes less than 2 seconds.   Neurological:      Mental Status: He is alert and oriented to person, place, and time.   Psychiatric:         Behavior: Behavior normal.     Assessment    Diagnoses and all orders for this visit:    1. Dysphagia, unspecified type (Primary)  -     FL esophagram complete; Future    2. Dyspepsia  -     FL esophagram complete; Future    3. History of esophagitis  -     FL esophagram complete; Future    Other orders  -     pantoprazole (PROTONIX) 40 MG EC tablet; Take 1 tablet by mouth 2 (Two) Times a Day.  Dispense: 60 tablet; Refill: 5    Plan    Arrange esophagram to rule out stricture, ring, narrowing subsequently is sent of possible esophagitis given patient's history of grade C esophagitis.  Start Protonix 40 mg p.o. twice daily in the interim.  Antireflux measures and dietary modifications reviewed. Low acid diet reviewed. Keep head of bed elevated. Stop eating/drinking at least 3 hours prior to bedtime. Eliminate caffeine and carbonated beverages.  Weight loss encouraged if BMI over 25.  Follow-up and further recommendations pending the aforementioned work-up.          KOLBY Zapata  [facility]  [address] [place] [address]  Office: [phone]

## 2022-03-03 ENCOUNTER — APPOINTMENT (OUTPATIENT)
Dept: GENERAL RADIOLOGY | Facility: HOSPITAL | Age: 82
End: 2022-03-03

## 2022-03-03 ENCOUNTER — HOSPITAL ENCOUNTER (INPATIENT)
Facility: HOSPITAL | Age: 82
LOS: 6 days | Discharge: HOME | End: 2022-03-09
Attending: EMERGENCY MEDICINE | Admitting: STUDENT IN AN ORGANIZED HEALTH CARE EDUCATION/TRAINING PROGRAM

## 2022-03-03 DIAGNOSIS — R09.02 HYPOXIA: ICD-10-CM

## 2022-03-03 DIAGNOSIS — J18.9 PNEUMONIA OF RIGHT LOWER LOBE DUE TO INFECTIOUS ORGANISM: Primary | ICD-10-CM

## 2022-03-03 LAB
ALBUMIN SERPL-MCNC: 3.6 G/DL (ref 3.5–5.2)
ALBUMIN/GLOB SERPL: 0.9 G/DL
ALP SERPL-CCNC: 92 U/L (ref 39–117)
ALT SERPL W P-5'-P-CCNC: 23 U/L (ref 1–41)
ANION GAP SERPL CALCULATED.3IONS-SCNC: 11.7 MMOL/L (ref 5–15)
ANION GAP SERPL CALCULATED.3IONS-SCNC: 13 MMOL/L (ref 5–15)
AST SERPL-CCNC: 29 U/L (ref 1–40)
B PARAPERT DNA SPEC QL NAA+PROBE: NOT DETECTED
B PERT DNA SPEC QL NAA+PROBE: NOT DETECTED
BASOPHILS # BLD AUTO: 0.02 10*3/MM3 (ref 0–0.2)
BASOPHILS # BLD AUTO: 0.04 10*3/MM3 (ref 0–0.2)
BASOPHILS NFR BLD AUTO: 0.1 % (ref 0–1.5)
BASOPHILS NFR BLD AUTO: 0.2 % (ref 0–1.5)
BILIRUB SERPL-MCNC: 1.6 MG/DL (ref 0–1.2)
BUN SERPL-MCNC: 14 MG/DL (ref 8–23)
BUN SERPL-MCNC: 15 MG/DL (ref 8–23)
BUN/CREAT SERPL: 23.1 (ref 7–25)
BUN/CREAT SERPL: 23.7 (ref 7–25)
C PNEUM DNA NPH QL NAA+NON-PROBE: NOT DETECTED
CALCIUM SPEC-SCNC: 9.1 MG/DL (ref 8.6–10.5)
CALCIUM SPEC-SCNC: 9.8 MG/DL (ref 8.6–10.5)
CHLORIDE SERPL-SCNC: 101 MMOL/L (ref 98–107)
CHLORIDE SERPL-SCNC: 103 MMOL/L (ref 98–107)
CO2 SERPL-SCNC: 24.3 MMOL/L (ref 22–29)
CO2 SERPL-SCNC: 25 MMOL/L (ref 22–29)
CREAT SERPL-MCNC: 0.59 MG/DL (ref 0.76–1.27)
CREAT SERPL-MCNC: 0.65 MG/DL (ref 0.76–1.27)
D-LACTATE SERPL-SCNC: 1.7 MMOL/L (ref 0.5–2)
DEPRECATED RDW RBC AUTO: 41.7 FL (ref 37–54)
DEPRECATED RDW RBC AUTO: 45.3 FL (ref 37–54)
EGFRCR SERPLBLD CKD-EPI 2021: 94.7 ML/MIN/1.73
EGFRCR SERPLBLD CKD-EPI 2021: 97.5 ML/MIN/1.73
EOSINOPHIL # BLD AUTO: 0.01 10*3/MM3 (ref 0–0.4)
EOSINOPHIL # BLD AUTO: 0.37 10*3/MM3 (ref 0–0.4)
EOSINOPHIL NFR BLD AUTO: 0 % (ref 0.3–6.2)
EOSINOPHIL NFR BLD AUTO: 2.5 % (ref 0.3–6.2)
ERYTHROCYTE [DISTWIDTH] IN BLOOD BY AUTOMATED COUNT: 13 % (ref 12.3–15.4)
ERYTHROCYTE [DISTWIDTH] IN BLOOD BY AUTOMATED COUNT: 13.3 % (ref 12.3–15.4)
FLUAV SUBTYP SPEC NAA+PROBE: NOT DETECTED
FLUBV RNA ISLT QL NAA+PROBE: NOT DETECTED
GLOBULIN UR ELPH-MCNC: 4 GM/DL
GLUCOSE SERPL-MCNC: 131 MG/DL (ref 65–99)
GLUCOSE SERPL-MCNC: 182 MG/DL (ref 65–99)
HADV DNA SPEC NAA+PROBE: NOT DETECTED
HCOV 229E RNA SPEC QL NAA+PROBE: NOT DETECTED
HCOV HKU1 RNA SPEC QL NAA+PROBE: NOT DETECTED
HCOV NL63 RNA SPEC QL NAA+PROBE: NOT DETECTED
HCOV OC43 RNA SPEC QL NAA+PROBE: NOT DETECTED
HCT VFR BLD AUTO: 39.7 % (ref 37.5–51)
HCT VFR BLD AUTO: 43.8 % (ref 37.5–51)
HGB BLD-MCNC: 13.1 G/DL (ref 13–17.7)
HGB BLD-MCNC: 14.7 G/DL (ref 13–17.7)
HMPV RNA NPH QL NAA+NON-PROBE: NOT DETECTED
HPIV1 RNA ISLT QL NAA+PROBE: NOT DETECTED
HPIV2 RNA SPEC QL NAA+PROBE: NOT DETECTED
HPIV3 RNA NPH QL NAA+PROBE: NOT DETECTED
HPIV4 P GENE NPH QL NAA+PROBE: NOT DETECTED
IMM GRANULOCYTES # BLD AUTO: 0.05 10*3/MM3 (ref 0–0.05)
IMM GRANULOCYTES # BLD AUTO: 0.11 10*3/MM3 (ref 0–0.05)
IMM GRANULOCYTES NFR BLD AUTO: 0.3 % (ref 0–0.5)
IMM GRANULOCYTES NFR BLD AUTO: 0.5 % (ref 0–0.5)
L PNEUMO1 AG UR QL IA: NEGATIVE
LYMPHOCYTES # BLD AUTO: 0.45 10*3/MM3 (ref 0.7–3.1)
LYMPHOCYTES # BLD AUTO: 0.57 10*3/MM3 (ref 0.7–3.1)
LYMPHOCYTES NFR BLD AUTO: 2.1 % (ref 19.6–45.3)
LYMPHOCYTES NFR BLD AUTO: 3.9 % (ref 19.6–45.3)
M PNEUMO IGG SER IA-ACNC: NOT DETECTED
MAGNESIUM SERPL-MCNC: 1.7 MG/DL (ref 1.6–2.4)
MAGNESIUM SERPL-MCNC: 1.9 MG/DL (ref 1.6–2.4)
MCH RBC QN AUTO: 29.5 PG (ref 26.6–33)
MCH RBC QN AUTO: 30 PG (ref 26.6–33)
MCHC RBC AUTO-ENTMCNC: 33 G/DL (ref 31.5–35.7)
MCHC RBC AUTO-ENTMCNC: 33.6 G/DL (ref 31.5–35.7)
MCV RBC AUTO: 88 FL (ref 79–97)
MCV RBC AUTO: 90.8 FL (ref 79–97)
MONOCYTES # BLD AUTO: 0.36 10*3/MM3 (ref 0.1–0.9)
MONOCYTES # BLD AUTO: 0.43 10*3/MM3 (ref 0.1–0.9)
MONOCYTES NFR BLD AUTO: 2 % (ref 5–12)
MONOCYTES NFR BLD AUTO: 2.5 % (ref 5–12)
NEUTROPHILS NFR BLD AUTO: 13.24 10*3/MM3 (ref 1.7–7)
NEUTROPHILS NFR BLD AUTO: 20.36 10*3/MM3 (ref 1.7–7)
NEUTROPHILS NFR BLD AUTO: 90.7 % (ref 42.7–76)
NEUTROPHILS NFR BLD AUTO: 95.2 % (ref 42.7–76)
NRBC BLD AUTO-RTO: 0 /100 WBC (ref 0–0.2)
NRBC BLD AUTO-RTO: 0 /100 WBC (ref 0–0.2)
NT-PROBNP SERPL-MCNC: 808 PG/ML (ref 0–1800)
PHOSPHATE SERPL-MCNC: 2.9 MG/DL (ref 2.5–4.5)
PLATELET # BLD AUTO: 208 10*3/MM3 (ref 140–450)
PLATELET # BLD AUTO: 245 10*3/MM3 (ref 140–450)
PMV BLD AUTO: 10.7 FL (ref 6–12)
PMV BLD AUTO: 11 FL (ref 6–12)
POTASSIUM SERPL-SCNC: 3.6 MMOL/L (ref 3.5–5.2)
POTASSIUM SERPL-SCNC: 3.9 MMOL/L (ref 3.5–5.2)
PROCALCITONIN SERPL-MCNC: 0.07 NG/ML (ref 0–0.25)
PROT SERPL-MCNC: 7.6 G/DL (ref 6–8.5)
QT INTERVAL: 347 MS
RBC # BLD AUTO: 4.37 10*6/MM3 (ref 4.14–5.8)
RBC # BLD AUTO: 4.98 10*6/MM3 (ref 4.14–5.8)
RHINOVIRUS RNA SPEC NAA+PROBE: NOT DETECTED
RSV RNA NPH QL NAA+NON-PROBE: NOT DETECTED
S PNEUM AG SPEC QL LA: NEGATIVE
SARS-COV-2 RNA NPH QL NAA+NON-PROBE: NOT DETECTED
SODIUM SERPL-SCNC: 139 MMOL/L (ref 136–145)
SODIUM SERPL-SCNC: 139 MMOL/L (ref 136–145)
TROPONIN T SERPL-MCNC: <0.01 NG/ML (ref 0–0.03)
WBC NRBC COR # BLD: 14.61 10*3/MM3 (ref 3.4–10.8)
WBC NRBC COR # BLD: 21.4 10*3/MM3 (ref 3.4–10.8)

## 2022-03-03 PROCEDURE — 94799 UNLISTED PULMONARY SVC/PX: CPT

## 2022-03-03 PROCEDURE — 83880 ASSAY OF NATRIURETIC PEPTIDE: CPT | Performed by: EMERGENCY MEDICINE

## 2022-03-03 PROCEDURE — 84145 PROCALCITONIN (PCT): CPT | Performed by: EMERGENCY MEDICINE

## 2022-03-03 PROCEDURE — 84100 ASSAY OF PHOSPHORUS: CPT | Performed by: STUDENT IN AN ORGANIZED HEALTH CARE EDUCATION/TRAINING PROGRAM

## 2022-03-03 PROCEDURE — 80053 COMPREHEN METABOLIC PANEL: CPT | Performed by: EMERGENCY MEDICINE

## 2022-03-03 PROCEDURE — 0202U NFCT DS 22 TRGT SARS-COV-2: CPT | Performed by: EMERGENCY MEDICINE

## 2022-03-03 PROCEDURE — 85025 COMPLETE CBC W/AUTO DIFF WBC: CPT | Performed by: STUDENT IN AN ORGANIZED HEALTH CARE EDUCATION/TRAINING PROGRAM

## 2022-03-03 PROCEDURE — 83735 ASSAY OF MAGNESIUM: CPT | Performed by: EMERGENCY MEDICINE

## 2022-03-03 PROCEDURE — 94640 AIRWAY INHALATION TREATMENT: CPT

## 2022-03-03 PROCEDURE — 94760 N-INVAS EAR/PLS OXIMETRY 1: CPT

## 2022-03-03 PROCEDURE — 36415 COLL VENOUS BLD VENIPUNCTURE: CPT | Performed by: STUDENT IN AN ORGANIZED HEALTH CARE EDUCATION/TRAINING PROGRAM

## 2022-03-03 PROCEDURE — 87899 AGENT NOS ASSAY W/OPTIC: CPT | Performed by: STUDENT IN AN ORGANIZED HEALTH CARE EDUCATION/TRAINING PROGRAM

## 2022-03-03 PROCEDURE — 93005 ELECTROCARDIOGRAM TRACING: CPT | Performed by: EMERGENCY MEDICINE

## 2022-03-03 PROCEDURE — 83735 ASSAY OF MAGNESIUM: CPT | Performed by: STUDENT IN AN ORGANIZED HEALTH CARE EDUCATION/TRAINING PROGRAM

## 2022-03-03 PROCEDURE — 71045 X-RAY EXAM CHEST 1 VIEW: CPT

## 2022-03-03 PROCEDURE — 99284 EMERGENCY DEPT VISIT MOD MDM: CPT

## 2022-03-03 PROCEDURE — 83605 ASSAY OF LACTIC ACID: CPT | Performed by: EMERGENCY MEDICINE

## 2022-03-03 PROCEDURE — 25010000002 AMPICILLIN-SULBACTAM PER 1.5 G: Performed by: NURSE PRACTITIONER

## 2022-03-03 PROCEDURE — 87040 BLOOD CULTURE FOR BACTERIA: CPT | Performed by: EMERGENCY MEDICINE

## 2022-03-03 PROCEDURE — 93010 ELECTROCARDIOGRAM REPORT: CPT | Performed by: INTERNAL MEDICINE

## 2022-03-03 PROCEDURE — 94761 N-INVAS EAR/PLS OXIMETRY MLT: CPT

## 2022-03-03 PROCEDURE — 92610 EVALUATE SWALLOWING FUNCTION: CPT

## 2022-03-03 PROCEDURE — 85025 COMPLETE CBC W/AUTO DIFF WBC: CPT | Performed by: EMERGENCY MEDICINE

## 2022-03-03 PROCEDURE — 84484 ASSAY OF TROPONIN QUANT: CPT | Performed by: EMERGENCY MEDICINE

## 2022-03-03 PROCEDURE — 25010000002 CEFTRIAXONE PER 250 MG: Performed by: EMERGENCY MEDICINE

## 2022-03-03 PROCEDURE — 25010000002 ENOXAPARIN PER 10 MG: Performed by: STUDENT IN AN ORGANIZED HEALTH CARE EDUCATION/TRAINING PROGRAM

## 2022-03-03 RX ORDER — SODIUM CHLORIDE 0.9 % (FLUSH) 0.9 %
10 SYRINGE (ML) INJECTION AS NEEDED
Status: DISCONTINUED | OUTPATIENT
Start: 2022-03-03 | End: 2022-03-09 | Stop reason: HOSPADM

## 2022-03-03 RX ORDER — IPRATROPIUM BROMIDE AND ALBUTEROL SULFATE 2.5; .5 MG/3ML; MG/3ML
3 SOLUTION RESPIRATORY (INHALATION)
Status: DISCONTINUED | OUTPATIENT
Start: 2022-03-03 | End: 2022-03-09 | Stop reason: HOSPADM

## 2022-03-03 RX ORDER — ASPIRIN 81 MG/1
81 TABLET, CHEWABLE ORAL DAILY
Status: DISCONTINUED | OUTPATIENT
Start: 2022-03-03 | End: 2022-03-09 | Stop reason: HOSPADM

## 2022-03-03 RX ORDER — NITROGLYCERIN 0.4 MG/1
0.4 TABLET SUBLINGUAL
Status: DISCONTINUED | OUTPATIENT
Start: 2022-03-03 | End: 2022-03-09 | Stop reason: HOSPADM

## 2022-03-03 RX ORDER — SODIUM CHLORIDE 0.9 % (FLUSH) 0.9 %
10 SYRINGE (ML) INJECTION EVERY 12 HOURS SCHEDULED
Status: DISCONTINUED | OUTPATIENT
Start: 2022-03-03 | End: 2022-03-09 | Stop reason: HOSPADM

## 2022-03-03 RX ORDER — ONDANSETRON 2 MG/ML
4 INJECTION INTRAMUSCULAR; INTRAVENOUS EVERY 6 HOURS PRN
Status: DISCONTINUED | OUTPATIENT
Start: 2022-03-03 | End: 2022-03-09 | Stop reason: HOSPADM

## 2022-03-03 RX ORDER — PANTOPRAZOLE SODIUM 40 MG/1
40 TABLET, DELAYED RELEASE ORAL 2 TIMES DAILY
Status: DISCONTINUED | OUTPATIENT
Start: 2022-03-03 | End: 2022-03-09 | Stop reason: HOSPADM

## 2022-03-03 RX ORDER — PROPRANOLOL HYDROCHLORIDE 80 MG/1
80 CAPSULE, EXTENDED RELEASE ORAL DAILY
Status: DISCONTINUED | OUTPATIENT
Start: 2022-03-03 | End: 2022-03-09 | Stop reason: HOSPADM

## 2022-03-03 RX ORDER — ATORVASTATIN CALCIUM 20 MG/1
40 TABLET, FILM COATED ORAL NIGHTLY
Status: DISCONTINUED | OUTPATIENT
Start: 2022-03-03 | End: 2022-03-09 | Stop reason: HOSPADM

## 2022-03-03 RX ORDER — ASPIRIN 81 MG/1
81 TABLET, CHEWABLE ORAL DAILY
COMMUNITY

## 2022-03-03 RX ORDER — ACETAMINOPHEN 325 MG/1
650 TABLET ORAL EVERY 4 HOURS PRN
Status: DISCONTINUED | OUTPATIENT
Start: 2022-03-03 | End: 2022-03-09 | Stop reason: HOSPADM

## 2022-03-03 RX ORDER — FLUTICASONE PROPIONATE 50 MCG
2 SPRAY, SUSPENSION (ML) NASAL DAILY
Status: DISCONTINUED | OUTPATIENT
Start: 2022-03-03 | End: 2022-03-09 | Stop reason: HOSPADM

## 2022-03-03 RX ORDER — CHOLECALCIFEROL (VITAMIN D3) 125 MCG
5 CAPSULE ORAL NIGHTLY PRN
Status: DISCONTINUED | OUTPATIENT
Start: 2022-03-03 | End: 2022-03-09 | Stop reason: HOSPADM

## 2022-03-03 RX ORDER — CETIRIZINE HYDROCHLORIDE 10 MG/1
10 TABLET ORAL DAILY
Status: DISCONTINUED | OUTPATIENT
Start: 2022-03-03 | End: 2022-03-09 | Stop reason: HOSPADM

## 2022-03-03 RX ORDER — ALBUTEROL SULFATE 2.5 MG/3ML
2.5 SOLUTION RESPIRATORY (INHALATION) ONCE
Status: COMPLETED | OUTPATIENT
Start: 2022-03-03 | End: 2022-03-03

## 2022-03-03 RX ADMIN — AMPICILLIN SODIUM AND SULBACTAM SODIUM 3 G: 2; 1 INJECTION, POWDER, FOR SOLUTION INTRAMUSCULAR; INTRAVENOUS at 20:11

## 2022-03-03 RX ADMIN — ASPIRIN 81 MG: 81 TABLET, CHEWABLE ORAL at 16:02

## 2022-03-03 RX ADMIN — PROPRANOLOL HYDROCHLORIDE 80 MG: 80 CAPSULE, EXTENDED RELEASE ORAL at 16:03

## 2022-03-03 RX ADMIN — PANTOPRAZOLE SODIUM 40 MG: 40 TABLET, DELAYED RELEASE ORAL at 20:11

## 2022-03-03 RX ADMIN — Medication 10 ML: at 20:13

## 2022-03-03 RX ADMIN — Medication 10 ML: at 13:56

## 2022-03-03 RX ADMIN — Medication 10 ML: at 07:53

## 2022-03-03 RX ADMIN — ENOXAPARIN SODIUM 40 MG: 100 INJECTION SUBCUTANEOUS at 13:56

## 2022-03-03 RX ADMIN — CETIRIZINE HYDROCHLORIDE 10 MG: 10 TABLET ORAL at 16:02

## 2022-03-03 RX ADMIN — IPRATROPIUM BROMIDE AND ALBUTEROL SULFATE 3 ML: 2.5; .5 SOLUTION RESPIRATORY (INHALATION) at 15:34

## 2022-03-03 RX ADMIN — CEFTRIAXONE 1 G: 1 INJECTION, POWDER, FOR SOLUTION INTRAMUSCULAR; INTRAVENOUS at 09:34

## 2022-03-03 RX ADMIN — IPRATROPIUM BROMIDE AND ALBUTEROL SULFATE 3 ML: 2.5; .5 SOLUTION RESPIRATORY (INHALATION) at 20:20

## 2022-03-03 RX ADMIN — AMPICILLIN SODIUM AND SULBACTAM SODIUM 3 G: 2; 1 INJECTION, POWDER, FOR SOLUTION INTRAMUSCULAR; INTRAVENOUS at 16:03

## 2022-03-03 RX ADMIN — ATORVASTATIN CALCIUM 40 MG: 20 TABLET, FILM COATED ORAL at 20:11

## 2022-03-03 RX ADMIN — ALBUTEROL SULFATE 2.5 MG: 2.5 SOLUTION RESPIRATORY (INHALATION) at 07:39

## 2022-03-03 RX ADMIN — FLUTICASONE PROPIONATE 2 SPRAY: 50 SPRAY, METERED NASAL at 16:03

## 2022-03-03 RX ADMIN — LISINOPRIL: 20 TABLET ORAL at 16:05

## 2022-03-03 NOTE — THERAPY EVALUATION
Acute Care - Speech Language Pathology   Swallow Initial Evaluation [facility]     Patient Name: Junito Sorto  : 1940  MRN: [id number]  Today's Date: 3/3/2022               Admit Date: 3/3/2022    Visit Dx:     ICD-10-CM ICD-9-CM   1. Pneumonia of right lower lobe due to infectious organism  J18.9 486   2. Hypoxia  R09.02 799.02     Patient Active Problem List   Diagnosis   • Hyperglycemia   • Obesity   • Allergic rhinitis   • Paroxysmal atrial fibrillation (HCC)   • Benign essential hypertension   • HLD (hyperlipidemia)   • Esophageal dysphagia   • Hiatal hernia   • Gastroesophageal reflux disease with esophagitis   • Prostate cancer (HCC)   • Bladder outlet obstruction   • Sciatica of right side   • Pneumonia of right lower lobe due to infectious organism   • Hypoxia     Past Medical History:   Diagnosis Date   • Arthritis    • At risk for obstructive sleep apnea     5   • Atrial fibrillation (HCC)     not followed by Cardiologist just PCP   • Bladder outlet obstruction 2019   • BPH (benign prostatic hyperplasia)    • Colon polyp    • Coronary artery disease Few years    Afib   • Dysphagia    • Eczema    • GERD (gastroesophageal reflux disease) 2013    Mentioned as possible   • Hiatal hernia    • Hyperlipidemia    • Hypertension    • Low back pain    • Prostate cancer (HCC)    • Skin cancer      Past Surgical History:   Procedure Laterality Date   • CATARACT EXTRACTION, BILATERAL     • COLONOSCOPY  unknown   • CYSTOSCOPY TRANSURETHRAL RESECTION OF PROSTATE N/A 2019    Procedure: CYSTOSCOPY TRANSURETHRAL RESECTION OF PROSTATE;  Surgeon: Chip Bedolla MD;  Location: [facility];  Service: Urology   • ENDOSCOPY N/A 10/31/2018    LA Grade C reflux esophagitis, HH, erythematous mucosa in the antrum. Path: Gastritis, esophagitis.    • EPIDURAL BLOCK     • ESOPHAGEAL DILATATION     • PROSTATE SURGERY  2014   • SKIN CANCER EXCISION      multiple, over that last few years   • SKIN  CANCER EXCISION Right 09/03/2019    FACE,    • TONSILLECTOMY      around 1950   • UPPER GASTROINTESTINAL ENDOSCOPY  2013       SLP Recommendation and Plan    Discussed plan of care with pt and wife. No overt s/s aspiration at bedside, extensive review and written handout provided for GERD precautions.     Will await esophagram results, and dependent on presence/absence of oropharyngeal difficulties available to perform VFSS, therapy, or sign off as clinically indicated.     Recommend: regular and thin liquid diet (pt will self avoid difficult consistencies as cognition intact). Medication: with thin liquids. Compensations: GERD precautions which include: consumption of small meals, utilization of thin liquid wash or extra sauces/gravies as indicated, remaining upright for all PO consumption and at least 30 minutes s/p, avoid eating at reasonable time frame based on sleep schedule.    SWALLOW EVALUATION (last 72 hours)     SLP Adult Swallow Evaluation     Row Name 03/03/22 1500       Rehab Evaluation    Document Type evaluation  -AB    Subjective Information no complaints  -AB    Patient Observations alert; cooperative; agree to therapy  -AB    Patient/Family/Caregiver Comments/Observations Pt is alert, cooperative, pleasant. Wife at bedside. Both report eager to discharge home following admit this morning  -AB    Patient Effort good  -AB    Symptoms Noted During/After Treatment none  -AB            General Information    Patient Profile Reviewed yes  -AB    Pertinent History Of Current Problem 82 yo male admitted from home with SOA d/t pna of RLL d/t infectious organism, aspiration pna.  Pt endorses known GI history of reflux, hiatal hernia, EGD with dilation, esophagitis, solid dysphagia aided by water (eats softer foods at home). Esophagram previously ordered on 2.23.22 for OP completion. Both pt and wife endorse no oropharyngeal dysphagia.  -AB    Current Method of Nutrition regular textures; thin liquids  -AB     Precautions/Limitations, Vision WFL  -AB    Precautions/Limitations, Hearing WFL  -AB    Prior Level of Function-Communication WFL  -AB    Prior Level of Function-Swallowing regular textures; thin liquids; esophageal concerns  -AB    Plans/Goals Discussed with patient; agreed upon  -AB    Barriers to Rehab medically complex  -AB    Patient's Goals for Discharge --  eliminate baseline chronic cough  -AB    Family Goals for Discharge --  eliminate baseline chronic cough  -AB            Oral Motor Structure and Function    Oral Lesions or Structural Abnormalities and/or variants none  -AB    Dentition Assessment natural, present and adequate  maxillary bridge  -AB    Secretion Management WNL/WFL  -AB    Mucosal Quality moist, healthy  -AB    Gag Response --  did not assess  -AB    Volitional Swallow WFL  -AB    Volitional Cough weak; non-productive  -AB            Oral Musculature and Cranial Nerve Assessment    Oral Motor General Assessment WFL  -AB            Clinical Swallow Eval    Clinical Swallow Evaluation Summary Bedside swallow evaluation completed. Pt endorses known GI history of reflux, hiatal hernia, EGD with dilation, esophagitis, solid dysphagia aided by water (eats softer foods at home). Esophagram previously ordered on 2.23.22 for OP completion. Both pt and wife endorse no oropharyngeal dysphagia. Pt assessed with: thins by cup/straw, mechanical soft/mixed consistency, regular solids. Declines puree solids. Oral phase unremarkable. Mastication and A-P transport timely and efficient. Pharyngeal phase with no overt s/s aspiration. Palpation suggests timely initiation and adequate hyolaryngeal elevation. Dry, nonproductive cough noted x3 throughout eval, does not appear directly following PO - pt reports chronic, dry cough at baseline. Discussed plan of care with pt and wife. No overt s/s aspiration at bedside, extensive review and written handout provided for GERD precautions. Will await esophagram  results, and dependent on presence/absence of oropharyngeal difficulties available to perform VFSS, therapy, or sign off as clinically indicated. Recommend: regular and thin liquid diet (pt will self avoid difficult consistencies as cognition intact). Medication: with thin liquids. Compensations: GERD precautions which include: consumption of small meals, utilization of thin liquid wash or extra sauces/gravies as indicated, remaining upright for all PO consumption and at least 30 minutes s/p, avoid eating at reasonable time frame based on sleep schedule.  -AB            SLP Evaluation Clinical Impression    SLP Swallowing Diagnosis functional oral phase; R/O pharyngeal dysphagia; esophageal dysphagia  -AB    Functional Impact risk of aspiration/pneumonia  -AB    Rehab Potential/Prognosis, Swallowing re-evaluate goals as necessary  -AB    Swallow Criteria for Skilled Therapeutic Interventions Met --  will determine following additional imaging and workup  -AB            SLP Treatment Clinical Impressions    Care Plan Review evaluation/treatment results reviewed; care plan/treatment goals reviewed; risks/benefits reviewed; current/potential barriers reviewed; patient/other agree to care plan  -AB    Care Plan Review, Other Participant(s) spouse  -AB            Recommendations    Therapy Frequency (Swallow) --  will determine following additional imaging and workup  -AB    SLP Diet Recommendation regular textures; thin liquids  -AB    Recommended Diagnostics --  dedicated esophageal assessment  -AB    Recommended Precautions and Strategies upright posture during/after eating; small bites of food and sips of liquid; reflux precautions; general aspiration precautions  -AB    Oral Care Recommendations Oral Care before breakfast, after meals and PRN  -AB    SLP Rec. for Method of Medication Administration with thin liquids  -AB    Monitor for Signs of Aspiration yes; notify SLP if any concerns  -AB    Anticipated Discharge  Disposition (SLP) home  -AB    Demonstrates Need for Referral to Another Service dedicated esophageal assessment  esophagram planned for next date  -AB            Swallow Goals (SLP)    Oral Nutrition/Hydration Goal Selection (SLP) --  will determine following additional imaging and workup  -AB          User Key  (r) = Recorded By, (t) = Taken By, (c) = Cosigned By    Initials Name Effective Dates    Danielle Darling MS CCC-SLP 08/01/21 -                 EDUCATION  The patient has been educated in the following areas:   Dysphagia (Swallowing Impairment).         SLP Outcome Measures (last 72 hours)     SLP Outcome Measures     Row Name 03/03/22 1500             SLP Outcome Measures    Outcome Measure Used? Adult NOMS  -AB              Adult FCM Scores    FCM Chosen Swallowing  -AB      Swallowing FCM Score 6  -AB            User Key  (r) = Recorded By, (t) = Taken By, (c) = Cosigned By    Initials Name Effective Dates    Danielle Darling MS CCC-SLP 08/01/21 -                  Time Calculation:    Time Calculation- SLP     Row Name 03/03/22 1548             Time Calculation- SLP    SLP Start Time 1500  -AB      SLP Received On 03/03/22  -AB            User Key  (r) = Recorded By, (t) = Taken By, (c) = Cosigned By    Initials Name Provider Type    AB Danielle Jasso, MS CCC-SLP Speech and Language Pathologist                Therapy Charges for Today     Code Description Service Date Service Provider Modifiers Qty    [id number]  ST EVAL ORAL PHARYNG SWALLOW 4 3/3/2022 Danielle Jasso MS CCC-SLP GN 1             PPE:  Patient was not wearing a face mask during this therapy encounter. Therapist used appropriate personal protective equipment including mask, eye protection and gloves.  Mask used was standard procedure mask. Appropriate PPE was worn during the entire therapy session. The patient coughed during this evaluation. Therapist was within 6 feet for 15 minutes or more during the evaluation. Hand  hygiene was completed before and after therapy session. Patient is not in enhanced droplet precautions.       Danielle Jasso, MS CCC-SLP  3/3/2022

## 2022-03-03 NOTE — PLAN OF CARE
Goal Outcome Evaluation:                 Bedside swallow evaluation completed. Pt endorses known GI history of reflux, hiatal hernia, EGD with dilation, esophagitis, solid dysphagia aided by water (eats softer foods at home). Esophagram previously ordered on 2.23.22 for OP completion. Both pt and wife endorse no oropharyngeal dysphagia. Pt assessed with: thins by cup/straw, mechanical soft/mixed consistency, regular solids. Declines puree solids. Oral phase unremarkable. Mastication and A-P transport timely and efficient. Pharyngeal phase with no overt s/s aspiration. Palpation suggests timely initiation and adequate hyolaryngeal elevation. Dry, nonproductive cough noted x3 throughout eval, does not appear directly following PO - pt reports chronic, dry cough at baseline.     Discussed plan of care with pt and wife. No overt s/s aspiration at bedside, extensive review and written handout provided for GERD precautions.     Will await esophagram results, and dependent on presence/absence of oropharyngeal difficulties available to perform VFSS, therapy, or sign off as clinically indicated.     Recommend: regular and thin liquid diet (pt will self avoid difficult consistencies as cognition intact). Medication: with thin liquids. Compensations: GERD precautions which include: consumption of small meals, utilization of thin liquid wash or extra sauces/gravies as indicated, remaining upright for all PO consumption and at least 30 minutes s/p, avoid eating at reasonable time frame based on sleep schedule.

## 2022-03-03 NOTE — ED NOTES
"Nursing report ED to floor  Junito Sorto  81 y.o.  male    HPI :   Chief Complaint   Patient presents with   • Shortness of Breath   • Cough   • Generalized Body Aches       Admitting doctor:   Kia Hernandez DO    Admitting diagnosis:   The primary encounter diagnosis was Pneumonia of right lower lobe due to infectious organism. A diagnosis of Hypoxia was also pertinent to this visit.    Code status:   Current Code Status     Date Active Code Status Order ID Comments User Context       3/3/2022 1151 CPR (Attempt to Resuscitate) [id number]  Kia Hernandez DO ED     Advance Care Planning Activity      Questions for Current Code Status     Question Answer    Code Status (Patient has no pulse and is not breathing) CPR (Attempt to Resuscitate)    Medical Interventions (Patient has pulse or is breathing) Full Support          Allergies:   Dilaudid [hydromorphone hcl]    Intake and Output  No intake or output data in the 24 hours ending 03/03/22 1232    Weight:       03/03/22  0753   Weight: 99.8 kg (220 lb)       Most recent vitals:   Vitals:    03/03/22 0739 03/03/22 0745 03/03/22 0753 03/03/22 0755   BP:    163/91   BP Location:    Left arm   Patient Position:    Lying   Pulse: 107 80 92    Resp: 28  18    Temp:       SpO2: (!) 88% 97% 91%    Weight:   99.8 kg (220 lb)    Height:   170.2 cm (67\")        Active LDAs/IV Access:   Lines, Drains & Airways     Active LDAs     Name Placement date Placement time Site Days    Peripheral IV 03/03/22 0753 Anterior; Left Hand 03/03/22  0753  Hand  less than 1    Peripheral IV 03/03/22 0851 Right Forearm 03/03/22  0851  Forearm  less than 1                Labs (abnormal labs have a star):   Labs Reviewed   COMPREHENSIVE METABOLIC PANEL - Abnormal; Notable for the following components:       Result Value    Glucose 131 (*)     Creatinine 0.65 (*)     Total Bilirubin 1.6 (*)     All other components within normal limits    Narrative:     GFR Normal >60  Chronic Kidney " Disease <60  Kidney Failure <15     CBC WITH AUTO DIFFERENTIAL - Abnormal; Notable for the following components:    WBC 14.61 (*)     Neutrophil % 90.7 (*)     Lymphocyte % 3.9 (*)     Monocyte % 2.5 (*)     Neutrophils, Absolute 13.24 (*)     Lymphocytes, Absolute 0.57 (*)     All other components within normal limits   RESPIRATORY PANEL PCR W/ COVID-19 (SARS-COV-2) VISHAL/QUIANA/EDGAR/PAD/COR/MAD/DAVID IN-HOUSE, NP [facility]AB IN [facility], 3-4 HR TAT - Normal    Narrative:     In the setting of a positive respiratory panel with a viral infection PLUS a negative procalcitonin without other underlying concern for bacterial infection, consider observing off antibiotics or discontinuation of antibiotics and continue supportive care. If the respiratory panel is positive for atypical bacterial infection (Bordetella pertussis, Chlamydophila pneumoniae, or Mycoplasma pneumoniae), consider antibiotic de-escalation to target atypical bacterial infection.   TROPONIN (IN-HOUSE) - Normal    Narrative:     Troponin T Reference Range:  <= 0.03 ng/mL-   Negative for AMI  >0.03 ng/mL-     Abnormal for myocardial necrosis.  Clinicians would have to utilize clinical acumen, EKG, Troponin and serial changes to determine if it is an Acute Myocardial Infarction or myocardial injury due to an underlying chronic condition.       Results may be falsely decreased if patient taking Biotin.     BNP (IN-HOUSE) - Normal    Narrative:     Among patients with dyspnea, NT-proBNP is highly sensitive for the detection of acute congestive heart failure. In addition NT-proBNP of <300 pg/ml effectively rules out acute congestive heart failure with 99% negative predictive value.    Results may be falsely decreased if patient taking Biotin.     MAGNESIUM - Normal   LACTIC ACID, PLASMA - Normal   PROCALCITONIN - Normal    Narrative:     As a Marker for Sepsis (Non-Neonates):    1. <0.5 ng/mL represents a low risk of severe sepsis and/or septic shock.  2. >2 ng/mL  "represents a high risk of severe sepsis and/or septic shock.    As a Marker for Lower Respiratory Tract Infections that require antibiotic therapy:    PCT on Admission    Antibiotic Therapy       6-12 Hrs later    >0.5                Strongly Recommended  >0.25 - <0.5        Recommended   0.1 - 0.25          Discouraged              Remeasure/reassess PCT  <0.1                Strongly Discouraged     Remeasure/reassess PCT    As 28 day mortality risk marker: \"Change in Procalcitonin Result\" (>80% or <=80%) if Day 0 (or Day 1) and Day 4 values are available. Refer to http://www.[email or web address] [email or web address] Controlpct-calculator.com    Change in PCT <=80%  A decrease of PCT levels below or equal to 80% defines a positive change in PCT test result representing a higher risk for 28-day all-cause mortality of patients diagnosed with severe sepsis for septic shock.    Change in PCT >80%  A decrease of PCT levels of more than 80% defines a negative change in PCT result representing a lower risk for 28-day all-cause mortality of patients diagnosed with severe sepsis or septic shock.      BLOOD CULTURE   BLOOD CULTURE   GASTROINTESTINAL PANEL, PCR   LEGIONELLA ANTIGEN, URINE   RESPIRATORY CULTURE   STREP PNEUMO AG, URINE OR CSF   BASIC METABOLIC PANEL   MAGNESIUM   PHOSPHORUS   CBC WITH AUTO DIFFERENTIAL   CBC AND DIFFERENTIAL    Narrative:     The following orders were created for panel order CBC & Differential.  Procedure                               Abnormality         Status                     ---------                               -----------         ------                     CBC Auto Differential[[id number]]        Abnormal            Final result                 Please view results for these tests on the individual orders.   CBC AND DIFFERENTIAL    Narrative:     The following orders were created for panel order CBC & Differential.  Procedure                               Abnormality         Status                     ---------                 "               -----------         ------                     CBC Auto Differential[[id number]]                                                         Please view results for these tests on the individual orders.       EKG:   ECG 12 Lead   Final Result   HEART RATE= 96  bpm   RR Interval= 623  ms   AZ Interval=   ms   P Horizontal Axis=   deg   P Front Axis=   deg   QRSD Interval= 93  ms   QT Interval= 347  ms   QRS Axis= -55  deg   T Wave Axis= 76  deg   - ABNORMAL ECG -   Atrial fibrillation   Inferior infarct, old   Anterior infarct, old   PVCs and ventricular couplets seen, new   Electronically Signed By: Steve Prater ([facility]) 03-Mar-2022 11:21:08   Date and Time of Study: 2022-03-03 07:20:11          Meds given in ED:   Medications   sodium chloride 0.9 % flush 10 mL (10 mL Intravenous Given 3/3/22 8343)   sodium chloride 0.9 % flush 10 mL (has no administration in time range)   sodium chloride 0.9 % flush 10 mL (has no administration in time range)   nitroglycerin (NITROSTAT) SL tablet 0.4 mg (has no administration in time range)   acetaminophen (TYLENOL) tablet 650 mg (has no administration in time range)   melatonin tablet 5 mg (has no administration in time range)   ondansetron (ZOFRAN) injection 4 mg (has no administration in time range)   enoxaparin (LOVENOX) syringe 40 mg (has no administration in time range)   cefTRIAXone (ROCEPHIN) 1 g in sodium chloride 0.9 % 100 mL IVPB-VTB (has no administration in time range)   albuterol (PROVENTIL) nebulizer solution 0.083% 2.5 mg/3mL (2.5 mg Nebulization Given 3/3/22 9039)   cefTRIAXone (ROCEPHIN) 1 g in sodium chloride 0.9 % 100 mL IVPB-VTB (0 g Intravenous Stopped 3/3/22 1004)       Imaging results:  No radiology results for the last day    Ambulatory status:   - up with assist      Social issues:   Social History     Socioeconomic History   • Marital status:    Tobacco Use   • Smoking status: Former Smoker     Types: Pipe   • Smokeless tobacco: Never Used    • Tobacco comment: only in college   Vaping Use   • Vaping Use: Never used   Substance and Sexual Activity   • Alcohol use: Yes     Types: 3 Glasses of wine per week     Comment: SOCIALLY   • Drug use: No   • Sexual activity: Defer     Comment: 50 years ago       NIH Stroke Scale:        Nursing report ED to floor:       Emilee Brar, RN  03/03/22 2609

## 2022-03-03 NOTE — ED TRIAGE NOTES
Pt arrived to ER via pv with wife with c/o of coughing, weakness, body aches, and elevated blood pressure. Pt also c/o SOA that started yesterday.   Pt placed in wheelchair & mask during triage. This RN in appropriate PPE during care.

## 2022-03-03 NOTE — H&P
Patient Name:  Junito Sorto  YOB: 1940  MRN:  [id number]  Admit Date:  3/3/2022  Patient Care Team:  Fan Schulz MD as PCP - General  Steven Cadena MD as Consulting Physician (Gastroenterology)      Subjective   History Present Illness     Chief Complaint   Patient presents with   • Shortness of Breath   • Cough   • Generalized Body Aches       Mr. Sorto is a 81 y.o. with a history of atrial fibrillation (not on any AC), hypertension, hyperlipidemia and GERD that presents to [facility] complaining of shortness of breath.  Patient states he has had a chronic cough for years.  He was recently evaluated by gastroenterology who diagnosed him with acid reflux and initiated Protonix.  He has only been on this medication for some short time and is not noticed any relief.  Cough is productive sometimes producing green to yellow sputum.  Reports mild chills and subjective fever this morning.  He is typically not on oxygen at home though is currently requiring 3 L - 96% on room air.  He has complaints of wheezing though there are none present on auscultation.  He denies any history of COPD.  He was also recently diagnosed with an upper respiratory infection and was put on a Z-Adrián and nose spray with some relief though symptoms have returned. white count mildly elevated at 14, calcitonin and lactate normal,.      History of Present Illness  Review of Systems   Constitutional: Negative for chills and fever.   HENT: Positive for trouble swallowing. Negative for congestion and sore throat.    Eyes: Negative for discharge and itching.   Respiratory: Positive for cough, shortness of breath and wheezing. Negative for chest tightness.    Cardiovascular: Negative for chest pain, palpitations and leg swelling.   Gastrointestinal: Negative for abdominal pain, nausea and vomiting.   Endocrine: Negative for cold intolerance and heat intolerance.   Genitourinary: Negative for  difficulty urinating and dysuria.   Musculoskeletal: Negative for back pain and gait problem.   Skin: Negative for color change and pallor.   Allergic/Immunologic: Negative for environmental allergies and food allergies.   Neurological: Negative for dizziness and headaches.   Hematological: Negative for adenopathy. Does not bruise/bleed easily.   Psychiatric/Behavioral: Negative for agitation and confusion.        Personal History     Past Medical History:   Diagnosis Date   • Arthritis    • At risk for obstructive sleep apnea     5   • Atrial fibrillation (HCC)     not followed by Cardiologist just PCP   • Bladder outlet obstruction 9/11/2019   • BPH (benign prostatic hyperplasia)    • Colon polyp    • Coronary artery disease Few years    Afib   • Dysphagia    • Eczema    • GERD (gastroesophageal reflux disease) 2013    Mentioned as possible   • Hiatal hernia    • Hyperlipidemia    • Hypertension    • Low back pain    • Prostate cancer (HCC)    • Skin cancer      Past Surgical History:   Procedure Laterality Date   • CATARACT EXTRACTION, BILATERAL     • COLONOSCOPY  unknown   • CYSTOSCOPY TRANSURETHRAL RESECTION OF PROSTATE N/A 9/11/2019    Procedure: CYSTOSCOPY TRANSURETHRAL RESECTION OF PROSTATE;  Surgeon: Chip Bedolla MD;  Location: [facility];  Service: Urology   • ENDOSCOPY N/A 10/31/2018    LA Grade C reflux esophagitis, HH, erythematous mucosa in the antrum. Path: Gastritis, esophagitis.    • EPIDURAL BLOCK     • ESOPHAGEAL DILATATION     • PROSTATE SURGERY  11/06/2014   • SKIN CANCER EXCISION      multiple, over that last few years   • SKIN CANCER EXCISION Right 09/03/2019    FACE,    • TONSILLECTOMY      around 1950   • UPPER GASTROINTESTINAL ENDOSCOPY  2013     Family History   Problem Relation Age of Onset   • Cancer Mother         pancreatic   • Liver disease Mother    • Cancer Father         prostate, bladder, colon, lymphoma, leukemia   • Colon cancer Father    • Cancer Brother          prostate   • Cancer Paternal Grandfather         prostate   • Malig Hyperthermia Neg Hx      Social History     Tobacco Use   • Smoking status: Former Smoker     Types: Pipe   • Smokeless tobacco: Never Used   • Tobacco comment: only in college   Vaping Use   • Vaping Use: Never used   Substance Use Topics   • Alcohol use: Yes     Types: 3 Glasses of wine per week     Comment: SOCIALLY   • Drug use: No     No current facility-administered medications on file prior to encounter.     Current Outpatient Medications on File Prior to Encounter   Medication Sig Dispense Refill   • Albuterol Sulfate (PROAIR HFA IN) Inhale 2 puffs As Needed.     • amLODIPine-benazepril (LOTREL 5-20) 5-20 MG per capsule TAKE ONE CAPSULE BY MOUTH EVERY MORNING (Patient taking differently: Take 1 capsule by mouth Daily.) 90 capsule 3   • aspirin 81 MG chewable tablet Chew 81 mg Daily.     • atorvastatin (LIPITOR) 40 MG tablet TAKE ONE TABLET BY MOUTH DAILY (Patient taking differently: Take 40 mg by mouth Every Night.) 90 tablet 3   • fluticasone (Flonase) 50 MCG/ACT nasal spray 2 sprays into the nostril(s) as directed by provider Daily. 15.8 mL 0   • guaiFENesin 200 MG tablet Take 400 mg by mouth Every 4 (Four) Hours As Needed for Cough.     • loratadine (Loradamed) 10 MG tablet Take 10 mg by mouth Daily.     • pantoprazole (PROTONIX) 40 MG EC tablet Take 1 tablet by mouth 2 (Two) Times a Day. 60 tablet 5   • propranolol LA (INDERAL LA) 80 MG 24 hr capsule TAKE ONE CAPSULE BY MOUTH EVERY MORNING (Patient taking differently: Take 80 mg by mouth Daily.) 90 capsule 3   • [DISCONTINUED] aspirin 81 MG oral suspension 81 mg.       Allergies   Allergen Reactions   • Dilaudid [Hydromorphone Hcl] Hallucinations       Objective    Objective     Vital Signs  Temp:  [98 °F (36.7 °C)-98.6 °F (37 °C)] 98 °F (36.7 °C)  Heart Rate:  [] 79  Resp:  [18-28] 22  BP: (163-168)/(85-91) 168/85  SpO2:  [88 %-97 %] 93 %  on  Flow (L/min):  [4] 4;   Device  (Oxygen Therapy): nasal cannula  Body mass index is 34.14 kg/m².    Physical Exam  Vitals and nursing note reviewed. Exam conducted with a chaperone present.   Constitutional:       Appearance: He is obese. He is ill-appearing.   HENT:      Head: Normocephalic and atraumatic.   Eyes:      Extraocular Movements: Extraocular movements intact.      Conjunctiva/sclera: Conjunctivae normal.   Cardiovascular:      Rate and Rhythm: Normal rate. Rhythm irregularly irregular.   Pulmonary:      Effort: Pulmonary effort is normal. No respiratory distress.      Breath sounds: Normal breath sounds.   Abdominal:      General: Bowel sounds are normal. There is no distension.      Palpations: Abdomen is soft.      Tenderness: There is no abdominal tenderness.   Musculoskeletal:         General: No swelling. Normal range of motion.      Cervical back: Normal range of motion and neck supple.   Skin:     General: Skin is warm and dry.   Neurological:      Mental Status: He is alert and oriented to person, place, and time. Mental status is at baseline.   Psychiatric:         Mood and Affect: Mood normal.         Behavior: Behavior normal.         Results Review:  I reviewed the patient's new clinical results.  I reviewed the patient's new imaging results and agree with the interpretation.  I reviewed the patient's other test results and agree with the interpretation  I personally viewed and interpreted the patient's EKG/Telemetry data  Discussed with ED provider.    Lab Results (last 24 hours)     Procedure Component Value Units Date/Time    CBC & Differential [[id number]]  (Abnormal) Collected: 03/03/22 0753    Specimen: Blood Updated: 03/03/22 0880    Narrative:      The following orders were created for panel order CBC & Differential.  Procedure                               Abnormality         Status                     ---------                               -----------         ------                     CBC Auto  Differential[[id number]]        Abnormal            Final result                 Please view results for these tests on the individual orders.    Comprehensive Metabolic Panel [[id number]]  (Abnormal) Collected: 03/03/22 0753    Specimen: Blood Updated: 03/03/22 0837     Glucose 131 mg/dL      BUN 15 mg/dL      Creatinine 0.65 mg/dL      Sodium 139 mmol/L      Potassium 3.9 mmol/L      Comment: Slight hemolysis detected by analyzer. Results may be affected.        Chloride 103 mmol/L      CO2 24.3 mmol/L      Calcium 9.8 mg/dL      Total Protein 7.6 g/dL      Albumin 3.60 g/dL      ALT (SGPT) 23 U/L      AST (SGOT) 29 U/L      Comment: Slight hemolysis detected by analyzer. Results may be affected.        Alkaline Phosphatase 92 U/L      Total Bilirubin 1.6 mg/dL      Globulin 4.0 gm/dL      A/G Ratio 0.9 g/dL      BUN/Creatinine Ratio 23.1     Anion Gap 11.7 mmol/L      eGFR 94.7 mL/min/1.73      Comment: National Kidney Foundation and American Society of Nephrology (ASN) Task Force recommended calculation based on the Chronic Kidney Disease Epidemiology Collaboration (CKD-EPI) equation refit without adjustment for race.       Narrative:      GFR Normal >60  Chronic Kidney Disease <60  Kidney Failure <15      Respiratory Panel PCR w/COVID-19(SARS-CoV-2) VISHAL/QUIANA/EDGAR/PAD/COR/MAD/DAVID In-House, NP Swab in UTM/VTM, 3-4 HR TAT - Swab, Nasopharynx [[id number]]  (Normal) Collected: 03/03/22 0753    Specimen: Swab from Nasopharynx Updated: 03/03/22 0900     ADENOVIRUS, PCR Not Detected     Coronavirus 229E Not Detected     Coronavirus HKU1 Not Detected     Coronavirus NL63 Not Detected     Coronavirus OC43 Not Detected     COVID19 Not Detected     Human Metapneumovirus Not Detected     Human Rhinovirus/Enterovirus Not Detected     Influenza A PCR Not Detected     Influenza B PCR Not Detected     Parainfluenza Virus 1 Not Detected     Parainfluenza Virus 2 Not Detected     Parainfluenza Virus 3 Not Detected     Parainfluenza  Virus 4 Not Detected     RSV, PCR Not Detected     Bordetella pertussis pcr Not Detected     Bordetella parapertussis PCR Not Detected     Chlamydophila pneumoniae PCR Not Detected     Mycoplasma pneumo by PCR Not Detected    Narrative:      In the setting of a positive respiratory panel with a viral infection PLUS a negative procalcitonin without other underlying concern for bacterial infection, consider observing off antibiotics or discontinuation of antibiotics and continue supportive care. If the respiratory panel is positive for atypical bacterial infection (Bordetella pertussis, Chlamydophila pneumoniae, or Mycoplasma pneumoniae), consider antibiotic de-escalation to target atypical bacterial infection.    Troponin [[id number]]  (Normal) Collected: 03/03/22 0753    Specimen: Blood Updated: 03/03/22 0836     Troponin T <0.010 ng/mL     Narrative:      Troponin T Reference Range:  <= 0.03 ng/mL-   Negative for AMI  >0.03 ng/mL-     Abnormal for myocardial necrosis.  Clinicians would have to utilize clinical acumen, EKG, Troponin and serial changes to determine if it is an Acute Myocardial Infarction or myocardial injury due to an underlying chronic condition.       Results may be falsely decreased if patient taking Biotin.      BNP [[id number]]  (Normal) Collected: 03/03/22 0753    Specimen: Blood Updated: 03/03/22 0834     proBNP 808.0 pg/mL     Narrative:      Among patients with dyspnea, NT-proBNP is highly sensitive for the detection of acute congestive heart failure. In addition NT-proBNP of <300 pg/ml effectively rules out acute congestive heart failure with 99% negative predictive value.    Results may be falsely decreased if patient taking Biotin.      CBC Auto Differential [[id number]]  (Abnormal) Collected: 03/03/22 0753    Specimen: Blood Updated: 03/03/22 0815     WBC 14.61 10*3/mm3      RBC 4.98 10*6/mm3      Hemoglobin 14.7 g/dL      Hematocrit 43.8 %      MCV 88.0 fL      MCH 29.5 pg      MCHC 33.6  "g/dL      RDW 13.0 %      RDW-SD 41.7 fl      MPV 10.7 fL      Platelets 245 10*3/mm3      Neutrophil % 90.7 %      Lymphocyte % 3.9 %      Monocyte % 2.5 %      Eosinophil % 2.5 %      Basophil % 0.1 %      Immature Grans % 0.3 %      Neutrophils, Absolute 13.24 10*3/mm3      Lymphocytes, Absolute 0.57 10*3/mm3      Monocytes, Absolute 0.36 10*3/mm3      Eosinophils, Absolute 0.37 10*3/mm3      Basophils, Absolute 0.02 10*3/mm3      Immature Grans, Absolute 0.05 10*3/mm3      nRBC 0.0 /100 WBC     Magnesium [[id number]]  (Normal) Collected: 03/03/22 0753    Specimen: Blood Updated: 03/03/22 0836     Magnesium 1.9 mg/dL     Procalcitonin [[id number]]  (Normal) Collected: 03/03/22 0753    Specimen: Blood Updated: 03/03/22 1019     Procalcitonin 0.07 ng/mL     Narrative:      As a Marker for Sepsis (Non-Neonates):    1. <0.5 ng/mL represents a low risk of severe sepsis and/or septic shock.  2. >2 ng/mL represents a high risk of severe sepsis and/or septic shock.    As a Marker for Lower Respiratory Tract Infections that require antibiotic therapy:    PCT on Admission    Antibiotic Therapy       6-12 Hrs later    >0.5                Strongly Recommended  >0.25 - <0.5        Recommended   0.1 - 0.25          Discouraged              Remeasure/reassess PCT  <0.1                Strongly Discouraged     Remeasure/reassess PCT    As 28 day mortality risk marker: \"Change in Procalcitonin Result\" (>80% or <=80%) if Day 0 (or Day 1) and Day 4 values are available. Refer to http://www.[facility]-pct-calculator.com    Change in PCT <=80%  A decrease of PCT levels below or equal to 80% defines a positive change in PCT test result representing a higher risk for 28-day all-cause mortality of patients diagnosed with severe sepsis for septic shock.    Change in PCT >80%  A decrease of PCT levels of more than 80% defines a negative change in PCT result representing a lower risk for 28-day all-cause mortality of patients diagnosed with " severe sepsis or septic shock.       Blood Culture - Blood, Arm, Right [[id number]] Collected: 03/03/22 0850    Specimen: Blood from Arm, Right Updated: 03/03/22 0857    Lactic Acid, Plasma [[id number]]  (Normal) Collected: 03/03/22 0851    Specimen: Blood Updated: 03/03/22 0946     Lactate 1.7 mmol/L     Blood Culture - Blood, Arm, Left [[id number]] Collected: 03/03/22 0934    Specimen: Blood from Arm, Left Updated: 03/03/22 0942          Imaging Results (Last 24 Hours)     Procedure Component Value Units Date/Time    XR Chest 1 View [[id number]] Collected: 03/03/22 0802     Updated: 03/03/22 0806    Narrative:      ONE VIEW PORTABLE CHEST     HISTORY: Cough and shortness of breath.     FINDINGS: There is cardiomegaly unchanged from 06/26/2016. There is  slight vascular congestion as well as somewhat more localized  peribronchial thickening and haziness at the right lung base suspicious  for an area of developing pneumonia.     This report was finalized on 3/3/2022 8:02 AM by Dr. Issa Lebron M.D.                 ECG 12 Lead   Final Result   HEART RATE= 96  bpm   RR Interval= 623  ms   MN Interval=   ms   P Horizontal Axis=   deg   P Front Axis=   deg   QRSD Interval= 93  ms   QT Interval= 347  ms   QRS Axis= -55  deg   T Wave Axis= 76  deg   - ABNORMAL ECG -   Atrial fibrillation   Inferior infarct, old   Anterior infarct, old   PVCs and ventricular couplets seen, new   Electronically Signed By: Steve Prater ([facility]) 03-Mar-2022 11:21:08   Date and Time of Study: 2022-03-03 07:20:11           Assessment/Plan     Active Hospital Problems    Diagnosis  POA   • **Pneumonia of right lower lobe due to infectious organism [J18.9]  Yes   • Hypoxia [R09.02]  Unknown   • Gastroesophageal reflux disease with esophagitis [K21.00]  Yes   • Esophageal dysphagia [R13.19]  Yes   • Paroxysmal atrial fibrillation (HCC) [I48.0]  Yes   • Benign essential hypertension [I10]  Yes   • HLD (hyperlipidemia) [E78.5]  Yes   • Obesity [E66.9]   Yes      Resolved Hospital Problems   No resolved problems to display.     · Aspiration pneumonia/dysphagia: Received ceftriaxone, we will switch to Unasyn while inpatient.  White count slightly elevated, monitor closely.  We will ask speech therapy to evaluate given history of dysphagia and recent coughing/choking spell.  Wean O2 as tolerated.  Blood cultures pending.  Add duo nebs. Check esophagram.  · GERD: She recently seen by GI in whom diagnosed with reflux and started him on Protonix-continue.  We discussed reflux precautions.  · HTN: Stable, resume home meds when available.  · Blood sugar elevated on initial labs, last A1c was fine a year ago, repeat.  · I discussed the patient's findings and my recommendations with patient, family and nursing staff.    VTE Prophylaxis - SCDs.  Code Status - Full code.       KOLBY Penn  [facility]  03/03/22  14:22 EST

## 2022-03-03 NOTE — ED PROVIDER NOTES
EMERGENCY DEPARTMENT ENCOUNTER    Room Number:  10/10  Date of encounter:  3/3/2022  PCP: Fan Schulz MD  Patient Care Team:  Fan Schulz MD as PCP - General  Steven Cadena MD as Consulting Physician (Gastroenterology)   Historian: Patient    HPI:  Chief Complaint: Shortness of breath and cough  A complete HPI/ROS/PMH/PSH/SH/FH are unobtainable due to: Nothing    Context: Junito Sorto is a 81 y.o. male who presents to the ED c/o shortness of breath and cough.  He reports that he has had a cough for the last several weeks to months.  He reports he seen his primary care doctor.  He states he has been referred to ear nose and throat but does not see them for another 2 to 3 weeks.  He reports that this morning he has been coughing and got some shortness of breath.  He states that the shortness of breath is not normal for his cough.  He denies chest pain.  He denies fevers.  He reports his cough is productive.  He has not taken any medicine for his symptoms today.  He denies fever.  He states he is vaccinated against COVID.    Prior record review: GI note 2/23/2022 for difficulty swallowing.  Esophagram ordered.    PAST MEDICAL HISTORY  Active Ambulatory Problems     Diagnosis Date Noted   • Hyperglycemia 02/16/2016   • Obesity 02/25/2016   • Allergic rhinitis 03/28/2016   • Paroxysmal atrial fibrillation (HCC) 03/28/2016   • Benign essential hypertension 03/28/2016   • Hypercholesterolemia 03/28/2016   • Rhinosinusitis 09/23/2018   • Esophageal dysphagia 10/09/2018   • Hiatal hernia 02/05/2019   • Gastroesophageal reflux disease with esophagitis 02/05/2019   • Prostate cancer (HCC) 09/11/2019   • Bladder outlet obstruction 09/11/2019   • Sciatica of right side 12/10/2020     Resolved Ambulatory Problems     Diagnosis Date Noted   • Hypertension 02/25/2016   • Acute cystitis 02/26/2017   • Bacterial conjunctivitis 03/13/2017   • Impacted cerumen of right ear 01/13/2019     Past Medical History:    Diagnosis Date   • Arthritis    • At risk for obstructive sleep apnea    • Atrial fibrillation (HCC)    • BPH (benign prostatic hyperplasia)    • Colon polyp    • Coronary artery disease Few years   • Dysphagia    • Eczema    • GERD (gastroesophageal reflux disease) 2013   • Hyperlipidemia    • Low back pain    • Skin cancer        The patient has a COVID HM Topic on their chart, and they are fully vaccinated.    PAST SURGICAL HISTORY  Past Surgical History:   Procedure Laterality Date   • CATARACT EXTRACTION, BILATERAL     • COLONOSCOPY  unknown   • CYSTOSCOPY TRANSURETHRAL RESECTION OF PROSTATE N/A 9/11/2019    Procedure: CYSTOSCOPY TRANSURETHRAL RESECTION OF PROSTATE;  Surgeon: Chip Bedolla MD;  Location: [facility];  Service: Urology   • ENDOSCOPY N/A 10/31/2018    LA Grade C reflux esophagitis, HH, erythematous mucosa in the antrum. Path: Gastritis, esophagitis.    • EPIDURAL BLOCK     • ESOPHAGEAL DILATATION     • PROSTATE SURGERY  11/06/2014   • SKIN CANCER EXCISION      multiple, over that last few years   • SKIN CANCER EXCISION Right 09/03/2019    FACE,    • TONSILLECTOMY      around 1950   • UPPER GASTROINTESTINAL ENDOSCOPY  2013         FAMILY HISTORY  Family History   Problem Relation Age of Onset   • Cancer Mother         pancreatic   • Liver disease Mother    • Cancer Father         prostate, bladder, colon, lymphoma, leukemia   • Colon cancer Father    • Cancer Brother         prostate   • Cancer Paternal Grandfather         prostate   • Malig Hyperthermia Neg Hx          SOCIAL HISTORY  Social History     Socioeconomic History   • Marital status:    Tobacco Use   • Smoking status: Former Smoker     Types: Pipe   • Smokeless tobacco: Never Used   • Tobacco comment: only in college   Vaping Use   • Vaping Use: Never used   Substance and Sexual Activity   • Alcohol use: Yes     Types: 3 Glasses of wine per week     Comment: SOCIALLY   • Drug use: No   • Sexual activity: Defer      Comment: 50 years ago         ALLERGIES  Dilaudid [hydromorphone hcl]        REVIEW OF SYSTEMS  Review of Systems   No chest pain, positive shortness of breath, negative nausea, negative vomiting, negative abdominal pain, positive shortness of breath, negative palpitations  All systems reviewed and negative except for those discussed in HPI.       PHYSICAL EXAM    I have reviewed the triage vital signs and nursing notes.    ED Triage Vitals   Temp Heart Rate Resp BP SpO2   03/03/22 0702 03/03/22 0701 03/03/22 0701 -- 03/03/22 0701   98.6 °F (37 °C) 89 24  90 %      Temp src Heart Rate Source Patient Position BP Location FiO2 (%)   -- -- -- -- --              Physical Exam  GENERAL: Awake, alert, no acute distress, persistently coughing  SKIN: Warm, dry  HENT: Normocephalic, atraumatic.  Posterior pharynx clear.  No stridor or drooling.  EYES: no scleral icterus  CV: regular rhythm, regular rate  RESPIRATORY: normal effort, lungs clear  ABDOMEN: soft, nontender, nondistended  MUSCULOSKELETAL: no deformity, no peripheral edema  NEURO: alert, moves all extremities, follows commands          LAB RESULTS  Recent Results (from the past 24 hour(s))   ECG 12 Lead    Collection Time: 03/03/22  7:20 AM   Result Value Ref Range    QT Interval 347 ms   Comprehensive Metabolic Panel    Collection Time: 03/03/22  7:53 AM    Specimen: Blood   Result Value Ref Range    Glucose 131 (H) 65 - 99 mg/dL    BUN 15 8 - 23 mg/dL    Creatinine 0.65 (L) 0.76 - 1.27 mg/dL    Sodium 139 136 - 145 mmol/L    Potassium 3.9 3.5 - 5.2 mmol/L    Chloride 103 98 - 107 mmol/L    CO2 24.3 22.0 - 29.0 mmol/L    Calcium 9.8 8.6 - 10.5 mg/dL    Total Protein 7.6 6.0 - 8.5 g/dL    Albumin 3.60 3.50 - 5.20 g/dL    ALT (SGPT) 23 1 - 41 U/L    AST (SGOT) 29 1 - 40 U/L    Alkaline Phosphatase 92 39 - 117 U/L    Total Bilirubin 1.6 (H) 0.0 - 1.2 mg/dL    Globulin 4.0 gm/dL    A/G Ratio 0.9 g/dL    BUN/Creatinine Ratio 23.1 7.0 - 25.0    Anion Gap 11.7 5.0 -  15.0 mmol/L    eGFR 94.7 >60.0 mL/min/1.73   Respiratory Panel PCR w/COVID-19(SARS-CoV-2) VISHAL/QUIANA/EDGAR/PAD/COR/MAD/DAVID In-House, NP Swab in UTM/VTM, 3-4 HR TAT - Swab, Nasopharynx    Collection Time: 03/03/22  7:53 AM    Specimen: Nasopharynx; Swab   Result Value Ref Range    ADENOVIRUS, PCR Not Detected Not Detected    Coronavirus 229E Not Detected Not Detected    Coronavirus HKU1 Not Detected Not Detected    Coronavirus NL63 Not Detected Not Detected    Coronavirus OC43 Not Detected Not Detected    COVID19 Not Detected Not Detected - Ref. Range    Human Metapneumovirus Not Detected Not Detected    Human Rhinovirus/Enterovirus Not Detected Not Detected    Influenza A PCR Not Detected Not Detected    Influenza B PCR Not Detected Not Detected    Parainfluenza Virus 1 Not Detected Not Detected    Parainfluenza Virus 2 Not Detected Not Detected    Parainfluenza Virus 3 Not Detected Not Detected    Parainfluenza Virus 4 Not Detected Not Detected    RSV, PCR Not Detected Not Detected    Bordetella pertussis pcr Not Detected Not Detected    Bordetella parapertussis PCR Not Detected Not Detected    Chlamydophila pneumoniae PCR Not Detected Not Detected    Mycoplasma pneumo by PCR Not Detected Not Detected   Troponin    Collection Time: 03/03/22  7:53 AM    Specimen: Blood   Result Value Ref Range    Troponin T <0.010 0.000 - 0.030 ng/mL   BNP    Collection Time: 03/03/22  7:53 AM    Specimen: Blood   Result Value Ref Range    proBNP 808.0 0.0-1,800.0 pg/mL   CBC Auto Differential    Collection Time: 03/03/22  7:53 AM    Specimen: Blood   Result Value Ref Range    WBC 14.61 (H) 3.40 - 10.80 10*3/mm3    RBC 4.98 4.14 - 5.80 10*6/mm3    Hemoglobin 14.7 13.0 - 17.7 g/dL    Hematocrit 43.8 37.5 - 51.0 %    MCV 88.0 79.0 - 97.0 fL    MCH 29.5 26.6 - 33.0 pg    MCHC 33.6 31.5 - 35.7 g/dL    RDW 13.0 12.3 - 15.4 %    RDW-SD 41.7 37.0 - 54.0 fl    MPV 10.7 6.0 - 12.0 fL    Platelets 245 140 - 450 10*3/mm3    Neutrophil % 90.7 (H)  42.7 - 76.0 %    Lymphocyte % 3.9 (L) 19.6 - 45.3 %    Monocyte % 2.5 (L) 5.0 - 12.0 %    Eosinophil % 2.5 0.3 - 6.2 %    Basophil % 0.1 0.0 - 1.5 %    Immature Grans % 0.3 0.0 - 0.5 %    Neutrophils, Absolute 13.24 (H) 1.70 - 7.00 10*3/mm3    Lymphocytes, Absolute 0.57 (L) 0.70 - 3.10 10*3/mm3    Monocytes, Absolute 0.36 0.10 - 0.90 10*3/mm3    Eosinophils, Absolute 0.37 0.00 - 0.40 10*3/mm3    Basophils, Absolute 0.02 0.00 - 0.20 10*3/mm3    Immature Grans, Absolute 0.05 0.00 - 0.05 10*3/mm3    nRBC 0.0 0.0 - 0.2 /100 WBC   Magnesium    Collection Time: 03/03/22  7:53 AM    Specimen: Blood   Result Value Ref Range    Magnesium 1.9 1.6 - 2.4 mg/dL       Ordered the above labs and independently reviewed the results.        RADIOLOGY  XR Chest 1 View    Result Date: 3/3/2022  ONE VIEW PORTABLE CHEST  HISTORY: Cough and shortness of breath.  FINDINGS: There is cardiomegaly unchanged from 06/26/2016. There is slight vascular congestion as well as somewhat more localized peribronchial thickening and haziness at the right lung base suspicious for an area of developing pneumonia.  This report was finalized on 3/3/2022 8:02 AM by Dr. Issa Lebron M.D.        I ordered the above noted radiological studies. Reviewed by me and discussed with radiologist.  See dictation for official radiology interpretation.      PROCEDURES    Procedures      MEDICATIONS GIVEN IN ER    Medications   sodium chloride 0.9 % flush 10 mL (10 mL Intravenous Given 3/3/22 0753)   cefTRIAXone (ROCEPHIN) 1 g in sodium chloride 0.9 % 100 mL IVPB-VTB (has no administration in time range)   albuterol (PROVENTIL) nebulizer solution 0.083% 2.5 mg/3mL (2.5 mg Nebulization Given 3/3/22 3622)         PROGRESS, DATA ANALYSIS, CONSULTS, AND MEDICAL DECISION MAKING    All labs have been independently reviewed by me.  All radiology studies have been reviewed by me and discussed with radiologist dictating the report.   EKG's independently viewed and interpreted by  me.  Discussion below represents my analysis of pertinent findings related to patient's condition, differential diagnosis, treatment plan and final disposition.    Differential diagnosis includes but is not limited to pneumonia, lung cancer, ENT cancer, viral syndrome, congestive heart failure, non-STEMI, pneumothorax.    ED Course as of 03/03/22 0933   u Mar 03, 2022   0818 EKG          EKG time: 720  Rhythm/Rate: Atrial fibrillation, rate 96  P waves and SD: Absent  QRS, axis: Narrow QRS, left axis, occasional ventricular ectopy  ST and T waves: No acute    Interpreted Contemporaneously by me, independently viewed  New ectopy changed compared to prior 7/3/2018   [TR]   0819 He currently has a 4 L nasal cannula oxygen requirement which is new. [TR]   0844 I reviewed work-up and findings with the patient at the bedside.  Answered all questions.  He has a right basilar pneumonia, leukocytosis, oxygen requirement which is new.  Plan admission.  He is agreeable. [TR]   0932 Speaking with Dr. Sifuentes with [facility].  Will admit. [TR]      ED Course User Index  [TR] Talib Mejia MD           PPE: The patient wore a mask and I wore an N95 mask throughout the entire patient encounter.  I wore goggles.      AS OF 09:33 EST VITALS:    BP - 163/91  HR - 92  TEMP - 98.6 °F (37 °C)  O2 SATS - 91%        DIAGNOSIS  Final diagnoses:   Pneumonia of right lower lobe due to infectious organism   Hypoxia         DISPOSITION  ED Disposition     ED Disposition Condition Comment    Decision to Admit  Level of Care: Telemetry [5]   Diagnosis: Pneumonia of right lower lobe due to infectious organism [[id number]]   Admitting Physician: TREVOR SIFUENTES [[id number]]   Attending Physician: TREVOR SIFUENTES [[id number]]   Certification: I Certify That Inpatient Hospital Services Are Medically Necessary For Greater Than 2 Midnights                  Talib Mejia MD  03/03/22 0901

## 2022-03-04 ENCOUNTER — APPOINTMENT (OUTPATIENT)
Dept: GENERAL RADIOLOGY | Facility: HOSPITAL | Age: 82
End: 2022-03-04

## 2022-03-04 LAB
ANION GAP SERPL CALCULATED.3IONS-SCNC: 9 MMOL/L (ref 5–15)
BASOPHILS # BLD MANUAL: 0.08 10*3/MM3 (ref 0–0.2)
BASOPHILS NFR BLD MANUAL: 1 % (ref 0–1.5)
BUN SERPL-MCNC: 16 MG/DL (ref 8–23)
BUN/CREAT SERPL: 25 (ref 7–25)
CALCIUM SPEC-SCNC: 9.3 MG/DL (ref 8.6–10.5)
CHLORIDE SERPL-SCNC: 105 MMOL/L (ref 98–107)
CO2 SERPL-SCNC: 26 MMOL/L (ref 22–29)
CREAT SERPL-MCNC: 0.64 MG/DL (ref 0.76–1.27)
DEPRECATED RDW RBC AUTO: 44.8 FL (ref 37–54)
EGFRCR SERPLBLD CKD-EPI 2021: 95.1 ML/MIN/1.73
EOSINOPHIL # BLD MANUAL: 0.08 10*3/MM3 (ref 0–0.4)
EOSINOPHIL NFR BLD MANUAL: 1 % (ref 0.3–6.2)
ERYTHROCYTE [DISTWIDTH] IN BLOOD BY AUTOMATED COUNT: 13.4 % (ref 12.3–15.4)
GLUCOSE SERPL-MCNC: 92 MG/DL (ref 65–99)
HCT VFR BLD AUTO: 39.2 % (ref 37.5–51)
HGB BLD-MCNC: 13 G/DL (ref 13–17.7)
LYMPHOCYTES # BLD MANUAL: 0.84 10*3/MM3 (ref 0.7–3.1)
LYMPHOCYTES NFR BLD MANUAL: 1 % (ref 5–12)
MAGNESIUM SERPL-MCNC: 1.9 MG/DL (ref 1.6–2.4)
MCH RBC QN AUTO: 30 PG (ref 26.6–33)
MCHC RBC AUTO-ENTMCNC: 33.2 G/DL (ref 31.5–35.7)
MCV RBC AUTO: 90.5 FL (ref 79–97)
MONOCYTES # BLD: 0.08 10*3/MM3 (ref 0.1–0.9)
NEUTROPHILS # BLD AUTO: 7.24 10*3/MM3 (ref 1.7–7)
NEUTROPHILS NFR BLD MANUAL: 86.9 % (ref 42.7–76)
PHOSPHATE SERPL-MCNC: 2.3 MG/DL (ref 2.5–4.5)
PLAT MORPH BLD: NORMAL
PLATELET # BLD AUTO: 176 10*3/MM3 (ref 140–450)
PMV BLD AUTO: 10.7 FL (ref 6–12)
POTASSIUM SERPL-SCNC: 3.5 MMOL/L (ref 3.5–5.2)
RBC # BLD AUTO: 4.33 10*6/MM3 (ref 4.14–5.8)
RBC MORPH BLD: NORMAL
SODIUM SERPL-SCNC: 140 MMOL/L (ref 136–145)
VARIANT LYMPHS NFR BLD MANUAL: 10.1 % (ref 19.6–45.3)
WBC MORPH BLD: NORMAL
WBC NRBC COR # BLD: 8.33 10*3/MM3 (ref 3.4–10.8)

## 2022-03-04 PROCEDURE — 25010000002 ENOXAPARIN PER 10 MG: Performed by: STUDENT IN AN ORGANIZED HEALTH CARE EDUCATION/TRAINING PROGRAM

## 2022-03-04 PROCEDURE — 74221 X-RAY XM ESOPHAGUS 2CNTRST: CPT

## 2022-03-04 PROCEDURE — 83735 ASSAY OF MAGNESIUM: CPT | Performed by: STUDENT IN AN ORGANIZED HEALTH CARE EDUCATION/TRAINING PROGRAM

## 2022-03-04 PROCEDURE — 36415 COLL VENOUS BLD VENIPUNCTURE: CPT | Performed by: STUDENT IN AN ORGANIZED HEALTH CARE EDUCATION/TRAINING PROGRAM

## 2022-03-04 PROCEDURE — 85025 COMPLETE CBC W/AUTO DIFF WBC: CPT | Performed by: STUDENT IN AN ORGANIZED HEALTH CARE EDUCATION/TRAINING PROGRAM

## 2022-03-04 PROCEDURE — 87205 SMEAR GRAM STAIN: CPT | Performed by: STUDENT IN AN ORGANIZED HEALTH CARE EDUCATION/TRAINING PROGRAM

## 2022-03-04 PROCEDURE — 94799 UNLISTED PULMONARY SVC/PX: CPT

## 2022-03-04 PROCEDURE — 87070 CULTURE OTHR SPECIMN AEROBIC: CPT | Performed by: STUDENT IN AN ORGANIZED HEALTH CARE EDUCATION/TRAINING PROGRAM

## 2022-03-04 PROCEDURE — 84100 ASSAY OF PHOSPHORUS: CPT | Performed by: STUDENT IN AN ORGANIZED HEALTH CARE EDUCATION/TRAINING PROGRAM

## 2022-03-04 PROCEDURE — 80048 BASIC METABOLIC PNL TOTAL CA: CPT | Performed by: STUDENT IN AN ORGANIZED HEALTH CARE EDUCATION/TRAINING PROGRAM

## 2022-03-04 PROCEDURE — 94640 AIRWAY INHALATION TREATMENT: CPT

## 2022-03-04 PROCEDURE — 85007 BL SMEAR W/DIFF WBC COUNT: CPT | Performed by: STUDENT IN AN ORGANIZED HEALTH CARE EDUCATION/TRAINING PROGRAM

## 2022-03-04 PROCEDURE — 94761 N-INVAS EAR/PLS OXIMETRY MLT: CPT

## 2022-03-04 PROCEDURE — 25010000002 AMPICILLIN-SULBACTAM PER 1.5 G: Performed by: NURSE PRACTITIONER

## 2022-03-04 RX ORDER — AMLODIPINE BESYLATE 5 MG/1
5 TABLET ORAL
Status: DISCONTINUED | OUTPATIENT
Start: 2022-03-05 | End: 2022-03-09 | Stop reason: HOSPADM

## 2022-03-04 RX ORDER — BUDESONIDE AND FORMOTEROL FUMARATE DIHYDRATE 160; 4.5 UG/1; UG/1
2 AEROSOL RESPIRATORY (INHALATION)
Status: DISCONTINUED | OUTPATIENT
Start: 2022-03-04 | End: 2022-03-09 | Stop reason: HOSPADM

## 2022-03-04 RX ADMIN — PANTOPRAZOLE SODIUM 40 MG: 40 TABLET, DELAYED RELEASE ORAL at 20:30

## 2022-03-04 RX ADMIN — IPRATROPIUM BROMIDE AND ALBUTEROL SULFATE 3 ML: 2.5; .5 SOLUTION RESPIRATORY (INHALATION) at 11:22

## 2022-03-04 RX ADMIN — IPRATROPIUM BROMIDE AND ALBUTEROL SULFATE 3 ML: 2.5; .5 SOLUTION RESPIRATORY (INHALATION) at 21:02

## 2022-03-04 RX ADMIN — FLUTICASONE PROPIONATE 2 SPRAY: 50 SPRAY, METERED NASAL at 08:19

## 2022-03-04 RX ADMIN — BARIUM SULFATE 135 ML: 980 POWDER, FOR SUSPENSION ORAL at 11:09

## 2022-03-04 RX ADMIN — PROPRANOLOL HYDROCHLORIDE 80 MG: 80 CAPSULE, EXTENDED RELEASE ORAL at 08:19

## 2022-03-04 RX ADMIN — LISINOPRIL: 20 TABLET ORAL at 08:19

## 2022-03-04 RX ADMIN — PANTOPRAZOLE SODIUM 40 MG: 40 TABLET, DELAYED RELEASE ORAL at 08:19

## 2022-03-04 RX ADMIN — AMPICILLIN SODIUM AND SULBACTAM SODIUM 3 G: 2; 1 INJECTION, POWDER, FOR SOLUTION INTRAMUSCULAR; INTRAVENOUS at 20:30

## 2022-03-04 RX ADMIN — BARIUM SULFATE 183 ML: 960 POWDER, FOR SUSPENSION ORAL at 11:09

## 2022-03-04 RX ADMIN — CETIRIZINE HYDROCHLORIDE 10 MG: 10 TABLET ORAL at 08:19

## 2022-03-04 RX ADMIN — AMPICILLIN SODIUM AND SULBACTAM SODIUM 3 G: 2; 1 INJECTION, POWDER, FOR SOLUTION INTRAMUSCULAR; INTRAVENOUS at 01:45

## 2022-03-04 RX ADMIN — Medication 10 ML: at 22:53

## 2022-03-04 RX ADMIN — BARIUM SULFATE 700 MG: 700 TABLET ORAL at 11:09

## 2022-03-04 RX ADMIN — ATORVASTATIN CALCIUM 40 MG: 20 TABLET, FILM COATED ORAL at 20:30

## 2022-03-04 RX ADMIN — ASPIRIN 81 MG: 81 TABLET, CHEWABLE ORAL at 08:19

## 2022-03-04 RX ADMIN — AMPICILLIN SODIUM AND SULBACTAM SODIUM 3 G: 2; 1 INJECTION, POWDER, FOR SOLUTION INTRAMUSCULAR; INTRAVENOUS at 14:29

## 2022-03-04 RX ADMIN — ENOXAPARIN SODIUM 40 MG: 100 INJECTION SUBCUTANEOUS at 13:01

## 2022-03-04 RX ADMIN — IPRATROPIUM BROMIDE AND ALBUTEROL SULFATE 3 ML: 2.5; .5 SOLUTION RESPIRATORY (INHALATION) at 07:19

## 2022-03-04 RX ADMIN — AMPICILLIN SODIUM AND SULBACTAM SODIUM 3 G: 2; 1 INJECTION, POWDER, FOR SOLUTION INTRAMUSCULAR; INTRAVENOUS at 08:19

## 2022-03-04 RX ADMIN — BUDESONIDE AND FORMOTEROL FUMARATE DIHYDRATE 2 PUFF: 160; 4.5 AEROSOL RESPIRATORY (INHALATION) at 21:06

## 2022-03-04 RX ADMIN — IPRATROPIUM BROMIDE AND ALBUTEROL SULFATE 3 ML: 2.5; .5 SOLUTION RESPIRATORY (INHALATION) at 15:09

## 2022-03-04 NOTE — PLAN OF CARE
"Goal Outcome Evaluation:              Outcome Summary: Pt, family and staff deny any difficulties with oral or pharyngeal stages of the swallow.  Esophagram negative for penetration or aspiration.  Also noted on esophagram \"tertiary wave formation causing intraesophageal reflux and stasis\".   Will s/o as no oropharyngeal s/s.  "

## 2022-03-04 NOTE — PROGRESS NOTES
I saw this patient last month for esophageal dysphagia with a history of grade C reflux esophagitis.  I sent him for an esophagram but it looks like he is also admitted for pneumonia currently.  Looks like he needs an EGD.  Do you want me to have him follow-up in the office after he gets out of the hospital to see how he is doing?

## 2022-03-04 NOTE — PLAN OF CARE
Problem: Adult Inpatient Plan of Care  Goal: Plan of Care Review  Outcome: Ongoing, Progressing  Flowsheets (Taken 3/3/2022 2327)  Progress: improving  Plan of Care Reviewed With: patient  Outcome Summary: Vitals as documented, afebrile. Alert, oriented and cooperative. Calls for assistance. Bed alrm activated for safety. Voids per urinal, specimen sent. No s/sx of distress. Denies pain. O2 @ 4 LPM NC maintaining saturations. A. Fib on monitor.  Goal: Patient-Specific Goal (Individualized)  Outcome: Ongoing, Progressing  Goal: Absence of Hospital-Acquired Illness or Injury  Outcome: Ongoing, Progressing  Intervention: Identify and Manage Fall Risk  Description: Perform standard risk assessment with a validated tool or comprehensive approach appropriate to the patient on admission; reassess fall risk frequently, with change in status or transfer to another level of care.  Communicate fall injury risk to interprofessional healthcare team.  Determine need for increased observation, equipment and environmental modification, such as low bed and signage, as well as supportive, nonskid footwear.  Adjust safety measures to individual developmental age, stage and identified risk factors.  Reinforce the importance of safety and physical activity with patient and family.  Perform regular intentional rounding to assess need for position change, pain assessment, personal needs, including assistance with toileting.  Recent Flowsheet Documentation  Taken 3/3/2022 2200 by Nia Moreno, RN  Safety Promotion/Fall Prevention:   assistive device/personal items within reach   clutter free environment maintained   fall prevention program maintained   nonskid shoes/slippers when out of bed   room organization consistent   safety round/check completed  Taken 3/3/2022 1932 by Nia Moreno, RN  Safety Promotion/Fall Prevention:   assistive device/personal items within reach   clutter free environment maintained   fall prevention  program maintained   lighting adjusted   nonskid shoes/slippers when out of bed   room organization consistent   safety round/check completed  Intervention: Prevent Skin Injury  Description: Assess skin risk on admission and at regular intervals throughout hospital stay.  Keep all areas of skin (especially folds) clean and dry.  Maintain adequate skin hydration.  Relieve and redistribute pressure and protect bony prominences; implement measures based on patient-specific risk factors.  Match turning and repositioning schedule to clinical condition.  Encourage weight shift frequently; assist with reposition if unable to complete independently.  Float heels off bed. Avoid pressure on the Achilles tendon.  Keep skin free from extended contact with medical devices.  Use aids (e.g., slide boards, mechanical lift) during transfer.  Recent Flowsheet Documentation  Taken 3/3/2022 2200 by Nia Moreno RN  Body Position:   position changed independently   neutral head position   neutral body alignment   tilted, left  Taken 3/3/2022 1932 by Nia Moreno RN  Body Position:   position changed independently   tilted, right   neutral body alignment   neutral head position  Intervention: Prevent and Manage VTE (venous thromboembolism) Risk  Description: Assess for VTE risk.  Encourage/assist with early ambulation.  Initiate and maintain compression or other therapy, as indicated based on identified risk in accordance with organizational protocol/provider order.  Encourage both active and passive leg exercises while in bed, if unable to ambulate.  Recent Flowsheet Documentation  Taken 3/3/2022 1932 by Nia Moreno RN  VTE Prevention/Management:   bilateral   dorsiflexion/plantar flexion performed  Intervention: Prevent Infection  Description: Maintain skin and mucous membrane integrity; promote hand, oral and pulmonary hygiene.  Optimize fluid balance, nutrition, sleep and glycemic control to maximize infection  resistance.  Identify potential sources of infection early to prevent or mitigate progression of infection (e.g., wound, lines, devices).  Evaluate ongoing need for invasive devices; remove promptly when no longer indicated.  Recent Flowsheet Documentation  Taken 3/3/2022 2200 by Nia Moreno RN  Infection Prevention:   environmental surveillance performed   equipment surfaces disinfected   hand hygiene promoted   personal protective equipment utilized   rest/sleep promoted   single patient room provided   visitors restricted/screened  Taken 3/3/2022 1932 by Nia Moreno RN  Infection Prevention:   environmental surveillance performed   equipment surfaces disinfected   hand hygiene promoted   personal protective equipment utilized   rest/sleep promoted   visitors restricted/screened   single patient room provided  Goal: Optimal Comfort and Wellbeing  Outcome: Ongoing, Progressing  Intervention: Provide Person-Centered Care  Description: Use a family-focused approach to care.  Develop trust and rapport by proactively providing information, encouraging questions, addressing concerns and offering reassurance.  Acknowledge emotional response to hospitalization.  Recognize and utilize personal coping strategies.  Honor spiritual and cultural preferences.  Recent Flowsheet Documentation  Taken 3/3/2022 2200 by Nia Moreno RN  Trust Relationship/Rapport:   care explained   emotional support provided   choices provided   empathic listening provided   questions answered   questions encouraged   reassurance provided   thoughts/feelings acknowledged  Taken 3/3/2022 1932 by Nia Moreno RN  Trust Relationship/Rapport:   care explained   choices provided   emotional support provided   questions answered   questions encouraged   empathic listening provided   reassurance provided   thoughts/feelings acknowledged  Goal: Readiness for Transition of Care  Outcome: Ongoing, Progressing     Problem: Fluid Imbalance  (Pneumonia)  Goal: Fluid Balance  Outcome: Ongoing, Progressing     Problem: Infection (Pneumonia)  Goal: Resolution of Infection Signs and Symptoms  Outcome: Ongoing, Progressing  Intervention: Prevent Infection Progression  Description: Implement transmission-based precautions and isolation, as indicated, to prevent spread of infection.  Obtain cultures prior to initiating antimicrobial therapy. Do not delay treatment for laboratory results in the presence of high suspicion.  Administer ordered antimicrobial therapy promptly; reassess need regularly.  Identify and manage signs of early sepsis.  Provide fever-reduction and comfort measures.  Recent Flowsheet Documentation  Taken 3/3/2022 2200 by Nia Moreno RN  Isolation Precautions: protective environment maintained     Problem: Respiratory Compromise (Pneumonia)  Goal: Effective Oxygenation and Ventilation  Outcome: Ongoing, Progressing  Intervention: Promote Airway Secretion Clearance  Description: Assess the effectiveness of pulmonary hygiene and ability to perform airway clearance techniques.  Promote early mobility/ambulation; match to ability and tolerance.  Encourage deep breathing and lung expansion therapy to prevent atelectasis (e.g., incentive spirometry, positive airway pressure).  Anticipate the need to splint chest or abdominal wall with cough to minimize discomfort; assist if needed.  Initiate cough-enhancement and airway-clearance techniques with instruction (e.g., active cycle breathing, positive expiratory pressure, suction); consider mechanical insufflation-exsufflation in the presence of neuromuscular weakness.  Consider pharmacologic therapy (e.g., systemic corticosteroid, beta-2 agonist, mucolytic, antimicrobial) to improve mucus clearance, inflammation, cough response and air flow.  Initiate inspiratory muscle training to decrease the risk of pulmonary complications.  Recent Flowsheet Documentation  Taken 3/3/2022 1932 by Nia Moreno  RN  Cough And Deep Breathing: done independently per patient  Intervention: Optimize Oxygenation and Ventilation  Description: Establish oxygenation and ventilation parameters and goals; consider home baseline values for chronic cardiac and lung conditions.  Anticipate noninvasive and invasive monitoring (e.g., pulse oximetry, end-tidal carbon dioxide, blood gases, cardiovascular).  Maintain optimal position to relieve discomfort, breathlessness and ventilation/perfusion mismatch.  Provide oxygen therapy judiciously to avoid hyperoxemia; adjust to achieve oxygenation goal.  Monitor fluid balance closely to minimize the risk of fluid overload.  Implement noninvasive or invasive positive pressure to enhance alveolar ventilation.  Recent Flowsheet Documentation  Taken 3/3/2022 2200 by Nia Moreno, RN  Head of Bed (HOB): HOB at 20-30 degrees  Taken 3/3/2022 1932 by Nia Moreno, RN  Head of Bed (\[facility] [place] [facility]\""): HOB at 30-45 degrees   Goal Outcome Evaluation:  Plan of Care Reviewed With: patient        Progress: improving  Outcome Summary: Vitals as documented, afebrile. Alert, oriented and cooperative. Calls for assistance. Bed alrm activated for safety. Voids per urinal, specimen sent. No s/sx of distress. Denies pain. O2 @ 4 LPM NC maintaining saturations. A. Fib on monitor.

## 2022-03-04 NOTE — PLAN OF CARE
Problem: Adult Inpatient Plan of Care  Goal: Readiness for Transition of Care  Outcome: Ongoing, Not Progressing  Intervention: Mutually Develop Transition Plan  Recent Flowsheet Documentation  Taken 3/3/2022 1300 by Gemma Ceja RN  Equipment Currently Used at Home: none  Transportation Anticipated: family or friend will provide  Patient/Family Anticipated Services at Transition: none  Patient/Family Anticipates Transition to: home with family     Problem: Adult Inpatient Plan of Care  Goal: Plan of Care Review  Outcome: Ongoing, Progressing  Goal: Patient-Specific Goal (Individualized)  Outcome: Ongoing, Progressing  Goal: Absence of Hospital-Acquired Illness or Injury  Outcome: Ongoing, Progressing  Intervention: Identify and Manage Fall Risk  Recent Flowsheet Documentation  Taken 3/3/2022 1800 by Gemma Ceja RN  Safety Promotion/Fall Prevention:   activity supervised   assistive device/personal items within reach   clutter free environment maintained   fall prevention program maintained   nonskid shoes/slippers when out of bed   safety round/check completed  Taken 3/3/2022 1600 by Gemma Ceja RN  Safety Promotion/Fall Prevention:   activity supervised   assistive device/personal items within reach   clutter free environment maintained   fall prevention program maintained   room organization consistent  Taken 3/3/2022 1321 by Gemma Ceja RN  Safety Promotion/Fall Prevention:   activity supervised   assistive device/personal items within reach   clutter free environment maintained   fall prevention program maintained   nonskid shoes/slippers when out of bed  Intervention: Prevent and Manage VTE (venous thromboembolism) Risk  Recent Flowsheet Documentation  Taken 3/3/2022 1321 by Gemma Ceja RN  VTE Prevention/Management:   bilateral   dorsiflexion/plantar flexion performed  Intervention: Prevent Infection  Recent Flowsheet Documentation  Taken 3/3/2022 1800 by Gemma Ceja  RN  Infection Prevention: single patient room provided  Taken 3/3/2022 1600 by Gemma Ceja RN  Infection Prevention: single patient room provided  Taken 3/3/2022 1321 by Gemma Ceja RN  Infection Prevention: single patient room provided  Goal: Optimal Comfort and Wellbeing  Outcome: Ongoing, Progressing  Intervention: Provide Person-Centered Care  Recent Flowsheet Documentation  Taken 3/3/2022 1321 by Gemma Ceja RN  Trust Relationship/Rapport:   choices provided   care explained     Problem: Fluid Imbalance (Pneumonia)  Goal: Fluid Balance  Outcome: Ongoing, Progressing     Problem: Infection (Pneumonia)  Goal: Resolution of Infection Signs and Symptoms  Outcome: Ongoing, Progressing     Problem: Respiratory Compromise (Pneumonia)  Goal: Effective Oxygenation and Ventilation  Outcome: Ongoing, Progressing     Problem: Adult Inpatient Plan of Care  Goal: Plan of Care Review  Outcome: Ongoing, Progressing     Problem: Adult Inpatient Plan of Care  Goal: Readiness for Transition of Care  Outcome: Ongoing, Not Progressing  Intervention: Mutually Develop Transition Plan  Recent Flowsheet Documentation  Taken 3/3/2022 1300 by Gemma Ceja RN  Equipment Currently Used at Home: none  Transportation Anticipated: family or friend will provide  Patient/Family Anticipated Services at Transition: none  Patient/Family Anticipates Transition to: home with family   Goal Outcome Evaluation:

## 2022-03-04 NOTE — CASE MANAGEMENT/SOCIAL WORK
Discharge Planning Assessment  [facility]     Patient Name: Junito Sorto  MRN: [id number]  Today's Date: 3/4/2022    Admit Date: 3/3/2022     Discharge Needs Assessment     Row Name 03/04/22 1759       Living Environment    Lives With spouse    Name(s) of Who Lives With Patient wife Lolita Sorto [phone]    Current Living Arrangements home/apartment/condo    Primary Care Provided by self; spouse/significant other    Provides Primary Care For no one, unable/limited ability to care for self    Family Caregiver if Needed spouse    Family Caregiver Names wife Lolita Sorto [phone]    Quality of Family Relationships helpful; involved; supportive    Able to Return to Prior Arrangements yes       Resource/Environmental Concerns    Resource/Environmental Concerns home accessibility    Home Accessibility Concerns stairs to enter home  2 steps with 1 handrail to enter their 2 story home with everything that they need on the main level of the home.       Transition Planning    Patient/Family Anticipates Transition to home with family    Patient/Family Anticipated Services at Transition none    Transportation Anticipated family or friend will provide       Discharge Needs Assessment    Equipment Currently Used at Home none    Concerns to be Addressed discharge planning    Anticipated Changes Related to Illness none    Equipment Needed After Discharge nebulizer; oxygen    Current Discharge Risk physical impairment               Discharge Plan     Row Name 03/04/22 2498       Plan    Plan Plans home with assistance from his spouse. Follow to see if the patient will need home O2, IV antibiotics, Lovenox or a nebulizer.    Provided Post Acute Provider List? N/A    N/A Provider List Comment Denies HH/SNF needs.    Provided Post Acute Provider Quality & Resource List? N/A    N/A Quality & Resource List Comment Denies HH/SNF needs.    Patient/Family in Agreement with Plan yes    Plan Comments Met with the patient  and his wife Lolita Sorto [phone]; explained role of CCP, verified facesheet, checked IMM and discussed discharge planning needs.  The patient plans to return home upon d/c with assistance from his spouse, uses no DME, has 2 steps with 1 handrail to enter their 2 story home with everything that they need on the main level of the home. The patient’s PCP is Fan Schulz, pharmacy is [facility] and he denies any trouble remembering to take his medication or with affording his medication.  The patient denies any HH/SNF history, was encouraged to bring his POA documents, states that he drives himself to appointments and his wife will transport him home upon d/c. The patient is currently on 4 liters of O2 and they requested a list of companies that can provide O2 in case needed upon d/c.  CCP will follow to see if the patient will need O2 upon d/c and will see if he needs any IV antibiotics as he is currently on IV Unasyn or if he will need Lovenox or a nebulizer upon d/c.  LESA Fan              Continued Care and Services - Admitted Since 3/3/2022    Coordination has not been started for this encounter.       Expected Discharge Date and Time     Expected Discharge Date Expected Discharge Time    Mar 7, 2022          Demographic Summary     Row Name 03/04/22 1343       General Information    Admission Type inpatient    Arrived From home    Referral Source admission list    Reason for Consult discharge planning    Preferred Language English     Used During This Interaction no       Contact Information    Permission Granted to Share Info With family/designee               Functional Status     Row Name 03/04/22 3072       Functional Status    Usual Activity Tolerance good    Current Activity Tolerance moderate       Functional Status, IADL    Medications independent    Meal Preparation independent    Housekeeping independent    Laundry independent    Shopping independent        Mental Status    General Appearance WDL WDL       Mental Status Summary    Recent Changes in Mental Status/Cognitive Functioning no changes               Psychosocial    No documentation.                Abuse/Neglect    No documentation.                Legal    No documentation.                Substance Abuse    No documentation.                Patient Forms    No documentation.                   LESA Treviño

## 2022-03-04 NOTE — PROGRESS NOTES
"DAILY PROGRESS NOTE  [facility]    Patient Identification:  Name: Junito Sorto  Age: 81 y.o.  Sex: male  :  1940  MRN: [id number]         Primary Care Physician: Fan Schulz MD      Subjective  Patient with no acute complaints.  Reviewed his history again.  He has a very longstanding history of a chronic dry persistent cough.  He states he came in because he is having coughing spasms after which he could not catch his breath.    No sputum production.  No fever sweats or chills.  He does note that the aerosol treatments that he has been receiving are helping a great deal.  He does not recall how long he has been on the Lotrel except that its been \"a long time\".    Objective:  General Appearance:  Comfortable, well-appearing, in no acute distress and not in pain (Obese.).    Vital signs: (most recent): Blood pressure 125/83, pulse 73, temperature 97.6 °F (36.4 °C), temperature source Oral, resp. rate 18, height 170.2 cm (67\"), weight 98.9 kg (218 lb), SpO2 97 %.    Lungs:  Normal effort and normal respiratory rate.  He is not in respiratory distress.  No stridor.  There are wheezes.  No rales, decreased breath sounds or rhonchi.    Heart: Normal rate.  Regular rhythm.    Extremities: There is no dependent edema.    Neurological: Patient is alert and oriented to person, place and time.    Skin:  Warm and dry.                Vital signs in last 24 hours:  Temp:  [96.9 °F (36.1 °C)-98 °F (36.7 °C)] 97.6 °F (36.4 °C)  Heart Rate:  [62-79] 73  Resp:  [16-19] 18  BP: (113-128)/(71-89) 125/83    Intake/Output:    Intake/Output Summary (Last 24 hours) at 3/4/2022 1700  Last data filed at 3/4/2022 1253  Gross per 24 hour   Intake 340 ml   Output 900 ml   Net -560 ml         Results from last 7 days   Lab Units 22  0822 22  1509 22  0753   WBC 10*3/mm3 8.33 21.40* 14.61*   HEMOGLOBIN g/dL 13.0 13.1 14.7   PLATELETS 10*3/mm3 176 208 245     Results from last 7 days   Lab " Units 03/04/22  0822 03/03/22  1509 03/03/22  0753   SODIUM mmol/L 140 139 139   POTASSIUM mmol/L 3.5 3.6 3.9   CHLORIDE mmol/L 105 101 103   CO2 mmol/L 26.0 25.0 24.3   BUN mg/dL 16 14 15   CREATININE mg/dL 0.64* 0.59* 0.65*   GLUCOSE mg/dL 92 182* 131*   Estimated Creatinine Clearance: 81.1 mL/min (A) (by C-G formula based on SCr of 0.64 mg/dL (L)).  Results from last 7 days   Lab Units 03/04/22  0822 03/03/22  1509 03/03/22  0753   CALCIUM mg/dL 9.3 9.1 9.8   ALBUMIN g/dL  --   --  3.60   MAGNESIUM mg/dL 1.9 1.7 1.9   PHOSPHORUS mg/dL 2.3* 2.9  --      Results from last 7 days   Lab Units 03/03/22  0753   ALBUMIN g/dL 3.60   BILIRUBIN mg/dL 1.6*   ALK PHOS U/L 92   AST (SGOT) U/L 29   ALT (SGPT) U/L 23       Assessment:  Pulmonary infiltrate: Possible pneumonia.  There was a leukocytosis on presentation that resolved complete within 24 hours.  Very possibly stress-induced.  Procalcitonin level is well within normal limits.  No sputum production.  Continue antibiotics for the time being, recheck procalcitonin in the morning.  Would favor de-escalating antibiotics quickly.  Chronic cough: Consider possible side effect of ACE inhibitor.  Very possible underlying COPD.  Discontinue ACE inhibitor's.  Continue bronchodilators.  Pulmonary consultation.  GERD/presbyesophagus: Continue PPI treatment.  Okay for outpatient follow-up.  GI input appreciated.  Paroxysmal atrial fibrillation: Continue rate control.  Patiently presently not on anticoagulants.  Consider switching to a more beta-1 selective beta-blocker noting no wheezes.  Hypertension:  Obesity:  Hypoxemia: Lowest O2 sat IC on record is 91%.  Patient is presently on room air again.    All problems new to this examiner.    Plan:  Please see above.    Trace Su MD  3/4/2022  17:00 EST

## 2022-03-04 NOTE — NURSING NOTE
URINE COLLECTED AND SENT TO LAB BEGINNING OF SHIFT. AWAITING SPUTUM SPECIMEN. CUP AT BEDSIDE. UNABLE TO EXPEL. EDUCATED. WILL CALL WHEN SPECIMEN AVAILABLE.

## 2022-03-05 LAB
ANION GAP SERPL CALCULATED.3IONS-SCNC: 8 MMOL/L (ref 5–15)
BASOPHILS # BLD AUTO: 0.05 10*3/MM3 (ref 0–0.2)
BASOPHILS NFR BLD AUTO: 0.6 % (ref 0–1.5)
BUN SERPL-MCNC: 14 MG/DL (ref 8–23)
BUN/CREAT SERPL: 20.6 (ref 7–25)
CALCIUM SPEC-SCNC: 9.4 MG/DL (ref 8.6–10.5)
CHLORIDE SERPL-SCNC: 103 MMOL/L (ref 98–107)
CO2 SERPL-SCNC: 29 MMOL/L (ref 22–29)
CREAT SERPL-MCNC: 0.68 MG/DL (ref 0.76–1.27)
DEPRECATED RDW RBC AUTO: 43.5 FL (ref 37–54)
EGFRCR SERPLBLD CKD-EPI 2021: 93.4 ML/MIN/1.73
EOSINOPHIL # BLD AUTO: 0.87 10*3/MM3 (ref 0–0.4)
EOSINOPHIL NFR BLD AUTO: 10.5 % (ref 0.3–6.2)
ERYTHROCYTE [DISTWIDTH] IN BLOOD BY AUTOMATED COUNT: 13.3 % (ref 12.3–15.4)
GLUCOSE SERPL-MCNC: 107 MG/DL (ref 65–99)
HCT VFR BLD AUTO: 36.6 % (ref 37.5–51)
HGB BLD-MCNC: 12 G/DL (ref 13–17.7)
IMM GRANULOCYTES # BLD AUTO: 0.02 10*3/MM3 (ref 0–0.05)
IMM GRANULOCYTES NFR BLD AUTO: 0.2 % (ref 0–0.5)
LYMPHOCYTES # BLD AUTO: 0.9 10*3/MM3 (ref 0.7–3.1)
LYMPHOCYTES NFR BLD AUTO: 10.9 % (ref 19.6–45.3)
MAGNESIUM SERPL-MCNC: 1.8 MG/DL (ref 1.6–2.4)
MCH RBC QN AUTO: 29.2 PG (ref 26.6–33)
MCHC RBC AUTO-ENTMCNC: 32.8 G/DL (ref 31.5–35.7)
MCV RBC AUTO: 89.1 FL (ref 79–97)
MONOCYTES # BLD AUTO: 0.55 10*3/MM3 (ref 0.1–0.9)
MONOCYTES NFR BLD AUTO: 6.7 % (ref 5–12)
NEUTROPHILS NFR BLD AUTO: 5.88 10*3/MM3 (ref 1.7–7)
NEUTROPHILS NFR BLD AUTO: 71.1 % (ref 42.7–76)
NRBC BLD AUTO-RTO: 0 /100 WBC (ref 0–0.2)
PHOSPHATE SERPL-MCNC: 2.9 MG/DL (ref 2.5–4.5)
PLATELET # BLD AUTO: 198 10*3/MM3 (ref 140–450)
PMV BLD AUTO: 10.8 FL (ref 6–12)
POTASSIUM SERPL-SCNC: 3.5 MMOL/L (ref 3.5–5.2)
RBC # BLD AUTO: 4.11 10*6/MM3 (ref 4.14–5.8)
SODIUM SERPL-SCNC: 140 MMOL/L (ref 136–145)
TSH SERPL DL<=0.05 MIU/L-ACNC: 2.13 UIU/ML (ref 0.27–4.2)
WBC NRBC COR # BLD: 8.27 10*3/MM3 (ref 3.4–10.8)

## 2022-03-05 PROCEDURE — 94761 N-INVAS EAR/PLS OXIMETRY MLT: CPT

## 2022-03-05 PROCEDURE — 94799 UNLISTED PULMONARY SVC/PX: CPT

## 2022-03-05 PROCEDURE — 25010000002 ENOXAPARIN PER 10 MG: Performed by: STUDENT IN AN ORGANIZED HEALTH CARE EDUCATION/TRAINING PROGRAM

## 2022-03-05 PROCEDURE — 85025 COMPLETE CBC W/AUTO DIFF WBC: CPT | Performed by: STUDENT IN AN ORGANIZED HEALTH CARE EDUCATION/TRAINING PROGRAM

## 2022-03-05 PROCEDURE — 80048 BASIC METABOLIC PNL TOTAL CA: CPT | Performed by: STUDENT IN AN ORGANIZED HEALTH CARE EDUCATION/TRAINING PROGRAM

## 2022-03-05 PROCEDURE — 25010000002 AMPICILLIN-SULBACTAM PER 1.5 G: Performed by: NURSE PRACTITIONER

## 2022-03-05 PROCEDURE — 84443 ASSAY THYROID STIM HORMONE: CPT | Performed by: HOSPITALIST

## 2022-03-05 PROCEDURE — 84100 ASSAY OF PHOSPHORUS: CPT | Performed by: STUDENT IN AN ORGANIZED HEALTH CARE EDUCATION/TRAINING PROGRAM

## 2022-03-05 PROCEDURE — 83735 ASSAY OF MAGNESIUM: CPT | Performed by: STUDENT IN AN ORGANIZED HEALTH CARE EDUCATION/TRAINING PROGRAM

## 2022-03-05 RX ORDER — AMOXICILLIN 250 MG
2 CAPSULE ORAL DAILY
Status: DISCONTINUED | OUTPATIENT
Start: 2022-03-05 | End: 2022-03-09 | Stop reason: HOSPADM

## 2022-03-05 RX ADMIN — PANTOPRAZOLE SODIUM 40 MG: 40 TABLET, DELAYED RELEASE ORAL at 09:29

## 2022-03-05 RX ADMIN — CETIRIZINE HYDROCHLORIDE 10 MG: 10 TABLET ORAL at 09:29

## 2022-03-05 RX ADMIN — AMPICILLIN SODIUM AND SULBACTAM SODIUM 3 G: 2; 1 INJECTION, POWDER, FOR SOLUTION INTRAMUSCULAR; INTRAVENOUS at 14:21

## 2022-03-05 RX ADMIN — AMPICILLIN SODIUM AND SULBACTAM SODIUM 3 G: 2; 1 INJECTION, POWDER, FOR SOLUTION INTRAMUSCULAR; INTRAVENOUS at 20:28

## 2022-03-05 RX ADMIN — ASPIRIN 81 MG: 81 TABLET, CHEWABLE ORAL at 09:29

## 2022-03-05 RX ADMIN — AMPICILLIN SODIUM AND SULBACTAM SODIUM 3 G: 2; 1 INJECTION, POWDER, FOR SOLUTION INTRAMUSCULAR; INTRAVENOUS at 03:26

## 2022-03-05 RX ADMIN — PANTOPRAZOLE SODIUM 40 MG: 40 TABLET, DELAYED RELEASE ORAL at 20:29

## 2022-03-05 RX ADMIN — DOCUSATE SODIUM 50MG AND SENNOSIDES 8.6MG 2 TABLET: 8.6; 5 TABLET, FILM COATED ORAL at 09:38

## 2022-03-05 RX ADMIN — IPRATROPIUM BROMIDE AND ALBUTEROL SULFATE 3 ML: 2.5; .5 SOLUTION RESPIRATORY (INHALATION) at 15:57

## 2022-03-05 RX ADMIN — ATORVASTATIN CALCIUM 40 MG: 20 TABLET, FILM COATED ORAL at 20:28

## 2022-03-05 RX ADMIN — Medication 10 ML: at 20:29

## 2022-03-05 RX ADMIN — Medication 10 ML: at 09:30

## 2022-03-05 RX ADMIN — FLUTICASONE PROPIONATE 2 SPRAY: 50 SPRAY, METERED NASAL at 09:29

## 2022-03-05 RX ADMIN — AMLODIPINE BESYLATE 5 MG: 5 TABLET ORAL at 09:29

## 2022-03-05 RX ADMIN — BUDESONIDE AND FORMOTEROL FUMARATE DIHYDRATE 2 PUFF: 160; 4.5 AEROSOL RESPIRATORY (INHALATION) at 21:52

## 2022-03-05 RX ADMIN — ENOXAPARIN SODIUM 40 MG: 100 INJECTION SUBCUTANEOUS at 12:51

## 2022-03-05 RX ADMIN — BUDESONIDE AND FORMOTEROL FUMARATE DIHYDRATE 2 PUFF: 160; 4.5 AEROSOL RESPIRATORY (INHALATION) at 07:00

## 2022-03-05 RX ADMIN — PROPRANOLOL HYDROCHLORIDE 80 MG: 80 CAPSULE, EXTENDED RELEASE ORAL at 09:29

## 2022-03-05 RX ADMIN — IPRATROPIUM BROMIDE AND ALBUTEROL SULFATE 3 ML: 2.5; .5 SOLUTION RESPIRATORY (INHALATION) at 11:12

## 2022-03-05 RX ADMIN — AMPICILLIN SODIUM AND SULBACTAM SODIUM 3 G: 2; 1 INJECTION, POWDER, FOR SOLUTION INTRAMUSCULAR; INTRAVENOUS at 09:28

## 2022-03-05 NOTE — PROGRESS NOTES
"DAILY PROGRESS NOTE  [facility]    Patient Identification:  Name: Junito Sorto  Age: 81 y.o.  Sex: male  :  1940  MRN: [id number]         Primary Care Physician: Fan Schulz MD      Subjective  Overall feels about the same but now he notes both a dry and wet cough.    Objective:  General Appearance:  Comfortable, well-appearing, in no acute distress and not in pain.    Vital signs: (most recent): Blood pressure 138/86, pulse 74, temperature 97.6 °F (36.4 °C), temperature source Oral, resp. rate 18, height 170.2 cm (67\"), weight 98.9 kg (218 lb), SpO2 98 %.    Lungs:  Normal effort and normal respiratory rate.  He is not in respiratory distress.  No stridor.  There are rales.  No decreased breath sounds, wheezes or rhonchi.  (Bibasilar rales with the right more prominent than the left. He does sound more wet today than he did yesterday.  On the head to JVD on the right.)  Heart: Normal rate.  Regular rhythm.    Extremities: There is no dependent edema.    Neurological: Patient is alert and oriented to person, place and time.    Skin:  Warm and dry.                Vital signs in last 24 hours:  Temp:  [97.2 °F (36.2 °C)-98.5 °F (36.9 °C)] 97.6 °F (36.4 °C)  Heart Rate:  [70-97] 74  Resp:  [18] 18  BP: (122-150)/(84-92) 138/86    Intake/Output:    Intake/Output Summary (Last 24 hours) at 3/5/2022 1840  Last data filed at 3/5/2022 1300  Gross per 24 hour   Intake 240 ml   Output 600 ml   Net -360 ml         Results from last 7 days   Lab Units 22  0618 22  0822 22  1509 22  0753   WBC 10*3/mm3 8.27 8.33 21.40* 14.61*   HEMOGLOBIN g/dL 12.0* 13.0 13.1 14.7   PLATELETS 10*3/mm3 198 176 208 245     Results from last 7 days   Lab Units 22  0618 22  0822 22  1509 22  0753   SODIUM mmol/L 140 140 139 139   POTASSIUM mmol/L 3.5 3.5 3.6 3.9   CHLORIDE mmol/L 103 105 101 103   CO2 mmol/L 29.0 26.0 25.0 24.3   BUN mg/dL 14 16 14 15   CREATININE " mg/dL 0.68* 0.64* 0.59* 0.65*   GLUCOSE mg/dL 107* 92 182* 131*   Estimated Creatinine Clearance: 81.1 mL/min (A) (by C-G formula based on SCr of 0.68 mg/dL (L)).  Results from last 7 days   Lab Units 03/05/22  0618 03/04/22  0822 03/03/22  1509 03/03/22  0753   CALCIUM mg/dL 9.4 9.3 9.1 9.8   ALBUMIN g/dL  --   --   --  3.60   MAGNESIUM mg/dL 1.8 1.9 1.7 1.9   PHOSPHORUS mg/dL 2.9 2.3* 2.9  --      Results from last 7 days   Lab Units 03/03/22  0753   ALBUMIN g/dL 3.60   BILIRUBIN mg/dL 1.6*   ALK PHOS U/L 92   AST (SGOT) U/L 29   ALT (SGPT) U/L 23       Assessment:    Pneumonia of right lower lobe due to infectious organism    Obesity    Paroxysmal atrial fibrillation (HCC)    Benign essential hypertension    HLD (hyperlipidemia)    Esophageal dysphagia    Gastroesophageal reflux disease with esophagitis    Hypoxia  Pulmonary infiltrate: Possible pneumonia. Pulmonary infiltrate with transient leukocytosis. Procalcitonin level normal. No fever sweats or chills. Presently on IV antibiotics from Unasyn. Recheck procalcitonin level in the morning.  Chronic cough:  ACE inhibitor discontinued. Bronchodilators been administered. Not a great deal of change per patient.   GERD/presbyesophagus: Continue PPI treatment.  Okay for outpatient follow-up.  GI input appreciated.  Paroxysmal atrial fibrillation: Continue rate control.  Presently not on anticoagulants.  Consider switching to a more beta-1 selective beta-blocker noting the wheezes.  Hypertension:  Obesity:  Hypoxemia:  Patient back on O2 at 1.5 L.    Plan:  Please see above.  Could fluid overload be playing a role? Minimal if any JVD and BNP is within normal limits. He has had an evaluation for atrial fibrillation in past but I cannot find an echocardiogram. We will recheck the BNP in the morning.  Discussed with patient and wife at bedside.    Trace Su MD  3/5/2022  18:40 EST

## 2022-03-05 NOTE — PLAN OF CARE
Problem: Adult Inpatient Plan of Care  Goal: Plan of Care Review  Outcome: Ongoing, Progressing  Flowsheets (Taken 3/5/2022 1800)  Progress: improving  Plan of Care Reviewed With: patient  Outcome Evaluation: VSS, pt on iv antibiotics,  Pt currently on 1L of O2,  pt is trending in positive way.  Hopefully home tomarrow.  Unclear right now if he will requir home o2, might need walking pulse ox before discharge.  Will continue to monitor.  Goal: Patient-Specific Goal (Individualized)  Outcome: Ongoing, Progressing  Goal: Absence of Hospital-Acquired Illness or Injury  Outcome: Ongoing, Progressing  Intervention: Identify and Manage Fall Risk  Recent Flowsheet Documentation  Taken 3/5/2022 1742 by Heather Dumont RN  Safety Promotion/Fall Prevention: activity supervised  Taken 3/5/2022 1600 by Heather Dumont RN  Safety Promotion/Fall Prevention: activity supervised  Taken 3/5/2022 1400 by Heather Dumont RN  Safety Promotion/Fall Prevention: safety round/check completed  Taken 3/5/2022 1200 by Heather Dumont RN  Safety Promotion/Fall Prevention:   activity supervised   safety round/check completed  Taken 3/5/2022 1000 by Heather Dumont RN  Safety Promotion/Fall Prevention:   activity supervised   safety round/check completed  Taken 3/5/2022 0909 by Heather Dumont RN  Safety Promotion/Fall Prevention: safety round/check completed  Intervention: Prevent Skin Injury  Recent Flowsheet Documentation  Taken 3/5/2022 1742 by Heather Dumont RN  Body Position: position changed independently  Taken 3/5/2022 1600 by Heather Dumont RN  Body Position: position changed independently  Taken 3/5/2022 1400 by Heather Dumont RN  Body Position: position changed independently  Taken 3/5/2022 1200 by Heather Dumont RN  Body Position: position changed independently  Taken 3/5/2022 1000 by Heather Dumont RN  Body Position: position changed independently  Taken 3/5/2022 0909 by Heather Dumont  RN  Body Position: supine, legs elevated  Intervention: Prevent and Manage VTE (Venous Thromboembolism) Risk  Recent Flowsheet Documentation  Taken 3/5/2022 1742 by Heather Dumont RN  Activity Management: up in chair  Taken 3/5/2022 1600 by Heather Dumont RN  Activity Management: up in chair  Taken 3/5/2022 1400 by Heather Dumont RN  Activity Management: up in chair  Taken 3/5/2022 1200 by Heather Dumont RN  Activity Management: up in chair  Taken 3/5/2022 1000 by Heather Dumont RN  Activity Management:   ambulated to bathroom   ambulated in room  Taken 3/5/2022 0909 by Heather Dumont RN  Activity Management: activity adjusted per tolerance  Intervention: Prevent Infection  Recent Flowsheet Documentation  Taken 3/5/2022 1742 by Heather Dumont RN  Infection Prevention: single patient room provided  Taken 3/5/2022 1600 by Heather Dumont RN  Infection Prevention: single patient room provided  Taken 3/5/2022 1400 by Heather Dumont RN  Infection Prevention: single patient room provided  Taken 3/5/2022 1000 by Heather Dumont RN  Infection Prevention: single patient room provided  Taken 3/5/2022 0909 by Heather Dumont RN  Infection Prevention: single patient room provided  Goal: Optimal Comfort and Wellbeing  Outcome: Ongoing, Progressing  Goal: Readiness for Transition of Care  Outcome: Ongoing, Progressing     Problem: Fluid Imbalance (Pneumonia)  Goal: Fluid Balance  Outcome: Ongoing, Progressing     Problem: Infection (Pneumonia)  Goal: Resolution of Infection Signs and Symptoms  Outcome: Ongoing, Progressing     Problem: Respiratory Compromise (Pneumonia)  Goal: Effective Oxygenation and Ventilation  Outcome: Ongoing, Progressing  Intervention: Promote Airway Secretion Clearance  Recent Flowsheet Documentation  Taken 3/5/2022 1400 by Heather Dumont RN  Cough And Deep Breathing: done independently per patient  Taken 3/5/2022 0909 by Heather Dumont RN  Cough And Deep  Breathing: done independently per patient  Intervention: Optimize Oxygenation and Ventilation  Recent Flowsheet Documentation  Taken 3/5/2022 1742 by Heather Dumont RN  Head of Bed (HOB) Positioning: HOB at 20-30 degrees  Taken 3/5/2022 1600 by Heather Dumont RN  Head of Bed (HOB) Positioning: HOB at 20-30 degrees  Taken 3/5/2022 1200 by Heather Dumont RN  Head of Bed (HOB) Positioning: HOB elevated  Taken 3/5/2022 1000 by Heather Dumont RN  Head of Bed (HOB) Positioning: HOB at 20-30 degrees  Taken 3/5/2022 0909 by Heather Dumont RN  Head of Bed (HOB) Positioning: HOB at 30-45 degrees     Problem: Fall Injury Risk  Goal: Absence of Fall and Fall-Related Injury  Outcome: Ongoing, Progressing  Intervention: Identify and Manage Contributors  Recent Flowsheet Documentation  Taken 3/5/2022 1742 by Heather Dumont RN  Medication Review/Management: medications reviewed  Taken 3/5/2022 1600 by Heather Dumont RN  Medication Review/Management: medications reviewed  Taken 3/5/2022 1400 by Heather Dumont RN  Medication Review/Management: medications reviewed  Taken 3/5/2022 1200 by Heather Dumont RN  Medication Review/Management:   medications reviewed   dosing adjusted   high-risk medications identified  Taken 3/5/2022 1000 by Heather Dumont RN  Medication Review/Management: medications reviewed  Taken 3/5/2022 0909 by Heather Dumont RN  Medication Review/Management: medications reviewed  Intervention: Promote Injury-Free Environment  Recent Flowsheet Documentation  Taken 3/5/2022 1742 by Heather Dumont RN  Safety Promotion/Fall Prevention: activity supervised  Taken 3/5/2022 1600 by Heather Dumont RN  Safety Promotion/Fall Prevention: activity supervised  Taken 3/5/2022 1400 by Heather Dumont RN  Safety Promotion/Fall Prevention: safety round/check completed  Taken 3/5/2022 1200 by Heather Dumont RN  Safety Promotion/Fall Prevention:   activity supervised   safety  round/check completed  Taken 3/5/2022 1000 by Heather Dumont RN  Safety Promotion/Fall Prevention:   activity supervised   safety round/check completed  Taken 3/5/2022 0909 by Heather Dumont RN  Safety Promotion/Fall Prevention: safety round/check completed     Problem: Adjustment to Illness (Heart Failure)  Goal: Optimal Coping  Outcome: Ongoing, Progressing  Intervention: Support Psychosocial Response  Recent Flowsheet Documentation  Taken 3/5/2022 0909 by Heather Dumont RN  Family/Support System Care: self-care encouraged     Problem: Cardiac Output Decreased (Heart Failure)  Goal: Optimal Cardiac Output  Outcome: Ongoing, Progressing     Problem: Dysrhythmia (Heart Failure)  Goal: Stable Heart Rate and Rhythm  Outcome: Ongoing, Progressing     Problem: Fluid Imbalance (Heart Failure)  Goal: Fluid Balance  Outcome: Ongoing, Progressing     Problem: Functional Ability Impaired (Heart Failure)  Goal: Optimal Functional Ability  Outcome: Ongoing, Progressing  Intervention: Optimize Functional Ability  Recent Flowsheet Documentation  Taken 3/5/2022 1742 by Heather Dumont RN  Activity Management: up in chair  Taken 3/5/2022 1600 by Heather Dumont RN  Activity Management: up in chair  Taken 3/5/2022 1400 by Heather Dumont RN  Activity Management: up in chair  Taken 3/5/2022 1200 by Heather Dumont RN  Activity Management: up in chair  Taken 3/5/2022 1000 by Heather Dumont RN  Activity Management:   ambulated to bathroom   ambulated in room  Taken 3/5/2022 0909 by Heather Dumont RN  Activity Management: activity adjusted per tolerance     Problem: Oral Intake Inadequate (Heart Failure)  Goal: Optimal Nutrition Intake  Outcome: Ongoing, Progressing     Problem: Respiratory Compromise (Heart Failure)  Goal: Effective Oxygenation and Ventilation  Outcome: Ongoing, Progressing  Intervention: Promote Airway Secretion Clearance  Recent Flowsheet Documentation  Taken 3/5/2022 1400 by  Heather Dumont, RN  Cough And Deep Breathing: done independently per patient  Taken 3/5/2022 0909 by Heather Dumont, RN  Cough And Deep Breathing: done independently per patient     Problem: Sleep Disordered Breathing (Heart Failure)  Goal: Effective Breathing Pattern During Sleep  Outcome: Ongoing, Progressing     Problem: Dysrhythmia  Goal: Normalized Cardiac Rhythm  Outcome: Ongoing, Progressing   Goal Outcome Evaluation:  Plan of Care Reviewed With: patient        Progress: improving  Outcome Evaluation: VSS, pt on iv antibiotics,  Pt currently on 1L of O2,  pt is trending in positive way.  Hopefully home tom[place].  Unclear right now if he will requir home o2, might need walking pulse ox before discharge.  Will continue to monitor.

## 2022-03-05 NOTE — PLAN OF CARE
Goal Outcome Evaluation:           Progress: no change  Outcome Evaluation: VSS. Pt has frequent dry cough. No complaint of pain this shift. Pt sat in chair until ready for bed.  Will continue to monitor.  Problem: Adult Inpatient Plan of Care  Goal: Plan of Care Review  Recent Flowsheet Documentation  Taken 3/5/2022 0341 by Marcela Abarca, RN  Progress: no change  Outcome Evaluation: VSS. Pt has frequent dry cough. No complaint of pain this shift. Pt sat in chair until ready for bed.  Will continue to monitor.  3/5/2022 0046 by Marcela Abarca RN  Outcome: Unable to Meet, Plan Revised  Goal: Patient-Specific Goal (Individualized)  Outcome: Unable to Meet, Plan Revised  Goal: Absence of Hospital-Acquired Illness or Injury  Outcome: Unable to Meet, Plan Revised  Goal: Optimal Comfort and Wellbeing  Outcome: Unable to Meet, Plan Revised  Goal: Readiness for Transition of Care  Outcome: Unable to Meet, Plan Revised     Problem: Fluid Imbalance (Pneumonia)  Goal: Fluid Balance  Outcome: Unable to Meet, Plan Revised     Problem: Infection (Pneumonia)  Goal: Resolution of Infection Signs and Symptoms  Outcome: Unable to Meet, Plan Revised     Problem: Respiratory Compromise (Pneumonia)  Goal: Effective Oxygenation and Ventilation  Outcome: Unable to Meet, Plan Revised     Problem: Fall Injury Risk  Goal: Absence of Fall and Fall-Related Injury  Outcome: Unable to Meet, Plan Revised

## 2022-03-05 NOTE — PROGRESS NOTES
[facility] INPATIENT PROGRESS NOTE         [address]    3/5/2022      PATIENT IDENTIFICATION:  Name: Junito Sorto ADMIT: 3/3/2022   : 1940  PCP: Fan Schulz MD    MRN: [id number] LOS: 2 days   AGE/SEX: 81 y.o. male  ROOM: [facility]                     LOS 2    Reason for visit: Follow-up pneumonia      SUBJECTIVE:      Says that he feels his breathing has improved compared to yesterday.  Cough has improved.  Saturations on 3 L at time of visit 89%.  Scattered coarse breath sounds.  No significant wheezing on auscultation.  He complains of concentrated and darker urine.  He is worried about this related to his known history of prostate problems.  I told him if this worsens we can always get his urologist, Dr. Sergio Bedolla involved.  I am seeing the patient for the first time today.  All patient problems are new to me.      Objective   OBJECTIVE:    Vital Sign Min/Max for last 24 hours  Temp  Min: 96.9 °F (36.1 °C)  Max: 98.5 °F (36.9 °C)   BP  Min: 122/84  Max: 145/92   Pulse  Min: 70  Max: 97   Resp  Min: 16  Max: 18   SpO2  Min: 92 %  Max: 98 %   No data recorded   No data recorded                         Body mass index is 34.14 kg/m².    Intake/Output Summary (Last 24 hours) at 3/5/2022 0957  Last data filed at 3/5/2022 0500  Gross per 24 hour   Intake 480 ml   Output 1200 ml   Net -720 ml         Exam:  GEN:  No distress, appears stated age  EYES:   PERRL, anicteric sclerae  ENT:    External ears/nose normal, OP clear  NECK:  No adenopathy, midline trachea  LUNGS: Normal chest on inspection, palpation and coarse breath sounds bilaterally  on auscultation  CV:  Normal S1S2, without murmur  ABD:  Nontender, nondistended, no hepatosplenomegaly, +BS  EXT:  No edema.  No cyanosis or clubbing.  No mottling and normal cap refill.    Assessment     Scheduled meds:  amLODIPine, 5 mg, Oral, Q24H  ampicillin-sulbactam, 3 g, Intravenous, Q6H  aspirin, 81 mg, Oral, Daily  atorvastatin, 40  mg, Oral, Nightly  budesonide-formoterol, 2 puff, Inhalation, BID - RT  cetirizine, 10 mg, Oral, Daily  enoxaparin, 40 mg, Subcutaneous, Q24H  fluticasone, 2 spray, Nasal, Daily  ipratropium-albuterol, 3 mL, Nebulization, 4x Daily - RT  pantoprazole, 40 mg, Oral, BID  propranolol LA, 80 mg, Oral, Daily  senna-docusate sodium, 2 tablet, Oral, Daily  sodium chloride, 10 mL, Intravenous, Q12H      IV meds:                         Data Review:  Results from last 7 days   Lab Units 03/05/22  0618 03/04/22  0822 03/03/22  1509 03/03/22  0753   SODIUM mmol/L 140 140 139 139   POTASSIUM mmol/L 3.5 3.5 3.6 3.9   CHLORIDE mmol/L 103 105 101 103   CO2 mmol/L 29.0 26.0 25.0 24.3   BUN mg/dL 14 16 14 15   CREATININE mg/dL 0.68* 0.64* 0.59* 0.65*   GLUCOSE mg/dL 107* 92 182* 131*   CALCIUM mg/dL 9.4 9.3 9.1 9.8         Estimated Creatinine Clearance: 81.1 mL/min (A) (by C-G formula based on SCr of 0.68 mg/dL (L)).  Results from last 7 days   Lab Units 03/05/22  0618 03/04/22  0822 03/03/22  1509 03/03/22  0753   WBC 10*3/mm3 8.27 8.33 21.40* 14.61*   HEMOGLOBIN g/dL 12.0* 13.0 13.1 14.7   PLATELETS 10*3/mm3 198 176 208 245         Results from last 7 days   Lab Units 03/03/22  0753   ALT (SGPT) U/L 23   AST (SGOT) U/L 29         Results from last 7 days   Lab Units 03/03/22  0851 03/03/22  0753   PROCALCITONIN ng/mL  --  0.07   LACTATE mmol/L 1.7  --          No results found for: HGBA1C, POCGLU    Chest x-ray 3/3 reviewed shows cardiomegaly unchanged from previous imaging.  Mild vascular congestion and airspace disease right lung base consistent with pneumonia.          Microbiology reviewed              Active Hospital Problems    Diagnosis  POA   • **Pneumonia of right lower lobe due to infectious organism [J18.9]  Yes   • Hypoxia [R09.02]  Yes   • Gastroesophageal reflux disease with esophagitis [K21.00]  Yes   • Esophageal dysphagia [R13.19]  Yes   • Paroxysmal atrial fibrillation (HCC) [I48.0]  Yes   • Benign essential  hypertension [I10]  Yes   • HLD (hyperlipidemia) [E78.5]  Yes   • Obesity [E66.9]  Yes      Resolved Hospital Problems   No resolved problems to display.         ASSESSMENT:    Right lower lobe pneumonia  Asthma: Worse secondary to pneumonia  GERD  Reflux esophagitis  History of prostate cancer        PLAN:    Encourage pulmonary toilet.  Continue antibiotics.  Continue Symbicort for asthma and scheduled bronchodilators to help with clearance as well.      Aden Ocasio MD  Pulmonary and Critical Care Medicine  [facility], [facility]  3/5/2022    09:57 EST

## 2022-03-05 NOTE — CONSULTS
Referring Provider: Dr. Su  Reason for Consultation: Pneumonia/wheezing    Patient Care Team:  Fan Schulz MD as PCP - General  Steven Cadena MD as Consulting Physician (Gastroenterology)    Chief complaint:   Shortness of breath and cough    History of present illness:    Subjective   This is an 81-year-old male patient, former minimal smoker (few years in the college).  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  However, he described episodes of what it looks like bronchitis occurring frequently, about 2-4 times a year usually treated with Z-Adrián and steroid and sometimes inhalers.  He has personal history of allergy, previously seen by allergist about 3 years ago and family history of both asthma and allergy.  He had elevated eosinophils levels in the past.    He presented to the hospital yesterday for shortness of breath and cough.  He said that it originally started around [place] when he was diagnosed with respiratory illness and treated with 2 courses of antibiotics and steroid.  Symptoms improved but then as soon as he stops treatment, his symptoms came back and has been gradually getting worse lately.  He could not tell me whether there was an acute flareup lately and it seems like his symptoms are gradually getting worse.  He had cough, mostly dry, not producing phlegm and associated with shortness of breath on activities and wheezing.  Symptoms improved with the use of nebulizers since admission but he continues to have significant dry cough.    He denies symptoms of reflux but reported prior endoscopy showing esophagitis and signs consistent with reflux.  He also describes an event in the past when he had a piece of meat stuck in his esophagus requiring endoscopy but no symptoms of dysphagia usually and no choking or aspiration.  Has esophagogram was negative for aspiration.      Labs  reviewed:  Respiratory culture negative; urine strep and Legionella antigen negative; pro-Don negative; WBC 21 with neutrophils of 95% on presentation.  WBC today 8.  Bicarb 25    Review of Systems  Constitutional: No fever or chills.   ENMT: No sinus congestion  Cardiovascular: No chest pain, palpitation or legs swelling.    Respiratory: See above  Gastrointestinal: No constipation, nausea vomiting or abdominal pain but reported recent issues with diarrhea last week.  Neurology: No headache, weakness, numbness or dizziness.   Musculoskeletal: No joints pain, stiffness or swelling.   Psychiatry: No depression.  Genitourinary: No dysuria or frequent urination  Endo: No weight changes. No cold or warm intolerance.  Lymphatic: No swollen glands.  Integumentary: No rash.    History  Past Medical History:   Diagnosis Date   • Arthritis    • At risk for obstructive sleep apnea     5   • Atrial fibrillation (HCC)     not followed by Cardiologist just PCP   • Bladder outlet obstruction 9/11/2019   • BPH (benign prostatic hyperplasia)    • Colon polyp    • Coronary artery disease Few years    Afib   • Dysphagia    • Eczema    • GERD (gastroesophageal reflux disease) 2013    Mentioned as possible   • Hiatal hernia    • Hyperlipidemia    • Hypertension    • Low back pain    • Prostate cancer (HCC)    • Skin cancer    ,   Past Surgical History:   Procedure Laterality Date   • CATARACT EXTRACTION, BILATERAL     • COLONOSCOPY  unknown   • CYSTOSCOPY TRANSURETHRAL RESECTION OF PROSTATE N/A 9/11/2019    Procedure: CYSTOSCOPY TRANSURETHRAL RESECTION OF PROSTATE;  Surgeon: Chip Bedolla MD;  [facility];  Service: Urology   • ENDOSCOPY N/A 10/31/2018    LA Grade C reflux esophagitis, HH, erythematous mucosa in the antrum. Path: Gastritis, esophagitis.    • EPIDURAL BLOCK     • ESOPHAGEAL DILATATION     • PROSTATE SURGERY  11/06/2014   • SKIN CANCER EXCISION      multiple, over that last few years   • SKIN CANCER  EXCISION Right 09/03/2019    FACE,    • TONSILLECTOMY      around 1950   • UPPER GASTROINTESTINAL ENDOSCOPY  2013   ,   Family History   Problem Relation Age of Onset   • Cancer Mother         pancreatic   • Liver disease Mother    • Cancer Father         prostate, bladder, colon, lymphoma, leukemia   • Colon cancer Father    • Cancer Brother         prostate   • Cancer Paternal Grandfather         prostate   • Malig Hyperthermia Neg Hx    ,   Social History     Socioeconomic History   • Marital status:    Tobacco Use   • Smoking status: Former Smoker     Types: Pipe   • Smokeless tobacco: Never Used   • Tobacco comment: only in college   Vaping Use   • Vaping Use: Never used   Substance and Sexual Activity   • Alcohol use: Yes     Types: 3 Glasses of wine per week     Comment: SOCIALLY   • Drug use: No   • Sexual activity: Defer     Comment: 50 years ago     E-cigarette/Vaping   • E-cigarette/Vaping Use Never User      E-cigarette/Vaping Substances     E-cigarette/Vaping Devices       ,   Medications Prior to Admission   Medication Sig Dispense Refill Last Dose   • Albuterol Sulfate (PROAIR HFA IN) Inhale 2 puffs As Needed.   3/2/2022 at Unknown time   • amLODIPine-benazepril (LOTREL 5-20) 5-20 MG per capsule TAKE ONE CAPSULE BY MOUTH EVERY MORNING (Patient taking differently: Take 1 capsule by mouth Daily.) 90 capsule 3 3/2/2022 at Unknown time   • aspirin 81 MG chewable tablet Chew 81 mg Daily.   3/2/2022 at Unknown time   • atorvastatin (LIPITOR) 40 MG tablet TAKE ONE TABLET BY MOUTH DAILY (Patient taking differently: Take 40 mg by mouth Every Night.) 90 tablet 3 3/2/2022 at Unknown time   • fluticasone (Flonase) 50 MCG/ACT nasal spray 2 sprays into the nostril(s) as directed by provider Daily. 15.8 mL 0 3/3/2022 at Unknown time   • guaiFENesin 200 MG tablet Take 400 mg by mouth Every 4 (Four) Hours As Needed for Cough.   3/3/2022 at Unknown time   • loratadine (Loradamed) 10 MG tablet Take 10 mg by mouth  Daily.   3/3/2022 at Unknown time   • pantoprazole (PROTONIX) 40 MG EC tablet Take 1 tablet by mouth 2 (Two) Times a Day. 60 tablet 5 3/2/2022 at Unknown time   • propranolol LA (INDERAL LA) 80 MG 24 hr capsule TAKE ONE CAPSULE BY MOUTH EVERY MORNING (Patient taking differently: Take 80 mg by mouth Daily.) 90 capsule 3 3/2/2022 at Unknown time   , Scheduled Meds:  [START ON 3/5/2022] amLODIPine, 5 mg, Oral, Q24H  ampicillin-sulbactam, 3 g, Intravenous, Q6H  aspirin, 81 mg, Oral, Daily  atorvastatin, 40 mg, Oral, Nightly  budesonide-formoterol, 2 puff, Inhalation, BID - RT  cetirizine, 10 mg, Oral, Daily  enoxaparin, 40 mg, Subcutaneous, Q24H  fluticasone, 2 spray, Nasal, Daily  ipratropium-albuterol, 3 mL, Nebulization, 4x Daily - RT  pantoprazole, 40 mg, Oral, BID  propranolol LA, 80 mg, Oral, Daily  sodium chloride, 10 mL, Intravenous, Q12H    , Continuous Infusions:    and Allergies:  Dilaudid [hydromorphone hcl]    Objective     Vital Signs   Temp:  [96.9 °F (36.1 °C)-98 °F (36.7 °C)] 97.6 °F (36.4 °C)  Heart Rate:  [62-79] 73  Resp:  [16-19] 18  BP: (113-128)/(71-89) 125/83    PPE used per hospital policy    Physical Exam:  Constitutional: Not in acute distress.  Eyes: Injected conjunctivae, EOMI. pupils equal reactive to light.  ENMT: Mcneil 3. No oral thrush.  Moist tongue.  External ears normal.  Neck: Large. Trachea midline. No thyromegaly  Heart: RRR, no murmur  Lungs: Equal but diminished air entry throughout.  Coarse breath sounds at the right base.  No wheezing.  No labored breathing.     Abdomen: Obese. Soft. No tenderness or dullness. No HSM.  Extremities: No cyanosis, clubbing or pitting edema.  Warm extremities and well-perfused.  Neuro: Conscious, alert, oriented x3.  Strength 5/5 in arms.  Psych: Appropriate mood and affect.    Integumentary: No rash.  Normal skin turgor  Lymphatic: No palpable cervical or supraclavicular lymph nodes.      Diagnostic imaging:  I personally and independently  reviewed the following images:     CXR 3/22: Right lower lobe consolidation.      Assessment   1. Right lower lobe pneumonia  2. Recurrent bronchitis/asthma  3. Suspected asthma  4. GERD  5. Reflux esophagitis, grade C    Recommendations:    Antibiotic therapy for pneumonia: Currently on Unasyn.  Agree with this choice but could also de-escalate to Rocephin as there is no evidence of aspiration by work-up.  On discharge, can use third-generation cephalosporin.    DuoNeb 4 times a day and Symbicort 2 puffs twice daily.  On discharge, can continue Symbicort and DuoNeb as needed until seen at the office.  He will require PFT as outpatient.  I discussed that with him and he is agreeable.    Treatment of reflux with omeprazole.  Recommend to continue as outpatient as untreated reflux could precipitate asthma    Thank you for the consultation.  Okay to discharge    Essence Argueta MD  03/04/22  19:14 EST

## 2022-03-06 LAB
BACTERIA SPEC RESP CULT: NORMAL
GRAM STN SPEC: NORMAL
MRSA DNA SPEC QL NAA+PROBE: NORMAL
PROCALCITONIN SERPL-MCNC: 0.5 NG/ML (ref 0–0.25)

## 2022-03-06 PROCEDURE — 94664 DEMO&/EVAL PT USE INHALER: CPT

## 2022-03-06 PROCEDURE — 87205 SMEAR GRAM STAIN: CPT | Performed by: INTERNAL MEDICINE

## 2022-03-06 PROCEDURE — 94799 UNLISTED PULMONARY SVC/PX: CPT

## 2022-03-06 PROCEDURE — 87641 MR-STAPH DNA AMP PROBE: CPT | Performed by: HOSPITALIST

## 2022-03-06 PROCEDURE — 25010000002 ENOXAPARIN PER 10 MG: Performed by: STUDENT IN AN ORGANIZED HEALTH CARE EDUCATION/TRAINING PROGRAM

## 2022-03-06 PROCEDURE — 94761 N-INVAS EAR/PLS OXIMETRY MLT: CPT

## 2022-03-06 PROCEDURE — 84145 PROCALCITONIN (PCT): CPT | Performed by: HOSPITALIST

## 2022-03-06 PROCEDURE — 25010000002 VANCOMYCIN PER 500 MG: Performed by: HOSPITALIST

## 2022-03-06 PROCEDURE — 25010000002 AMPICILLIN-SULBACTAM PER 1.5 G: Performed by: NURSE PRACTITIONER

## 2022-03-06 PROCEDURE — 25010000002 CEFTRIAXONE PER 250 MG: Performed by: HOSPITALIST

## 2022-03-06 PROCEDURE — 87070 CULTURE OTHR SPECIMN AEROBIC: CPT | Performed by: INTERNAL MEDICINE

## 2022-03-06 RX ORDER — VANCOMYCIN HYDROCHLORIDE 1 G/200ML
1000 INJECTION, SOLUTION INTRAVENOUS EVERY 12 HOURS
Status: DISCONTINUED | OUTPATIENT
Start: 2022-03-06 | End: 2022-03-07

## 2022-03-06 RX ADMIN — PANTOPRAZOLE SODIUM 40 MG: 40 TABLET, DELAYED RELEASE ORAL at 20:03

## 2022-03-06 RX ADMIN — ASPIRIN 81 MG: 81 TABLET, CHEWABLE ORAL at 11:32

## 2022-03-06 RX ADMIN — IPRATROPIUM BROMIDE AND ALBUTEROL SULFATE 3 ML: 2.5; .5 SOLUTION RESPIRATORY (INHALATION) at 12:24

## 2022-03-06 RX ADMIN — IPRATROPIUM BROMIDE AND ALBUTEROL SULFATE 3 ML: 2.5; .5 SOLUTION RESPIRATORY (INHALATION) at 15:55

## 2022-03-06 RX ADMIN — Medication 10 ML: at 11:34

## 2022-03-06 RX ADMIN — BUDESONIDE AND FORMOTEROL FUMARATE DIHYDRATE 2 PUFF: 160; 4.5 AEROSOL RESPIRATORY (INHALATION) at 20:06

## 2022-03-06 RX ADMIN — ENOXAPARIN SODIUM 40 MG: 100 INJECTION SUBCUTANEOUS at 11:31

## 2022-03-06 RX ADMIN — ATORVASTATIN CALCIUM 40 MG: 20 TABLET, FILM COATED ORAL at 20:03

## 2022-03-06 RX ADMIN — BUDESONIDE AND FORMOTEROL FUMARATE DIHYDRATE 2 PUFF: 160; 4.5 AEROSOL RESPIRATORY (INHALATION) at 08:41

## 2022-03-06 RX ADMIN — CEFTRIAXONE 1 G: 1 INJECTION, POWDER, FOR SOLUTION INTRAMUSCULAR; INTRAVENOUS at 16:23

## 2022-03-06 RX ADMIN — CETIRIZINE HYDROCHLORIDE 10 MG: 10 TABLET ORAL at 11:32

## 2022-03-06 RX ADMIN — Medication 10 ML: at 20:04

## 2022-03-06 RX ADMIN — FLUTICASONE PROPIONATE 2 SPRAY: 50 SPRAY, METERED NASAL at 11:33

## 2022-03-06 RX ADMIN — VANCOMYCIN HYDROCHLORIDE 1000 MG: 1 INJECTION, SOLUTION INTRAVENOUS at 17:52

## 2022-03-06 RX ADMIN — AMPICILLIN SODIUM AND SULBACTAM SODIUM 3 G: 2; 1 INJECTION, POWDER, FOR SOLUTION INTRAMUSCULAR; INTRAVENOUS at 11:32

## 2022-03-06 RX ADMIN — AMLODIPINE BESYLATE 5 MG: 5 TABLET ORAL at 11:32

## 2022-03-06 RX ADMIN — PROPRANOLOL HYDROCHLORIDE 80 MG: 80 CAPSULE, EXTENDED RELEASE ORAL at 11:32

## 2022-03-06 RX ADMIN — PANTOPRAZOLE SODIUM 40 MG: 40 TABLET, DELAYED RELEASE ORAL at 11:32

## 2022-03-06 RX ADMIN — AMPICILLIN SODIUM AND SULBACTAM SODIUM 3 G: 2; 1 INJECTION, POWDER, FOR SOLUTION INTRAMUSCULAR; INTRAVENOUS at 03:10

## 2022-03-06 NOTE — PROGRESS NOTES
"[facility] Clinical Pharmacy Services: Vancomycin Pharmacokinetic Initial Consult Note    Junito Sorto is a 81 y.o. male who is on day 1/5 of pharmacy to dose vancomycin.    Indication: PNA  Consulting Provider: Trace Su MD   Planned Duration of Therapy: 5 days  Loading Dose Ordered or Given: None  MRSA PCR performed: Ordered; Result: pending  Culture/Source: respiratory cx - GPC clusters 1+   Target: -600 mg/L.hr   Other Antimicrobials: Ceftriaxone     Vitals/Labs  Ht: 170.2 cm (67\"); Wt: 98.9 kg (218 lb)  Temp Readings from Last 1 Encounters:   03/06/22 97.6 °F (36.4 °C) (Oral)    Estimated Creatinine Clearance: 81.1 mL/min (A) (by C-G formula based on SCr of 0.68 mg/dL (L)).       Results from last 7 days   Lab Units 03/05/22  0618 03/04/22  0822 03/03/22  1509   CREATININE mg/dL 0.68* 0.64* 0.59*   WBC 10*3/mm3 8.27 8.33 21.40*     Assessment/Plan:    1. Vancomycin Dose:   1000 mg IV every  12  Hours. No loading dose given as Junito is not hypotensive or febrile.   2. Predictive AUC level for the dose ordered is 400-475 mg/L.hr, which is within the target of 400-600 mg/L.hr   3. Serum levels will be deferred until result of MRSA nares returns. No current concern for nephrotoxicity in the interim.      Pharmacy will follow patient's kidney function and will adjust doses and obtain levels as necessary. Thank you for involving pharmacy in this patient's care. Please contact pharmacy with any questions or concerns.                           Virgilio Becker, PharmD, [facility]  Clinical Infectious Diseases Pharmacy Specialist     "

## 2022-03-06 NOTE — PROGRESS NOTES
[facility] INPATIENT PROGRESS NOTE         [address]    3/6/2022      PATIENT IDENTIFICATION:  Name: Junito Sorto ADMIT: 3/3/2022   : 1940  PCP: Fan Schulz MD    MRN: [id number] LOS: 3 days   AGE/SEX: 81 y.o. male  ROOM: [facility]                     LOS 3    Reason for visit: Follow-up pneumonia      SUBJECTIVE:      No new complaints.  Still with cough.  No cardiac chest pain, nausea or vomiting.  Oxygen down to 1.5 L/min.  Nursing noted that patient coughed up thick sputum with blood in it.  Sending for culture.    Objective   OBJECTIVE:    Vital Sign Min/Max for last 24 hours  Temp  Min: 97.2 °F (36.2 °C)  Max: 98.2 °F (36.8 °C)   BP  Min: 138/86  Max: 155/98   Pulse  Min: 72  Max: 84   Resp  Min: 18  Max: 20   SpO2  Min: 91 %  Max: 98 %   No data recorded   No data recorded                         Body mass index is 34.14 kg/m².    Intake/Output Summary (Last 24 hours) at 3/6/2022 0824  Last data filed at 3/6/2022 0519  Gross per 24 hour   Intake 480 ml   Output --   Net 480 ml         Exam:  GEN:  No distress, appears stated age  EYES:   PERRL, anicteric sclerae  ENT:    External ears/nose normal, OP clear  NECK:  No adenopathy, midline trachea  LUNGS: Normal chest on inspection, palpation and coarse breath sounds bilaterally  on auscultation  CV:  Normal S1S2, without murmur  ABD:  Nontender, nondistended, no hepatosplenomegaly, +BS  EXT:  No edema.  No cyanosis or clubbing.  No mottling and normal cap refill.    Assessment     Scheduled meds:  amLODIPine, 5 mg, Oral, Q24H  ampicillin-sulbactam, 3 g, Intravenous, Q6H  aspirin, 81 mg, Oral, Daily  atorvastatin, 40 mg, Oral, Nightly  budesonide-formoterol, 2 puff, Inhalation, BID - RT  cetirizine, 10 mg, Oral, Daily  enoxaparin, 40 mg, Subcutaneous, Q24H  fluticasone, 2 spray, Nasal, Daily  ipratropium-albuterol, 3 mL, Nebulization, 4x Daily - RT  pantoprazole, 40 mg, Oral, BID  propranolol LA, 80 mg, Oral,  Daily  senna-docusate sodium, 2 tablet, Oral, Daily  sodium chloride, 10 mL, Intravenous, Q12H      IV meds:                         Data Review:  Results from last 7 days   Lab Units 03/05/22  0618 03/04/22  0822 03/03/22  1509 03/03/22  0753   SODIUM mmol/L 140 140 139 139   POTASSIUM mmol/L 3.5 3.5 3.6 3.9   CHLORIDE mmol/L 103 105 101 103   CO2 mmol/L 29.0 26.0 25.0 24.3   BUN mg/dL 14 16 14 15   CREATININE mg/dL 0.68* 0.64* 0.59* 0.65*   GLUCOSE mg/dL 107* 92 182* 131*   CALCIUM mg/dL 9.4 9.3 9.1 9.8         Estimated Creatinine Clearance: 81.1 mL/min (A) (by C-G formula based on SCr of 0.68 mg/dL (L)).  Results from last 7 days   Lab Units 03/05/22  0618 03/04/22  0822 03/03/22  1509 03/03/22  0753   WBC 10*3/mm3 8.27 8.33 21.40* 14.61*   HEMOGLOBIN g/dL 12.0* 13.0 13.1 14.7   PLATELETS 10*3/mm3 198 176 208 245         Results from last 7 days   Lab Units 03/03/22  0753   ALT (SGPT) U/L 23   AST (SGOT) U/L 29         Results from last 7 days   Lab Units 03/06/22  0633 03/03/22  0851 03/03/22  0753   PROCALCITONIN ng/mL 0.50*  --  0.07   LACTATE mmol/L  --  1.7  --          No results found for: HGBA1C, POCGLU    Chest x-ray 3/3 reviewed shows cardiomegaly unchanged from previous imaging.  Mild vascular congestion and airspace disease right lung base consistent with pneumonia.      Esophagram 3/4 reviewed: No aspiration or penetration noted.  Significant intraesophageal reflux and stasis.  Previous narrowing of the distal esophagus is no longer present.  History of esophageal dilation.        Microbiology reviewed  Scant growth of normal respiratory mohini on sputum culture.            Active Hospital Problems    Diagnosis  POA   • **Pneumonia of right lower lobe due to infectious organism [J18.9]  Yes   • Hypoxia [R09.02]  Yes   • Gastroesophageal reflux disease with esophagitis [K21.00]  Yes   • Esophageal dysphagia [R13.19]  Yes   • Paroxysmal atrial fibrillation (HCC) [I48.0]  Yes   • Benign essential  hypertension [I10]  Yes   • HLD (hyperlipidemia) [E78.5]  Yes   • Obesity [E66.9]  Yes      Resolved Hospital Problems   No resolved problems to display.         ASSESSMENT:    Right lower lobe pneumonia  Asthma exacerbation: secondary to pneumonia  GERD  Reflux esophagitis  History of prostate cancer  Hemoptysis      PLAN:    Encourage pulmonary toilet.  Continue antibiotics.  Recommend repeat CT scan in 6 to 8 weeks to assure resolution.  Continue Symbicort for asthma and scheduled bronchodilators to help with clearance as well.  Noted patient coughed up thick sputum with blood in it this morning per nursing staff.  Sending for culture.      Aden Ocasio MD  Pulmonary and Critical Care Medicine  [facility], [facility]  3/6/2022    08:24 EST

## 2022-03-06 NOTE — PLAN OF CARE
Problem: Adult Inpatient Plan of Care  Goal: Plan of Care Review  Recent Flowsheet Documentation  Taken 3/6/2022 0350 by Faviola Martinez RN  Progress: improving  Plan of Care Reviewed With: patient  Outcome Evaluation: VSS, IV ABX, O2 @ 1L NC. Encouraged pulmonary hygiene. No c/o pain on current shift. Will continue to monitor.  Goal: Absence of Hospital-Acquired Illness or Injury  Intervention: Identify and Manage Fall Risk  Recent Flowsheet Documentation  Taken 3/6/2022 0125 by Faviola Martinez RN  Safety Promotion/Fall Prevention:   activity supervised   assistive device/personal items within reach   clutter free environment maintained   fall prevention program maintained   lighting adjusted   nonskid shoes/slippers when out of bed   room organization consistent   safety round/check completed  Taken 3/6/2022 0034 by Faviola Martinez RN  Safety Promotion/Fall Prevention:   activity supervised   assistive device/personal items within reach   clutter free environment maintained   fall prevention program maintained   lighting adjusted   nonskid shoes/slippers when out of bed   room organization consistent   safety round/check completed  Taken 3/5/2022 2213 by Faviola Martinez RN  Safety Promotion/Fall Prevention:   activity supervised   assistive device/personal items within reach   clutter free environment maintained   fall prevention program maintained   lighting adjusted   nonskid shoes/slippers when out of bed   room organization consistent   safety round/check completed  Taken 3/5/2022 2027 by Faviola Martinez RN  Safety Promotion/Fall Prevention:   activity supervised   assistive device/personal items within reach   clutter free environment maintained   fall prevention program maintained   lighting adjusted   nonskid shoes/slippers when out of bed   room organization consistent   safety round/check completed  Intervention: Prevent Skin Injury  Recent Flowsheet Documentation  Taken 3/6/2022 0125 by Faviola Martinez  RN  Body Position: (up in chair)   other (see comments)   legs elevated  Skin Protection:   adhesive use limited   tubing/devices free from skin contact  Taken 3/5/2022 2027 by Faviola Martinez RN  Body Position:   position changed independently   supine, legs elevated  Skin Protection:   adhesive use limited   tubing/devices free from skin contact  Intervention: Prevent and Manage VTE (venous thromboembolism) Risk  Recent Flowsheet Documentation  Taken 3/5/2022 2027 by Faviola Martinez RN  VTE Prevention/Management:   bilateral   dorsiflexion/plantar flexion performed  Intervention: Prevent Infection  Recent Flowsheet Documentation  Taken 3/6/2022 0125 by Faviola Martinez RN  Infection Prevention:   hand hygiene promoted   personal protective equipment utilized   single patient room provided   rest/sleep promoted  Taken 3/6/2022 0034 by Faviola Martinez RN  Infection Prevention:   hand hygiene promoted   personal protective equipment utilized   rest/sleep promoted   single patient room provided  Taken 3/5/2022 2213 by Faviola Martinez RN  Infection Prevention:   hand hygiene promoted   personal protective equipment utilized   rest/sleep promoted   single patient room provided  Taken 3/5/2022 2027 by Faviola Martinez RN  Infection Prevention:   hand hygiene promoted   personal protective equipment utilized   rest/sleep promoted   single patient room provided  Goal: Optimal Comfort and Wellbeing  Intervention: Provide Person-Centered Care  Recent Flowsheet Documentation  Taken 3/6/2022 0125 by Faviola Martinez RN  Trust Relationship/Rapport: care explained  Taken 3/5/2022 2027 by Faviola Martinez RN  Trust Relationship/Rapport:   care explained   choices provided   emotional support provided   empathic listening provided   questions answered   questions encouraged   reassurance provided   thoughts/feelings acknowledged   Goal Outcome Evaluation:  Plan of Care Reviewed With: patient        Progress: improving  Outcome Evaluation:  VSS, IV ABX, O2 @ 1L NC. Encouraged pulmonary hygiene. No c/o pain on current shift. Will continue to monitor.

## 2022-03-06 NOTE — PROGRESS NOTES
"DAILY PROGRESS NOTE  [facility]    Patient Identification:  Name: Junito Sorto  Age: 81 y.o.  Sex: male  :  1940  MRN: [id number]         Primary Care Physician: Fan Schulz MD      Subjective  Patient coughed up thick purulent appearing sputum this morning.  Overall feeling about the same.    Objective:  General Appearance:  Comfortable, well-appearing, in no acute distress and not in pain (Obese).    Vital signs: (most recent): Blood pressure 164/92, pulse 69, temperature 97.6 °F (36.4 °C), temperature source Oral, resp. rate 16, height 170.2 cm (67\"), weight 98.9 kg (218 lb), SpO2 92 %.    Lungs:  Normal effort and normal respiratory rate.  He is not in respiratory distress.  No stridor.  There are rales and rhonchi.  No decreased breath sounds or wheezes.  (A few basilar rales.  Scattered rhonchi.)  Heart: Normal rate.  Regular rhythm.    Extremities: There is no dependent edema.    Neurological: Patient is alert and oriented to person, place and time.    Skin:  Warm and dry.                Vital signs in last 24 hours:  Temp:  [97.4 °F (36.3 °C)-98.2 °F (36.8 °C)] 97.6 °F (36.4 °C)  Heart Rate:  [68-84] 69  Resp:  [16-20] 16  BP: (129-164)/(82-98) 164/92    Intake/Output:    Intake/Output Summary (Last 24 hours) at 3/6/2022 1538  Last data filed at 3/6/2022 1300  Gross per 24 hour   Intake 720 ml   Output --   Net 720 ml         Results from last 7 days   Lab Units 22  0618 22  0822 22  1509 22  0753   WBC 10*3/mm3 8.27 8.33 21.40* 14.61*   HEMOGLOBIN g/dL 12.0* 13.0 13.1 14.7   PLATELETS 10*3/mm3 198 176 208 245     Results from last 7 days   Lab Units 22  0618 22  0822 22  1509 22  0753   SODIUM mmol/L 140 140 139 139   POTASSIUM mmol/L 3.5 3.5 3.6 3.9   CHLORIDE mmol/L 103 105 101 103   CO2 mmol/L 29.0 26.0 25.0 24.3   BUN mg/dL 14 16 14 15   CREATININE mg/dL 0.68* 0.64* 0.59* 0.65*   GLUCOSE mg/dL 107* 92 182* 131* "   Estimated Creatinine Clearance: 81.1 mL/min (A) (by C-G formula based on SCr of 0.68 mg/dL (L)).  Results from last 7 days   Lab Units 03/05/22  0618 03/04/22  0822 03/03/22  1509 03/03/22  0753   CALCIUM mg/dL 9.4 9.3 9.1 9.8   ALBUMIN g/dL  --   --   --  3.60   MAGNESIUM mg/dL 1.8 1.9 1.7 1.9   PHOSPHORUS mg/dL 2.9 2.3* 2.9  --      Results from last 7 days   Lab Units 03/03/22  0753   ALBUMIN g/dL 3.60   BILIRUBIN mg/dL 1.6*   ALK PHOS U/L 92   AST (SGOT) U/L 29   ALT (SGPT) U/L 23       Assessment:  Pulmonary infiltrate -evolving pneumonia: Procalcitonin level a little on the high side today.  No fever and WBCs continue to be normal.  There was purulent sputum production this morning and the Gram stain shows gram-positive cocci in clusters.  Will check MRSA screening and go ahead and initiate vancomycin pending culture results.  Chronic cough:  ACE inhibitor discontinued. Bronchodilators been administered. Not a great deal of change per patient.   GERD/presbyesophagus: Continue PPI treatment.  Okay for outpatient follow-up.  GI input appreciated.  Paroxysmal atrial fibrillation: Continue rate control.  Presently not on anticoagulants.  Consider switching to a more beta-1 selective beta-blocker noting the wheezes.  Hypertension:  Obesity:  Hypoxemia:      Plan:  Please see above.  Discussed with patient and wife at bedside.  Discussed with RN.    Trace Su MD  3/6/2022  15:38 EST

## 2022-03-07 LAB
ANION GAP SERPL CALCULATED.3IONS-SCNC: 9.9 MMOL/L (ref 5–15)
BUN SERPL-MCNC: 11 MG/DL (ref 8–23)
BUN/CREAT SERPL: 18.3 (ref 7–25)
CALCIUM SPEC-SCNC: 9.6 MG/DL (ref 8.6–10.5)
CHLORIDE SERPL-SCNC: 106 MMOL/L (ref 98–107)
CO2 SERPL-SCNC: 26.1 MMOL/L (ref 22–29)
CREAT SERPL-MCNC: 0.6 MG/DL (ref 0.76–1.27)
EGFRCR SERPLBLD CKD-EPI 2021: 97 ML/MIN/1.73
GLUCOSE SERPL-MCNC: 138 MG/DL (ref 65–99)
MAGNESIUM SERPL-MCNC: 1.9 MG/DL (ref 1.6–2.4)
POTASSIUM SERPL-SCNC: 3.6 MMOL/L (ref 3.5–5.2)
QT INTERVAL: 368 MS
SODIUM SERPL-SCNC: 142 MMOL/L (ref 136–145)

## 2022-03-07 PROCEDURE — 94799 UNLISTED PULMONARY SVC/PX: CPT

## 2022-03-07 PROCEDURE — 80048 BASIC METABOLIC PNL TOTAL CA: CPT | Performed by: HOSPITALIST

## 2022-03-07 PROCEDURE — 25010000002 VANCOMYCIN PER 500 MG: Performed by: HOSPITALIST

## 2022-03-07 PROCEDURE — 94761 N-INVAS EAR/PLS OXIMETRY MLT: CPT

## 2022-03-07 PROCEDURE — 93005 ELECTROCARDIOGRAM TRACING: CPT | Performed by: HOSPITALIST

## 2022-03-07 PROCEDURE — 25010000002 CEFTRIAXONE PER 250 MG: Performed by: HOSPITALIST

## 2022-03-07 PROCEDURE — 94664 DEMO&/EVAL PT USE INHALER: CPT

## 2022-03-07 PROCEDURE — 25010000002 ENOXAPARIN PER 10 MG: Performed by: STUDENT IN AN ORGANIZED HEALTH CARE EDUCATION/TRAINING PROGRAM

## 2022-03-07 PROCEDURE — 83735 ASSAY OF MAGNESIUM: CPT | Performed by: HOSPITALIST

## 2022-03-07 PROCEDURE — 25010000002 VANCOMYCIN 10 G RECONSTITUTED SOLUTION: Performed by: HOSPITALIST

## 2022-03-07 PROCEDURE — 93010 ELECTROCARDIOGRAM REPORT: CPT | Performed by: INTERNAL MEDICINE

## 2022-03-07 RX ADMIN — CEFTRIAXONE 1 G: 1 INJECTION, POWDER, FOR SOLUTION INTRAMUSCULAR; INTRAVENOUS at 15:38

## 2022-03-07 RX ADMIN — VANCOMYCIN HYDROCHLORIDE 1250 MG: 10 INJECTION, POWDER, LYOPHILIZED, FOR SOLUTION INTRAVENOUS at 16:40

## 2022-03-07 RX ADMIN — ENOXAPARIN SODIUM 40 MG: 100 INJECTION SUBCUTANEOUS at 11:15

## 2022-03-07 RX ADMIN — ATORVASTATIN CALCIUM 40 MG: 20 TABLET, FILM COATED ORAL at 20:34

## 2022-03-07 RX ADMIN — PROPRANOLOL HYDROCHLORIDE 80 MG: 80 CAPSULE, EXTENDED RELEASE ORAL at 08:51

## 2022-03-07 RX ADMIN — Medication 10 ML: at 20:34

## 2022-03-07 RX ADMIN — BUDESONIDE AND FORMOTEROL FUMARATE DIHYDRATE 2 PUFF: 160; 4.5 AEROSOL RESPIRATORY (INHALATION) at 19:56

## 2022-03-07 RX ADMIN — ASPIRIN 81 MG: 81 TABLET, CHEWABLE ORAL at 08:51

## 2022-03-07 RX ADMIN — Medication 10 ML: at 08:51

## 2022-03-07 RX ADMIN — AMLODIPINE BESYLATE 5 MG: 5 TABLET ORAL at 08:51

## 2022-03-07 RX ADMIN — IPRATROPIUM BROMIDE AND ALBUTEROL SULFATE 3 ML: 2.5; .5 SOLUTION RESPIRATORY (INHALATION) at 15:26

## 2022-03-07 RX ADMIN — BUDESONIDE AND FORMOTEROL FUMARATE DIHYDRATE 2 PUFF: 160; 4.5 AEROSOL RESPIRATORY (INHALATION) at 08:20

## 2022-03-07 RX ADMIN — VANCOMYCIN HYDROCHLORIDE 1000 MG: 1 INJECTION, SOLUTION INTRAVENOUS at 04:30

## 2022-03-07 RX ADMIN — IPRATROPIUM BROMIDE AND ALBUTEROL SULFATE 3 ML: 2.5; .5 SOLUTION RESPIRATORY (INHALATION) at 11:38

## 2022-03-07 RX ADMIN — PANTOPRAZOLE SODIUM 40 MG: 40 TABLET, DELAYED RELEASE ORAL at 08:50

## 2022-03-07 RX ADMIN — PANTOPRAZOLE SODIUM 40 MG: 40 TABLET, DELAYED RELEASE ORAL at 20:34

## 2022-03-07 RX ADMIN — DOCUSATE SODIUM 50MG AND SENNOSIDES 8.6MG 2 TABLET: 8.6; 5 TABLET, FILM COATED ORAL at 08:50

## 2022-03-07 RX ADMIN — CETIRIZINE HYDROCHLORIDE 10 MG: 10 TABLET ORAL at 08:51

## 2022-03-07 RX ADMIN — FLUTICASONE PROPIONATE 2 SPRAY: 50 SPRAY, METERED NASAL at 08:51

## 2022-03-07 NOTE — PROGRESS NOTES
[facility] INPATIENT PROGRESS NOTE         [address]    3/7/2022      PATIENT IDENTIFICATION:  Name: Junito Sorto ADMIT: 3/3/2022   : 1940  PCP: Fan Schulz MD    MRN: [id number] LOS: 4 days   AGE/SEX: 81 y.o. male  ROOM: [facility]                     LOS 4    Reason for visit: Follow-up pneumonia      SUBJECTIVE:      Denies chest pain.  Says that his cough is improved.  No longer has any productive sputum.  Mild scattered coarse breath sounds at bases.  No wheezing.  Nonlabored.    Objective   OBJECTIVE:    Vital Sign Min/Max for last 24 hours  Temp  Min: 97.4 °F (36.3 °C)  Max: 98.1 °F (36.7 °C)   BP  Min: 129/90  Max: 164/92   Pulse  Min: 69  Max: 85   Resp  Min: 16  Max: 20   SpO2  Min: 90 %  Max: 100 %   No data recorded   No data recorded                         Body mass index is 34.14 kg/m².    Intake/Output Summary (Last 24 hours) at 3/7/2022 1040  Last data filed at 3/7/2022 0900  Gross per 24 hour   Intake 1330 ml   Output 400 ml   Net 930 ml         Exam:  GEN:  No distress, appears stated age  EYES:   PERRL, anicteric sclerae  ENT:    External ears/nose normal, OP clear  NECK:  No adenopathy, midline trachea  LUNGS: Normal chest on inspection, palpation and coarse breath sounds bilaterally  on auscultation  CV:  Normal S1S2, without murmur  ABD:  Nontender, nondistended, no hepatosplenomegaly, +BS  EXT:  No edema.  No cyanosis or clubbing.  No mottling and normal cap refill.    Assessment     Scheduled meds:  amLODIPine, 5 mg, Oral, Q24H  aspirin, 81 mg, Oral, Daily  atorvastatin, 40 mg, Oral, Nightly  budesonide-formoterol, 2 puff, Inhalation, BID - RT  cefTRIAXone, 1 g, Intravenous, Q24H  cetirizine, 10 mg, Oral, Daily  enoxaparin, 40 mg, Subcutaneous, Q24H  fluticasone, 2 spray, Nasal, Daily  ipratropium-albuterol, 3 mL, Nebulization, 4x Daily - RT  pantoprazole, 40 mg, Oral, BID  propranolol LA, 80 mg, Oral, Daily  senna-docusate sodium, 2 tablet, Oral,  Daily  sodium chloride, 10 mL, Intravenous, Q12H  vancomycin, 1,000 mg, Intravenous, Q12H      IV meds:                      Pharmacy to dose vancomycin,       Data Review:  Results from last 7 days   Lab Units 03/05/22  0618 03/04/22  0822 03/03/22  1509 03/03/22  0753   SODIUM mmol/L 140 140 139 139   POTASSIUM mmol/L 3.5 3.5 3.6 3.9   CHLORIDE mmol/L 103 105 101 103   CO2 mmol/L 29.0 26.0 25.0 24.3   BUN mg/dL 14 16 14 15   CREATININE mg/dL 0.68* 0.64* 0.59* 0.65*   GLUCOSE mg/dL 107* 92 182* 131*   CALCIUM mg/dL 9.4 9.3 9.1 9.8         Estimated Creatinine Clearance: 81.1 mL/min (A) (by C-G formula based on SCr of 0.68 mg/dL (L)).  Results from last 7 days   Lab Units 03/05/22  0618 03/04/22  0822 03/03/22  1509 03/03/22  0753   WBC 10*3/mm3 8.27 8.33 21.40* 14.61*   HEMOGLOBIN g/dL 12.0* 13.0 13.1 14.7   PLATELETS 10*3/mm3 198 176 208 245         Results from last 7 days   Lab Units 03/03/22  0753   ALT (SGPT) U/L 23   AST (SGOT) U/L 29         Results from last 7 days   Lab Units 03/06/22  0633 03/03/22  0851 03/03/22  0753   PROCALCITONIN ng/mL 0.50*  --  0.07   LACTATE mmol/L  --  1.7  --          No results found for: HGBA1C, POCGLU    Chest x-ray 3/3 reviewed shows cardiomegaly unchanged from previous imaging.  Mild vascular congestion and airspace disease right lung base consistent with pneumonia.      Esophagram 3/4 reviewed: No aspiration or penetration noted.  Significant intraesophageal reflux and stasis.  Previous narrowing of the distal esophagus is no longer present.  History of esophageal dilation.        Microbiology reviewed  Scant growth of normal respiratory mohini on sputum culture from 3/4.  Repeat sputum culture 3/6 shows 1+ gram-positive cocci in clusters.  MRSA screen negative.              Active Hospital Problems    Diagnosis  POA   • **Pneumonia of right lower lobe due to infectious organism [J18.9]  Yes   • Hypoxia [R09.02]  Yes   • Gastroesophageal reflux disease with esophagitis  [K21.00]  Yes   • Esophageal dysphagia [R13.19]  Yes   • Paroxysmal atrial fibrillation (HCC) [I48.0]  Yes   • Benign essential hypertension [I10]  Yes   • HLD (hyperlipidemia) [E78.5]  Yes   • Obesity [E66.9]  Yes      Resolved Hospital Problems   No resolved problems to display.         ASSESSMENT:    Right lower lobe pneumonia  Asthma exacerbation: secondary to pneumonia  GERD  Reflux esophagitis  History of prostate cancer  Hemoptysis      PLAN:    Encourage pulmonary toilet.  Continue antibiotics.  Recommend repeat CT scan in 6 to 8 weeks to assure resolution.  Continue Symbicort for asthma and scheduled bronchodilators to help with clearance as well.        Aden Ocasio MD  Pulmonary and Critical Care Medicine  [facility]  3/7/2022    10:40 EST

## 2022-03-07 NOTE — PROGRESS NOTES
"[facility] Clinical Pharmacy Services: Vancomycin Monitoring Note    Junito Sorto is a 81 y.o. male who is on day 2/5 of pharmacy to dose vancomycin for pneumonia.    Previous Vancomycin Dose: 1000 mg IV every  12  hours  Updated Cultures and Sensitivities:   - BCx x2 sets 3/3 NGTD   - RPP, S pneumo Ag, Legionella Ag 3/3 all negative  - MRSA PCR negative  - SCx 3/6 NGTD; Gram stain shows Rare (1+) Gram positive cocci in clusters        Vitals/Labs  Ht: 170.2 cm (67\"); Wt: 98.9 kg (218 lb)   Temp Readings from Last 1 Encounters:   03/07/22 97.1 °F (36.2 °C) (Oral)     Estimated Creatinine Clearance: 81.1 mL/min (A) (by C-G formula based on SCr of 0.6 mg/dL (L)).        Results from last 7 days   Lab Units 03/07/22  1007 03/05/22  0618 03/04/22  0822 03/03/22  1509   CREATININE mg/dL 0.60* 0.68* 0.64* 0.59*   WBC 10*3/mm3  --  8.27 8.33 21.40*     Assessment/Plan    Current Vancomycin Dose: 1000 mg IV every  12  hours; provides a predicted  mg/L.hr with only a 51% chance of obtaining a therapeutic AUC. Increasing the dose to 1250 mg (~12.6 mg/kg) IV q12h provides predicted AUC of 502 mg/L.hr with a 73% chance of obtaining a therapeutic AUC.   Next Level Date and Time: Vanc Trough on 3/8 at 1400  We will continue to monitor patient changes and renal function     Thank you for involving pharmacy in this patient's care. Please contact pharmacy with any questions or concerns.       Jovanni Baker, PharmD  Clinical Pharmacist          "

## 2022-03-07 NOTE — PROGRESS NOTES
"DAILY PROGRESS NOTE  [facility]    Patient Identification:  Name: Junito Sorto  Age: 81 y.o.  Sex: male  :  1940  MRN: [id number]         Primary Care Physician: Fan Schulz MD      Subjective  Patient with no new complaints today.  States he overall is feeling better.  Cough improved.  There is a message on my secure chat when a log in this morning that he had a run of nonsustained VT last night.  Asymptomatic.    Objective:  General Appearance:  Comfortable, well-appearing, in no acute distress and not in pain (Obese).    Vital signs: (most recent): Blood pressure 143/84, pulse 63, temperature 97.2 °F (36.2 °C), temperature source Oral, resp. rate 18, height 170.2 cm (67\"), weight 98.9 kg (218 lb), SpO2 94 %.    Lungs:  Normal effort and normal respiratory rate.  He is not in respiratory distress.  No stridor.  There are rhonchi.  No rales, decreased breath sounds or wheezes.  (Only a rare rhonchi today.  No rales.  Much improved from yesterday.  O2 sat 95% on 5 L nasal cannula.)  Heart: Normal rate.  Irregular rhythm.    Extremities: There is no dependent edema.    Neurological: Patient is alert and oriented to person, place and time.    Skin:  Warm and dry.                Vital signs in last 24 hours:  Temp:  [97.1 °F (36.2 °C)-98.1 °F (36.7 °C)] 97.2 °F (36.2 °C)  Heart Rate:  [63-85] 63  Resp:  [16-20] 18  BP: (135-157)/(84-93) 143/84    Intake/Output:    Intake/Output Summary (Last 24 hours) at 3/7/2022 1842  Last data filed at 3/7/2022 1700  Gross per 24 hour   Intake 1330 ml   Output 400 ml   Net 930 ml         Results from last 7 days   Lab Units 22  0618 22  0822 22  1509 22  0753   WBC 10*3/mm3 8.27 8.33 21.40* 14.61*   HEMOGLOBIN g/dL 12.0* 13.0 13.1 14.7   PLATELETS 10*3/mm3 198 176 208 245     Results from last 7 days   Lab Units 22  1007 22  0618 22  0822 22  1509 22  0753   SODIUM mmol/L 142 140 140 139 139 "   POTASSIUM mmol/L 3.6 3.5 3.5 3.6 3.9   CHLORIDE mmol/L 106 103 105 101 103   CO2 mmol/L 26.1 29.0 26.0 25.0 24.3   BUN mg/dL 11 14 16 14 15   CREATININE mg/dL 0.60* 0.68* 0.64* 0.59* 0.65*   GLUCOSE mg/dL 138* 107* 92 182* 131*   Estimated Creatinine Clearance: 81.1 mL/min (A) (by C-G formula based on SCr of 0.6 mg/dL (L)).  Results from last 7 days   Lab Units 03/07/22  1007 03/05/22  0618 03/04/22  0822 03/03/22  1509 03/03/22  0753   CALCIUM mg/dL 9.6 9.4 9.3 9.1 9.8   ALBUMIN g/dL  --   --   --   --  3.60   MAGNESIUM mg/dL 1.9 1.8 1.9 1.7 1.9   PHOSPHORUS mg/dL  --  2.9 2.3* 2.9  --      Results from last 7 days   Lab Units 03/03/22  0753   ALBUMIN g/dL 3.60   BILIRUBIN mg/dL 1.6*   ALK PHOS U/L 92   AST (SGOT) U/L 29   ALT (SGPT) U/L 23       Assessment:  Pulmonary infiltrate -evolving pneumonia:  Gram-positive in clusters on sputum Gram stain.  Sputum culture however shows no staph aureus to be present.  MRSA screen also negative.  Will discontinue vancomycin and continue with Rocephin.  Chronic cough:  ACE inhibitor discontinued. Bronchodilators been administered.  Improved today   GERD/presbyesophagus: Continue PPI treatment.  Patient is to follow-up with Dr. Cadena after discharge.    Paroxysmal atrial fibrillation: Continue rate control.  Presently not on anticoagulants.    Nonsustained VT: EKG nonacute.  Potassium and magnesium normal.  I will bring his cardiologist and on the case.  Hypertension:  Obesity:  Hypoxemia: Improving.      Plan:  Please see above.    Trcae Su MD  3/7/2022  18:42 EST

## 2022-03-07 NOTE — PLAN OF CARE
Goal Outcome Evaluation:  Plan of Care Reviewed With: patient        Progress: improving  Outcome Evaluation: VSS, requiring 2 L oxygen. No complaints of pain. Ambulated in gracia with stand-by assist. IV abx cont'd as ordered. Will continue to provide supportive care.

## 2022-03-07 NOTE — NURSING NOTE
3/6/22    1845    At 1800 monitor tech informed this nurse that at 1610 pt had 13 beat run of V-tach.  Pt was asymptomatic. No reports of pain or racing heart.   Called A at 1845 to update on run of vtach.  Spoke with KOLBY javed for A and was instructed to continue to monitor patient and call if there are further runs of V-tach    Heather Dumont RN.

## 2022-03-07 NOTE — PLAN OF CARE
Goal Outcome Evaluation:  Plan of Care Reviewed With: patient        Progress: improving  Outcome Evaluation: Vitals stable. Pt remains on 1L o2. No c/o pain from pt during shift. Up with assist x1. IV abx given as ordered. Will continue with current plan of care.

## 2022-03-07 NOTE — PROGRESS NOTES
Please make sure this patient has a follow-up with Dr. Cadena after he gets out of the hospital for pneumonia to discuss esophagram .

## 2022-03-08 LAB
ANION GAP SERPL CALCULATED.3IONS-SCNC: 10 MMOL/L (ref 5–15)
BACTERIA SPEC AEROBE CULT: NORMAL
BACTERIA SPEC AEROBE CULT: NORMAL
BACTERIA SPEC RESP CULT: NO GROWTH
BUN SERPL-MCNC: 11 MG/DL (ref 8–23)
BUN/CREAT SERPL: 20 (ref 7–25)
CALCIUM SPEC-SCNC: 10 MG/DL (ref 8.6–10.5)
CHLORIDE SERPL-SCNC: 104 MMOL/L (ref 98–107)
CO2 SERPL-SCNC: 28 MMOL/L (ref 22–29)
CREAT SERPL-MCNC: 0.55 MG/DL (ref 0.76–1.27)
EGFRCR SERPLBLD CKD-EPI 2021: 99.6 ML/MIN/1.73
GLUCOSE SERPL-MCNC: 125 MG/DL (ref 65–99)
GRAM STN SPEC: NORMAL
GRAM STN SPEC: NORMAL
MAGNESIUM SERPL-MCNC: 1.7 MG/DL (ref 1.6–2.4)
POTASSIUM SERPL-SCNC: 3.8 MMOL/L (ref 3.5–5.2)
SODIUM SERPL-SCNC: 142 MMOL/L (ref 136–145)
TROPONIN T SERPL-MCNC: <0.01 NG/ML (ref 0–0.03)

## 2022-03-08 PROCEDURE — 94761 N-INVAS EAR/PLS OXIMETRY MLT: CPT

## 2022-03-08 PROCEDURE — 80048 BASIC METABOLIC PNL TOTAL CA: CPT | Performed by: HOSPITALIST

## 2022-03-08 PROCEDURE — 94799 UNLISTED PULMONARY SVC/PX: CPT

## 2022-03-08 PROCEDURE — 84484 ASSAY OF TROPONIN QUANT: CPT | Performed by: HOSPITALIST

## 2022-03-08 PROCEDURE — 99222 1ST HOSP IP/OBS MODERATE 55: CPT | Performed by: INTERNAL MEDICINE

## 2022-03-08 PROCEDURE — 25010000002 CEFTRIAXONE PER 250 MG: Performed by: HOSPITALIST

## 2022-03-08 PROCEDURE — 94760 N-INVAS EAR/PLS OXIMETRY 1: CPT

## 2022-03-08 PROCEDURE — 83735 ASSAY OF MAGNESIUM: CPT | Performed by: HOSPITALIST

## 2022-03-08 PROCEDURE — 25010000002 ENOXAPARIN PER 10 MG: Performed by: STUDENT IN AN ORGANIZED HEALTH CARE EDUCATION/TRAINING PROGRAM

## 2022-03-08 PROCEDURE — 94664 DEMO&/EVAL PT USE INHALER: CPT

## 2022-03-08 RX ADMIN — ATORVASTATIN CALCIUM 40 MG: 20 TABLET, FILM COATED ORAL at 20:15

## 2022-03-08 RX ADMIN — Medication 10 ML: at 08:51

## 2022-03-08 RX ADMIN — FLUTICASONE PROPIONATE 2 SPRAY: 50 SPRAY, METERED NASAL at 08:51

## 2022-03-08 RX ADMIN — ASPIRIN 81 MG: 81 TABLET, CHEWABLE ORAL at 08:51

## 2022-03-08 RX ADMIN — BUDESONIDE AND FORMOTEROL FUMARATE DIHYDRATE 2 PUFF: 160; 4.5 AEROSOL RESPIRATORY (INHALATION) at 09:12

## 2022-03-08 RX ADMIN — Medication 10 ML: at 21:00

## 2022-03-08 RX ADMIN — AMLODIPINE BESYLATE 5 MG: 5 TABLET ORAL at 08:51

## 2022-03-08 RX ADMIN — IPRATROPIUM BROMIDE AND ALBUTEROL SULFATE 3 ML: 2.5; .5 SOLUTION RESPIRATORY (INHALATION) at 12:04

## 2022-03-08 RX ADMIN — PANTOPRAZOLE SODIUM 40 MG: 40 TABLET, DELAYED RELEASE ORAL at 20:15

## 2022-03-08 RX ADMIN — ENOXAPARIN SODIUM 40 MG: 100 INJECTION SUBCUTANEOUS at 11:15

## 2022-03-08 RX ADMIN — CEFTRIAXONE 1 G: 1 INJECTION, POWDER, FOR SOLUTION INTRAMUSCULAR; INTRAVENOUS at 15:55

## 2022-03-08 RX ADMIN — BUDESONIDE AND FORMOTEROL FUMARATE DIHYDRATE 2 PUFF: 160; 4.5 AEROSOL RESPIRATORY (INHALATION) at 20:53

## 2022-03-08 RX ADMIN — PANTOPRAZOLE SODIUM 40 MG: 40 TABLET, DELAYED RELEASE ORAL at 08:51

## 2022-03-08 RX ADMIN — CETIRIZINE HYDROCHLORIDE 10 MG: 10 TABLET ORAL at 08:51

## 2022-03-08 RX ADMIN — IPRATROPIUM BROMIDE AND ALBUTEROL SULFATE 3 ML: 2.5; .5 SOLUTION RESPIRATORY (INHALATION) at 16:11

## 2022-03-08 RX ADMIN — PROPRANOLOL HYDROCHLORIDE 80 MG: 80 CAPSULE, EXTENDED RELEASE ORAL at 08:51

## 2022-03-08 NOTE — CONSULTS
Patient Name: Junito Sorto  :1940  81 y.o.    Date of Admission: 3/3/2022  Date of Consultation:  22  Encounter Provider: Steve Prater III, MD  Place of Service: [facility] CARDIOLOGY  Referring Provider: Kia Hernandez DO  Patient Care Team:  Fan Schulz MD as PCP - General  Steven Cadena MD as Consulting Physician (Gastroenterology)      Chief complaint:shortness of breath and cough    History of Present Illness: Junito Sorto is a 81 year old pt with a history of obesity, PAF not on AC, HTN, HLD and GERD.     We have been asked to see him for atrial fibrillation and an episode of wide-complex tachycardia, irregular, that occurred on the sixth.  There is concerned about nonsustained ventricular tachycardia.    Patient has a history of chronic atrial fibrillation.  He was seen by Dr. Talbot in  in , he has not seen a cardiologist since.  At that point he had been started on Eliquis.  His A. fib has been managed by his primary care ever since, patient has been treated with aspirin only.  He does not recall being on the blood thinner since he saw Dr. Talbot.    Patient was admitted with a complaint of shortness of breath and cough, cough is productive of purulent sputum.  Symptoms originally started around [place], he was diagnosed with respiratory illness and treated with Z-Adrián, he then had worsening shortness of breath and cough.  In emergency room he was found to have an elevated white count and was felt to have a right basilar pneumonia.  He is admitted for IV antibiotics.  EKG in the emergency room demonstrated atrial fibrillation with controlled response.            Past Medical History:   Diagnosis Date   • Arthritis    • At risk for obstructive sleep apnea     5   • Atrial fibrillation (HCC)     not followed by Cardiologist just PCP   • Bladder outlet obstruction 2019   • BPH (benign prostatic hyperplasia)    • Colon polyp     • Coronary artery disease Few years    Afib   • Dysphagia    • Eczema    • GERD (gastroesophageal reflux disease) 2013    Mentioned as possible   • Hiatal hernia    • Hyperlipidemia    • Hypertension    • Low back pain    • Prostate cancer (HCC)    • Skin cancer        Past Surgical History:   Procedure Laterality Date   • CATARACT EXTRACTION, BILATERAL     • COLONOSCOPY  unknown   • CYSTOSCOPY TRANSURETHRAL RESECTION OF PROSTATE N/A 9/11/2019    Procedure: CYSTOSCOPY TRANSURETHRAL RESECTION OF PROSTATE;  Surgeon: Chip Bedolla MD;  Location: [facility];  Service: Urology   • ENDOSCOPY N/A 10/31/2018    LA Grade C reflux esophagitis, HH, erythematous mucosa in the antrum. Path: Gastritis, esophagitis.    • EPIDURAL BLOCK     • ESOPHAGEAL DILATATION     • PROSTATE SURGERY  11/06/2014   • SKIN CANCER EXCISION      multiple, over that last few years   • SKIN CANCER EXCISION Right 09/03/2019    FACE,    • TONSILLECTOMY      around 1950   • UPPER GASTROINTESTINAL ENDOSCOPY  2013         Prior to Admission medications    Medication Sig Start Date End Date Taking? Authorizing Provider   Albuterol Sulfate (PROAIR HFA IN) Inhale 2 puffs As Needed.   Yes ProviderAfasneh MD   amLODIPine-benazepril (LOTREL 5-20) 5-20 MG per capsule TAKE ONE CAPSULE BY MOUTH EVERY MORNING  Patient taking differently: Take 1 capsule by mouth Daily. 11/1/21  Yes Fan Schulz MD   aspirin 81 MG chewable tablet Chew 81 mg Daily.   Yes ProviderAfsaneh MD   atorvastatin (LIPITOR) 40 MG tablet TAKE ONE TABLET BY MOUTH DAILY  Patient taking differently: Take 40 mg by mouth Every Night. 9/28/21  Yes Fan Schulz MD   fluticasone (Flonase) 50 MCG/ACT nasal spray 2 sprays into the nostril(s) as directed by provider Daily. 7/2/21  Yes Jovanni Elizabeth MD   guaiFENesin 200 MG tablet Take 400 mg by mouth Every 4 (Four) Hours As Needed for Cough.   Yes Emergency, Nurse Epic, RN   loratadine (Loradamed) 10 MG tablet Take  10 mg by mouth Daily.   Yes Provider, MD Afsaneh   pantoprazole (PROTONIX) 40 MG EC tablet Take 1 tablet by mouth 2 (Two) Times a Day. 2/23/22  Yes Jasmin Silverio APRN   propranolol LA (INDERAL LA) 80 MG 24 hr capsule TAKE ONE CAPSULE BY MOUTH EVERY MORNING  Patient taking differently: Take 80 mg by mouth Daily. 11/1/21  Yes Fan Schulz MD       Allergies   Allergen Reactions   • Dilaudid [Hydromorphone Hcl] Hallucinations       Social History     Socioeconomic History   • Marital status:    Tobacco Use   • Smoking status: Former Smoker     Types: Pipe   • Smokeless tobacco: Never Used   • Tobacco comment: only in college   Vaping Use   • Vaping Use: Never used   Substance and Sexual Activity   • Alcohol use: Yes     Types: 3 Glasses of wine per week     Comment: SOCIALLY   • Drug use: No   • Sexual activity: Defer     Comment: 50 years ago       Family History   Problem Relation Age of Onset   • Cancer Mother         pancreatic   • Liver disease Mother    • Cancer Father         prostate, bladder, colon, lymphoma, leukemia   • Colon cancer Father    • Cancer Brother         prostate   • Cancer Paternal Grandfather         prostate   • Malig Hyperthermia Neg Hx        REVIEW OF SYSTEMS:   All systems reviewed.  Pertinent positives identified in HPI.  All other systems are negative.      Objective:     Vitals:    03/07/22 2300 03/08/22 0615 03/08/22 0712 03/08/22 0912   BP: 153/88  143/98    BP Location: Left arm  Left arm    Patient Position: Lying  Sitting    Pulse: 72 65  75   Resp: 18  18 18   Temp: 98.3 °F (36.8 °C)  97.5 °F (36.4 °C)    TempSrc: Oral  Oral    SpO2: 92% 91%  95%   Weight:       Height:         Body mass index is 34.14 kg/m².    Physical Exam:  General Appearance:    Alert, cooperative, in no acute distress   Head:    Normocephalic, without obvious abnormality   Eyes:            Lids and lashes normal, conjunctivae and sclerae normal, no icterus, no pallor, corneas clear    Ears:    Ears appear intact with no abnormalities noted   Throat:   No oral lesions, oral mucosa moist   Neck:   No adenopathy, supple, trachea midline, no thyromegaly, no carotid bruit, no JVD   Back:     No kyphosis present, no erythema or scars, no tenderness to palpation    Lungs:    Coarse rhonchi, coarse cough,respirations regular, even and unlabored    Heart:    irregular rhythm and normal rate, normal S1 and S2, no murmur, no gallop, no rub, no click   Chest Wall:    No abnormalities observed   Abdomen:     Normal bowel sounds, no masses, no organomegaly, soft        non-tender, non-distended, no guarding   Extremities:   Moves all extremities well, no edema, no cyanosis, no redness   Pulses:  Bilateral carotids brisk   Skin:  Psychiatric:   No bleeding or rash    Alert and oriented, normal mood and affect         Lab Review:     Results from last 7 days   Lab Units 03/08/22  0002 03/03/22  1509 03/03/22  0753   SODIUM mmol/L 142   < > 139   POTASSIUM mmol/L 3.8   < > 3.9   CHLORIDE mmol/L 104   < > 103   CO2 mmol/L 28.0   < > 24.3   BUN mg/dL 11   < > 15   CREATININE mg/dL 0.55*   < > 0.65*   CALCIUM mg/dL 10.0   < > 9.8   BILIRUBIN mg/dL  --   --  1.6*   ALK PHOS U/L  --   --  92   ALT (SGPT) U/L  --   --  23   AST (SGOT) U/L  --   --  29   GLUCOSE mg/dL 125*   < > 131*    < > = values in this interval not displayed.     Results from last 7 days   Lab Units 03/08/22  0002 03/03/22  0753   TROPONIN T ng/mL <0.010 <0.010     Results from last 7 days   Lab Units 03/05/22  0618   WBC 10*3/mm3 8.27   HEMOGLOBIN g/dL 12.0*   HEMATOCRIT % 36.6*   PLATELETS 10*3/mm3 198         Results from last 7 days   Lab Units 03/08/22  0002   MAGNESIUM mg/dL 1.7                               I personally viewed and interpreted the patient's EKG/Telemetry data.      Current Facility-Administered Medications:   •  acetaminophen (TYLENOL) tablet 650 mg, 650 mg, Oral, Q4H PRN, Kia Hernandez DO  •  amLODIPine (NORVASC)  tablet 5 mg, 5 mg, Oral, Q24H, Trace Su MD, 5 mg at 03/08/22 0851  •  aspirin chewable tablet 81 mg, 81 mg, Oral, Daily, Kia Ruiz APRN, 81 mg at 03/08/22 0851  •  atorvastatin (LIPITOR) tablet 40 mg, 40 mg, Oral, Nightly, Kia Ruiz APRN, 40 mg at 03/07/22 2034  •  budesonide-formoterol (SYMBICORT) 160-4.5 MCG/ACT inhaler 2 puff, 2 puff, Inhalation, BID - RT, Trace Su MD, 2 puff at 03/08/22 0912  •  cefTRIAXone (ROCEPHIN) 1 g in sodium chloride 0.9 % 100 mL IVPB-VTB, 1 g, Intravenous, Q24H, Trace Su MD, Last Rate: 200 mL/hr at 03/07/22 1538, 1 g at 03/07/22 1538  •  cetirizine (zyrTEC) tablet 10 mg, 10 mg, Oral, Daily, Kia Ruiz APRN, 10 mg at 03/08/22 0851  •  enoxaparin (LOVENOX) syringe 40 mg, 40 mg, Subcutaneous, Q24H, Kia Hernandez DO, 40 mg at 03/07/22 1115  •  fluticasone (FLONASE) 50 MCG/ACT nasal spray 2 spray, 2 spray, Nasal, Daily, Kia Ruiz APRN, 2 spray at 03/08/22 0851  •  ipratropium-albuterol (DUO-NEB) nebulizer solution 3 mL, 3 mL, Nebulization, 4x Daily - RT, Kia Ruiz APRN, 3 mL at 03/07/22 1526  •  melatonin tablet 5 mg, 5 mg, Oral, Nightly PRN, Kia Hernandez DO  •  nitroglycerin (NITROSTAT) SL tablet 0.4 mg, 0.4 mg, Sublingual, Q5 Min PRN, Kia Hernandez DO  •  ondansetron (ZOFRAN) injection 4 mg, 4 mg, Intravenous, Q6H PRN, Kia Hernandez, DO  •  pantoprazole (PROTONIX) EC tablet 40 mg, 40 mg, Oral, BID, Kia Ruiz APRN, 40 mg at 03/08/22 0851  •  propranolol LA (INDERAL LA) 24 hr capsule 80 mg, 80 mg, Oral, Daily, Kia Ruiz APRN, 80 mg at 03/08/22 0851  •  sennosides-docusate (PERICOLACE) 8.6-50 MG per tablet 2 tablet, 2 tablet, Oral, Daily, Trace Su MD, 2 tablet at 03/07/22 0850  •  [COMPLETED] Insert peripheral IV, , , Once **AND** sodium chloride 0.9 % flush 10 mL, 10 mL, Intravenous, PRN, Talib Mejia MD, 10 mL at 03/03/22 8276  •  sodium chloride  0.9 % flush 10 mL, 10 mL, Intravenous, Q12H, Kia Hernandez DO, 10 mL at 03/08/22 0851  •  sodium chloride 0.9 % flush 10 mL, 10 mL, Intravenous, PRN, Kia Hernandez DO, 10 mL at 03/06/22 1134    CHADS-VASc Risk Assessment            3 Total Score    1 Hypertension    2 Age >/= 75        Criteria that do not apply:    CHF    DM    PRIOR STROKE/TIA/THROMBO    Vascular Disease    Age 65-74    Sex: Female          Assessment and Plan:       Active Hospital Problems    Diagnosis  POA   • **Pneumonia of right lower lobe due to infectious organism [J18.9]  Yes   • Hypoxia [R09.02]  Yes   • Gastroesophageal reflux disease with esophagitis [K21.00]  Yes   • Esophageal dysphagia [R13.19]  Yes   • Paroxysmal atrial fibrillation (HCC) [I48.0]  Yes   • Benign essential hypertension [I10]  Yes   • HLD (hyperlipidemia) [E78.5]  Yes   • Obesity [E66.9]  Yes      Resolved Hospital Problems   No resolved problems to display.     1.  Community-acquired right lower lobe pneumonia  2.  Persistent atrial fibrillation, prior history of A. fib, probably chronic-  has been managed by his primary.  Has not had an assessment of ejection fraction for years, we will obtain an echocardiogram  3.  Wide-complex tachycardia, I reviewed his telemetry strips, wide-complex tachycardia appears to be aberrancy with irregularity.  He does have a fair number of variant complexes seen throughout with identical morphology.  We will await the echocardiogram  4.  Patient is CWI4OT5-DTBs over 3, he meets guideline recommendations for chronic anticoagulation.  He was treated with chronic anticoagulation back in 2014 in 2015 but has been treated with aspirin since.  5.  Abnormal esophagram with significant tertiary wave formation with intraesophageal reflux and stasis.  Follow-up with GI.    Steve Prater III, MD  03/08/22  09:44 EST

## 2022-03-08 NOTE — CASE MANAGEMENT/SOCIAL WORK
Continued Stay Note  [facility]     Patient Name: Junito Sorto  MRN: [id number]  Today's Date: 3/8/2022    Admit Date: 3/3/2022     Discharge Plan     Row Name 03/08/22 1556       Plan    Plan Home with spouse    Patient/Family in Agreement with Plan yes    Plan Comments Met with pt and wife at bedside who confirms plan to return home at discharge.  Might need home O2 and will use [place] if needed.  CCP continues to follow for needs.  AYESHA Suarez RN               Discharge Codes    No documentation.               Expected Discharge Date and Time     Expected Discharge Date Expected Discharge Time    Mar 8, 2022             Carole Suarez, RN

## 2022-03-08 NOTE — PROGRESS NOTES
[facility] INPATIENT PROGRESS NOTE         [address]    3/8/2022      PATIENT IDENTIFICATION:  Name: Junito Sorto ADMIT: 3/3/2022   : 1940  PCP: Fan Schulz MD    MRN: [id number] LOS: 5 days   AGE/SEX: 81 y.o. male  ROOM: [facility]                     LOS 5    Reason for visit: Follow-up pneumonia      SUBJECTIVE:      No new issues overnight.    Objective   OBJECTIVE:    Vital Sign Min/Max for last 24 hours  Temp  Min: 97.1 °F (36.2 °C)  Max: 98.3 °F (36.8 °C)   BP  Min: 140/84  Max: 153/88   Pulse  Min: 63  Max: 82   Resp  Min: 18  Max: 20   SpO2  Min: 91 %  Max: 95 %   No data recorded   No data recorded                         Body mass index is 34.14 kg/m².    Intake/Output Summary (Last 24 hours) at 3/8/2022 0806  Last data filed at 3/8/2022 0600  Gross per 24 hour   Intake 1140 ml   Output 1700 ml   Net -560 ml         Exam:  GEN:  No distress, appears stated age  NECK:  No adenopathy, midline trachea  LUNGS: Normal chest on inspection, palpation and diminished breath sounds bilaterally  on auscultation  CV:  Normal S1S2, without murmur      Assessment     Scheduled meds:  amLODIPine, 5 mg, Oral, Q24H  aspirin, 81 mg, Oral, Daily  atorvastatin, 40 mg, Oral, Nightly  budesonide-formoterol, 2 puff, Inhalation, BID - RT  cefTRIAXone, 1 g, Intravenous, Q24H  cetirizine, 10 mg, Oral, Daily  enoxaparin, 40 mg, Subcutaneous, Q24H  fluticasone, 2 spray, Nasal, Daily  ipratropium-albuterol, 3 mL, Nebulization, 4x Daily - RT  pantoprazole, 40 mg, Oral, BID  propranolol LA, 80 mg, Oral, Daily  senna-docusate sodium, 2 tablet, Oral, Daily  sodium chloride, 10 mL, Intravenous, Q12H      IV meds:                         Data Review:  Results from last 7 days   Lab Units 22  0002 22  1007 22  0618 22  0822 22  1509   SODIUM mmol/L 142 142 140 140 139   POTASSIUM mmol/L 3.8 3.6 3.5 3.5 3.6   CHLORIDE mmol/L 104 106 103 105 101   CO2 mmol/L 28.0 26.1 29.0 26.0  25.0   BUN mg/dL 11 11 14 16 14   CREATININE mg/dL 0.55* 0.60* 0.68* 0.64* 0.59*   GLUCOSE mg/dL 125* 138* 107* 92 182*   CALCIUM mg/dL 10.0 9.6 9.4 9.3 9.1         Estimated Creatinine Clearance: 81.1 mL/min (A) (by C-G formula based on SCr of 0.55 mg/dL (L)).  Results from last 7 days   Lab Units 03/05/22  0618 03/04/22  0822 03/03/22  1509 03/03/22  0753   WBC 10*3/mm3 8.27 8.33 21.40* 14.61*   HEMOGLOBIN g/dL 12.0* 13.0 13.1 14.7   PLATELETS 10*3/mm3 198 176 208 245         Results from last 7 days   Lab Units 03/03/22  0753   ALT (SGPT) U/L 23   AST (SGOT) U/L 29         Results from last 7 days   Lab Units 03/06/22  0633 03/03/22  0851 03/03/22  0753   PROCALCITONIN ng/mL 0.50*  --  0.07   LACTATE mmol/L  --  1.7  --          No results found for: HGBA1C, POCGLU    Chest x-ray 3/3 reviewed shows cardiomegaly unchanged from previous imaging.  Mild vascular congestion and airspace disease right lung base consistent with pneumonia.      Esophagram 3/4 reviewed: No aspiration or penetration noted.  Significant intraesophageal reflux and stasis.  Previous narrowing of the distal esophagus is no longer present.  History of esophageal dilation.        Microbiology reviewed  Scant growth of normal respiratory mohini on sputum culture from 3/4.  Repeat sputum culture 3/6 shows 1+ gram-positive cocci in clusters.  MRSA screen negative.              Active Hospital Problems    Diagnosis  POA   • **Pneumonia of right lower lobe due to infectious organism [J18.9]  Yes   • Hypoxia [R09.02]  Yes   • Gastroesophageal reflux disease with esophagitis [K21.00]  Yes   • Esophageal dysphagia [R13.19]  Yes   • Paroxysmal atrial fibrillation (HCC) [I48.0]  Yes   • Benign essential hypertension [I10]  Yes   • HLD (hyperlipidemia) [E78.5]  Yes   • Obesity [E66.9]  Yes      Resolved Hospital Problems   No resolved problems to display.         ASSESSMENT:    Right lower lobe pneumonia  Asthma exacerbation: secondary to  pneumonia  GERD  Reflux esophagitis  History of prostate cancer  Hemoptysis      PLAN:    Encourage pulmonary toilet.  Continue antibiotics.  Recommend repeat CT scan in 6 to 8 weeks to assure resolution.  Continue Symbicort for asthma and scheduled bronchodilators to help with clearance as well.  Making good progress from pulmonary standpoint and following peripherally for pulmonary issues.      Aden Ocasio MD  Pulmonary and Critical Care Medicine  [facility], [facility]  3/8/2022    08:06 EST

## 2022-03-08 NOTE — PLAN OF CARE
Goal Outcome Evaluation:  Plan of Care Reviewed With: patient  Progress: improving  Outcome Evaluation: VSS. On 1L o2. No c/o pain from pt. Up in chair during shift. Pt appears to be resting well throughout the night. Cardiology consult today. Will continue current plan of care.

## 2022-03-08 NOTE — PLAN OF CARE
Problem: Fluid Imbalance (Pneumonia)  Goal: Fluid Balance  Outcome: Ongoing, Progressing     Problem: Fall Injury Risk  Goal: Absence of Fall and Fall-Related Injury  Outcome: Ongoing, Progressing   Goal Outcome Evaluation:  Plan of Care Reviewed With: patient        Progress: improving  Outcome Evaluation: VSS, requiring 1-2 L oxygen. Up in chair for all of shift, ambulated in hallway with stand-by assist. Awaiting ECHO. Will continue to monitor.

## 2022-03-08 NOTE — PROGRESS NOTES
Name: Junito Sorto ADMIT: 3/3/2022   : 1940  PCP: Fan Schulz MD    MRN: [id number] LOS: 5 days   AGE/SEX: 81 y.o. male  ROOM: [facility]     Subjective   Subjective   Patient seen at bedside.       Objective   Objective   Vital Signs  Temp:  [97.2 °F (36.2 °C)-98.3 °F (36.8 °C)] 97.5 °F (36.4 °C)  Heart Rate:  [65-82] 78  Resp:  [18] 18  BP: (140-153)/(84-98) 143/98  SpO2:  [91 %-98 %] 98 %  on  Flow (L/min):  [1-2] 1;   Device (Oxygen Therapy): room air  Body mass index is 34.14 kg/m².  Physical Exam  General, awake and alert.  Head and ENT, normocephalic and atraumatic.  Lungs, symmetric expansion, equal air entry bilaterally.  Heart, regular rate and rhythm.  Abdomen, soft and nontender.  Extremities, no clubbing or cyanosis.  Neuro, no focal deficits.  Skin: Warm and no rash.  Psych, normal mood and affect.  Musculoskeletal, joint examination is grossly normal.    Results Review     I reviewed the patient's new clinical results.  Results from last 7 days   Lab Units 22  0618 22  0822 22  1509 22  0753   WBC 10*3/mm3 8.27 8.33 21.40* 14.61*   HEMOGLOBIN g/dL 12.0* 13.0 13.1 14.7   PLATELETS 10*3/mm3 198 176 208 245     Results from last 7 days   Lab Units 22  0002 22  1007 22  0618 22  0822   SODIUM mmol/L 142 142 140 140   POTASSIUM mmol/L 3.8 3.6 3.5 3.5   CHLORIDE mmol/L 104 106 103 105   CO2 mmol/L 28.0 26.1 29.0 26.0   BUN mg/dL 11 11 14 16   CREATININE mg/dL 0.55* 0.60* 0.68* 0.64*   GLUCOSE mg/dL 125* 138* 107* 92     Results from last 7 days   Lab Units 22  0753   ALBUMIN g/dL 3.60   BILIRUBIN mg/dL 1.6*   ALK PHOS U/L 92   AST (SGOT) U/L 29   ALT (SGPT) U/L 23     Results from last 7 days   Lab Units 22  0002 22  1007 22  0618 22  0822 22  1509 22  0753   CALCIUM mg/dL 10.0 9.6 9.4 9.3 9.1 9.8   ALBUMIN g/dL  --   --   --   --   --  3.60   MAGNESIUM mg/dL 1.7 1.9 1.8 1.9 1.7 1.9   PHOSPHORUS  mg/dL  --   --  2.9 2.3* 2.9  --      Results from last 7 days   Lab Units 03/06/22  0633 03/03/22  0851 03/03/22  0753   PROCALCITONIN ng/mL 0.50*  --  0.07   LACTATE mmol/L  --  1.7  --      COVID19   Date Value Ref Range Status   03/03/2022 Not Detected Not Detected - Ref. Range Final     SARS-CoV-2, RONALDO   Date Value Ref Range Status   01/19/2022 Not Detected Not Detected Final     Comment:     This nucleic acid amplification test was developed and its performance  characteristics determined by [employer]. Nucleic acid  amplification tests include RT-PCR and TMA. This test has not been  FDA cleared or approved. This test has been authorized by FDA under  an Emergency Use Authorization (EUA). This test is only authorized  for the duration of time the declaration that circumstances exist  justifying the authorization of the emergency use of in vitro  diagnostic tests for detection of SARS-CoV-2 virus and/or diagnosis  of COVID-19 infection under section 564(b)(1) of the Act, 21 U.S.C.  360bbb-3(b) (1), unless the authorization is terminated or revoked  sooner.  When diagnostic testing is negative, the possibility of a false  negative result should be considered in the context of a patient's  recent exposures and the presence of clinical signs and symptoms  consistent with COVID-19. An individual without symptoms of COVID-19  and who is not shedding SARS-CoV-2 virus would expect to have a  negative (not detected) result in this assay.       No results found for: HGBA1C, POCGLU    FL Esophagram Complete Double-Contrast  FLUOROSCOPIC ESOPHAGRAM COMPLETE DOUBLE CONTRAST     CLINICAL INFORMATION: Dysphagia.     COMPARISON: 10/17/2018     FLUOROSCOPY TIME: 1 minute 40 seconds     NUMBER OF IMAGES: 160     Rapid sequence imaging of the esophagus performed while swallowing thin  and thickened liquid barium. Additionally a 13 mm diameter barium tablet  was given to the patient at the end the examination with  water.     FINDINGS: The valleculae and piriform sinuses have satisfactory  appearance, no penetration or aspiration during the examination. There  was fairly extensive tertiary wave formation of the mid and distal  esophagus causing intraesophageal reflux. There was stasis of barium  within the esophagus. There was mucosal irregularity seen, esophagitis  not excluded. The area of distal esophageal narrowing on the previous  examination is no longer present. History of esophageal dilatation. With  the patient supine, no gastroesophageal reflux or hiatal hernia seen.     The patient was able swallow a barium tablet without difficulty, transit  through the esophagus and GE junction without delay.     CONCLUSION: No aspiration or penetration. There is fairly significant  tertiary wave formation causing intraesophageal reflux and stasis. There  is mild mucosal irregularity of the distal esophagus, esophagitis not  excluded. Barium tablet passed freely through the esophagus and GE  junction without delay. Area of narrowing involving the distal esophagus  on the previous examination is no longer present. History of esophageal  dilatation.     This report was finalized on 3/4/2022 11:40 AM by Dr. Ken Johnson M.D.       Scheduled Medications  amLODIPine, 5 mg, Oral, Q24H  aspirin, 81 mg, Oral, Daily  atorvastatin, 40 mg, Oral, Nightly  budesonide-formoterol, 2 puff, Inhalation, BID - RT  cefTRIAXone, 1 g, Intravenous, Q24H  cetirizine, 10 mg, Oral, Daily  enoxaparin, 40 mg, Subcutaneous, Q24H  fluticasone, 2 spray, Nasal, Daily  ipratropium-albuterol, 3 mL, Nebulization, 4x Daily - RT  pantoprazole, 40 mg, Oral, BID  propranolol LA, 80 mg, Oral, Daily  senna-docusate sodium, 2 tablet, Oral, Daily  sodium chloride, 10 mL, Intravenous, Q12H    Infusions   Diet  Diet Regular       Assessment/Plan     Active Hospital Problems    Diagnosis  POA   • **Pneumonia of right lower lobe due to infectious organism [J18.9]  Yes   •  Hypoxia [R09.02]  Yes   • Gastroesophageal reflux disease with esophagitis [K21.00]  Yes   • Esophageal dysphagia [R13.19]  Yes   • Paroxysmal atrial fibrillation (HCC) [I48.0]  Yes   • Benign essential hypertension [I10]  Yes   • HLD (hyperlipidemia) [E78.5]  Yes   • Obesity [E66.9]  Yes      Resolved Hospital Problems   No resolved problems to display.       81 y.o. male admitted with Pneumonia of right lower lobe due to infectious organism.    Assessment and plan:  1.  Pneumonia of right lower lobe due to infectious organism, continue Rocephin.  Pulmonary on board and following.  Repeat CT scan recommended in 6 to 8 weeks.    2.  Asthma exacerbation.  Continue breathing treatments.  Continue supplemental oxygen as needed.    3.  Gastroesophageal reflux disease/presbyesophagus, continue PPI therapy.  Follow-up with GI on discharge.    4.  Paroxysmal atrial fibrillation, patient is currently rate controlled.  He is on propranolol.  Patient not on anticoagulation.  Cardiology on board and following.    5.  Wide-complex tachycardia, cardiology on board.  Echo has been ordered.    6.  History of prostate cancer, chronic condition.    7.  CODE STATUS is full code.  Further plans based hospital course.      Wilfrido Acevedo MD  [facility]  03/08/22  15:17 EST

## 2022-03-09 ENCOUNTER — APPOINTMENT (OUTPATIENT)
Dept: CARDIOLOGY | Facility: HOSPITAL | Age: 82
End: 2022-03-09

## 2022-03-09 ENCOUNTER — READMISSION MANAGEMENT (OUTPATIENT)
Dept: CALL CENTER | Facility: HOSPITAL | Age: 82
End: 2022-03-09

## 2022-03-09 VITALS
RESPIRATION RATE: 18 BRPM | DIASTOLIC BLOOD PRESSURE: 91 MMHG | HEART RATE: 67 BPM | BODY MASS INDEX: 34.21 KG/M2 | TEMPERATURE: 97.7 F | WEIGHT: 218 LBS | HEIGHT: 67 IN | SYSTOLIC BLOOD PRESSURE: 144 MMHG | OXYGEN SATURATION: 93 %

## 2022-03-09 LAB
ANION GAP SERPL CALCULATED.3IONS-SCNC: 7 MMOL/L (ref 5–15)
AORTIC DIMENSIONLESS INDEX: 0.4 (DI)
ASCENDING AORTA: 3.7 CM
BASOPHILS # BLD AUTO: 0.05 10*3/MM3 (ref 0–0.2)
BASOPHILS NFR BLD AUTO: 0.7 % (ref 0–1.5)
BH CV ECHO MEAS - ACS: 1.94 CM
BH CV ECHO MEAS - AO MAX PG: 9.5 MMHG
BH CV ECHO MEAS - AO MEAN PG: 5.2 MMHG
BH CV ECHO MEAS - AO ROOT DIAM: 4.2 CM
BH CV ECHO MEAS - AO V2 MAX: 154.2 CM/SEC
BH CV ECHO MEAS - AO V2 VTI: 29.1 CM
BH CV ECHO MEAS - AVA(I,D): 2.07 CM2
BH CV ECHO MEAS - CONTRAST EF 4CH: 45 CM2
BH CV ECHO MEAS - EDV(CUBED): 171.2 ML
BH CV ECHO MEAS - EDV(MOD-SP2): 100 ML
BH CV ECHO MEAS - EDV(MOD-SP4): 132 ML
BH CV ECHO MEAS - EF(MOD-BP): 45.2 %
BH CV ECHO MEAS - EF(MOD-SP2): 47 %
BH CV ECHO MEAS - EF(MOD-SP4): 45.5 %
BH CV ECHO MEAS - ESV(CUBED): 66.8 ML
BH CV ECHO MEAS - ESV(MOD-SP2): 53 ML
BH CV ECHO MEAS - ESV(MOD-SP4): 72 ML
BH CV ECHO MEAS - FS: 26.9 %
BH CV ECHO MEAS - IVS/LVPW: 1 CM
BH CV ECHO MEAS - IVSD: 1.12 CM
BH CV ECHO MEAS - LV MASS(C)D: 252.8 GRAMS
BH CV ECHO MEAS - LV MAX PG: 1.72 MMHG
BH CV ECHO MEAS - LV MEAN PG: 0.91 MMHG
BH CV ECHO MEAS - LV V1 MAX: 65.6 CM/SEC
BH CV ECHO MEAS - LV V1 VTI: 13.1 CM
BH CV ECHO MEAS - LVIDD: 5.6 CM
BH CV ECHO MEAS - LVIDS: 4.1 CM
BH CV ECHO MEAS - LVOT AREA: 4.6 CM2
BH CV ECHO MEAS - LVOT DIAM: 2.42 CM
BH CV ECHO MEAS - LVPWD: 1.12 CM
BH CV ECHO MEAS - MV DEC SLOPE: 346.6 CM/SEC2
BH CV ECHO MEAS - MV MAX PG: 4.3 MMHG
BH CV ECHO MEAS - MV MEAN PG: 1.15 MMHG
BH CV ECHO MEAS - MV V2 VTI: 26.4 CM
BH CV ECHO MEAS - MVA(VTI): 2.28 CM2
BH CV ECHO MEAS - PA ACC TIME: 0.08 SEC
BH CV ECHO MEAS - PA PR(ACCEL): 44.5 MMHG
BH CV ECHO MEAS - PA V2 MAX: 89.2 CM/SEC
BH CV ECHO MEAS - RAP SYSTOLE: 3 MMHG
BH CV ECHO MEAS - RV MAX PG: 1.5 MMHG
BH CV ECHO MEAS - RV V1 MAX: 61.3 CM/SEC
BH CV ECHO MEAS - RV V1 VTI: 8.5 CM
BH CV ECHO MEAS - RVOT DIAM: 2.5 CM
BH CV ECHO MEAS - RVSP: 21.6 MMHG
BH CV ECHO MEAS - SV(LVOT): 60.3 ML
BH CV ECHO MEAS - SV(MOD-SP2): 47 ML
BH CV ECHO MEAS - SV(MOD-SP4): 60 ML
BH CV ECHO MEAS - SV(RVOT): 42.2 ML
BH CV ECHO MEAS - TAPSE (>1.6): 1.9 CM
BH CV ECHO MEAS - TR MAX PG: 18.6 MMHG
BH CV ECHO MEAS - TR MAX VEL: 215.8 CM/SEC
BH CV VAS BP RIGHT ARM: NORMAL MMHG
BH CV XLRA - RV BASE: 4 CM
BH CV XLRA - RV LENGTH: 7.2 CM
BH CV XLRA - TDI S': 14.3 CM/SEC
BUN SERPL-MCNC: 11 MG/DL (ref 8–23)
BUN/CREAT SERPL: 20.4 (ref 7–25)
CALCIUM SPEC-SCNC: 9.5 MG/DL (ref 8.6–10.5)
CHLORIDE SERPL-SCNC: 103 MMOL/L (ref 98–107)
CO2 SERPL-SCNC: 30 MMOL/L (ref 22–29)
CREAT SERPL-MCNC: 0.54 MG/DL (ref 0.76–1.27)
DEPRECATED RDW RBC AUTO: 44.5 FL (ref 37–54)
EGFRCR SERPLBLD CKD-EPI 2021: 100.1 ML/MIN/1.73
EOSINOPHIL # BLD AUTO: 0.5 10*3/MM3 (ref 0–0.4)
EOSINOPHIL NFR BLD AUTO: 7.4 % (ref 0.3–6.2)
ERYTHROCYTE [DISTWIDTH] IN BLOOD BY AUTOMATED COUNT: 13.6 % (ref 12.3–15.4)
GLUCOSE SERPL-MCNC: 87 MG/DL (ref 65–99)
HCT VFR BLD AUTO: 40.1 % (ref 37.5–51)
HGB BLD-MCNC: 13.4 G/DL (ref 13–17.7)
IMM GRANULOCYTES # BLD AUTO: 0.02 10*3/MM3 (ref 0–0.05)
IMM GRANULOCYTES NFR BLD AUTO: 0.3 % (ref 0–0.5)
LEFT ATRIUM VOLUME INDEX: 65.2 ML/M2
LYMPHOCYTES # BLD AUTO: 1.13 10*3/MM3 (ref 0.7–3.1)
LYMPHOCYTES NFR BLD AUTO: 16.8 % (ref 19.6–45.3)
MAXIMAL PREDICTED HEART RATE: 139 BPM
MCH RBC QN AUTO: 29.9 PG (ref 26.6–33)
MCHC RBC AUTO-ENTMCNC: 33.4 G/DL (ref 31.5–35.7)
MCV RBC AUTO: 89.5 FL (ref 79–97)
MONOCYTES # BLD AUTO: 0.59 10*3/MM3 (ref 0.1–0.9)
MONOCYTES NFR BLD AUTO: 8.8 % (ref 5–12)
NEUTROPHILS NFR BLD AUTO: 4.44 10*3/MM3 (ref 1.7–7)
NEUTROPHILS NFR BLD AUTO: 66 % (ref 42.7–76)
NRBC BLD AUTO-RTO: 0 /100 WBC (ref 0–0.2)
PLATELET # BLD AUTO: 219 10*3/MM3 (ref 140–450)
PMV BLD AUTO: 10.9 FL (ref 6–12)
POTASSIUM SERPL-SCNC: 3.7 MMOL/L (ref 3.5–5.2)
RBC # BLD AUTO: 4.48 10*6/MM3 (ref 4.14–5.8)
SINUS: 3.5 CM
SODIUM SERPL-SCNC: 140 MMOL/L (ref 136–145)
STJ: 3.5 CM
STRESS TARGET HR: 118 BPM
WBC NRBC COR # BLD: 6.73 10*3/MM3 (ref 3.4–10.8)

## 2022-03-09 PROCEDURE — 94799 UNLISTED PULMONARY SVC/PX: CPT

## 2022-03-09 PROCEDURE — 93306 TTE W/DOPPLER COMPLETE: CPT

## 2022-03-09 PROCEDURE — 99232 SBSQ HOSP IP/OBS MODERATE 35: CPT | Performed by: NURSE PRACTITIONER

## 2022-03-09 PROCEDURE — 94761 N-INVAS EAR/PLS OXIMETRY MLT: CPT

## 2022-03-09 PROCEDURE — 80048 BASIC METABOLIC PNL TOTAL CA: CPT | Performed by: HOSPITALIST

## 2022-03-09 PROCEDURE — 94664 DEMO&/EVAL PT USE INHALER: CPT

## 2022-03-09 PROCEDURE — 25010000002 ENOXAPARIN PER 10 MG: Performed by: STUDENT IN AN ORGANIZED HEALTH CARE EDUCATION/TRAINING PROGRAM

## 2022-03-09 PROCEDURE — 25010000002 PERFLUTREN (DEFINITY) 8.476 MG IN SODIUM CHLORIDE (PF) 0.9 % 10 ML INJECTION: Performed by: INTERNAL MEDICINE

## 2022-03-09 PROCEDURE — 25010000002 CEFTRIAXONE PER 250 MG: Performed by: HOSPITALIST

## 2022-03-09 PROCEDURE — 93306 TTE W/DOPPLER COMPLETE: CPT | Performed by: INTERNAL MEDICINE

## 2022-03-09 PROCEDURE — 85025 COMPLETE CBC W/AUTO DIFF WBC: CPT | Performed by: INTERNAL MEDICINE

## 2022-03-09 RX ORDER — ALBUTEROL SULFATE 90 UG/1
2 AEROSOL, METERED RESPIRATORY (INHALATION) EVERY 6 HOURS PRN
Qty: 2 G | Refills: 0 | Status: SHIPPED | OUTPATIENT
Start: 2022-03-09 | End: 2022-04-08

## 2022-03-09 RX ORDER — CEFDINIR 300 MG/1
300 CAPSULE ORAL 2 TIMES DAILY
Qty: 14 CAPSULE | Refills: 0 | Status: ON HOLD | OUTPATIENT
Start: 2022-03-09 | End: 2022-03-17

## 2022-03-09 RX ORDER — AMLODIPINE BESYLATE 5 MG/1
5 TABLET ORAL
Qty: 30 TABLET | Refills: 0 | Status: SHIPPED | OUTPATIENT
Start: 2022-03-10 | End: 2022-03-11 | Stop reason: SDUPTHER

## 2022-03-09 RX ADMIN — Medication 10 ML: at 09:14

## 2022-03-09 RX ADMIN — PANTOPRAZOLE SODIUM 40 MG: 40 TABLET, DELAYED RELEASE ORAL at 09:13

## 2022-03-09 RX ADMIN — PERFLUTREN 1.5 ML: 6.52 INJECTION, SUSPENSION INTRAVENOUS at 11:50

## 2022-03-09 RX ADMIN — BUDESONIDE AND FORMOTEROL FUMARATE DIHYDRATE 2 PUFF: 160; 4.5 AEROSOL RESPIRATORY (INHALATION) at 08:01

## 2022-03-09 RX ADMIN — CEFTRIAXONE 1 G: 1 INJECTION, POWDER, FOR SOLUTION INTRAMUSCULAR; INTRAVENOUS at 16:30

## 2022-03-09 RX ADMIN — IPRATROPIUM BROMIDE AND ALBUTEROL SULFATE 3 ML: 2.5; .5 SOLUTION RESPIRATORY (INHALATION) at 16:05

## 2022-03-09 RX ADMIN — ASPIRIN 81 MG: 81 TABLET, CHEWABLE ORAL at 09:13

## 2022-03-09 RX ADMIN — AMLODIPINE BESYLATE 5 MG: 5 TABLET ORAL at 09:13

## 2022-03-09 RX ADMIN — ENOXAPARIN SODIUM 40 MG: 100 INJECTION SUBCUTANEOUS at 12:00

## 2022-03-09 RX ADMIN — FLUTICASONE PROPIONATE 2 SPRAY: 50 SPRAY, METERED NASAL at 09:13

## 2022-03-09 RX ADMIN — CETIRIZINE HYDROCHLORIDE 10 MG: 10 TABLET ORAL at 09:13

## 2022-03-09 RX ADMIN — PROPRANOLOL HYDROCHLORIDE 80 MG: 80 CAPSULE, EXTENDED RELEASE ORAL at 09:13

## 2022-03-09 NOTE — DISCHARGE SUMMARY
[facility]    Date of Discharge:  3/9/2022    PCP: Fan Schulz MD    Discharge Diagnosis:   Active Hospital Problems    Diagnosis  POA   • **Pneumonia of right lower lobe due to infectious organism [J18.9]  Yes   • Hypoxia [R09.02]  Yes   • Gastroesophageal reflux disease with esophagitis [K21.00]  Yes   • Esophageal dysphagia [R13.19]  Yes   • Paroxysmal atrial fibrillation (HCC) [I48.0]  Yes   • Benign essential hypertension [I10]  Yes   • HLD (hyperlipidemia) [E78.5]  Yes   • Obesity [E66.9]  Yes      Resolved Hospital Problems   No resolved problems to display.          Consults     Date and Time Order Name Status Description    3/7/2022  6:40 PM Inpatient Cardiology Consult Completed     3/4/2022  5:12 PM Inpatient Pulmonology Consult Completed     3/3/2022  8:42 AM [facility] (on-call MD unless specified) Details          Hospital Course  81 y.o. male with past medical history significant for gastroesophageal reflux disease, atrial fibrillation, was admitted to the hospital, he has been diagnosed with pneumonia of right lower lobe due to infectious organism, we have continued Rocephin therapy.  Pulmonary has been following the patient, they have recommended a repeat CT scan in 6 to 8 weeks.  Patient also had asthma exacerbation, breathing treatments were continued, he is feeling better.  Patient does have history of gastroesophageal reflux disease, we will continue PPI therapy.  Patient to follow-up with PCP for further GI work-up.  Patient also has paroxysmal atrial fibrillation, he is currently rate controlled.  Patient is on propanolol, he refuses anticoagulation, cardiology had evaluated the patient.  Since patient has clinically improved, he can be transitioned to oral antibiotics today.  I have seen and examined patient at bedside, total time spent on discharge and management is more than 30 minutes.  Plan of care was discussed with the patient and  he verbalized understanding.      Condition on Discharge: Improved.     Temp:  [97.3 °F (36.3 °C)-97.9 °F (36.6 °C)] 97.7 °F (36.5 °C)  Heart Rate:  [67-82] 67  Resp:  [16-18] 18  BP: (135-151)/(89-97) 144/91  Body mass index is 34.14 kg/m².    Physical Exam   General, awake and alert.  Head and ENT, normocephalic and atraumatic.  Lungs, symmetric expansion, equal air entry bilaterally.  Heart, regular rate and rhythm.  Abdomen, soft and nontender.  Extremities, no clubbing or cyanosis.  Neuro, no focal deficits.  Skin: Warm and no rash.  Psych, normal mood and affect.  Musculoskeletal, joint examination is grossly normal.    Disposition: Home or Self Care       Discharge Medications      New Medications      Instructions Start Date   amLODIPine 5 MG tablet  Commonly known as: NORVASC   5 mg, Oral, Every 24 Hours Scheduled   Start Date: March 10, 2022     cefdinir 300 MG capsule  Commonly known as: OMNICEF   300 mg, Oral, 2 Times Daily         Changes to Medications      Instructions Start Date   atorvastatin 40 MG tablet  Commonly known as: LIPITOR  What changed: when to take this   TAKE ONE TABLET BY MOUTH DAILY      propranolol LA 80 MG 24 hr capsule  Commonly known as: INDERAL LA  What changed: when to take this   TAKE ONE CAPSULE BY MOUTH EVERY MORNING         Continue These Medications      Instructions Start Date   aspirin 81 MG chewable tablet   81 mg, Oral, Daily      fluticasone 50 MCG/ACT nasal spray  Commonly known as: Flonase   2 sprays, Nasal, Daily      guaiFENesin 200 MG tablet   400 mg, Oral, Every 4 Hours PRN      Loradamed 10 MG tablet  Generic drug: loratadine   10 mg, Oral, Daily      pantoprazole 40 MG EC tablet  Commonly known as: PROTONIX   40 mg, Oral, 2 Times Daily      PROAIR HFA IN   2 puffs, Inhalation, As Needed         Stop These Medications    amLODIPine-benazepril 5-20 MG per capsule  Commonly known as: LOTREL 5-20             Additional Instructions for the Follow-ups that You Need  to Schedule     Discharge Follow-up with PCP   As directed       Currently Documented PCP:    Fan Schulz MD    PCP Phone Number:    [phone]     Follow Up Details: Follow-up with PCP in 7 days.         Discharge Follow-up with Specialty: Follow-up with pulmonary and cardiology in 2 to 3 weeks.   As directed      Specialty: Follow-up with pulmonary and cardiology in 2 to 3 weeks.            Follow-up Information     Fan Schulz MD .    Specialty: Internal Medicine  Why: Follow-up with PCP in 7 days.  Contact information:  [address] [place] [address]  [phone]                           Wilfrido Acevedo MD  03/09/22  17:13 EST    Discharge time spent greater than 30 minutes.

## 2022-03-09 NOTE — PLAN OF CARE
Goal Outcome Evaluation:   VSS. 02@ 1LPM. Remains in chronic A fib. Up with standby assist. Sleeping in chair. Echo in AM. Will continue to monitor.

## 2022-03-09 NOTE — CASE MANAGEMENT/SOCIAL WORK
Continued Stay Note  [facility]     Patient Name: Junito Sorto  MRN: [id number]  Today's Date: 3/9/2022    Admit Date: 3/3/2022     Discharge Plan     Row Name 03/09/22 4886       Plan    Plan Comments Pt O2 sat 88% room air at rest. 2L placed back on patient and O2 sat increased to 92%  C. Daniela MARROQUIN               Discharge Codes    No documentation.               Expected Discharge Date and Time     Expected Discharge Date Expected Discharge Time    Mar 9, 2022             Carole Suarez RN

## 2022-03-09 NOTE — PLAN OF CARE
Goal Outcome Evaluation:   VSS, A&O, A-fib rate controlled, 2 LPM via NC    Patient had echo this shift and plan is to d/c home with spouse

## 2022-03-09 NOTE — PROGRESS NOTES
[facility] INPATIENT PROGRESS NOTE         [address]    3/9/2022      PATIENT IDENTIFICATION:  Name: Junito Sorto ADMIT: 3/3/2022   : 1940  PCP: Fan Schulz MD    MRN: [id number] LOS: 6 days   AGE/SEX: 81 y.o. male  ROOM: [facility]                     LOS 6    Reason for visit: Follow-up pneumonia      SUBJECTIVE:      No new issues overnight.    Objective   OBJECTIVE:    Vital Sign Min/Max for last 24 hours  Temp  Min: 97.3 °F (36.3 °C)  Max: 97.9 °F (36.6 °C)   BP  Min: 135/89  Max: 151/97   Pulse  Min: 67  Max: 82   Resp  Min: 16  Max: 18   SpO2  Min: 92 %  Max: 98 %   No data recorded   No data recorded                         Body mass index is 34.14 kg/m².  No intake or output data in the 24 hours ending 22 0807      Exam:  GEN:  No distress, appears stated age  NECK:  No adenopathy, midline trachea  LUNGS: Normal chest on inspection, palpation and diminished breath sounds bilaterally  on auscultation  CV:  Normal S1S2, without murmur      Assessment     Scheduled meds:  amLODIPine, 5 mg, Oral, Q24H  aspirin, 81 mg, Oral, Daily  atorvastatin, 40 mg, Oral, Nightly  budesonide-formoterol, 2 puff, Inhalation, BID - RT  cefTRIAXone, 1 g, Intravenous, Q24H  cetirizine, 10 mg, Oral, Daily  enoxaparin, 40 mg, Subcutaneous, Q24H  fluticasone, 2 spray, Nasal, Daily  ipratropium-albuterol, 3 mL, Nebulization, 4x Daily - RT  pantoprazole, 40 mg, Oral, BID  propranolol LA, 80 mg, Oral, Daily  senna-docusate sodium, 2 tablet, Oral, Daily  sodium chloride, 10 mL, Intravenous, Q12H      IV meds:                         Data Review:  Results from last 7 days   Lab Units 22  0706 22  0002 22  1007 22  0618 22  0822   SODIUM mmol/L 140 142 142 140 140   POTASSIUM mmol/L 3.7 3.8 3.6 3.5 3.5   CHLORIDE mmol/L 103 104 106 103 105   CO2 mmol/L 30.0* 28.0 26.1 29.0 26.0   BUN mg/dL 11 11 11 14 16   CREATININE mg/dL 0.54* 0.55* 0.60* 0.68* 0.64*   GLUCOSE mg/dL  87 125* 138* 107* 92   CALCIUM mg/dL 9.5 10.0 9.6 9.4 9.3         Estimated Creatinine Clearance: 81.1 mL/min (A) (by C-G formula based on SCr of 0.54 mg/dL (L)).  Results from last 7 days   Lab Units 03/05/22  0618 03/04/22  0822 03/03/22  1509 03/03/22  0753   WBC 10*3/mm3 8.27 8.33 21.40* 14.61*   HEMOGLOBIN g/dL 12.0* 13.0 13.1 14.7   PLATELETS 10*3/mm3 198 176 208 245         Results from last 7 days   Lab Units 03/03/22  0753   ALT (SGPT) U/L 23   AST (SGOT) U/L 29         Results from last 7 days   Lab Units 03/06/22  0633 03/03/22  0851 03/03/22  0753   PROCALCITONIN ng/mL 0.50*  --  0.07   LACTATE mmol/L  --  1.7  --          No results found for: HGBA1C, POCGLU    Chest x-ray 3/3 reviewed shows cardiomegaly unchanged from previous imaging.  Mild vascular congestion and airspace disease right lung base consistent with pneumonia.      Esophagram 3/4 reviewed: No aspiration or penetration noted.  Significant intraesophageal reflux and stasis.  Previous narrowing of the distal esophagus is no longer present.  History of esophageal dilation.        Microbiology reviewed  Scant growth of normal respiratory mohini on sputum culture from 3/4.  Repeat sputum culture 3/6 shows 1+ gram-positive cocci in clusters.  MRSA screen negative.              Active Hospital Problems    Diagnosis  POA   • **Pneumonia of right lower lobe due to infectious organism [J18.9]  Yes   • Hypoxia [R09.02]  Yes   • Gastroesophageal reflux disease with esophagitis [K21.00]  Yes   • Esophageal dysphagia [R13.19]  Yes   • Paroxysmal atrial fibrillation (HCC) [I48.0]  Yes   • Benign essential hypertension [I10]  Yes   • HLD (hyperlipidemia) [E78.5]  Yes   • Obesity [E66.9]  Yes      Resolved Hospital Problems   No resolved problems to display.         ASSESSMENT:    Right lower lobe pneumonia  Asthma exacerbation: secondary to pneumonia  GERD  Reflux esophagitis  History of prostate cancer  Hemoptysis  Chronic A-fib       PLAN:    Encourage  pulmonary toilet.  Continue antibiotics.  Recommend repeat CT scan in 6 to 8 weeks to assure resolution.  Continue Symbicort for asthma and scheduled bronchodilators to help with clearance as well.  Making good progress from pulmonary standpoint and following peripherally for pulmonary issues.  2D echo pending.          Aden Ocasio MD  Pulmonary and Critical Care Medicine  [facility]  3/9/2022    08:07 EST

## 2022-03-09 NOTE — PROGRESS NOTES
"    Patient Name: Junito Sorto  :1940  81 y.o.      Patient Care Team:  Fan Schulz MD as PCP - General  Steven Cadena MD as Consulting Physician (Gastroenterology)    Chief Complaint: follow up atrial fibrillation    Interval History: he is sitting up in the chair. Remains on oxygen. Asymptomatic from AF. No heart racing or palpitations.       Objective   Vital Signs  Temp:  [97.3 °F (36.3 °C)-97.9 °F (36.6 °C)] 97.7 °F (36.5 °C)  Heart Rate:  [67-82] 71  Resp:  [16-18] 18  BP: (135-151)/(89-99) 139/97  No intake or output data in the 24 hours ending 22 1036  Flowsheet Rows    Flowsheet Row First Filed Value   Admission Height 170.2 cm (67\") Documented at 2022 0753   Admission Weight 99.8 kg (220 lb) Documented at 2022 0753          Physical Exam:   General Appearance:    Alert, cooperative, in no acute distress   Lungs:     Clear to auscultation.  Normal respiratory effort and rate.      Heart:    irregular rhythm and normal rate, normal S1 and S2, no murmurs, gallops or rubs.     Chest Wall:    No abnormalities observed   Abdomen:     Soft, nontender, positive bowel sounds.     Extremities:   no cyanosis, clubbing or edema.  No marked joint deformities.  Adequate musculoskeletal strength.       Results Review:    Results from last 7 days   Lab Units 22  0706   SODIUM mmol/L 140   POTASSIUM mmol/L 3.7   CHLORIDE mmol/L 103   CO2 mmol/L 30.0*   BUN mg/dL 11   CREATININE mg/dL 0.54*   GLUCOSE mg/dL 87   CALCIUM mg/dL 9.5     Results from last 7 days   Lab Units 22  0002 22  0753   TROPONIN T ng/mL <0.010 <0.010     Results from last 7 days   Lab Units 22  0706   WBC 10*3/mm3 6.73   HEMOGLOBIN g/dL 13.4   HEMATOCRIT % 40.1   PLATELETS 10*3/mm3 219         Results from last 7 days   Lab Units 22  0002   MAGNESIUM mg/dL 1.7                   Medication Review:   amLODIPine, 5 mg, Oral, Q24H  aspirin, 81 mg, Oral, Daily  atorvastatin, 40 mg, Oral, " Nightly  budesonide-formoterol, 2 puff, Inhalation, BID - RT  cefTRIAXone, 1 g, Intravenous, Q24H  cetirizine, 10 mg, Oral, Daily  enoxaparin, 40 mg, Subcutaneous, Q24H  fluticasone, 2 spray, Nasal, Daily  ipratropium-albuterol, 3 mL, Nebulization, 4x Daily - RT  pantoprazole, 40 mg, Oral, BID  propranolol LA, 80 mg, Oral, Daily  senna-docusate sodium, 2 tablet, Oral, Daily  sodium chloride, 10 mL, Intravenous, Q12H              Assessment/Plan   1. Community acquired right lower lobe pneumonia  2. Persistent atrial fibrillation, probably chronic. His PCP, Dr. Schulz has been following this and documents well that patient refuses anticoagulation. He remains on aspirin alone. His HR is well controlled. He is on propranolol. Echocardiogram pending.   3. Wide complex tachycardia consistent with atrial fibrillation with aberrancy.   4. Abnormal esophagram wit significant tertiary wave formation with intraesophageal reflux and stasis. Outpatient follow up with Dr. Cadena planned.   5. Hypertension   6. Asthma exacerbation due to #1    Will review echocardiogram when available. Should be done today.   HR well controlled. Continue current regimen.   Declines AC.     KOLBY Carr  [facility]  03/09/22  10:36 EST

## 2022-03-10 ENCOUNTER — TRANSITIONAL CARE MANAGEMENT TELEPHONE ENCOUNTER (OUTPATIENT)
Dept: CALL CENTER | Facility: HOSPITAL | Age: 82
End: 2022-03-10

## 2022-03-10 NOTE — OUTREACH NOTE
Call Center TCM Note    Flowsheet Row Responses   [facility] patient discharged from? [place]   Does the patient have one of the following disease processes/diagnoses(primary or secondary)? COPD/Pneumonia   Was the primary reason for admission: Pneumonia   TCM attempt successful? Yes   Call start time 1611   Call end time 1623   Discharge diagnosis Pneumonia    Person spoke with today (if not patient) and relationship Patient   Meds reviewed with patient/caregiver? Yes   Is the patient having any side effects they believe may be caused by any medication additions or changes? No   Does the patient have all medications ordered at discharge? Yes   Is the patient taking all medications as directed (includes completed medication regime)? Yes   Does the patient have a primary care provider?  Yes   Does the patient have an appointment with their PCP or specialist within 7 days of discharge? Yes   Comments regarding PCP hospital fu appt on 3/11/22 at 2:15 PM   Has the patient kept scheduled appointments due by today? N/A   What DME was ordered? [employer]2 - [place] 2LO2   Pulse Ox monitoring Intermittent   Pulse Ox device source Patient   O2 Sat comments 97% RA,  2LO2 PRN   O2 Sat: education provided Sat levels, Monitoring frequency, When to seek care   O2 Sat education comments sats remaining below 90% call 911/go to ED   Psychosocial issues? No   Did the patient receive a copy of their discharge instructions? Yes   Nursing interventions Reviewed instructions with patient   What is the patient's perception of their health status since discharge? Improving   Nursing Interventions Nurse provided patient education   Are the patient's immunizations up to date?  Yes   Nursing interventions Educated on importance of maintaining up to date immunizations as advised by provider   If the patient is a current smoker, are they able to teach back resources for cessation? Not a smoker   Is the patient/caregiver able to teach back the  hierarchy of who to call/visit for symptoms/problems? PCP, Specialist, Home health nurse, Urgent Care, ED, 911 Yes   Is the patient/caregiver able to teach back signs and symptoms of worsening condition: Fever/chills, Shortness of breath, Chest pain   Is the patient/caregiver able to teach back importance of completing antibiotic course of treatment? Yes   TCM call completed? Yes   Wrap up additional comments Pt states he is doing better. Pt verified PCP hospital fu appt on 3/11/22. No questions/concerns.           Maria Guadalupe Sidhu RN    3/10/2022, 16:23 EST

## 2022-03-10 NOTE — OUTREACH NOTE
Prep Survey    Flowsheet Row Responses   [facility] patient discharged from? [place]   Is LACE score < 7 ? No   Emergency Room discharge w/ pulse ox? No   Eligibility [facility]   Date of Admission 03/03/22   Date of Discharge 03/09/22   Discharge Disposition Home or Self Care   Discharge diagnosis Pneumonia    Does the patient have one of the following disease processes/diagnoses(primary or secondary)? COPD/Pneumonia   Does the patient have Home health ordered? No   Is there a DME ordered? Yes   What DME was ordered? [employer] - [place]    Prep survey completed? Yes          SHUKRI NUNEZ - Registered Nurse

## 2022-03-11 ENCOUNTER — OFFICE VISIT (OUTPATIENT)
Dept: FAMILY MEDICINE CLINIC | Facility: CLINIC | Age: 82
End: 2022-03-11

## 2022-03-11 VITALS
HEIGHT: 67 IN | TEMPERATURE: 96.9 F | BODY MASS INDEX: 35.63 KG/M2 | SYSTOLIC BLOOD PRESSURE: 128 MMHG | OXYGEN SATURATION: 98 % | HEART RATE: 63 BPM | WEIGHT: 227 LBS | DIASTOLIC BLOOD PRESSURE: 74 MMHG

## 2022-03-11 DIAGNOSIS — Z09 HOSPITAL DISCHARGE FOLLOW-UP: ICD-10-CM

## 2022-03-11 DIAGNOSIS — I10 BENIGN ESSENTIAL HYPERTENSION: ICD-10-CM

## 2022-03-11 DIAGNOSIS — J18.9 PNEUMONIA OF RIGHT LOWER LOBE DUE TO INFECTIOUS ORGANISM: Primary | ICD-10-CM

## 2022-03-11 PROCEDURE — 99214 OFFICE O/P EST MOD 30 MIN: CPT | Performed by: NURSE PRACTITIONER

## 2022-03-11 RX ORDER — AMLODIPINE BESYLATE 5 MG/1
5 TABLET ORAL
Qty: 30 TABLET | Refills: 5 | Status: SHIPPED | OUTPATIENT
Start: 2022-03-11 | End: 2022-03-21 | Stop reason: HOSPADM

## 2022-03-11 RX ORDER — BUDESONIDE AND FORMOTEROL FUMARATE DIHYDRATE 160; 4.5 UG/1; UG/1
2 AEROSOL RESPIRATORY (INHALATION)
COMMUNITY
End: 2022-03-21 | Stop reason: HOSPADM

## 2022-03-11 NOTE — PROGRESS NOTES
Subjective   Junito Sorto is a 81 y.o. male. Patient is here today for   Chief Complaint   Patient presents with   • Hospital Follow Up Visit   • Pneumonia       (Not on file)-  Risk for Readmission (LACE) Score: 9 (3/9/2022  6:01 AM)           Vitals:    03/11/22 1419   BP: 128/74   Pulse: 63   Temp: 96.9 °F (36.1 °C)   SpO2: 98%     The following portions of the patient's history were reviewed and updated as appropriate: allergies, current medications, past family history, past medical history, past social history, past surgical history and problem list.    Past Medical History:   Diagnosis Date   • Arthritis    • At risk for obstructive sleep apnea     5   • Atrial fibrillation (HCC)     not followed by Cardiologist just PCP   • Bladder outlet obstruction 9/11/2019   • BPH (benign prostatic hyperplasia)    • Colon polyp    • Coronary artery disease Few years    Afib   • Dysphagia    • Eczema    • GERD (gastroesophageal reflux disease) 2013    Mentioned as possible   • Hiatal hernia    • Hyperlipidemia    • Hypertension    • Low back pain    • Prostate cancer (HCC)    • Skin cancer       Allergies   Allergen Reactions   • Dilaudid [Hydromorphone Hcl] Hallucinations      Social History     Socioeconomic History   • Marital status:    Tobacco Use   • Smoking status: Former Smoker     Types: Pipe   • Smokeless tobacco: Never Used   • Tobacco comment: only in college   Vaping Use   • Vaping Use: Never used   Substance and Sexual Activity   • Alcohol use: Yes     Types: 3 Glasses of wine per week     Comment: SOCIALLY   • Drug use: No   • Sexual activity: Defer     Comment: 50 years ago        Current Outpatient Medications:   •  albuterol sulfate HFA (ProAir HFA) 108 (90 Base) MCG/ACT inhaler, Inhale 2 puffs Every 6 (Six) Hours As Needed for Shortness of Air or Wheezing for up to 30 days., Disp: 2 g, Rfl: 0  •  amLODIPine (NORVASC) 5 MG tablet, Take 1 tablet by mouth Daily., Disp: 30 tablet, Rfl: 5  •   aspirin 81 MG chewable tablet, Chew 81 mg Daily., Disp: , Rfl:   •  atorvastatin (LIPITOR) 40 MG tablet, TAKE ONE TABLET BY MOUTH DAILY (Patient taking differently: Take 40 mg by mouth Every Night.), Disp: 90 tablet, Rfl: 3  •  budesonide-formoterol (SYMBICORT) 160-4.5 MCG/ACT inhaler, Inhale 2 puffs 2 (Two) Times a Day., Disp: , Rfl:   •  cefdinir (OMNICEF) 300 MG capsule, Take 1 capsule by mouth 2 (Two) Times a Day., Disp: 14 capsule, Rfl: 0  •  fluticasone (Flonase) 50 MCG/ACT nasal spray, 2 sprays into the nostril(s) as directed by provider Daily., Disp: 15.8 mL, Rfl: 0  •  guaiFENesin 200 MG tablet, Take 400 mg by mouth Every 4 (Four) Hours As Needed for Cough., Disp: , Rfl:   •  loratadine (CLARITIN) 10 MG tablet, Take 10 mg by mouth Daily., Disp: , Rfl:   •  pantoprazole (PROTONIX) 40 MG EC tablet, Take 1 tablet by mouth 2 (Two) Times a Day., Disp: 60 tablet, Rfl: 5  •  propranolol LA (INDERAL LA) 80 MG 24 hr capsule, TAKE ONE CAPSULE BY MOUTH EVERY MORNING (Patient taking differently: Take 80 mg by mouth Daily.), Disp: 90 capsule, Rfl: 3     Objective     Pt here today for hospital follow up from [facility] from 3/3-3/9 for pneumonia of right lower lobe; given Rocephin IV antibiotic and breathing treatments, and followed by Pulmonary while in hospital. Pt taken off of Lotrel while in hospital and started on amlodipine 5 mg daily. Pt was discharged on Cefdinir and pt stating he has 6 more days on his script. Pt was discharge on oxygen 2L n/c. Pt stating he has an appt with Pulmonary on April 25. Pt denies having a cardiologist appt set up yet. Pt stating he is going to see his GI first before seeing his cardiologist. Pt stating he is feeling better.          Review of Systems   Constitutional: Negative.  Negative for fever.   HENT: Positive for rhinorrhea (today). Negative for congestion, ear pain and sore throat.    Respiratory: Positive for cough (tickle in throat). Negative for shortness of  breath.    Cardiovascular: Negative.  Negative for chest pain.   Gastrointestinal: Negative.    Neurological: Negative.  Negative for headaches.       Physical Exam  Vitals reviewed.   HENT:      Head: Normocephalic.      Right Ear: Tympanic membrane normal.      Left Ear: Tympanic membrane normal.      Nose: Congestion present.      Mouth/Throat:      Mouth: Mucous membranes are moist.      Pharynx: Oropharyngeal exudate (clear pnd) present. No pharyngeal swelling or posterior oropharyngeal erythema.   Eyes:      Pupils: Pupils are equal, round, and reactive to light.   Cardiovascular:      Rate and Rhythm: Normal rate and regular rhythm.   Pulmonary:      Effort: No accessory muscle usage or respiratory distress.      Breath sounds: Rales (fine to right lower base) present. No decreased breath sounds, wheezing or rhonchi.   Musculoskeletal:      Comments: In wheel chair   Lymphadenopathy:      Cervical: No cervical adenopathy.   Neurological:      Mental Status: He is alert and oriented to person, place, and time.   Psychiatric:         Behavior: Behavior normal.         ASSESSMENT      Problems Addressed this Visit        Cardiac and Vasculature    Benign essential hypertension    Relevant Medications    amLODIPine (NORVASC) 5 MG tablet       Pulmonary and Pneumonias    Pneumonia of right lower lobe due to infectious organism - Primary    Relevant Medications    budesonide-formoterol (SYMBICORT) 160-4.5 MCG/ACT inhaler    Other Relevant Orders    XR Chest PA & Lateral      Other Visit Diagnoses     Hospital discharge follow-up          Diagnoses       Codes Comments    Pneumonia of right lower lobe due to infectious organism    -  Primary ICD-10-CM: J18.9  ICD-9-CM: 486     Benign essential hypertension     ICD-10-CM: I10  ICD-9-CM: 401.1     Hospital discharge follow-up     ICD-10-CM: Z09  ICD-9-CM: V67.59           Current outpatient and discharge medications have been reconciled for the patient.  Reviewed by:  KOLBY Turner      PLAN    Patient Instructions   Rest, drink plenty of fluids, eat a heart healthy diet  Continue cefdinir as prescribed  Ordering chest xray to be done around 3/30/22  Call/rto if having any new symptoms and/or worsening  Pt and pt's wife verb. Understanding.     Return if symptoms worsen or fail to improve, for Dr. Schulz as needed/as recommended.

## 2022-03-11 NOTE — PATIENT INSTRUCTIONS
Rest, drink plenty of fluids, eat a heart healthy diet  Continue cefdinir as prescribed  Ordering chest xray to be done around 3/30/22  Call/rto if having any new symptoms and/or worsening  Pt and pt's wife verb. Understanding.

## 2022-03-14 ENCOUNTER — TELEPHONE (OUTPATIENT)
Dept: GASTROENTEROLOGY | Facility: CLINIC | Age: 82
End: 2022-03-14

## 2022-03-14 NOTE — TELEPHONE ENCOUNTER
Patient called. Advised as per Jasmin's note.   F/u visit with Dr. Cadena scheduled for 3/15@6400.

## 2022-03-14 NOTE — TELEPHONE ENCOUNTER
----- Message from Madyson Walton sent at 3/14/2022 11:24 AM EDT -----  Regarding: pt question  Contact: [phone]  Pt is stating he had a esophogram in the hospital on 03/09 and he wants to know the results and if he needs a office visit.

## 2022-03-15 ENCOUNTER — OFFICE VISIT (OUTPATIENT)
Dept: GASTROENTEROLOGY | Facility: CLINIC | Age: 82
End: 2022-03-15

## 2022-03-15 ENCOUNTER — TELEPHONE (OUTPATIENT)
Dept: GASTROENTEROLOGY | Facility: CLINIC | Age: 82
End: 2022-03-15

## 2022-03-15 VITALS — WEIGHT: 225.8 LBS | HEIGHT: 67 IN | TEMPERATURE: 97.1 F | BODY MASS INDEX: 35.44 KG/M2

## 2022-03-15 DIAGNOSIS — R13.19 ESOPHAGEAL DYSPHAGIA: Primary | ICD-10-CM

## 2022-03-15 DIAGNOSIS — K21.00 GASTROESOPHAGEAL REFLUX DISEASE WITH ESOPHAGITIS WITHOUT HEMORRHAGE: ICD-10-CM

## 2022-03-15 PROCEDURE — 99213 OFFICE O/P EST LOW 20 MIN: CPT | Performed by: INTERNAL MEDICINE

## 2022-03-15 RX ORDER — OMEPRAZOLE 40 MG/1
40 CAPSULE, DELAYED RELEASE ORAL DAILY
Qty: 90 CAPSULE | Refills: 3 | Status: SHIPPED | OUTPATIENT
Start: 2022-03-15 | End: 2022-10-11

## 2022-03-15 NOTE — PROGRESS NOTES
Chief Complaint   Patient presents with   • Difficulty Swallowing   • Cough       Junito Sorto is a  81 y.o. male here for a follow up visit for reflux esophagitis with dysphagia.    HPI     Patient 81-year-old male with history of esophageal dysphagia admitted to the hospital for pneumonia.  Patient with history of hypertension and hyperlipidemia underwent esophagram while in the hospital.  His cough is improved though still persists.  Patient still having episodic dysphagia but esophagram showed no stricture only distal tertiary contractions suggesting esophagitis here for further recommendations.    Past Medical History:   Diagnosis Date   • Arthritis    • At risk for obstructive sleep apnea     5   • Atrial fibrillation (HCC)     not followed by Cardiologist just PCP   • Bladder outlet obstruction 09/11/2019   • BPH (benign prostatic hyperplasia)    • Colon polyp    • Coronary artery disease Few years    Afib   • Dysphagia    • Eczema    • GERD (gastroesophageal reflux disease) 2013    Mentioned as possible   • Hernia     Your office reported hiatal   • Hiatal hernia    • Hyperlipidemia    • Hypertension    • Low back pain    • Prostate cancer (HCC)    • Skin cancer          Current Outpatient Medications:   •  albuterol sulfate HFA (ProAir HFA) 108 (90 Base) MCG/ACT inhaler, Inhale 2 puffs Every 6 (Six) Hours As Needed for Shortness of Air or Wheezing for up to 30 days., Disp: 2 g, Rfl: 0  •  amLODIPine (NORVASC) 5 MG tablet, Take 1 tablet by mouth Daily., Disp: 30 tablet, Rfl: 5  •  aspirin 81 MG chewable tablet, Chew 81 mg Daily., Disp: , Rfl:   •  atorvastatin (LIPITOR) 40 MG tablet, TAKE ONE TABLET BY MOUTH DAILY (Patient taking differently: Take 40 mg by mouth Every Night.), Disp: 90 tablet, Rfl: 3  •  budesonide-formoterol (SYMBICORT) 160-4.5 MCG/ACT inhaler, Inhale 2 puffs 2 (Two) Times a Day., Disp: , Rfl:   •  cefdinir (OMNICEF) 300 MG capsule, Take 1 capsule by mouth 2 (Two) Times a Day., Disp:  14 capsule, Rfl: 0  •  fluticasone (Flonase) 50 MCG/ACT nasal spray, 2 sprays into the nostril(s) as directed by provider Daily., Disp: 15.8 mL, Rfl: 0  •  guaiFENesin 200 MG tablet, Take 400 mg by mouth Every 4 (Four) Hours As Needed for Cough., Disp: , Rfl:   •  loratadine (CLARITIN) 10 MG tablet, Take 10 mg by mouth Daily., Disp: , Rfl:   •  propranolol LA (INDERAL LA) 80 MG 24 hr capsule, TAKE ONE CAPSULE BY MOUTH EVERY MORNING (Patient taking differently: Take 80 mg by mouth Daily.), Disp: 90 capsule, Rfl: 3  •  omeprazole (priLOSEC) 40 MG capsule, Take 1 capsule by mouth Daily., Disp: 90 capsule, Rfl: 3    Allergies   Allergen Reactions   • Dilaudid [Hydromorphone Hcl] Hallucinations       Social History     Socioeconomic History   • Marital status:    Tobacco Use   • Smoking status: Former Smoker     Types: Pipe   • Smokeless tobacco: Never Used   • Tobacco comment: only in college   Vaping Use   • Vaping Use: Never used   Substance and Sexual Activity   • Alcohol use: Yes     Alcohol/week: 5.0 standard drinks     Types: 5 Glasses of wine per week     Comment: SOCIALLY   • Drug use: No   • Sexual activity: Defer     Comment: 50 years ago       Family History   Problem Relation Age of Onset   • Cancer Mother         pancreatic   • Liver disease Mother    • Cancer Father         prostate, bladder, colon, lymphoma, leukemia   • Colon cancer Father    • Cancer Brother         prostate   • Cancer Paternal Grandfather         prostate   • Malig Hyperthermia Neg Hx        Review of Systems   Constitutional: Negative.    HENT: Positive for sore throat, trouble swallowing and voice change.    Respiratory: Negative.    Cardiovascular: Negative.    Gastrointestinal: Negative.    Musculoskeletal: Positive for arthralgias.   Skin: Negative.    Hematological: Negative.        Vitals:    03/15/22 1043   Temp: 97.1 °F (36.2 °C)       Physical Exam  Vitals reviewed.   Constitutional:       Appearance: Normal  appearance. He is well-developed.   HENT:      Head: Normocephalic and atraumatic.   Eyes:      General: No scleral icterus.     Pupils: Pupils are equal, round, and reactive to light.   Cardiovascular:      Rate and Rhythm: Normal rate and regular rhythm.      Heart sounds: Normal heart sounds.   Pulmonary:      Effort: Pulmonary effort is normal. No respiratory distress.      Breath sounds: No wheezing or rales.   Abdominal:      General: Bowel sounds are normal. There is no distension.      Palpations: Abdomen is soft. There is no mass.      Tenderness: There is no abdominal tenderness.      Hernia: No hernia is present.   Musculoskeletal:         General: No swelling or tenderness. Normal range of motion.   Skin:     General: Skin is warm and dry.      Coloration: Skin is not jaundiced.      Findings: No rash.   Neurological:      General: No focal deficit present.      Mental Status: He is alert and oriented to person, place, and time.      Cranial Nerves: No cranial nerve deficit.   Psychiatric:         Behavior: Behavior normal.         Thought Content: Thought content normal.         Judgment: Judgment normal.         No results displayed because visit has over 200 results.      Office Visit on 01/19/2022   Component Date Value Ref Range Status   • SARS-CoV-2, RONALDO 01/19/2022 Not Detected  Not Detected Final   • [facility] SARS-COV-2, RONALDO 2 DAY TAT 01/19/2022 Performed   Final       Diagnoses and all orders for this visit:    1. Esophageal dysphagia (Primary)    2. Gastroesophageal reflux disease with esophagitis without hemorrhage    Other orders  -     omeprazole (priLOSEC) 40 MG capsule; Take 1 capsule by mouth Daily.  Dispense: 90 capsule; Refill: 3      Patient 81-year-old male with history of esophageal dysphagia underwent esophagram while in the hospital for pneumonia.  Patient found with tertiary contractions distally without obstruction.  Patient still reporting intermittent dysphagia to solids, would  recommend proceeding with EGD but in the meantime will change PPI to see if we can get better response to therapy.  Tertiary contractions suggest inflammatory irritability.  This may be related to his dysphagia causing intermittent esophageal spasms.  We will follow-up clinically based on response to therapy and EGD findings.

## 2022-03-15 NOTE — TELEPHONE ENCOUNTER
ROLAN patient in office for EGD. Scheduled 04/27/2022 with arrival time 7:00am. Prep packet handed to patient. Also advised arrival time may vary based on [facility] guidelines. ROLAN Costello

## 2022-03-17 ENCOUNTER — TELEPHONE (OUTPATIENT)
Dept: FAMILY MEDICINE CLINIC | Facility: CLINIC | Age: 82
End: 2022-03-17

## 2022-03-17 ENCOUNTER — READMISSION MANAGEMENT (OUTPATIENT)
Dept: CALL CENTER | Facility: HOSPITAL | Age: 82
End: 2022-03-17

## 2022-03-17 ENCOUNTER — APPOINTMENT (OUTPATIENT)
Dept: GENERAL RADIOLOGY | Facility: HOSPITAL | Age: 82
End: 2022-03-17

## 2022-03-17 ENCOUNTER — HOSPITAL ENCOUNTER (INPATIENT)
Facility: HOSPITAL | Age: 82
LOS: 4 days | Discharge: HOME | End: 2022-03-21
Attending: EMERGENCY MEDICINE | Admitting: STUDENT IN AN ORGANIZED HEALTH CARE EDUCATION/TRAINING PROGRAM

## 2022-03-17 ENCOUNTER — APPOINTMENT (OUTPATIENT)
Dept: CT IMAGING | Facility: HOSPITAL | Age: 82
End: 2022-03-17

## 2022-03-17 DIAGNOSIS — J18.9 PNEUMONIA DUE TO INFECTIOUS ORGANISM, UNSPECIFIED LATERALITY, UNSPECIFIED PART OF LUNG: Primary | ICD-10-CM

## 2022-03-17 DIAGNOSIS — R09.02 HYPOXEMIA: ICD-10-CM

## 2022-03-17 DIAGNOSIS — R13.19 ESOPHAGEAL DYSPHAGIA: ICD-10-CM

## 2022-03-17 DIAGNOSIS — K21.00 GASTROESOPHAGEAL REFLUX DISEASE WITH ESOPHAGITIS WITHOUT HEMORRHAGE: ICD-10-CM

## 2022-03-17 DIAGNOSIS — A41.9 SEPSIS, DUE TO UNSPECIFIED ORGANISM, UNSPECIFIED WHETHER ACUTE ORGAN DYSFUNCTION PRESENT: ICD-10-CM

## 2022-03-17 PROBLEM — K21.9 GERD (GASTROESOPHAGEAL REFLUX DISEASE): Status: ACTIVE | Noted: 2019-02-05

## 2022-03-17 PROBLEM — R06.01 ORTHOPNEA: Status: ACTIVE | Noted: 2022-03-17

## 2022-03-17 PROBLEM — J96.01 ACUTE RESPIRATORY FAILURE WITH HYPOXIA (HCC): Status: ACTIVE | Noted: 2022-03-17

## 2022-03-17 LAB
ALBUMIN SERPL-MCNC: 3.5 G/DL (ref 3.5–5.2)
ALBUMIN/GLOB SERPL: 0.9 G/DL
ALP SERPL-CCNC: 88 U/L (ref 39–117)
ALT SERPL W P-5'-P-CCNC: 31 U/L (ref 1–41)
ANION GAP SERPL CALCULATED.3IONS-SCNC: 10.9 MMOL/L (ref 5–15)
ANION GAP SERPL CALCULATED.3IONS-SCNC: 8 MMOL/L (ref 5–15)
AST SERPL-CCNC: 21 U/L (ref 1–40)
B PARAPERT DNA SPEC QL NAA+PROBE: NOT DETECTED
B PERT DNA SPEC QL NAA+PROBE: NOT DETECTED
BASOPHILS # BLD AUTO: 0.05 10*3/MM3 (ref 0–0.2)
BASOPHILS NFR BLD AUTO: 0.3 % (ref 0–1.5)
BILIRUB SERPL-MCNC: 1.4 MG/DL (ref 0–1.2)
BILIRUB UR QL STRIP: NEGATIVE
BUN SERPL-MCNC: 13 MG/DL (ref 8–23)
BUN SERPL-MCNC: 15 MG/DL (ref 8–23)
BUN/CREAT SERPL: 22.4 (ref 7–25)
BUN/CREAT SERPL: 24.5 (ref 7–25)
C PNEUM DNA NPH QL NAA+NON-PROBE: NOT DETECTED
CALCIUM SPEC-SCNC: 8.9 MG/DL (ref 8.6–10.5)
CALCIUM SPEC-SCNC: 9.5 MG/DL (ref 8.6–10.5)
CHLORIDE SERPL-SCNC: 102 MMOL/L (ref 98–107)
CHLORIDE SERPL-SCNC: 104 MMOL/L (ref 98–107)
CLARITY UR: CLEAR
CO2 SERPL-SCNC: 26 MMOL/L (ref 22–29)
CO2 SERPL-SCNC: 26.1 MMOL/L (ref 22–29)
COLOR UR: YELLOW
CREAT SERPL-MCNC: 0.53 MG/DL (ref 0.76–1.27)
CREAT SERPL-MCNC: 0.67 MG/DL (ref 0.76–1.27)
D DIMER PPP FEU-MCNC: 0.65 MCGFEU/ML (ref 0–0.49)
D-LACTATE SERPL-SCNC: 1.8 MMOL/L (ref 0.5–2)
DEPRECATED RDW RBC AUTO: 41.5 FL (ref 37–54)
DEPRECATED RDW RBC AUTO: 41.8 FL (ref 37–54)
EGFRCR SERPLBLD CKD-EPI 2021: 100.7 ML/MIN/1.73
EGFRCR SERPLBLD CKD-EPI 2021: 93.8 ML/MIN/1.73
EOSINOPHIL # BLD AUTO: 0.11 10*3/MM3 (ref 0–0.4)
EOSINOPHIL NFR BLD AUTO: 0.7 % (ref 0.3–6.2)
ERYTHROCYTE [DISTWIDTH] IN BLOOD BY AUTOMATED COUNT: 12.9 % (ref 12.3–15.4)
ERYTHROCYTE [DISTWIDTH] IN BLOOD BY AUTOMATED COUNT: 13 % (ref 12.3–15.4)
FLUAV SUBTYP SPEC NAA+PROBE: NOT DETECTED
FLUBV RNA ISLT QL NAA+PROBE: NOT DETECTED
GLOBULIN UR ELPH-MCNC: 3.7 GM/DL
GLUCOSE SERPL-MCNC: 121 MG/DL (ref 65–99)
GLUCOSE SERPL-MCNC: 143 MG/DL (ref 65–99)
GLUCOSE UR STRIP-MCNC: NEGATIVE MG/DL
HADV DNA SPEC NAA+PROBE: NOT DETECTED
HCOV 229E RNA SPEC QL NAA+PROBE: NOT DETECTED
HCOV HKU1 RNA SPEC QL NAA+PROBE: NOT DETECTED
HCOV NL63 RNA SPEC QL NAA+PROBE: NOT DETECTED
HCOV OC43 RNA SPEC QL NAA+PROBE: NOT DETECTED
HCT VFR BLD AUTO: 37.6 % (ref 37.5–51)
HCT VFR BLD AUTO: 40.3 % (ref 37.5–51)
HGB BLD-MCNC: 12.7 G/DL (ref 13–17.7)
HGB BLD-MCNC: 13.7 G/DL (ref 13–17.7)
HGB UR QL STRIP.AUTO: NEGATIVE
HMPV RNA NPH QL NAA+NON-PROBE: NOT DETECTED
HPIV1 RNA ISLT QL NAA+PROBE: NOT DETECTED
HPIV2 RNA SPEC QL NAA+PROBE: NOT DETECTED
HPIV3 RNA NPH QL NAA+PROBE: NOT DETECTED
HPIV4 P GENE NPH QL NAA+PROBE: NOT DETECTED
IMM GRANULOCYTES # BLD AUTO: 0.07 10*3/MM3 (ref 0–0.05)
IMM GRANULOCYTES NFR BLD AUTO: 0.4 % (ref 0–0.5)
KETONES UR QL STRIP: NEGATIVE
LEUKOCYTE ESTERASE UR QL STRIP.AUTO: NEGATIVE
LYMPHOCYTES # BLD AUTO: 0.45 10*3/MM3 (ref 0.7–3.1)
LYMPHOCYTES NFR BLD AUTO: 2.8 % (ref 19.6–45.3)
M PNEUMO IGG SER IA-ACNC: NOT DETECTED
MCH RBC QN AUTO: 30.1 PG (ref 26.6–33)
MCH RBC QN AUTO: 30.3 PG (ref 26.6–33)
MCHC RBC AUTO-ENTMCNC: 33.8 G/DL (ref 31.5–35.7)
MCHC RBC AUTO-ENTMCNC: 34 G/DL (ref 31.5–35.7)
MCV RBC AUTO: 89.1 FL (ref 79–97)
MCV RBC AUTO: 89.2 FL (ref 79–97)
MONOCYTES # BLD AUTO: 0.46 10*3/MM3 (ref 0.1–0.9)
MONOCYTES NFR BLD AUTO: 2.9 % (ref 5–12)
MRSA DNA SPEC QL NAA+PROBE: NORMAL
NEUTROPHILS NFR BLD AUTO: 14.83 10*3/MM3 (ref 1.7–7)
NEUTROPHILS NFR BLD AUTO: 92.9 % (ref 42.7–76)
NITRITE UR QL STRIP: NEGATIVE
NRBC BLD AUTO-RTO: 0 /100 WBC (ref 0–0.2)
NT-PROBNP SERPL-MCNC: 825 PG/ML (ref 0–1800)
PH UR STRIP.AUTO: 6.5 [PH] (ref 5–8)
PLATELET # BLD AUTO: 245 10*3/MM3 (ref 140–450)
PLATELET # BLD AUTO: 262 10*3/MM3 (ref 140–450)
PMV BLD AUTO: 10.6 FL (ref 6–12)
PMV BLD AUTO: 10.7 FL (ref 6–12)
POTASSIUM SERPL-SCNC: 3.5 MMOL/L (ref 3.5–5.2)
POTASSIUM SERPL-SCNC: 3.7 MMOL/L (ref 3.5–5.2)
PROCALCITONIN SERPL-MCNC: 0.19 NG/ML (ref 0–0.25)
PROT SERPL-MCNC: 7.2 G/DL (ref 6–8.5)
PROT UR QL STRIP: NEGATIVE
QT INTERVAL: 349 MS
RBC # BLD AUTO: 4.22 10*6/MM3 (ref 4.14–5.8)
RBC # BLD AUTO: 4.52 10*6/MM3 (ref 4.14–5.8)
RHINOVIRUS RNA SPEC NAA+PROBE: NOT DETECTED
RSV RNA NPH QL NAA+NON-PROBE: NOT DETECTED
SARS-COV-2 RNA NPH QL NAA+NON-PROBE: NOT DETECTED
SODIUM SERPL-SCNC: 138 MMOL/L (ref 136–145)
SODIUM SERPL-SCNC: 139 MMOL/L (ref 136–145)
SP GR UR STRIP: 1.02 (ref 1–1.03)
TROPONIN T SERPL-MCNC: <0.01 NG/ML (ref 0–0.03)
UROBILINOGEN UR QL STRIP: NORMAL
WBC NRBC COR # BLD: 15.97 10*3/MM3 (ref 3.4–10.8)
WBC NRBC COR # BLD: 18.25 10*3/MM3 (ref 3.4–10.8)

## 2022-03-17 PROCEDURE — 25010000002 CEFTRIAXONE PER 250 MG: Performed by: PHYSICIAN ASSISTANT

## 2022-03-17 PROCEDURE — 81003 URINALYSIS AUTO W/O SCOPE: CPT | Performed by: PHYSICIAN ASSISTANT

## 2022-03-17 PROCEDURE — 25010000002 ENOXAPARIN PER 10 MG: Performed by: NURSE PRACTITIONER

## 2022-03-17 PROCEDURE — 99285 EMERGENCY DEPT VISIT HI MDM: CPT

## 2022-03-17 PROCEDURE — 85025 COMPLETE CBC W/AUTO DIFF WBC: CPT | Performed by: PHYSICIAN ASSISTANT

## 2022-03-17 PROCEDURE — 93005 ELECTROCARDIOGRAM TRACING: CPT | Performed by: EMERGENCY MEDICINE

## 2022-03-17 PROCEDURE — 85027 COMPLETE CBC AUTOMATED: CPT | Performed by: NURSE PRACTITIONER

## 2022-03-17 PROCEDURE — 84484 ASSAY OF TROPONIN QUANT: CPT | Performed by: PHYSICIAN ASSISTANT

## 2022-03-17 PROCEDURE — 71250 CT THORAX DX C-: CPT

## 2022-03-17 PROCEDURE — 99223 1ST HOSP IP/OBS HIGH 75: CPT | Performed by: INTERNAL MEDICINE

## 2022-03-17 PROCEDURE — 25010000002 PIPERACILLIN SOD-TAZOBACTAM PER 1 G: Performed by: STUDENT IN AN ORGANIZED HEALTH CARE EDUCATION/TRAINING PROGRAM

## 2022-03-17 PROCEDURE — 71045 X-RAY EXAM CHEST 1 VIEW: CPT

## 2022-03-17 PROCEDURE — 25010000002 VANCOMYCIN 10 G RECONSTITUTED SOLUTION: Performed by: PHYSICIAN ASSISTANT

## 2022-03-17 PROCEDURE — 87040 BLOOD CULTURE FOR BACTERIA: CPT | Performed by: PHYSICIAN ASSISTANT

## 2022-03-17 PROCEDURE — 84145 PROCALCITONIN (PCT): CPT | Performed by: PHYSICIAN ASSISTANT

## 2022-03-17 PROCEDURE — 94799 UNLISTED PULMONARY SVC/PX: CPT

## 2022-03-17 PROCEDURE — 80053 COMPREHEN METABOLIC PANEL: CPT | Performed by: PHYSICIAN ASSISTANT

## 2022-03-17 PROCEDURE — 0202U NFCT DS 22 TRGT SARS-COV-2: CPT | Performed by: PHYSICIAN ASSISTANT

## 2022-03-17 PROCEDURE — 94761 N-INVAS EAR/PLS OXIMETRY MLT: CPT

## 2022-03-17 PROCEDURE — 94640 AIRWAY INHALATION TREATMENT: CPT

## 2022-03-17 PROCEDURE — 87641 MR-STAPH DNA AMP PROBE: CPT | Performed by: STUDENT IN AN ORGANIZED HEALTH CARE EDUCATION/TRAINING PROGRAM

## 2022-03-17 PROCEDURE — 83605 ASSAY OF LACTIC ACID: CPT | Performed by: PHYSICIAN ASSISTANT

## 2022-03-17 PROCEDURE — 85379 FIBRIN DEGRADATION QUANT: CPT | Performed by: NURSE PRACTITIONER

## 2022-03-17 PROCEDURE — 93010 ELECTROCARDIOGRAM REPORT: CPT | Performed by: INTERNAL MEDICINE

## 2022-03-17 PROCEDURE — 36415 COLL VENOUS BLD VENIPUNCTURE: CPT

## 2022-03-17 PROCEDURE — 83880 ASSAY OF NATRIURETIC PEPTIDE: CPT | Performed by: NURSE PRACTITIONER

## 2022-03-17 RX ORDER — PROPRANOLOL HYDROCHLORIDE 80 MG/1
80 CAPSULE, EXTENDED RELEASE ORAL DAILY
Status: DISCONTINUED | OUTPATIENT
Start: 2022-03-17 | End: 2022-03-21 | Stop reason: HOSPADM

## 2022-03-17 RX ORDER — PANTOPRAZOLE SODIUM 40 MG/1
40 TABLET, DELAYED RELEASE ORAL EVERY MORNING
Status: DISCONTINUED | OUTPATIENT
Start: 2022-03-17 | End: 2022-03-21 | Stop reason: HOSPADM

## 2022-03-17 RX ORDER — ACETAMINOPHEN 325 MG/1
650 TABLET ORAL EVERY 4 HOURS PRN
Status: DISCONTINUED | OUTPATIENT
Start: 2022-03-17 | End: 2022-03-21 | Stop reason: HOSPADM

## 2022-03-17 RX ORDER — GUAIFENESIN 600 MG/1
600 TABLET, EXTENDED RELEASE ORAL EVERY 12 HOURS SCHEDULED
Status: DISCONTINUED | OUTPATIENT
Start: 2022-03-17 | End: 2022-03-21 | Stop reason: HOSPADM

## 2022-03-17 RX ORDER — CETIRIZINE HYDROCHLORIDE 10 MG/1
5 TABLET ORAL DAILY
Status: DISCONTINUED | OUTPATIENT
Start: 2022-03-17 | End: 2022-03-21 | Stop reason: HOSPADM

## 2022-03-17 RX ORDER — ACETAMINOPHEN 160 MG/5ML
650 SOLUTION ORAL EVERY 4 HOURS PRN
Status: DISCONTINUED | OUTPATIENT
Start: 2022-03-17 | End: 2022-03-21 | Stop reason: HOSPADM

## 2022-03-17 RX ORDER — ALBUTEROL SULFATE 2.5 MG/3ML
2.5 SOLUTION RESPIRATORY (INHALATION) EVERY 6 HOURS PRN
Status: DISCONTINUED | OUTPATIENT
Start: 2022-03-17 | End: 2022-03-21 | Stop reason: HOSPADM

## 2022-03-17 RX ORDER — FLUTICASONE PROPIONATE 50 MCG
2 SPRAY, SUSPENSION (ML) NASAL DAILY
Status: DISCONTINUED | OUTPATIENT
Start: 2022-03-17 | End: 2022-03-21 | Stop reason: HOSPADM

## 2022-03-17 RX ORDER — ASPIRIN 81 MG/1
81 TABLET, CHEWABLE ORAL DAILY
Status: DISCONTINUED | OUTPATIENT
Start: 2022-03-17 | End: 2022-03-21 | Stop reason: HOSPADM

## 2022-03-17 RX ORDER — ACETAMINOPHEN 650 MG/1
650 SUPPOSITORY RECTAL EVERY 4 HOURS PRN
Status: DISCONTINUED | OUTPATIENT
Start: 2022-03-17 | End: 2022-03-21 | Stop reason: HOSPADM

## 2022-03-17 RX ORDER — NITROGLYCERIN 0.4 MG/1
0.4 TABLET SUBLINGUAL
Status: DISCONTINUED | OUTPATIENT
Start: 2022-03-17 | End: 2022-03-21 | Stop reason: HOSPADM

## 2022-03-17 RX ORDER — BUDESONIDE AND FORMOTEROL FUMARATE DIHYDRATE 160; 4.5 UG/1; UG/1
2 AEROSOL RESPIRATORY (INHALATION)
Status: DISCONTINUED | OUTPATIENT
Start: 2022-03-17 | End: 2022-03-18

## 2022-03-17 RX ORDER — SODIUM CHLORIDE 0.9 % (FLUSH) 0.9 %
10 SYRINGE (ML) INJECTION AS NEEDED
Status: DISCONTINUED | OUTPATIENT
Start: 2022-03-17 | End: 2022-03-21 | Stop reason: HOSPADM

## 2022-03-17 RX ORDER — FUROSEMIDE 40 MG/1
40 TABLET ORAL DAILY
Status: DISCONTINUED | OUTPATIENT
Start: 2022-03-17 | End: 2022-03-21 | Stop reason: HOSPADM

## 2022-03-17 RX ORDER — ONDANSETRON 2 MG/ML
4 INJECTION INTRAMUSCULAR; INTRAVENOUS EVERY 6 HOURS PRN
Status: DISCONTINUED | OUTPATIENT
Start: 2022-03-17 | End: 2022-03-21 | Stop reason: HOSPADM

## 2022-03-17 RX ORDER — CALCIUM CARBONATE 200(500)MG
2 TABLET,CHEWABLE ORAL 2 TIMES DAILY PRN
Status: DISCONTINUED | OUTPATIENT
Start: 2022-03-17 | End: 2022-03-21 | Stop reason: HOSPADM

## 2022-03-17 RX ORDER — ONDANSETRON 4 MG/1
4 TABLET, FILM COATED ORAL EVERY 6 HOURS PRN
Status: DISCONTINUED | OUTPATIENT
Start: 2022-03-17 | End: 2022-03-21 | Stop reason: HOSPADM

## 2022-03-17 RX ORDER — SODIUM CHLORIDE 9 MG/ML
100 INJECTION, SOLUTION INTRAVENOUS CONTINUOUS
Status: DISCONTINUED | OUTPATIENT
Start: 2022-03-17 | End: 2022-03-17

## 2022-03-17 RX ORDER — ACETAMINOPHEN 500 MG
1000 TABLET ORAL ONCE
Status: COMPLETED | OUTPATIENT
Start: 2022-03-17 | End: 2022-03-17

## 2022-03-17 RX ORDER — SODIUM CHLORIDE 9 MG/ML
50 INJECTION, SOLUTION INTRAVENOUS CONTINUOUS
Status: DISCONTINUED | OUTPATIENT
Start: 2022-03-17 | End: 2022-03-17

## 2022-03-17 RX ORDER — ATORVASTATIN CALCIUM 20 MG/1
40 TABLET, FILM COATED ORAL NIGHTLY
Status: DISCONTINUED | OUTPATIENT
Start: 2022-03-17 | End: 2022-03-21 | Stop reason: HOSPADM

## 2022-03-17 RX ORDER — SODIUM CHLORIDE 0.9 % (FLUSH) 0.9 %
10 SYRINGE (ML) INJECTION EVERY 12 HOURS SCHEDULED
Status: DISCONTINUED | OUTPATIENT
Start: 2022-03-17 | End: 2022-03-21 | Stop reason: HOSPADM

## 2022-03-17 RX ADMIN — ACETAMINOPHEN 1000 MG: 500 TABLET ORAL at 04:22

## 2022-03-17 RX ADMIN — TAZOBACTAM SODIUM AND PIPERACILLIN SODIUM 3.38 G: 375; 3 INJECTION, SOLUTION INTRAVENOUS at 14:50

## 2022-03-17 RX ADMIN — ENOXAPARIN SODIUM 40 MG: 100 INJECTION SUBCUTANEOUS at 21:44

## 2022-03-17 RX ADMIN — FUROSEMIDE 40 MG: 40 TABLET ORAL at 14:50

## 2022-03-17 RX ADMIN — BUDESONIDE AND FORMOTEROL FUMARATE DIHYDRATE 2 PUFF: 160; 4.5 AEROSOL RESPIRATORY (INHALATION) at 20:26

## 2022-03-17 RX ADMIN — GUAIFENESIN 600 MG: 600 TABLET, EXTENDED RELEASE ORAL at 22:37

## 2022-03-17 RX ADMIN — CETIRIZINE HYDROCHLORIDE 5 MG: 10 TABLET ORAL at 12:25

## 2022-03-17 RX ADMIN — SODIUM CHLORIDE 100 ML/HR: 9 INJECTION, SOLUTION INTRAVENOUS at 05:06

## 2022-03-17 RX ADMIN — Medication 10 ML: at 09:04

## 2022-03-17 RX ADMIN — PANTOPRAZOLE SODIUM 40 MG: 40 TABLET, DELAYED RELEASE ORAL at 12:25

## 2022-03-17 RX ADMIN — TAZOBACTAM SODIUM AND PIPERACILLIN SODIUM 3.38 G: 375; 3 INJECTION, SOLUTION INTRAVENOUS at 21:44

## 2022-03-17 RX ADMIN — CEFTRIAXONE 1 G: 1 INJECTION, POWDER, FOR SOLUTION INTRAMUSCULAR; INTRAVENOUS at 04:27

## 2022-03-17 RX ADMIN — ATORVASTATIN CALCIUM 40 MG: 20 TABLET, FILM COATED ORAL at 21:44

## 2022-03-17 RX ADMIN — ASPIRIN 81 MG: 81 TABLET, CHEWABLE ORAL at 12:25

## 2022-03-17 RX ADMIN — PROPRANOLOL HYDROCHLORIDE 80 MG: 80 CAPSULE, EXTENDED RELEASE ORAL at 12:25

## 2022-03-17 RX ADMIN — GUAIFENESIN 600 MG: 600 TABLET, EXTENDED RELEASE ORAL at 14:50

## 2022-03-17 RX ADMIN — SODIUM CHLORIDE 500 ML: 9 INJECTION, SOLUTION INTRAVENOUS at 04:23

## 2022-03-17 RX ADMIN — VANCOMYCIN HYDROCHLORIDE 2000 MG: 10 INJECTION, POWDER, LYOPHILIZED, FOR SOLUTION INTRAVENOUS at 04:57

## 2022-03-17 RX ADMIN — FLUTICASONE PROPIONATE 2 SPRAY: 50 SPRAY, METERED NASAL at 12:25

## 2022-03-17 RX ADMIN — Medication 10 ML: at 21:45

## 2022-03-17 NOTE — ED NOTES
Pt to triage from home with c/o short of air - pt recently admitted for pneumonia.  Pt also with fever at triage.  Pt provided with mask in triage. Triage personnel wore appropriate PPE

## 2022-03-17 NOTE — OUTREACH NOTE
COPD/PN Week 2 Survey    Flowsheet Row Responses   [facility] facility patient discharged from? [facility]   Does the patient have one of the following disease processes/diagnoses(primary or secondary)? COPD/Pneumonia   Was the primary reason for admission: Pneumonia   Week 2 attempt successful? No   Revoke Readmitted          EFRAÍN CHURCH - Registered Nurse

## 2022-03-17 NOTE — PLAN OF CARE
Goal Outcome Evaluation:              Discussed with RN. Patient passed swallow screen. During previous admission, clinical swallow eval completed 3/3/22 recommending regular and thins. Esophagram completed 3/3/22 reporting no penetration or aspiration. SLP to s/o at this time, please re-consult as warranted.

## 2022-03-17 NOTE — ED PROVIDER NOTES
MD ATTESTATION NOTE    The KATLIN and I have discussed this patient's history, physical exam, and treatment plan.    I provided a substantive portion of the care of this patient. I personally performed the physical exam, in its entirety. The attached note describes my personal findings.      Junito Sorto is a 81 y.o. male who presents to the ED c/o shortness of breath and fever.  Patient recently discharged in the hospital after a bout of pneumonia.  Patient was discharged on Omnicef he took his last dose this morning.  States that over the course today has gotten more short of breath.  Patient states that he checked his pulse ox with a home oximeter and could not get above 89%.  Patient reports he has had worse coughing.  States that previously he was feeling better after discharge but is gotten worse today.      On exam:  GENERAL: Mild distressed  HENT: nares patent  EYES: no scleral icterus  CV: regular rhythm, regular rate  RESPIRATORY: normal effort, coarse breath sounds bilaterally  ABDOMEN: soft, nontender nondistended  MUSCULOSKELETAL: no deformity  NEURO: alert, moves all extremities, follows commands  SKIN: warm, dry    Labs  Recent Results (from the past 24 hour(s))   Comprehensive Metabolic Panel    Collection Time: 03/17/22  3:55 AM    Specimen: Blood   Result Value Ref Range    Glucose 143 (H) 65 - 99 mg/dL    BUN 15 8 - 23 mg/dL    Creatinine 0.67 (L) 0.76 - 1.27 mg/dL    Sodium 139 136 - 145 mmol/L    Potassium 3.5 3.5 - 5.2 mmol/L    Chloride 102 98 - 107 mmol/L    CO2 26.1 22.0 - 29.0 mmol/L    Calcium 9.5 8.6 - 10.5 mg/dL    Total Protein 7.2 6.0 - 8.5 g/dL    Albumin 3.50 3.50 - 5.20 g/dL    ALT (SGPT) 31 1 - 41 U/L    AST (SGOT) 21 1 - 40 U/L    Alkaline Phosphatase 88 39 - 117 U/L    Total Bilirubin 1.4 (H) 0.0 - 1.2 mg/dL    Globulin 3.7 gm/dL    A/G Ratio 0.9 g/dL    BUN/Creatinine Ratio 22.4 7.0 - 25.0    Anion Gap 10.9 5.0 - 15.0 mmol/L    eGFR 93.8 >60.0 mL/min/1.73   Lactic Acid, Plasma     Collection Time: 03/17/22  3:55 AM    Specimen: Blood   Result Value Ref Range    Lactate 1.8 0.5 - 2.0 mmol/L   Procalcitonin    Collection Time: 03/17/22  3:55 AM    Specimen: Blood   Result Value Ref Range    Procalcitonin 0.19 0.00 - 0.25 ng/mL   Troponin    Collection Time: 03/17/22  3:55 AM    Specimen: Blood   Result Value Ref Range    Troponin T <0.010 0.000 - 0.030 ng/mL   CBC Auto Differential    Collection Time: 03/17/22  3:55 AM    Specimen: Blood   Result Value Ref Range    WBC 15.97 (H) 3.40 - 10.80 10*3/mm3    RBC 4.52 4.14 - 5.80 10*6/mm3    Hemoglobin 13.7 13.0 - 17.7 g/dL    Hematocrit 40.3 37.5 - 51.0 %    MCV 89.2 79.0 - 97.0 fL    MCH 30.3 26.6 - 33.0 pg    MCHC 34.0 31.5 - 35.7 g/dL    RDW 12.9 12.3 - 15.4 %    RDW-SD 41.8 37.0 - 54.0 fl    MPV 10.6 6.0 - 12.0 fL    Platelets 262 140 - 450 10*3/mm3    Neutrophil % 92.9 (H) 42.7 - 76.0 %    Lymphocyte % 2.8 (L) 19.6 - 45.3 %    Monocyte % 2.9 (L) 5.0 - 12.0 %    Eosinophil % 0.7 0.3 - 6.2 %    Basophil % 0.3 0.0 - 1.5 %    Immature Grans % 0.4 0.0 - 0.5 %    Neutrophils, Absolute 14.83 (H) 1.70 - 7.00 10*3/mm3    Lymphocytes, Absolute 0.45 (L) 0.70 - 3.10 10*3/mm3    Monocytes, Absolute 0.46 0.10 - 0.90 10*3/mm3    Eosinophils, Absolute 0.11 0.00 - 0.40 10*3/mm3    Basophils, Absolute 0.05 0.00 - 0.20 10*3/mm3    Immature Grans, Absolute 0.07 (H) 0.00 - 0.05 10*3/mm3    nRBC 0.0 0.0 - 0.2 /100 WBC   Respiratory Panel PCR w/COVID-19(SARS-CoV-2) VISHLA/QUIANA/EDGAR/PAD/COR/MAD/DAVID In-House, NP Swab in UTM/VTM, 3-4 HR TAT - Swab, Nasopharynx    Collection Time: 03/17/22  4:10 AM    Specimen: Nasopharynx; Swab   Result Value Ref Range    ADENOVIRUS, PCR Not Detected Not Detected    Coronavirus 229E Not Detected Not Detected    Coronavirus HKU1 Not Detected Not Detected    Coronavirus NL63 Not Detected Not Detected    Coronavirus OC43 Not Detected Not Detected    COVID19 Not Detected Not Detected - Ref. Range    Human Metapneumovirus Not Detected Not  Detected    Human Rhinovirus/Enterovirus Not Detected Not Detected    Influenza A PCR Not Detected Not Detected    Influenza B PCR Not Detected Not Detected    Parainfluenza Virus 1 Not Detected Not Detected    Parainfluenza Virus 2 Not Detected Not Detected    Parainfluenza Virus 3 Not Detected Not Detected    Parainfluenza Virus 4 Not Detected Not Detected    RSV, PCR Not Detected Not Detected    Bordetella pertussis pcr Not Detected Not Detected    Bordetella parapertussis PCR Not Detected Not Detected    Chlamydophila pneumoniae PCR Not Detected Not Detected    Mycoplasma pneumo by PCR Not Detected Not Detected   ECG 12 Lead    Collection Time: 03/17/22  4:13 AM   Result Value Ref Range    QT Interval 349 ms   CBC (No Diff)    Collection Time: 03/17/22  6:43 AM    Specimen: Blood   Result Value Ref Range    WBC 18.25 (H) 3.40 - 10.80 10*3/mm3    RBC 4.22 4.14 - 5.80 10*6/mm3    Hemoglobin 12.7 (L) 13.0 - 17.7 g/dL    Hematocrit 37.6 37.5 - 51.0 %    MCV 89.1 79.0 - 97.0 fL    MCH 30.1 26.6 - 33.0 pg    MCHC 33.8 31.5 - 35.7 g/dL    RDW 13.0 12.3 - 15.4 %    RDW-SD 41.5 37.0 - 54.0 fl    MPV 10.7 6.0 - 12.0 fL    Platelets 245 140 - 450 10*3/mm3       Radiology  XR Chest 1 View    Result Date: 3/17/2022  Patient: VICTOR MANUEL HOLLEY  Time Out: 05:56 Exam(s): FILM CXR 1 VIEW EXAM:   XR Chest, 1 View CLINICAL HISTORY:    Reason for exam: SOA. TECHNIQUE:   Frontal view of the chest. COMPARISON: 3 3 2022 FINDINGS:   Lungs: Mild bilateral perihilar reticular opacities.   Pleural space:  Unremarkable.  No pneumothorax.   Heart: Mild cardiomegaly.   Mediastinum:  Unremarkable.   Bones joints:  Unremarkable. IMPRESSION:     Cardiomegaly with mild bilateral perihilar reticular opacities compatible with interstitial pulmonary edema.    Electronically signed by Hernan Galarza M.D. on 03-17-22 at 0556      Medical Decision Making:  ED Course as of 03/17/22 0705   Thu Mar 17, 2022   0342 Temp(!): 102.3 °F (39.1 °C) [RC]   9350  Heart Rate: 75 [RC]   0342 Resp: 18 [RC]   0342 SpO2(!): 89 %  2L NC [RC]   0350 Patient is approximately, septic, and by my eye appears to still have a right lower lobe pneumonia.  That said, the chest x-ray is slightly improved from3 March 2022.  The patient will obviously need to be admitted.  I will place a call to the hospitalist at this time as he appears stable for a telemetry bed. [RC]   0503 Discussed patient's case with KOLBY Carolina with [facility].  To admit the patient to Dr. Hernandez's care for further management. [RC]      ED Course User Index  [RC] Hoang Cano III, PA           PPE: Both the patient and I wore a surgical mask throughout the entire patient encounter. I wore protective goggles.     Diagnosis  Final diagnoses:   Pneumonia due to infectious organism, unspecified laterality, unspecified part of lung   Hypoxemia   Sepsis, due to unspecified organism, unspecified whether acute organ dysfunction present (HCC)        Kenyon Carrero MD  03/17/22 0705

## 2022-03-17 NOTE — PROGRESS NOTES
"[facility] Clinical Pharmacy Services: Enoxaparin Consult    Junito Sorto has a pharmacy consult to dose prophylactic enoxaparin per KOLBY Dc with A's request.     Indication: VTE prophylaxis  Home Anticoagulation: None     Relevant clinical data and objective history reviewed:  81 y.o. male 170.2 cm (67\") 103 kg (227 lb 1.2 oz)   Body mass index is 35.56 kg/m².  Estimated Creatinine Clearance: 82.9 mL/min (A) (by C-G formula based on SCr of 0.53 mg/dL (L)).    Assessment/Plan  -Will start patient on 40mg subcutaneous every 24 hours, adjusted for renal function.   -Consult order will be discontinued but pharmacy will continue to follow.     Annabelle Diaz, PharmD, [facility]  Clinical Pharmacy Specialist, Emergency Medicine   Phone: [phone]      "

## 2022-03-17 NOTE — ED PROVIDER NOTES
EMERGENCY DEPARTMENT ENCOUNTER    Room Number:  05/05  Date of encounter:  3/17/2022  PCP: Fan Schulz MD  Historian: Patient      HPI:  Chief Complaint: Shortness of breath  A complete HPI/ROS/PMH/PSH/SH/FH are unobtainable due to: Nothing    Context: Junito Sorto is a 81 y.o. male who presents to the ED c/o shortness of breath and fever.  Patient states he was recently admitted to the hospital for pneumonia.  When he was discharged he was sent home with a course of Omnicef.  The antibiotic was completed today.  He states over the past 2 days he has felt significantly more short of breath than normal and a few hours prior to arrival noticed he had a fever of 102.3.  He denies chest pain but states he does have a deep pain in the center of his back that is worse with coughing.  He is here for further evaluation.    Reviewing the patient's chart he was admitted for right lower lobe pneumonia and asthma exacerbation from 3/3/2022 to 3/9/2022.  While in the hospital he was evaluated by cardiology for wide-complex tachycardia and atrial fibrillation.  His respiratory culture grew out gram-positive cocci in cluster.  While in the hospital he was treated with vancomycin and Rocephin.  He was discharged home on Omnicef.      PAST MEDICAL HISTORY  Active Ambulatory Problems     Diagnosis Date Noted   • Hyperglycemia 02/16/2016   • Obesity 02/25/2016   • Allergic rhinitis 03/28/2016   • Paroxysmal atrial fibrillation (HCC) 03/28/2016   • Benign essential hypertension 03/28/2016   • HLD (hyperlipidemia) 03/28/2016   • Esophageal dysphagia 10/09/2018   • Hiatal hernia 02/05/2019   • Gastroesophageal reflux disease with esophagitis 02/05/2019   • Prostate cancer (HCC) 09/11/2019   • Bladder outlet obstruction 09/11/2019   • Sciatica of right side 12/10/2020   • Pneumonia of right lower lobe due to infectious organism 03/03/2022   • Hypoxia 03/03/2022     Resolved Ambulatory Problems     Diagnosis Date Noted   •  Hypertension 02/25/2016   • Acute cystitis 02/26/2017   • Bacterial conjunctivitis 03/13/2017   • Impacted cerumen of right ear 01/13/2019     Past Medical History:   Diagnosis Date   • Arthritis    • At risk for obstructive sleep apnea    • Atrial fibrillation (HCC)    • BPH (benign prostatic hyperplasia)    • Colon polyp    • Coronary artery disease Few years   • Dysphagia    • Eczema    • GERD (gastroesophageal reflux disease) 2013   • Hernia    • Hyperlipidemia    • Low back pain    • Skin cancer          PAST SURGICAL HISTORY  Past Surgical History:   Procedure Laterality Date   • CATARACT EXTRACTION, BILATERAL     • COLONOSCOPY  unknown   • CYSTOSCOPY TRANSURETHRAL RESECTION OF PROSTATE N/A 9/11/2019    Procedure: CYSTOSCOPY TRANSURETHRAL RESECTION OF PROSTATE;  Surgeon: Chip Bedolla MD;  Location: [facility];  Service: Urology   • ENDOSCOPY N/A 10/31/2018    LA Grade C reflux esophagitis, HH, erythematous mucosa in the antrum. Path: Gastritis, esophagitis.    • EPIDURAL BLOCK     • ESOPHAGEAL DILATATION     • PROSTATE SURGERY  11/06/2014   • SKIN CANCER EXCISION      multiple, over that last few years   • SKIN CANCER EXCISION Right 09/03/2019    FACE,    • TONSILLECTOMY      around 1950   • UPPER GASTROINTESTINAL ENDOSCOPY  2013         FAMILY HISTORY  Family History   Problem Relation Age of Onset   • Cancer Mother         pancreatic   • Liver disease Mother    • Cancer Father         prostate, bladder, colon, lymphoma, leukemia   • Colon cancer Father    • Cancer Brother         prostate   • Cancer Paternal Grandfather         prostate   • Malig Hyperthermia Neg Hx          SOCIAL HISTORY  Social History     Socioeconomic History   • Marital status:    Tobacco Use   • Smoking status: Former Smoker     Types: Pipe   • Smokeless tobacco: Never Used   • Tobacco comment: only in college   Vaping Use   • Vaping Use: Never used   Substance and Sexual Activity   • Alcohol use: Yes      Alcohol/week: 5.0 standard drinks     Types: 5 Glasses of wine per week     Comment: SOCIALLY   • Drug use: No   • Sexual activity: Defer     Comment: 50 years ago         ALLERGIES  Dilaudid [hydromorphone hcl]        REVIEW OF SYSTEMS  Review of Systems   Constitutional: Positive for chills, fatigue and fever.   HENT: Negative.    Eyes: Negative.    Respiratory: Positive for cough and shortness of breath.    Cardiovascular: Negative for chest pain and palpitations.   Gastrointestinal: Positive for nausea. Negative for abdominal pain and vomiting.   Endocrine: Negative.    Genitourinary: Negative.    Musculoskeletal: Negative.    Skin: Negative.    Neurological: Positive for weakness.   Psychiatric/Behavioral: Negative.         All systems reviewed and negative except for those discussed in HPI.       PHYSICAL EXAM    I have reviewed the triage vital signs and nursing notes.    ED Triage Vitals [03/17/22 0335]   Temp Heart Rate Resp BP SpO2   (!) 102.3 °F (39.1 °C) 75 18 -- (!) 89 %      Temp src Heart Rate Source Patient Position BP Location FiO2 (%)   Tympanic Monitor -- -- --       Physical Exam  GENERAL: Chronically ill-appearing  HENT: nares patent  EYES: no scleral icterus  CV: regular rhythm, regular rate  RESPIRATORY: Questionable rales/rhonchi at the base of the right lung, slightly tachypnea.  Sats 95% on 2 L O2 NC  ABDOMEN: soft, nontender  MUSCULOSKELETAL: no deformity  NEURO: alert, moves all extremities, follows commands  SKIN: warm, dry        LAB RESULTS  Recent Results (from the past 24 hour(s))   Comprehensive Metabolic Panel    Collection Time: 03/17/22  3:55 AM    Specimen: Blood   Result Value Ref Range    Glucose 143 (H) 65 - 99 mg/dL    BUN 15 8 - 23 mg/dL    Creatinine 0.67 (L) 0.76 - 1.27 mg/dL    Sodium 139 136 - 145 mmol/L    Potassium 3.5 3.5 - 5.2 mmol/L    Chloride 102 98 - 107 mmol/L    CO2 26.1 22.0 - 29.0 mmol/L    Calcium 9.5 8.6 - 10.5 mg/dL    Total Protein 7.2 6.0 - 8.5 g/dL     Albumin 3.50 3.50 - 5.20 g/dL    ALT (SGPT) 31 1 - 41 U/L    AST (SGOT) 21 1 - 40 U/L    Alkaline Phosphatase 88 39 - 117 U/L    Total Bilirubin 1.4 (H) 0.0 - 1.2 mg/dL    Globulin 3.7 gm/dL    A/G Ratio 0.9 g/dL    BUN/Creatinine Ratio 22.4 7.0 - 25.0    Anion Gap 10.9 5.0 - 15.0 mmol/L    eGFR 93.8 >60.0 mL/min/1.73   Lactic Acid, Plasma    Collection Time: 03/17/22  3:55 AM    Specimen: Blood   Result Value Ref Range    Lactate 1.8 0.5 - 2.0 mmol/L   Procalcitonin    Collection Time: 03/17/22  3:55 AM    Specimen: Blood   Result Value Ref Range    Procalcitonin 0.19 0.00 - 0.25 ng/mL   Troponin    Collection Time: 03/17/22  3:55 AM    Specimen: Blood   Result Value Ref Range    Troponin T <0.010 0.000 - 0.030 ng/mL   CBC Auto Differential    Collection Time: 03/17/22  3:55 AM    Specimen: Blood   Result Value Ref Range    WBC 15.97 (H) 3.40 - 10.80 10*3/mm3    RBC 4.52 4.14 - 5.80 10*6/mm3    Hemoglobin 13.7 13.0 - 17.7 g/dL    Hematocrit 40.3 37.5 - 51.0 %    MCV 89.2 79.0 - 97.0 fL    MCH 30.3 26.6 - 33.0 pg    MCHC 34.0 31.5 - 35.7 g/dL    RDW 12.9 12.3 - 15.4 %    RDW-SD 41.8 37.0 - 54.0 fl    MPV 10.6 6.0 - 12.0 fL    Platelets 262 140 - 450 10*3/mm3    Neutrophil % 92.9 (H) 42.7 - 76.0 %    Lymphocyte % 2.8 (L) 19.6 - 45.3 %    Monocyte % 2.9 (L) 5.0 - 12.0 %    Eosinophil % 0.7 0.3 - 6.2 %    Basophil % 0.3 0.0 - 1.5 %    Immature Grans % 0.4 0.0 - 0.5 %    Neutrophils, Absolute 14.83 (H) 1.70 - 7.00 10*3/mm3    Lymphocytes, Absolute 0.45 (L) 0.70 - 3.10 10*3/mm3    Monocytes, Absolute 0.46 0.10 - 0.90 10*3/mm3    Eosinophils, Absolute 0.11 0.00 - 0.40 10*3/mm3    Basophils, Absolute 0.05 0.00 - 0.20 10*3/mm3    Immature Grans, Absolute 0.07 (H) 0.00 - 0.05 10*3/mm3    nRBC 0.0 0.0 - 0.2 /100 WBC   Respiratory Panel PCR w/COVID-19(SARS-CoV-2) VISHAL/QUIANA/EDGAR/PAD/COR/MAD/DAVID In-House, NP Swab in [facility], 3-4 HR TAT - Swab, Nasopharynx    Collection Time: 03/17/22  4:10 AM    Specimen: Nasopharynx; Swab   Result  Value Ref Range    ADENOVIRUS, PCR Not Detected Not Detected    Coronavirus 229E Not Detected Not Detected    Coronavirus HKU1 Not Detected Not Detected    Coronavirus NL63 Not Detected Not Detected    Coronavirus OC43 Not Detected Not Detected    COVID19 Not Detected Not Detected - Ref. Range    Human Metapneumovirus Not Detected Not Detected    Human Rhinovirus/Enterovirus Not Detected Not Detected    Influenza A PCR Not Detected Not Detected    Influenza B PCR Not Detected Not Detected    Parainfluenza Virus 1 Not Detected Not Detected    Parainfluenza Virus 2 Not Detected Not Detected    Parainfluenza Virus 3 Not Detected Not Detected    Parainfluenza Virus 4 Not Detected Not Detected    RSV, PCR Not Detected Not Detected    Bordetella pertussis pcr Not Detected Not Detected    Bordetella parapertussis PCR Not Detected Not Detected    Chlamydophila pneumoniae PCR Not Detected Not Detected    Mycoplasma pneumo by PCR Not Detected Not Detected   ECG 12 Lead    Collection Time: 03/17/22  4:13 AM   Result Value Ref Range    QT Interval 349 ms       Ordered the above labs and independently reviewed the results.        RADIOLOGY  XR Chest 1 View    Result Date: 3/17/2022  Patient: VICTOR MANUEL HOLLEY  Time Out: 05:56 Exam(s): FILM CXR 1 VIEW EXAM:   XR Chest, 1 View CLINICAL HISTORY:    Reason for exam: SOA. TECHNIQUE:   Frontal view of the chest. COMPARISON: 3 3 2022 FINDINGS:   Lungs: Mild bilateral perihilar reticular opacities.   Pleural space:  Unremarkable.  No pneumothorax.   Heart: Mild cardiomegaly.   Mediastinum:  Unremarkable.   Bones joints:  Unremarkable. IMPRESSION:     Cardiomegaly with mild bilateral perihilar reticular opacities compatible with interstitial pulmonary edema.    Electronically signed by Hernan Galarza M.D. on 03-17-22 at 0556      I ordered the above noted radiological studies. Reviewed by me and discussed with radiologist.  See dictation for official radiology  interpretation.      PROCEDURES    Procedures      MEDICATIONS GIVEN IN ER    Medications   vancomycin 2000 mg/500 mL 0.9% NS IVPB ([facility]) (2,000 mg Intravenous New Bag 3/17/22 0457)   sodium chloride 0.9 % flush 10 mL (has no administration in time range)   sodium chloride 0.9 % flush 10 mL (has no administration in time range)   nitroglycerin (NITROSTAT) SL tablet 0.4 mg (has no administration in time range)   sodium chloride 0.9 % infusion (100 mL/hr Intravenous New Bag 3/17/22 0506)   acetaminophen (TYLENOL) tablet 650 mg (has no administration in time range)     Or   acetaminophen (TYLENOL) 160 MG/5ML solution 650 mg (has no administration in time range)     Or   acetaminophen (TYLENOL) suppository 650 mg (has no administration in time range)   ondansetron (ZOFRAN) tablet 4 mg (has no administration in time range)     Or   ondansetron (ZOFRAN) injection 4 mg (has no administration in time range)   calcium carbonate (TUMS) chewable tablet 500 mg (200 mg elemental) (has no administration in time range)   cefTRIAXone (ROCEPHIN) 1 g in sodium chloride 0.9 % 100 mL IVPB-VTB (has no administration in time range)   Pharmacy to dose vancomycin (has no administration in time range)   sodium chloride 0.9 % bolus 500 mL (0 mL Intravenous Stopped 3/17/22 0457)   cefTRIAXone (ROCEPHIN) 1 g in sodium chloride 0.9 % 100 mL IVPB-VTB (0 g Intravenous Stopped 3/17/22 0457)   acetaminophen (TYLENOL) tablet 1,000 mg (1,000 mg Oral Given 3/17/22 0422)         PROGRESS, DATA ANALYSIS, CONSULTS, AND MEDICAL DECISION MAKING    All labs have been independently reviewed by me.  All radiology studies have been reviewed by me and discussed with radiologist dictating the report.   EKG's independently viewed and interpreted by me.  Discussion below represents my analysis of pertinent findings related to patient's condition, differential diagnosis, treatment plan and final disposition.    DDx includes but is not limited to: Aspiration  pneumonia, recurrent pneumonia, CHF, ACS, viral infection, bacteremia, septicemia.  Will obtain CBC, CMP, troponin, EKG, portable chest x-ray, blood cultures, procalcitonin, lactic acid, respiratory viral panel to further evaluate.    ED Course as of 03/17/22 0601   u Mar 17, 2022   0342 Temp(!): 102.3 °F (39.1 °C) [RC]   0342 Heart Rate: 75 [RC]   0342 Resp: 18 [RC]   0342 SpO2(!): 89 %  2L NC [RC]   0350 Patient is approximately, septic, and by my eye appears to still have a right lower lobe pneumonia.  That said, the chest x-ray is slightly improved from3 March 2022.  The patient will obviously need to be admitted.  I will place a call to the hospitalist at this time as he appears stable for a telemetry bed. [RC]   0503 Discussed patient's case with KOLBY Carolina with [facility].  To admit the patient to Dr. Hernandez's care for further management. [RC]      ED Course User Index  [RC] Hoang Cano III, PA           PPE: The patient wore a surgical mask throughout the entire patient encounter. I wore an N95.    AS OF 06:01 EDT VITALS:    BP - 135/76  HR - 75  TEMP - 99.1 °F (37.3 °C) (Oral)  O2 SATS - 94%        DIAGNOSIS  Final diagnoses:   Pneumonia due to infectious organism, unspecified laterality, unspecified part of lung   Hypoxemia   Sepsis, due to unspecified organism, unspecified whether acute organ dysfunction present (HCC)         DISPOSITION  ADMISSION    Discussed treatment plan and reason for admission with pt/family and admitting physician.  Pt/family voiced understanding of the plan for admission for further testing/treatment as needed.              Hoang Cano III, PA  03/17/22 0601

## 2022-03-17 NOTE — TELEPHONE ENCOUNTER
THE PATIENT'S EMERGENCY CONTACT STATES THAT THE PATIENT WAS READMITTED TO THE HOSPITAL AS OF TODAY, 03/17/2022. THE PATIENT HAS HAD AN X-RAY AND CT SCAN COMPLETED. ADVISE, IF NEEDED, BY CALLING [phone].

## 2022-03-17 NOTE — H&P
Patient Name:  Junito Sorto  YOB: 1940  MRN:  [id number]  Admit Date:  3/17/2022  Patient Care Team:  Fan Schulz MD as PCP - General  Steven Cadena MD as Consulting Physician (Gastroenterology)      Subjective   History Present Illness     Chief Complaint   Patient presents with   • Shortness of Breath   • Fever       Mr. Sorto is a 81 y.o. former smoker with a history of hypertension, hyperlipidemia, prostate cancer, GERD, CAD, paroxysmal atrial fibrillation, BPH who presents to [facility] ER chief complaint of shortness of breath, fever and admitted for acute respiratory failure with hypoxia most likely secondary to pneumonia due to infectious organism.    Recent hospital admission (3/3/2022--3/9/2022) for pneumonia right lower lobe improved with IV ceftriaxone and transition to cefdinir 300 mg p.o. twice daily reportedly completed after discharge.  Doing well until approximately 0001 on 3/17/2022 and also noted orthopnea, productive cough, fever; therefore, sought [facility] ER for further evaluation.      T-max 102.3, O2 saturation 89% on room air improved to 97% on 2 L nasal cannula, BP acceptable, hemodynamically stable, unremarkable serum lactate 1.8, procalcitonin 0.19.  Chest x-ray concerning for interstitial pulmonary edema.  WBC 15,000 increased to 18 with neutrophil percent 92.  SLP evaluated patient noting intact swallow and recommend regular consistency and thin liquid diet.  Patient received empiric antibiotic therapy IV ceftriaxone and vancomycin.    Recommendation pending hospital course.  Details below in assessment/plan.     History of Present Illness  Review of Systems   Constitutional: Positive for fever. Negative for chills.   HENT: Positive for congestion. Negative for rhinorrhea.    Respiratory: Positive for cough and shortness of breath.    Cardiovascular: Positive for leg swelling. Negative for chest pain.   Gastrointestinal: Negative for abdominal pain,  constipation, diarrhea, nausea and vomiting.   Endocrine: Negative for polydipsia, polyphagia and polyuria.   Genitourinary: Negative for decreased urine volume, difficulty urinating, dysuria, flank pain and urgency.   Musculoskeletal: Positive for gait problem (2/2 generalized weakness). Negative for myalgias.   Skin: Negative for rash and wound.   Neurological: Positive for weakness (generalized ). Negative for dizziness, light-headedness and headaches.   Psychiatric/Behavioral: Negative for confusion and hallucinations.        Personal History     Past Medical History:   Diagnosis Date   • Arthritis    • At risk for obstructive sleep apnea     5   • Atrial fibrillation (HCC)     not followed by Cardiologist just PCP   • Bladder outlet obstruction 09/11/2019   • BPH (benign prostatic hyperplasia)    • Colon polyp    • Coronary artery disease Few years    Afib   • Dysphagia    • Eczema    • GERD (gastroesophageal reflux disease) 2013    Mentioned as possible   • Hernia     Your office reported hiatal   • Hiatal hernia    • Hyperlipidemia    • Hypertension    • Low back pain    • Prostate cancer (HCC)    • Skin cancer      Past Surgical History:   Procedure Laterality Date   • CATARACT EXTRACTION, BILATERAL     • COLONOSCOPY  unknown   • CYSTOSCOPY TRANSURETHRAL RESECTION OF PROSTATE N/A 9/11/2019    Procedure: CYSTOSCOPY TRANSURETHRAL RESECTION OF PROSTATE;  Surgeon: Chip Bedolla MD;  Location: [facility];  Service: Urology   • ENDOSCOPY N/A 10/31/2018    LA Grade C reflux esophagitis, HH, erythematous mucosa in the antrum. Path: Gastritis, esophagitis.    • EPIDURAL BLOCK     • ESOPHAGEAL DILATATION     • PROSTATE SURGERY  11/06/2014   • SKIN CANCER EXCISION      multiple, over that last few years   • SKIN CANCER EXCISION Right 09/03/2019    FACE,    • TONSILLECTOMY      around 1950   • UPPER GASTROINTESTINAL ENDOSCOPY  2013     Family History   Problem Relation Age of Onset   • Cancer Mother          pancreatic   • Liver disease Mother    • Cancer Father         prostate, bladder, colon, lymphoma, leukemia   • Colon cancer Father    • Cancer Brother         prostate   • Cancer Paternal Grandfather         prostate   • Malig Hyperthermia Neg Hx      Social History     Tobacco Use   • Smoking status: Former Smoker     Types: Pipe   • Smokeless tobacco: Never Used   • Tobacco comment: only in college   Vaping Use   • Vaping Use: Never used   Substance Use Topics   • Alcohol use: Yes     Alcohol/week: 5.0 standard drinks     Types: 5 Glasses of wine per week     Comment: SOCIALLY   • Drug use: No     No current facility-administered medications on file prior to encounter.     Current Outpatient Medications on File Prior to Encounter   Medication Sig Dispense Refill   • albuterol sulfate HFA (ProAir HFA) 108 (90 Base) MCG/ACT inhaler Inhale 2 puffs Every 6 (Six) Hours As Needed for Shortness of Air or Wheezing for up to 30 days. 2 g 0   • amLODIPine (NORVASC) 5 MG tablet Take 1 tablet by mouth Daily. 30 tablet 5   • aspirin 81 MG chewable tablet Chew 81 mg Daily.     • atorvastatin (LIPITOR) 40 MG tablet TAKE ONE TABLET BY MOUTH DAILY (Patient taking differently: Take 40 mg by mouth Every Night.) 90 tablet 3   • budesonide-formoterol (SYMBICORT) 160-4.5 MCG/ACT inhaler Inhale 2 puffs 2 (Two) Times a Day.     • fluticasone (Flonase) 50 MCG/ACT nasal spray 2 sprays into the nostril(s) as directed by provider Daily. 15.8 mL 0   • guaiFENesin 200 MG tablet Take 400 mg by mouth Every 4 (Four) Hours As Needed for Cough.     • loratadine (CLARITIN) 10 MG tablet Take 10 mg by mouth Daily.     • omeprazole (priLOSEC) 40 MG capsule Take 1 capsule by mouth Daily. 90 capsule 3   • propranolol LA (INDERAL LA) 80 MG 24 hr capsule TAKE ONE CAPSULE BY MOUTH EVERY MORNING (Patient taking differently: Take 80 mg by mouth Daily.) 90 capsule 3   • [DISCONTINUED] cefdinir (OMNICEF) 300 MG capsule Take 1 capsule by mouth 2 (Two) Times a  Day. 14 capsule 0     Allergies   Allergen Reactions   • Dilaudid [Hydromorphone Hcl] Hallucinations       Objective    Objective     Vital Signs  Temp:  [99.1 °F (37.3 °C)-102.3 °F (39.1 °C)] 99.1 °F (37.3 °C)  Heart Rate:  [66-82] 75  Resp:  [18-20] 20  BP: (105-158)/(74-96) 123/81  SpO2:  [89 %-97 %] 97 %  on  Flow (L/min):  [2-4] 2;   Device (Oxygen Therapy): nasal cannula  Body mass index is 35.56 kg/m².    Physical Exam  Constitutional:       General: He is not in acute distress.     Appearance: He is obese. He is ill-appearing. He is not toxic-appearing.      Comments: Generally weak, elderly   HENT:      Head: Normocephalic and atraumatic.   Eyes:      Extraocular Movements: Extraocular movements intact.      Conjunctiva/sclera: Conjunctivae normal.   Cardiovascular:      Rate and Rhythm: Normal rate. Rhythm irregular.      Heart sounds: Normal heart sounds.   Pulmonary:      Effort: Pulmonary effort is normal.      Breath sounds: Rales (BLL) present.   Abdominal:      General: Bowel sounds are normal.      Palpations: Abdomen is soft.   Musculoskeletal:         General: No tenderness.      Cervical back: Normal range of motion and neck supple.      Right lower leg: Edema (1-2+ pitting edema) present.      Left lower leg: Edema present.   Skin:     General: Skin is warm and dry.   Neurological:      Mental Status: He is alert and oriented to person, place, and time.      Motor: Weakness present.   Psychiatric:         Behavior: Behavior normal.         Thought Content: Thought content normal.         Results Review:  I reviewed the patient's new clinical results.  I reviewed the patient's new imaging results and agree with the interpretation.  I reviewed the patient's other test results and agree with the interpretation  I personally viewed and interpreted the patient's EKG/Telemetry data  Discussed with ED provider.    Lab Results (last 24 hours)     Procedure Component Value Units Date/Time    CBC &  Differential [[id number]]  (Abnormal) Collected: 03/17/22 0355    Specimen: Blood Updated: 03/17/22 0411    Narrative:      The following orders were created for panel order CBC & Differential.  Procedure                               Abnormality         Status                     ---------                               -----------         ------                     CBC Auto Differential[[id number]]        Abnormal            Final result                 Please view results for these tests on the individual orders.    Comprehensive Metabolic Panel [[id number]]  (Abnormal) Collected: 03/17/22 0355    Specimen: Blood Updated: 03/17/22 0433     Glucose 143 mg/dL      BUN 15 mg/dL      Creatinine 0.67 mg/dL      Sodium 139 mmol/L      Potassium 3.5 mmol/L      Chloride 102 mmol/L      CO2 26.1 mmol/L      Calcium 9.5 mg/dL      Total Protein 7.2 g/dL      Albumin 3.50 g/dL      ALT (SGPT) 31 U/L      AST (SGOT) 21 U/L      Alkaline Phosphatase 88 U/L      Total Bilirubin 1.4 mg/dL      Globulin 3.7 gm/dL      A/G Ratio 0.9 g/dL      BUN/Creatinine Ratio 22.4     Anion Gap 10.9 mmol/L      eGFR 93.8 mL/min/1.73      Comment: National Kidney Foundation and American Society of Nephrology (ASN) Task Force recommended calculation based on the Chronic Kidney Disease Epidemiology Collaboration (CKD-EPI) equation refit without adjustment for race.       Narrative:      GFR Normal >60  Chronic Kidney Disease <60  Kidney Failure <15      Lactic Acid, Plasma [[id number]]  (Normal) Collected: 03/17/22 0355    Specimen: Blood Updated: 03/17/22 0431     Lactate 1.8 mmol/L     Procalcitonin [[id number]]  (Normal) Collected: 03/17/22 0355    Specimen: Blood Updated: 03/17/22 0440     Procalcitonin 0.19 ng/mL     Narrative:      As a Marker for Sepsis (Non-Neonates):    1. <0.5 ng/mL represents a low risk of severe sepsis and/or septic shock.  2. >2 ng/mL represents a high risk of severe sepsis and/or septic shock.    As a Marker for Lower  "Respiratory Tract Infections that require antibiotic therapy:    PCT on Admission    Antibiotic Therapy       6-12 Hrs later    >0.5                Strongly Recommended  >0.25 - <0.5        Recommended   0.1 - 0.25          Discouraged              Remeasure/reassess PCT  <0.1                Strongly Discouraged     Remeasure/reassess PCT    As 28 day mortality risk marker: \"Change in Procalcitonin Result\" (>80% or <=80%) if Day 0 (or Day 1) and Day 4 values are available. Refer to http://[email or web address] [email or web address]-pct-[email or web address]    Change in PCT <=80%  A decrease of PCT levels below or equal to 80% defines a positive change in PCT test result representing a higher risk for 28-day all-cause mortality of patients diagnosed with severe sepsis for septic shock.    Change in PCT >80%  A decrease of PCT levels of more than 80% defines a negative change in PCT result representing a lower risk for 28-day all-cause mortality of patients diagnosed with severe sepsis or septic shock.       Troponin [[id number]]  (Normal) Collected: 03/17/22 0355    Specimen: Blood Updated: 03/17/22 0440     Troponin T <0.010 ng/mL     Narrative:      Troponin T Reference Range:  <= 0.03 ng/mL-   Negative for AMI  >0.03 ng/mL-     Abnormal for myocardial necrosis.  Clinicians would have to utilize clinical acumen, EKG, Troponin and serial changes to determine if it is an Acute Myocardial Infarction or myocardial injury due to an underlying chronic condition.       Results may be falsely decreased if patient taking Biotin.      CBC Auto Differential [[id number]]  (Abnormal) Collected: 03/17/22 0355    Specimen: Blood Updated: 03/17/22 0411     WBC 15.97 10*3/mm3      RBC 4.52 10*6/mm3      Hemoglobin 13.7 g/dL      Hematocrit 40.3 %      MCV 89.2 fL      MCH 30.3 pg      MCHC 34.0 g/dL      RDW 12.9 %      RDW-SD 41.8 fl      MPV 10.6 fL      Platelets 262 10*3/mm3      Neutrophil % 92.9 %      Lymphocyte % 2.8 %      Monocyte % 2.9 %      Eosinophil % 0.7 % "      Basophil % 0.3 %      Immature Grans % 0.4 %      Neutrophils, Absolute 14.83 10*3/mm3      Lymphocytes, Absolute 0.45 10*3/mm3      Monocytes, Absolute 0.46 10*3/mm3      Eosinophils, Absolute 0.11 10*3/mm3      Basophils, Absolute 0.05 10*3/mm3      Immature Grans, Absolute 0.07 10*3/mm3      nRBC 0.0 /100 WBC     Blood Culture - Blood, Arm, Left [[id number]] Collected: 03/17/22 0402    Specimen: Blood from Arm, Left Updated: 03/17/22 0407    Respiratory Panel PCR w/COVID-19(SARS-CoV-2) VISHAL/QUIANA/EDGAR/PAD/COR/MAD/DAVID In-House, NP Swab in UTM/VTM, 3-4 HR TAT - Swab, Nasopharynx [[id number]]  (Normal) Collected: 03/17/22 0410    Specimen: Swab from Nasopharynx Updated: 03/17/22 0519     ADENOVIRUS, PCR Not Detected     Coronavirus 229E Not Detected     Coronavirus HKU1 Not Detected     Coronavirus NL63 Not Detected     Coronavirus OC43 Not Detected     COVID19 Not Detected     Human Metapneumovirus Not Detected     Human Rhinovirus/Enterovirus Not Detected     Influenza A PCR Not Detected     Influenza B PCR Not Detected     Parainfluenza Virus 1 Not Detected     Parainfluenza Virus 2 Not Detected     Parainfluenza Virus 3 Not Detected     Parainfluenza Virus 4 Not Detected     RSV, PCR Not Detected     Bordetella pertussis pcr Not Detected     Bordetella parapertussis PCR Not Detected     Chlamydophila pneumoniae PCR Not Detected     Mycoplasma pneumo by PCR Not Detected    Narrative:      In the setting of a positive respiratory panel with a viral infection PLUS a negative procalcitonin without other underlying concern for bacterial infection, consider observing off antibiotics or discontinuation of antibiotics and continue supportive care. If the respiratory panel is positive for atypical bacterial infection (Bordetella pertussis, Chlamydophila pneumoniae, or Mycoplasma pneumoniae), consider antibiotic de-escalation to target atypical bacterial infection.    Blood Culture - Blood, Arm, Right [[id number]]  Collected: 03/17/22 0426    Specimen: Blood from Arm, Right Updated: 03/17/22 0430    Basic Metabolic Panel [[id number]]  (Abnormal) Collected: 03/17/22 0643    Specimen: Blood Updated: 03/17/22 0706     Glucose 121 mg/dL      BUN 13 mg/dL      Creatinine 0.53 mg/dL      Sodium 138 mmol/L      Potassium 3.7 mmol/L      Chloride 104 mmol/L      CO2 26.0 mmol/L      Calcium 8.9 mg/dL      BUN/Creatinine Ratio 24.5     Anion Gap 8.0 mmol/L      eGFR 100.7 mL/min/1.73      Comment: National Kidney Foundation and American Society of Nephrology (ASN) Task Force recommended calculation based on the Chronic Kidney Disease Epidemiology Collaboration (CKD-EPI) equation refit without adjustment for race.       Narrative:      GFR Normal >60  Chronic Kidney Disease <60  Kidney Failure <15      CBC (No Diff) [[id number]]  (Abnormal) Collected: 03/17/22 0643    Specimen: Blood Updated: 03/17/22 0652     WBC 18.25 10*3/mm3      RBC 4.22 10*6/mm3      Hemoglobin 12.7 g/dL      Hematocrit 37.6 %      MCV 89.1 fL      MCH 30.1 pg      MCHC 33.8 g/dL      RDW 13.0 %      RDW-SD 41.5 fl      MPV 10.7 fL      Platelets 245 10*3/mm3     BNP [[id number]]  (Normal) Collected: 03/17/22 0643    Specimen: Blood Updated: 03/17/22 1131     proBNP 825.0 pg/mL     Narrative:      Among patients with dyspnea, NT-proBNP is highly sensitive for the detection of acute congestive heart failure. In addition NT-proBNP of <300 pg/ml effectively rules out acute congestive heart failure with 99% negative predictive value.    Results may be falsely decreased if patient taking Biotin.      Urinalysis With Microscopic If Indicated (No Culture) - Urine, Clean Catch [[id number]]  (Normal) Collected: 03/17/22 1025    Specimen: Urine, Clean Catch Updated: 03/17/22 1046     Color, UA Yellow     Appearance, UA Clear     pH, UA 6.5     Specific Gravity, UA 1.019     Glucose, UA Negative     Ketones, UA Negative     Bilirubin, UA Negative     Blood, UA Negative      Protein, UA Negative     Leuk Esterase, UA Negative     Nitrite, UA Negative     Urobilinogen, UA 1.0 E.U./dL    Narrative:      Urine microscopic not indicated.          Imaging Results (Last 24 Hours)     Procedure Component Value Units Date/Time    XR Chest 1 View [[id number]] Collected: 03/17/22 0557     Updated: 03/17/22 0557    Narrative:        Patient: VICTOR MANUEL HOLLEY  Time Out: 05:56  Exam(s): FILM CXR 1 VIEW     EXAM:    XR Chest, 1 View    CLINICAL HISTORY:     Reason for exam: SOA.    TECHNIQUE:    Frontal view of the chest.    COMPARISON:  3 3 2022    FINDINGS:    Lungs: Mild bilateral perihilar reticular opacities.    Pleural space:  Unremarkable.  No pneumothorax.    Heart: Mild cardiomegaly.    Mediastinum:  Unremarkable.    Bones joints:  Unremarkable.    IMPRESSION:       Cardiomegaly with mild bilateral perihilar reticular opacities compatible   with interstitial pulmonary edema.    Impression:          Electronically signed by Hernan Galarza M.D. on 03-17-22 at 0556          Results for orders placed during the hospital encounter of 03/03/22    Adult Transthoracic Echo Complete W/ Cont if Necessary Per Protocol    Interpretation Summary  · Left ventricular ejection fraction appears to be 46 - 50%. Left ventricular systolic function is low normal.  · Left ventricular diastolic function was indeterminate.  · Left atrial volume is severely increased.  · The right atrial cavity is moderately dilated.      ECG 12 Lead   Final Result   HEART RATE= 78  bpm   RR Interval= 766  ms   MT Interval=   ms   P Horizontal Axis=   deg   P Front Axis=   deg   QRSD Interval= 102  ms   QT Interval= 349  ms   QRS Axis= -46  deg   T Wave Axis= 71  deg   - ABNORMAL ECG -   Atrial fibrillation   Inferior infarct, old   Probable anteroseptal infarct, old   Nonspecific T abnormalities, lateral leads   NO SIGNIFICANT CHANGE FROM PREVIOUS ECG   Electronically Signed By: Steve Prater ([facility]) 17-Mar-2022 08:45:21   Date and  Time of Study: 2022-03-17 04:13:42           Assessment/Plan     Active Hospital Problems    Diagnosis  POA   • **Pneumonia due to infectious organism [J18.9]  Yes   • Acute respiratory failure with hypoxia (HCC) [J96.01]  Yes   • Orthopnea [R06.01]  Yes   • GERD (gastroesophageal reflux disease) [K21.9]  Yes   • Esophageal dysphagia [R13.19]  Yes   • Benign essential hypertension [I10]  Yes   • Paroxysmal atrial fibrillation (HCC) [I48.0]  Yes   • Obesity [E66.9]  Yes      Resolved Hospital Problems   No resolved problems to display.       Mr. Sorto is a 81 y.o. former smoker with a history of hypertension, hyperlipidemia, prostate cancer, GERD, CAD, paroxysmal atrial fibrillation, BPH who presents to [facility] [place], operated by [facility] ER chief complaint of shortness of breath, fever and admitted for acute respiratory failure with hypoxia most likely secondary to pneumonia due to infectious organism.      Pneumonia due to infectious organism  T-max on 102.3  Suspect recurrent pneumonia ? Silent aspiration (see SLP recommendation below)  Ordering CT chest   Consult ID  Continue empiric antibiotic IV ceftriaxone, vancomycin vs switch to Zosyn   MRSA screen ordered  BC x2 pending results  Negative respiratory viral panel to include COVID-19  Mucinex      Acute respiratory failure with hypoxia (HCC) / orthopnea  Admits to sedentary lifestyle   No anticoagulation or history of DVT/PE  Ordering D-dimer  No evidence of tachycardia; however, chronic usage of beta-blocker noted  Uses recliner for upright position with rest  BLE pitting edema 1-2+  Ordering proBNP (Echo LVEF 46-50% ? HFrEF vs HFpEF 3/9)  Stop IVF given hemodynamic stability & unremarkable lactate  Symbicort BID, Flonase      Paroxysmal atrial fibrillation (HCC)  Rate controlled with BB continued on admission with hold parameters      Benign essential hypertension  BP acceptable  Continue BB & hold amlodipine for now to avoid hypotension      Esophageal dysphagia  Previous admission  abnormal esophagram with significant tertiary wave formation with intraesophageal reflux and stasis  SLP passed bedside swallow  Diet regular, thin      GERD (gastroesophageal reflux disease)  PPI      Obesity  BMI 35  Complicating his problems        · I discussed the patient's findings and my recommendations with Dr. Harris.    VTE Prophylaxis - enoxaparin  Code Status - CPR       KOLBY Saravia  [facility]  03/17/22  12:30 EDT

## 2022-03-17 NOTE — CONSULTS
"Referring Provider: KOLBY Montiel    Reason for Consultation: recurrent pneumonia    History of present illness:  Junito Sorto is a 81 y.o. with PMH of esophageal dysphagia who I am asked to evaluate and give opinion for recurrent pneumonia. History is obtained from the patient, his wife, and review of the old medical records which I summarize/synthesize as follows: He says he worked as a  and had many toxic exposures to his lungs during his career but didn't really begin having respiratory symptoms (mostly SOA with exertion) until about a year ago.     He had a recent admission from 3/3-3/9/22 for shortness of breath and productive cough. CXR was concerning for developing pneumonia. He was treatd with Unasyn, then vancomycin and ceftriaxone, and ultimately discharged on cefdinir which he just finished yesterday. Swallow study didn't show any overt aspiration. Blood and sputum cultures were negative. RPP was negative. MRSA nares screen was negative. He was seen by pulmonary during that stay. He says he actually improved and was feeling mostly back to baseline.     However, he returned to the ER about 10 hours ago with shortness of breath and fever to 102 F.  No alleviating factors. He had some chest pain that was worse w/ coughing. In the ER he was febrile to 102 F. Labs notable for WBC 15, Procal 0.19, and RPP negative. CXR showed \"Cardiomegaly with mild bilateral perihilar reticular opacities compatible with interstitial pulmonary edema.\" He was started on IV vancomycin and ceftriaxone. ID consulted for further recommendations. His fever curve has improved. He is requiring 3L NC currently.     Past Medical History:   Diagnosis Date   • Arthritis    • At risk for obstructive sleep apnea     5   • Atrial fibrillation (HCC)     not followed by Cardiologist just PCP   • Bladder outlet obstruction 09/11/2019   • BPH (benign prostatic hyperplasia)    • Colon polyp    • Coronary artery disease Few years    " Afib   • Dysphagia    • Eczema    • GERD (gastroesophageal reflux disease) 2013    Mentioned as possible   • Hernia     Your office reported hiatal   • Hiatal hernia    • Hyperlipidemia    • Hypertension    • Low back pain    • Prostate cancer (HCC)    • Skin cancer        Past Surgical History:   Procedure Laterality Date   • CATARACT EXTRACTION, BILATERAL     • COLONOSCOPY  unknown   • CYSTOSCOPY TRANSURETHRAL RESECTION OF PROSTATE N/A 9/11/2019    Procedure: CYSTOSCOPY TRANSURETHRAL RESECTION OF PROSTATE;  Surgeon: Chip Bedolla MD;  Location: [facility];  Service: Urology   • ENDOSCOPY N/A 10/31/2018    LA Grade C reflux esophagitis, HH, erythematous mucosa in the antrum. Path: Gastritis, esophagitis.    • EPIDURAL BLOCK     • ESOPHAGEAL DILATATION     • PROSTATE SURGERY  11/06/2014   • SKIN CANCER EXCISION      multiple, over that last few years   • SKIN CANCER EXCISION Right 09/03/2019    FACE,    • TONSILLECTOMY      around 1950   • UPPER GASTROINTESTINAL ENDOSCOPY  2013       Social History:    Retired   Lives in [place], [place]    Family History:  No 1st degree relatives w/ unusual lung infections    Antibiotic allergies and intolerances:  None    Medications:    Current Facility-Administered Medications:   •  acetaminophen (TYLENOL) tablet 650 mg, 650 mg, Oral, Q4H PRN **OR** acetaminophen (TYLENOL) 160 MG/5ML solution 650 mg, 650 mg, Oral, Q4H PRN **OR** acetaminophen (TYLENOL) suppository 650 mg, 650 mg, Rectal, Q4H PRN, Debbi Llanos APRN  •  albuterol (PROVENTIL) nebulizer solution 0.083% 2.5 mg/3mL, 2.5 mg, Nebulization, Q6H PRN, Sid Capone APRN  •  aspirin chewable tablet 81 mg, 81 mg, Oral, Daily, Sid Capone APRN, 81 mg at 03/17/22 1225  •  atorvastatin (LIPITOR) tablet 40 mg, 40 mg, Oral, Nightly, Sid Capone APRN  •  budesonide-formoterol (SYMBICORT) 160-4.5 MCG/ACT inhaler 2 puff, 2 puff, Inhalation, BID - RT, Sid Capone APRN  •  calcium  carbonate (TUMS) chewable tablet 500 mg (200 mg elemental), 2 tablet, Oral, BID PRN, Debbi Llanos APRN  •  [START ON 3/18/2022] cefTRIAXone (ROCEPHIN) 1 g in sodium chloride 0.9 % 100 mL IVPB-VTB, 1 g, Intravenous, Q24H, Debbi Llanos APRN  •  cetirizine (zyrTEC) tablet 5 mg, 5 mg, Oral, Daily, Sid Capone APRN, 5 mg at 03/17/22 1225  •  enoxaparin (LOVENOX) syringe 40 mg, 40 mg, Subcutaneous, Nightly, Sid Capone APRN  •  fluticasone (FLONASE) 50 MCG/ACT nasal spray 2 spray, 2 spray, Nasal, Daily, Sid Capone APRN, 2 spray at 03/17/22 1225  •  guaiFENesin (MUCINEX) 12 hr tablet 600 mg, 600 mg, Oral, Q12H, Sid Capone APRN  •  nitroglycerin (NITROSTAT) SL tablet 0.4 mg, 0.4 mg, Sublingual, Q5 Min PRN, Debbi Llanos APRN  •  ondansetron (ZOFRAN) tablet 4 mg, 4 mg, Oral, Q6H PRN **OR** ondansetron (ZOFRAN) injection 4 mg, 4 mg, Intravenous, Q6H PRN, Debbi Llanos APRN  •  pantoprazole (PROTONIX) EC tablet 40 mg, 40 mg, Oral, QAM, Sid Capone APRN, 40 mg at 03/17/22 1225  •  Pharmacy to dose vancomycin, , Does not apply, Continuous PRN, Debbi Llanos APRN  •  propranolol LA (INDERAL LA) 24 hr capsule 80 mg, 80 mg, Oral, Daily, Sid Capone APRN, 80 mg at 03/17/22 1225  •  sodium chloride 0.9 % flush 10 mL, 10 mL, Intravenous, Q12H, Debbi Llanos APRN, 10 mL at 03/17/22 0904  •  sodium chloride 0.9 % flush 10 mL, 10 mL, Intravenous, PRN, Debbi Llanos APRN  •  vancomycin 1250 mg/250 mL 0.9% NS IVPB ([facility]), 1,250 mg, Intravenous, Q12H, Debbi Llanos APRN    Review of Systems  All systems were reviewed and are negative unless otherwise stated above in the HPI    Objective   Vital Signs   Temp:  [99.1 °F (37.3 °C)-102.3 °F (39.1 °C)] 99.1 °F (37.3 °C)  Heart Rate:  [66-82] 75  Resp:  [18-20] 20  BP: (105-158)/(74-96) 123/81    Physical Exam:   General: awake, alert, very nice, coughing  Head: atraumatic  Eyes:  no scleral  icterus  ENT: no thrush; NC in nares  Neck: Supple  Cardiovascular: NR, RR,  no LE edema  Respiratory: Lungs w/ BLL rales; no wheezing; on 3L NC  GI: Abdomen is soft, nontender, nondistended, normal bowel sounds in all four quadrants  :  no Venegas catheter  Musculoskeletal: normal musculature  Skin: No rashes, lesions, or embolic phenomenon  Neurological: Alert and oriented x 3  Psychiatric: Normal mood and affect   Vasc: no cyanosis; PIV w/o erythema    Labs:     Lab Results   Component Value Date    WBC 18.25 (H) 03/17/2022    HGB 12.7 (L) 03/17/2022    HCT 37.6 03/17/2022    MCV 89.1 03/17/2022     03/17/2022       Lab Results   Component Value Date    GLUCOSE 121 (H) 03/17/2022    BUN 13 03/17/2022    CREATININE 0.53 (L) 03/17/2022    EGFRIFNONA 98 10/11/2021    EGFRIFAFRI 119 10/11/2021    BCR 24.5 03/17/2022    CO2 26.0 03/17/2022    CALCIUM 8.9 03/17/2022    PROTENTOTREF 6.3 10/11/2021    ALBUMIN 3.50 03/17/2022    LABIL2 2.2 10/11/2021    AST 21 03/17/2022    ALT 31 03/17/2022     UA clear    Procal 0.19 (previous value was 0.50)    Microbiology:  3/3 RPP: negative  3/3 BCx: negative  3/3 Strep and Legionella Ag: negative  3/4 SpCx: negative  3/6 SpCx: negative  3/6 MRSA nares: negative  3/17 RPP: negative  3/17 BCx: NGTD  3/17 MRSA nares: negative    Radiology (personally reviewed image and report):  CXR 3/17: IMPRESSION: Cardiomegaly with mild bilateral perihilar reticular opacities compatible   with interstitial pulmonary edema.    Assessment/Plan   1. Bilateral lower lobe pneumonia (by exam)  2. Fever in adult  3. Leukocytosis  4. Shortness of breath  5. Chest pain  6. Esophageal dysphagia  7. Paroxysmal atrial fibrillation    Noted plans for CT chest, and I will follow-up those results. Stop vancomycin since MRSA nares negative. Primary team stopped ceftriaxone and then started Zosyn with concerns for aspiration pneumonia. I agree with that plan. They also ordered a sputum culture if he  can produce one. I'll repeat a procal in 24-48 hours for trend.     ID will follow.

## 2022-03-17 NOTE — PROGRESS NOTES
"[facility] Clinical Pharmacy Services: Vancomycin Pharmacokinetic Initial Consult Note    Junito Sorto is a 81 y.o. male who is on day 1 of pharmacy to dose vancomycin.    Indication: Pneumonia  Consulting Provider: KOLBY Carolina with [facility]  Planned Duration of Therapy: 5 days  Loading Dose Ordered or Given: 2000 mg on 3/17 at 0457  MRSA PCR performed: Ordered per protocol, pending collection  Culture/Source: Pneumonia, Blood cultures pending  Target: -600 mg/L.hr   Other Antimicrobials: Ceftriaxone    Vitals/Labs  Ht: 170.2 cm (67\"); Wt: 102 kg (225 lb 12.8 oz)  Temp Readings from Last 1 Encounters:   03/17/22 99.1 °F (37.3 °C) (Oral)    Estimated Creatinine Clearance: 82.5 mL/min (A) (by C-G formula based on SCr of 0.53 mg/dL (L)).     Results from last 7 days   Lab Units 03/17/22  0643 03/17/22  0355   CREATININE mg/dL 0.53* 0.67*   WBC 10*3/mm3 18.25* 15.97*     Assessment/Plan:    Vancomycin Dose:   1250 mg IV every  12  hours  Predictive AUC level for the dose ordered is 477 mg/L.hr, which is within the target of 400-600 mg/L.hr  Vanc Trough has been ordered on 3/18 at 0530 (prior to 3rd overall dose)  MRSA nasal PCR swab ordered. If negative, would recommend discontinuation of vancomycin    Pharmacy will follow patient's kidney function and will adjust doses and obtain levels as necessary. Thank you for involving pharmacy in this patient's care. Please contact pharmacy with any questions or concerns.                           Annabelle Diaz, PharmD, [facility]  Clinical Pharmacy Specialist, Emergency Medicine   Phone: [phone]    "

## 2022-03-18 ENCOUNTER — APPOINTMENT (OUTPATIENT)
Dept: GENERAL RADIOLOGY | Facility: HOSPITAL | Age: 82
End: 2022-03-18

## 2022-03-18 LAB
ANION GAP SERPL CALCULATED.3IONS-SCNC: 5.7 MMOL/L (ref 5–15)
BASOPHILS # BLD AUTO: 0.04 10*3/MM3 (ref 0–0.2)
BASOPHILS NFR BLD AUTO: 0.4 % (ref 0–1.5)
BUN SERPL-MCNC: 13 MG/DL (ref 8–23)
BUN/CREAT SERPL: 18.1 (ref 7–25)
CALCIUM SPEC-SCNC: 9.4 MG/DL (ref 8.6–10.5)
CHLORIDE SERPL-SCNC: 104 MMOL/L (ref 98–107)
CO2 SERPL-SCNC: 29.3 MMOL/L (ref 22–29)
CREAT SERPL-MCNC: 0.72 MG/DL (ref 0.76–1.27)
DEPRECATED RDW RBC AUTO: 42.9 FL (ref 37–54)
EGFRCR SERPLBLD CKD-EPI 2021: 91.8 ML/MIN/1.73
EOSINOPHIL # BLD AUTO: 0.37 10*3/MM3 (ref 0–0.4)
EOSINOPHIL NFR BLD AUTO: 3.9 % (ref 0.3–6.2)
ERYTHROCYTE [DISTWIDTH] IN BLOOD BY AUTOMATED COUNT: 13 % (ref 12.3–15.4)
GLUCOSE SERPL-MCNC: 88 MG/DL (ref 65–99)
HCT VFR BLD AUTO: 35.9 % (ref 37.5–51)
HGB BLD-MCNC: 11.7 G/DL (ref 13–17.7)
IMM GRANULOCYTES # BLD AUTO: 0.02 10*3/MM3 (ref 0–0.05)
IMM GRANULOCYTES NFR BLD AUTO: 0.2 % (ref 0–0.5)
LYMPHOCYTES # BLD AUTO: 1.15 10*3/MM3 (ref 0.7–3.1)
LYMPHOCYTES NFR BLD AUTO: 12.3 % (ref 19.6–45.3)
MAGNESIUM SERPL-MCNC: 2 MG/DL (ref 1.6–2.4)
MCH RBC QN AUTO: 29.6 PG (ref 26.6–33)
MCHC RBC AUTO-ENTMCNC: 32.6 G/DL (ref 31.5–35.7)
MCV RBC AUTO: 90.9 FL (ref 79–97)
MONOCYTES # BLD AUTO: 0.48 10*3/MM3 (ref 0.1–0.9)
MONOCYTES NFR BLD AUTO: 5.1 % (ref 5–12)
NEUTROPHILS NFR BLD AUTO: 7.32 10*3/MM3 (ref 1.7–7)
NEUTROPHILS NFR BLD AUTO: 78.1 % (ref 42.7–76)
NRBC BLD AUTO-RTO: 0 /100 WBC (ref 0–0.2)
PHOSPHATE SERPL-MCNC: 2.3 MG/DL (ref 2.5–4.5)
PLATELET # BLD AUTO: 218 10*3/MM3 (ref 140–450)
PMV BLD AUTO: 10.9 FL (ref 6–12)
POTASSIUM SERPL-SCNC: 3.3 MMOL/L (ref 3.5–5.2)
RBC # BLD AUTO: 3.95 10*6/MM3 (ref 4.14–5.8)
SODIUM SERPL-SCNC: 139 MMOL/L (ref 136–145)
WBC NRBC COR # BLD: 9.38 10*3/MM3 (ref 3.4–10.8)

## 2022-03-18 PROCEDURE — 80048 BASIC METABOLIC PNL TOTAL CA: CPT | Performed by: STUDENT IN AN ORGANIZED HEALTH CARE EDUCATION/TRAINING PROGRAM

## 2022-03-18 PROCEDURE — 85025 COMPLETE CBC W/AUTO DIFF WBC: CPT | Performed by: STUDENT IN AN ORGANIZED HEALTH CARE EDUCATION/TRAINING PROGRAM

## 2022-03-18 PROCEDURE — 99222 1ST HOSP IP/OBS MODERATE 55: CPT | Performed by: PHYSICIAN ASSISTANT

## 2022-03-18 PROCEDURE — 99232 SBSQ HOSP IP/OBS MODERATE 35: CPT | Performed by: INTERNAL MEDICINE

## 2022-03-18 PROCEDURE — 84100 ASSAY OF PHOSPHORUS: CPT | Performed by: STUDENT IN AN ORGANIZED HEALTH CARE EDUCATION/TRAINING PROGRAM

## 2022-03-18 PROCEDURE — 83735 ASSAY OF MAGNESIUM: CPT | Performed by: STUDENT IN AN ORGANIZED HEALTH CARE EDUCATION/TRAINING PROGRAM

## 2022-03-18 PROCEDURE — 25010000002 PIPERACILLIN SOD-TAZOBACTAM PER 1 G: Performed by: STUDENT IN AN ORGANIZED HEALTH CARE EDUCATION/TRAINING PROGRAM

## 2022-03-18 PROCEDURE — 97162 PT EVAL MOD COMPLEX 30 MIN: CPT

## 2022-03-18 PROCEDURE — 25010000002 ENOXAPARIN PER 10 MG: Performed by: NURSE PRACTITIONER

## 2022-03-18 PROCEDURE — 97530 THERAPEUTIC ACTIVITIES: CPT

## 2022-03-18 RX ORDER — AMOXICILLIN 250 MG
2 CAPSULE ORAL ONCE
Status: COMPLETED | OUTPATIENT
Start: 2022-03-18 | End: 2022-03-18

## 2022-03-18 RX ADMIN — ASPIRIN 81 MG: 81 TABLET, CHEWABLE ORAL at 09:16

## 2022-03-18 RX ADMIN — GUAIFENESIN 600 MG: 600 TABLET, EXTENDED RELEASE ORAL at 21:35

## 2022-03-18 RX ADMIN — DOCUSATE SODIUM 50MG AND SENNOSIDES 8.6MG 2 TABLET: 8.6; 5 TABLET, FILM COATED ORAL at 16:19

## 2022-03-18 RX ADMIN — CETIRIZINE HYDROCHLORIDE 5 MG: 10 TABLET ORAL at 09:16

## 2022-03-18 RX ADMIN — ATORVASTATIN CALCIUM 40 MG: 20 TABLET, FILM COATED ORAL at 20:18

## 2022-03-18 RX ADMIN — PANTOPRAZOLE SODIUM 40 MG: 40 TABLET, DELAYED RELEASE ORAL at 06:57

## 2022-03-18 RX ADMIN — FLUTICASONE PROPIONATE 2 SPRAY: 50 SPRAY, METERED NASAL at 09:16

## 2022-03-18 RX ADMIN — TAZOBACTAM SODIUM AND PIPERACILLIN SODIUM 3.38 G: 375; 3 INJECTION, SOLUTION INTRAVENOUS at 13:30

## 2022-03-18 RX ADMIN — TAZOBACTAM SODIUM AND PIPERACILLIN SODIUM 3.38 G: 375; 3 INJECTION, SOLUTION INTRAVENOUS at 04:45

## 2022-03-18 RX ADMIN — POTASSIUM PHOSPHATE, MONOBASIC AND POTASSIUM PHOSPHATE, DIBASIC 21 MMOL: 224; 236 INJECTION, SOLUTION, CONCENTRATE INTRAVENOUS at 17:38

## 2022-03-18 RX ADMIN — Medication 10 ML: at 20:18

## 2022-03-18 RX ADMIN — TAZOBACTAM SODIUM AND PIPERACILLIN SODIUM 3.38 G: 375; 3 INJECTION, SOLUTION INTRAVENOUS at 21:35

## 2022-03-18 RX ADMIN — ENOXAPARIN SODIUM 40 MG: 100 INJECTION SUBCUTANEOUS at 20:18

## 2022-03-18 RX ADMIN — GUAIFENESIN 600 MG: 600 TABLET, EXTENDED RELEASE ORAL at 09:16

## 2022-03-18 RX ADMIN — Medication 10 ML: at 09:17

## 2022-03-18 NOTE — THERAPY EVALUATION
Patient Name: Junito Sorto  : 1940    MRN: [id number]                              Today's Date: 3/18/2022       Admit Date: 3/17/2022    Visit Dx:     ICD-10-CM ICD-9-CM   1. Pneumonia due to infectious organism, unspecified laterality, unspecified part of lung  J18.9 486   2. Hypoxemia  R09.02 799.02   3. Sepsis, due to unspecified organism, unspecified whether acute organ dysfunction present (HCC)  A41.9 038.9     995.91     Patient Active Problem List   Diagnosis   • Hyperglycemia   • Obesity   • Allergic rhinitis   • Paroxysmal atrial fibrillation (HCC)   • Benign essential hypertension   • HLD (hyperlipidemia)   • Esophageal dysphagia   • Hiatal hernia   • GERD (gastroesophageal reflux disease)   • Prostate cancer (HCC)   • Bladder outlet obstruction   • Sciatica of right side   • Pneumonia of right lower lobe due to infectious organism   • Hypoxia   • Pneumonia due to infectious organism   • Acute respiratory failure with hypoxia (HCC)   • Orthopnea     Past Medical History:   Diagnosis Date   • Arthritis    • At risk for obstructive sleep apnea     5   • Atrial fibrillation (HCC)     not followed by Cardiologist just PCP   • Bladder outlet obstruction 2019   • BPH (benign prostatic hyperplasia)    • Colon polyp    • Coronary artery disease Few years    Afib   • Dysphagia    • Eczema    • GERD (gastroesophageal reflux disease) 2013    Mentioned as possible   • Hernia     Your office reported hiatal   • Hiatal hernia    • Hyperlipidemia    • Hypertension    • Low back pain    • Prostate cancer (HCC)    • Skin cancer      Past Surgical History:   Procedure Laterality Date   • CATARACT EXTRACTION, BILATERAL     • COLONOSCOPY  unknown   • CYSTOSCOPY TRANSURETHRAL RESECTION OF PROSTATE N/A 2019    Procedure: CYSTOSCOPY TRANSURETHRAL RESECTION OF PROSTATE;  Surgeon: Chip Bedolla MD;  Location: [facility];  Service: Urology   • ENDOSCOPY N/A 10/31/2018    LA Grade C reflux  esophagitis, HH, erythematous mucosa in the antrum. Path: Gastritis, esophagitis.    • EPIDURAL BLOCK     • ESOPHAGEAL DILATATION     • PROSTATE SURGERY  11/06/2014   • SKIN CANCER EXCISION      multiple, over that last few years   • SKIN CANCER EXCISION Right 09/03/2019    FACE,    • TONSILLECTOMY      around 1950   • UPPER GASTROINTESTINAL ENDOSCOPY  2013      General Information     Row Name 03/18/22 1700          Physical Therapy Time and Intention    Document Type evaluation  -CF     Mode of Treatment individual therapy;physical therapy  -CF     Row Name 03/18/22 1700          General Information    Patient Profile Reviewed yes  -CF     Prior Level of Function independent:  no AD, 1 fall last week  -CF     Existing Precautions/Restrictions fall  -CF     Barriers to Rehab none identified  -CF     Row Name 03/18/22 1700          Living Environment    People in Home spouse  -CF     Row Name 03/18/22 1700          Home Main Entrance    Number of Stairs, Main Entrance none  -CF     Row Name 03/18/22 1700          Stairs Within Home, Primary    Number of Stairs, Within Home, Primary none  -CF     Row Name 03/18/22 1700          Cognition    Orientation Status (Cognition) oriented x 4  -CF     Row Name 03/18/22 1700          Safety Issues, Functional Mobility    Safety Issues Affecting Function (Mobility) insight into deficits/self-awareness  -CF     Impairments Affecting Function (Mobility) endurance/activity tolerance;strength  -CF           User Key  (r) = Recorded By, (t) = Taken By, (c) = Cosigned By    Initials Name Provider Type    CF Dominga Rojas, KRISTI Physical Therapist               Mobility     Row Name 03/18/22 1702          Bed Mobility    Bed Mobility supine-sit;sit-supine  -CF     Supine-Sit [place] (Bed Mobility) modified independence  -CF     Sit-Supine [place] (Bed Mobility) modified independence  -CF     Assistive Device (Bed Mobility) head of bed elevated  -CF     Row Name 03/18/22  1704          Sit-Stand Transfer    Sit-Stand [place] (Transfers) standby assist  -CF     Row Name 03/18/22 1704          Gait/Stairs (Locomotion)    [place] Level (Gait) standby assist  -CF     Distance in Feet (Gait) 30'  -CF     Deviations/Abnormal Patterns (Gait) femi decreased;gait speed decreased  -CF     Comment, (Gait/Stairs) Sats ranged 87-90% with activity on 2L  -CF           User Key  (r) = Recorded By, (t) = Taken By, (c) = Cosigned By    Initials Name Provider Type    CF Dominga Rojas PT Physical Therapist               Obj/Interventions     Row Name 03/18/22 1705          Range of Motion Comprehensive    General Range of Motion bilateral lower extremity ROM WFL  -CF     Row Name 03/18/22 1705          Strength Comprehensive (MMT)    General Manual Muscle Testing (MMT) Assessment no strength deficits identified  -CF     Comment, General Manual Muscle Testing (MMT) Assessment BLE 5/5  -CF     Row Name 03/18/22 1705          Balance    Balance Assessment sitting static balance;sitting dynamic balance;standing dynamic balance  -CF     Static Sitting Balance independent  -CF     Dynamic Sitting Balance independent  -CF     Dynamic Standing Balance supervision  -CF     Comment, Balance no loss of balance  -CF           User Key  (r) = Recorded By, (t) = Taken By, (c) = Cosigned By    Initials Name Provider Type    CF Dominga Rojas PT Physical Therapist               Goals/Plan     Row Name 03/18/22 1709          Transfer Goal 1 (PT)    Activity/Assistive Device (Transfer Goal 1, PT) sit-to-stand/stand-to-sit;bed-to-chair/chair-to-bed  -CF     [place] Level/Cues Needed (Transfer Goal 1, PT) independent  -CF     Time Frame (Transfer Goal 1, PT) 1 week  -CF     Row Name 03/18/22 1709          Gait Training Goal 1 (PT)    Activity/Assistive Device (Gait Training Goal 1, PT) gait (walking locomotion)  -CF     [place] Level (Gait Training Goal 1, PT) supervision required  -CF      Distance (Gait Training Goal 1, PT) 250'  -CF     Time Frame (Gait Training Goal 1, PT) 1 week  -CF           User Key  (r) = Recorded By, (t) = Taken By, (c) = Cosigned By    Initials Name Provider Type    CF Dominga Rojas, PT Physical Therapist               Clinical Impression     Row Name 03/18/22 1706          Pain    Pretreatment Pain Rating 0/10 - no pain  -CF     Posttreatment Pain Rating 0/10 - no pain  -CF     Row Name 03/18/22 1706          Plan of Care Review    Plan of Care Reviewed With patient  -CF     Outcome Evaluation Pt seen for PT evaluation this PM. Pt admitted with worsening SOA and fever. Pt recently admitted with PNA and placed on 2L O2 at home, he reports primarily wearing at night. Pt reports 1 fall at home last week but typically is independent and requires no AD. Upon exam, pt limited by O2 - sats reading 87-90% with standing tasks. No overt loss of balance noted with transfers and ambulation. Pt may benefit from 1-2 more PT sessions to ambulate further and increase activity tolerance. Anticipate pt requiring O2 with mobility at this time. PT plans to follow up saturday.  -CF     Row Name 03/18/22 1706          Therapy Assessment/Plan (PT)    Rehab Potential (PT) good, to achieve stated therapy goals  -CF     Criteria for Skilled Interventions Met (PT) yes  -CF     Predicted Duration of Therapy Intervention (PT) 1 week  -CF     Row Name 03/18/22 1706          Vital Signs    Pre SpO2 (%) 90  -CF     O2 Delivery Pre Treatment nasal cannula  2L  -CF     Intra SpO2 (%) 87  -CF     O2 Delivery Intra Treatment nasal cannula  -CF     Post SpO2 (%) 90  -CF     O2 Delivery Post Treatment nasal cannula  -CF     Row Name 03/18/22 1706          Positioning and Restraints    Pre-Treatment Position in bed  -CF     Post Treatment Position bed  declined chair  -CF     In Bed notified nsg;call light within reach;encouraged to call for assist;exit alarm on;fowlers;with family/caregiver  -CF            User Key  (r) = Recorded By, (t) = Taken By, (c) = Cosigned By    Initials Name Provider Type     Dominga Rojas PT Physical Therapist               Outcome Measures     Row Name 03/18/22 1710          How much help from another person do you currently need...    Turning from your back to your side while in flat bed without using bedrails? 4  -CF     Moving from lying on back to sitting on the side of a flat bed without bedrails? 4  -CF     Moving to and from a bed to a chair (including a wheelchair)? 3  -CF     Standing up from a chair using your arms (e.g., wheelchair, bedside chair)? 3  -CF     Climbing 3-5 steps with a railing? 3  -CF     To walk in hospital room? 3  -CF     AM-PAC 6 Clicks Score (PT) 20  -CF     Row Name 03/18/22 1710          Functional Assessment    Outcome Measure Options AM-PAC 6 Clicks Basic Mobility (PT)  -CF           User Key  (r) = Recorded By, (t) = Taken By, (c) = Cosigned By    Initials Name Provider Type    CF Dominga Rojas PT Physical Therapist                             Physical Therapy Education                 Title: PT OT SLP Therapies (Done)     Topic: Physical Therapy (Done)     Point: Mobility training (Done)     Learning Progress Summary           Patient Acceptance, E, VU,NR by  at 3/18/2022 1710                   Point: Home exercise program (Done)     Learning Progress Summary           Patient Acceptance, E, VU,NR by CF at 3/18/2022 1710                   Point: Body mechanics (Done)     Learning Progress Summary           Patient Acceptance, E, VU,NR by CF at 3/18/2022 1710                   Point: Precautions (Done)     Learning Progress Summary           Patient Acceptance, E, VU,NR by  at 3/18/2022 1710                               User Key     Initials Effective Dates Name Provider Type Discipline    CF 06/16/21 -  Dominga Rojas PT Physical Therapist PT              PT Recommendation and Plan  Planned Therapy Interventions (PT): balance  training, bed mobility training, gait training, ROM (range of motion), strengthening, patient/family education, transfer training  Plan of Care Reviewed With: patient  Outcome Evaluation: Pt seen for PT evaluation this PM. Pt admitted with worsening SOA and fever. Pt recently admitted with PNA and placed on 2L O2 at home, he reports primarily wearing at night. Pt reports 1 fall at home last week but typically is independent and requires no AD. Upon exam, pt limited by O2 - sats reading 87-90% with standing tasks. No overt loss of balance noted with transfers and ambulation. Pt may benefit from 1-2 more PT sessions to ambulate further and increase activity tolerance. Anticipate pt requiring O2 with mobility at this time. PT plans to follow up saturday.     Time Calculation:    PT Charges     Row Name 03/18/22 1658             Time Calculation    Start Time 1405  -CF      Stop Time 1425  -      Time Calculation (min) 20 min  -CF      PT Received On 03/18/22  -CF      PT - Next Appointment 03/19/22  -      PT Goal Re-Cert Due Date 03/25/22  -              Time Calculation- PT    Total Timed Code Minutes- PT 12 minute(s)  -CF            User Key  (r) = Recorded By, (t) = Taken By, (c) = Cosigned By    Initials Name Provider Type    CF Dominga Rojas, KRISTI Physical Therapist              Therapy Charges for Today     Code Description Service Date Service Provider Modifiers Qty    [id number]  PT EVAL MOD COMPLEXITY 2 3/18/2022 Dominga Rojas, PT GP 1    [id number]  PT THERAPEUTIC ACT EA 15 MIN 3/18/2022 Dominga Rojas, PT GP 1          PT G-Codes  Outcome Measure Options: AM-PAC 6 Clicks Basic Mobility (PT)  AM-PAC 6 Clicks Score (PT): 20    Dominga Rojas PT  3/18/2022

## 2022-03-18 NOTE — PROGRESS NOTES
Name: Junito Sorto ADMIT: 3/17/2022   : 1940  PCP: Fan Schulz MD    MRN: [id number] LOS: 1 days   AGE/SEX: 81 y.o. male  ROOM: [facility]     Subjective   Subjective       Review of Systems     Objective   Objective   Vital Signs  Temp:  [98.1 °F (36.7 °C)-98.6 °F (37 °C)] 98.5 °F (36.9 °C)  Heart Rate:  [53-66] 53  Resp:  [16-18] 17  BP: (107-121)/(64-81) 107/64  SpO2:  [93 %-96 %] 93 %  on  Flow (L/min):  [2-3] 2;   Device (Oxygen Therapy): nasal cannula  Body mass index is 33.45 kg/m².  Physical Exam  General Alert and oriented x3, no acute distress.  Ill-appearing, obese  Eyes PERRL, EOMI, anicteric sclera  Lungs : Rales bilateral bases, no wheezing, on 2 L nasal cannula.  CV Irregular rate and rhythm, no murmurs rubs or gallops.  AbdomenSoft, nontender, nondistended.  Normoactive bowel sounds  Extremities 1+ bilateral lower extremity edema, cyanosis.      Results Review     I reviewed the patient's new clinical results.  Results from last 7 days   Lab Units 22  0739 22  0643 22  0355   WBC 10*3/mm3 9.38 18.25* 15.97*   HEMOGLOBIN g/dL 11.7* 12.7* 13.7   PLATELETS 10*3/mm3 218 245 262     Results from last 7 days   Lab Units 22  0739 22  0643 22  0355   SODIUM mmol/L 139 138 139   POTASSIUM mmol/L 3.3* 3.7 3.5   CHLORIDE mmol/L 104 104 102   CO2 mmol/L 29.3* 26.0 26.1   BUN mg/dL 13 13 15   CREATININE mg/dL 0.72* 0.53* 0.67*   GLUCOSE mg/dL 88 121* 143*   Estimated Creatinine Clearance: 92.2 mL/min (A) (by C-G formula based on SCr of 0.72 mg/dL (L)).  Results from last 7 days   Lab Units 22  0355   ALBUMIN g/dL 3.50   BILIRUBIN mg/dL 1.4*   ALK PHOS U/L 88   AST (SGOT) U/L 21   ALT (SGPT) U/L 31     Results from last 7 days   Lab Units 22  0739 22  0643 22  0355   CALCIUM mg/dL 9.4 8.9 9.5   ALBUMIN g/dL  --   --  3.50   MAGNESIUM mg/dL 2.0  --   --    PHOSPHORUS mg/dL 2.3*  --   --      Results from last 7 days   Lab Units  03/17/22  0355   PROCALCITONIN ng/mL 0.19   LACTATE mmol/L 1.8     COVID19   Date Value Ref Range Status   03/17/2022 Not Detected Not Detected - Ref. Range Final   03/03/2022 Not Detected Not Detected - Ref. Range Final     No results found for: HGBA1C, POCGLU    CT Chest Without Contrast Diagnostic  Narrative: CT CHEST WO CONTRAST DIAGNOSTIC-     CLINICAL HISTORY: Pneumonia. A fusion or abscess is suspected. Abnormal  chest x-ray.     TECHNIQUE: Spiral CT images were obtained through the chest without IV  contrast and were reconstructed in 3 mm thick slices.     Radiation dose reduction techniques were utilized, including automated  exposure control and exposure modulation based on body size.     COMPARISON: Chest x-ray dated 03/17/2022.     FINDINGS: Lung window images demonstrate mild patchy airspace  infiltrates within both lungs, more prominent on the right than the left  that are suspicious for pneumonia due to COVID 19 infection. Correlation  with clinical findings is recommended. No focal areas of dense  consolidation are identified. There are no discrete lung masses. There  are no pleural effusions. There multiple mildly enlarged mediastinal  lymph nodes that are likely benign reactive lymph nodes. These measure  up to 11 mm in short axis. The heart is mildly enlarged. Images through  the upper abdomen show no significant abnormality.     Impression: Minimal patchy bilateral groundglass infiltrate suspicious  for pneumonia due to COVID 19 infection. Mild mediastinal  lymphadenopathy, likely reactive.     This report was finalized on 3/17/2022 4:43 PM by Dr. Francesco Hackett M.D.     XR Chest 1 View  Narrative: Patient: VICTOR MANUEL HOLLEY  Time Out: 05:56  Exam(s): FILM CXR 1 VIEW     EXAM:    XR Chest, 1 View    CLINICAL HISTORY:     Reason for exam: SOA.    TECHNIQUE:    Frontal view of the chest.    COMPARISON:  3 3 2022    FINDINGS:    Lungs: Mild bilateral perihilar reticular opacities.    Pleural space:   Unremarkable.  No pneumothorax.    Heart: Mild cardiomegaly.    Mediastinum:  Unremarkable.    Bones joints:  Unremarkable.    IMPRESSION:       Cardiomegaly with mild bilateral perihilar reticular opacities compatible   with interstitial pulmonary edema.  Impression: Electronically signed by Hernan Galarza M.D. on 03-17-22 at 0556    Scheduled Medications  aspirin, 81 mg, Oral, Daily  atorvastatin, 40 mg, Oral, Nightly  budesonide-formoterol, 2 puff, Inhalation, BID - RT  cetirizine, 5 mg, Oral, Daily  enoxaparin, 40 mg, Subcutaneous, Nightly  fluticasone, 2 spray, Nasal, Daily  furosemide, 40 mg, Oral, Daily  guaiFENesin, 600 mg, Oral, Q12H  pantoprazole, 40 mg, Oral, QAM  piperacillin-tazobactam, 3.375 g, Intravenous, Q8H  potassium phosphate, 21 mmol, Intravenous, Once  propranolol LA, 80 mg, Oral, Daily  senna-docusate sodium, 2 tablet, Oral, Once  sodium chloride, 10 mL, Intravenous, Q12H    Infusions   Diet  Diet Regular; Thin       Assessment/Plan     Active Hospital Problems    Diagnosis  POA   • **Pneumonia due to infectious organism [J18.9]  Yes   • Acute respiratory failure with hypoxia (HCC) [J96.01]  Yes   • Orthopnea [R06.01]  Yes   • GERD (gastroesophageal reflux disease) [K21.9]  Yes   • Esophageal dysphagia [R13.19]  Yes   • Benign essential hypertension [I10]  Yes   • Paroxysmal atrial fibrillation (HCC) [I48.0]  Yes   • Obesity [E66.9]  Yes      Resolved Hospital Problems   No resolved problems to display.         #Bilateral lower lobe pneumonia/hypoxemia   -Patient was recently admitted for pneumonia and discharged home on Omnicef.   -Oxygen saturation dropped to 89% on room air   -Cr chest showed Cardiomegaly with mild bilateral perihilar reticular opacities compatible    with interstitial pulmonary edema.   -White blood cell count was 18.25, procalcitonin was normal.  T-max was 102.3 on admission   -Concern for recurrent aspiration causing pneumonia.    SLP-During previous admission, clinical  swallow eval completed 3/3/22 recommending regular and thins. Esophagram completed 3/3/22 reporting no penetration or aspiration.   -CT chest 03/18-Minimal patchy bilateral groundglass infiltrate suspicious for pneumonia due to COVID 19 infection. Mild mediastinal lymphadenopathy, likely reactive.  -pm Zzosyn 03/17  -Respiratory viral count negative   -MRSA nares negative  -blood culture negative to date  -Pulmonology consulted secondary to history of recurrent pneumonia, COPD.  Follows up with outpatient pulmonology.      #Diastolic heart failure/orthopnea:    proBNP was 825   -Echocardiogram 03/09-Left ventricular ejection fraction appears to be 46 - 50%. Left ventricular systolic function is low normal.   -Chest x-ray concerning for pulmonary edema   -DC IV fluids, start on p.o. Lasix daily 40 mg daily.       #COPD without exacerbation: Continue home inhalers:  Pulmonology consult.  #Paroxysmal atrial fibrillation:   EKG shows atrial fib, no ischemic changes.  Continue beta-blocker with hold parameters.  Not on anticoagulation at home.  On Lovenox for DVT prophylaxis.   #Esophageal dysphagia:  Esophagram on previous admission showed significant tertiary of information with intraesophageal reflux and stasis.  Regular thin diet ordered.    #GERD:  Continue PPI   #Hyperlipidemia: Continue atorvastatin   #Hypophosphatemia/hypokalemia.  Replete as needed.    CODE STATUS: Full code  DVT prophylaxis: Lovenox  Disposition: To be determined    Campbell Harris MD  [facility]  03/18/22  14:16 EDT

## 2022-03-18 NOTE — CONSULTS
[facility], [place], operated by [facility]  Initial Inpatient Consult Note    Referring Provider: Dr. Harris    Reason for Consultation: Dysphagia with history of esophageal stricture    Subjective     History of present illness:      Thank you for allowing us to participate in the care of this patient.    81 y.o. male patient of Dr. Cadena admitted with pneumonia and hypoxemia.  He has a past medical history significant for GERD, esophageal stricture, atrial fibrillation (not on anticoagulant), hypertension. Complaints of dysphagia with solids but not liquids. Typically gets stuck and within 10 minutes and some water will clear. He also complains of long time time dry cough and hoarseness. He was just prescribed omeprazole on 3/15 but wasn't able to start it.  Scheduled to undergo EGD with Dr. Cadena on 4/27/2022.  He denies abdominal pain, nausea, vomiting, melena, hematochezia.  He does have intermittent constipation for which he takes Ivis-Colace as needed.    History of prostate cancer s/p prostatectomy.     Scheduled for EGD with Dr. Cadena on 4/27/2022    Esophagram completed 3/4/2022 during previous admission showed significant tertiary wave formation with intraesophageal reflux and stasis, mucosal irregularity at the distal esophagus but the barium tablet passed through freely.  No evidence of previous distal esophageal narrowing.    EGD completed in October 2018 with hiatal hernia, grade C esophagitis. Last colonoscopy December 2011 with pancolonic diverticulosis and a small rectal polyp which was removed: Recommended repeat in 5 years    Past Medical History:  Past Medical History:   Diagnosis Date   • Arthritis    • At risk for obstructive sleep apnea     5   • Atrial fibrillation (HCC)     not followed by Cardiologist just PCP   • Bladder outlet obstruction 09/11/2019   • BPH (benign prostatic hyperplasia)    • Colon polyp    • Coronary artery disease Few years    Afib   • Dysphagia    • Eczema    • GERD  (gastroesophageal reflux disease) 2013    Mentioned as possible   • Hernia     Your office reported hiatal   • Hiatal hernia    • Hyperlipidemia    • Hypertension    • Low back pain    • Prostate cancer (HCC)    • Skin cancer      Past Surgical History:  Past Surgical History:   Procedure Laterality Date   • CATARACT EXTRACTION, BILATERAL     • COLONOSCOPY  unknown   • CYSTOSCOPY TRANSURETHRAL RESECTION OF PROSTATE N/A 9/11/2019    Procedure: CYSTOSCOPY TRANSURETHRAL RESECTION OF PROSTATE;  Surgeon: Chip Bedolla MD;  Location: [facility];  Service: Urology   • ENDOSCOPY N/A 10/31/2018    LA Grade C reflux esophagitis, HH, erythematous mucosa in the antrum. Path: Gastritis, esophagitis.    • EPIDURAL BLOCK     • ESOPHAGEAL DILATATION     • PROSTATE SURGERY  11/06/2014   • SKIN CANCER EXCISION      multiple, over that last few years   • SKIN CANCER EXCISION Right 09/03/2019    FACE,    • TONSILLECTOMY      around 1950   • UPPER GASTROINTESTINAL ENDOSCOPY  2013      Social History:   Social History     Tobacco Use   • Smoking status: Former Smoker     Types: Pipe   • Smokeless tobacco: Never Used   • Tobacco comment: only in college   Substance Use Topics   • Alcohol use: Yes     Alcohol/week: 5.0 standard drinks     Types: 5 Glasses of wine per week     Comment: SOCIALLY      Family History:  Family History   Problem Relation Age of Onset   • Cancer Mother         pancreatic   • Liver disease Mother    • Cancer Father         prostate, bladder, colon, lymphoma, leukemia   • Colon cancer Father    • Cancer Brother         prostate   • Cancer Paternal Grandfather         prostate   • Malig Hyperthermia Neg Hx        Home Meds:  Medications Prior to Admission   Medication Sig Dispense Refill Last Dose   • albuterol sulfate HFA (ProAir HFA) 108 (90 Base) MCG/ACT inhaler Inhale 2 puffs Every 6 (Six) Hours As Needed for Shortness of Air or Wheezing for up to 30 days. 2 g 0 3/16/2022 at Unknown time   •  amLODIPine (NORVASC) 5 MG tablet Take 1 tablet by mouth Daily. 30 tablet 5 3/16/2022 at Unknown time   • aspirin 81 MG chewable tablet Chew 81 mg Daily.   3/16/2022 at Unknown time   • atorvastatin (LIPITOR) 40 MG tablet TAKE ONE TABLET BY MOUTH DAILY (Patient taking differently: Take 40 mg by mouth Every Night.) 90 tablet 3 3/16/2022 at Unknown time   • budesonide-formoterol (SYMBICORT) 160-4.5 MCG/ACT inhaler Inhale 2 puffs 2 (Two) Times a Day.   3/16/2022 at Unknown time   • fluticasone (Flonase) 50 MCG/ACT nasal spray 2 sprays into the nostril(s) as directed by provider Daily. 15.8 mL 0 3/16/2022 at Unknown time   • guaiFENesin 200 MG tablet Take 400 mg by mouth Every 4 (Four) Hours As Needed for Cough.   3/16/2022 at Unknown time   • loratadine (CLARITIN) 10 MG tablet Take 10 mg by mouth Daily.   3/16/2022 at Unknown time   • omeprazole (priLOSEC) 40 MG capsule Take 1 capsule by mouth Daily. 90 capsule 3 3/16/2022 at Unknown time   • propranolol LA (INDERAL LA) 80 MG 24 hr capsule TAKE ONE CAPSULE BY MOUTH EVERY MORNING (Patient taking differently: Take 80 mg by mouth Daily.) 90 capsule 3 3/16/2022 at Unknown time     Current Meds:   aspirin, 81 mg, Oral, Daily  atorvastatin, 40 mg, Oral, Nightly  budesonide-formoterol, 2 puff, Inhalation, BID - RT  cetirizine, 5 mg, Oral, Daily  enoxaparin, 40 mg, Subcutaneous, Nightly  fluticasone, 2 spray, Nasal, Daily  furosemide, 40 mg, Oral, Daily  guaiFENesin, 600 mg, Oral, Q12H  pantoprazole, 40 mg, Oral, QAM  piperacillin-tazobactam, 3.375 g, Intravenous, Q8H  propranolol LA, 80 mg, Oral, Daily  sodium chloride, 10 mL, Intravenous, Q12H      Allergies:  Allergies   Allergen Reactions   • Dilaudid [Hydromorphone Hcl] Hallucinations     Review of Systems   Constitutional:   + fevers tmax 102, chills  Eye:   No recent visual problems, eye discharge, eye pain, redness   HENT:   No ear pain, nasal congestion, sore throat, voice changes   Respiratory:   + Shortness of  breath, cough  Cardiovascular:   No Chest pain, palpitations, syncope + orthopnea  Gastrointestinal:   No nausea, vomiting, diarrhea  + dysphagia, occasional constipation  Genitourinary:   No hematuria, dysuria, incontinence + urinary frequency  Hema/Lymph:   Negative for bruising tendency, swollen lymph glands, nosebleeds, history of anticoagulation   Endocrine:   Negative for excessive thirst, excessive hunger, excessive urination,   Musculoskeletal:   No back pain, neck pain, joint pain, muscle pain, decreased range of motion   Integumentary:   No rash, pruritus, abrasions   Neurologic: No weakness, numbness, frequent headaches   Psychiatric:   No anxiety, depression, mood change    Objective     Vital Signs  Temp:  [98.1 °F (36.7 °C)-98.6 °F (37 °C)] 98.5 °F (36.9 °C)  Heart Rate:  [53-66] 53  Resp:  [16-18] 17  BP: (107-121)/(64-81) 107/64  Physical Exam:   Constitutional:   Well developed, well nourished. No acute distress.   Head:   Atraumatic, normocephalic.   Neck:   Supple and symmetric. Trachea midline.   ENT:   External ears and nose without lesion. Hearing grossly intact.   Eyes:   Pupils equal and round. Sclerae anicteric. Conjunctivae clear.   Respiratory:   Quiet, even, non-labored breathing. 2L O2 via nasal cannula.  Cardiovascular:   Regular rate and rhythm. No murmur, gallop or rub appreciated.   Gastrointestinal:   Soft, nondistended, nontender. No rebound or guarding present. Bowel sounds present in all 4 quadrants.   Integumentary:   Skin warm and dry, not  jaundiced.   Neurological:   Alert and oriented to person, place and time. Fluid of speech.   Musculoskeletal:   Active range of motion all 4 extremities. No rashes or edema.   Psychological:   Appropriate mood and affect. Cooperative.    Results Review:   I reviewed the patient's new clinical results.    Results from last 7 days   Lab Units 03/18/22  0739 03/17/22  0643 03/17/22  0355   WBC 10*3/mm3 9.38 18.25* 15.97*   HEMOGLOBIN g/dL  11.7* 12.7* 13.7   HEMATOCRIT % 35.9* 37.6 40.3   PLATELETS 10*3/mm3 218 245 262     Results from last 7 days   Lab Units 03/18/22  0739 03/17/22  0643 03/17/22  0355   SODIUM mmol/L 139 138 139   POTASSIUM mmol/L 3.3* 3.7 3.5   CHLORIDE mmol/L 104 104 102   CO2 mmol/L 29.3* 26.0 26.1   BUN mg/dL 13 13 15   CREATININE mg/dL 0.72* 0.53* 0.67*   CALCIUM mg/dL 9.4 8.9 9.5   BILIRUBIN mg/dL  --   --  1.4*   ALK PHOS U/L  --   --  88   ALT (SGPT) U/L  --   --  31   AST (SGOT) U/L  --   --  21   GLUCOSE mg/dL 88 121* 143*         No results found for: LIPASE    Radiology:  CT Chest Without Contrast Diagnostic   Final Result   Minimal patchy bilateral groundglass infiltrate suspicious   for pneumonia due to COVID 19 infection. Mild mediastinal   lymphadenopathy, likely reactive.       This report was finalized on 3/17/2022 4:43 PM by Dr. Francesco Hackett M.D.          XR Chest 1 View   Final Result         Electronically signed by Hernan Galarza M.D. on 03-17-22 at 0556          Assessment/Plan   Patient Active Problem List   Diagnosis   • Hyperglycemia   • Obesity   • Allergic rhinitis   • Paroxysmal atrial fibrillation (HCC)   • Benign essential hypertension   • HLD (hyperlipidemia)   • Esophageal dysphagia   • Hiatal hernia   • GERD (gastroesophageal reflux disease)   • Prostate cancer (HCC)   • Bladder outlet obstruction   • Sciatica of right side   • Pneumonia of right lower lobe due to infectious organism   • Hypoxia   • Pneumonia due to infectious organism   • Acute respiratory failure with hypoxia (HCC)   • Orthopnea       Assessment:  1. Dysphagia  2. GERD  3. Bilateral lower lobe pneumonia pneumonia  4. Hypertension  5. Paroxysmal atrial fibrillation not requiring anticoagulation  6. Hyperlipidemia  7. COPD    Plan:  · Continue 40 mg Protonix daily.  · Diet per SLP recommendations.  · Currently on Lovenox for DVT prophylaxis.  · Tentatively plan for EGD Sunday.  · Antibiotic regimen per infectious  disease.      I discussed the patient's findings and my recommendations with patient and Dr. Ortiz.    Joshuaon dictation used throughout this note.            SYLVESTER Reyes  [facility]  [address] [place] [address]  Office: [phone]

## 2022-03-18 NOTE — PLAN OF CARE
Goal Outcome Evaluation:  Plan of Care Reviewed With: patient        Progress: no change  Outcome Evaluation: Patient admitted to the floor this evening with pneumonia. Has had no c/o pain thorugh the night. Mild SOA with exertion. Has trace-mild edema in BLE. Up with stand-by assist to bathroom otherwise patient will use urinal. IV antibiotics as scheduled. Lovenox scheduled. Currently on 2 L O2, was on 3 upon arrival to floor. Dry cough that hasn't been productive so far, still need sputum sample. VSS. Will continue to monitor.

## 2022-03-18 NOTE — CASE MANAGEMENT/SOCIAL WORK
Discharge Planning Assessment  [facility]     Patient Name: Junito Sorto  MRN: [id number]  Today's Date: 3/18/2022    Admit Date: 3/17/2022     Discharge Needs Assessment     Row Name 03/18/22 1003       Living Environment    People in Home spouse    Current Living Arrangements home    Primary Care Provided by self    Provides Primary Care For no one    Family Caregiver if Needed spouse    Able to Return to Prior Arrangements yes       Resource/Environmental Concerns    Resource/Environmental Concerns home accessibility    Home Accessibility Concerns stairs to enter home       Transition Planning    Patient/Family Anticipates Transition to home with family    Patient/Family Anticipated Services at Transition none    Transportation Anticipated family or friend will provide       Discharge Needs Assessment    Equipment Currently Used at Home oxygen    Concerns to be Addressed discharge planning    Equipment Needed After Discharge none               Discharge Plan     Row Name 03/18/22 1004       Plan    Plan Home, fmaily to transport    Provided Post Acute Provider List? Refused    Provided Post Acute Provider Quality & Resource List? Refused    Patient/Family in Agreement with Plan yes    Plan Comments CSW spoke to patient at bedside; explained role, verified facesheet, and discussed d/c plan. Plan is to return home, family to transport. Patient is enrolled in meds to beds. He uses 2L continuous O2 supplied by [employer]. CSW answered questions patient had regarding his oxygen supply. CSW provided 6S CCP contact information if any oxygen supply needs arise. He lives in a 2 level home with 2 steps to enter and a handrail. He denies any HH or DME needs. CCP to follow for any recommendations/needs that arise pending treatment. LESA Coulter              Continued Care and Services - Admitted Since 3/17/2022    Coordination has not been started for this encounter.     Selected Continued Care - Prior Encounters  Includes selections from prior encounters from 12/17/2021 to 3/18/2022    Discharged on 3/9/2022 Admission date: 3/3/2022 - Discharge disposition: Home or Self Care    Durable Medical Equipment     Service Provider Selected Services Address Phone Fax Patient Preferred    [facility]  Durable Medical Equipment [id number]  [address], [facility] [address] [phone] --                       Demographic Summary     Row Name 03/18/22 1002       General Information    Admission Type inpatient    Arrived From home    Referral Source admission list    Reason for Consult discharge planning    Preferred Language English               Functional Status     Row Name 03/18/22 1003       Functional Status    Usual Activity Tolerance moderate    Current Activity Tolerance moderate       Functional Status, IADL    Medications independent    Meal Preparation independent    Housekeeping independent    Laundry independent    Shopping independent       Mental Status    General Appearance WDL WDL               Psychosocial    No documentation.                Abuse/Neglect    No documentation.                Legal    No documentation.                Substance Abuse    No documentation.                Patient Forms    No documentation.                   LESA RAY

## 2022-03-18 NOTE — CONSULTS
Referring Provider: Dr. Harris  Reason for Consultation: Pneumonia    Patient Care Team:  Fan Schulz MD as PCP - General  Steven Cadena MD as Consulting Physician (Gastroenterology)    Chief complaint:   Shortness of breath    History of present illness:    Subjective   This is an 81-year-old male patient, former minimal smoker (few years in the college).  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  However, he described episodes of what it looks like bronchitis occurring frequently, about 2-4 times a year usually treated with Z-Adrián and steroid and sometimes inhalers.  He has personal history of allergy, previously seen by allergist about 3 years ago and family history of both asthma and allergy.  He had elevated eosinophils levels in the past.    He denies symptoms of reflux but reported prior endoscopy showing esophagitis and signs consistent with reflux.  He also describes an event in the past when he had a piece of meat stuck in his esophagus requiring endoscopy but no symptoms of dysphagia usually and no choking or aspiration.  Has esophagogram was negative for aspiration.    He was hospitalized in early March for symptoms of shortness of breath and finding of right lower lobe pneumonia treated with antibiotics.    He said that he felt better after discharge and was doing well and then 4 days later, he had recurrence of shortness of breath and mild cough without sputum.  He also had a fever at home.  Over here, he had a temperature of 102.  He was started on antibiotics with Zosyn for aspiration and he feels better so far.  His SPO2 is currently 95-97% on 2 L oxygen.    He denies recent issues with choking or aspiration and denies symptoms of reflux as above.    On last visit, he was discharged on Symbicort which she continued at home.    Labs reviewed:  WBC 18 K on admission than 9.3K today; bicarb 29;  creatinine 0.7; MRSA swab and RVP negative.;  proBNP normal; hemoglobin 11.7    Data: Echo 3/9/2022: EF 46-50%; LA severely increased    Review of Systems  Constitutional: No fever or chills.   ENMT: No sinus congestion  Cardiovascular: Chest tightness..  Note retrosternal radiating chest pain.  No palpitation.  Respiratory: See above.  Gastrointestinal: No constipation, diarrhea or abdominal pain.  No nausea or vomiting  Neurology: No headache, weakness, numbness or dizziness.   Musculoskeletal: No joints pain, stiffness or swelling.   Psychiatry: No depression.  Genitourinary: No dysuria or frequent urination  Endo: No weight changes. No cold or warm intolerance.  Lymphatic: No swollen glands.  Integumentary: No rash.    History  Past Medical History:   Diagnosis Date   • Arthritis    • At risk for obstructive sleep apnea     5   • Atrial fibrillation (HCC)     not followed by Cardiologist just PCP   • Bladder outlet obstruction 09/11/2019   • BPH (benign prostatic hyperplasia)    • Colon polyp    • Coronary artery disease Few years    Afib   • Dysphagia    • Eczema    • GERD (gastroesophageal reflux disease) 2013    Mentioned as possible   • Hernia     Your office reported hiatal   • Hiatal hernia    • Hyperlipidemia    • Hypertension    • Low back pain    • Prostate cancer (HCC)    • Skin cancer    ,   Past Surgical History:   Procedure Laterality Date   • CATARACT EXTRACTION, BILATERAL     • COLONOSCOPY  unknown   • CYSTOSCOPY TRANSURETHRAL RESECTION OF PROSTATE N/A 9/11/2019    Procedure: CYSTOSCOPY TRANSURETHRAL RESECTION OF PROSTATE;  Surgeon: Chip Bedolla MD;  Location: [facility];  Service: Urology   • ENDOSCOPY N/A 10/31/2018    LA Grade C reflux esophagitis, HH, erythematous mucosa in the antrum. Path: Gastritis, esophagitis.    • EPIDURAL BLOCK     • ESOPHAGEAL DILATATION     • PROSTATE SURGERY  11/06/2014   • SKIN CANCER EXCISION      multiple, over that last few years   • SKIN CANCER  EXCISION Right 09/03/2019    FACE,    • TONSILLECTOMY      around 1950   • UPPER GASTROINTESTINAL ENDOSCOPY  2013   ,   Family History   Problem Relation Age of Onset   • Cancer Mother         pancreatic   • Liver disease Mother    • Cancer Father         prostate, bladder, colon, lymphoma, leukemia   • Colon cancer Father    • Cancer Brother         prostate   • Cancer Paternal Grandfather         prostate   • Malig Hyperthermia Neg Hx    ,   Social History     Socioeconomic History   • Marital status:    Tobacco Use   • Smoking status: Former Smoker     Types: Pipe   • Smokeless tobacco: Never Used   • Tobacco comment: only in college   Vaping Use   • Vaping Use: Never used   Substance and Sexual Activity   • Alcohol use: Yes     Alcohol/week: 5.0 standard drinks     Types: 5 Glasses of wine per week     Comment: SOCIALLY   • Drug use: No   • Sexual activity: Defer     Comment: 50 years ago     E-cigarette/Vaping   • E-cigarette/Vaping Use Never User      E-cigarette/Vaping Substances   • Nicotine No    • THC No    • CBD No    • Flavoring No      E-cigarette/Vaping Devices   • Disposable No    • Pre-filled or Refillable Cartridge No    • Refillable Tank No    • Pre-filled Pod No        ,   Medications Prior to Admission   Medication Sig Dispense Refill Last Dose   • albuterol sulfate HFA (ProAir HFA) 108 (90 Base) MCG/ACT inhaler Inhale 2 puffs Every 6 (Six) Hours As Needed for Shortness of Air or Wheezing for up to 30 days. 2 g 0 3/16/2022 at Unknown time   • amLODIPine (NORVASC) 5 MG tablet Take 1 tablet by mouth Daily. 30 tablet 5 3/16/2022 at Unknown time   • aspirin 81 MG chewable tablet Chew 81 mg Daily.   3/16/2022 at Unknown time   • atorvastatin (LIPITOR) 40 MG tablet TAKE ONE TABLET BY MOUTH DAILY (Patient taking differently: Take 40 mg by mouth Every Night.) 90 tablet 3 3/16/2022 at Unknown time   • budesonide-formoterol (SYMBICORT) 160-4.5 MCG/ACT inhaler Inhale 2 puffs 2 (Two) Times a Day.    3/16/2022 at Unknown time   • fluticasone (Flonase) 50 MCG/ACT nasal spray 2 sprays into the nostril(s) as directed by provider Daily. 15.8 mL 0 3/16/2022 at Unknown time   • guaiFENesin 200 MG tablet Take 400 mg by mouth Every 4 (Four) Hours As Needed for Cough.   3/16/2022 at Unknown time   • loratadine (CLARITIN) 10 MG tablet Take 10 mg by mouth Daily.   3/16/2022 at Unknown time   • omeprazole (priLOSEC) 40 MG capsule Take 1 capsule by mouth Daily. 90 capsule 3 3/16/2022 at Unknown time   • propranolol LA (INDERAL LA) 80 MG 24 hr capsule TAKE ONE CAPSULE BY MOUTH EVERY MORNING (Patient taking differently: Take 80 mg by mouth Daily.) 90 capsule 3 3/16/2022 at Unknown time   , Scheduled Meds:  aspirin, 81 mg, Oral, Daily  atorvastatin, 40 mg, Oral, Nightly  budesonide-formoterol, 2 puff, Inhalation, BID - RT  cetirizine, 5 mg, Oral, Daily  enoxaparin, 40 mg, Subcutaneous, Nightly  fluticasone, 2 spray, Nasal, Daily  furosemide, 40 mg, Oral, Daily  guaiFENesin, 600 mg, Oral, Q12H  pantoprazole, 40 mg, Oral, QAM  piperacillin-tazobactam, 3.375 g, Intravenous, Q8H  potassium phosphate, 21 mmol, Intravenous, Once  propranolol LA, 80 mg, Oral, Daily  senna-docusate sodium, 2 tablet, Oral, Once  sodium chloride, 10 mL, Intravenous, Q12H    , Continuous Infusions:    and Allergies:  Dilaudid [hydromorphone hcl]    Objective     Vital Signs   Temp:  [98.1 °F (36.7 °C)-98.6 °F (37 °C)] 98.5 °F (36.9 °C)  Heart Rate:  [53-66] 53  Resp:  [16-18] 17  BP: (107-121)/(64-81) 107/64    PPE used per hospital policy    Physical Exam:  Constitutional: Not in acute distress.  Eyes: Injected conjunctivae, EOMI. pupils equal reactive to light.  ENMT: Mcneil 3. No oral thrush.  moist tongue  Neck: Large. Trachea midline. No thyromegaly  Heart: RRR, no murmur  Lungs: Mild right basilar crackles.  No wheezing.  No use of accessory muscles.  Equal breath sounds bilaterally  Abdomen: Obese. Soft. No tenderness or dullness. No  HSM.  Extremities: No cyanosis, clubbing but trace pitting edema in distal legs.  Warm extremities and well-perfused.  Neuro: Conscious, alert, oriented x3.  Strength 5/5 in arms.  Psych: Appropriate mood and affect.    Integumentary: No rash.  Normal skin turgor  Lymphatic: No palpable cervical or supraclavicular lymph nodes.      Diagnostic imaging:  I personally and independently reviewed the following images:   CT chest 3/17/2022: Groundglass opacities, very subtle mostly in the right upper lobe and right lower lobe superior segment, in a central location.  No pleural effusion.  Subtle mediastinal adenopathies        Assessment     1. Bilateral groundglass infiltrates, subtle: Unclear etiology.  Not typical for volume overload neither for bacterial pneumonia.  Atypical pneumonia, aspiration pneumonitis and may be ILD are on the differential.   2. Borderline enlarged mediastinal adenopathies, likely reactive  3. Recurrent bronchitis/asthma ?  4. GERD/Reflux esophagitis, grade C  5. Anemia    Recommendations:    · GI evaluation.  Agree with EGD.  Could also consider pH metry.  Among the differential is silent aspiration of acid reflux at night  · Antibiotics per ID.  Seems to be improving  · If reflux/aspiration is ruled out then he may require evaluation for ILD but this is usually extensive and could require lung biopsy and therefore it is deferred to the last resort.  Also pretest probability for this is low.  · Recommend head of bed elevation/wedge pillow on discharge  · Discontinue Symbicort as steroid could increase the risk of pneumonia.  He will need a PFT as outpatient with pre and postbronchodilator testing to evaluate the possibility of asthma and if no evidence of asthma then should permanently discontinue inhaled steroid    Thank you for the consult.      Essence Argueta MD  03/18/22  15:30 EDT

## 2022-03-18 NOTE — PROGRESS NOTES
LOS: 1 day     Chief Complaint:  Follow-up pneumonia    Interval History:  He is feeling better today. Fever is resolved. He is on 2L NC. His WBC is down to 9k. CT chest did show some LL changes but nothing overwhelming.     ROS: no n/v/d    Vital Signs  Temp:  [98.1 °F (36.7 °C)-98.6 °F (37 °C)] 98.5 °F (36.9 °C)  Heart Rate:  [53-66] 53  Resp:  [16-18] 17  BP: (107-121)/(64-81) 107/64    Physical Exam:  General: awake, alert, very nice, sitting up in chair  Eyes:  no scleral icterus  ENT: NC in nares  Cardiovascular: bradycardic  Respiratory: Lungs w/ improved BLL rales; no wheezing; on 2L NC  GI: Abdomen is soft, nontender, nondistended  :  no Venegas catheter  Musculoskeletal: normal musculature  Skin: No rashes  Neurological: Alert and oriented x 3  Psychiatric: Normal mood and affect   Vasc: PIV w/o erythema    Antibiotics:  •  piperacillin-tazobactam (ZOSYN) 3.375 g in iso-osmotic dextrose 50 ml (premix), 3.375 g, Intravenous, Q8H    LABS:  CBC, BMP, micro reviewed today  Lab Results   Component Value Date    WBC 9.38 03/18/2022    HGB 11.7 (L) 03/18/2022    HCT 35.9 (L) 03/18/2022    MCV 90.9 03/18/2022     03/18/2022     Lab Results   Component Value Date    GLUCOSE 88 03/18/2022    BUN 13 03/18/2022    CREATININE 0.72 (L) 03/18/2022    EGFRIFNONA 98 10/11/2021    EGFRIFAFRI 119 10/11/2021    BCR 18.1 03/18/2022    CO2 29.3 (H) 03/18/2022    CALCIUM 9.4 03/18/2022    PROTENTOTREF 6.3 10/11/2021    ALBUMIN 3.50 03/17/2022    LABIL2 2.2 10/11/2021    AST 21 03/17/2022    ALT 31 03/17/2022     UA clear    Procal 0.19 (previous value was 0.50)     Microbiology:  3/3 RPP: negative  3/3 BCx: negative  3/3 Strep and Legionella Ag: negative  3/4 SpCx: negative  3/6 SpCx: negative  3/6 MRSA nares: negative  3/17 RPP: negative  3/17 BCx: NGTD  3/17 MRSA nares: negative    Radiology (personally reviewed report):   CT chest with minimal patchy BLL infiltrates and mild mediastinal  adenopathy    Assessment/Plan   1. Bilateral lower lobe pneumonia - fairly minimal on CT  2. Fever in adult - resolved  3. Leukocytosis - resolved  4. Shortness of breath and acute hypoxic respiratory failure - better  5. Chest pain  6. Esophageal dysphagia  7. Paroxysmal atrial fibrillation     He is improving with Zosyn 3.375 g q8h. He is now afebrile with a normal WBC. CT chest did show some BLL changes but all things considered pretty minimal. I recommend that we treat him with Zosyn through 3/23/22 to complete a 7-day course. He says he has outpatient follow-up with pulmonary which I think is a good idea given his underlying lung disease.     ID will follow and see again on 3/21/22 so please call ID MD on call at [phone] if any ID questions or concerns in the interim.

## 2022-03-18 NOTE — PLAN OF CARE
Goal Outcome Evaluation:  Plan of Care Reviewed With: patient           Outcome Evaluation: Pt seen for PT evaluation this PM. Pt admitted with worsening SOA and fever. Pt recently admitted with PNA and placed on 2L O2 at home, he reports primarily wearing at night. Pt reports 1 fall at home last week but typically is independent and requires no AD. Upon exam, pt limited by O2 - sats reading 87-90% with standing tasks. No overt loss of balance noted with transfers and ambulation. Pt may benefit from 1-2 more PT sessions to ambulate further and increase activity tolerance. Anticipate pt requiring O2 with mobility at this time. PT plans to follow up saturday.

## 2022-03-19 LAB
ANION GAP SERPL CALCULATED.3IONS-SCNC: 9 MMOL/L (ref 5–15)
BASOPHILS # BLD AUTO: 0.05 10*3/MM3 (ref 0–0.2)
BASOPHILS NFR BLD AUTO: 0.8 % (ref 0–1.5)
BUN SERPL-MCNC: 13 MG/DL (ref 8–23)
BUN/CREAT SERPL: 18.8 (ref 7–25)
CALCIUM SPEC-SCNC: 9.3 MG/DL (ref 8.6–10.5)
CHLORIDE SERPL-SCNC: 104 MMOL/L (ref 98–107)
CO2 SERPL-SCNC: 27 MMOL/L (ref 22–29)
CREAT SERPL-MCNC: 0.69 MG/DL (ref 0.76–1.27)
DEPRECATED RDW RBC AUTO: 42.7 FL (ref 37–54)
EGFRCR SERPLBLD CKD-EPI 2021: 93 ML/MIN/1.73
EOSINOPHIL # BLD AUTO: 0.44 10*3/MM3 (ref 0–0.4)
EOSINOPHIL NFR BLD AUTO: 6.7 % (ref 0.3–6.2)
ERYTHROCYTE [DISTWIDTH] IN BLOOD BY AUTOMATED COUNT: 13 % (ref 12.3–15.4)
FERRITIN SERPL-MCNC: 354 NG/ML (ref 30–400)
GLUCOSE SERPL-MCNC: 88 MG/DL (ref 65–99)
HCT VFR BLD AUTO: 36.1 % (ref 37.5–51)
HGB BLD-MCNC: 11.9 G/DL (ref 13–17.7)
IMM GRANULOCYTES # BLD AUTO: 0.02 10*3/MM3 (ref 0–0.05)
IMM GRANULOCYTES NFR BLD AUTO: 0.3 % (ref 0–0.5)
IRON 24H UR-MRATE: 60 MCG/DL (ref 59–158)
IRON SATN MFR SERPL: 27 % (ref 20–50)
LYMPHOCYTES # BLD AUTO: 1.05 10*3/MM3 (ref 0.7–3.1)
LYMPHOCYTES NFR BLD AUTO: 16 % (ref 19.6–45.3)
MAGNESIUM SERPL-MCNC: 2 MG/DL (ref 1.6–2.4)
MCH RBC QN AUTO: 29.6 PG (ref 26.6–33)
MCHC RBC AUTO-ENTMCNC: 33 G/DL (ref 31.5–35.7)
MCV RBC AUTO: 89.8 FL (ref 79–97)
MONOCYTES # BLD AUTO: 0.48 10*3/MM3 (ref 0.1–0.9)
MONOCYTES NFR BLD AUTO: 7.3 % (ref 5–12)
NEUTROPHILS NFR BLD AUTO: 4.52 10*3/MM3 (ref 1.7–7)
NEUTROPHILS NFR BLD AUTO: 68.9 % (ref 42.7–76)
NRBC BLD AUTO-RTO: 0 /100 WBC (ref 0–0.2)
PHOSPHATE SERPL-MCNC: 3 MG/DL (ref 2.5–4.5)
PLATELET # BLD AUTO: 232 10*3/MM3 (ref 140–450)
PMV BLD AUTO: 10.9 FL (ref 6–12)
POTASSIUM SERPL-SCNC: 3.6 MMOL/L (ref 3.5–5.2)
RBC # BLD AUTO: 4.02 10*6/MM3 (ref 4.14–5.8)
SODIUM SERPL-SCNC: 140 MMOL/L (ref 136–145)
TIBC SERPL-MCNC: 224 MCG/DL (ref 298–536)
TRANSFERRIN SERPL-MCNC: 150 MG/DL (ref 200–360)
WBC NRBC COR # BLD: 6.56 10*3/MM3 (ref 3.4–10.8)

## 2022-03-19 PROCEDURE — 83735 ASSAY OF MAGNESIUM: CPT | Performed by: STUDENT IN AN ORGANIZED HEALTH CARE EDUCATION/TRAINING PROGRAM

## 2022-03-19 PROCEDURE — 84100 ASSAY OF PHOSPHORUS: CPT | Performed by: STUDENT IN AN ORGANIZED HEALTH CARE EDUCATION/TRAINING PROGRAM

## 2022-03-19 PROCEDURE — 99232 SBSQ HOSP IP/OBS MODERATE 35: CPT | Performed by: INTERNAL MEDICINE

## 2022-03-19 PROCEDURE — 25010000002 PIPERACILLIN SOD-TAZOBACTAM PER 1 G: Performed by: STUDENT IN AN ORGANIZED HEALTH CARE EDUCATION/TRAINING PROGRAM

## 2022-03-19 PROCEDURE — 80048 BASIC METABOLIC PNL TOTAL CA: CPT | Performed by: STUDENT IN AN ORGANIZED HEALTH CARE EDUCATION/TRAINING PROGRAM

## 2022-03-19 PROCEDURE — 83540 ASSAY OF IRON: CPT | Performed by: STUDENT IN AN ORGANIZED HEALTH CARE EDUCATION/TRAINING PROGRAM

## 2022-03-19 PROCEDURE — 85025 COMPLETE CBC W/AUTO DIFF WBC: CPT | Performed by: STUDENT IN AN ORGANIZED HEALTH CARE EDUCATION/TRAINING PROGRAM

## 2022-03-19 PROCEDURE — 84466 ASSAY OF TRANSFERRIN: CPT | Performed by: STUDENT IN AN ORGANIZED HEALTH CARE EDUCATION/TRAINING PROGRAM

## 2022-03-19 PROCEDURE — 82728 ASSAY OF FERRITIN: CPT | Performed by: STUDENT IN AN ORGANIZED HEALTH CARE EDUCATION/TRAINING PROGRAM

## 2022-03-19 RX ADMIN — TAZOBACTAM SODIUM AND PIPERACILLIN SODIUM 3.38 G: 375; 3 INJECTION, SOLUTION INTRAVENOUS at 13:11

## 2022-03-19 RX ADMIN — Medication 10 ML: at 09:53

## 2022-03-19 RX ADMIN — PROPRANOLOL HYDROCHLORIDE 80 MG: 80 CAPSULE, EXTENDED RELEASE ORAL at 09:53

## 2022-03-19 RX ADMIN — PANTOPRAZOLE SODIUM 40 MG: 40 TABLET, DELAYED RELEASE ORAL at 06:07

## 2022-03-19 RX ADMIN — CETIRIZINE HYDROCHLORIDE 5 MG: 10 TABLET ORAL at 09:53

## 2022-03-19 RX ADMIN — ATORVASTATIN CALCIUM 40 MG: 20 TABLET, FILM COATED ORAL at 20:14

## 2022-03-19 RX ADMIN — GUAIFENESIN 600 MG: 600 TABLET, EXTENDED RELEASE ORAL at 09:52

## 2022-03-19 RX ADMIN — FLUTICASONE PROPIONATE 2 SPRAY: 50 SPRAY, METERED NASAL at 09:53

## 2022-03-19 RX ADMIN — GUAIFENESIN 600 MG: 600 TABLET, EXTENDED RELEASE ORAL at 20:14

## 2022-03-19 RX ADMIN — Medication 10 ML: at 20:15

## 2022-03-19 RX ADMIN — TAZOBACTAM SODIUM AND PIPERACILLIN SODIUM 3.38 G: 375; 3 INJECTION, SOLUTION INTRAVENOUS at 20:14

## 2022-03-19 RX ADMIN — ASPIRIN 81 MG: 81 TABLET, CHEWABLE ORAL at 09:52

## 2022-03-19 RX ADMIN — TAZOBACTAM SODIUM AND PIPERACILLIN SODIUM 3.38 G: 375; 3 INJECTION, SOLUTION INTRAVENOUS at 03:58

## 2022-03-19 NOTE — PROGRESS NOTES
[facility]  Inpatient Progress Note    Reason for Follow Up: Dysphagia    Subjective     Interval History:   No overnight events    Current Facility-Administered Medications:   •  acetaminophen (TYLENOL) tablet 650 mg, 650 mg, Oral, Q4H PRN **OR** acetaminophen (TYLENOL) 160 MG/5ML solution 650 mg, 650 mg, Oral, Q4H PRN **OR** acetaminophen (TYLENOL) suppository 650 mg, 650 mg, Rectal, Q4H PRN, Debbi Llanos APRN  •  albuterol (PROVENTIL) nebulizer solution 0.083% 2.5 mg/3mL, 2.5 mg, Nebulization, Q6H PRN, Sid Capone APRN  •  aspirin chewable tablet 81 mg, 81 mg, Oral, Daily, Sid Capone APRN, 81 mg at 03/19/22 0952  •  atorvastatin (LIPITOR) tablet 40 mg, 40 mg, Oral, Nightly, Sid Capone APRN, 40 mg at 03/18/22 2018  •  calcium carbonate (TUMS) chewable tablet 500 mg (200 mg elemental), 2 tablet, Oral, BID PRN, Debbi Llanos APRN  •  cetirizine (zyrTEC) tablet 5 mg, 5 mg, Oral, Daily, Sid Capone APRN, 5 mg at 03/19/22 0953  •  enoxaparin (LOVENOX) syringe 40 mg, 40 mg, Subcutaneous, Nightly, Sid Capone APRN, 40 mg at 03/18/22 2018  •  fluticasone (FLONASE) 50 MCG/ACT nasal spray 2 spray, 2 spray, Nasal, Daily, Sid Capone APRN, 2 spray at 03/19/22 0953  •  furosemide (LASIX) tablet 40 mg, 40 mg, Oral, Daily, Campbell Harris MD, 40 mg at 03/17/22 1450  •  guaiFENesin (MUCINEX) 12 hr tablet 600 mg, 600 mg, Oral, Q12H, Sid Capone APRN, 600 mg at 03/19/22 0952  •  nitroglycerin (NITROSTAT) SL tablet 0.4 mg, 0.4 mg, Sublingual, Q5 Min PRN, Debbi Llanos APRN  •  ondansetron (ZOFRAN) tablet 4 mg, 4 mg, Oral, Q6H PRN **OR** ondansetron (ZOFRAN) injection 4 mg, 4 mg, Intravenous, Q6H PRN, Debbi Llanos APRN  •  pantoprazole (PROTONIX) EC tablet 40 mg, 40 mg, Oral, Liborio BROOKS Jamie A, APRN, 40 mg at 03/19/22 0607  •  piperacillin-tazobactam (ZOSYN) 3.375 g in iso-osmotic dextrose 50 ml (premix), 3.375 g, Intravenous, Q8H,  Campbell Harris MD, 3.375 g at 03/19/22 0358  •  propranolol LA (INDERAL LA) 24 hr capsule 80 mg, 80 mg, Oral, Daily, Sid Capone APRN, 80 mg at 03/19/22 0953  •  sodium chloride 0.9 % flush 10 mL, 10 mL, Intravenous, Q12H, Debbi Llanos APRN, 10 mL at 03/19/22 0953  •  sodium chloride 0.9 % flush 10 mL, 10 mL, Intravenous, PRN, Debbi Llanos APRN  Review of Systems:    There is weakness fatigue all other systems reviewed and negative    Objective     Vital Signs  Temp:  [97.4 °F (36.3 °C)-98.5 °F (36.9 °C)] 97.4 °F (36.3 °C)  Heart Rate:  [53-75] 72  Resp:  [16-18] 18  BP: (116-139)/(76-85) 133/85  Body mass index is 33.45 kg/m².    Intake/Output Summary (Last 24 hours) at 3/19/2022 1311  Last data filed at 3/19/2022 1000  Gross per 24 hour   Intake 550 ml   Output --   Net 550 ml     I/O this shift:  In: 330 [P.O.:330]  Out: -      Physical Exam:   General: patient awake, alert and cooperative   Eyes: Normal lids and lashes, no scleral icterus   Neck: supple, normal ROM   Skin: warm and dry, not jaundiced   Cardiovascular: regular rhythm and rate, no murmurs auscultated   Pulm: clear to auscultation bilaterally, regular and unlabored   Abdomen: soft, nontender, nondistended; normal bowel sounds   Extremities: no rash or edema   Psychiatric: Normal mood and behavior; memory intact     Results Review:     I reviewed the patient's new clinical results.    Results from last 7 days   Lab Units 03/19/22  0613 03/18/22  0739 03/17/22  0643   WBC 10*3/mm3 6.56 9.38 18.25*   HEMOGLOBIN g/dL 11.9* 11.7* 12.7*   HEMATOCRIT % 36.1* 35.9* 37.6   PLATELETS 10*3/mm3 232 218 245     Results from last 7 days   Lab Units 03/19/22  0613 03/18/22  0739 03/17/22  0643 03/17/22  0355   SODIUM mmol/L 140 139 138 139   POTASSIUM mmol/L 3.6 3.3* 3.7 3.5   CHLORIDE mmol/L 104 104 104 102   CO2 mmol/L 27.0 29.3* 26.0 26.1   BUN mg/dL 13 13 13 15   CREATININE mg/dL 0.69* 0.72* 0.53* 0.67*   CALCIUM mg/dL 9.3 9.4 8.9  9.5   BILIRUBIN mg/dL  --   --   --  1.4*   ALK PHOS U/L  --   --   --  88   ALT (SGPT) U/L  --   --   --  31   AST (SGOT) U/L  --   --   --  21   GLUCOSE mg/dL 88 88 121* 143*         No results found for: LIPASE    Radiology:  CT Chest Without Contrast Diagnostic   Final Result   Minimal patchy bilateral groundglass infiltrate suspicious   for pneumonia due to COVID 19 infection. Mild mediastinal   lymphadenopathy, likely reactive.       This report was finalized on 3/17/2022 4:43 PM by Dr. Francesco Hackett M.D.          XR Chest 1 View   Final Result         Electronically signed by Hernan Galarza M.D. on 03-17-22 at 0556          Assessment/Plan     Patient Active Problem List   Diagnosis   • Hyperglycemia   • Obesity   • Allergic rhinitis   • Paroxysmal atrial fibrillation (HCC)   • Benign essential hypertension   • HLD (hyperlipidemia)   • Esophageal dysphagia   • Hiatal hernia   • GERD (gastroesophageal reflux disease)   • Prostate cancer (HCC)   • Bladder outlet obstruction   • Sciatica of right side   • Pneumonia of right lower lobe due to infectious organism   • Hypoxia   • Pneumonia due to infectious organism   • Acute respiratory failure with hypoxia (HCC)   • Orthopnea     Assessment:  1. Dysphagia  2. GERD  3. Bilateral lower lobe pneumonia pneumonia  4. Hypertension  5. Paroxysmal atrial fibrillation not requiring anticoagulation  6. Hyperlipidemia  7. COPD     Plan:  · Continue 40 mg Protonix daily.  · Diet per SLP recommendations.  · Currently on Lovenox for DVT prophylaxis.  · EGD Sunday  · Hold Lovenox Sunday  · Antibiotic regimen per infectious disease    I discussed the patients findings and my recommendations with patient and nursing staff.    Tony Ramos MD

## 2022-03-19 NOTE — PROGRESS NOTES
ID note for pneumonia  Subjective: New patient to me.  Says he is feeling pretty well.  Tolerating the antibiotics without side effects but he still little bit tired of having to be hooked up to them.  He is not having fevers or chills or night sweats.  Still on 2 L  Objective: Afebrile, no acute distress, lungs are clear to auscultation normally with normal pulmonary effort    White count 6.56, hemoglobin 11.9, platelets 232  Creatinine 0.69  Microbiology reviewed and all negative to date    A/p  1.  Bilateral lower lobe pneumonia  2.  Acute hypoxic respiratory failure  3.  Esophageal dysphagia    GI and pulmonary evaluation ongoing for possible silent aspiration.  Doing well from an infectious perspective with normal white count and minimal oxygen requirement.  He remains on Zosyn with an anticipated stop date of 3/23/2022 to complete 7-day course.  Can transition to oral antibiotics at discharged prior to that date

## 2022-03-19 NOTE — PROGRESS NOTES
"                                              LOS: 2 days   Patient Care Team:  Fan Schulz MD as PCP - General  Steven Cadena MD as Consulting Physician (Gastroenterology)    Chief Complaint:  F/up pneumonia    Subjective   Interval History  Has minimal cough but no sputum.  On 2 L oxygen.  Reported no shortness of breath at rest    REVIEW OF SYSTEMS:   .     GASTROINTESTINAL: No anorexia, nausea, vomiting or diarrhea. No abdominal pain.  CONSTITUTIONAL: No fever or chills.     Ventilator/Non-Invasive Ventilation Settings (From admission, onward)            None                Physical Exam:     Vital Signs  Temp:  [97.4 °F (36.3 °C)-97.7 °F (36.5 °C)] 97.5 °F (36.4 °C)  Heart Rate:  [66-75] 66  Resp:  [16-18] 18  BP: (109-139)/(66-85) 109/66    Intake/Output Summary (Last 24 hours) at 3/19/2022 1538  Last data filed at 3/19/2022 1323  Gross per 24 hour   Intake 810 ml   Output --   Net 810 ml     Flowsheet Rows    Flowsheet Row First Filed Value   Admission Height 170.2 cm (67\") Documented at 03/17/2022 0335   Admission Weight 102 kg (225 lb 12.8 oz) Documented at 03/17/2022 0548          PPE used per hospital policy    General Appearance:   Alert, cooperative, in no acute distress   ENMT:  Mallampati score 3, moist mucous membrane   Eyes:  Pupils equal and reactive to light. EOMI   Neck:   Large. Trachea midline. No thyromegaly.   Lungs:    Equal but diminished air entry throughout.  Coarse breath sounds at the left base.  No wheezing.  No use of accessory muscles    Heart:   Regular rhythm and normal rate, normal S1 and S2, no         murmur   Skin:   No rash or ecchymosis   Abdomen:    Obese. Soft. No tenderness. No HSM.   Neuro/psych:  Conscious, alert, oriented x3. Strength 5/5 in upper and lower  ext.  Appropriate mood and affect   Extremities:  No cyanosis, clubbing or edema.  Warm extremities and well-perfused          Results Review:        Results from last 7 days   Lab Units 03/19/22  0613 " 03/18/22  0739 03/17/22  0643   SODIUM mmol/L 140 139 138   POTASSIUM mmol/L 3.6 3.3* 3.7   CHLORIDE mmol/L 104 104 104   CO2 mmol/L 27.0 29.3* 26.0   BUN mg/dL 13 13 13   CREATININE mg/dL 0.69* 0.72* 0.53*   GLUCOSE mg/dL 88 88 121*   CALCIUM mg/dL 9.3 9.4 8.9     Results from last 7 days   Lab Units 03/17/22  0355   TROPONIN T ng/mL <0.010     Results from last 7 days   Lab Units 03/19/22  0613 03/18/22  0739 03/17/22  0643   WBC 10*3/mm3 6.56 9.38 18.25*   HEMOGLOBIN g/dL 11.9* 11.7* 12.7*   HEMATOCRIT % 36.1* 35.9* 37.6   PLATELETS 10*3/mm3 232 218 245         Results from last 7 days   Lab Units 03/17/22  0643   PROBNP pg/mL 825.0       Results from last 7 days   Lab Units 03/17/22  1513   D DIMER QUANT MCGFEU/mL 0.65*                     I reviewed the patient's new clinical results.        Medication Review:   aspirin, 81 mg, Oral, Daily  atorvastatin, 40 mg, Oral, Nightly  cetirizine, 5 mg, Oral, Daily  enoxaparin, 40 mg, Subcutaneous, Nightly  fluticasone, 2 spray, Nasal, Daily  furosemide, 40 mg, Oral, Daily  guaiFENesin, 600 mg, Oral, Q12H  pantoprazole, 40 mg, Oral, QAM  piperacillin-tazobactam, 3.375 g, Intravenous, Q8H  propranolol LA, 80 mg, Oral, Daily  sodium chloride, 10 mL, Intravenous, Q12H            Assessment   1. Bilateral groundglass infiltrates, subtle: Unclear etiology.  Not typical for volume overload neither for bacterial pneumonia.  Atypical pneumonia, aspiration pneumonitis and may be ILD are on the differential.   2. Borderline enlarged mediastinal adenopathies, likely reactive  3. Recurrent bronchitis/asthma ?  4. GERD/Reflux esophagitis, grade C  5. Anemia      Plan     · Zosyn for pneumonia per ID.  · EGD tomorrow.  · I-S  · Head of bed elevation to prevent aspiration of acid reflux.  · DVT prophylaxis with Lovenox  · Continue off Symbicort as inhaled steroid could be associated with recurrent pneumonia    Discussed with family at bedside      Essence Argueta MD  03/19/22  15:38  EDT          This note was dictated utilizing Dragon dictation

## 2022-03-19 NOTE — PLAN OF CARE
Goal Outcome Evaluation:  Plan of Care Reviewed With: patient        Progress: improving   VSS. Pt still on 2L O2. No c/o pain. Pt up with stand-by assist when pump is hooked up and up ad usha when it is not. Pt on zosyn IV for pna. Had 2.5 sec pause earlier but is resting comfortably now. No other issues. Will CTM.

## 2022-03-19 NOTE — PROGRESS NOTES
Name: Junito Sorto ADMIT: 3/17/2022   : 1940  PCP: Fan Schulz MD    MRN: [id number] LOS: 2 days   AGE/SEX: 81 y.o. male  ROOM: [facility]     Subjective   Subjective   Patient seen this morning.  No new events overnight.  Remains on 2 L.  Sitting up at the side of the bed.  Plan for EGD by GI tomorrow.    Review of Systems   As above  Objective   Objective   Vital Signs  Temp:  [97.4 °F (36.3 °C)-98.5 °F (36.9 °C)] 97.4 °F (36.3 °C)  Heart Rate:  [53-75] 72  Resp:  [16-18] 18  BP: (116-139)/(76-85) 133/85  SpO2:  [93 %-95 %] 93 %  on  Flow (L/min):  [2] 2;   Device (Oxygen Therapy): nasal cannula  Body mass index is 33.45 kg/m².  Physical Exam  General Alert and oriented x3, no acute distress.  Ill-appearing, obese  Eyes PERRL, EOMI, anicteric sclera  Lungs : Rales bilateral bases, no wheezing, on 2 L nasal cannula.  CV Irregular rate and rhythm, no murmurs rubs or gallops.  AbdomenSoft, nontender, nondistended.  Normoactive bowel sounds  Extremities 1+ bilateral lower extremity edema, cyanosis.      Results Review     I reviewed the patient's new clinical results.  Results from last 7 days   Lab Units 22  0622  0739 22  0643 22  0355   WBC 10*3/mm3 6.56 9.38 18.25* 15.97*   HEMOGLOBIN g/dL 11.9* 11.7* 12.7* 13.7   PLATELETS 10*3/mm3 232 218 245 262     Results from last 7 days   Lab Units 22  0622  0739 22  0643 22  0355   SODIUM mmol/L 140 139 138 139   POTASSIUM mmol/L 3.6 3.3* 3.7 3.5   CHLORIDE mmol/L 104 104 104 102   CO2 mmol/L 27.0 29.3* 26.0 26.1   BUN mg/dL 13 13 13 15   CREATININE mg/dL 0.69* 0.72* 0.53* 0.67*   GLUCOSE mg/dL 88 88 121* 143*   Estimated Creatinine Clearance: 96.2 mL/min (A) (by C-G formula based on SCr of 0.69 mg/dL (L)).  Results from last 7 days   Lab Units 22  0355   ALBUMIN g/dL 3.50   BILIRUBIN mg/dL 1.4*   ALK PHOS U/L 88   AST (SGOT) U/L 21   ALT (SGPT) U/L 31     Results from last 7 days   Lab Units  03/19/22  0613 03/18/22  0739 03/17/22  0643 03/17/22  0355   CALCIUM mg/dL 9.3 9.4 8.9 9.5   ALBUMIN g/dL  --   --   --  3.50   MAGNESIUM mg/dL 2.0 2.0  --   --    PHOSPHORUS mg/dL 3.0 2.3*  --   --      Results from last 7 days   Lab Units 03/17/22  0355   PROCALCITONIN ng/mL 0.19   LACTATE mmol/L 1.8     COVID19   Date Value Ref Range Status   03/17/2022 Not Detected Not Detected - Ref. Range Final   03/03/2022 Not Detected Not Detected - Ref. Range Final     No results found for: HGBA1C, POCGLU    CT Chest Without Contrast Diagnostic  Narrative: CT CHEST WO CONTRAST DIAGNOSTIC-     CLINICAL HISTORY: Pneumonia. A fusion or abscess is suspected. Abnormal  chest x-ray.     TECHNIQUE: Spiral CT images were obtained through the chest without IV  contrast and were reconstructed in 3 mm thick slices.     Radiation dose reduction techniques were utilized, including automated  exposure control and exposure modulation based on body size.     COMPARISON: Chest x-ray dated 03/17/2022.     FINDINGS: Lung window images demonstrate mild patchy airspace  infiltrates within both lungs, more prominent on the right than the left  that are suspicious for pneumonia due to COVID 19 infection. Correlation  with clinical findings is recommended. No focal areas of dense  consolidation are identified. There are no discrete lung masses. There  are no pleural effusions. There multiple mildly enlarged mediastinal  lymph nodes that are likely benign reactive lymph nodes. These measure  up to 11 mm in short axis. The heart is mildly enlarged. Images through  the upper abdomen show no significant abnormality.     Impression: Minimal patchy bilateral groundglass infiltrate suspicious  for pneumonia due to COVID 19 infection. Mild mediastinal  lymphadenopathy, likely reactive.     This report was finalized on 3/17/2022 4:43 PM by Dr. Francesco Hackett M.D.     XR Chest 1 View  Narrative: Patient: VICTOR MANUEL HOLLEY  Time Out: 05:56  Exam(s): FILM  CXR 1 VIEW     EXAM:    XR Chest, 1 View    CLINICAL HISTORY:     Reason for exam: SOA.    TECHNIQUE:    Frontal view of the chest.    COMPARISON:  3 3 2022    FINDINGS:    Lungs: Mild bilateral perihilar reticular opacities.    Pleural space:  Unremarkable.  No pneumothorax.    Heart: Mild cardiomegaly.    Mediastinum:  Unremarkable.    Bones joints:  Unremarkable.    IMPRESSION:       Cardiomegaly with mild bilateral perihilar reticular opacities compatible   with interstitial pulmonary edema.  Impression: Electronically signed by Hernan Galarza M.D. on 03-17-22 at 0556    Scheduled Medications  aspirin, 81 mg, Oral, Daily  atorvastatin, 40 mg, Oral, Nightly  cetirizine, 5 mg, Oral, Daily  enoxaparin, 40 mg, Subcutaneous, Nightly  fluticasone, 2 spray, Nasal, Daily  furosemide, 40 mg, Oral, Daily  guaiFENesin, 600 mg, Oral, Q12H  pantoprazole, 40 mg, Oral, QAM  piperacillin-tazobactam, 3.375 g, Intravenous, Q8H  propranolol LA, 80 mg, Oral, Daily  sodium chloride, 10 mL, Intravenous, Q12H    Infusions   Diet  Diet Regular; Thin       Assessment/Plan     Active Hospital Problems    Diagnosis  POA   • **Pneumonia due to infectious organism [J18.9]  Yes   • Acute respiratory failure with hypoxia (HCC) [J96.01]  Yes   • Orthopnea [R06.01]  Yes   • GERD (gastroesophageal reflux disease) [K21.9]  Yes   • Esophageal dysphagia [R13.19]  Yes   • Benign essential hypertension [I10]  Yes   • Paroxysmal atrial fibrillation (HCC) [I48.0]  Yes   • Obesity [E66.9]  Yes      Resolved Hospital Problems   No resolved problems to display.         #Bilateral lower lobe pneumonia/hypoxemia   -Patient was recently admitted for pneumonia and discharged home on Omnicef.   -Oxygen saturation dropped to 89% on room air   -Cr chest showed Cardiomegaly with mild bilateral perihilar reticular opacities compatible    with interstitial pulmonary edema.   -White blood cell count was 18.25, procalcitonin was normal.  T-max was 102.3 on admission    -Concern for recurrent aspiration causing pneumonia.    SLP-During previous admission, clinical swallow eval completed 3/3/22 recommending regular and thins. Esophagram completed 3/3/22 reporting no penetration or aspiration.   -CT chest 03/18-Minimal patchy bilateral groundglass infiltrate suspicious for pneumonia due to COVID 19 infection. Mild mediastinal lymphadenopathy, likely reactive.  -On Zosyn 03/17-ID following, appreciate recs.  -Respiratory viral count negative   -MRSA nares negative  -blood culture negative to date  -Pulmonology consulted secondary to history of recurrent pneumonia, COPD.  -Home Symbicort discontinued 03/18 per pulmonology as steroid could increase the risk of pneumonia. will need a PFT as outpatient with pre and postbronchodilator testing to evaluate the possibility of asthma and if no evidence of asthma then should permanently discontinue inhaled steroid     #Esophageal dysphagia:  Esophagram on previous admission showed significant tertiary of information with intraesophageal reflux and stasis.  Regular thin diet ordered  GI consulted due to this dysphagia history of esophageal stricture,, plan for EGD 03/20      #Diastolic heart failure/orthopnea:    proBNP was 825   -Echocardiogram 03/09-Left ventricular ejection fraction appears to be 46 - 50%. Left ventricular systolic function is low normal.   -Chest x-ray concerning for pulmonary edema   -DC IV fluids, start on p.o. Lasix daily 40 mg daily.       #Asthma without exacerbation. Continue home inhalers:  Pulmonology consult.  Home Symbicort discontinued as it can increase recurrent pneumonia.  Will need outpatient PFT to evaluate lung function if negative Symbicort may need to be discontinued permanently.  #Paroxysmal atrial fibrillation:   EKG shows atrial fib, no ischemic changes.  Continue beta-blocker with hold parameters.  Not on anticoagulation at home, patient states he was placed on Eliquis years ago however stopped  taking it due to bruising..  On Lovenox for DVT prophylaxis.       #GERD:  Continue PPI   #Hyperlipidemia: Continue atorvastatin   #Hypophosphatemia/hypokalemia.  Replete as needed.    CODE STATUS: Full code  DVT prophylaxis: Lovenox  Disposition: To be determined    Campbell Harris MD  [facility]  03/19/22  09:57 EDT

## 2022-03-20 ENCOUNTER — ANESTHESIA EVENT (OUTPATIENT)
Dept: GASTROENTEROLOGY | Facility: HOSPITAL | Age: 82
End: 2022-03-20

## 2022-03-20 ENCOUNTER — ANESTHESIA (OUTPATIENT)
Dept: GASTROENTEROLOGY | Facility: HOSPITAL | Age: 82
End: 2022-03-20

## 2022-03-20 LAB
ALBUMIN SERPL-MCNC: 3.3 G/DL (ref 3.5–5.2)
ALBUMIN/GLOB SERPL: 0.9 G/DL
ALP SERPL-CCNC: 76 U/L (ref 39–117)
ALT SERPL W P-5'-P-CCNC: 21 U/L (ref 1–41)
ANION GAP SERPL CALCULATED.3IONS-SCNC: 7.8 MMOL/L (ref 5–15)
AST SERPL-CCNC: 17 U/L (ref 1–40)
BASOPHILS # BLD AUTO: 0.04 10*3/MM3 (ref 0–0.2)
BASOPHILS NFR BLD AUTO: 0.7 % (ref 0–1.5)
BILIRUB SERPL-MCNC: 1.8 MG/DL (ref 0–1.2)
BUN SERPL-MCNC: 13 MG/DL (ref 8–23)
BUN/CREAT SERPL: 16.7 (ref 7–25)
CALCIUM SPEC-SCNC: 9.6 MG/DL (ref 8.6–10.5)
CHLORIDE SERPL-SCNC: 105 MMOL/L (ref 98–107)
CO2 SERPL-SCNC: 27.2 MMOL/L (ref 22–29)
CREAT SERPL-MCNC: 0.78 MG/DL (ref 0.76–1.27)
DEPRECATED RDW RBC AUTO: 41.1 FL (ref 37–54)
EGFRCR SERPLBLD CKD-EPI 2021: 89.6 ML/MIN/1.73
EOSINOPHIL # BLD AUTO: 0.4 10*3/MM3 (ref 0–0.4)
EOSINOPHIL NFR BLD AUTO: 6.7 % (ref 0.3–6.2)
ERYTHROCYTE [DISTWIDTH] IN BLOOD BY AUTOMATED COUNT: 12.8 % (ref 12.3–15.4)
GLOBULIN UR ELPH-MCNC: 3.6 GM/DL
GLUCOSE SERPL-MCNC: 91 MG/DL (ref 65–99)
HCT VFR BLD AUTO: 38.2 % (ref 37.5–51)
HGB BLD-MCNC: 12.8 G/DL (ref 13–17.7)
IMM GRANULOCYTES # BLD AUTO: 0.01 10*3/MM3 (ref 0–0.05)
IMM GRANULOCYTES NFR BLD AUTO: 0.2 % (ref 0–0.5)
INR PPP: 1.19 (ref 0.9–1.1)
LYMPHOCYTES # BLD AUTO: 0.91 10*3/MM3 (ref 0.7–3.1)
LYMPHOCYTES NFR BLD AUTO: 15.2 % (ref 19.6–45.3)
MAGNESIUM SERPL-MCNC: 2 MG/DL (ref 1.6–2.4)
MCH RBC QN AUTO: 29.6 PG (ref 26.6–33)
MCHC RBC AUTO-ENTMCNC: 33.5 G/DL (ref 31.5–35.7)
MCV RBC AUTO: 88.4 FL (ref 79–97)
MONOCYTES # BLD AUTO: 0.47 10*3/MM3 (ref 0.1–0.9)
MONOCYTES NFR BLD AUTO: 7.9 % (ref 5–12)
NEUTROPHILS NFR BLD AUTO: 4.15 10*3/MM3 (ref 1.7–7)
NEUTROPHILS NFR BLD AUTO: 69.3 % (ref 42.7–76)
NRBC BLD AUTO-RTO: 0 /100 WBC (ref 0–0.2)
PHOSPHATE SERPL-MCNC: 3.2 MG/DL (ref 2.5–4.5)
PLATELET # BLD AUTO: 249 10*3/MM3 (ref 140–450)
PMV BLD AUTO: 10.7 FL (ref 6–12)
POTASSIUM SERPL-SCNC: 3.9 MMOL/L (ref 3.5–5.2)
PROT SERPL-MCNC: 6.9 G/DL (ref 6–8.5)
PROTHROMBIN TIME: 15 SECONDS (ref 11.7–14.2)
RBC # BLD AUTO: 4.32 10*6/MM3 (ref 4.14–5.8)
SODIUM SERPL-SCNC: 140 MMOL/L (ref 136–145)
WBC NRBC COR # BLD: 5.98 10*3/MM3 (ref 3.4–10.8)

## 2022-03-20 PROCEDURE — 88305 TISSUE EXAM BY PATHOLOGIST: CPT | Performed by: INTERNAL MEDICINE

## 2022-03-20 PROCEDURE — 0DD98ZX EXTRACTION OF DUODENUM, VIA NATURAL OR ARTIFICIAL OPENING ENDOSCOPIC, DIAGNOSTIC: ICD-10-PCS | Performed by: INTERNAL MEDICINE

## 2022-03-20 PROCEDURE — 85610 PROTHROMBIN TIME: CPT | Performed by: INTERNAL MEDICINE

## 2022-03-20 PROCEDURE — 87205 SMEAR GRAM STAIN: CPT | Performed by: STUDENT IN AN ORGANIZED HEALTH CARE EDUCATION/TRAINING PROGRAM

## 2022-03-20 PROCEDURE — 25010000002 PROPOFOL 10 MG/ML EMULSION: Performed by: ANESTHESIOLOGY

## 2022-03-20 PROCEDURE — 99232 SBSQ HOSP IP/OBS MODERATE 35: CPT | Performed by: INTERNAL MEDICINE

## 2022-03-20 PROCEDURE — 80053 COMPREHEN METABOLIC PANEL: CPT | Performed by: INTERNAL MEDICINE

## 2022-03-20 PROCEDURE — 25010000002 PIPERACILLIN SOD-TAZOBACTAM PER 1 G: Performed by: INTERNAL MEDICINE

## 2022-03-20 PROCEDURE — 43450 DILATE ESOPHAGUS 1/MULT PASS: CPT | Performed by: INTERNAL MEDICINE

## 2022-03-20 PROCEDURE — 43239 EGD BIOPSY SINGLE/MULTIPLE: CPT | Performed by: INTERNAL MEDICINE

## 2022-03-20 PROCEDURE — 25010000002 ENOXAPARIN PER 10 MG: Performed by: INTERNAL MEDICINE

## 2022-03-20 PROCEDURE — 25010000002 PIPERACILLIN SOD-TAZOBACTAM PER 1 G: Performed by: STUDENT IN AN ORGANIZED HEALTH CARE EDUCATION/TRAINING PROGRAM

## 2022-03-20 PROCEDURE — 85025 COMPLETE CBC W/AUTO DIFF WBC: CPT | Performed by: INTERNAL MEDICINE

## 2022-03-20 PROCEDURE — 0DD38ZX EXTRACTION OF LOWER ESOPHAGUS, VIA NATURAL OR ARTIFICIAL OPENING ENDOSCOPIC, DIAGNOSTIC: ICD-10-PCS | Performed by: INTERNAL MEDICINE

## 2022-03-20 PROCEDURE — 0DD68ZX EXTRACTION OF STOMACH, VIA NATURAL OR ARTIFICIAL OPENING ENDOSCOPIC, DIAGNOSTIC: ICD-10-PCS | Performed by: INTERNAL MEDICINE

## 2022-03-20 PROCEDURE — 87070 CULTURE OTHR SPECIMN AEROBIC: CPT | Performed by: STUDENT IN AN ORGANIZED HEALTH CARE EDUCATION/TRAINING PROGRAM

## 2022-03-20 PROCEDURE — 0DD28ZX EXTRACTION OF MIDDLE ESOPHAGUS, VIA NATURAL OR ARTIFICIAL OPENING ENDOSCOPIC, DIAGNOSTIC: ICD-10-PCS | Performed by: INTERNAL MEDICINE

## 2022-03-20 PROCEDURE — 83735 ASSAY OF MAGNESIUM: CPT | Performed by: STUDENT IN AN ORGANIZED HEALTH CARE EDUCATION/TRAINING PROGRAM

## 2022-03-20 PROCEDURE — 84100 ASSAY OF PHOSPHORUS: CPT | Performed by: STUDENT IN AN ORGANIZED HEALTH CARE EDUCATION/TRAINING PROGRAM

## 2022-03-20 RX ORDER — GLYCOPYRROLATE 0.2 MG/ML
INJECTION INTRAMUSCULAR; INTRAVENOUS AS NEEDED
Status: DISCONTINUED | OUTPATIENT
Start: 2022-03-20 | End: 2022-03-20 | Stop reason: SURG

## 2022-03-20 RX ORDER — SODIUM CHLORIDE 9 MG/ML
10 INJECTION INTRAVENOUS EVERY 12 HOURS SCHEDULED
Status: DISCONTINUED | OUTPATIENT
Start: 2022-03-20 | End: 2022-03-20 | Stop reason: HOSPADM

## 2022-03-20 RX ORDER — SODIUM CHLORIDE 9 MG/ML
50 INJECTION, SOLUTION INTRAVENOUS ONCE
Status: COMPLETED | OUTPATIENT
Start: 2022-03-20 | End: 2022-03-20

## 2022-03-20 RX ORDER — LIDOCAINE HYDROCHLORIDE 20 MG/ML
INJECTION, SOLUTION INFILTRATION; PERINEURAL AS NEEDED
Status: DISCONTINUED | OUTPATIENT
Start: 2022-03-20 | End: 2022-03-20 | Stop reason: SURG

## 2022-03-20 RX ORDER — PROPOFOL 10 MG/ML
VIAL (ML) INTRAVENOUS AS NEEDED
Status: DISCONTINUED | OUTPATIENT
Start: 2022-03-20 | End: 2022-03-20 | Stop reason: SURG

## 2022-03-20 RX ORDER — SODIUM CHLORIDE 9 MG/ML
INJECTION, SOLUTION INTRAVENOUS CONTINUOUS PRN
Status: DISCONTINUED | OUTPATIENT
Start: 2022-03-20 | End: 2022-03-20 | Stop reason: SURG

## 2022-03-20 RX ADMIN — GUAIFENESIN 600 MG: 600 TABLET, EXTENDED RELEASE ORAL at 20:27

## 2022-03-20 RX ADMIN — GLYCOPYRROLATE 0.2 MG: 0.2 INJECTION INTRAMUSCULAR; INTRAVENOUS at 11:55

## 2022-03-20 RX ADMIN — ENOXAPARIN SODIUM 40 MG: 100 INJECTION SUBCUTANEOUS at 20:28

## 2022-03-20 RX ADMIN — LIDOCAINE HYDROCHLORIDE 100 MG: 20 INJECTION, SOLUTION INFILTRATION; PERINEURAL at 11:56

## 2022-03-20 RX ADMIN — TAZOBACTAM SODIUM AND PIPERACILLIN SODIUM 3.38 G: 375; 3 INJECTION, SOLUTION INTRAVENOUS at 20:28

## 2022-03-20 RX ADMIN — ATORVASTATIN CALCIUM 40 MG: 20 TABLET, FILM COATED ORAL at 20:28

## 2022-03-20 RX ADMIN — SODIUM CHLORIDE 50 ML/HR: 9 INJECTION, SOLUTION INTRAVENOUS at 11:39

## 2022-03-20 RX ADMIN — Medication 10 ML: at 20:28

## 2022-03-20 RX ADMIN — TAZOBACTAM SODIUM AND PIPERACILLIN SODIUM 3.38 G: 375; 3 INJECTION, SOLUTION INTRAVENOUS at 13:42

## 2022-03-20 RX ADMIN — TAZOBACTAM SODIUM AND PIPERACILLIN SODIUM 3.38 G: 375; 3 INJECTION, SOLUTION INTRAVENOUS at 04:03

## 2022-03-20 RX ADMIN — SODIUM CHLORIDE: 9 INJECTION, SOLUTION INTRAVENOUS at 11:55

## 2022-03-20 RX ADMIN — PROPOFOL 170 MG: 10 INJECTION, EMULSION INTRAVENOUS at 11:57

## 2022-03-20 NOTE — PROGRESS NOTES
Name: Junito Sorto ADMIT: 3/17/2022   : 1940  PCP: Fan Schulz MD    MRN: [id number] LOS: 3 days   AGE/SEX: 81 y.o. male  ROOM: ENDO/ENDO     Subjective   Subjective   Patient seen this morning.  No new events overnight.  Remains on 2 L nasal cannula.  Patient states last time she was discharged he was discharged home on oxygen and was on 2 L nasal cannula as well.  Still coughing, but had some sputum production.  Sent for cultures.  No fevers, no chills.     Review of Systems   As above  Objective   Objective   Vital Signs  Temp:  [97.4 °F (36.3 °C)-98.2 °F (36.8 °C)] 98.2 °F (36.8 °C)  Heart Rate:  [64-82] 82  Resp:  [16-18] 16  BP: ()/(53-91) 113/71  SpO2:  [95 %-97 %] 96 %  on  Flow (L/min):  [2-8] 2;   Device (Oxygen Therapy): nasal cannula  Body mass index is 33.45 kg/m².  Physical Exam  General Alert and oriented x3, no acute distress.  Ill-appearing, obese  Eyes PERRL, EOMI, anicteric sclera  Lungs : CTA, no wheezing, on 2 L nasal cannula.  CV Irregular rate and rhythm, no murmurs rubs or gallops.  AbdomenSoft, nontender, nondistended.  Normoactive bowel sounds  Extremities 1+ bilateral lower extremity edema, cyanosis.      Results Review     I reviewed the patient's new clinical results.  Results from last 7 days   Lab Units 22  0613 22  0739 22  0643   WBC 10*3/mm3 5.98 6.56 9.38 18.25*   HEMOGLOBIN g/dL 12.8* 11.9* 11.7* 12.7*   PLATELETS 10*3/mm3 249 232 218 245     Results from last 7 days   Lab Units 22  0613 22  0739 22  0643   SODIUM mmol/L 140 140 139 138   POTASSIUM mmol/L 3.9 3.6 3.3* 3.7   CHLORIDE mmol/L 105 104 104 104   CO2 mmol/L 27.2 27.0 29.3* 26.0   BUN mg/dL 13 13 13 13   CREATININE mg/dL 0.78 0.69* 0.72* 0.53*   GLUCOSE mg/dL 91 88 88 121*   Estimated Creatinine Clearance: 85.1 mL/min (by C-G formula based on SCr of 0.78 mg/dL).  Results from last 7 days   Lab Units 22  1007  03/17/22  0355   ALBUMIN g/dL 3.30* 3.50   BILIRUBIN mg/dL 1.8* 1.4*   ALK PHOS U/L 76 88   AST (SGOT) U/L 17 21   ALT (SGPT) U/L 21 31     Results from last 7 days   Lab Units 03/20/22  1007 03/19/22  0613 03/18/22  0739 03/17/22  0643 03/17/22  0355   CALCIUM mg/dL 9.6 9.3 9.4 8.9 9.5   ALBUMIN g/dL 3.30*  --   --   --  3.50   MAGNESIUM mg/dL 2.0 2.0 2.0  --   --    PHOSPHORUS mg/dL 3.2 3.0 2.3*  --   --      Results from last 7 days   Lab Units 03/17/22  0355   PROCALCITONIN ng/mL 0.19   LACTATE mmol/L 1.8     COVID19   Date Value Ref Range Status   03/17/2022 Not Detected Not Detected - Ref. Range Final   03/03/2022 Not Detected Not Detected - Ref. Range Final     No results found for: HGBA1C, POCGLU    CT Chest Without Contrast Diagnostic  Narrative: CT CHEST WO CONTRAST DIAGNOSTIC-     CLINICAL HISTORY: Pneumonia. A fusion or abscess is suspected. Abnormal  chest x-ray.     TECHNIQUE: Spiral CT images were obtained through the chest without IV  contrast and were reconstructed in 3 mm thick slices.     Radiation dose reduction techniques were utilized, including automated  exposure control and exposure modulation based on body size.     COMPARISON: Chest x-ray dated 03/17/2022.     FINDINGS: Lung window images demonstrate mild patchy airspace  infiltrates within both lungs, more prominent on the right than the left  that are suspicious for pneumonia due to COVID 19 infection. Correlation  with clinical findings is recommended. No focal areas of dense  consolidation are identified. There are no discrete lung masses. There  are no pleural effusions. There multiple mildly enlarged mediastinal  lymph nodes that are likely benign reactive lymph nodes. These measure  up to 11 mm in short axis. The heart is mildly enlarged. Images through  the upper abdomen show no significant abnormality.     Impression: Minimal patchy bilateral groundglass infiltrate suspicious  for pneumonia due to COVID 19 infection. Mild  mediastinal  lymphadenopathy, likely reactive.     This report was finalized on 3/17/2022 4:43 PM by Dr. Francesco Hackett M.D.     XR Chest 1 View  Narrative: Patient: VICTOR MANUEL HOLLEY  Time Out: 05:56  Exam(s): FILM CXR 1 VIEW     EXAM:    XR Chest, 1 View    CLINICAL HISTORY:     Reason for exam: SOA.    TECHNIQUE:    Frontal view of the chest.    COMPARISON:  3 3 2022    FINDINGS:    Lungs: Mild bilateral perihilar reticular opacities.    Pleural space:  Unremarkable.  No pneumothorax.    Heart: Mild cardiomegaly.    Mediastinum:  Unremarkable.    Bones joints:  Unremarkable.    IMPRESSION:       Cardiomegaly with mild bilateral perihilar reticular opacities compatible   with interstitial pulmonary edema.  Impression: Electronically signed by Hernan Galarza M.D. on 03-17-22 at 0556    Scheduled Medications  aspirin, 81 mg, Oral, Daily  atorvastatin, 40 mg, Oral, Nightly  cetirizine, 5 mg, Oral, Daily  enoxaparin, 40 mg, Subcutaneous, Nightly  fluticasone, 2 spray, Nasal, Daily  furosemide, 40 mg, Oral, Daily  guaiFENesin, 600 mg, Oral, Q12H  pantoprazole, 40 mg, Oral, QAM  piperacillin-tazobactam, 3.375 g, Intravenous, Q8H  propranolol LA, 80 mg, Oral, Daily  Sodium Chloride (PF), 10 mL, Intravenous, Q12H  sodium chloride, 10 mL, Intravenous, Q12H    Infusions   Diet  NPO Diet       Assessment/Plan     Active Hospital Problems    Diagnosis  POA   • **Pneumonia due to infectious organism [J18.9]  Yes   • Acute respiratory failure with hypoxia (HCC) [J96.01]  Yes   • Orthopnea [R06.01]  Yes   • GERD (gastroesophageal reflux disease) [K21.9]  Yes   • Esophageal dysphagia [R13.19]  Yes   • Benign essential hypertension [I10]  Yes   • Paroxysmal atrial fibrillation (HCC) [I48.0]  Yes   • Obesity [E66.9]  Yes      Resolved Hospital Problems   No resolved problems to display.         #Bilateral lower lobe pneumonia/hypoxemia   -Patient was recently admitted for pneumonia and discharged home on Omnicef.   -Oxygen  saturation dropped to 89% on room air   -Cr chest showed Cardiomegaly with mild bilateral perihilar reticular opacities compatible    with interstitial pulmonary edema.   -White blood cell count was 18.25, procalcitonin was normal.  T-max was 102.3 on admission   -Concern for recurrent aspiration causing pneumonia.    SLP-During previous admission, clinical swallow eval completed 3/3/22 recommending regular and thins. Esophagram completed 3/3/22 reporting no penetration or aspiration.   -CT chest 03/18-Minimal patchy bilateral groundglass infiltrate suspicious for pneumonia due to COVID 19 infection. Mild mediastinal lymphadenopathy, likely reactive.  -On Zosyn 03/17-ID following, appreciate recs.  -Respiratory viral count negative   -MRSA nares negative  -blood culture negative to date  -Pulmonology consulted secondary to history of recurrent pneumonia, COPD.  -Home Symbicort discontinued 03/18 per pulmonology as steroid could increase the risk of pneumonia. will need a PFT as outpatient with pre and postbronchodilator testing to evaluate the possibility of asthma and if no evidence of asthma then should permanently discontinue inhaled steroid     #Esophageal dysphagia:  Esophagram on previous admission showed significant tertiary of information with intraesophageal reflux and stasis.  Regular thin diet ordered  GI consulted due to this dysphagia history of esophageal stricture,, plan for EGD 03/20      #Diastolic heart failure/orthopnea:    proBNP was 825   -Echocardiogram 03/09-Left ventricular ejection fraction appears to be 46 - 50%. Left ventricular systolic function is low normal.   -Chest x-ray concerning for pulmonary edema   -DC IV fluids, start on p.o. Lasix daily 40 mg daily.       #Asthma without exacerbation. Continue home inhalers:  Pulmonology consult.  Home Symbicort discontinued as it can increase recurrent pneumonia.  Will need outpatient PFT to evaluate lung function if negative Symbicort may  need to be discontinued permanently.  #Paroxysmal atrial fibrillation:   EKG shows atrial fib, no ischemic changes.  Continue beta-blocker with hold parameters.  Not on anticoagulation at home, patient states he was placed on Eliquis years ago however stopped taking it due to bruising..  On Lovenox for DVT prophylaxis.       #GERD:  Continue PPI   #Hyperlipidemia: Continue atorvastatin   #Hypophosphatemia/hypokalemia.  Replete as needed.    CODE STATUS: Full code  DVT prophylaxis: Lovenox  Disposition: To be determined.  Likely home tomorrow with home oxygen.    Campbell Harris MD  [facility]  03/20/22  13:04 EDT

## 2022-03-20 NOTE — PROGRESS NOTES
ID note for pneumonia  Subjective: Tolerating abx without side effects. Mainly dry cough but did have some sputum production this am and sent this to lab.  He is not having fevers or chills or night sweats.  Still on 2 L  Objective: Afebrile, no acute distress, lungs are clear to auscultation normally with normal pulmonary effort    White count 5.98, hemoglobin 12.8, platelets 242  Microbiology reviewed and all negative to date    A/p  1.  Bilateral lower lobe pneumonia  2.  Acute hypoxic respiratory failure  3.  Esophageal dysphagia    EGD planned for today  Doing well from an infectious perspective with normal white count and minimal oxygen requirement.  He remains on Zosyn with an anticipated stop date of 3/23/2022 to complete 7-day course.  Can transition to oral antibiotics such as Augmentin at discharged prior to that date

## 2022-03-20 NOTE — PROGRESS NOTES
"                                              LOS: 3 days   Patient Care Team:  Fan Schulz MD as PCP - General  Steven Cadena MD as Consulting Physician (Gastroenterology)    Chief Complaint:  F/up pneumonia    Subjective   Interval History  Seems to be coughing, mostly dry.  Coughed few times when I was in the room.  SPO2 is % on 2 L oxygen.  He goes up to 100% when he takes a deep breath when I was listening to him.    Reviewed the EGD note.  Dilation was performed and biopsies obtained but appearance of the esophagus was normal    REVIEW OF SYSTEMS:   .     GASTROINTESTINAL: No anorexia, nausea, vomiting or diarrhea. No abdominal pain.  CONSTITUTIONAL: No fever or chills.     Ventilator/Non-Invasive Ventilation Settings (From admission, onward)            None                Physical Exam:     Vital Signs  Temp:  [97.4 °F (36.3 °C)-98.2 °F (36.8 °C)] 98.2 °F (36.8 °C)  Heart Rate:  [64-82] 73  Resp:  [16-18] 17  BP: ()/(53-91) 121/86    Intake/Output Summary (Last 24 hours) at 3/20/2022 1438  Last data filed at 3/20/2022 1342  Gross per 24 hour   Intake 950 ml   Output --   Net 950 ml     Flowsheet Rows    Flowsheet Row First Filed Value   Admission Height 170.2 cm (67\") Documented at 03/17/2022 0335   Admission Weight 102 kg (225 lb 12.8 oz) Documented at 03/17/2022 0548          PPE used per hospital policy    General Appearance:   Alert, cooperative, in no acute distress   ENMT:  Mallampati score 3, moist mucous membrane   Eyes:  Pupils equal and reactive to light. EOMI   Neck:   Large. Trachea midline. No thyromegaly.   Lungs:    Mild crackles in the right lower lobe but otherwise clear though diminished overall.  No wheezing.  No use of accessory muscles    Heart:   Regular rhythm and normal rate, normal S1 and S2, no         murmur   Skin:   No rash or ecchymosis   Abdomen:    Obese. Soft. No tenderness. No HSM.   Neuro/psych:  Conscious, alert, oriented x3. Strength 5/5 in upper " and lower  ext.  Appropriate mood and affect   Extremities:  No cyanosis, clubbing or edema.  Warm extremities and well-perfused          Results Review:        Results from last 7 days   Lab Units 03/20/22  1007 03/19/22  0613 03/18/22  0739   SODIUM mmol/L 140 140 139   POTASSIUM mmol/L 3.9 3.6 3.3*   CHLORIDE mmol/L 105 104 104   CO2 mmol/L 27.2 27.0 29.3*   BUN mg/dL 13 13 13   CREATININE mg/dL 0.78 0.69* 0.72*   GLUCOSE mg/dL 91 88 88   CALCIUM mg/dL 9.6 9.3 9.4     Results from last 7 days   Lab Units 03/17/22  0355   TROPONIN T ng/mL <0.010     Results from last 7 days   Lab Units 03/20/22  1007 03/19/22  0613 03/18/22  0739   WBC 10*3/mm3 5.98 6.56 9.38   HEMOGLOBIN g/dL 12.8* 11.9* 11.7*   HEMATOCRIT % 38.2 36.1* 35.9*   PLATELETS 10*3/mm3 249 232 218     Results from last 7 days   Lab Units 03/20/22  1007   INR  1.19*     Results from last 7 days   Lab Units 03/17/22  0643   PROBNP pg/mL 825.0       Results from last 7 days   Lab Units 03/17/22  1513   D DIMER QUANT MCGFEU/mL 0.65*                     I reviewed the patient's new clinical results.        Medication Review:   aspirin, 81 mg, Oral, Daily  atorvastatin, 40 mg, Oral, Nightly  cetirizine, 5 mg, Oral, Daily  enoxaparin, 40 mg, Subcutaneous, Nightly  fluticasone, 2 spray, Nasal, Daily  furosemide, 40 mg, Oral, Daily  guaiFENesin, 600 mg, Oral, Q12H  pantoprazole, 40 mg, Oral, QAM  piperacillin-tazobactam, 3.375 g, Intravenous, Q8H  propranolol LA, 80 mg, Oral, Daily  sodium chloride, 10 mL, Intravenous, Q12H            Assessment   1. Bilateral groundglass infiltrates, subtle: Unclear etiology.  Not typical for volume overload neither for bacterial pneumonia.  Atypical pneumonia, aspiration pneumonitis and may be ILD are on the differential.   2. Borderline enlarged mediastinal adenopathies, likely reactive  3. Dysphagia s/p esophageal dilation 3/20  4. Recurrent bronchitis/asthma ?  5. History of GERD/Reflux esophagitis, grade  C  6. Anemia      Plan     · Zosyn for pneumonia per ID.  · I-S  · Head of bed elevation to prevent aspiration of acid reflux.  · DVT prophylaxis with Lovenox  · Continue off Symbicort as inhaled steroid could be associated with recurrent pneumonia  · Walking oximetry tomorrow    Okay to discharge home from pulmonary perspective.  He has a follow-up in the office in April and already has oxygen but will perform a walking oximetry to evaluate his needs again.  I did recommend to the patient and the family to perform routine maintenance and cleaning of their ventilation system in case there is any material that could cause pneumonitis. Otherwise, if he has recurrence of respiratory issues then he may need more extensive pulmonary evaluation with imaging and probably bronchoscopy      Essence Argueta MD  03/20/22  14:38 EDT          This note was dictated utilizing Dragon dictation

## 2022-03-20 NOTE — PLAN OF CARE
Goal Outcome Evaluation:  Plan of Care Reviewed With: patient        Progress: improving   VSS. Pt up ad usha/stand-by assist. No c/o pain. IV zosyn. Lovenox held for EGD in AM. Consent and education signed and in chart. Copy given to pt. Pt NPO since MN. No other issues. Will CTM.

## 2022-03-20 NOTE — ANESTHESIA PREPROCEDURE EVALUATION
Anesthesia Evaluation     NPO Solid Status: > 8 hours             Airway   Mallampati: II  TM distance: >3 FB  Neck ROM: full  no difficulty expected  Dental - normal exam     Pulmonary - normal exam   (+) pneumonia , shortness of breath,   Cardiovascular - normal exam    (+) hypertension, CAD, dysrhythmias Atrial Fib, orthopnea, hyperlipidemia,       Neuro/Psych  (+) numbness,    GI/Hepatic/Renal/Endo    (+) obesity, morbid obesity, hiatal hernia, GERD,      Musculoskeletal     Abdominal  - normal exam   Substance History      OB/GYN          Other   arthritis,    history of cancer                    Anesthesia Plan    ASA 3     MAC     intravenous induction     Anesthetic plan, all risks, benefits, and alternatives have been provided, discussed and informed consent has been obtained with: patient.        CODE STATUS:    Code Status (Patient has no pulse and is not breathing): CPR (Attempt to Resuscitate)  Medical Interventions (Patient has pulse or is breathing): Full Support

## 2022-03-20 NOTE — ANESTHESIA POSTPROCEDURE EVALUATION
"Patient: Junito Sorto    Procedure Summary     Date: 03/20/22 Room / Location:  [facility] /  [facility]    Anesthesia Start: 1152 Anesthesia Stop: 1207    Procedure: ESOPHAGOGASTRODUODENOSCOPY WITH BIOPSIES AND NAILS DILATION #56 (N/A Esophagus) Diagnosis:       Esophageal dysphagia      (Esophageal dysphagia [R13.19])    Surgeons: Tony Ramos MD Provider: Andrew Allen MD    Anesthesia Type: MAC ASA Status: 3          Anesthesia Type: MAC    Vitals  Vitals Value Taken Time   /71 03/20/22 1228   Temp     Pulse 82 03/20/22 1228   Resp 16 03/20/22 1228   SpO2 96 % 03/20/22 1228           Post Anesthesia Care and Evaluation    Patient location during evaluation: bedside  Patient participation: complete - patient participated  Level of consciousness: awake and alert  Pain management: adequate  Airway patency: patent  Anesthetic complications: No anesthetic complications    Cardiovascular status: acceptable  Respiratory status: acceptable  Hydration status: acceptable    Comments: /71 (BP Location: Left arm, Patient Position: Sitting)   Pulse 82   Temp 36.8 °C (98.2 °F) (Oral)   Resp 16   Ht 172.7 cm (68\")   Wt 99.8 kg (220 lb)   SpO2 96%   BMI 33.45 kg/m²       "

## 2022-03-21 ENCOUNTER — READMISSION MANAGEMENT (OUTPATIENT)
Dept: CALL CENTER | Facility: HOSPITAL | Age: 82
End: 2022-03-21

## 2022-03-21 VITALS
TEMPERATURE: 98.6 F | BODY MASS INDEX: 33.34 KG/M2 | SYSTOLIC BLOOD PRESSURE: 121 MMHG | OXYGEN SATURATION: 95 % | RESPIRATION RATE: 18 BRPM | DIASTOLIC BLOOD PRESSURE: 85 MMHG | WEIGHT: 220 LBS | HEART RATE: 76 BPM | HEIGHT: 68 IN

## 2022-03-21 LAB
ANION GAP SERPL CALCULATED.3IONS-SCNC: 7.2 MMOL/L (ref 5–15)
BUN SERPL-MCNC: 11 MG/DL (ref 8–23)
BUN/CREAT SERPL: 16.7 (ref 7–25)
CALCIUM SPEC-SCNC: 9.7 MG/DL (ref 8.6–10.5)
CHLORIDE SERPL-SCNC: 104 MMOL/L (ref 98–107)
CO2 SERPL-SCNC: 27.8 MMOL/L (ref 22–29)
CREAT SERPL-MCNC: 0.66 MG/DL (ref 0.76–1.27)
DEPRECATED RDW RBC AUTO: 40.7 FL (ref 37–54)
EGFRCR SERPLBLD CKD-EPI 2021: 94.2 ML/MIN/1.73
ERYTHROCYTE [DISTWIDTH] IN BLOOD BY AUTOMATED COUNT: 12.7 % (ref 12.3–15.4)
GLUCOSE SERPL-MCNC: 98 MG/DL (ref 65–99)
HCT VFR BLD AUTO: 38.2 % (ref 37.5–51)
HGB BLD-MCNC: 12.8 G/DL (ref 13–17.7)
MAGNESIUM SERPL-MCNC: 2 MG/DL (ref 1.6–2.4)
MCH RBC QN AUTO: 29.6 PG (ref 26.6–33)
MCHC RBC AUTO-ENTMCNC: 33.5 G/DL (ref 31.5–35.7)
MCV RBC AUTO: 88.4 FL (ref 79–97)
PHOSPHATE SERPL-MCNC: 3 MG/DL (ref 2.5–4.5)
PLATELET # BLD AUTO: 237 10*3/MM3 (ref 140–450)
PMV BLD AUTO: 10.6 FL (ref 6–12)
POTASSIUM SERPL-SCNC: 3.6 MMOL/L (ref 3.5–5.2)
RBC # BLD AUTO: 4.32 10*6/MM3 (ref 4.14–5.8)
SODIUM SERPL-SCNC: 139 MMOL/L (ref 136–145)
WBC NRBC COR # BLD: 6.35 10*3/MM3 (ref 3.4–10.8)

## 2022-03-21 PROCEDURE — 85027 COMPLETE CBC AUTOMATED: CPT | Performed by: INTERNAL MEDICINE

## 2022-03-21 PROCEDURE — 25010000002 PIPERACILLIN SOD-TAZOBACTAM PER 1 G: Performed by: INTERNAL MEDICINE

## 2022-03-21 PROCEDURE — 94618 PULMONARY STRESS TESTING: CPT

## 2022-03-21 PROCEDURE — 99231 SBSQ HOSP IP/OBS SF/LOW 25: CPT | Performed by: PHYSICIAN ASSISTANT

## 2022-03-21 PROCEDURE — 80048 BASIC METABOLIC PNL TOTAL CA: CPT | Performed by: INTERNAL MEDICINE

## 2022-03-21 PROCEDURE — 99232 SBSQ HOSP IP/OBS MODERATE 35: CPT | Performed by: INTERNAL MEDICINE

## 2022-03-21 PROCEDURE — 84100 ASSAY OF PHOSPHORUS: CPT | Performed by: INTERNAL MEDICINE

## 2022-03-21 PROCEDURE — 83735 ASSAY OF MAGNESIUM: CPT | Performed by: INTERNAL MEDICINE

## 2022-03-21 RX ORDER — AMOXICILLIN AND CLAVULANATE POTASSIUM 875; 125 MG/1; MG/1
1 TABLET, FILM COATED ORAL 2 TIMES DAILY
Qty: 5 TABLET | Refills: 0 | Status: SHIPPED | OUTPATIENT
Start: 2022-03-21 | End: 2022-03-24

## 2022-03-21 RX ORDER — FUROSEMIDE 20 MG/1
20 TABLET ORAL DAILY
Qty: 30 TABLET | Refills: 0 | Status: SHIPPED | OUTPATIENT
Start: 2022-03-22 | End: 2022-03-28 | Stop reason: SDUPTHER

## 2022-03-21 RX ADMIN — PANTOPRAZOLE SODIUM 40 MG: 40 TABLET, DELAYED RELEASE ORAL at 06:21

## 2022-03-21 RX ADMIN — PROPRANOLOL HYDROCHLORIDE 80 MG: 80 CAPSULE, EXTENDED RELEASE ORAL at 08:35

## 2022-03-21 RX ADMIN — Medication 10 ML: at 08:36

## 2022-03-21 RX ADMIN — CETIRIZINE HYDROCHLORIDE 5 MG: 10 TABLET ORAL at 08:35

## 2022-03-21 RX ADMIN — TAZOBACTAM SODIUM AND PIPERACILLIN SODIUM 3.38 G: 375; 3 INJECTION, SOLUTION INTRAVENOUS at 04:18

## 2022-03-21 RX ADMIN — TAZOBACTAM SODIUM AND PIPERACILLIN SODIUM 3.38 G: 375; 3 INJECTION, SOLUTION INTRAVENOUS at 12:52

## 2022-03-21 RX ADMIN — GUAIFENESIN 600 MG: 600 TABLET, EXTENDED RELEASE ORAL at 08:35

## 2022-03-21 RX ADMIN — FLUTICASONE PROPIONATE 2 SPRAY: 50 SPRAY, METERED NASAL at 08:36

## 2022-03-21 RX ADMIN — ASPIRIN 81 MG: 81 TABLET, CHEWABLE ORAL at 08:35

## 2022-03-21 NOTE — SIGNIFICANT NOTE
03/21/22 1113   OTHER   Discipline physical therapist   Rehab Time/Intention   Session Not Performed other (see comments)  (Pt IND up in room, about to perform 6 min walk with RT. No further PT needs. PT signing off.)

## 2022-03-21 NOTE — DISCHARGE INSTRUCTIONS
Notify your primary care provider if you experience chest pain, difficulty breathing, fevers/chills, nausea or vomiting, bleeding in stool, excessive diarrhea, numbness or weakness or tingling in any part of your body.

## 2022-03-21 NOTE — PROGRESS NOTES
Exercise Oximetry    Patient Name:Junito Sorto   MRN: [id number]   Date: 03/21/22             ROOM AIR BASELINE   SpO2%    Heart Rate    Blood Pressure      EXERCISE ON ROOM AIR SpO2% EXERCISE ON O2 @  LPM SpO2%   1 MINUTE 95% 1 MINUTE    2 MINUTES 93% 2 MINUTES    3 MINUTES 93% 3 MINUTES    4 MINUTES 93% 4 MINUTES    5 MINUTES 93% 5 MINUTES    6 MINUTES 93% 6 MINUTES               Distance Walked   Distance Walked   Dyspnea (Eduard Scale)   Dyspnea (Eduard Scale)   Fatigue (Eduard Scale)   Fatigue (Eduard Scale)   SpO2% Post Exercise   SpO2% Post Exercise   HR Post Exercise  HR Post Exercise   Time to Recovery   Time to Recovery     Comments:

## 2022-03-21 NOTE — OUTREACH NOTE
Prep Survey    Flowsheet Row Responses   [facility] facility patient discharged from? [place]   Is LACE score < 7 ? No   Emergency Room discharge w/ pulse ox? No   Eligibility [facility]   Date of Admission 03/17/22   Date of Discharge 03/21/22   Discharge Disposition Home or Self Care   Discharge diagnosis PNA   Does the patient have one of the following disease processes/diagnoses(primary or secondary)? COPD/Pneumonia   Does the patient have Home health ordered? No   Is there a DME ordered? No   Prep survey completed? Yes          SHAVONNE A - Registered Nurse

## 2022-03-21 NOTE — PROGRESS NOTES
LOS: 4 days     Chief Complaint:  Follow-up pneumonia    Interval History:  He is feeling much better compared to admission. Still w/ a dry cough. Fever resolved. WBC is normal. SpCx NGTD. Eager for discharge. He will be going home on supplemental O2.     ROS: no n/v/d    Vital Signs  Temp:  [98.7 °F (37.1 °C)] 98.7 °F (37.1 °C)  Heart Rate:  [64-82] 75  Resp:  [16-18] 18  BP: ()/(53-95) 128/88    Physical Exam:  General: awake, alert, very nice, sitting up in chair  Eyes:  no scleral icterus  ENT: NC in nares  Cardiovascular: NR  Respiratory: Lungs w/ improved BLL rales; no wheezing; on 2L NC  GI: Abdomen is soft  :  no Venegas catheter  Musculoskeletal: normal musculature  Skin: No rashes  Neurological: Alert and oriented x 3  Psychiatric: Normal mood and affect     Antibiotics:  •  piperacillin-tazobactam (ZOSYN) 3.375 g IV q8h    LABS:  CBC, BMP, micro reviewed today  Lab Results   Component Value Date    WBC 6.35 03/21/2022    HGB 12.8 (L) 03/21/2022    HCT 38.2 03/21/2022    MCV 88.4 03/21/2022     03/21/2022     Lab Results   Component Value Date    GLUCOSE 98 03/21/2022    BUN 11 03/21/2022    CREATININE 0.66 (L) 03/21/2022    EGFRIFNONA 98 10/11/2021    EGFRIFAFRI 119 10/11/2021    BCR 16.7 03/21/2022    CO2 27.8 03/21/2022    CALCIUM 9.7 03/21/2022    PROTENTOTREF 6.3 10/11/2021    ALBUMIN 3.30 (L) 03/20/2022    LABIL2 2.2 10/11/2021    AST 17 03/20/2022    ALT 21 03/20/2022      Microbiology:  3/3 RPP: negative  3/3 BCx: negative  3/3 Strep and Legionella Ag: negative  3/4 SpCx: negative  3/6 SpCx: negative  3/6 MRSA nares: negative  3/17 RPP: negative  3/17 BCx: negative  3/17 MRSA nares: negative  3/20 SpCx: NGTD    Radiology (prior):  CT chest with minimal patchy BLL infiltrates and mild mediastinal adenopathy    Assessment/Plan   1. Bilateral lower lobe pneumonia - fairly minimal on CT  2. Fever in adult - resolved  3. Leukocytosis - resolved  4. Shortness of breath and acute hypoxic  respiratory failure - better  5. Chest pain  6. Esophageal dysphagia  7. Paroxysmal atrial fibrillation     He has improved with Zosyn 3.375 g q8h. He is now afebrile with a normal WBC. CT chest did show some BLL changes but all things considered pretty minimal. I recommend that we treat him with Zosyn through today to complete a 5-day course given his excellent response. He has a 12:30 PM dose that is due and can be run over 30 minutes and following that could be discharged from my ID standpoint. D/W RN and patient. Thanks to pulmonary and GI for their evaluations.     Thank you for allowing me to be involved in the care of this patient. Infectious diseases will sign off at this time with antibiotics plan in place, but please call me at [phone] if any further ID questions or new ID concerns.

## 2022-03-21 NOTE — CASE MANAGEMENT/SOCIAL WORK
Continued Stay Note  [facility]     Patient Name: Junito Sorto  MRN: [id number]  Today's Date: 3/21/2022    Admit Date: 3/17/2022     Discharge Plan     Row Name 03/21/22 1341       Plan    Plan Return home with family    Patient/Family in Agreement with Plan yes    Plan Comments Spoke with patient at bedside.  Plan remains for him to return home at [place].  He does not feel HH will be needed.  Family will transport home.  BHumeniuk RN                Expected Discharge Date and Time     Expected Discharge Date Expected Discharge Time    Mar 21, 2022             Becky S. Humeniuk, RN

## 2022-03-21 NOTE — PLAN OF CARE
Goal Outcome Evaluation:  Plan of Care Reviewed With: patient        Progress: improving  Outcome Evaluation: VSS, no complaints of pain this shift.  Pt up in chair most of the shift.  Continue oxygen 1-2L.  Plan is for patient to be discharged after Zosyn is complete.

## 2022-03-21 NOTE — PLAN OF CARE
Goal Outcome Evaluation:  Plan of Care Reviewed With: patient        Progress: improving  Outcome Evaluation: Patient had no c/o pain through the night. Still having a frequent cough that has not been very productive. Up independently in room. IV Zosyn as scheduled. O2 between 1-2 L. Daily weight. VSS. Plan is to hopefully be discharged home today with home O2. Will continue to monitor.

## 2022-03-21 NOTE — PROGRESS NOTES
[facility]  Inpatient Progress Note    Reason for Follow-up: Dysphagia    Subjective     Interval History:   Reports improvement in trouble swallowing.  Still with cough which he reports was part of the reason for the upper endoscopy-to evaluate for possible GERD causing chronic cough.  He denies abdominal pain, nausea, vomiting.  Stools are doing okay although he does have intermittent constipation at home for which he takes over-the-counter medications as needed.    EGD on 3/20/2022 showed normal throughout.  Empirically dilated esophagus and biopsies for EOE. --- Pending pathology    Current Facility-Administered Medications:   •  acetaminophen (TYLENOL) tablet 650 mg, 650 mg, Oral, Q4H PRN **OR** acetaminophen (TYLENOL) 160 MG/5ML solution 650 mg, 650 mg, Oral, Q4H PRN **OR** acetaminophen (TYLENOL) suppository 650 mg, 650 mg, Rectal, Q4H PRN, Tony Ramos MD  •  albuterol (PROVENTIL) nebulizer solution 0.083% 2.5 mg/3mL, 2.5 mg, Nebulization, Q6H PRN, Tony Ramos MD  •  aspirin chewable tablet 81 mg, 81 mg, Oral, Daily, Tony Ramos MD, 81 mg at 03/21/22 0835  •  atorvastatin (LIPITOR) tablet 40 mg, 40 mg, Oral, Nightly, Tony Ramos MD, 40 mg at 03/20/22 2028  •  calcium carbonate (TUMS) chewable tablet 500 mg (200 mg elemental), 2 tablet, Oral, BID PRN, Tony Ramos MD  •  cetirizine (zyrTEC) tablet 5 mg, 5 mg, Oral, Daily, Tony Ramos MD, 5 mg at 03/21/22 0835  •  enoxaparin (LOVENOX) syringe 40 mg, 40 mg, Subcutaneous, Nightly, Tony Ramos MD, 40 mg at 03/20/22 2028  •  fluticasone (FLONASE) 50 MCG/ACT nasal spray 2 spray, 2 spray, Nasal, Daily, Tony Ramos MD, 2 spray at 03/21/22 0836  •  furosemide (LASIX) tablet 40 mg, 40 mg, Oral, Daily, Tony Ramos MD, 40 mg at 03/17/22 1450  •  guaiFENesin (MUCINEX) 12 hr tablet 600 mg, 600 mg, Oral, Q12H, Tony Ramos MD, 600 mg at 03/21/22 0896  •  nitroglycerin (NITROSTAT) SL tablet 0.4 mg, 0.4  mg, Sublingual, Q5 Min PRN, Tony Ramos MD  •  ondansetron (ZOFRAN) tablet 4 mg, 4 mg, Oral, Q6H PRN **OR** ondansetron (ZOFRAN) injection 4 mg, 4 mg, Intravenous, Q6H PRN, Tony Ramos MD  •  pantoprazole (PROTONIX) EC tablet 40 mg, 40 mg, Oral, QAM, Tony Ramos MD, 40 mg at 03/21/22 0621  •  piperacillin-tazobactam (ZOSYN) 3.375 g in iso-osmotic dextrose 50 ml (premix), 3.375 g, Intravenous, Q8H, Tony Ramos MD, 3.375 g at 03/21/22 0418  •  propranolol LA (INDERAL LA) 24 hr capsule 80 mg, 80 mg, Oral, Daily, Tony Ramos MD, 80 mg at 03/21/22 0835  •  sodium chloride 0.9 % flush 10 mL, 10 mL, Intravenous, Q12H, Tony Ramos MD, 10 mL at 03/21/22 0836  •  sodium chloride 0.9 % flush 10 mL, 10 mL, Intravenous, PRN, Tony Ramos MD  Review of Systems:    The following systems were reviewed and negative;  gastrointestinal    Objective     Vital Signs  Temp:  [98.7 °F (37.1 °C)] 98.7 °F (37.1 °C)  Heart Rate:  [64-82] 75  Resp:  [16-18] 18  BP: ()/(53-95) 128/88  Body mass index is 33.45 kg/m².    Intake/Output Summary (Last 24 hours) at 3/21/2022 0850  Last data filed at 3/21/2022 0716  Gross per 24 hour   Intake 1130 ml   Output 150 ml   Net 980 ml     I/O this shift:  In: -   Out: 150 [Urine:150]     Physical Exam:    General: patient awake, alert and cooperative   Eyes: Normal lids and lashes, no scleral icterus   Neck: supple, normal ROM   Skin: warm and dry, not jaundiced   Pulm: regular and unlabored, cough present   Abdomen: soft, nontender, nondistended;   Psychiatric: Normal mood and behavior; memory intact     Results Review:     I reviewed the patient's new clinical results.    Results from last 7 days   Lab Units 03/21/22 0545 03/20/22  1007 03/19/22  0613   WBC 10*3/mm3 6.35 5.98 6.56   HEMOGLOBIN g/dL 12.8* 12.8* 11.9*   HEMATOCRIT % 38.2 38.2 36.1*   PLATELETS 10*3/mm3 237 249 232     Results from last 7 days   Lab Units 03/21/22 0545 03/20/22 1007 03/19/22  0613  03/17/22  0643 03/17/22  0355   SODIUM mmol/L 139 140 140   < > 139   POTASSIUM mmol/L 3.6 3.9 3.6   < > 3.5   CHLORIDE mmol/L 104 105 104   < > 102   CO2 mmol/L 27.8 27.2 27.0   < > 26.1   BUN mg/dL 11 13 13   < > 15   CREATININE mg/dL 0.66* 0.78 0.69*   < > 0.67*   CALCIUM mg/dL 9.7 9.6 9.3   < > 9.5   BILIRUBIN mg/dL  --  1.8*  --   --  1.4*   ALK PHOS U/L  --  76  --   --  88   ALT (SGPT) U/L  --  21  --   --  31   AST (SGOT) U/L  --  17  --   --  21   GLUCOSE mg/dL 98 91 88   < > 143*    < > = values in this interval not displayed.     Results from last 7 days   Lab Units 03/20/22  1007   INR  1.19*     No results found for: LIPASE    Radiology:  CT Chest Without Contrast Diagnostic   Final Result   Minimal patchy bilateral groundglass infiltrate suspicious   for pneumonia due to COVID 19 infection. Mild mediastinal   lymphadenopathy, likely reactive.       This report was finalized on 3/17/2022 4:43 PM by Dr. Francesco Hackett M.D.          XR Chest 1 View   Final Result         Electronically signed by Hernan Galarza M.D. on 03-17-22 at 0556          Assessment/Plan     Patient Active Problem List   Diagnosis   • Hyperglycemia   • Obesity   • Allergic rhinitis   • Paroxysmal atrial fibrillation (HCC)   • Benign essential hypertension   • HLD (hyperlipidemia)   • Esophageal dysphagia   • Hiatal hernia   • GERD (gastroesophageal reflux disease)   • Prostate cancer (HCC)   • Bladder outlet obstruction   • Sciatica of right side   • Pneumonia of right lower lobe due to infectious organism   • Hypoxia   • Pneumonia due to infectious organism   • Acute respiratory failure with hypoxia (HCC)   • Orthopnea       Assessment:  1. Dysphagia, improved with empiric dilation  2. GERD  3. Bilateral lower lobe pneumonia  4. Hypertension  5. Paroxysmal A. fib not requiring anticoagulation  6. Hyperlipidemia  7. COPD      Plan:  · Continue 40 mg Protonix daily  · Likely chronic cough not related to GERD however could discuss  proceeding with pH probe as outpatient with Dr. Cadena if other sources of cough cannot be identified.  He currently has pneumonia.  · Pending pathology from EGD.  · Diet per SLP recommendations  · Currently on Lovenox for DVT prophylaxis  · Antibiotic regimen per infectious disease.  · GI will sign off with follow-up in office with Dr. Cadena.  We will follow peripherally for pathology results.    I discussed the patients findings and my recommendations with patient.    Dragon dictation used throughout this note.            Mely Retana PA-C  [facility]  [address] [place] [address]  Office: [phone]

## 2022-03-21 NOTE — PROGRESS NOTES
"                                              LOS: 4 days   Patient Care Team:  Fan Schulz MD as PCP - General  Steven Cadena MD as Consulting Physician (Gastroenterology)    Chief Complaint:  F/up pneumonia    Subjective   Interval History  Reported mild dry cough worse when he takes a deep breath.  Feels better overall.  No dyspnea at rest    REVIEW OF SYSTEMS:   .     GASTROINTESTINAL: No anorexia, nausea, vomiting or diarrhea. No abdominal pain.  CONSTITUTIONAL: No fever or chills.     Ventilator/Non-Invasive Ventilation Settings (From admission, onward)            None                Physical Exam:     Vital Signs  Temp:  [98.6 °F (37 °C)-98.7 °F (37.1 °C)] 98.6 °F (37 °C)  Heart Rate:  [73-80] 76  Resp:  [17-18] 18  BP: (121-135)/(85-95) 121/85    Intake/Output Summary (Last 24 hours) at 3/21/2022 1557  Last data filed at 3/21/2022 1128  Gross per 24 hour   Intake 520 ml   Output 200 ml   Net 320 ml     Flowsheet Rows    Flowsheet Row First Filed Value   Admission Height 170.2 cm (67\") Documented at 03/17/2022 0335   Admission Weight 102 kg (225 lb 12.8 oz) Documented at 03/17/2022 0548          PPE used per hospital policy    General Appearance:   Alert, cooperative, in no acute distress   ENMT:  Mallampati score 3, moist mucous membrane   Eyes:  Pupils equal and reactive to light. EOMI   Neck:   Large. Trachea midline. No thyromegaly.   Lungs:    Slightly coarse breath sounds at the bases but otherwise clear.  No wheezing.  Nonlabored breathing    Heart:   Regular rhythm and normal rate, normal S1 and S2, no         murmur   Skin:   No rash or ecchymosis   Abdomen:    Obese. Soft. No tenderness. No HSM.   Neuro/psych:  Conscious, alert, oriented x3. Strength 5/5 in upper and lower  ext.  Appropriate mood and affect   Extremities:  No cyanosis, clubbing or edema.  Warm extremities and well-perfused          Results Review:        Results from last 7 days   Lab Units 03/21/22  0545 03/20/22  1007 " 03/19/22  0613   SODIUM mmol/L 139 140 140   POTASSIUM mmol/L 3.6 3.9 3.6   CHLORIDE mmol/L 104 105 104   CO2 mmol/L 27.8 27.2 27.0   BUN mg/dL 11 13 13   CREATININE mg/dL 0.66* 0.78 0.69*   GLUCOSE mg/dL 98 91 88   CALCIUM mg/dL 9.7 9.6 9.3     Results from last 7 days   Lab Units 03/17/22  0355   TROPONIN T ng/mL <0.010     Results from last 7 days   Lab Units 03/21/22  0545 03/20/22  1007 03/19/22  0613   WBC 10*3/mm3 6.35 5.98 6.56   HEMOGLOBIN g/dL 12.8* 12.8* 11.9*   HEMATOCRIT % 38.2 38.2 36.1*   PLATELETS 10*3/mm3 237 249 232     Results from last 7 days   Lab Units 03/20/22  1007   INR  1.19*     Results from last 7 days   Lab Units 03/17/22  0643   PROBNP pg/mL 825.0       Results from last 7 days   Lab Units 03/17/22  1513   D DIMER QUANT MCGFEU/mL 0.65*                     I reviewed the patient's new clinical results.        Medication Review:   aspirin, 81 mg, Oral, Daily  atorvastatin, 40 mg, Oral, Nightly  cetirizine, 5 mg, Oral, Daily  enoxaparin, 40 mg, Subcutaneous, Nightly  fluticasone, 2 spray, Nasal, Daily  furosemide, 40 mg, Oral, Daily  guaiFENesin, 600 mg, Oral, Q12H  pantoprazole, 40 mg, Oral, QAM  piperacillin-tazobactam, 3.375 g, Intravenous, Q8H  propranolol LA, 80 mg, Oral, Daily  sodium chloride, 10 mL, Intravenous, Q12H            Assessment   1. Bilateral groundglass infiltrates, subtle: Unclear etiology.  Not typical for volume overload neither for bacterial pneumonia.  Atypical pneumonia, aspiration pneumonitis and may be ILD are on the differential.   2. Borderline enlarged mediastinal adenopathies, likely reactive  3. Dysphagia s/p esophageal dilation 3/20  4. Recurrent bronchitis/asthma ?  5. History of GERD/Reflux esophagitis, grade C  6. Anemia      Plan     · Switch Zosyn to Augmentin  · I-S  · Head of bed elevation to prevent aspiration of acid reflux.  · DVT prophylaxis with Lovenox  · Continue off Symbicort as inhaled steroid could be associated with recurrent  pneumonia  · Walking oximetry reviewed.  He does not require oxygen on exertion now.  He has oxygen at home which I told him he could use as needed    Okay to discharge home from pulmonary perspective.  He has a follow-up in the office in April       Essence Argueta MD  03/21/22  15:57 EDT          This note was dictated utilizing [email or web address] dictation

## 2022-03-21 NOTE — PROGRESS NOTES
Exercise Oximetry    Patient Name:Junito Sorto   MRN: [id number]   Date: 03/21/22             ROOM AIR BASELINE   SpO2% 95%   Heart Rate 85   Blood Pressure      EXERCISE ON ROOM AIR SpO2% EXERCISE ON O2 @  LPM SpO2%   1 MINUTE 95% 1 MINUTE    2 MINUTES 93% 2 MINUTES    3 MINUTES 93% 3 MINUTES    4 MINUTES 93% 4 MINUTES    5 MINUTES 93% 5 MINUTES    6 MINUTES 93% 6 MINUTES               Distance Walked  6 MINUTES Distance Walked   Dyspnea (Eduard Scale)   Dyspnea (Eduard Scale)   Fatigue (Eduard Scale)   Fatigue (Eduard Scale)   SpO2% Post Exercise  94% SpO2% Post Exercise   HR Post Exercise  104 HR Post Exercise   Time to Recovery   Time to Recovery     Comments: PT WALKED ON ROOM AIR VIA HEAD PROBE CHECK, DID NOT REQUIRE 02 FOR WALK

## 2022-03-21 NOTE — DISCHARGE SUMMARY
Patient Name: Junito Sorto  : 1940  MRN: [id number]    Date of Admission: 3/17/2022  Date of Discharge:  3/21/2022  Primary Care Physician: Fan Schulz MD      Chief Complaint:   Shortness of Breath and Fever      Discharge Diagnoses     Active Hospital Problems    Diagnosis  POA   • **Pneumonia due to infectious organism [J18.9]  Yes   • Acute respiratory failure with hypoxia (HCC) [J96.01]  Yes   • Orthopnea [R06.01]  Yes   • GERD (gastroesophageal reflux disease) [K21.9]  Yes   • Esophageal dysphagia [R13.19]  Yes   • Benign essential hypertension [I10]  Yes   • Paroxysmal atrial fibrillation (HCC) [I48.0]  Yes   • Obesity [E66.9]  Yes      Resolved Hospital Problems   No resolved problems to display.        Hospital Course      Pneumonia/hypoxemia Patient was recently admitted for pneumonia and discharged home on Omnicef.  And 2 per patient.  L nasal cannula Oxygen saturation dropped to 89% on room air on admission NAD.Cr chest showed Cardiomegaly with mild bilateral perihilar reticular opacities compatible    with interstitial pulmonary edema. White blood cell count was 18.25, procalcitonin was normal.  T-max was 102.3 on admission .Concern for recurrent aspiration causing pneumonia.  SLP-During previous admission, clinical swallow eval completed 3/3/22 recommending regular and thins. Esophagram completed 3/3/22 reporting no penetration or aspiration.  CT chest -Minimal patchy bilateral groundglass infiltrate suspicious for pneumonia due to COVID 19 infection. Mild mediastinal lymphadenopathy, likely reactive.  Respiratory viral count negative.  -MRSA nares negative. Was started on Zosyn.  Continue Zosyn for 5 days for aspiration pneumonia per ID recommendations.-Pulmonology consulted secondary to history of recurrent pneumonia, asthma.Home Symbicort discontinued  per pulmonology as steroid could increase the risk of pneumonia. will need a PFT as outpatient with pre and  postbronchodilator testing to evaluate the possibility of asthma and if no evidence of asthma then should permanently discontinue inhaled steroid.  Patient has scheduled pulmonology follow-up in April.  Follow-up as scheduled.  Patient has recurrence of respiratory issues then he may need more extensive pulmonary evaluation with imaging and probably bronchoscopy     #Esophageal dysphagia:  Esophagram on previous admission showed h intraesophageal reflux and stasis.  Regular thin diet ordered I consulted due to this dysphagia history of esophageal stricture, status post EGD EGD on 3/20/2022 showed normal throughout.  Empirically dilated esophagus and biopsies for evaluation of significant esophagitis..  Follow-up with Dr. Cadena outpatient. Likely chronic cough not related to GERD however could discuss proceeding with pH probe if has persistent cough and no cause is identified.        #Diastolic heart failure  proBNP was 825 -Echocardiogram 03/09-Left ventricular ejection fraction appears to be 46 - 50%. Left ventricular systolic function is low normal. Chest x-ray concerning for pulmonary edema .  Start on p.o. Lasix 20 mg daily at discharge.. Continue aspirin statin.      #Asthma without exacerbation. Continue home inhalers:  Pulmonology consult.  Home Symbicort discontinued as it can increase recurrent pneumonia.  Will need outpatient PFT to evaluate lung function if negative Symbicort may need to be discontinued permanently.    #Paroxysmal atrial fibrillation:   EKG shows atrial fib, no ischemic changes.  Continue beta-blocker. Not on anticoagulation at home, patient states he was placed on Eliquis years ago however stopped taking it due to bruising.  Continue low-dose aspirin.          Day of Discharge     Subjective:  Patient seen this morning.  Feeling significantly better.  Still coughing.  Denies chest pain.  Minimal shortness of breath with  exertion.  No fevers no chills.    Physical Exam:  Temp:  [98.6 °F  (37 °C)-98.7 °F (37.1 °C)] 98.6 °F (37 °C)  Heart Rate:  [73-80] 76  Resp:  [17-18] 18  BP: (121-135)/(85-95) 121/85  Body mass index is 33.45 kg/m².  Physical Exam  General Alert and oriented x3, no acute distress.  Ill-appearing, obese  Eyes PERRL, EOMI, anicteric sclera  Lungs : CTA, no wheezing, on 2 L nasal cannula.  CV Irregular rate and rhythm, no murmurs rubs or gallops.  AbdomenSoft, nontender, nondistended.  Normoactive bowel sounds  Extremities 1+ bilateral lower extremity edema, cyanosis.      Consultants     Consult Orders (all) (From admission, onward)     Start     Ordered    03/18/22 1418  Inpatient Case Management  Consult  Once        Provider:  (Not yet assigned)    03/18/22 1417    03/18/22 0920  Inpatient Pulmonology Consult  Once        Specialty:  Pulmonary Disease  Provider:  Dru Reid Jr., MD    03/18/22 0920    03/18/22 0900  Inpatient Gastroenterology Consult  Once        Specialty:  Gastroenterology  Provider:  Aisha Ortiz MD    03/18/22 0859    03/17/22 1117  Inpatient Infectious Diseases Consult  Once        Specialty:  Infectious Diseases  Provider:  Tung Otoole MD    03/17/22 1117    03/17/22 0432  [facility] (on-call MD unless specified) Details  Once        Specialty:  Hospitalist  Provider:  (Not yet assigned)    03/17/22 0431              Procedures     ESOPHAGOGASTRODUODENOSCOPY WITH BIOPSIES AND NAILS DILATION #56      Imaging Results (All)     Procedure Component Value Units Date/Time    CT Chest Without Contrast Diagnostic [[id number]] Collected: 03/17/22 1638     Updated: 03/17/22 1646    Narrative:      CT CHEST WO CONTRAST DIAGNOSTIC-     CLINICAL HISTORY: Pneumonia. A fusion or abscess is suspected. Abnormal  chest x-ray.     TECHNIQUE: Spiral CT images were obtained through the chest without IV  contrast and were reconstructed in 3 mm thick slices.     Radiation dose reduction techniques were utilized, including  automated  exposure control and exposure modulation based on body size.     COMPARISON: Chest x-ray dated 03/17/2022.     FINDINGS: Lung window images demonstrate mild patchy airspace  infiltrates within both lungs, more prominent on the right than the left  that are suspicious for pneumonia due to COVID 19 infection. Correlation  with clinical findings is recommended. No focal areas of dense  consolidation are identified. There are no discrete lung masses. There  are no pleural effusions. There multiple mildly enlarged mediastinal  lymph nodes that are likely benign reactive lymph nodes. These measure  up to 11 mm in short axis. The heart is mildly enlarged. Images through  the upper abdomen show no significant abnormality.       Impression:      Minimal patchy bilateral groundglass infiltrate suspicious  for pneumonia due to COVID 19 infection. Mild mediastinal  lymphadenopathy, likely reactive.     This report was finalized on 3/17/2022 4:43 PM by Dr. Francesco Hackett M.D.       XR Chest 1 View [[id number]] Collected: 03/17/22 0557     Updated: 03/17/22 0557    Narrative:        Patient: VICTOR MANUEL HOLLEY  Time Out: 05:56  Exam(s): FILM CXR 1 VIEW     EXAM:    XR Chest, 1 View    CLINICAL HISTORY:     Reason for exam: SOA.    TECHNIQUE:    Frontal view of the chest.    COMPARISON:  3 3 2022    FINDINGS:    Lungs: Mild bilateral perihilar reticular opacities.    Pleural space:  Unremarkable.  No pneumothorax.    Heart: Mild cardiomegaly.    Mediastinum:  Unremarkable.    Bones joints:  Unremarkable.    IMPRESSION:       Cardiomegaly with mild bilateral perihilar reticular opacities compatible   with interstitial pulmonary edema.    Impression:          Electronically signed by Hernan Galarza M.D. on 03-17-22 at 0556          Results for orders placed during the hospital encounter of 03/03/22    Adult Transthoracic Echo Complete W/ Cont if Necessary Per Protocol    Interpretation Summary  · Left ventricular ejection  fraction appears to be 46 - 50%. Left ventricular systolic function is low normal.  · Left ventricular diastolic function was indeterminate.  · Left atrial volume is severely increased.  · The right atrial cavity is moderately dilated.    Pertinent Labs     Results from last 7 days   Lab Units 03/21/22  0545 03/20/22 1007 03/19/22  0613 03/18/22  0739   WBC 10*3/mm3 6.35 5.98 6.56 9.38   HEMOGLOBIN g/dL 12.8* 12.8* 11.9* 11.7*   PLATELETS 10*3/mm3 237 249 232 218     Results from last 7 days   Lab Units 03/21/22  0545 03/20/22 1007 03/19/22  0613 03/18/22  0739   SODIUM mmol/L 139 140 140 139   POTASSIUM mmol/L 3.6 3.9 3.6 3.3*   CHLORIDE mmol/L 104 105 104 104   CO2 mmol/L 27.8 27.2 27.0 29.3*   BUN mg/dL 11 13 13 13   CREATININE mg/dL 0.66* 0.78 0.69* 0.72*   GLUCOSE mg/dL 98 91 88 88   Estimated Creatinine Clearance: 100.6 mL/min (A) (by C-G formula based on SCr of 0.66 mg/dL (L)).  Results from last 7 days   Lab Units 03/20/22 1007 03/17/22  0355   ALBUMIN g/dL 3.30* 3.50   BILIRUBIN mg/dL 1.8* 1.4*   ALK PHOS U/L 76 88   AST (SGOT) U/L 17 21   ALT (SGPT) U/L 21 31     Results from last 7 days   Lab Units 03/21/22  0545 03/20/22 1007 03/19/22  0613 03/18/22  0739 03/17/22  0643 03/17/22  0355   CALCIUM mg/dL 9.7 9.6 9.3 9.4   < > 9.5   ALBUMIN g/dL  --  3.30*  --   --   --  3.50   MAGNESIUM mg/dL 2.0 2.0 2.0 2.0  --   --    PHOSPHORUS mg/dL 3.0 3.2 3.0 2.3*  --   --     < > = values in this interval not displayed.       Results from last 7 days   Lab Units 03/17/22  1513 03/17/22  0643 03/17/22  0355   TROPONIN T ng/mL  --   --  <0.010   PROBNP pg/mL  --  825.0  --    D DIMER QUANT MCGFEU/mL 0.65*  --   --            Invalid input(s): LDLCALC  Results from last 7 days   Lab Units 03/20/22  0830 03/17/22  1239 03/17/22  0426 03/17/22  0402   BLOODCX   --   --  No growth at 4 days No growth at 4 days   RESPCX  Light growth (2+) The culture consists of normal respiratory mohini. This is a preliminary report;  final report to follow.  --   --   --    MRSAPCR   --  No MRSA Detected  --   --      Results from last 7 days   Lab Units 03/17/22  0410   COVID19  Not Detected       Test Results Pending at Discharge     Pending Labs     Order Current Status    Tissue Pathology Exam In process    Blood Culture - Blood, Arm, Left Preliminary result    Blood Culture - Blood, Arm, Right Preliminary result    Respiratory Culture - Sputum, Cough Preliminary result          Discharge Details        Discharge Medications      New Medications      Instructions Start Date   furosemide 20 MG tablet  Commonly known as: LASIX  Notes to patient: Next dose due 3/22/22 at 9am   20 mg, Oral, Daily   Start Date: March 22, 2022        Changes to Medications      Instructions Start Date   atorvastatin 40 MG tablet  Commonly known as: LIPITOR  What changed: when to take this  Notes to patient: Next dose due 3/22/22 at 9am   TAKE ONE TABLET BY MOUTH DAILY      propranolol LA 80 MG 24 hr capsule  Commonly known as: INDERAL LA  What changed: when to take this  Notes to patient: Next dose due 3/22/22 at 9am   TAKE ONE CAPSULE BY MOUTH EVERY MORNING         Continue These Medications      Instructions Start Date   albuterol sulfate  (90 Base) MCG/ACT inhaler  Commonly known as: ProAir HFA   2 puffs, Inhalation, Every 6 Hours PRN      aspirin 81 MG chewable tablet  Notes to patient: Next dose due 3/22/22 at 9am   81 mg, Oral, Daily      fluticasone 50 MCG/ACT nasal spray  Commonly known as: Flonase  Notes to patient: Next dose due 3/22/22 at 9am   2 sprays, Nasal, Daily      guaiFENesin 200 MG tablet   400 mg, Oral, Every 4 Hours PRN      loratadine 10 MG tablet  Commonly known as: CLARITIN  Notes to patient: Next dose due 3/22/22 at 9am   10 mg, Oral, Daily      omeprazole 40 MG capsule  Commonly known as: priLOSEC  Notes to patient: Next dose due 3/22/22 at 9am   40 mg, Oral, Daily         Stop These Medications    amLODIPine 5 MG tablet  Commonly  known as: NORVASC     budesonide-formoterol 160-4.5 MCG/ACT inhaler  Commonly known as: SYMBICORT            Allergies   Allergen Reactions   • Dilaudid [Hydromorphone Hcl] Hallucinations       Discharge Disposition:  Home or Self Care      Discharge Diet:  Diet Order   Procedures   • Diet Regular       Discharge Activity:       CODE STATUS:    Code Status and Medical Interventions:   Ordered at: 03/17/22 0457     Code Status (Patient has no pulse and is not breathing):    CPR (Attempt to Resuscitate)     Medical Interventions (Patient has pulse or is breathing):    Full Support       Future Appointments   Date Time Provider Department Center   3/28/2022 10:30 AM Suzy Metcalf APRN [facility]   4/12/2022 11:15 AM Steven Cadena MD [facility]   4/15/2022 10:00 AM [facility]   4/22/2022 10:30 AM Fan Schulz MD [facility]      Follow-up Information     Fan Schulz MD. Schedule an appointment as soon as possible for a visit in 1 week(s).    Specialty: Internal Medicine  Contact information:  [address] [facility] [id number] [phone]             Pulmonology Follow up.    Why: Follow-up with pulmonology as scheduled in April.           Steven Cadena MD Follow up in 4 week(s).    Specialty: Gastroenterology  Contact information:  [address] [id number] [phone]                         Time Spent on Discharge:  Greater than 30 minutes      Campbell Harris MD  [facility]  03/21/22  15:19 EDT

## 2022-03-22 ENCOUNTER — TRANSITIONAL CARE MANAGEMENT TELEPHONE ENCOUNTER (OUTPATIENT)
Dept: CALL CENTER | Facility: HOSPITAL | Age: 82
End: 2022-03-22

## 2022-03-22 LAB
BACTERIA SPEC AEROBE CULT: NORMAL
BACTERIA SPEC AEROBE CULT: NORMAL
BACTERIA SPEC RESP CULT: NORMAL
GRAM STN SPEC: NORMAL
LAB AP CASE REPORT: NORMAL
PATH REPORT.FINAL DX SPEC: NORMAL
PATH REPORT.GROSS SPEC: NORMAL

## 2022-03-22 NOTE — OUTREACH NOTE
Call Center TCM Note    Flowsheet Row Responses   [facility] patient discharged from? [place]   Does the patient have one of the following disease processes/diagnoses(primary or secondary)? COPD/Pneumonia   Was the primary reason for admission: Pneumonia   TCM attempt successful? Yes   Call start time 1555   Call end time 1558   Discharge diagnosis PNA   Does the patient have all medications ordered at discharge? Yes   Is the patient taking all medications as directed (includes completed medication regime)? Yes   Comments regarding appointments f/u with cardiology on 3/28   Does the patient have a primary care provider?  Yes   Does the patient have an appointment with their PCP or specialist within 7 days of discharge? No   Comments regarding PCP says he will call to make a f/u appt with PCP on his own time   Nursing Interventions Advised patient to make appointment   Has the patient kept scheduled appointments due by today? N/A   Has home health visited the patient within 72 hours of discharge? N/A   Pulse Ox monitoring Intermittent   Pulse Ox device source Patient   O2 Sat comments 95%-96% on room air   O2 Sat: education provided Sat levels, Monitoring frequency, When to seek care   Psychosocial issues? No   Comments using his O2 at night    Did the patient receive a copy of their discharge instructions? Yes   What is the patient's perception of their health status since discharge? Improving   Nursing Interventions Nurse provided patient education   Is the patient/caregiver able to teach back the hierarchy of who to call/visit for symptoms/problems? PCP, Specialist, Home health nurse, Urgent Care, ED, 911 Yes   Is the patient/caregiver able to teach back signs and symptoms of worsening condition: Fever/chills, Shortness of breath, Chest pain   Is the patient/caregiver able to teach back importance of completing antibiotic course of treatment? Yes   TCM call completed? Yes   Wrap up additional comments  Says he is doing well, no questions, states he will call later on to make a f/u appt with PCP.          Tracey Brar RN    3/22/2022, 15:58 EDT

## 2022-03-22 NOTE — CASE MANAGEMENT/SOCIAL WORK
Case Management Discharge Note      Final Note: DC'd home 3/21    Provided Post Acute Provider List?: Refused  Provided Post Acute Provider Quality & Resource List?: Refused            Discharged on 3/9/2022 Admission date: 3/3/2022 - Discharge disposition: Home or Self Care    Durable Medical Equipment     Service Provider Selected Services Address Phone Fax Patient Preferred    [employer] [employer] [facility]  Durable Medical Equipment [id number], [place] [address] [phone] --                    Transportation Services  Private: Car    Final Discharge Disposition Code: 01 - home or self-care

## 2022-03-24 ENCOUNTER — OFFICE VISIT (OUTPATIENT)
Dept: FAMILY MEDICINE CLINIC | Facility: CLINIC | Age: 82
End: 2022-03-24

## 2022-03-24 VITALS
RESPIRATION RATE: 18 BRPM | OXYGEN SATURATION: 95 % | SYSTOLIC BLOOD PRESSURE: 130 MMHG | BODY MASS INDEX: 33.8 KG/M2 | HEIGHT: 68 IN | DIASTOLIC BLOOD PRESSURE: 80 MMHG | WEIGHT: 223 LBS | TEMPERATURE: 96.9 F | HEART RATE: 42 BPM

## 2022-03-24 DIAGNOSIS — J18.9 PNEUMONIA DUE TO INFECTIOUS ORGANISM, UNSPECIFIED LATERALITY, UNSPECIFIED PART OF LUNG: Primary | ICD-10-CM

## 2022-03-24 DIAGNOSIS — Z09 HOSPITAL DISCHARGE FOLLOW-UP: ICD-10-CM

## 2022-03-24 PROCEDURE — 1111F DSCHRG MED/CURRENT MED MERGE: CPT | Performed by: NURSE PRACTITIONER

## 2022-03-24 PROCEDURE — 99214 OFFICE O/P EST MOD 30 MIN: CPT | Performed by: NURSE PRACTITIONER

## 2022-03-24 NOTE — PROGRESS NOTES
Subjective   Junito Sorto is a 81 y.o. male. Patient is here today for   Chief Complaint   Patient presents with   • Hospital Follow Up Visit     F/U on pneumonia        (Not on file)-  Risk for Readmission (LACE) Score: 10 (3/21/2022  6:01 AM)           Vitals:    03/24/22 0954   BP: 130/80   Pulse: (!) 42 ; 54   Resp: 18   Temp: 96.9 °F (36.1 °C)   SpO2: 95%     The following portions of the patient's history were reviewed and updated as appropriate: allergies, current medications, past family history, past medical history, past social history, past surgical history and problem list.    Past Medical History:   Diagnosis Date   • Arthritis    • At risk for obstructive sleep apnea     5   • Atrial fibrillation (HCC)     not followed by Cardiologist just PCP   • Bladder outlet obstruction 09/11/2019   • BPH (benign prostatic hyperplasia)    • Colon polyp    • Coronary artery disease Few years    Afib   • Dysphagia    • Eczema    • GERD (gastroesophageal reflux disease) 2013    Mentioned as possible   • Hernia     Your office reported hiatal   • Hiatal hernia    • Hyperlipidemia    • Hypertension    • Low back pain    • Prostate cancer (HCC)    • Skin cancer       Allergies   Allergen Reactions   • Dilaudid [Hydromorphone Hcl] Hallucinations      Social History     Socioeconomic History   • Marital status:    Tobacco Use   • Smoking status: Former Smoker     Types: Pipe   • Smokeless tobacco: Never Used   • Tobacco comment: only in college   Vaping Use   • Vaping Use: Never used   Substance and Sexual Activity   • Alcohol use: Yes     Alcohol/week: 5.0 standard drinks     Types: 5 Glasses of wine per week     Comment: SOCIALLY   • Drug use: No   • Sexual activity: Defer     Comment: 50 years ago        Current Outpatient Medications:   •  albuterol sulfate HFA (ProAir HFA) 108 (90 Base) MCG/ACT inhaler, Inhale 2 puffs Every 6 (Six) Hours As Needed for Shortness of Air or Wheezing for up to 30 days., Disp:  2 g, Rfl: 0  •  aspirin 81 MG chewable tablet, Chew 81 mg Daily., Disp: , Rfl:   •  atorvastatin (LIPITOR) 40 MG tablet, TAKE ONE TABLET BY MOUTH DAILY (Patient taking differently: Take 40 mg by mouth Every Night.), Disp: 90 tablet, Rfl: 3  •  fluticasone (Flonase) 50 MCG/ACT nasal spray, 2 sprays into the nostril(s) as directed by provider Daily., Disp: 15.8 mL, Rfl: 0  •  guaiFENesin 200 MG tablet, Take 400 mg by mouth Every 4 (Four) Hours As Needed for Cough., Disp: , Rfl:   •  loratadine (CLARITIN) 10 MG tablet, Take 10 mg by mouth Daily., Disp: , Rfl:   •  omeprazole (priLOSEC) 40 MG capsule, Take 1 capsule by mouth Daily., Disp: 90 capsule, Rfl: 3  •  propranolol LA (INDERAL LA) 80 MG 24 hr capsule, TAKE ONE CAPSULE BY MOUTH EVERY MORNING (Patient taking differently: Take 80 mg by mouth Daily.), Disp: 90 capsule, Rfl: 3  •  furosemide (LASIX) 20 MG tablet, Take 1 tablet by mouth Daily for 30 days., Disp: 30 tablet, Rfl: 0     Objective     Pt here today for hospital follow up from [facility] on 3/17 - 3/21 for c/o cough and shortness of breath. Upon ER admit O2 sat 89% and temperature was 102.3, chest xray showed Cardiomegaly with mild bilateral perihilar reticular opacities compatible with interstitial pulmonary edema and WBC was 18.25. Pt admitted for recurrent aspiration pneumonia.   CT chest done showed minimal patchy bilateral groundglass infiltrate suspicious for pneumonia due to COVID 19 infection, mild mediastinal lymphadenopathy; Respiratory viral count negative; MRSA nares negative. Pt was started on Zosyn for 5 days. Pt having esophageal dysphagia diagnosed on previous admit after EGD, biopsy from esophagus from EGD showed esophagitis and swallow study done recommended regular thin liquids. Pt's proBNP was 825 upon admit; pt's last echo showed left ventricular ejection fraction 46 - 50%, and EKG showed Afib. Pt was started on Lasix 20 mg daily at discharge but admits today that he only  took one day due to caused him to urinate more. Pt stating he is now only on O2 2L as needed, and uses at night only now. Pt was discharged on Augmentin bid x 3 days and has completed that at this time. Pt stating he is eating ok but not very hungry, is not drinking much and getting up moving around some.     Pt has an appt with Suzy Metcalf, cardiology on 3/27/22; scheduled chest xray for 3/30/22; GI appt with Dr. Cadena on 4/12/22; lab appt on 4/15/22; appt with Dr. Schulz on 4/22/22; and an appt with pulmonologist on 4/25/22.          Review of Systems   Constitutional: Negative.    HENT: Negative.    Respiratory: Positive for cough (productive) and shortness of breath (with exertion).    Cardiovascular: Negative.    Gastrointestinal: Negative.        Physical Exam  Vitals reviewed.   HENT:      Head: Normocephalic.   Eyes:      Pupils: Pupils are equal, round, and reactive to light.   Cardiovascular:      Rate and Rhythm: Normal rate and regular rhythm.      Pulses: Normal pulses.   Pulmonary:      Effort: Pulmonary effort is normal. No accessory muscle usage or respiratory distress.      Breath sounds: Normal breath sounds. No stridor. No wheezing, rhonchi or rales.   Musculoskeletal:         General: Normal range of motion.      Right lower leg: No edema.      Left lower leg: No edema.   Neurological:      Mental Status: He is alert and oriented to person, place, and time.   Psychiatric:         Behavior: Behavior normal.         ASSESSMENT     Problems Addressed this Visit        Pulmonary and Pneumonias    Pneumonia due to infectious organism - Primary      Other Visit Diagnoses     Hospital discharge follow-up          Diagnoses       Codes Comments    Pneumonia due to infectious organism, unspecified laterality, unspecified part of lung    -  Primary ICD-10-CM: J18.9  ICD-9-CM: 486     Hospital discharge follow-up     ICD-10-CM: Z09  ICD-9-CM: V67.59           Current outpatient and discharge medications  have been reconciled for the patient.  Reviewed by: KOLBY Turner      PLAN    Patient Instructions   Symbicort and amlodipine stopped while in hospital; continue with this treatment plan.  Pt refusing to take furosemide; discussed in detail while in office, why he is on medication and how it works; pt did not change mind. Pt's family member in room at time of discussion. Recommend pt to start weighing himself and checking b/p every other day. Discussed parameters for b/p and weight. Recommend pt to work on drinking fluids through out day and to to eat a little through out day as well. Discussed s/s to monitor for for worsening conditions and if having any to call/rto. Pt informed that once he does chest xray next week results would be called into him. Pt and pt's family member verb. Understanding.     Return for Dr. Schulz as needed/as recommended.

## 2022-03-24 NOTE — PATIENT INSTRUCTIONS
Symbicort and amlodipine stopped while in hospital; continue with this treatment plan.  Pt refusing to take furosemide; discussed in detail while in office, why he is on medication and how it works; pt did not change mind. Pt's family member in room at time of discussion. Recommend pt to start weighing himself and checking b/p every other day. Discussed parameters for b/p and weight. Recommend pt to work on drinking fluids through out day and to to eat a little through out day as well. Discussed s/s to monitor for for worsening conditions and if having any to call/rto. Pt informed that once he does chest xray next week results would be called into him. Pt and pt's family member verb. Understanding.

## 2022-03-25 ENCOUNTER — TELEPHONE (OUTPATIENT)
Dept: GASTROENTEROLOGY | Facility: CLINIC | Age: 82
End: 2022-03-25

## 2022-03-25 NOTE — TELEPHONE ENCOUNTER
----- Message from Tony Ramos MD sent at 3/24/2022 11:32 AM EDT -----  Pathology benign  Office visit Wellington Cadena already scheduled in April

## 2022-03-28 ENCOUNTER — OFFICE VISIT (OUTPATIENT)
Dept: CARDIOLOGY | Facility: CLINIC | Age: 82
End: 2022-03-28

## 2022-03-28 VITALS
BODY MASS INDEX: 34.37 KG/M2 | HEIGHT: 67 IN | HEART RATE: 60 BPM | DIASTOLIC BLOOD PRESSURE: 78 MMHG | SYSTOLIC BLOOD PRESSURE: 120 MMHG | WEIGHT: 219 LBS

## 2022-03-28 DIAGNOSIS — I48.0 PAROXYSMAL ATRIAL FIBRILLATION: Primary | ICD-10-CM

## 2022-03-28 DIAGNOSIS — J96.01 ACUTE RESPIRATORY FAILURE WITH HYPOXIA: ICD-10-CM

## 2022-03-28 DIAGNOSIS — J18.9 PNEUMONIA OF RIGHT LOWER LOBE DUE TO INFECTIOUS ORGANISM: ICD-10-CM

## 2022-03-28 DIAGNOSIS — I51.9 LEFT VENTRICULAR DYSFUNCTION: ICD-10-CM

## 2022-03-28 DIAGNOSIS — I49.3 PVC (PREMATURE VENTRICULAR CONTRACTION): ICD-10-CM

## 2022-03-28 PROBLEM — R06.01 ORTHOPNEA: Status: RESOLVED | Noted: 2022-03-17 | Resolved: 2022-03-28

## 2022-03-28 PROBLEM — R09.02 HYPOXIA: Status: RESOLVED | Noted: 2022-03-03 | Resolved: 2022-03-28

## 2022-03-28 PROCEDURE — 99214 OFFICE O/P EST MOD 30 MIN: CPT | Performed by: NURSE PRACTITIONER

## 2022-03-28 PROCEDURE — 93000 ELECTROCARDIOGRAM COMPLETE: CPT | Performed by: NURSE PRACTITIONER

## 2022-03-28 RX ORDER — FUROSEMIDE 20 MG/1
20 TABLET ORAL DAILY
Qty: 90 TABLET | Refills: 1 | Status: SHIPPED | OUTPATIENT
Start: 2022-03-28 | End: 2022-04-15

## 2022-03-28 NOTE — PROGRESS NOTES
Date of Office Visit: 2022  Encounter Provider: KOLBY Mireles  Place of Service: [facility] CARDIOLOGY  Patient Name: Junito Sorto  :1940  Primary Cardiologist: Dr. Steve Prater    Chief Complaint   Patient presents with   • Shortness of Breath   • Hospital Follow Up Visit   :     HPI: Junito Sorto is a pleasant 81 y.o. male who presents today for follow-up on atrial fibrillation and recent hospitalization.  He is a new patient to me and I have reviewed his medical records.    In 2022, he was hospitalized with right lower lobe pneumonia and an acute asthma exacerbation.  He had known paroxysmal atrial fibrillation that was rate controlled on propanolol and he has refused anticoagulation in the past.  He had previously been on apixaban and stopped it due to bruising.  Echocardiogram showed EF of 46-50%.  He was discharged on antibiotic therapy on 3/9/2022.    A week later he was rehospitalized with shortness of breath.  Pulmonary consulted and recommended outpatient PFTs.  He had esophageal dysphagia and was recommended follow-up with Dr. Cadena (GI).  Chest x-ray was concerning for pulmonary edema and he was started on furosemide 20 mg at discharge.  He declined restarting anticoagulation.    He presents today with his wife and daughter accompanying him.  When he was discharged from the hospital, he did not take his furosemide.  He thought he was supposed to be taking it for leg swelling and did not appreciate that he was swollen.  His amlodipine was also discontinued during the hospitalization.  He recently saw his PCP who recommended that he is restart the furosemide because of the possible pulmonary edema and diastolic heart failure.     He reports dyspnea, cough, and wheezing.  He denies chest pain, PND, orthopnea, palpitations, edema, dizziness, or syncope.  He is following up with Dr. Aden Ocasio 3/25.       Past Medical History:   Diagnosis  Date   • Arthritis    • At risk for obstructive sleep apnea     5   • Atrial fibrillation (HCC)    • Bladder outlet obstruction 2019   • BPH (benign prostatic hyperplasia)    • Colon polyp    • Coronary artery disease Few years    Afib   • Dysphagia    • Eczema    • GERD (gastroesophageal reflux disease) 2013    Mentioned as possible   • Hernia     Your office reported hiatal   • Hiatal hernia    • Hyperlipidemia    • Hypertension    • Left ventricular dysfunction    • Low back pain    • Prostate cancer (HCC)    • Skin cancer        Past Surgical History:   Procedure Laterality Date   • CATARACT EXTRACTION, BILATERAL     • COLONOSCOPY  unknown   • CYSTOSCOPY TRANSURETHRAL RESECTION OF PROSTATE N/A 2019    Procedure: CYSTOSCOPY TRANSURETHRAL RESECTION OF PROSTATE;  Surgeon: Chip Bedolla MD;  Location: [facility] MAIN OR;  Service: Urology   • ENDOSCOPY N/A 10/31/2018    LA Grade C reflux esophagitis, HH, erythematous mucosa in the antrum. Path: Gastritis, esophagitis.    • ENDOSCOPY N/A 3/20/2022    Procedure: ESOPHAGOGASTRODUODENOSCOPY WITH BIOPSIES AND NAILS DILATION #56;  Surgeon: Tony Ramos MD;  Location: [facility] ENDOSCOPY;  Service: Gastroenterology;  Laterality: N/A;  PRE OP - DYSPHAGIA  POST OP- MILD GASTRITIS   • EPIDURAL BLOCK     • ESOPHAGEAL DILATATION     • PROSTATE SURGERY  2014   • SKIN CANCER EXCISION      multiple, over that last few years   • SKIN CANCER EXCISION Right 2019    FACE,    • TONSILLECTOMY      around 1950   • UPPER GASTROINTESTINAL ENDOSCOPY         Social History     Socioeconomic History   • Marital status:    Tobacco Use   • Smoking status: Former Smoker     Years: 2.00     Types: Pipe     Start date: 1959     Quit date: 1961     Years since quittin.4   • Smokeless tobacco: Never Used   • Tobacco comment: I have allergies to air born pollen etc.  Smoking made it worse, tasted bad...so quit.   Vaping Use   • Vaping Use:  Never used   Substance and Sexual Activity   • Alcohol use: Yes     Alcohol/week: 5.0 standard drinks     Types: 5 Glasses of wine per week     Comment: Less than 4 oz /wk since this prob started.   • Drug use: No   • Sexual activity: Defer     Comment: 50 years ago       Family History   Problem Relation Age of Onset   • Cancer Mother         pancreatic   • Liver disease Mother    • Cancer Father         prostate, bladder, colon, lymphoma, leukemia   • Colon cancer Father    • Arrhythmia Father         Afib   • Heart disease Father    • Cancer Brother         prostate   • Arrhythmia Brother         Afib   • Cancer Paternal Grandfather         prostate   • Arrhythmia Paternal Aunt         Irregular beat . Before the time of illness sharing.   • Malig Hyperthermia Neg Hx        The following portion of the patient's history were reviewed and updated as appropriate: past medical history, past surgical history, past social history, past family history, allergies, current medications, and problem list.    Review of Systems   Constitutional: Negative.   Cardiovascular: Positive for dyspnea on exertion.   Respiratory: Positive for cough, shortness of breath and wheezing. Negative for sleep disturbances due to breathing.    Hematologic/Lymphatic: Negative.    Neurological: Negative.        Allergies   Allergen Reactions   • Dilaudid [Hydromorphone Hcl] Hallucinations         Current Outpatient Medications:   •  albuterol sulfate HFA (ProAir HFA) 108 (90 Base) MCG/ACT inhaler, Inhale 2 puffs Every 6 (Six) Hours As Needed for Shortness of Air or Wheezing for up to 30 days., Disp: 2 g, Rfl: 0  •  aspirin 81 MG chewable tablet, Chew 81 mg Daily., Disp: , Rfl:   •  atorvastatin (LIPITOR) 40 MG tablet, TAKE ONE TABLET BY MOUTH DAILY (Patient taking differently: Take 40 mg by mouth Every Night.), Disp: 90 tablet, Rfl: 3  •  fluticasone (Flonase) 50 MCG/ACT nasal spray, 2 sprays into the nostril(s) as directed by provider Daily.,  "Disp: 15.8 mL, Rfl: 0  •  furosemide (LASIX) 20 MG tablet, Take 1 tablet by mouth Daily for 30 days., Disp: 90 tablet, Rfl: 1  •  guaiFENesin 200 MG tablet, Take 400 mg by mouth Every 4 (Four) Hours As Needed for Cough., Disp: , Rfl:   •  loratadine (CLARITIN) 10 MG tablet, Take 10 mg by mouth Daily., Disp: , Rfl:   •  O2 (OXYGEN), Inhale 1 (One) Time., Disp: , Rfl:   •  omeprazole (priLOSEC) 40 MG capsule, Take 1 capsule by mouth Daily., Disp: 90 capsule, Rfl: 3  •  propranolol LA (INDERAL LA) 80 MG 24 hr capsule, TAKE ONE CAPSULE BY MOUTH EVERY MORNING (Patient taking differently: Take 80 mg by mouth Daily.), Disp: 90 capsule, Rfl: 3        Objective:     Vitals:    03/28/22 1030 03/28/22 1053 03/28/22 1108   BP: 150/90 152/88 120/78   BP Location: Left arm Right arm Left arm   Patient Position:   Sitting   Pulse: 60     Weight: 99.3 kg (219 lb)     Height: 170.2 cm (67\")       Body mass index is 34.3 kg/m².    PHYSICAL EXAM:    Vitals Reviewed.   General Appearance: No acute distress, well developed and well nourished. Obese.   Eyes: Conjunctiva and lids: No erythema, swelling, or discharge. Sclera non-icteric. Glasses.   HENT: Atraumatic, normocephalic. External eyes, ears, and nose normal. No hearing loss noted. Mucous membranes normal. Lips not cyanotic. Neck supple with no tenderness. Wearing mask.   Respiratory: No signs of respiratory distress. Respiration rhythm and depth normal.   Clear to auscultation. No rales, crackles, rhonchi, or wheezing auscultated.   Cardiovascular:  Jugular Venous Pressure: Normal  Heart Rate and Rhythm: Irregularly, irregular heart Sounds: Normal S1 and S2. No S3 or S4 noted.  Murmurs: No murmurs noted. No rubs, thrills, or gallops.   Arterial Pulses: Carotid pulses normal. No carotid bruit noted. Posterior tibialis and dorsalis pedis pulses normal.   Lower Extremities: Bilateral +1 pitting lower extremity edema noted.  Gastrointestinal:  Abdomen soft, non-distended, non-tender. "    Musculoskeletal: Normal movement of extremities.  Skin and Nails: General appearance normal. No pallor, cyanosis, diaphoresis. Skin temperature normal. No clubbing of fingernails.   Psychiatric: Patient alert and oriented to person, place, and time. Speech and behavior appropriate. Normal mood and affect.       ECG 12 Lead    Date/Time: 3/28/2022 10:51 AM  Performed by: Suzy Metcalf APRN  Authorized by: Suzy Metcalf APRN   Comparison: compared with previous ECG from 3/7/2022  Similar to previous ECG  Rhythm: atrial fibrillation  Rate: normal  BPM: 60  Conduction: left anterior fascicular block  ST Segments: ST segments normal  T Waves: T waves normal  QRS axis: normal    Clinical impression: abnormal EKG              Assessment:       Diagnosis Plan   1. Paroxysmal atrial fibrillation (HCC)  CBC (No Diff)    Basic Metabolic Panel   2. Left ventricular dysfunction  CBC (No Diff)    Basic Metabolic Panel   3. Pneumonia of right lower lobe due to infectious organism     4. Acute respiratory failure with hypoxia (HCC)     5. PVC (premature ventricular contraction)            Plan:       1.  Permanent Atrial Fibrillation: Rate controlled on propanolol.  He declines anticoagulation and remains on aspirin.  His daughter said he may be interested in the watchman device.    Atrial Fibrillation and Atrial Flutter  Assessment  • The patient has permanent atrial fibrillation  • This is non-valvular in etiology  • The patient's CHADS2-VASc score is 4  • A [id number] score of 2 or more is considered a high risk for a thromboembolic event  • Aspirin prescribed      2.  Left Ventricular Dysfunction: EF is 46-50%.  Recently felt to have possible pulmonary edema per chest x-ray.  He just started the furosemide 20 mg daily last Friday.  He is going for a chest x-ray will have a CBC and BMP completed.    3/4.  Recently hospitalized with pneumonia and possible asthma.  He will be following up with Dr. Adne Ocasio at the  end of March.  He reports shortness of breath, coughing, and wheezing.    5.  PVC: Noted on EKG during hospitalization in the setting of acute illness.  Denies palpitations.  Continue beta-blocker.    6.  We will see how he responds to furosemide if it makes any difference with his breathing.    7.  Follow-up with Dr. Steve Prater in 3 months.      As always, it has been a pleasure to participate in your patient's care. Thank you.       Sincerely,         KOLBY Josue  [facility]      · Dictated utilizing Dragon Dictation  · COVID-19 Precautions - Patient was compliant in wearing a mask. When I saw the patient, I used appropriate personal protective equipment (PPE) including mask and eye shield (standard procedure).  Additionally, I used gown and gloves if indicated.  Hand hygiene was completed before and after seeing the patient.  · I spent 49 minutes reviewing her medical records/testing/previous office notes/labs, face-to-face interaction with patient, physical examination, formulating the plan of care, and discussion of plan of care with patient.

## 2022-03-30 ENCOUNTER — LAB (OUTPATIENT)
Dept: LAB | Facility: HOSPITAL | Age: 82
End: 2022-03-30

## 2022-03-30 ENCOUNTER — HOSPITAL ENCOUNTER (OUTPATIENT)
Dept: GENERAL RADIOLOGY | Facility: HOSPITAL | Age: 82
Discharge: HOME | End: 2022-03-30

## 2022-03-30 ENCOUNTER — READMISSION MANAGEMENT (OUTPATIENT)
Dept: CALL CENTER | Facility: HOSPITAL | Age: 82
End: 2022-03-30

## 2022-03-30 DIAGNOSIS — I48.0 PAROXYSMAL ATRIAL FIBRILLATION: ICD-10-CM

## 2022-03-30 DIAGNOSIS — J18.9 PNEUMONIA OF RIGHT LOWER LOBE DUE TO INFECTIOUS ORGANISM: ICD-10-CM

## 2022-03-30 DIAGNOSIS — I51.9 LEFT VENTRICULAR DYSFUNCTION: ICD-10-CM

## 2022-03-30 LAB
ANION GAP SERPL CALCULATED.3IONS-SCNC: 9 MMOL/L (ref 5–15)
BUN SERPL-MCNC: 14 MG/DL (ref 8–23)
BUN/CREAT SERPL: 18.4 (ref 7–25)
CALCIUM SPEC-SCNC: 9.6 MG/DL (ref 8.6–10.5)
CHLORIDE SERPL-SCNC: 104 MMOL/L (ref 98–107)
CO2 SERPL-SCNC: 29 MMOL/L (ref 22–29)
CREAT SERPL-MCNC: 0.76 MG/DL (ref 0.76–1.27)
DEPRECATED RDW RBC AUTO: 43.7 FL (ref 37–54)
EGFRCR SERPLBLD CKD-EPI 2021: 90.3 ML/MIN/1.73
ERYTHROCYTE [DISTWIDTH] IN BLOOD BY AUTOMATED COUNT: 13.1 % (ref 12.3–15.4)
GLUCOSE SERPL-MCNC: 119 MG/DL (ref 65–99)
HCT VFR BLD AUTO: 39.2 % (ref 37.5–51)
HGB BLD-MCNC: 12.7 G/DL (ref 13–17.7)
MCH RBC QN AUTO: 29.7 PG (ref 26.6–33)
MCHC RBC AUTO-ENTMCNC: 32.4 G/DL (ref 31.5–35.7)
MCV RBC AUTO: 91.6 FL (ref 79–97)
PLATELET # BLD AUTO: 200 10*3/MM3 (ref 140–450)
PMV BLD AUTO: 11 FL (ref 6–12)
POTASSIUM SERPL-SCNC: 3.6 MMOL/L (ref 3.5–5.2)
RBC # BLD AUTO: 4.28 10*6/MM3 (ref 4.14–5.8)
SODIUM SERPL-SCNC: 142 MMOL/L (ref 136–145)
WBC NRBC COR # BLD: 6.87 10*3/MM3 (ref 3.4–10.8)

## 2022-03-30 PROCEDURE — 85027 COMPLETE CBC AUTOMATED: CPT

## 2022-03-30 PROCEDURE — 71046 X-RAY EXAM CHEST 2 VIEWS: CPT

## 2022-03-30 PROCEDURE — 36415 COLL VENOUS BLD VENIPUNCTURE: CPT

## 2022-03-30 PROCEDURE — 80048 BASIC METABOLIC PNL TOTAL CA: CPT

## 2022-04-01 ENCOUNTER — TELEPHONE (OUTPATIENT)
Dept: CARDIOLOGY | Facility: CLINIC | Age: 82
End: 2022-04-01

## 2022-04-01 DIAGNOSIS — I48.19 ATRIAL FIBRILLATION, PERSISTENT: ICD-10-CM

## 2022-04-01 DIAGNOSIS — I49.3 PVC (PREMATURE VENTRICULAR CONTRACTION): Primary | ICD-10-CM

## 2022-04-01 NOTE — TELEPHONE ENCOUNTER
Called and spoke with patient.  Completely asymptomatic.  Hard to tell if home heart rate monitor is accurate though has been seeing some low heart rates at times on both blood pressure cuff and pulse ox, they seem to correlate pretty well to each other, per patient.  He will decrease propranolol to 40mg daily over the weekend but call on call provider over the weekend if HR remains <50 bpm at rest, or if HR becomes elevated >90s.   ER if becomes symptomatic.        SIVAN CAPPS--  Patient needs EKG check Monday AM and possibly 24 hr holter as well.  Please ask if he can bring his pulse ox and blood pressure cuff in with him as well

## 2022-04-04 ENCOUNTER — CLINICAL SUPPORT (OUTPATIENT)
Dept: CARDIOLOGY | Facility: CLINIC | Age: 82
End: 2022-04-04

## 2022-04-04 DIAGNOSIS — I49.3 PVC (PREMATURE VENTRICULAR CONTRACTION): ICD-10-CM

## 2022-04-04 DIAGNOSIS — I48.19 ATRIAL FIBRILLATION, PERSISTENT: Primary | ICD-10-CM

## 2022-04-04 PROCEDURE — 93000 ELECTROCARDIOGRAM COMPLETE: CPT | Performed by: NURSE PRACTITIONER

## 2022-04-04 RX ORDER — PROPRANOLOL HCL 60 MG
60 CAPSULE, EXTENDED RELEASE 24HR ORAL EVERY MORNING
Qty: 30 CAPSULE | Refills: 0 | Status: SHIPPED | OUTPATIENT
Start: 2022-04-04 | End: 2022-04-20 | Stop reason: SDUPTHER

## 2022-04-04 NOTE — PROGRESS NOTES
Procedure     ECG 12 Lead    Date/Time: 4/4/2022 10:34 AM  Performed by: Kalina Millard DNP, APRN  Authorized by: Kalina Millard DNP, KOLBY   Comparison: compared with previous ECG from 3/28/2022  Rhythm: atrial fibrillation  Ectopy comments: PVCs  BPM: 72    Clinical impression: abnormal EKG            Pt is coming in today for a EKG. His HR today was 72 . Discuss with Rosibel. She advise the pt to start taking propanolol 60 MG daily, schedule a 48 hour Holter monitor for Friday, call if his HR is below 50.

## 2022-04-04 NOTE — TELEPHONE ENCOUNTER
Junito Sorto is scheduled to come in at 1030 for ECG.  Requested patient bring in pulse ox and BP cuff and he stated he will.    Patient also wanted to let you know that his propranolol ER is in capsule form so he was unable to cut it in half.  Patient held the medication all weekend.  Patient stated he will need new prescription if you want him to continue taking this lower dose.    Please let me know if there is anything else you would like me to do for this patient.    Thank you,  Betzaida Torres RN  Triage Nurse [facility] [place]

## 2022-04-05 ENCOUNTER — TELEPHONE (OUTPATIENT)
Dept: CARDIOLOGY | Facility: CLINIC | Age: 82
End: 2022-04-05

## 2022-04-05 NOTE — TELEPHONE ENCOUNTER
Pt LVM inquiring about Holter appt previously discussed. Sent off for approval to pre-cert and will call pt with appt date and time.    dd

## 2022-04-06 NOTE — TELEPHONE ENCOUNTER
Please call pt and schedule for 48 hr holter for Friday 4/8/22. Has been approved by pre-cert. Thank you.    dd

## 2022-04-07 ENCOUNTER — READMISSION MANAGEMENT (OUTPATIENT)
Dept: CALL CENTER | Facility: HOSPITAL | Age: 82
End: 2022-04-07

## 2022-04-07 NOTE — OUTREACH NOTE
COPD/PN Week 3 Survey    Flowsheet Row Responses   [facility] patient discharged from? [place]   Does the patient have one of the following disease processes/diagnoses(primary or secondary)? COPD/Pneumonia   Was the primary reason for admission: Pneumonia   Week 3 attempt successful? Yes   Call start time 1458   Call end time 1502   Discharge diagnosis PNA   Meds reviewed with patient/caregiver? Yes   Is the patient having any side effects they believe may be caused by any medication additions or changes? No   Does the patient have all medications ordered at discharge? Yes   Is the patient taking all medications as directed (includes completed medication regime)? Yes   Comments regarding appointments f/u with cardiology on 3/28   Does the patient have a primary care provider?  Yes   Does the patient have an appointment with their PCP or specialist within 7 days of discharge? No   Comments regarding PCP Spouse states patient has had PCP follow up   Has the patient kept scheduled appointments due by today? Yes   Has home health visited the patient within 72 hours of discharge? N/A   What DME was ordered? O2 - [place] [employer]   Has all DME been delivered? Yes   DME comments O2 at night   Pulse Ox monitoring Intermittent   Pulse Ox device source Patient   O2 Sat comments O2 sat 90's on RA   O2 Sat: education provided Sat levels, Monitoring frequency, When to seek care   Psychosocial issues? No   Nursing interventions Reviewed instructions with patient   What is the patient's perception of their health status since discharge? Improving   Nursing Interventions Nurse provided patient education   Is the patient/caregiver able to teach back signs and symptoms of worsening condition: Fever/chills, Shortness of breath, Chest pain   Is the patient/caregiver able to teach back importance of completing antibiotic course of treatment? Yes   Week 3 call completed? Yes          GOPAL CHURCH - Registered Nurse

## 2022-04-11 ENCOUNTER — TELEPHONE (OUTPATIENT)
Dept: CARDIOLOGY | Facility: CLINIC | Age: 82
End: 2022-04-11

## 2022-04-11 NOTE — TELEPHONE ENCOUNTER
Pt LVM stating he is coming in to bring his 48 hr monitor and will bring a copy of his vitals log with him and leave it up front in case we are interested in it.    isaiah

## 2022-04-12 ENCOUNTER — OFFICE VISIT (OUTPATIENT)
Dept: GASTROENTEROLOGY | Facility: CLINIC | Age: 82
End: 2022-04-12

## 2022-04-12 VITALS — TEMPERATURE: 97 F | BODY MASS INDEX: 35.31 KG/M2 | WEIGHT: 225 LBS | HEIGHT: 67 IN

## 2022-04-12 DIAGNOSIS — K21.9 GASTROESOPHAGEAL REFLUX DISEASE WITHOUT ESOPHAGITIS: Primary | ICD-10-CM

## 2022-04-12 DIAGNOSIS — K44.9 HIATAL HERNIA: ICD-10-CM

## 2022-04-12 PROCEDURE — 99212 OFFICE O/P EST SF 10 MIN: CPT | Performed by: INTERNAL MEDICINE

## 2022-04-12 NOTE — TELEPHONE ENCOUNTER
Please let him know that we will review the Holter monitor and then determine the next steps.  Thank you.

## 2022-04-12 NOTE — PROGRESS NOTES
Chief Complaint   Patient presents with   • Esophageal dysphagia        Junito Sorto is a  81 y.o. male here for a follow up visit for GERD.    HPI     Patient 81-year-old male with history hypertension, hyperlipidemia and GERD who was recently admitted for possible aspiration pneumonia.  Patient had been suffering for several weeks with dysphagia.  Patient with history of esophageal stricture underwent esophagram revealing tertiary contractions significant for inflammation but no stricture noted.  Patient underwent EGD showing no active esophagitis though microscopically changes of esophagitis were present with no eosinophilia.  Patient underwent Farrar dilation during the procedure without resistance but patient has been doing well ever since.  No further dysphagia was noted and pneumonia and cough has resolved.    Past Medical History:   Diagnosis Date   • Arthritis    • At risk for obstructive sleep apnea     5   • Atrial fibrillation (HCC)    • Bladder outlet obstruction 09/11/2019   • BPH (benign prostatic hyperplasia)    • Colon polyp    • Coronary artery disease Few years    Afib   • Dysphagia    • Eczema    • GERD (gastroesophageal reflux disease) 2013    Mentioned as possible   • Hernia     Your office reported hiatal   • Hiatal hernia    • Hyperlipidemia    • Hypertension    • Left ventricular dysfunction    • Low back pain    • Prostate cancer (HCC)    • Skin cancer          Current Outpatient Medications:   •  aspirin 81 MG chewable tablet, Chew 81 mg Daily., Disp: , Rfl:   •  atorvastatin (LIPITOR) 40 MG tablet, TAKE ONE TABLET BY MOUTH DAILY (Patient taking differently: Take 40 mg by mouth Every Night.), Disp: 90 tablet, Rfl: 3  •  fluticasone (Flonase) 50 MCG/ACT nasal spray, 2 sprays into the nostril(s) as directed by provider Daily. (Patient taking differently: 2 sprays into the nostril(s) as directed by provider As Needed.), Disp: 15.8 mL, Rfl: 0  •  furosemide (LASIX) 20 MG tablet, Take 1  tablet by mouth Daily for 30 days., Disp: 90 tablet, Rfl: 1  •  guaiFENesin 200 MG tablet, Take 400 mg by mouth Every 4 (Four) Hours As Needed for Cough., Disp: , Rfl:   •  loratadine (CLARITIN) 10 MG tablet, Take 10 mg by mouth Daily., Disp: , Rfl:   •  O2 (OXYGEN), Inhale 2 L/min Every Night., Disp: , Rfl:   •  omeprazole (priLOSEC) 40 MG capsule, Take 1 capsule by mouth Daily., Disp: 90 capsule, Rfl: 3  •  propranolol LA (INDERAL LA) 60 MG 24 hr capsule, Take 1 capsule by mouth Every Morning., Disp: 30 capsule, Rfl: 0    Allergies   Allergen Reactions   • Dilaudid [Hydromorphone Hcl] Hallucinations       Social History     Socioeconomic History   • Marital status:    Tobacco Use   • Smoking status: Former Smoker     Years: 2.00     Types: Pipe     Start date: 1959     Quit date: 1961     Years since quittin.4   • Smokeless tobacco: Never Used   • Tobacco comment: I have allergies to air born pollen etc.  Smoking made it worse, tasted bad...so quit.   Vaping Use   • Vaping Use: Never used   Substance and Sexual Activity   • Alcohol use: Yes     Alcohol/week: 5.0 standard drinks     Types: 5 Glasses of wine per week     Comment: Less than 4 oz /wk since this prob started.   • Drug use: No   • Sexual activity: Defer     Comment: 50 years ago       Family History   Problem Relation Age of Onset   • Cancer Mother         pancreatic   • Liver disease Mother    • Cancer Father         prostate, bladder, colon, lymphoma, leukemia   • Colon cancer Father    • Arrhythmia Father         Afib   • Heart disease Father    • Cancer Brother         prostate   • Arrhythmia Brother         Afib   • Cancer Paternal Grandfather         prostate   • Arrhythmia Paternal Aunt         Irregular beat . Before the time of illness sharing.   • Malig Hyperthermia Neg Hx        Review of Systems   Constitutional: Negative.    Respiratory: Negative.    Cardiovascular: Negative.    Gastrointestinal: Negative.     Musculoskeletal: Negative.    Skin: Negative.    Hematological: Negative.        Vitals:    04/12/22 1214   Temp: 97 °F (36.1 °C)       Physical Exam  Vitals reviewed.   Constitutional:       Appearance: He is well-developed.   HENT:      Head: Normocephalic and atraumatic.   Eyes:      General: No scleral icterus.     Pupils: Pupils are equal, round, and reactive to light.   Cardiovascular:      Rate and Rhythm: Normal rate and regular rhythm.      Heart sounds: Normal heart sounds.   Pulmonary:      Effort: Pulmonary effort is normal.      Breath sounds: Normal breath sounds.   Abdominal:      General: Bowel sounds are normal. There is no distension.      Palpations: Abdomen is soft. There is no mass.      Tenderness: There is no abdominal tenderness.      Hernia: No hernia is present.   Skin:     General: Skin is warm and dry.      Coloration: Skin is not jaundiced.      Findings: No rash.   Neurological:      General: No focal deficit present.      Mental Status: He is alert and oriented to person, place, and time.   Psychiatric:         Behavior: Behavior normal.         Thought Content: Thought content normal.         Judgment: Judgment normal.         Hospital Outpatient Visit on 04/08/2022   Component Date Value Ref Range Status   • Target HR (85%) 04/08/2022 118  bpm In process   • Max. Pred. HR (100%) 04/08/2022 139  bpm In process   Lab on 03/30/2022   Component Date Value Ref Range Status   • Glucose 03/30/2022 119 (A) 65 - 99 mg/dL Final   • BUN 03/30/2022 14  8 - 23 mg/dL Final   • Creatinine 03/30/2022 0.76  0.76 - 1.27 mg/dL Final   • Sodium 03/30/2022 142  136 - 145 mmol/L Final   • Potassium 03/30/2022 3.6  3.5 - 5.2 mmol/L Final   • Chloride 03/30/2022 104  98 - 107 mmol/L Final   • CO2 03/30/2022 29.0  22.0 - 29.0 mmol/L Final   • Calcium 03/30/2022 9.6  8.6 - 10.5 mg/dL Final   • BUN/Creatinine Ratio 03/30/2022 18.4  7.0 - 25.0 Final   • Anion Gap 03/30/2022 9.0  5.0 - 15.0 mmol/L Final   •  eGFR 03/30/2022 90.3  >60.0 mL/min/1.73 Final   • WBC 03/30/2022 6.87  3.40 - 10.80 10*3/mm3 Final   • RBC 03/30/2022 4.28  4.14 - 5.80 10*6/mm3 Final   • Hemoglobin 03/30/2022 12.7 (A) 13.0 - 17.7 g/dL Final   • Hematocrit 03/30/2022 39.2  37.5 - 51.0 % Final   • MCV 03/30/2022 91.6  79.0 - 97.0 fL Final   • MCH 03/30/2022 29.7  26.6 - 33.0 pg Final   • MCHC 03/30/2022 32.4  31.5 - 35.7 g/dL Final   • RDW 03/30/2022 13.1  12.3 - 15.4 % Final   • RDW-SD 03/30/2022 43.7  37.0 - 54.0 fl Final   • MPV 03/30/2022 11.0  6.0 - 12.0 fL Final   • Platelets 03/30/2022 200  140 - 450 10*3/mm3 Final   No results displayed because visit has over 200 results.      No results displayed because visit has over 200 results.          Diagnoses and all orders for this visit:    1. Gastroesophageal reflux disease without esophagitis (Primary)    2. Hiatal hernia      Patient is a 81-year-old male with history of hypertension, hyperlipidemia and atrial fibrillation who had been complaining of esophageal dysphagia.  Esophagram revealed no stricture but irritability with tertiary contractions.  Patient admitted for pneumonia underwent EGD with dilation.  Patient with no obstruction to passing the dilator but has been doing well since.  Biopsies with microscopic changes of reflux otherwise negative.  At this point will continue omeprazole and monitor clinically for recurrence.

## 2022-04-12 NOTE — TELEPHONE ENCOUNTER
Pt LVM stating he dropped off his holter monitor yesterday and had a HR of 35 this morning that has now normalized. Please advise    dd

## 2022-04-13 ENCOUNTER — TELEPHONE (OUTPATIENT)
Dept: CARDIOLOGY | Facility: CLINIC | Age: 82
End: 2022-04-13

## 2022-04-13 NOTE — PROGRESS NOTES
Suzy Metcalf and Dr. Prater not in the office today.  Heart rate normal today.  Does have a few PVCs EGM looks like she is having less during recent hospitalization as well.  I suspect that his heart rate readings at home are likely falsely low at times due to PVCs not being picked up on pulse ox.  He is asymptomatic.  Will check Holter on low-dose beta-blocker to determine PVC burden and average heart rate control.  However patient to call right away if he develops heart rate readings less than 50 or any symptoms or concerns prior to that time.

## 2022-04-13 NOTE — TELEPHONE ENCOUNTER
I recently saw patient in the office.  He was started on furosemide 20 mg daily.  Can you call to see if this has helped his breathing?

## 2022-04-14 NOTE — TELEPHONE ENCOUNTER
Spoke with patient, and he said he has not noticed a difference with his breathing since starting the furosemide 20 mg daily.     Sobia Briceño RN  Triage [facility] [place]

## 2022-04-15 DIAGNOSIS — R73.9 HYPERGLYCEMIA: ICD-10-CM

## 2022-04-15 DIAGNOSIS — E78.2 MODERATE MIXED HYPERLIPIDEMIA NOT REQUIRING STATIN THERAPY: Primary | ICD-10-CM

## 2022-04-15 NOTE — TELEPHONE ENCOUNTER
I spoke with patient via telephone.  He has not noticed any difference in his shortness of breath with the addition of furosemide.  He is not having any swelling or fluid retention.  It is causing him to have increased urinary frequency.  I recommended just stopping the furosemide and monitoring for symptoms.  If he starts to have any heart failure symptoms to contact the office.

## 2022-04-16 LAB
ALBUMIN SERPL-MCNC: 3.9 G/DL (ref 3.6–4.6)
ALBUMIN/GLOB SERPL: 1.4 {RATIO} (ref 1.2–2.2)
ALP SERPL-CCNC: 85 IU/L (ref 44–121)
ALT SERPL-CCNC: 15 IU/L (ref 0–44)
AST SERPL-CCNC: 21 IU/L (ref 0–40)
BASOPHILS # BLD AUTO: 0 X10E3/UL (ref 0–0.2)
BASOPHILS NFR BLD AUTO: 1 %
BILIRUB SERPL-MCNC: 1.3 MG/DL (ref 0–1.2)
BUN SERPL-MCNC: 14 MG/DL (ref 8–27)
BUN/CREAT SERPL: 22 (ref 10–24)
CALCIUM SERPL-MCNC: 9.7 MG/DL (ref 8.6–10.2)
CHLORIDE SERPL-SCNC: 102 MMOL/L (ref 96–106)
CHOLEST SERPL-MCNC: 125 MG/DL (ref 100–199)
CO2 SERPL-SCNC: 25 MMOL/L (ref 20–29)
CREAT SERPL-MCNC: 0.64 MG/DL (ref 0.76–1.27)
EGFRCR SERPLBLD CKD-EPI 2021: 95 ML/MIN/1.73
EOSINOPHIL # BLD AUTO: 0.4 X10E3/UL (ref 0–0.4)
EOSINOPHIL NFR BLD AUTO: 7 %
ERYTHROCYTE [DISTWIDTH] IN BLOOD BY AUTOMATED COUNT: 12.7 % (ref 11.6–15.4)
GLOBULIN SER CALC-MCNC: 2.8 G/DL (ref 1.5–4.5)
GLUCOSE SERPL-MCNC: 95 MG/DL (ref 65–99)
HBA1C MFR BLD: 6 % (ref 4.8–5.6)
HCT VFR BLD AUTO: 39.5 % (ref 37.5–51)
HDLC SERPL-MCNC: 49 MG/DL
HGB BLD-MCNC: 13.2 G/DL (ref 13–17.7)
IMM GRANULOCYTES # BLD AUTO: 0 X10E3/UL (ref 0–0.1)
IMM GRANULOCYTES NFR BLD AUTO: 0 %
LDLC SERPL CALC-MCNC: 63 MG/DL (ref 0–99)
LDLC/HDLC SERPL: 1.3 RATIO (ref 0–3.6)
LYMPHOCYTES # BLD AUTO: 1.1 X10E3/UL (ref 0.7–3.1)
LYMPHOCYTES NFR BLD AUTO: 17 %
MCH RBC QN AUTO: 29.1 PG (ref 26.6–33)
MCHC RBC AUTO-ENTMCNC: 33.4 G/DL (ref 31.5–35.7)
MCV RBC AUTO: 87 FL (ref 79–97)
MONOCYTES # BLD AUTO: 0.5 X10E3/UL (ref 0.1–0.9)
MONOCYTES NFR BLD AUTO: 8 %
NEUTROPHILS # BLD AUTO: 4.3 X10E3/UL (ref 1.4–7)
NEUTROPHILS NFR BLD AUTO: 67 %
PLATELET # BLD AUTO: 187 X10E3/UL (ref 150–450)
POTASSIUM SERPL-SCNC: 4 MMOL/L (ref 3.5–5.2)
PROT SERPL-MCNC: 6.7 G/DL (ref 6–8.5)
RBC # BLD AUTO: 4.54 X10E6/UL (ref 4.14–5.8)
SODIUM SERPL-SCNC: 140 MMOL/L (ref 134–144)
TRIGL SERPL-MCNC: 62 MG/DL (ref 0–149)
UNABLE TO VOID: NORMAL
VLDLC SERPL CALC-MCNC: 13 MG/DL (ref 5–40)
WBC # BLD AUTO: 6.4 X10E3/UL (ref 3.4–10.8)

## 2022-04-20 ENCOUNTER — TELEPHONE (OUTPATIENT)
Dept: CARDIOLOGY | Facility: CLINIC | Age: 82
End: 2022-04-20

## 2022-04-20 RX ORDER — PROPRANOLOL HCL 60 MG
60 CAPSULE, EXTENDED RELEASE 24HR ORAL EVERY MORNING
Qty: 90 CAPSULE | Refills: 3 | Status: SHIPPED | OUTPATIENT
Start: 2022-04-20 | End: 2022-04-29

## 2022-04-20 NOTE — TELEPHONE ENCOUNTER
Interpretation Summary    · An abnormal monitor study.  · Atrial fibrillation is present throughout, maximal minimal heart rates correspond to atrial fibrillation  · Average heart rate 60 bpm, range 32-93, no pauses are reported.      I spoke with patient via telephone about Holter monitor results he verbalizes understanding.  Average heart rate is at 60.  He denies lightheadedness, dizziness, near-syncope, or syncope.  Continue current medication regimen and follow-up with Dr. Prater in June.

## 2022-04-22 ENCOUNTER — OFFICE VISIT (OUTPATIENT)
Dept: FAMILY MEDICINE CLINIC | Facility: CLINIC | Age: 82
End: 2022-04-22

## 2022-04-22 VITALS
TEMPERATURE: 97.3 F | HEART RATE: 55 BPM | BODY MASS INDEX: 35.63 KG/M2 | HEIGHT: 67 IN | WEIGHT: 227 LBS | SYSTOLIC BLOOD PRESSURE: 148 MMHG | OXYGEN SATURATION: 98 % | DIASTOLIC BLOOD PRESSURE: 84 MMHG

## 2022-04-22 DIAGNOSIS — E66.09 CLASS 2 OBESITY DUE TO EXCESS CALORIES WITHOUT SERIOUS COMORBIDITY WITH BODY MASS INDEX (BMI) OF 37.0 TO 37.9 IN ADULT: ICD-10-CM

## 2022-04-22 DIAGNOSIS — I48.0 PAROXYSMAL ATRIAL FIBRILLATION: Primary | ICD-10-CM

## 2022-04-22 PROCEDURE — 99214 OFFICE O/P EST MOD 30 MIN: CPT | Performed by: INTERNAL MEDICINE

## 2022-04-22 NOTE — PROGRESS NOTES
Subjective   Junito Sorto is a 81 y.o. male. Patient is here today for No chief complaint on file.         Vitals:    22 1021   BP: 148/84   Pulse: 55   Temp: 97.3 °F (36.3 °C)   SpO2: 98%     Body mass index is 35.54 kg/m².      Past Medical History:   Diagnosis Date   • Arthritis    • At risk for obstructive sleep apnea     5   • Atrial fibrillation (HCC)    • Bladder outlet obstruction 2019   • BPH (benign prostatic hyperplasia)    • Colon polyp    • Coronary artery disease Few years    Afib   • Dysphagia    • Eczema    • GERD (gastroesophageal reflux disease) 2013    Mentioned as possible   • Hernia     Your office reported hiatal   • Hiatal hernia    • Hyperlipidemia    • Hypertension    • Left ventricular dysfunction    • Low back pain    • Prostate cancer (HCC)    • Skin cancer       Allergies   Allergen Reactions   • Dilaudid [Hydromorphone Hcl] Hallucinations      Social History     Socioeconomic History   • Marital status:    Tobacco Use   • Smoking status: Former Smoker     Years: 2.00     Types: Pipe     Start date: 1959     Quit date: 1961     Years since quittin.4   • Smokeless tobacco: Never Used   • Tobacco comment: I have allergies to air born pollen etc.  Smoking made it worse, tasted bad...so quit.   Vaping Use   • Vaping Use: Never used   Substance and Sexual Activity   • Alcohol use: Yes     Alcohol/week: 5.0 standard drinks     Types: 5 Glasses of wine per week     Comment: Less than 4 oz /wk since this prob started.   • Drug use: No   • Sexual activity: Defer     Comment: 50 years ago        Current Outpatient Medications:   •  aspirin 81 MG chewable tablet, Chew 81 mg Daily., Disp: , Rfl:   •  atorvastatin (LIPITOR) 40 MG tablet, TAKE ONE TABLET BY MOUTH DAILY (Patient taking differently: Take 40 mg by mouth Every Night.), Disp: 90 tablet, Rfl: 3  •  fluticasone (Flonase) 50 MCG/ACT nasal spray, 2 sprays into the nostril(s) as directed by provider  Daily. (Patient taking differently: 2 sprays into the nostril(s) as directed by provider As Needed.), Disp: 15.8 mL, Rfl: 0  •  guaiFENesin 200 MG tablet, Take 400 mg by mouth Every 4 (Four) Hours As Needed for Cough., Disp: , Rfl:   •  loratadine (CLARITIN) 10 MG tablet, Take 10 mg by mouth Daily., Disp: , Rfl:   •  O2 (OXYGEN), Inhale 2 L/min Every Night., Disp: , Rfl:   •  omeprazole (priLOSEC) 40 MG capsule, Take 1 capsule by mouth Daily., Disp: 90 capsule, Rfl: 3  •  propranolol LA (INDERAL LA) 60 MG 24 hr capsule, Take 1 capsule by mouth Every Morning., Disp: 90 capsule, Rfl: 3     Objective     He is here to follow-up on a hospitalization from about a month ago.    He was hospitalized with pneumonia.  Been about a week in the hospital.  He was seen by pulmonary medicine, cardiology.    Is like he is doing pretty well since discharge.  However, he complains of orthopnea.  He is not taking a diuretic.       Review of Systems   Constitutional: Negative.    HENT: Negative.    Respiratory:        He notes paroxysmal nocturnal dyspnea.   Cardiovascular: Negative.    Musculoskeletal: Negative.    Psychiatric/Behavioral: Negative.        Physical Exam  Vitals and nursing note reviewed.   Constitutional:       Appearance: Normal appearance. He is obese.      Comments: Pleasant, neatly groomed, in no distress.   Cardiovascular:      Rate and Rhythm: Normal rate. Rhythm irregular.      Heart sounds: Normal heart sounds. No murmur heard.    No gallop.   Pulmonary:      Effort: No respiratory distress.      Breath sounds: Normal breath sounds. No wheezing or rales.   Musculoskeletal:      Right lower leg: No edema.   Neurological:      General: No focal deficit present.      Mental Status: He is alert and oriented to person, place, and time.           Problems Addressed this Visit        Cardiac and Vasculature    Paroxysmal atrial fibrillation (HCC) - Primary       Endocrine and Metabolic    Obesity      Diagnoses        Codes Comments    Paroxysmal atrial fibrillation (HCC)    -  Primary ICD-10-CM: I48.0  ICD-9-CM: 427.31     Class 2 obesity due to excess calories without serious comorbidity with body mass index (BMI) of 37.0 to 37.9 in adult     ICD-10-CM: E66.09, Z68.37  ICD-9-CM: 278.00, V85.37             PLAN  He has atrial fibrillation.  He is on only aspirin for anticoagulation per his request.  Taking more potent anticoagulants has not been something that he is agreed to doing.    He has orthopnea and paroxysmal nocturnal dyspnea.  His exam today is relatively benign.  I do not hear any rales.  He is got some furosemide at home.  I asked him to start taking that 1 pill in the morning.  He is annoyed that it makes him urinate more frequently and he is not sure whether or not it is worth the inconvenience    Has an appointment to follow-up with his pulmonologist on Monday.  Was going to arrange for him to get a follow-up chest x-ray or CT chest.  I will leave that to his pulmonologist as he sees fit.    I asked him to follow-up in about 3 months.      No follow-ups on file.

## 2022-04-29 RX ORDER — PROPRANOLOL HCL 60 MG
CAPSULE, EXTENDED RELEASE 24HR ORAL
Qty: 90 CAPSULE | Refills: 3 | Status: SHIPPED | OUTPATIENT
Start: 2022-04-29 | End: 2022-07-11

## 2022-07-11 ENCOUNTER — OFFICE VISIT (OUTPATIENT)
Dept: CARDIOLOGY | Facility: CLINIC | Age: 82
End: 2022-07-11

## 2022-07-11 VITALS
WEIGHT: 223 LBS | BODY MASS INDEX: 35 KG/M2 | RESPIRATION RATE: 18 BRPM | HEART RATE: 58 BPM | OXYGEN SATURATION: 98 % | HEIGHT: 67 IN | SYSTOLIC BLOOD PRESSURE: 134 MMHG | DIASTOLIC BLOOD PRESSURE: 82 MMHG

## 2022-07-11 DIAGNOSIS — I48.19 ATRIAL FIBRILLATION, PERSISTENT: Primary | ICD-10-CM

## 2022-07-11 DIAGNOSIS — I51.9 LEFT VENTRICULAR DYSFUNCTION: ICD-10-CM

## 2022-07-11 PROCEDURE — 93000 ELECTROCARDIOGRAM COMPLETE: CPT | Performed by: INTERNAL MEDICINE

## 2022-07-11 PROCEDURE — 99214 OFFICE O/P EST MOD 30 MIN: CPT | Performed by: INTERNAL MEDICINE

## 2022-07-11 RX ORDER — METOPROLOL SUCCINATE 25 MG/1
25 TABLET, EXTENDED RELEASE ORAL DAILY
Qty: 90 TABLET | Refills: 3 | Status: SHIPPED | OUTPATIENT
Start: 2022-07-11

## 2022-07-11 NOTE — PROGRESS NOTES
Subjective:     Encounter Date:07/11/22      Patient ID: Junito Sorto is a 81 y.o. male.    Chief Complaint:  History of Present Illness      Dear Dr. Schulz,    I had the pleasure of seeing this patient in the office today for follow-up of his atrial fibrillation.    I saw March when he was admitted with a right lower lobe pneumonia and shortness of breath.  Patient had a long history of permanent atrial fibrillation and even treated with propranolol.  At 1 point he had been on apixaban but he had significant bruising and so it was stopped and he decided to never take chronic anticoagulation again.  We was hospitalized to get an echocardiogram, reviewed below, that showed ejection unction of 46-50%.  He was then readmitted a week later with shortness of breath he was started on furosemide 20 mg at that time.  He again declined anticoagulation.    He was then seen in the office March 28, 2022.    Today says overall he is doing okay except that he just is very fatigued.  He states that his heart rate usually is in the 50s and 60s.  He did wear a Holter monitor in April and that showed an average heart rate of 60.  No pauses were seen, no episodes of tachycardia were seen maximal heart rate was only about 90.    The following portions of the patient's history were reviewed and updated as appropriate: allergies, current medications, past family history, past medical history, past social history, past surgical history and problem list.    Past Medical History:   Diagnosis Date   • Arthritis    • At risk for obstructive sleep apnea     5   • Atrial fibrillation (HCC)    • Bladder outlet obstruction 09/11/2019   • BPH (benign prostatic hyperplasia)    • Colon polyp    • Coronary artery disease Few years    Afib   • Dysphagia    • Eczema    • GERD (gastroesophageal reflux disease) 2013    Mentioned as possible   • Hernia     Your office reported hiatal   • Hiatal hernia    • Hyperlipidemia    • Hypertension    •  "Left ventricular dysfunction    • Low back pain    • Prostate cancer (HCC)    • Skin cancer        Past Surgical History:   Procedure Laterality Date   • APPENDECTOMY  75 yrs ago   • CATARACT EXTRACTION, BILATERAL     • COLONOSCOPY  unknown   • CYSTOSCOPY TRANSURETHRAL RESECTION OF PROSTATE N/A 09/11/2019    Procedure: CYSTOSCOPY TRANSURETHRAL RESECTION OF PROSTATE;  Surgeon: Chip Bedolla MD;  Location: [facility] OR;  Service: Urology   • ENDOSCOPY N/A 10/31/2018    LA Grade C reflux esophagitis, HH, erythematous mucosa in the antrum. Path: Gastritis, esophagitis.    • ENDOSCOPY N/A 03/20/2022    Procedure: ESOPHAGOGASTRODUODENOSCOPY WITH BIOPSIES AND NAILS DILATION #56;  Surgeon: Tony Ramos MD;  Location: [facility] ENDOSCOPY;  Service: Gastroenterology;  Laterality: N/A;  PRE OP - DYSPHAGIA  POST OP- MILD GASTRITIS   • EPIDURAL BLOCK     • ESOPHAGEAL DILATATION     • PROSTATE SURGERY  11/06/2014   • SKIN CANCER EXCISION      multiple, over that last few years   • SKIN CANCER EXCISION Right 09/03/2019    FACE,    • TONSILLECTOMY      around 1950   • UPPER GASTROINTESTINAL ENDOSCOPY  2013           ECG 12 Lead    Date/Time: 7/11/2022 12:24 PM  Performed by: Steve Prater III, MD  Authorized by: Steve Prater III, MD   Comparison: compared with previous ECG   Similar to previous ECG  Rhythm: atrial fibrillation  Rate: bradycardic  Conduction: conduction normal  ST Segments: ST segments normal  T Waves: T waves normal  QRS axis: normal  Other findings: poor R wave progression    Clinical impression: abnormal EKG               Objective:     Vitals:    07/11/22 1052   BP: 134/82   BP Location: Right arm   Patient Position: Sitting   Cuff Size: Large Adult   Pulse: 58   Resp: 18   SpO2: 98%   Weight: 101 kg (223 lb)   Height: 170.2 cm (67\")         Constitutional:       General: Not in acute distress.     Appearance: Well-developed. Not diaphoretic.   Eyes:      General:         Right eye: No discharge. "         Left eye: No discharge.      Conjunctiva/sclera: Conjunctivae normal.      Pupils: Pupils are equal, round, and reactive to light.   HENT:      Head: Normocephalic and atraumatic.      Nose: Nose normal.   Neck:      Thyroid: No thyromegaly.      Trachea: No tracheal deviation.      Lymphadenopathy: No cervical adenopathy.   Pulmonary:      Effort: Pulmonary effort is normal. No respiratory distress.      Breath sounds: Normal breath sounds. No stridor.   Chest:      Chest wall: Not tender to palpatation.   Cardiovascular:      Normal rate. Irregularly irregular rhythm.      Murmurs: There is no murmur.      No gallop. No S3 gallop.   Pulses:     Intact distal pulses.   Abdominal:      General: Bowel sounds are normal. There is no distension.      Palpations: Abdomen is soft. There is no abdominal mass.      Tenderness: There is no abdominal tenderness. There is no guarding or rebound.   Musculoskeletal: Normal range of motion.         General: No tenderness or deformity.      Cervical back: Normal range of motion and neck supple. Skin:     General: Skin is warm and dry.      Findings: No erythema or rash.   Neurological:      Mental Status: Alert.   Psychiatric:         Thought Content: Thought content normal.         Lab Review:   Lab Results   Component Value Date    GLUCOSE 95 04/15/2022    BUN 14 04/15/2022    CREATININE 0.64 (L) 04/15/2022    EGFRIFNONA 98 10/11/2021    EGFRIFAFRI 119 10/11/2021    BCR 22 04/15/2022    K 4.0 04/15/2022    CO2 25 04/15/2022    CALCIUM 9.7 04/15/2022    PROTENTOTREF 6.7 04/15/2022    ALBUMIN 3.9 04/15/2022    LABIL2 1.4 04/15/2022    AST 21 04/15/2022    ALT 15 04/15/2022     CBC    CBC 3/21/22 3/30/22 4/15/22   WBC 6.35 6.87 6.4   RBC 4.32 4.28 4.54   Hemoglobin 12.8 (A) 12.7 (A) 13.2   Hematocrit 38.2 39.2 39.5   MCV 88.4 91.6 87   MCH 29.6 29.7 29.1   MCHC 33.5 32.4 33.4   RDW 12.7 13.1 12.7   Platelets 237 200 187   (A) Abnormal value            Lab Results    Component Value Date    PROBNP 825.0 03/17/2022    PROBNP 808.0 03/03/2022    PROBNP 898.6 06/26/2016     No components found for: TROP  Lab Results   Component Value Date    DDIMERQUANT 0.65 (H) 03/17/2022         Results for orders placed during the hospital encounter of 03/03/22    Adult Transthoracic Echo Complete W/ Cont if Necessary Per Protocol    Interpretation Summary  · Left ventricular ejection fraction appears to be 46 - 50%. Left ventricular systolic function is low normal.  · Left ventricular diastolic function was indeterminate.  · Left atrial volume is severely increased.  · The right atrial cavity is moderately dilated.    CHADS-VASc Risk Assessment            3 Total Score    1 Hypertension    2 Age >/= 75        Criteria that do not apply:    CHF    DM    PRIOR STROKE/TIA/THROMBO    Vascular Disease    Age 65-74    Sex: Female                Assessment:          Diagnosis Plan   1. Atrial fibrillation, persistent (HCC)  metoprolol succinate XL (TOPROL-XL) 25 MG 24 hr tablet    ECG 12 Lead   2. Left ventricular dysfunction            Plan:       1.  Permanent atrial fibrillation, rate control strategy.  Patient does not have any symptoms of atrial fibrillation or palpitations  2.  Generalized fatigue-he has been on propranolol for a long time in April we cut back the dose but he really did not notice any improvement.  I will switch him over to metoprolol and see if that helps with his generalized fatigue  3.  Patient is a [id number] score of 3.  He has a class I indication for initiation of chronic anticoagulation but he is taken it before, had significant bruising, and decided despite the risk that he is not willing to take anticoagulation on a chronic basis.  We discussed possible left atrial appendage closure and he is interested in learning more.  He states he would be willing to take anticoagulation for short period of time if that was necessary.    Thank you very much for allowing us to  participate in the care of this pleasant patient.  Please don't hesitate to call if I can be of assistance in any way.      Current Outpatient Medications:   •  aspirin 81 MG chewable tablet, Chew 81 mg Daily., Disp: , Rfl:   •  atorvastatin (LIPITOR) 40 MG tablet, TAKE ONE TABLET BY MOUTH DAILY (Patient taking differently: Take 40 mg by mouth Every Night.), Disp: 90 tablet, Rfl: 3  •  fluticasone (Flonase) 50 MCG/ACT nasal spray, 2 sprays into the nostril(s) as directed by provider Daily. (Patient taking differently: 2 sprays into the nostril(s) as directed by provider As Needed.), Disp: 15.8 mL, Rfl: 0  •  guaiFENesin 200 MG tablet, Take 400 mg by mouth Every 4 (Four) Hours As Needed for Cough., Disp: , Rfl:   •  loratadine (CLARITIN) 10 MG tablet, Take 10 mg by mouth Daily., Disp: , Rfl:   •  omeprazole (priLOSEC) 40 MG capsule, Take 1 capsule by mouth Daily., Disp: 90 capsule, Rfl: 3  •  metoprolol succinate XL (TOPROL-XL) 25 MG 24 hr tablet, Take 1 tablet by mouth Daily., Disp: 90 tablet, Rfl: 3         No follow-ups on file.

## 2022-07-21 DIAGNOSIS — R73.9 HYPERGLYCEMIA: ICD-10-CM

## 2022-07-21 DIAGNOSIS — E78.2 MODERATE MIXED HYPERLIPIDEMIA NOT REQUIRING STATIN THERAPY: Primary | ICD-10-CM

## 2022-07-23 LAB
ALBUMIN SERPL-MCNC: 4 G/DL (ref 3.6–4.6)
ALBUMIN/GLOB SERPL: 1.5 {RATIO} (ref 1.2–2.2)
ALP SERPL-CCNC: 91 IU/L (ref 44–121)
ALT SERPL-CCNC: 25 IU/L (ref 0–44)
AST SERPL-CCNC: 32 IU/L (ref 0–40)
BASOPHILS # BLD AUTO: 0 X10E3/UL (ref 0–0.2)
BASOPHILS NFR BLD AUTO: 1 %
BILIRUB SERPL-MCNC: 1.8 MG/DL (ref 0–1.2)
BUN SERPL-MCNC: 13 MG/DL (ref 8–27)
BUN/CREAT SERPL: 19 (ref 10–24)
CALCIUM SERPL-MCNC: 9.8 MG/DL (ref 8.6–10.2)
CHLORIDE SERPL-SCNC: 101 MMOL/L (ref 96–106)
CHOLEST SERPL-MCNC: 143 MG/DL (ref 100–199)
CO2 SERPL-SCNC: 26 MMOL/L (ref 20–29)
CREAT SERPL-MCNC: 0.67 MG/DL (ref 0.76–1.27)
EGFRCR SERPLBLD CKD-EPI 2021: 94 ML/MIN/1.73
EOSINOPHIL # BLD AUTO: 0.4 X10E3/UL (ref 0–0.4)
EOSINOPHIL NFR BLD AUTO: 8 %
ERYTHROCYTE [DISTWIDTH] IN BLOOD BY AUTOMATED COUNT: 14.1 % (ref 11.6–15.4)
GLOBULIN SER CALC-MCNC: 2.6 G/DL (ref 1.5–4.5)
GLUCOSE SERPL-MCNC: 100 MG/DL (ref 65–99)
HBA1C MFR BLD: 6.1 % (ref 4.8–5.6)
HCT VFR BLD AUTO: 43.6 % (ref 37.5–51)
HDLC SERPL-MCNC: 52 MG/DL
HGB BLD-MCNC: 14.4 G/DL (ref 13–17.7)
IMM GRANULOCYTES # BLD AUTO: 0 X10E3/UL (ref 0–0.1)
IMM GRANULOCYTES NFR BLD AUTO: 0 %
LDLC SERPL CALC-MCNC: 77 MG/DL (ref 0–99)
LDLC/HDLC SERPL: 1.5 RATIO (ref 0–3.6)
LYMPHOCYTES # BLD AUTO: 0.9 X10E3/UL (ref 0.7–3.1)
LYMPHOCYTES NFR BLD AUTO: 17 %
MCH RBC QN AUTO: 28.3 PG (ref 26.6–33)
MCHC RBC AUTO-ENTMCNC: 33 G/DL (ref 31.5–35.7)
MCV RBC AUTO: 86 FL (ref 79–97)
MONOCYTES # BLD AUTO: 0.4 X10E3/UL (ref 0.1–0.9)
MONOCYTES NFR BLD AUTO: 7 %
NEUTROPHILS # BLD AUTO: 3.5 X10E3/UL (ref 1.4–7)
NEUTROPHILS NFR BLD AUTO: 67 %
PLATELET # BLD AUTO: 174 X10E3/UL (ref 150–450)
POTASSIUM SERPL-SCNC: 3.8 MMOL/L (ref 3.5–5.2)
PROT SERPL-MCNC: 6.6 G/DL (ref 6–8.5)
RBC # BLD AUTO: 5.08 X10E6/UL (ref 4.14–5.8)
SODIUM SERPL-SCNC: 140 MMOL/L (ref 134–144)
TRIGL SERPL-MCNC: 67 MG/DL (ref 0–149)
UNABLE TO VOID: NORMAL
VLDLC SERPL CALC-MCNC: 14 MG/DL (ref 5–40)
WBC # BLD AUTO: 5.3 X10E3/UL (ref 3.4–10.8)

## 2022-07-26 ENCOUNTER — OFFICE VISIT (OUTPATIENT)
Dept: FAMILY MEDICINE CLINIC | Facility: CLINIC | Age: 82
End: 2022-07-26

## 2022-07-26 VITALS
HEART RATE: 65 BPM | OXYGEN SATURATION: 96 % | SYSTOLIC BLOOD PRESSURE: 136 MMHG | RESPIRATION RATE: 18 BRPM | HEIGHT: 67 IN | TEMPERATURE: 97 F | WEIGHT: 222 LBS | BODY MASS INDEX: 34.84 KG/M2 | DIASTOLIC BLOOD PRESSURE: 80 MMHG

## 2022-07-26 DIAGNOSIS — E78.00 HYPERCHOLESTEROLEMIA: ICD-10-CM

## 2022-07-26 DIAGNOSIS — I48.0 PAROXYSMAL ATRIAL FIBRILLATION: Primary | ICD-10-CM

## 2022-07-26 DIAGNOSIS — E66.09 CLASS 2 OBESITY DUE TO EXCESS CALORIES WITHOUT SERIOUS COMORBIDITY WITH BODY MASS INDEX (BMI) OF 37.0 TO 37.9 IN ADULT: ICD-10-CM

## 2022-07-26 PROCEDURE — 99214 OFFICE O/P EST MOD 30 MIN: CPT | Performed by: INTERNAL MEDICINE

## 2022-07-26 NOTE — PROGRESS NOTES
Subjective   Junito Sorto is a 81 y.o. male. Patient is here today for   Chief Complaint   Patient presents with   • Hyperlipidemia   • Hypertension     Lab follow up           Vitals:    22 0955   BP: 136/80   Pulse: 65   Resp: 18   Temp: 97 °F (36.1 °C)   SpO2: 96%     Body mass index is 34.76 kg/m².      Past Medical History:   Diagnosis Date   • Arthritis    • At risk for obstructive sleep apnea     5   • Atrial fibrillation (HCC)    • Bladder outlet obstruction 2019   • BPH (benign prostatic hyperplasia)    • Colon polyp    • Coronary artery disease Few years    Afib   • Dysphagia    • Eczema    • GERD (gastroesophageal reflux disease) 2013    Mentioned as possible   • Hernia     Your office reported hiatal   • Hiatal hernia    • Hyperlipidemia    • Hypertension    • Left ventricular dysfunction    • Low back pain    • Prostate cancer (HCC)    • Skin cancer       Allergies   Allergen Reactions   • Dilaudid [Hydromorphone Hcl] Hallucinations      Social History     Socioeconomic History   • Marital status:    Tobacco Use   • Smoking status: Former Smoker     Years: 2.00     Types: Pipe     Start date: 1959     Quit date: 1961     Years since quittin.7   • Smokeless tobacco: Never Used   • Tobacco comment: I have allergies to air born pollen etc.  Smoking made it worse, tasted bad...so quit.   Vaping Use   • Vaping Use: Never used   Substance and Sexual Activity   • Alcohol use: Yes     Alcohol/week: 5.0 standard drinks     Types: 5 Glasses of wine per week     Comment: Less than 4 oz /wk since this prob started.   • Drug use: No   • Sexual activity: Defer     Comment: 50 years ago        Current Outpatient Medications:   •  aspirin 81 MG chewable tablet, Chew 81 mg Daily., Disp: , Rfl:   •  atorvastatin (LIPITOR) 40 MG tablet, TAKE ONE TABLET BY MOUTH DAILY (Patient taking differently: Take 40 mg by mouth Every Night.), Disp: 90 tablet, Rfl: 3  •  fluticasone (Flonase) 50  MCG/ACT nasal spray, 2 sprays into the nostril(s) as directed by provider Daily. (Patient taking differently: 2 sprays into the nostril(s) as directed by provider As Needed.), Disp: 15.8 mL, Rfl: 0  •  guaiFENesin 200 MG tablet, Take 400 mg by mouth Every 4 (Four) Hours As Needed for Cough., Disp: , Rfl:   •  loratadine (CLARITIN) 10 MG tablet, Take 10 mg by mouth Daily., Disp: , Rfl:   •  metoprolol succinate XL (TOPROL-XL) 25 MG 24 hr tablet, Take 1 tablet by mouth Daily., Disp: 90 tablet, Rfl: 3  •  omeprazole (priLOSEC) 40 MG capsule, Take 1 capsule by mouth Daily., Disp: 90 capsule, Rfl: 3     Objective     He is here today to follow-up on labs from last week.    His appointment this week to see Dr. Sauer    He does have paroxysmal atrial fibrillation warfarin for anticoagulation.  Using this device to decrease his risk for stroke.    Hyperlipidemia    Hypertension         Review of Systems   Constitutional: Negative.    HENT: Negative.    Respiratory: Negative.    Cardiovascular: Negative.    Gastrointestinal: Negative.    Musculoskeletal: Negative.    Skin: Negative.    Psychiatric/Behavioral: Negative.        Physical Exam  Vitals and nursing note reviewed.   Constitutional:       Appearance: Normal appearance.      Comments: Pleasant, neatly groomed, no distress.   Cardiovascular:      Heart sounds: Normal heart sounds. No murmur heard.    No gallop.      Comments: He has an irregularly irregular rhythm with a regular rate.  Pulmonary:      Effort: No respiratory distress.      Breath sounds: Normal breath sounds. No wheezing or rales.   Neurological:      Mental Status: He is alert and oriented to person, place, and time.   Psychiatric:         Mood and Affect: Mood normal.         Thought Content: Thought content normal.           Problems Addressed this Visit        Cardiac and Vasculature    Paroxysmal atrial fibrillation (HCC) - Primary    Hypercholesterolemia       Endocrine and Metabolic    Obesity  "     Diagnoses       Codes Comments    Paroxysmal atrial fibrillation (HCC)    -  Primary ICD-10-CM: I48.0  ICD-9-CM: 427.31     Class 2 obesity due to excess calories without serious comorbidity with body mass index (BMI) of 37.0 to 37.9 in adult     ICD-10-CM: E66.09, Z68.37  ICD-9-CM: 278.00, V85.37     Hypercholesterolemia     ICD-10-CM: E78.00  ICD-9-CM: 272.0             PLAN  His hypercholesterolemia on atorvastatin 40 mg daily.    His LDL cholesterol is less than 100 which is his target.    His atrial fibrillation.  He is going to be talking to cardiology sometime this week regarding the suitability of getting a \"watchman device\".    I like to see him back at the end of October.      No follow-ups on file.  Answers for HPI/ROS submitted by the patient on 7/24/2022  What is the primary reason for your visit?: Other  Please describe your symptoms.: Recovery from hospitalization  Have you had these symptoms before?: Yes  How long have you been having these symptoms?: Greater than 2 weeks  Please list any medications you are currently taking for this condition.: Topol-XL 25 mg, prilosec 40 mg, metoprlol succ er 25mg  Please describe any probable cause for these symptoms. : Recovery from pneumonia      "

## 2022-07-28 ENCOUNTER — OFFICE VISIT (OUTPATIENT)
Dept: CARDIOLOGY | Facility: CLINIC | Age: 82
End: 2022-07-28

## 2022-07-28 VITALS
OXYGEN SATURATION: 98 % | DIASTOLIC BLOOD PRESSURE: 80 MMHG | SYSTOLIC BLOOD PRESSURE: 130 MMHG | HEIGHT: 67 IN | WEIGHT: 220.2 LBS | HEART RATE: 55 BPM | BODY MASS INDEX: 34.56 KG/M2

## 2022-07-28 DIAGNOSIS — I48.21 PERMANENT ATRIAL FIBRILLATION: Primary | ICD-10-CM

## 2022-07-28 PROCEDURE — 99214 OFFICE O/P EST MOD 30 MIN: CPT | Performed by: INTERNAL MEDICINE

## 2022-07-28 NOTE — PROGRESS NOTES
[facility] New Patient Office Note     Encounter Date:22  Patient:Junito Sorto  :1940  MRN:[id number]    Referring Provider: Tony Ceballos MD    Consulted for: Permanent atrial fibrillation and consideration for Watchman    Chief Complaint:   Chief Complaint   Patient presents with   • Watchman Consult     History of Presenting Illness:      Mr. Sorto is a 81 y.o. gentleman with past medical history notable for permanent atrial fibrillation, hypertension, mild cardiomyopathy with ejection fraction in the 45% range, esophageal dysphagia status post dilation of his esophagus, and gastric reflux disease who presents her office for an initial evaluation by myself regarding consideration for left atrial appendage closure therapy including watchman.  In general patient has had longstanding atrial fibrillation.  He does state that in the past he was on Eliquis for this but had significant bruising and nuisance bleeding that he actually discontinued this.  He is actually been off of anticoagulation for a number of years as result.  Fortunately he has not had any cardioembolic events that he is aware of.  Additionally he is doing well from a rhythm standpoint and really does not have much in the way of symptoms of his atrial fibrillation.  He was recently seen by his primary cardiologist they did revisit his anticoagulation status did offer him an appointment to see us to discuss alternative therapies given his past issues with anticoagulation.  Generally has not had any life-threatening bleeding but did have significant bleeding that prompted him to stop his anticoagulation.  He has been on an 81 mg aspirin without any significant issues with bruising or bleeding.  He does have a prior issues with dysphagia did have his esophagus dilated about a year ago and right now has not had any issues with swallowing.      Review of Systems:  Review of Systems   Constitutional:  Negative.   HENT: Negative.    Eyes: Negative.    Cardiovascular: Negative.    Respiratory: Negative.    Endocrine: Negative.    Hematologic/Lymphatic: Negative.    Skin: Negative.    Musculoskeletal: Negative.    Gastrointestinal: Negative.    Genitourinary: Negative.    Neurological: Negative.    Psychiatric/Behavioral: Negative.    Allergic/Immunologic: Negative.        Current Outpatient Medications on File Prior to Visit   Medication Sig Dispense Refill   • aspirin 81 MG chewable tablet Chew 81 mg Daily.     • atorvastatin (LIPITOR) 40 MG tablet TAKE ONE TABLET BY MOUTH DAILY (Patient taking differently: Take 40 mg by mouth Every Night.) 90 tablet 3   • fluticasone (Flonase) 50 MCG/ACT nasal spray 2 sprays into the nostril(s) as directed by provider Daily. (Patient taking differently: 2 sprays into the nostril(s) as directed by provider As Needed.) 15.8 mL 0   • guaiFENesin 200 MG tablet Take 400 mg by mouth Every 4 (Four) Hours As Needed for Cough.     • loratadine (CLARITIN) 10 MG tablet Take 10 mg by mouth Daily.     • metoprolol succinate XL (TOPROL-XL) 25 MG 24 hr tablet Take 1 tablet by mouth Daily. 90 tablet 3   • omeprazole (priLOSEC) 40 MG capsule Take 1 capsule by mouth Daily. 90 capsule 3     No current facility-administered medications on file prior to visit.       Allergies   Allergen Reactions   • Dilaudid [Hydromorphone Hcl] Hallucinations       Past Medical History:   Diagnosis Date   • Arthritis    • At risk for obstructive sleep apnea     5   • Atrial fibrillation (HCC)    • Bladder outlet obstruction 09/11/2019   • BPH (benign prostatic hyperplasia)    • Colon polyp    • Coronary artery disease Few years    Afib   • Dysphagia    • Eczema    • GERD (gastroesophageal reflux disease) 2013    Mentioned as possible   • Hernia     Your office reported hiatal   • Hiatal hernia    • Hyperlipidemia    • Hypertension    • Left ventricular dysfunction    • Low back pain    • Prostate cancer (HCC)    •  Skin cancer        Past Surgical History:   Procedure Laterality Date   • APPENDECTOMY  75 yrs ago   • CATARACT EXTRACTION, BILATERAL     • COLONOSCOPY  unknown   • CYSTOSCOPY TRANSURETHRAL RESECTION OF PROSTATE N/A 2019    Procedure: CYSTOSCOPY TRANSURETHRAL RESECTION OF PROSTATE;  Surgeon: Chip Bedolla MD;  Location: [facility] MAIN OR;  Service: Urology   • ENDOSCOPY N/A 10/31/2018    LA Grade C reflux esophagitis, HH, erythematous mucosa in the antrum. Path: Gastritis, esophagitis.    • ENDOSCOPY N/A 2022    Procedure: ESOPHAGOGASTRODUODENOSCOPY WITH BIOPSIES AND NAILS DILATION #56;  Surgeon: Tony Ramos MD;  Location: [facility] ENDOSCOPY;  Service: Gastroenterology;  Laterality: N/A;  PRE OP - DYSPHAGIA  POST OP- MILD GASTRITIS   • EPIDURAL BLOCK     • ESOPHAGEAL DILATATION     • PROSTATE SURGERY  2014   • SKIN CANCER EXCISION      multiple, over that last few years   • SKIN CANCER EXCISION Right 2019    FACE,    • TONSILLECTOMY      around 1950   • UPPER GASTROINTESTINAL ENDOSCOPY         Social History     Socioeconomic History   • Marital status:    Tobacco Use   • Smoking status: Former Smoker     Years: 2.00     Types: Pipe     Start date: 1959     Quit date: 1961     Years since quittin.7   • Smokeless tobacco: Never Used   • Tobacco comment: I have allergies to air born pollen etc.  Smoking made it worse, tasted bad...so quit.CAFFENIE USE   Vaping Use   • Vaping Use: Never used   Substance and Sexual Activity   • Alcohol use: Yes     Alcohol/week: 5.0 standard drinks     Types: 5 Glasses of wine per week     Comment: Less than 4 oz /wk since this prob started.   • Drug use: No   • Sexual activity: Defer     Comment: 50 years ago       Family History   Problem Relation Age of Onset   • Cancer Mother         pancreatic   • Liver disease Mother    • Cancer Father         prostate, bladder, colon, lymphoma, leukemia   • Colon cancer Father    •  "Arrhythmia Father         Afib   • Heart disease Father    • Cancer Brother         prostate   • Arrhythmia Brother         Afib   • Cancer Paternal Grandfather         prostate   • Arrhythmia Paternal Aunt         Irregular beat . Before the time of illness sharing.   • Malig Hyperthermia Neg Hx        The following portions of the patient's history were reviewed and updated as appropriate: allergies, current medications, past family history, past medical history, past social history, past surgical history and problem list.       Objective:       Vitals:    07/28/22 1312   BP: 130/80   BP Location: Left arm   Patient Position: Sitting   Pulse: 55   SpO2: 98%   Weight: 99.9 kg (220 lb 3.2 oz)   Height: 170.2 cm (67.01\")       Body mass index is 34.48 kg/m².    Physical Exam:  Constitutional: Well appearing, Well-developed, No acute distress   HENT: Oropharynx clear and membrane moist  Eyes: Normal conjunctiva, no sclera icterus.  Neck: Supple, no carotid bruit bilaterally.  Cardiovascular: Irregularly irregular rate and rhythm, No Murmur, No bilateral lower extremity edema.  Pulmonary: Normal respiratory effort, Normal lung sounds, no wheezing.  Abdominal: Soft, nontender, no hepatosplenomegaly, liver is non-pulsatile.  Neurological: Alert and orient x 3.   Skin: Warm, dry, no ecchymosis, no rash.  Psych: Appropriate mood and affect. Normal judgment and insight.      Lab Results   Component Value Date     07/22/2022     04/15/2022    K 3.8 07/22/2022    K 4.0 04/15/2022     07/22/2022     04/15/2022    CO2 26 07/22/2022    CO2 25 04/15/2022    BUN 13 07/22/2022    BUN 14 04/15/2022    CREATININE 0.67 (L) 07/22/2022    CREATININE 0.64 (L) 04/15/2022    EGFRIFNONA 98 10/11/2021    EGFRIFNONA 114 04/01/2021    EGFRIFAFRI 119 10/11/2021    EGFRIFAFRI 138 04/01/2021    GLUCOSE 100 (H) 07/22/2022    GLUCOSE 95 04/15/2022    CALCIUM 9.8 07/22/2022    CALCIUM 9.7 04/15/2022    PROTENTOTREF 6.6 " 07/22/2022    PROTENTOTREF 6.7 04/15/2022    ALBUMIN 4.0 07/22/2022    ALBUMIN 3.9 04/15/2022    BILITOT 1.8 (H) 07/22/2022    BILITOT 1.3 (H) 04/15/2022    AST 32 07/22/2022    AST 21 04/15/2022    ALT 25 07/22/2022    ALT 15 04/15/2022     Lab Results   Component Value Date    WBC 5.3 07/22/2022    WBC 6.4 04/15/2022    HGB 14.4 07/22/2022    HGB 13.2 04/15/2022    HCT 43.6 07/22/2022    HCT 39.5 04/15/2022    MCV 86 07/22/2022    MCV 87 04/15/2022     07/22/2022     04/15/2022     Lab Results   Component Value Date    TRIG 67 07/22/2022    TRIG 62 04/15/2022    HDL 52 07/22/2022    HDL 49 04/15/2022    LDL 77 07/22/2022    LDL 63 04/15/2022     Lab Results   Component Value Date    PROBNP 825.0 03/17/2022    PROBNP 808.0 03/03/2022     Lab Results   Component Value Date    TROPONINT <0.010 03/17/2022     Lab Results   Component Value Date    TSH 2.130 03/05/2022       Holter monitor 4/8/2022:  Permanent atrial fibrillation noted throughout study with an average heart rate of 60 bpm and a range of 32 bpm up to 93 bpm    Echocardiogram 3/9/2022:  · Left ventricular ejection fraction estimated approximately 46 to 50%  · Indeterminate diastolic function  · Left atrial volume is severely increased  · Right atrial volume is moderately dilated        Assessment:          Diagnosis Plan   1. Permanent atrial fibrillation (HCC)            Plan:       Mr. Sorto is a 81 y.o. gentleman with past medical history notable for permanent atrial fibrillation, hypertension, mild cardiomyopathy with ejection fraction in the 45% range, esophageal dysphagia status post dilation of his esophagus, and gastric reflux disease who presents her office for an initial evaluation by myself regarding consideration for left atrial appendage closure therapy including watchman.  I did have a long discussion with the patient and his wife regarding the purpose of watchman in the hopes of reducing his risk of stroke as well as the  risk and benefits of the procedure compared to current observation therapy or reconsidering anticoagulation.  Does appear in the past that he was on Eliquis which from a bleeding standpoint would probably be our lowest risk option.  Does have a has bled score of 5.8% compared to his stroke risk of 4.8%.  Obviously would benefit from being on anticoagulation or stroke prevention therapy given his elevated stroke risk based upon his [id number] score of 3.  Given prior issues with bleeding requiring him to discontinue this I do think that this would qualify him as a reasonable candidate to consider left atrial appendage closure.  Again gave patient and wife information regarding the procedure risk and benefits for shared decision making regarding next steps.  They would like to review some more of this information and will reach out to them further regarding neck steps.  If they are interested in the procedure we will first need to schedule a screening transesophageal echocardiogram to further evaluate his left atrial appendage.  I did explain to them that he would need to be on short-term anticoagulation typically for a couple of months.  This would require post procedure CHARU screening in order to discontinue anticoagulation and would need to be on antiplatelet therapy which he seems to be tolerating now.  There is a 4% chance of potentially needing to stay on anticoagulation for a longer period of time and even a smaller chance of needing to stay on it lifelong if there is any issues with the device such as thrombus formation or sharon-device leak    Permanent atrial fibrillation:  · [id number] score 3 with a 4.8% risk of ischemic stroke  · Has bled score 5.8%  · Prior issues with significant bruising and nuisance bleeding on anticoagulation prompting the patient to discontinue this  · Would recommend some form of stroke prevention and patient and wife are going to consider left atrial appendage closure after  reviewing more literature and can finalize our shared decision-making process.  We will reach out to them in 1 week to decide on neck steps whether we proceed forward with testing/screening or continue current therapy.        Thank you for allowing me to participate in the care of Junito GABINO Sorto. Feel free to contact me directly with any further questions or concerns.    Tony Ceballos MD  [facility]  07/28/22  17:15 EDT

## 2022-08-01 ENCOUNTER — TELEPHONE (OUTPATIENT)
Dept: CARDIOLOGY | Facility: CLINIC | Age: 82
End: 2022-08-01

## 2022-08-01 NOTE — TELEPHONE ENCOUNTER
Pt was seen on 7/11/22. This is the plan:        He is calling today with an update. Since he has started the metoprolol he has notice a difference the first day of taking it.    PT#: [phone]

## 2022-08-08 ENCOUNTER — TELEPHONE (OUTPATIENT)
Dept: CARDIOLOGY | Facility: CLINIC | Age: 82
End: 2022-08-08

## 2022-08-08 DIAGNOSIS — I48.19 ATRIAL FIBRILLATION, PERSISTENT: Primary | ICD-10-CM

## 2022-08-08 NOTE — TELEPHONE ENCOUNTER
Did discuss with patient again regarding our conversation in the office regarding stroke prevention.  He is interested in further evaluation for left atrial appendage occlusion.  We will set up transesophageal echocardiogram to further evaluate his appendage.

## 2022-08-17 ENCOUNTER — TRANSCRIBE ORDERS (OUTPATIENT)
Dept: CARDIOLOGY | Facility: CLINIC | Age: 82
End: 2022-08-17

## 2022-08-17 DIAGNOSIS — Z13.6 SCREENING FOR ISCHEMIC HEART DISEASE: ICD-10-CM

## 2022-08-17 DIAGNOSIS — Z01.818 OTHER SPECIFIED PRE-OPERATIVE EXAMINATION: ICD-10-CM

## 2022-08-17 DIAGNOSIS — Z01.810 PRE-OPERATIVE CARDIOVASCULAR EXAMINATION: Primary | ICD-10-CM

## 2022-08-22 ENCOUNTER — LAB (OUTPATIENT)
Dept: LAB | Facility: HOSPITAL | Age: 82
End: 2022-08-22

## 2022-08-22 ENCOUNTER — TRANSCRIBE ORDERS (OUTPATIENT)
Dept: ADMINISTRATIVE | Facility: HOSPITAL | Age: 82
End: 2022-08-22

## 2022-08-22 DIAGNOSIS — Z01.810 PRE-OPERATIVE CARDIOVASCULAR EXAMINATION: ICD-10-CM

## 2022-08-22 DIAGNOSIS — Z01.818 OTHER SPECIFIED PRE-OPERATIVE EXAMINATION: ICD-10-CM

## 2022-08-22 DIAGNOSIS — C61 MALIGNANT NEOPLASM OF PROSTATE: ICD-10-CM

## 2022-08-22 DIAGNOSIS — Z13.6 SCREENING FOR ISCHEMIC HEART DISEASE: ICD-10-CM

## 2022-08-22 DIAGNOSIS — C61 MALIGNANT NEOPLASM OF PROSTATE: Primary | ICD-10-CM

## 2022-08-22 LAB
ANION GAP SERPL CALCULATED.3IONS-SCNC: 10.2 MMOL/L (ref 5–15)
BASOPHILS # BLD AUTO: 0.04 10*3/MM3 (ref 0–0.2)
BASOPHILS NFR BLD AUTO: 0.6 % (ref 0–1.5)
BUN SERPL-MCNC: 17 MG/DL (ref 8–23)
BUN/CREAT SERPL: 22.1 (ref 7–25)
CALCIUM SPEC-SCNC: 10 MG/DL (ref 8.6–10.5)
CHLORIDE SERPL-SCNC: 102 MMOL/L (ref 98–107)
CO2 SERPL-SCNC: 26.8 MMOL/L (ref 22–29)
CREAT SERPL-MCNC: 0.77 MG/DL (ref 0.76–1.27)
DEPRECATED RDW RBC AUTO: 50.9 FL (ref 37–54)
EGFRCR SERPLBLD CKD-EPI 2021: 89.9 ML/MIN/1.73
EOSINOPHIL # BLD AUTO: 0.21 10*3/MM3 (ref 0–0.4)
EOSINOPHIL NFR BLD AUTO: 3.3 % (ref 0.3–6.2)
ERYTHROCYTE [DISTWIDTH] IN BLOOD BY AUTOMATED COUNT: 15.1 % (ref 12.3–15.4)
GLUCOSE SERPL-MCNC: 112 MG/DL (ref 65–99)
HCT VFR BLD AUTO: 46.4 % (ref 37.5–51)
HGB BLD-MCNC: 15 G/DL (ref 13–17.7)
IMM GRANULOCYTES # BLD AUTO: 0.02 10*3/MM3 (ref 0–0.05)
IMM GRANULOCYTES NFR BLD AUTO: 0.3 % (ref 0–0.5)
LYMPHOCYTES # BLD AUTO: 1.26 10*3/MM3 (ref 0.7–3.1)
LYMPHOCYTES NFR BLD AUTO: 19.6 % (ref 19.6–45.3)
MCH RBC QN AUTO: 29.6 PG (ref 26.6–33)
MCHC RBC AUTO-ENTMCNC: 32.3 G/DL (ref 31.5–35.7)
MCV RBC AUTO: 91.5 FL (ref 79–97)
MONOCYTES # BLD AUTO: 0.46 10*3/MM3 (ref 0.1–0.9)
MONOCYTES NFR BLD AUTO: 7.1 % (ref 5–12)
NEUTROPHILS NFR BLD AUTO: 4.45 10*3/MM3 (ref 1.7–7)
NEUTROPHILS NFR BLD AUTO: 69.1 % (ref 42.7–76)
NRBC BLD AUTO-RTO: 0 /100 WBC (ref 0–0.2)
PLATELET # BLD AUTO: 176 10*3/MM3 (ref 140–450)
PMV BLD AUTO: 11.2 FL (ref 6–12)
POTASSIUM SERPL-SCNC: 4.5 MMOL/L (ref 3.5–5.2)
PSA SERPL-MCNC: 3.47 NG/ML (ref 0–4)
RBC # BLD AUTO: 5.07 10*6/MM3 (ref 4.14–5.8)
SODIUM SERPL-SCNC: 139 MMOL/L (ref 136–145)
WBC NRBC COR # BLD: 6.44 10*3/MM3 (ref 3.4–10.8)

## 2022-08-22 PROCEDURE — 84153 ASSAY OF PSA TOTAL: CPT

## 2022-08-22 PROCEDURE — 85025 COMPLETE CBC W/AUTO DIFF WBC: CPT

## 2022-08-22 PROCEDURE — 36415 COLL VENOUS BLD VENIPUNCTURE: CPT

## 2022-08-22 PROCEDURE — 80048 BASIC METABOLIC PNL TOTAL CA: CPT

## 2022-08-27 ENCOUNTER — LAB (OUTPATIENT)
Dept: LAB | Facility: HOSPITAL | Age: 82
End: 2022-08-27

## 2022-08-27 DIAGNOSIS — Z01.818 OTHER SPECIFIED PRE-OPERATIVE EXAMINATION: ICD-10-CM

## 2022-08-27 DIAGNOSIS — Z01.810 PRE-OPERATIVE CARDIOVASCULAR EXAMINATION: ICD-10-CM

## 2022-08-27 DIAGNOSIS — Z13.6 SCREENING FOR ISCHEMIC HEART DISEASE: ICD-10-CM

## 2022-08-27 LAB — SARS-COV-2 ORF1AB RESP QL NAA+PROBE: NOT DETECTED

## 2022-08-27 PROCEDURE — C9803 HOPD COVID-19 SPEC COLLECT: HCPCS

## 2022-08-27 PROCEDURE — U0005 INFEC AGEN DETEC AMPLI PROBE: HCPCS

## 2022-08-27 PROCEDURE — U0004 COV-19 TEST NON-CDC HGH THRU: HCPCS

## 2022-08-29 ENCOUNTER — TELEPHONE (OUTPATIENT)
Dept: CARDIOLOGY | Facility: CLINIC | Age: 82
End: 2022-08-29

## 2022-08-29 DIAGNOSIS — I48.0 PAROXYSMAL ATRIAL FIBRILLATION: Primary | ICD-10-CM

## 2022-08-29 DIAGNOSIS — I48.20 ATRIAL FIBRILLATION, CHRONIC: Primary | ICD-10-CM

## 2022-08-29 NOTE — TELEPHONE ENCOUNTER
Dr. Ceballos.    Mary Anne from [facility] [employer] says that the DX code I48.19 does not cover the Echocardiogram that you ordered for the patient. Please send a revised order and change the Dx code.     Mary Anne can be reached at [phone] and fax [phone]    If you have any questions or concerns please call Mary Anne.    ThanksLinda

## 2022-08-29 NOTE — TELEPHONE ENCOUNTER
Talked with Mary Anne.  I did put in a new CHARU order with paroxysmal atrial fibrillation I 48 0.  This was covered by her report.  Please disregard the other CHARU orders.  Should be able to proceed forward as planned with CHARU tomorrow.

## 2022-08-30 ENCOUNTER — HOSPITAL ENCOUNTER (OUTPATIENT)
Dept: CARDIOLOGY | Facility: HOSPITAL | Age: 82
Discharge: HOME | End: 2022-08-30
Admitting: INTERNAL MEDICINE

## 2022-08-30 VITALS
RESPIRATION RATE: 17 BRPM | DIASTOLIC BLOOD PRESSURE: 90 MMHG | SYSTOLIC BLOOD PRESSURE: 129 MMHG | HEART RATE: 60 BPM | OXYGEN SATURATION: 97 %

## 2022-08-30 DIAGNOSIS — I48.19 ATRIAL FIBRILLATION, PERSISTENT: ICD-10-CM

## 2022-08-30 LAB
LV EF 2D ECHO EST: 60 %
MAXIMAL PREDICTED HEART RATE: 139 BPM
STRESS TARGET HR: 118 BPM

## 2022-08-30 PROCEDURE — 93312 ECHO TRANSESOPHAGEAL: CPT

## 2022-08-30 PROCEDURE — 93321 DOPPLER ECHO F-UP/LMTD STD: CPT | Performed by: INTERNAL MEDICINE

## 2022-08-30 PROCEDURE — 93325 DOPPLER ECHO COLOR FLOW MAPG: CPT | Performed by: INTERNAL MEDICINE

## 2022-08-30 PROCEDURE — A9270 NON-COVERED ITEM OR SERVICE: HCPCS | Performed by: INTERNAL MEDICINE

## 2022-08-30 PROCEDURE — 99152 MOD SED SAME PHYS/QHP 5/>YRS: CPT

## 2022-08-30 PROCEDURE — 93321 DOPPLER ECHO F-UP/LMTD STD: CPT

## 2022-08-30 PROCEDURE — 93325 DOPPLER ECHO COLOR FLOW MAPG: CPT

## 2022-08-30 PROCEDURE — 93312 ECHO TRANSESOPHAGEAL: CPT | Performed by: INTERNAL MEDICINE

## 2022-08-30 PROCEDURE — 25010000002 FENTANYL CITRATE (PF) 50 MCG/ML SOLUTION: Performed by: INTERNAL MEDICINE

## 2022-08-30 PROCEDURE — 63710000001 LIDOCAINE VISCOUS HCL 2 % SOLUTION: Performed by: INTERNAL MEDICINE

## 2022-08-30 PROCEDURE — 25010000002 MIDAZOLAM PER 1 MG: Performed by: INTERNAL MEDICINE

## 2022-08-30 RX ORDER — MIDAZOLAM HYDROCHLORIDE 1 MG/ML
INJECTION INTRAMUSCULAR; INTRAVENOUS
Status: COMPLETED | OUTPATIENT
Start: 2022-08-30 | End: 2022-08-30

## 2022-08-30 RX ORDER — FENTANYL CITRATE 50 UG/ML
INJECTION, SOLUTION INTRAMUSCULAR; INTRAVENOUS
Status: COMPLETED | OUTPATIENT
Start: 2022-08-30 | End: 2022-08-30

## 2022-08-30 RX ORDER — SODIUM CHLORIDE 9 MG/ML
INJECTION, SOLUTION INTRAVENOUS
Status: COMPLETED | OUTPATIENT
Start: 2022-08-30 | End: 2022-08-30

## 2022-08-30 RX ORDER — LIDOCAINE HYDROCHLORIDE 20 MG/ML
SOLUTION OROPHARYNGEAL
Status: COMPLETED | OUTPATIENT
Start: 2022-08-30 | End: 2022-08-30

## 2022-08-30 RX ADMIN — SODIUM CHLORIDE 50 ML/HR: 9 INJECTION, SOLUTION INTRAVENOUS at 08:14

## 2022-08-30 RX ADMIN — MIDAZOLAM HYDROCHLORIDE 2 MG: 1 INJECTION, SOLUTION INTRAMUSCULAR; INTRAVENOUS at 08:32

## 2022-08-30 RX ADMIN — MIDAZOLAM HYDROCHLORIDE 2 MG: 1 INJECTION, SOLUTION INTRAMUSCULAR; INTRAVENOUS at 08:34

## 2022-08-30 RX ADMIN — FENTANYL CITRATE 25 MCG: 50 INJECTION, SOLUTION INTRAMUSCULAR; INTRAVENOUS at 08:32

## 2022-08-30 RX ADMIN — FENTANYL CITRATE 25 MCG: 50 INJECTION, SOLUTION INTRAMUSCULAR; INTRAVENOUS at 08:34

## 2022-08-30 RX ADMIN — LIDOCAINE HYDROCHLORIDE 10 ML: 20 SOLUTION ORAL; TOPICAL at 08:15

## 2022-09-08 ENCOUNTER — TELEPHONE (OUTPATIENT)
Dept: CARDIOLOGY | Facility: CLINIC | Age: 82
End: 2022-09-08

## 2022-09-08 NOTE — TELEPHONE ENCOUNTER
Tony, this is a patient of mine that had the CHARU performed August 30 for evaluation for watchman.  Could you let him know next steps; I am not sure of your timing and plan. Thanks

## 2022-09-08 NOTE — TELEPHONE ENCOUNTER
Pt called. He would like to know what are the next steps. He had his CHARU done on  08/30/22.     He can be reached at [phone]    Thanks

## 2022-09-12 NOTE — TELEPHONE ENCOUNTER
We are currently getting a date scheduled to get our first set of patients done. I have been out of town for the last week and will reach out to him

## 2022-09-26 RX ORDER — ATORVASTATIN CALCIUM 40 MG/1
TABLET, FILM COATED ORAL
Qty: 90 TABLET | Refills: 3 | Status: SHIPPED | OUTPATIENT
Start: 2022-09-26 | End: 2022-10-20 | Stop reason: HOSPADM

## 2022-10-11 ENCOUNTER — OFFICE VISIT (OUTPATIENT)
Dept: GASTROENTEROLOGY | Facility: CLINIC | Age: 82
End: 2022-10-11

## 2022-10-11 VITALS — TEMPERATURE: 96.3 F | BODY MASS INDEX: 35.03 KG/M2 | WEIGHT: 223.2 LBS | HEIGHT: 67 IN

## 2022-10-11 DIAGNOSIS — K21.9 GASTROESOPHAGEAL REFLUX DISEASE WITHOUT ESOPHAGITIS: ICD-10-CM

## 2022-10-11 DIAGNOSIS — R13.19 ESOPHAGEAL DYSPHAGIA: Primary | ICD-10-CM

## 2022-10-11 DIAGNOSIS — K44.9 HIATAL HERNIA: ICD-10-CM

## 2022-10-11 PROCEDURE — 99212 OFFICE O/P EST SF 10 MIN: CPT | Performed by: INTERNAL MEDICINE

## 2022-10-11 RX ORDER — OMEPRAZOLE 20 MG/1
20 CAPSULE, DELAYED RELEASE ORAL DAILY
Qty: 90 CAPSULE | Refills: 3 | Status: SHIPPED | OUTPATIENT
Start: 2022-10-11 | End: 2022-11-21 | Stop reason: SDUPTHER

## 2022-10-11 NOTE — PROGRESS NOTES
Chief Complaint   Patient presents with   • Heartburn     GERD       Junito Sorto is a  82 y.o. male here for a follow up visit for GERD with history dysphagia.    HPI     Patient 82-year-old male with history of hypertension, hyperlipidemia, BPH and atrial fibrillation with history of GERD and dysphagia status post EGD with dilation to good effect.  Patient with no further swallowing issues feeling well without complaint.    Past Medical History:   Diagnosis Date   • Arthritis    • At risk for obstructive sleep apnea     5   • Atrial fibrillation (HCC)    • Bladder outlet obstruction 09/11/2019   • BPH (benign prostatic hyperplasia)    • Colon polyp    • Coronary artery disease Few years    Afib   • Dysphagia    • Eczema    • GERD (gastroesophageal reflux disease) 2013    Mentioned as possible   • Hernia     Your office reported hiatal   • Hiatal hernia    • Hyperlipidemia    • Hypertension    • Left ventricular dysfunction    • Low back pain    • Prostate cancer (HCC)    • Skin cancer          Current Outpatient Medications:   •  aspirin 81 MG chewable tablet, Chew 81 mg Daily., Disp: , Rfl:   •  atorvastatin (LIPITOR) 40 MG tablet, TAKE ONE TABLET BY MOUTH DAILY, Disp: 90 tablet, Rfl: 3  •  guaiFENesin 200 MG tablet, Take 400 mg by mouth Every 4 (Four) Hours As Needed for Cough., Disp: , Rfl:   •  loratadine (CLARITIN) 10 MG tablet, Take 10 mg by mouth Daily., Disp: , Rfl:   •  metoprolol succinate XL (TOPROL-XL) 25 MG 24 hr tablet, Take 1 tablet by mouth Daily., Disp: 90 tablet, Rfl: 3  •  omeprazole (priLOSEC) 20 MG capsule, Take 1 capsule by mouth Daily., Disp: 90 capsule, Rfl: 3  •  fluticasone (Flonase) 50 MCG/ACT nasal spray, 2 sprays into the nostril(s) as directed by provider Daily. (Patient taking differently: 2 sprays into the nostril(s) as directed by provider As Needed.), Disp: 15.8 mL, Rfl: 0    Allergies   Allergen Reactions   • Dilaudid [Hydromorphone Hcl] Hallucinations       Social History      Socioeconomic History   • Marital status:    Tobacco Use   • Smoking status: Former     Types: Pipe     Start date: 1959     Quit date: 1961     Years since quittin.9   • Smokeless tobacco: Never   • Tobacco comments:     50 years ago none since   Vaping Use   • Vaping Use: Never used   Substance and Sexual Activity   • Alcohol use: Yes     Alcohol/week: 5.0 standard drinks     Types: 5 Glasses of wine per week     Comment: Less than 4 oz /wk since this prob started.   • Drug use: No   • Sexual activity: Defer     Comment: 50 years ago       Family History   Problem Relation Age of Onset   • Cancer Mother         pancreatic   • Liver disease Mother    • Cancer Father         prostate, bladder, colon, lymphoma, leukemia   • Colon cancer Father    • Arrhythmia Father         Afib   • Heart disease Father    • Cancer Brother         prostate   • Arrhythmia Brother         Afib   • Cancer Paternal Grandfather         prostate   • Arrhythmia Paternal Aunt         Irregular beat . Before the time of illness sharing.   • Malig Hyperthermia Neg Hx        Review of Systems   Constitutional: Negative.    Respiratory: Negative.    Cardiovascular: Negative.    Gastrointestinal: Negative.    Musculoskeletal: Negative.    Skin: Negative.    Hematological: Negative.        Vitals:    10/11/22 1234   Temp: 96.3 °F (35.7 °C)       Physical Exam  Vitals reviewed.   Constitutional:       Appearance: Normal appearance. He is well-developed.   HENT:      Head: Normocephalic and atraumatic.   Eyes:      General: No scleral icterus.     Pupils: Pupils are equal, round, and reactive to light.   Pulmonary:      Effort: Pulmonary effort is normal. No respiratory distress.      Breath sounds: Normal breath sounds.   Abdominal:      General: Bowel sounds are normal. There is no distension.      Palpations: Abdomen is soft. There is no mass.      Tenderness: There is no abdominal tenderness.      Hernia: No hernia is  present.   Skin:     General: Skin is warm and dry.      Coloration: Skin is not jaundiced.      Findings: No rash.   Neurological:      General: No focal deficit present.      Mental Status: He is alert and oriented to person, place, and time.      Cranial Nerves: No cranial nerve deficit.   Psychiatric:         Behavior: Behavior normal.         Thought Content: Thought content normal.         Judgment: Judgment normal.         Hospital Outpatient Visit on 08/30/2022   Component Date Value Ref Range Status   • Target HR (85%) 08/30/2022 118  bpm Final   • Max. Pred. HR (100%) 08/30/2022 139  bpm Final   • Echo EF Estimated 08/30/2022 60  % Final   Lab on 08/27/2022   Component Date Value Ref Range Status   • COVID19 08/27/2022 Not Detected  Not Detected - Ref. Range Final   Lab on 08/22/2022   Component Date Value Ref Range Status   • PSA 08/22/2022 3.470  0.000 - 4.000 ng/mL Final   • Glucose 08/22/2022 112 (H)  65 - 99 mg/dL Final   • BUN 08/22/2022 17  8 - 23 mg/dL Final   • Creatinine 08/22/2022 0.77  0.76 - 1.27 mg/dL Final   • Sodium 08/22/2022 139  136 - 145 mmol/L Final   • Potassium 08/22/2022 4.5  3.5 - 5.2 mmol/L Final   • Chloride 08/22/2022 102  98 - 107 mmol/L Final   • CO2 08/22/2022 26.8  22.0 - 29.0 mmol/L Final   • Calcium 08/22/2022 10.0  8.6 - 10.5 mg/dL Final   • BUN/Creatinine Ratio 08/22/2022 22.1  7.0 - 25.0 Final   • Anion Gap 08/22/2022 10.2  5.0 - 15.0 mmol/L Final   • eGFR 08/22/2022 89.9  >60.0 mL/min/1.73 Final   • WBC 08/22/2022 6.44  3.40 - 10.80 10*3/mm3 Final   • RBC 08/22/2022 5.07  4.14 - 5.80 10*6/mm3 Final   • Hemoglobin 08/22/2022 15.0  13.0 - 17.7 g/dL Final   • Hematocrit 08/22/2022 46.4  37.5 - 51.0 % Final   • MCV 08/22/2022 91.5  79.0 - 97.0 fL Final   • MCH 08/22/2022 29.6  26.6 - 33.0 pg Final   • MCHC 08/22/2022 32.3  31.5 - 35.7 g/dL Final   • RDW 08/22/2022 15.1  12.3 - 15.4 % Final   • RDW-SD 08/22/2022 50.9  37.0 - 54.0 fl Final   • MPV 08/22/2022 11.2  6.0 - 12.0  fL Final   • Platelets 08/22/2022 176  140 - 450 10*3/mm3 Final   • Neutrophil % 08/22/2022 69.1  42.7 - 76.0 % Final   • Lymphocyte % 08/22/2022 19.6  19.6 - 45.3 % Final   • Monocyte % 08/22/2022 7.1  5.0 - 12.0 % Final   • Eosinophil % 08/22/2022 3.3  0.3 - 6.2 % Final   • Basophil % 08/22/2022 0.6  0.0 - 1.5 % Final   • Immature Grans % 08/22/2022 0.3  0.0 - 0.5 % Final   • Neutrophils, Absolute 08/22/2022 4.45  1.70 - 7.00 10*3/mm3 Final   • Lymphocytes, Absolute 08/22/2022 1.26  0.70 - 3.10 10*3/mm3 Final   • Monocytes, Absolute 08/22/2022 0.46  0.10 - 0.90 10*3/mm3 Final   • Eosinophils, Absolute 08/22/2022 0.21  0.00 - 0.40 10*3/mm3 Final   • Basophils, Absolute 08/22/2022 0.04  0.00 - 0.20 10*3/mm3 Final   • Immature Grans, Absolute 08/22/2022 0.02  0.00 - 0.05 10*3/mm3 Final   • nRBC 08/22/2022 0.0  0.0 - 0.2 /100 WBC Final       Diagnoses and all orders for this visit:    1. Esophageal dysphagia (Primary)    2. Hiatal hernia    3. Gastroesophageal reflux disease without esophagitis    Other orders  -     omeprazole (priLOSEC) 20 MG capsule; Take 1 capsule by mouth Daily.  Dispense: 90 capsule; Refill: 3      Patient 82-year-old male history of hiatal hernia, hypertension, hyperlipidemia, BPH here in follow-up.  Patient with no further dysphagia since last dilated feeling well without complaint.  Patient would like to decrease his omeprazole to 20 mg daily, will prescribe.  We will continue to monitor clinically and if recurrent symptoms will arrange repeat EGD for dilation.

## 2022-10-19 ENCOUNTER — APPOINTMENT (OUTPATIENT)
Dept: GENERAL RADIOLOGY | Facility: HOSPITAL | Age: 82
End: 2022-10-19

## 2022-10-19 ENCOUNTER — HOSPITAL ENCOUNTER (OUTPATIENT)
Facility: HOSPITAL | Age: 82
Setting detail: OBSERVATION
Discharge: HOME HEALTH | End: 2022-10-20
Attending: EMERGENCY MEDICINE | Admitting: INTERNAL MEDICINE

## 2022-10-19 ENCOUNTER — APPOINTMENT (OUTPATIENT)
Dept: CT IMAGING | Facility: HOSPITAL | Age: 82
End: 2022-10-19

## 2022-10-19 DIAGNOSIS — I48.91 ATRIAL FIBRILLATION, UNSPECIFIED TYPE: ICD-10-CM

## 2022-10-19 DIAGNOSIS — I63.532 ACUTE ISCHEMIC LEFT PCA STROKE: Primary | ICD-10-CM

## 2022-10-19 LAB
ABO GROUP BLD: NORMAL
ALBUMIN SERPL-MCNC: 4.3 G/DL (ref 3.5–5.2)
ALBUMIN/GLOB SERPL: 1.6 G/DL
ALP SERPL-CCNC: 100 U/L (ref 39–117)
ALT SERPL W P-5'-P-CCNC: 24 U/L (ref 1–41)
ANION GAP SERPL CALCULATED.3IONS-SCNC: 11.1 MMOL/L (ref 5–15)
AST SERPL-CCNC: 32 U/L (ref 1–40)
BASOPHILS # BLD AUTO: 0.04 10*3/MM3 (ref 0–0.2)
BASOPHILS NFR BLD AUTO: 0.5 % (ref 0–1.5)
BILIRUB SERPL-MCNC: 1.3 MG/DL (ref 0–1.2)
BLD GP AB SCN SERPL QL: NEGATIVE
BUN SERPL-MCNC: 15 MG/DL (ref 8–23)
BUN/CREAT SERPL: 19.2 (ref 7–25)
CALCIUM SPEC-SCNC: 10.1 MG/DL (ref 8.6–10.5)
CHLORIDE SERPL-SCNC: 101 MMOL/L (ref 98–107)
CO2 SERPL-SCNC: 27.9 MMOL/L (ref 22–29)
CREAT SERPL-MCNC: 0.78 MG/DL (ref 0.76–1.27)
DEPRECATED RDW RBC AUTO: 45 FL (ref 37–54)
EGFRCR SERPLBLD CKD-EPI 2021: 89 ML/MIN/1.73
EOSINOPHIL # BLD AUTO: 0.21 10*3/MM3 (ref 0–0.4)
EOSINOPHIL NFR BLD AUTO: 2.8 % (ref 0.3–6.2)
ERYTHROCYTE [DISTWIDTH] IN BLOOD BY AUTOMATED COUNT: 13.6 % (ref 12.3–15.4)
GLOBULIN UR ELPH-MCNC: 2.7 GM/DL
GLUCOSE BLDC GLUCOMTR-MCNC: 85 MG/DL (ref 70–130)
GLUCOSE BLDC GLUCOMTR-MCNC: 88 MG/DL (ref 70–130)
GLUCOSE SERPL-MCNC: 89 MG/DL (ref 65–99)
HCT VFR BLD AUTO: 45.8 % (ref 37.5–51)
HGB BLD-MCNC: 15.4 G/DL (ref 13–17.7)
HOLD SPECIMEN: NORMAL
HOLD SPECIMEN: NORMAL
IMM GRANULOCYTES # BLD AUTO: 0.01 10*3/MM3 (ref 0–0.05)
IMM GRANULOCYTES NFR BLD AUTO: 0.1 % (ref 0–0.5)
INR PPP: 1.03 (ref 0.9–1.1)
LYMPHOCYTES # BLD AUTO: 1.1 10*3/MM3 (ref 0.7–3.1)
LYMPHOCYTES NFR BLD AUTO: 14.7 % (ref 19.6–45.3)
MCH RBC QN AUTO: 30.1 PG (ref 26.6–33)
MCHC RBC AUTO-ENTMCNC: 33.6 G/DL (ref 31.5–35.7)
MCV RBC AUTO: 89.6 FL (ref 79–97)
MONOCYTES # BLD AUTO: 0.53 10*3/MM3 (ref 0.1–0.9)
MONOCYTES NFR BLD AUTO: 7.1 % (ref 5–12)
NEUTROPHILS NFR BLD AUTO: 5.58 10*3/MM3 (ref 1.7–7)
NEUTROPHILS NFR BLD AUTO: 74.8 % (ref 42.7–76)
NRBC BLD AUTO-RTO: 0 /100 WBC (ref 0–0.2)
PLATELET # BLD AUTO: 197 10*3/MM3 (ref 140–450)
PMV BLD AUTO: 11.1 FL (ref 6–12)
POTASSIUM SERPL-SCNC: 3.8 MMOL/L (ref 3.5–5.2)
PROT SERPL-MCNC: 7 G/DL (ref 6–8.5)
PROTHROMBIN TIME: 13.6 SECONDS (ref 11.7–14.2)
QT INTERVAL: 403 MS
RBC # BLD AUTO: 5.11 10*6/MM3 (ref 4.14–5.8)
RH BLD: NEGATIVE
SODIUM SERPL-SCNC: 140 MMOL/L (ref 136–145)
T&S EXPIRATION DATE: NORMAL
WBC NRBC COR # BLD: 7.47 10*3/MM3 (ref 3.4–10.8)
WHOLE BLOOD HOLD COAG: NORMAL
WHOLE BLOOD HOLD SPECIMEN: NORMAL

## 2022-10-19 PROCEDURE — 86901 BLOOD TYPING SEROLOGIC RH(D): CPT | Performed by: EMERGENCY MEDICINE

## 2022-10-19 PROCEDURE — 86850 RBC ANTIBODY SCREEN: CPT | Performed by: EMERGENCY MEDICINE

## 2022-10-19 PROCEDURE — G0378 HOSPITAL OBSERVATION PER HR: HCPCS

## 2022-10-19 PROCEDURE — 93010 ELECTROCARDIOGRAM REPORT: CPT | Performed by: INTERNAL MEDICINE

## 2022-10-19 PROCEDURE — 82962 GLUCOSE BLOOD TEST: CPT

## 2022-10-19 PROCEDURE — 99284 EMERGENCY DEPT VISIT MOD MDM: CPT

## 2022-10-19 PROCEDURE — 80053 COMPREHEN METABOLIC PANEL: CPT | Performed by: EMERGENCY MEDICINE

## 2022-10-19 PROCEDURE — 71045 X-RAY EXAM CHEST 1 VIEW: CPT

## 2022-10-19 PROCEDURE — 70450 CT HEAD/BRAIN W/O DYE: CPT

## 2022-10-19 PROCEDURE — 85610 PROTHROMBIN TIME: CPT | Performed by: EMERGENCY MEDICINE

## 2022-10-19 PROCEDURE — 86900 BLOOD TYPING SEROLOGIC ABO: CPT | Performed by: EMERGENCY MEDICINE

## 2022-10-19 PROCEDURE — 85025 COMPLETE CBC W/AUTO DIFF WBC: CPT | Performed by: EMERGENCY MEDICINE

## 2022-10-19 PROCEDURE — 93005 ELECTROCARDIOGRAM TRACING: CPT | Performed by: EMERGENCY MEDICINE

## 2022-10-19 RX ORDER — PANTOPRAZOLE SODIUM 40 MG/1
40 TABLET, DELAYED RELEASE ORAL EVERY MORNING
Status: DISCONTINUED | OUTPATIENT
Start: 2022-10-20 | End: 2022-10-20 | Stop reason: HOSPADM

## 2022-10-19 RX ORDER — SODIUM CHLORIDE 0.9 % (FLUSH) 0.9 %
10 SYRINGE (ML) INJECTION EVERY 12 HOURS SCHEDULED
Status: DISCONTINUED | OUTPATIENT
Start: 2022-10-19 | End: 2022-10-20 | Stop reason: HOSPADM

## 2022-10-19 RX ORDER — METOPROLOL SUCCINATE 25 MG/1
25 TABLET, EXTENDED RELEASE ORAL DAILY
Status: DISCONTINUED | OUTPATIENT
Start: 2022-10-20 | End: 2022-10-20 | Stop reason: HOSPADM

## 2022-10-19 RX ORDER — ASPIRIN 300 MG/1
300 SUPPOSITORY RECTAL DAILY
Status: DISCONTINUED | OUTPATIENT
Start: 2022-10-20 | End: 2022-10-20 | Stop reason: HOSPADM

## 2022-10-19 RX ORDER — GUAIFENESIN 200 MG/1
400 TABLET ORAL EVERY 4 HOURS PRN
Status: DISCONTINUED | OUTPATIENT
Start: 2022-10-19 | End: 2022-10-20 | Stop reason: HOSPADM

## 2022-10-19 RX ORDER — ASPIRIN 81 MG/1
81 TABLET, CHEWABLE ORAL DAILY
Status: DISCONTINUED | OUTPATIENT
Start: 2022-10-20 | End: 2022-10-20 | Stop reason: HOSPADM

## 2022-10-19 RX ORDER — SODIUM CHLORIDE 0.9 % (FLUSH) 0.9 %
10 SYRINGE (ML) INJECTION AS NEEDED
Status: DISCONTINUED | OUTPATIENT
Start: 2022-10-19 | End: 2022-10-20 | Stop reason: HOSPADM

## 2022-10-19 RX ORDER — ACETAMINOPHEN 325 MG/1
650 TABLET ORAL EVERY 4 HOURS PRN
Status: DISCONTINUED | OUTPATIENT
Start: 2022-10-19 | End: 2022-10-20 | Stop reason: HOSPADM

## 2022-10-19 RX ORDER — CETIRIZINE HYDROCHLORIDE 10 MG/1
10 TABLET ORAL DAILY
Status: DISCONTINUED | OUTPATIENT
Start: 2022-10-20 | End: 2022-10-20 | Stop reason: HOSPADM

## 2022-10-19 RX ORDER — ASPIRIN 81 MG/1
324 TABLET, CHEWABLE ORAL ONCE
Status: COMPLETED | OUTPATIENT
Start: 2022-10-19 | End: 2022-10-19

## 2022-10-19 RX ORDER — ACETAMINOPHEN 650 MG/1
650 SUPPOSITORY RECTAL EVERY 4 HOURS PRN
Status: DISCONTINUED | OUTPATIENT
Start: 2022-10-19 | End: 2022-10-20 | Stop reason: HOSPADM

## 2022-10-19 RX ORDER — ATORVASTATIN CALCIUM 80 MG/1
80 TABLET, FILM COATED ORAL NIGHTLY
Status: DISCONTINUED | OUTPATIENT
Start: 2022-10-19 | End: 2022-10-20 | Stop reason: HOSPADM

## 2022-10-19 RX ORDER — ONDANSETRON 2 MG/ML
4 INJECTION INTRAMUSCULAR; INTRAVENOUS EVERY 6 HOURS PRN
Status: DISCONTINUED | OUTPATIENT
Start: 2022-10-19 | End: 2022-10-20 | Stop reason: HOSPADM

## 2022-10-19 RX ADMIN — ASPIRIN 243 MG: 81 TABLET, CHEWABLE ORAL at 20:42

## 2022-10-19 NOTE — ED TRIAGE NOTES
Sent here from Dr Gamino [facility] for peripheral vision loss. Patient states they told him it was a stroke, states he has not been able to see anything on his RT side since yesterday at 1600    Mask placed on patient in triage. Triage staff wore appropriate PPE during interaction with patient.

## 2022-10-19 NOTE — ED PROVIDER NOTES
" EMERGENCY DEPARTMENT ENCOUNTER    Room Number:  15/15  Date of encounter:  10/19/2022  PCP: Fan Schulz MD  Historian: Patient, wife      HPI:  Chief Complaint: Vision loss  A complete HPI/ROS/PMH/PSH/SH/FH are unobtainable due to: None    Context: Junito Sorto is a 82 y.o. male who presents to the ED c/o vision loss.  He states that he woke up from a nap yesterday afternoon and noticed that he could not see out of the right side of his visual field in each eye.  Symptoms of been constant.  Nothing makes his worse or better.  No associated numbness or weakness in his extremities or any speech issues.  No history of similar.  He has known history of atrial fibrillation and is currently waiting to undergo watchman procedure.  However, he takes no blood thinners because \"I do not like how they make me feel.\"      PAST MEDICAL HISTORY  Active Ambulatory Problems     Diagnosis Date Noted   • Hyperglycemia 02/16/2016   • Obesity 02/25/2016   • Allergic rhinitis 03/28/2016   • Permanent atrial fibrillation (HCC) 03/28/2016   • HLD (hyperlipidemia) 03/28/2016   • Esophageal dysphagia 10/09/2018   • Hiatal hernia 02/05/2019   • GERD (gastroesophageal reflux disease) 02/05/2019   • Prostate cancer (HCC) 09/11/2019   • Bladder outlet obstruction 09/11/2019   • Sciatica of right side 12/10/2020   • Pneumonia of right lower lobe due to infectious organism 03/03/2022   • Acute respiratory failure with hypoxia (HCC) 03/17/2022   • PVC (premature ventricular contraction)    • Left ventricular dysfunction    • Hypercholesterolemia 07/26/2022     Resolved Ambulatory Problems     Diagnosis Date Noted   • Hypertension 02/25/2016   • Benign essential hypertension 03/28/2016   • Acute cystitis 02/26/2017   • Bacterial conjunctivitis 03/13/2017   • Impacted cerumen of right ear 01/13/2019   • Hypoxia 03/03/2022   • Pneumonia due to infectious organism 03/17/2022   • Orthopnea 03/17/2022     Past Medical History:   Diagnosis " Date   • Arthritis    • At risk for obstructive sleep apnea    • Atrial fibrillation (HCC)    • BPH (benign prostatic hyperplasia)    • Colon polyp    • Coronary artery disease Few years   • Dysphagia    • Eczema    • Hernia    • Hyperlipidemia    • Low back pain    • Skin cancer          PAST SURGICAL HISTORY  Past Surgical History:   Procedure Laterality Date   • APPENDECTOMY  75 yrs ago   • CATARACT EXTRACTION, BILATERAL     • COLONOSCOPY  unknown   • CYSTOSCOPY TRANSURETHRAL RESECTION OF PROSTATE N/A 09/11/2019    Procedure: CYSTOSCOPY TRANSURETHRAL RESECTION OF PROSTATE;  Surgeon: Chip Bedolla MD;  Location: [facility];  Service: Urology   • ENDOSCOPY N/A 10/31/2018    LA Grade C reflux esophagitis, HH, erythematous mucosa in the antrum. Path: Gastritis, esophagitis.    • ENDOSCOPY N/A 03/20/2022    Procedure: ESOPHAGOGASTRODUODENOSCOPY WITH BIOPSIES AND NAILS DILATION #56;  Surgeon: Tony Ramos MD;  Location: [facility];  Service: Gastroenterology;  Laterality: N/A;  PRE OP - DYSPHAGIA  POST OP- MILD GASTRITIS   • EPIDURAL BLOCK     • ESOPHAGEAL DILATATION     • PROSTATE SURGERY  11/06/2014   • SKIN CANCER EXCISION      multiple, over that last few years   • SKIN CANCER EXCISION Right 09/03/2019    FACE,    • TONSILLECTOMY      around 1950   • UPPER GASTROINTESTINAL ENDOSCOPY  2013         FAMILY HISTORY  Family History   Problem Relation Age of Onset   • Cancer Mother         pancreatic   • Liver disease Mother    • Cancer Father         prostate, bladder, colon, lymphoma, leukemia   • Colon cancer Father    • Arrhythmia Father         Afib   • Heart disease Father    • Cancer Brother         prostate   • Arrhythmia Brother         Afib   • Cancer Paternal Grandfather         prostate   • Arrhythmia Paternal Aunt         Irregular beat . Before the time of illness sharing.   • Malig Hyperthermia Neg Hx          SOCIAL HISTORY  Social History     Socioeconomic History   • Marital  status:    Tobacco Use   • Smoking status: Former     Types: Pipe     Start date: 1959     Quit date: 1961     Years since quittin.9   • Smokeless tobacco: Never   • Tobacco comments:     50 years ago none since   Vaping Use   • Vaping Use: Never used   Substance and Sexual Activity   • Alcohol use: Yes     Alcohol/week: 5.0 standard drinks     Types: 5 Glasses of wine per week     Comment: Less than 4 oz /wk since this prob started.   • Drug use: No   • Sexual activity: Defer     Comment: 50 years ago         ALLERGIES  Dilaudid [hydromorphone hcl]        REVIEW OF SYSTEMS  Review of Systems     All systems reviewed and negative except for those discussed in HPI.       PHYSICAL EXAM    I have reviewed the triage vital signs and nursing notes.    ED Triage Vitals [10/19/22 1744]   Temp Heart Rate Resp BP SpO2   97.7 °F (36.5 °C) 80 16 -- 96 %      Temp src Heart Rate Source Patient Position BP Location FiO2 (%)   -- -- -- -- --       Physical Exam  GENERAL: not distressed  HENT: nares patent  EYES: no scleral icterus  CV: irregular rhythm, regular rate  RESPIRATORY: normal effort, clear to auscultation bilaterally  ABDOMEN: soft, nontender  MUSCULOSKELETAL: no deformity  NEURO:   NIH Stroke Scale      Interval: Baseline  Time: 20:23 EDT  Person Administering Scale: Kenyon Clark II, MD    Administer stroke scale items in the order listed. Record performance in each category after each subscale exam. Do not go back and change scores. Follow directions provided for each exam technique. Scores should reflect what the patient does, not what the clinician thinks the patient can do. The clinician should record answers while administering the exam and work quickly. Except where indicated, the patient should not be coached (i.e., repeated requests to patient to make a special effort).      1a  Level of consciousness: 0=alert; keenly responsive   1b. LOC questions:  0=Performs both tasks correctly    1c. LOC commands: 0=Answers both questions correctly   2.  Best Gaze: 0=normal   3.  Visual: 2=Complete hemianopia   4. Facial Palsy: 0=Normal symmetric movement   5a.  Motor left arm: 0=No drift, limb holds 90 (or 45) degrees for full 10 seconds   5b.  Motor right arm: 0=No drift, limb holds 90 (or 45) degrees for full 10 seconds   6a. motor left le=No drift, limb holds 90 (or 45) degrees for full 10 seconds   6b  Motor right le=No drift, limb holds 90 (or 45) degrees for full 10 seconds   7. Limb Ataxia: 0=Absent   8.  Sensory: 0=Normal; no sensory loss   9. Best Language:  0=No aphasia, normal   10. Dysarthria: 0=Normal   11. Extinction and Inattention: 0=No abnormality   12. Distal motor function: 0=Normal    Total:   2     SKIN: warm, dry        LAB RESULTS  Recent Results (from the past 24 hour(s))   ECG 12 Lead    Collection Time: 10/19/22  6:30 PM   Result Value Ref Range    QT Interval 403 ms   POC Glucose Once    Collection Time: 10/19/22  6:48 PM    Specimen: Blood   Result Value Ref Range    Glucose 88 70 - 130 mg/dL   Type & Screen    Collection Time: 10/19/22  6:51 PM    Specimen: Blood   Result Value Ref Range    ABO Type A     RH type Negative     Antibody Screen Negative     T&S Expiration Date 10/22/2022 11:59:59 PM    Comprehensive Metabolic Panel    Collection Time: 10/19/22  7:06 PM    Specimen: Blood   Result Value Ref Range    Glucose 89 65 - 99 mg/dL    BUN 15 8 - 23 mg/dL    Creatinine 0.78 0.76 - 1.27 mg/dL    Sodium 140 136 - 145 mmol/L    Potassium 3.8 3.5 - 5.2 mmol/L    Chloride 101 98 - 107 mmol/L    CO2 27.9 22.0 - 29.0 mmol/L    Calcium 10.1 8.6 - 10.5 mg/dL    Total Protein 7.0 6.0 - 8.5 g/dL    Albumin 4.30 3.50 - 5.20 g/dL    ALT (SGPT) 24 1 - 41 U/L    AST (SGOT) 32 1 - 40 U/L    Alkaline Phosphatase 100 39 - 117 U/L    Total Bilirubin 1.3 (H) 0.0 - 1.2 mg/dL    Globulin 2.7 gm/dL    A/G Ratio 1.6 g/dL    BUN/Creatinine Ratio 19.2 7.0 - 25.0    Anion Gap 11.1 5.0 -  15.0 mmol/L    eGFR 89.0 >60.0 mL/min/1.73   Protime-INR    Collection Time: 10/19/22  7:06 PM    Specimen: Blood   Result Value Ref Range    Protime 13.6 11.7 - 14.2 Seconds    INR 1.03 0.90 - 1.10   Green Top (Gel)    Collection Time: 10/19/22  7:06 PM   Result Value Ref Range    Extra Tube Hold for add-ons.    Light Blue Top    Collection Time: 10/19/22  7:06 PM   Result Value Ref Range    Extra Tube Hold for add-ons.    CBC Auto Differential    Collection Time: 10/19/22  7:07 PM    Specimen: Blood   Result Value Ref Range    WBC 7.47 3.40 - 10.80 10*3/mm3    RBC 5.11 4.14 - 5.80 10*6/mm3    Hemoglobin 15.4 13.0 - 17.7 g/dL    Hematocrit 45.8 37.5 - 51.0 %    MCV 89.6 79.0 - 97.0 fL    MCH 30.1 26.6 - 33.0 pg    MCHC 33.6 31.5 - 35.7 g/dL    RDW 13.6 12.3 - 15.4 %    RDW-SD 45.0 37.0 - 54.0 fl    MPV 11.1 6.0 - 12.0 fL    Platelets 197 140 - 450 10*3/mm3    Neutrophil % 74.8 42.7 - 76.0 %    Lymphocyte % 14.7 (L) 19.6 - 45.3 %    Monocyte % 7.1 5.0 - 12.0 %    Eosinophil % 2.8 0.3 - 6.2 %    Basophil % 0.5 0.0 - 1.5 %    Immature Grans % 0.1 0.0 - 0.5 %    Neutrophils, Absolute 5.58 1.70 - 7.00 10*3/mm3    Lymphocytes, Absolute 1.10 0.70 - 3.10 10*3/mm3    Monocytes, Absolute 0.53 0.10 - 0.90 10*3/mm3    Eosinophils, Absolute 0.21 0.00 - 0.40 10*3/mm3    Basophils, Absolute 0.04 0.00 - 0.20 10*3/mm3    Immature Grans, Absolute 0.01 0.00 - 0.05 10*3/mm3    nRBC 0.0 0.0 - 0.2 /100 WBC   Lavender Top    Collection Time: 10/19/22  7:07 PM   Result Value Ref Range    Extra Tube hold for add-on    Gold Top - SST    Collection Time: 10/19/22  7:07 PM   Result Value Ref Range    Extra Tube Hold for add-ons.        Ordered the above labs and independently reviewed the results.        RADIOLOGY  CT Head Without Contrast Stroke Protocol    Result Date: 10/19/2022  CT HEAD WITHOUT CONTRAST  HISTORY: Stroke, vision loss.  COMPARISON: None.  FINDINGS: There is an area of decreased attenuation involving the left occipital lobe  medially consistent with an acute infarct measuring 5.5 cm in the AP dimension and approximately 2 cm in the transverse dimension. There is no evidence of hemorrhage. Mild vascular calcification and small vessel ischemic disease is noted.      Acute left PCA distribution infarct measuring 5.5 x 2 cm in size with no evidence of hemorrhage.  The above information was called to and discussed with Dr. Clark.    Radiation dose reduction techniques were utilized, including automated exposure control and exposure modulation based on body size.         I ordered the above noted radiological studies. Reviewed by me and discussed with radiologist.  See dictation for official radiology interpretation.      PROCEDURES    Critical Care  Performed by: Kenyon Clark II, MD  Authorized by: Kenyon Clark II, MD     Critical care provider statement:     Critical care time (minutes):  30    Critical care was necessary to treat or prevent imminent or life-threatening deterioration of the following conditions:  CNS failure or compromise    Critical care was time spent personally by me on the following activities:  Ordering and performing treatments and interventions, ordering and review of laboratory studies, ordering and review of radiographic studies, pulse oximetry, obtaining history from patient or surrogate, discussions with consultants, evaluation of patient's response to treatment, examination of patient and development of treatment plan with patient or surrogate          MEDICATIONS GIVEN IN ER    Medications   sodium chloride 0.9 % flush 10 mL (has no administration in time range)   aspirin chewable tablet 324 mg (has no administration in time range)         PROGRESS, DATA ANALYSIS, CONSULTS, AND MEDICAL DECISION MAKING    All labs have been independently reviewed by me.  All radiology studies have been reviewed by me and discussed with radiologist dictating the report.   EKG's independently viewed and interpreted  by me.  Discussion below represents my analysis of pertinent findings related to patient's condition, differential diagnosis, treatment plan and final disposition.    Patient presents with complete right hemianopia.  No other symptoms noted on neurological exam.  This seems to be due to his atrial fibrillation treated without anticoagulation.    I discussed case Dr. Perales, stroke neurology.  He recommends admission and aspirin following CT imaging.    ED Course as of 10/19/22 2023   Wed Oct 19, 2022   1911 EKG interpreted by myself.  Time 6:30 PM.  Atrial fibrillation.  Heart rate 71.  Normal axis.  Normal intervals.  PVC [TD]   1911 Glucose: 88 [TD]   2022 I discussed case with Dr. Lora, hospitalist.  Reviewed patient's labs, history, imaging.  He will admit. [TD]   2022 WBC: 7.47 [TD]   2022 Hemoglobin: 15.4 [TD]      ED Course User Index  [TD] Kenyon Clark II, MD       Patient is not a tPA candidate due to last known well greater than 4.5 hours ago.  Patient has evidence of a completed infarct on CT imaging.        PPE: The patient wore a surgical mask throughout the entire patient encounter. I wore an N95.    AS OF 20:23 EDT VITALS:    BP - 167/91  HR - 63  TEMP - 97.7 °F (36.5 °C)  O2 SATS - 97%        DIAGNOSIS  Final diagnoses:   Acute ischemic left PCA stroke (HCC)   Atrial fibrillation, unspecified type (HCC)         DISPOSITION  Admit           Kenyon Clark II, MD  10/19/22 2023

## 2022-10-20 ENCOUNTER — READMISSION MANAGEMENT (OUTPATIENT)
Dept: CALL CENTER | Facility: HOSPITAL | Age: 82
End: 2022-10-20

## 2022-10-20 ENCOUNTER — HOME HEALTH ADMISSION (OUTPATIENT)
Dept: HOME HEALTH SERVICES | Facility: HOME HEALTHCARE | Age: 82
End: 2022-10-20

## 2022-10-20 ENCOUNTER — APPOINTMENT (OUTPATIENT)
Dept: MRI IMAGING | Facility: HOSPITAL | Age: 82
End: 2022-10-20

## 2022-10-20 VITALS
SYSTOLIC BLOOD PRESSURE: 143 MMHG | OXYGEN SATURATION: 94 % | DIASTOLIC BLOOD PRESSURE: 94 MMHG | TEMPERATURE: 97.6 F | RESPIRATION RATE: 18 BRPM | HEART RATE: 50 BPM | WEIGHT: 223.11 LBS | BODY MASS INDEX: 34.94 KG/M2

## 2022-10-20 LAB
CHOLEST SERPL-MCNC: 143 MG/DL (ref 0–200)
GLUCOSE BLDC GLUCOMTR-MCNC: 77 MG/DL (ref 70–130)
HBA1C MFR BLD: 5.8 % (ref 4.8–5.6)
HDLC SERPL-MCNC: 60 MG/DL (ref 40–60)
LDLC SERPL CALC-MCNC: 70 MG/DL (ref 0–100)
LDLC/HDLC SERPL: 1.18 {RATIO}
TRIGL SERPL-MCNC: 62 MG/DL (ref 0–150)
VLDLC SERPL-MCNC: 13 MG/DL (ref 5–40)

## 2022-10-20 PROCEDURE — 80061 LIPID PANEL: CPT | Performed by: HOSPITALIST

## 2022-10-20 PROCEDURE — 36415 COLL VENOUS BLD VENIPUNCTURE: CPT | Performed by: HOSPITALIST

## 2022-10-20 PROCEDURE — 97535 SELF CARE MNGMENT TRAINING: CPT | Performed by: OCCUPATIONAL THERAPIST

## 2022-10-20 PROCEDURE — 83036 HEMOGLOBIN GLYCOSYLATED A1C: CPT | Performed by: HOSPITALIST

## 2022-10-20 PROCEDURE — 97166 OT EVAL MOD COMPLEX 45 MIN: CPT | Performed by: OCCUPATIONAL THERAPIST

## 2022-10-20 PROCEDURE — 70553 MRI BRAIN STEM W/O & W/DYE: CPT

## 2022-10-20 PROCEDURE — G0378 HOSPITAL OBSERVATION PER HR: HCPCS

## 2022-10-20 PROCEDURE — 97161 PT EVAL LOW COMPLEX 20 MIN: CPT

## 2022-10-20 PROCEDURE — 82962 GLUCOSE BLOOD TEST: CPT

## 2022-10-20 PROCEDURE — A9577 INJ MULTIHANCE: HCPCS | Performed by: INTERNAL MEDICINE

## 2022-10-20 PROCEDURE — 0 GADOBENATE DIMEGLUMINE 529 MG/ML SOLUTION: Performed by: INTERNAL MEDICINE

## 2022-10-20 PROCEDURE — 92610 EVALUATE SWALLOWING FUNCTION: CPT

## 2022-10-20 PROCEDURE — 99215 OFFICE O/P EST HI 40 MIN: CPT | Performed by: PHYSICIAN ASSISTANT

## 2022-10-20 RX ORDER — ATORVASTATIN CALCIUM 80 MG/1
80 TABLET, FILM COATED ORAL NIGHTLY
Qty: 30 TABLET | Refills: 0 | Status: SHIPPED | OUTPATIENT
Start: 2022-10-20 | End: 2022-11-03

## 2022-10-20 RX ADMIN — METOPROLOL SUCCINATE 25 MG: 25 TABLET, EXTENDED RELEASE ORAL at 09:02

## 2022-10-20 RX ADMIN — ASPIRIN 81 MG: 81 TABLET, CHEWABLE ORAL at 09:02

## 2022-10-20 RX ADMIN — BRIMONIDINE TARTRATE 1 DROP: 1 SOLUTION/ DROPS OPHTHALMIC at 16:06

## 2022-10-20 RX ADMIN — GADOBENATE DIMEGLUMINE 20 ML: 529 INJECTION, SOLUTION INTRAVENOUS at 11:02

## 2022-10-20 RX ADMIN — Medication 10 ML: at 09:03

## 2022-10-20 RX ADMIN — CETIRIZINE HYDROCHLORIDE 10 MG: 10 TABLET ORAL at 09:02

## 2022-10-20 RX ADMIN — Medication 10 ML: at 00:00

## 2022-10-20 NOTE — PROGRESS NOTES
[facility] Acute Inpt Rehab Note     Referral received via stroke order set.  Please note this is a screening only, rehab admissions will not actively be evaluating this patient.  If felt patient is appropriate for our services once therapies begin, please call our office at [phone], to initiate a full referral.    Thanks,  Nanette Sterling, RN  Rehab Admission Nurse

## 2022-10-20 NOTE — PLAN OF CARE
Goal Outcome Evaluation:      Patient permissive hypertension. DBP above 110, [facility] oncall made aware, ordered to continue to monitor. NIH of 3 for facial droop, right side visual cut. Swallow eval performed with charge RN, patient failed dysphagia screen due to facial droop. Patient voiced frustration from NPO status. Education provided. He is up with assist x1-2, unsteady gait noted. Patient voids per urinal. Awaiting MRI and CT today.           Problem: Adult Inpatient Plan of Care  Goal: Plan of Care Review  Outcome: Ongoing, Progressing  Goal: Patient-Specific Goal (Individualized)  Outcome: Ongoing, Progressing  Goal: Absence of Hospital-Acquired Illness or Injury  Outcome: Ongoing, Progressing  Intervention: Identify and Manage Fall Risk  Recent Flowsheet Documentation  Taken 10/20/2022 0442 by Aniya Castillo RN  Safety Promotion/Fall Prevention: safety round/check completed  Taken 10/20/2022 0200 by Aniya Castillo RN  Safety Promotion/Fall Prevention: safety round/check completed  Taken 10/19/2022 2345 by Aniya Castillo RN  Safety Promotion/Fall Prevention:   activity supervised   assistive device/personal items within reach   fall prevention program maintained   nonskid shoes/slippers when out of bed   safety round/check completed  Intervention: Prevent Skin Injury  Recent Flowsheet Documentation  Taken 10/20/2022 0442 by Aniya Castillo RN  Body Position: position changed independently  Taken 10/20/2022 0200 by Aniya Castillo RN  Body Position: position changed independently  Taken 10/19/2022 2345 by Aniya Castillo RN  Body Position: sitting up in bed  Intervention: Prevent and Manage VTE (Venous Thromboembolism) Risk  Recent Flowsheet Documentation  Taken 10/20/2022 0200 by Aniya Castillo RN  Activity Management: standing at bedside  Taken 10/19/2022 2345 by Aniya Castillo RN  Activity Management: ambulated in room  VTE Prevention/Management: (applied)   bilateral   sequential compression  devices on  Goal: Optimal Comfort and Wellbeing  Outcome: Ongoing, Progressing  Intervention: Provide Person-Centered Care  Recent Flowsheet Documentation  Taken 10/19/2022 2345 by Aniya Castillo RN  Trust Relationship/Rapport:   care explained   emotional support provided   empathic listening provided   questions answered   questions encouraged   choices provided   thoughts/feelings acknowledged  Goal: Readiness for Transition of Care  Outcome: Ongoing, Progressing  Intervention: Mutually Develop Transition Plan  Recent Flowsheet Documentation  Taken 10/20/2022 0013 by Aniya Castillo RN  Transportation Anticipated: family or friend will provide  Patient/Family Anticipated Services at Transition: none  Patient/Family Anticipates Transition to: home with family  Taken 10/20/2022 0010 by Aniya Castillo RN  Equipment Currently Used at Home: none     Problem: Adjustment to Illness (Stroke, Ischemic/Transient Ischemic Attack)  Goal: Optimal Coping  Outcome: Ongoing, Progressing     Problem: Cerebral Tissue Perfusion (Stroke, Ischemic/Transient Ischemic Attack)  Goal: Optimal Cerebral Tissue Perfusion  Outcome: Ongoing, Progressing     Problem: Functional Ability Impaired (Stroke, Ischemic/Transient Ischemic Attack)  Goal: Optimal Functional Ability  Outcome: Ongoing, Progressing  Intervention: Optimize Functional Ability  Recent Flowsheet Documentation  Taken 10/20/2022 0200 by Aniya Castillo RN  Activity Management: standing at bedside  Taken 10/19/2022 2345 by Aniya Castillo RN  Activity Management: ambulated in room     Problem: Swallowing Impairment (Stroke, Ischemic/Transient Ischemic Attack)  Goal: Optimal Eating and Swallowing without Aspiration  Outcome: Ongoing, Progressing

## 2022-10-20 NOTE — H&P
Patient Name:  Junito Sorto  YOB: 1940  MRN:  [id number]  Admit Date:  10/19/2022  Patient Care Team:  Fan Schulz MD as PCP - General  Steven Cadena MD as Consulting Physician (Gastroenterology)  Steve Prater III, MD as Consulting Physician (Cardiology)      Subjective   History Present Illness     Chief Complaint   Patient presents with   • Eye Problem       Mr. Sorto is a 82 y.o. male with a history of Prostate cancer, GERD, dysphagia status post dilation and chronic atrial fibrillation who is not on anticoagulation due to his own choice but does take aspirin.  He is in the process of having work-up for potential watchman procedure with Dr. Ceballos.  Notably he did have a CHARU done 8/30 as part of this work-up which did not show any PFO.  He presented to the emergency room today after initially seeing an optometrist due to some vision difficulties.  He says that he woke up from sleep today with dark vision in the right visual field.  He reports that he may have had some blurry vision there yesterday but did not really think much of it until waking up this morning when it was worse.  He made an appointment with the optometrist who did some visual field testing and noted right-sided hemianopsia and referred him to the ED for additional testing.  He has indeed been found to have a left PCA stroke on CT scan and is recommended for admission now.  He denies any other areas of numbness, tingling or weakness.  No difficulty swallowing or with his speech although he and his wife both state that he has had some word finding issues for months if not years.      Review of Systems   Constitutional: Negative for chills and fever.   HENT: Negative for congestion and trouble swallowing.    Eyes: Positive for visual disturbance.   Respiratory: Negative for cough, chest tightness, shortness of breath and wheezing.    Cardiovascular: Negative for chest pain and palpitations.    Gastrointestinal: Negative for abdominal distention, abdominal pain, constipation, diarrhea, nausea and vomiting.   Genitourinary: Negative for dysuria, frequency and urgency.   Musculoskeletal: Negative for arthralgias.   Skin: Negative for rash.   Neurological: Negative for dizziness, weakness and headaches.   Psychiatric/Behavioral: Negative for agitation and confusion.        Personal History     Past Medical History:   Diagnosis Date   • Arthritis    • At risk for obstructive sleep apnea     5   • Atrial fibrillation (HCC)    • Bladder outlet obstruction 09/11/2019   • BPH (benign prostatic hyperplasia)    • Colon polyp    • Coronary artery disease Few years    Afib   • Dysphagia    • Eczema    • GERD (gastroesophageal reflux disease) 2013    Mentioned as possible   • Hernia     Your office reported hiatal   • Hiatal hernia    • Hyperlipidemia    • Hypertension    • Left ventricular dysfunction    • Low back pain    • Prostate cancer (HCC)    • Skin cancer      Past Surgical History:   Procedure Laterality Date   • APPENDECTOMY  75 yrs ago   • CATARACT EXTRACTION, BILATERAL     • COLONOSCOPY  unknown   • CYSTOSCOPY TRANSURETHRAL RESECTION OF PROSTATE N/A 09/11/2019    Procedure: CYSTOSCOPY TRANSURETHRAL RESECTION OF PROSTATE;  Surgeon: Chip Bedolla MD;  Location: [facility] OR;  Service: Urology   • ENDOSCOPY N/A 10/31/2018    LA Grade C reflux esophagitis, HH, erythematous mucosa in the antrum. Path: Gastritis, esophagitis.    • ENDOSCOPY N/A 03/20/2022    Procedure: ESOPHAGOGASTRODUODENOSCOPY WITH BIOPSIES AND NAILS DILATION #56;  Surgeon: Tony Ramos MD;  Location: [facility] ENDOSCOPY;  Service: Gastroenterology;  Laterality: N/A;  PRE OP - DYSPHAGIA  POST OP- MILD GASTRITIS   • EPIDURAL BLOCK     • ESOPHAGEAL DILATATION     • PROSTATE SURGERY  11/06/2014   • SKIN CANCER EXCISION      multiple, over that last few years   • SKIN CANCER EXCISION Right 09/03/2019    FACE,    • TONSILLECTOMY       around 1950   • UPPER GASTROINTESTINAL ENDOSCOPY  2013     Family History   Problem Relation Age of Onset   • Cancer Mother         pancreatic   • Liver disease Mother    • Cancer Father         prostate, bladder, colon, lymphoma, leukemia   • Colon cancer Father    • Arrhythmia Father         Afib   • Heart disease Father    • Cancer Brother         prostate   • Arrhythmia Brother         Afib   • Cancer Paternal Grandfather         prostate   • Arrhythmia Paternal Aunt         Irregular beat . Before the time of illness sharing.   • Malig Hyperthermia Neg Hx      Social History     Tobacco Use   • Smoking status: Former     Types: Pipe     Start date: 1959     Quit date: 1961     Years since quittin.9   • Smokeless tobacco: Never   • Tobacco comments:     50 years ago none since   Vaping Use   • Vaping Use: Never used   Substance Use Topics   • Alcohol use: Yes     Alcohol/week: 5.0 standard drinks     Types: 5 Glasses of wine per week     Comment: Less than 4 oz /wk since this prob started.   • Drug use: No     No current facility-administered medications on file prior to encounter.     Current Outpatient Medications on File Prior to Encounter   Medication Sig Dispense Refill   • aspirin 81 MG chewable tablet Chew 81 mg Daily.     • atorvastatin (LIPITOR) 40 MG tablet TAKE ONE TABLET BY MOUTH DAILY 90 tablet 3   • fluticasone (Flonase) 50 MCG/ACT nasal spray 2 sprays into the nostril(s) as directed by provider Daily. (Patient taking differently: 2 sprays into the nostril(s) as directed by provider As Needed.) 15.8 mL 0   • guaiFENesin 200 MG tablet Take 400 mg by mouth Every 4 (Four) Hours As Needed for Cough.     • loratadine (CLARITIN) 10 MG tablet Take 10 mg by mouth Daily.     • metoprolol succinate XL (TOPROL-XL) 25 MG 24 hr tablet Take 1 tablet by mouth Daily. 90 tablet 3   • omeprazole (priLOSEC) 20 MG capsule Take 1 capsule by mouth Daily. 90 capsule 3     Allergies   Allergen Reactions    • Dilaudid [Hydromorphone Hcl] Hallucinations       Objective    Objective     Vital Signs  Temp:  [97.7 °F (36.5 °C)] 97.7 °F (36.5 °C)  Heart Rate:  [63-80] 63  Resp:  [16-20] 20  BP: (167)/(91) 167/91  SpO2:  [96 %-97 %] 97 %  on   ;   Device (Oxygen Therapy): room air  Body mass index is 34.94 kg/m².    Physical Exam  Vitals reviewed.   Constitutional:       General: He is not in acute distress.     Appearance: He is well-developed.   HENT:      Head: Normocephalic and atraumatic.      Mouth/Throat:      Pharynx: No oropharyngeal exudate.   Eyes:      General: No scleral icterus.     Extraocular Movements: Extraocular movements intact.      Pupils: Pupils are equal, round, and reactive to light.   Cardiovascular:      Rate and Rhythm: Normal rate and regular rhythm.      Heart sounds: No murmur heard.  Pulmonary:      Effort: Pulmonary effort is normal. No respiratory distress.      Breath sounds: Normal breath sounds. No wheezing.   Abdominal:      General: There is no distension.      Palpations: Abdomen is soft.      Tenderness: There is no abdominal tenderness.   Musculoskeletal:      Right lower leg: No edema.      Left lower leg: No edema.   Skin:     General: Skin is warm and dry.      Findings: No rash.   Neurological:      Mental Status: He is alert and oriented to person, place, and time.      Comments: Right homonymous hemianopsia present.  No facial droop or other obvious cranial nerve deficits.  Moves extremities well, 5 out of 5 strength throughout.   Psychiatric:         Mood and Affect: Mood normal.         Thought Content: Thought content normal.         Results Review:  I reviewed the patient's new clinical results.  I reviewed the patient's new imaging results and agree with the interpretation.  I reviewed the patient's other test results and agree with the interpretation  I personally viewed and interpreted the patient's EKG/Telemetry data  Discussed with ED provider.    Lab Results (last  24 hours)     Procedure Component Value Units Date/Time    POC Glucose Once [[id number]]  (Normal) Collected: 10/19/22 1848    Specimen: Blood Updated: 10/19/22 1849     Glucose 88 mg/dL      Comment: Meter: [id number] : [id number] Nadir Morales RN       Comprehensive Metabolic Panel [[id number]]  (Abnormal) Collected: 10/19/22 1906    Specimen: Blood Updated: 10/19/22 1932     Glucose 89 mg/dL      BUN 15 mg/dL      Creatinine 0.78 mg/dL      Sodium 140 mmol/L      Potassium 3.8 mmol/L      Comment: Slight hemolysis detected by analyzer. Results may be affected.        Chloride 101 mmol/L      CO2 27.9 mmol/L      Calcium 10.1 mg/dL      Total Protein 7.0 g/dL      Albumin 4.30 g/dL      ALT (SGPT) 24 U/L      AST (SGOT) 32 U/L      Alkaline Phosphatase 100 U/L      Total Bilirubin 1.3 mg/dL      Globulin 2.7 gm/dL      A/G Ratio 1.6 g/dL      BUN/Creatinine Ratio 19.2     Anion Gap 11.1 mmol/L      eGFR 89.0 mL/min/1.73      Comment: National Kidney Foundation and American Society of Nephrology (ASN) Task Force recommended calculation based on the Chronic Kidney Disease Epidemiology Collaboration (CKD-EPI) equation refit without adjustment for race.       Narrative:      GFR Normal >60  Chronic Kidney Disease <60  Kidney Failure <15    The GFR formula is only valid for adults with stable renal function between ages 18 and 70.    Protime-INR [[id number]]  (Normal) Collected: 10/19/22 1906    Specimen: Blood Updated: 10/19/22 1926     Protime 13.6 Seconds      INR 1.03    CBC & Differential [[id number]]  (Abnormal) Collected: 10/19/22 1907    Specimen: Blood Updated: 10/19/22 1916    Narrative:      The following orders were created for panel order CBC & Differential.  Procedure                               Abnormality         Status                     ---------                               -----------         ------                     CBC Auto Differential[[id number]]        Abnormal            Final result                  Please view results for these tests on the individual orders.    CBC Auto Differential [[id number]]  (Abnormal) Collected: 10/19/22 1907    Specimen: Blood Updated: 10/19/22 1916     WBC 7.47 10*3/mm3      RBC 5.11 10*6/mm3      Hemoglobin 15.4 g/dL      Hematocrit 45.8 %      MCV 89.6 fL      MCH 30.1 pg      MCHC 33.6 g/dL      RDW 13.6 %      RDW-SD 45.0 fl      MPV 11.1 fL      Platelets 197 10*3/mm3      Neutrophil % 74.8 %      Lymphocyte % 14.7 %      Monocyte % 7.1 %      Eosinophil % 2.8 %      Basophil % 0.5 %      Immature Grans % 0.1 %      Neutrophils, Absolute 5.58 10*3/mm3      Lymphocytes, Absolute 1.10 10*3/mm3      Monocytes, Absolute 0.53 10*3/mm3      Eosinophils, Absolute 0.21 10*3/mm3      Basophils, Absolute 0.04 10*3/mm3      Immature Grans, Absolute 0.01 10*3/mm3      nRBC 0.0 /100 WBC           Imaging Results (Last 24 Hours)     Procedure Component Value Units Date/Time    CT Head Without Contrast Stroke Protocol [[id number]] Collected: 10/19/22 1932     Updated: 10/19/22 1932    Narrative:      CT HEAD WITHOUT CONTRAST     HISTORY: Stroke, vision loss.     COMPARISON: None.     FINDINGS: There is an area of decreased attenuation involving the left  occipital lobe medially consistent with an acute infarct measuring 5.5  cm in the AP dimension and approximately 2 cm in the transverse  dimension. There is no evidence of hemorrhage. Mild vascular  calcification and small vessel ischemic disease is noted.       Impression:      Acute left PCA distribution infarct measuring 5.5 x 2 cm in  size with no evidence of hemorrhage.     The above information was called to and discussed with Dr. Clark.           Radiation dose reduction techniques were utilized, including automated  exposure control and exposure modulation based on body size.          XR Chest 1 View [[id number]] Resulted: 10/19/22 1927     Updated: 10/19/22 1927          Results for orders placed during the hospital encounter of  08/30/22    Adult Transesophageal Echo (CHARU) W/ Cont if Necessary Per Protocol    Interpretation Summary  · Saline test results are negative.  · Estimated left ventricular EF = 60% Left ventricular systolic function is normal.  · The right ventricular cavity is borderline dilated.  · Mildly reduced right ventricular systolic function noted.  · Estimated right ventricular systolic pressure from tricuspid regurgitation is normal (<35 mmHg).  · There is spontaneous echo contrast present in the atrial body and in the atrial appendage.      ECG 12 Lead   Final Result   HEART RATE= 71  bpm   RR Interval= 845  ms   ME Interval=   ms   P Horizontal Axis=   deg   P Front Axis=   deg   QRSD Interval= 101  ms   QT Interval= 403  ms   QRS Axis= -58  deg   T Wave Axis= 28  deg   - ABNORMAL ECG -   Atrial fibrillation   Inferior infarct, old   POOR R WAVE PROGRESSION   No change from previous tracing   Electronically Signed By: Steve Prater ([facility]) 19-Oct-2022 20:20:13   Date and Time of Study: 2022-10-19 18:30:39           Assessment/Plan     Active Hospital Problems    Diagnosis  POA   • **Acute ischemic left PCA stroke (HCC) [I63.532]  Yes   • PVC (premature ventricular contraction) [I49.3]  Yes   • Prostate cancer (HCC) [C61]  Yes   • Permanent atrial fibrillation (HCC) [I48.21]  Yes   • HLD (hyperlipidemia) [E78.5]  Yes      Resolved Hospital Problems   No resolved problems to display.       Mr. Sorto is a 82 y.o. gentleman with history of prostate cancer, hyperlipidemia, GERD and permanent A. fib but not on anticoagulation presenting with right homonymous hemianopsia since this morning and findings of left PCA stroke on CT scan    We will proceed with standard stroke work-up, MRI and imaging of the vasculature.  Defer to neurology if we need to repeat the echo since he just had a CHARU within the last 2 months.  Discussed with patient regarding need for anticoagulation.  He previously took Eliquis for a brief time but stopped  it due to excessive bruising, not a major bleed.  He may be agreeable to taking Xarelto, but will have to discuss further  Continue statin  Stroke protocol with swallow eval, PT and OT although he does not seem to have any other deficits.      VTE Prophylaxis - SCDs.  Code Status - Full code.       Lencho Lora MD  [facility]  10/19/22  21:27 EDT

## 2022-10-20 NOTE — DISCHARGE SUMMARY
Patient Name: Junito Sorto  : 1940  MRN: [id number]    Date of Admission: 10/19/2022  Date of Discharge:  10/20/2022  Primary Care Physician: Fan Schulz MD      Chief Complaint:   Eye Problem      Discharge Diagnoses     Active Hospital Problems    Diagnosis  POA   • **Acute ischemic left PCA stroke (HCC) [I63.532]  Yes   • PVC (premature ventricular contraction) [I49.3]  Yes   • Prostate cancer (HCC) [C61]  Yes   • Permanent atrial fibrillation (HCC) [I48.21]  Yes   • HLD (hyperlipidemia) [E78.5]  Yes      Resolved Hospital Problems   No resolved problems to display.        Hospital Course     Patient is a 82-year-old male with known history of prostate cancer, atrial fibrillation, GERD, hyperlipidemia who is only on aspirin not on anticoagulation presented to the hospital with complaints of right homonymous hemianopsia and MRI was done which revealed findings consistent with acute CVA in the left PCA territory.  He does not have any other focal findings on exam.  Neurology also did evaluate him and recommended to initiate Eliquis on 10/25/2022 and continue with statins.  Patient will follow-up with primary cardiology upon discharge given he has a history of atrial fibrillation and they are working on considering watchman device at a later date.  Given the above is being discharged home and he will follow-up with neurology as an outpatient basis.      Day of Discharge         Physical Exam:  Temp:  [97.6 °F (36.4 °C)-98.4 °F (36.9 °C)] 97.6 °F (36.4 °C)  Heart Rate:  [50-80] 50  Resp:  [16-20] 18  BP: (142-180)/() 143/94  Body mass index is 34.94 kg/m².  Physical Exam  HEENT: PERRLA, extraocular movements intact, Scleras no icterus  Neck: Supple, no JVD  Cardiovascular: Regular rate and rhythm with normal S1 and S2  Respiratory: Fairly clear to auscultation bilaterally with no wheezes  GI: Soft, nontender, bowel sounds are present  Extremities: No edema, palpable pulses  Neurologic:  Right homonymous hemianopsia present, no facial asymmetry, good strength in all 4 extremities.    Consultants     Consult Orders (all) (From admission, onward)     Start     Ordered    10/19/22 2123  Notify Stroke Coordinator  Once        Provider:  (Not yet assigned)    10/19/22 2126    10/19/22 2123  Inpatient Rehab Admission Consult  Once        Provider:  (Not yet assigned)    10/19/22 2126    10/19/22 2123  Inpatient Case Management  Consult  Once        Provider:  (Not yet assigned)    10/19/22 2126    10/19/22 2123  Inpatient Diabetes Educator Consult  Once,   Status:  Canceled        Provider:  (Not yet assigned)    10/19/22 2126    10/19/22 2123  Inpatient Neurology Consult Stroke  Once        Specialty:  Neurology  Provider:  Hernan Hilliard MD    10/19/22 2126    10/19/22 1950  [facility] (on-call MD unless specified) Details  Once        Specialty:  Hospitalist  Provider:  Lencho Lora MD    10/19/22 1949    10/19/22 1821  Inpatient Neurology Consult Stroke  Once        Specialty:  Neurology  Provider:  Hernan Hilliard MD    10/19/22 1820              Procedures     * Surgery not found *      Imaging Results (All)     Procedure Component Value Units Date/Time    MRI Brain With & Without Contrast [[id number]] Collected: 10/20/22 1200     Updated: 10/20/22 1200    Narrative:      MRI EXAMINATION OF THE BRAIN WITH AND WITHOUT CONTRAST     HISTORY: Stroke.     COMPARISON: CT head 10/19/2022.     TECHNIQUE: An MRI examination of the brain was performed before and  after the intravenous administration of contrast utilizing sagittal T1,  axial diffusion, T1, T2, T2 FLAIR, gradient echo T2 as well as sagittal,  axial and coronal imaging.     FINDINGS: The diffusion sequence demonstrates an acute left PCA  distribution infarct involving the medial aspect of the left occipital  lobe measuring approximately 5.5 cm in AP dimension and 2.6 cm in  transverse dimension. Small  scattered central areas of signal loss are  appreciated on the gradient echo T2 sequence consistent with mild  petechial hemorrhage.     There is expected flow-void in the basilar artery and in the distal  aspect of the internal carotid arteries bilaterally on the axial T2  sequence. After contrast administration there was mild gyriform  enhancement of the left occipital lobe medially. The axial T2 FLAIR  sequence demonstrates mild small vessel ischemic disease.       Impression:      There is an acute left PCA distribution infarct measuring  approximately 5.5 cm in the AP dimension and 2.6 cm in the transverse  dimension. Mild petechial hemorrhage is noted.             CT Head Without Contrast Stroke Protocol [[id number]] Collected: 10/19/22 1932     Updated: 10/20/22 0645    Narrative:      CT HEAD WITHOUT CONTRAST     HISTORY: Stroke, vision loss.     COMPARISON: None.     FINDINGS: There is an area of decreased attenuation involving the left  occipital lobe medially consistent with an acute infarct measuring 5.5  cm in the AP dimension and approximately 2 cm in the transverse  dimension. There is no evidence of hemorrhage. Mild vascular  calcification and small vessel ischemic disease is noted.       Impression:      Acute left PCA distribution infarct measuring 5.5 x 2 cm in  size with no evidence of hemorrhage.     The above information was called to and discussed with Dr. Clark.           Radiation dose reduction techniques were utilized, including automated  exposure control and exposure modulation based on body size.     This report was finalized on 10/20/2022 6:42 AM by Dr. Edmund Gooden M.D.       XR Chest 1 View [[id number]] Collected: 10/19/22 2221     Updated: 10/19/22 2237    Narrative:      SINGLE VIEW OF THE CHEST     HISTORY: Stroke protocol     COMPARISON: 03/17/2022     FINDINGS:  Cardiomegaly is present. There is no vascular congestion. There is  tortuosity of the aorta. There is stable  elevation of the right  hemidiaphragm, with associated bronchovascular crowding. No  pneumothorax, pleural effusion, or acute infiltrate is seen.       Impression:      No acute findings.     This report was finalized on 10/19/2022 10:22 PM by Dr. Mary Welsh M.D.             Results for orders placed during the hospital encounter of 08/30/22    Adult Transesophageal Echo (CHARU) W/ Cont if Necessary Per Protocol    Interpretation Summary  · Saline test results are negative.  · Estimated left ventricular EF = 60% Left ventricular systolic function is normal.  · The right ventricular cavity is borderline dilated.  · Mildly reduced right ventricular systolic function noted.  · Estimated right ventricular systolic pressure from tricuspid regurgitation is normal (<35 mmHg).  · There is spontaneous echo contrast present in the atrial body and in the atrial appendage.    Pertinent Labs     Results from last 7 days   Lab Units 10/19/22  1907   WBC 10*3/mm3 7.47   HEMOGLOBIN g/dL 15.4   PLATELETS 10*3/mm3 197     Results from last 7 days   Lab Units 10/19/22  1906   SODIUM mmol/L 140   POTASSIUM mmol/L 3.8   CHLORIDE mmol/L 101   CO2 mmol/L 27.9   BUN mg/dL 15   CREATININE mg/dL 0.78   GLUCOSE mg/dL 89   EGFR mL/min/1.73 89.0     Results from last 7 days   Lab Units 10/19/22  1906   ALBUMIN g/dL 4.30   BILIRUBIN mg/dL 1.3*   ALK PHOS U/L 100   AST (SGOT) U/L 32   ALT (SGPT) U/L 24     Results from last 7 days   Lab Units 10/19/22  1906   CALCIUM mg/dL 10.1   ALBUMIN g/dL 4.30           Results from last 7 days   Lab Units 10/20/22  0641   CHOLESTEROL mg/dL 143   TRIGLYCERIDES mg/dL 62   HDL CHOL mg/dL 60   LDL CHOL mg/dL 70             Test Results Pending at Discharge       Discharge Details        Discharge Medications      New Medications      Instructions Start Date   apixaban 5 MG tablet tablet  Commonly known as: ELIQUIS   5 mg, Oral, 2 Times Daily   Start Date: October 25, 2022        Changes to Medications       Instructions Start Date   atorvastatin 80 MG tablet  Commonly known as: LIPITOR  What changed:   medication strength  how much to take  when to take this   80 mg, Oral, Nightly      fluticasone 50 MCG/ACT nasal spray  Commonly known as: Flonase  What changed:   when to take this  reasons to take this   2 sprays, Nasal, Daily         Continue These Medications      Instructions Start Date   aspirin 81 MG chewable tablet   81 mg, Oral, Daily      guaiFENesin 200 MG tablet   400 mg, Oral, Every 4 Hours PRN      loratadine 10 MG tablet  Commonly known as: CLARITIN   10 mg, Oral, Daily      metoprolol succinate XL 25 MG 24 hr tablet  Commonly known as: TOPROL-XL   25 mg, Oral, Daily      omeprazole 20 MG capsule  Commonly known as: priLOSEC   20 mg, Oral, Daily             Allergies   Allergen Reactions   • Dilaudid [Hydromorphone Hcl] Hallucinations       Discharge Disposition:  Home or Self Care      Discharge Diet:  Diet Order   Procedures   • Diet Regular; Thin       Discharge Activity:   Activity Instructions     Activity as Tolerated            CODE STATUS:    Code Status and Medical Interventions:   Ordered at: 10/19/22 2126     Code Status (Patient has no pulse and is not breathing):    CPR (Attempt to Resuscitate)     Medical Interventions (Patient has pulse or is breathing):    Full Support     Release to patient:    Routine Release       Future Appointments   Date Time Provider Department Center   11/3/2022  2:15 PM Fan Schulz MD [facility]     Additional Instructions for the Follow-ups that You Need to Schedule     Discharge Follow-up with PCP   As directed       Currently Documented PCP:    Fan Schulz MD    PCP Phone Number:    [phone]     Follow Up Details: 1 week         Discharge Follow-up with Specified Provider: Dr. Prater; 2 Weeks   As directed      To: Dr. Prater    Follow Up: 2 Weeks         Discharge Follow-up with Specified Provider: ; 1 Month   As directed       To:     Follow Up: 1 Month            Follow-up Information     Fan Schulz MD .    Specialty: Internal Medicine  Why: 1 week  Contact information:  [id number] [address]  [phone]                         Additional Instructions for the Follow-ups that You Need to Schedule     Discharge Follow-up with PCP   As directed       Currently Documented PCP:    Fan Schulz MD    PCP Phone Number:    [phone]     Follow Up Details: 1 week         Discharge Follow-up with Specified Provider: Dr. Prater; 2 Weeks   As directed      To: Dr. Prater    Follow Up: 2 Weeks         Discharge Follow-up with Specified Provider: ; 1 Month   As directed      To:     Follow Up: 1 Month           Time Spent on Discharge:  Greater than 30 minutes      Chris Padgett MD  [facility]  10/20/22  14:59 EDT

## 2022-10-20 NOTE — ED NOTES
Nursing report ED to floor  Junito Sorto  82 y.o.  male    HPI :   Chief Complaint   Patient presents with    Eye Problem       Admitting doctor:   Lencho Lora MD    Admitting diagnosis:   The primary encounter diagnosis was Acute ischemic left PCA stroke (HCC). A diagnosis of Atrial fibrillation, unspecified type (HCC) was also pertinent to this visit.    Code status:   Current Code Status       Date Active Code Status Order ID Comments User Context       10/19/2022 2126 CPR (Attempt to Resuscitate) [id number]  Lencho Lora MD ED        Question Answer    Code Status (Patient has no pulse and is not breathing) CPR (Attempt to Resuscitate)    Medical Interventions (Patient has pulse or is breathing) Full Support    Release to patient Routine Release                    Allergies:   Dilaudid [hydromorphone hcl]    Isolation:   No active isolations    Intake and Output  No intake or output data in the 24 hours ending 10/19/22 2220    Weight:       10/19/22  1800   Weight: 101 kg (223 lb 1.7 oz)       Most recent vitals:   Vitals:    10/19/22 1744 10/19/22 1800 10/19/22 2007 10/19/22 2146   BP:   167/91 158/96   Pulse: 80  63 67   Resp: 16  20 18   Temp: 97.7 °F (36.5 °C)      SpO2: 96%  97% 97%   Weight:  101 kg (223 lb 1.7 oz)         Active LDAs/IV Access:   Lines, Drains & Airways       Active LDAs       Name Placement date Placement time Site Days    Peripheral IV 10/19/22 1842 Anterior;Left Forearm 10/19/22  1842  Forearm  less than 1    Peripheral IV 10/19/22 1842 Posterior;Right Hand 10/19/22  1842  Hand  less than 1                    Labs (abnormal labs have a star):   Labs Reviewed   COMPREHENSIVE METABOLIC PANEL - Abnormal; Notable for the following components:       Result Value    Total Bilirubin 1.3 (*)     All other components within normal limits    Narrative:     GFR Normal >60  Chronic Kidney Disease <60  Kidney Failure <15    The GFR formula is only valid for adults  with stable renal function between ages 18 and 70.   CBC WITH AUTO DIFFERENTIAL - Abnormal; Notable for the following components:    Lymphocyte % 14.7 (*)     All other components within normal limits   PROTIME-INR - Normal   POCT GLUCOSE FINGERSTICK - Normal   RAINBOW DRAW    Narrative:     The following orders were created for panel order [place] Draw.  Procedure                               Abnormality         Status                     ---------                               -----------         ------                     Green Top (Gel)[[id number]]                                  Final result               Lavender Top[[id number]]                                     Final result               Gold Top - SST[[id number]]                                   Final result               Light Blue Top[[id number]]                                   Final result                 Please view results for these tests on the individual orders.   HEMOGLOBIN A1C   LIPID PANEL   POCT GLUCOSE FINGERSTICK   POCT GLUCOSE FINGERSTICK   POCT GLUCOSE FINGERSTICK   POCT GLUCOSE FINGERSTICK   POCT GLUCOSE FINGERSTICK   TYPE AND SCREEN   CBC AND DIFFERENTIAL    Narrative:     The following orders were created for panel order CBC & Differential.  Procedure                               Abnormality         Status                     ---------                               -----------         ------                     CBC Auto Differential[[id number]]        Abnormal            Final result                 Please view results for these tests on the individual orders.   GREEN TOP   LAVENDER TOP   GOLD TOP - SST   LIGHT BLUE TOP       EKG:   ECG 12 Lead   Final Result   HEART RATE= 71  bpm   RR Interval= 845  ms   OH Interval=   ms   P Horizontal Axis=   deg   P Front Axis=   deg   QRSD Interval= 101  ms   QT Interval= 403  ms   QRS Axis= -58  deg   T Wave Axis= 28  deg   - ABNORMAL ECG -   Atrial fibrillation   Inferior infarct, old   POOR R  WAVE PROGRESSION   No change from previous tracing   Electronically Signed By: [facility] 19-Oct-2022 20:20:13   Date and Time of Study: 2022-10-19 18:30:39          Meds given in ED:   Medications   sodium chloride 0.9 % flush 10 mL (has no administration in time range)   sodium chloride 0.9 % flush 10 mL (has no administration in time range)   sodium chloride 0.9 % flush 10 mL (has no administration in time range)   atorvastatin (LIPITOR) tablet 80 mg (has no administration in time range)   aspirin chewable tablet 81 mg (has no administration in time range)     Or   aspirin suppository 300 mg (has no administration in time range)   acetaminophen (TYLENOL) tablet 650 mg (has no administration in time range)     Or   acetaminophen (TYLENOL) suppository 650 mg (has no administration in time range)   ondansetron (ZOFRAN) injection 4 mg (has no administration in time range)   guaiFENesin tablet 400 mg (has no administration in time range)   cetirizine (zyrTEC) tablet 10 mg (has no administration in time range)   metoprolol succinate XL (TOPROL-XL) 24 hr tablet 25 mg (has no administration in time range)   pantoprazole (PROTONIX) EC tablet 40 mg (has no administration in time range)   aspirin chewable tablet 324 mg (243 mg Oral Given 10/19/22 2042)       Imaging results:  CT Head Without Contrast Stroke Protocol    Result Date: 10/19/2022  Acute left PCA distribution infarct measuring 5.5 x 2 cm in size with no evidence of hemorrhage.  The above information was called to and discussed with Dr. Clark.    Radiation dose reduction techniques were utilized, including automated exposure control and exposure modulation based on body size.        Ambulatory status:   - able to ambulate    Social issues:   Social History     Socioeconomic History    Marital status:    Tobacco Use    Smoking status: Former     Types: Pipe     Start date: 1959     Quit date: 1961     Years since quittin.9     Smokeless tobacco: Never    Tobacco comments:     50 years ago none since   Vaping Use    Vaping Use: Never used   Substance and Sexual Activity    Alcohol use: Yes     Alcohol/week: 5.0 standard drinks     Types: 5 Glasses of wine per week     Comment: Less than 4 oz /wk since this prob started.    Drug use: No    Sexual activity: Defer     Comment: 50 years ago       NIH Stroke Scale:  Interval: baseline      Socorro Mcmillan RN  10/19/22 22:20 EDT

## 2022-10-20 NOTE — THERAPY EVALUATION
Patient Name: Junito Sorto  : 1940    MRN: [id number]                              Today's Date: 10/20/2022       Admit Date: 10/19/2022    Visit Dx:     ICD-10-CM ICD-9-CM   1. Acute ischemic left PCA stroke (HCC)  I63.532 434.91   2. Atrial fibrillation, unspecified type (HCC)  I48.91 427.31     Patient Active Problem List   Diagnosis   • Hyperglycemia   • Obesity   • Allergic rhinitis   • Permanent atrial fibrillation (HCC)   • HLD (hyperlipidemia)   • Esophageal dysphagia   • Hiatal hernia   • GERD (gastroesophageal reflux disease)   • Prostate cancer (HCC)   • Bladder outlet obstruction   • Sciatica of right side   • Pneumonia of right lower lobe due to infectious organism   • Acute respiratory failure with hypoxia (HCC)   • PVC (premature ventricular contraction)   • Left ventricular dysfunction   • Hypercholesterolemia   • Acute ischemic left PCA stroke (HCC)     Past Medical History:   Diagnosis Date   • Arthritis    • At risk for obstructive sleep apnea     5   • Atrial fibrillation (HCC)    • Bladder outlet obstruction 2019   • BPH (benign prostatic hyperplasia)    • Colon polyp    • Coronary artery disease Few years    Afib   • Dysphagia    • Eczema    • GERD (gastroesophageal reflux disease) 2013    Mentioned as possible   • Hernia     Your office reported hiatal   • Hiatal hernia    • Hyperlipidemia    • Hypertension    • Left ventricular dysfunction    • Low back pain    • Prostate cancer (HCC)    • Skin cancer      Past Surgical History:   Procedure Laterality Date   • APPENDECTOMY  75 yrs ago   • CATARACT EXTRACTION, BILATERAL     • COLONOSCOPY  unknown   • CYSTOSCOPY TRANSURETHRAL RESECTION OF PROSTATE N/A 2019    Procedure: CYSTOSCOPY TRANSURETHRAL RESECTION OF PROSTATE;  Surgeon: Chip Bedolla MD;  Location: [facility];  Service: Urology   • ENDOSCOPY N/A 10/31/2018    LA Grade C reflux esophagitis, HH, erythematous mucosa in the antrum. Path: Gastritis,  esophagitis.    • ENDOSCOPY N/A 03/20/2022    Procedure: ESOPHAGOGASTRODUODENOSCOPY WITH BIOPSIES AND NAILS DILATION #56;  Surgeon: Tony Ramos MD;  Location: [facility] ENDOSCOPY;  Service: Gastroenterology;  Laterality: N/A;  PRE OP - DYSPHAGIA  POST OP- MILD GASTRITIS   • EPIDURAL BLOCK     • ESOPHAGEAL DILATATION     • PROSTATE SURGERY  11/06/2014   • SKIN CANCER EXCISION      multiple, over that last few years   • SKIN CANCER EXCISION Right 09/03/2019    FACE,    • TONSILLECTOMY      around 1950   • UPPER GASTROINTESTINAL ENDOSCOPY  2013      General Information     Row Name 10/20/22 1319          OT Time and Intention    Document Type evaluation  -     Mode of Treatment individual therapy;occupational therapy  -     Row Name 10/20/22 1319          General Information    Patient Profile Reviewed yes  -     Prior Level of Function independent:;community mobility;gait;transfer;all household mobility;ADL's;bed mobility  -     Existing Precautions/Restrictions fall  R peripheral vision field cut  -     Barriers to Rehab none identified  -     Row Name 10/20/22 1319          Living Environment    People in Home spouse  -     Row Name 10/20/22 1319          Cognition    Orientation Status (Cognition) oriented x 4  -     Row Name 10/20/22 1319          Safety Issues, Functional Mobility    Safety Issues Affecting Function (Mobility) safety precaution awareness  -     Impairments Affecting Function (Mobility) balance;endurance/activity tolerance;strength;visual/perceptual  -           User Key  (r) = Recorded By, (t) = Taken By, (c) = Cosigned By    Initials Name Provider Type     Elsa Quintana, OTR Occupational Therapist                 Mobility/ADL's     Row Name 10/20/22 1320          Bed Mobility    Comment, (Bed Mobility) NT in BR upon OT arrival. nsg aide present.  -     Row Name 10/20/22 1320          Transfers    Transfers sit-stand transfer;stand-sit transfer;toilet  transfer;bed-chair transfer  -     Row Name 10/20/22 1320          Bed-Chair Transfer    Bed-Chair [place] (Transfers) set up;minimum assist (75% patient effort);contact guard;verbal cues  -     Assistive Device (Bed-Chair Transfers) walker, front-wheeled  -     Row Name 10/20/22 1320          Sit-Stand Transfer    Sit-Stand [place] (Transfers) set up;contact guard  -     Assistive Device (Sit-Stand Transfers) walker, front-wheeled  -     Row Name 10/20/22 1320          Stand-Sit Transfer    Stand-Sit [place] (Transfers) set up;contact guard  -     Assistive Device (Stand-Sit Transfers) walker, front-wheeled  -     Row Name 10/20/22 1320          Toilet Transfer    Type (Toilet Transfer) stand-sit;sit-stand  -     [place] Level (Toilet Transfer) set up;contact guard  -     Assistive Device (Toilet Transfer) walker, front-wheeled;grab bars/safety frame  -[facility] Name 10/20/22 1320          Functional Mobility    Functional Mobility- Ind. Level set up required;verbal cues required;contact guard assist;minimum assist (75% patient effort)  -     Functional Mobility- Device walker, front-wheeled  -     Functional Mobility- Comment in room, in BR, to chair  -[facility] Name 10/20/22 1320          Activities of Daily Living    BADL Assessment/Intervention bathing;upper body dressing;grooming;toileting;lower body dressing  -[facility] Name 10/20/22 1320          Bathing Assessment/Intervention    [place] Level (Bathing) bathing skills;set up;contact guard assist  -     Position (Bathing) supported sitting;supported standing  -[facility] Name 10/20/22 1320          Upper Body Dressing Assessment/Training    [place] Level (Upper Body Dressing) upper body dressing skills;doff;don;pajama/robe;set up;minimum assist (75% patient effort)  -     Position (Upper Body Dressing) supported sitting  -[facility] Name 10/20/22 1320          Grooming Assessment/Training     [place] Level (Grooming) grooming skills;wash face, hands;standby assist;set up  -     Position (Grooming) supported sitting  -KP     Row Name 10/20/22 1320          Toileting Assessment/Training    [place] Level (Toileting) toileting skills;perform perineal hygiene;adjust/manage clothing;contact guard assist  -KP     Position (Toileting) supported sitting;supported standing  -KP     Row Name 10/20/22 1320          Lower Body Dressing Assessment/Training    [place] Level (Lower Body Dressing) lower body dressing skills;don;set up;moderate assist (50% patient effort);minimum assist (75% patient effort)  undergarment  -KP     Position (Lower Body Dressing) supported sitting;supported standing  -KP           User Key  (r) = Recorded By, (t) = Taken By, (c) = Cosigned By    Initials Name Provider Type    Elsa Corcoran, OTR Occupational Therapist               Obj/Interventions     Row Name 10/20/22 1323          Sensory Assessment (Somatosensory)    Sensory Assessment (Somatosensory) UE sensation intact  -     Row Name 10/20/22 1323          Vision Assessment/Intervention    Visual Impairment/Limitations peripheral vision impaired right  -KP     Visual Motor Impairment visual tracking, right  -KP     Visual Processing Deficit visual search, right;visual attention, right  -KP     Row Name 10/20/22 1323          Range of Motion Comprehensive    General Range of Motion bilateral upper extremity ROM WNL  -     Comment, General Range of Motion B UE 8/8  -KP     Row Name 10/20/22 1323          Strength Comprehensive (MMT)    Comment, General Manual Muscle Testing (MMT) Assessment B UE 4/5  -KP     Row Name 10/20/22 1323          Motor Skills    Motor Skills coordination;functional endurance  -     Coordination WFL  -     Row Name 10/20/22 1323          Balance    Balance Assessment sitting static balance;standing static balance;standing dynamic balance  -     Static Sitting Balance  set-up;standby assist  -KP     Position, Sitting Balance supported  -KP     Static Standing Balance set-up;contact guard  -KP     Dynamic Standing Balance contact guard  -KP     Position/Device Used, Standing Balance walker, front-wheeled  -KP     Balance Interventions sitting;standing;sit to stand;supported;static;dynamic;occupation based/functional task  -           User Key  (r) = Recorded By, (t) = Taken By, (c) = Cosigned By    Initials Name Provider Type    KP Elsa Quintana, OTR Occupational Therapist               Goals/Plan     Row Name 10/20/22 1323          Transfer Goal 1 (OT)    Activity/Assistive Device (Transfer Goal 1, OT) bed-to-chair/chair-to-bed;toilet;sit-to-stand/stand-to-sit;walker, rolling  -KP     [place] Level/Cues Needed (Transfer Goal 1, OT) standby assist  -KP     Time Frame (Transfer Goal 1, OT) short term goal (STG);2 weeks  -KP     Progress/Outcome (Transfer Goal 1, OT) goal ongoing  -     Row Name 10/20/22 1328          Bathing Goal 1 (OT)    Activity/Device (Bathing Goal 1, OT) bathing skills, all  -KP     [place] Level/Cues Needed (Bathing Goal 1, OT) standby assist  -KP     Time Frame (Bathing Goal 1, OT) short term goal (STG);2 weeks  -KP     Progress/Outcomes (Bathing Goal 1, OT) goal ongoing  -     Row Name 10/20/22 1321          Strength Goal 1 (OT)    Strength Goal 1 (OT) incr UE to 4+/5  -KP     Time Frame (Strength Goal 1, OT) short term goal (STG);2 weeks  -KP     Progress/Outcome (Strength Goal 1, OT) goal ongoing  -     Row Name 10/20/22 1327          Therapy Assessment/Plan (OT)    Planned Therapy Interventions (OT) activity tolerance training;functional balance retraining;BADL retraining;transfer/mobility retraining;strengthening exercise;ROM/therapeutic exercise;patient/caregiver education/training;neuromuscular control/coordination retraining  -           User Key  (r) = Recorded By, (t) = Taken By, (c) = Cosigned By    Initials Name  Provider Type    Elsa Corcoran OTR Occupational Therapist               Clinical Impression     Row Name 10/20/22 1324          Pain Assessment    Pretreatment Pain Rating 0/10 - no pain  -KP     Posttreatment Pain Rating 0/10 - no pain  -KP     Row Name 10/20/22 1324          Plan of Care Review    Plan of Care Reviewed With patient  -     Progress improving  -     Outcome Evaluation pt it an 82 yr old male admitted from home where he lives w his spouse. He was admitted for a stroke and recent fall. pt this date demo decr visual skills on the R side w decr peripheral vision and field cut. pt demo decent strength in UEs. and was CGA w walker for tsf to toilet and back to chair, walking w walker w VC due to field cut on R side. pt washed up in BR w CGA and mod A to min A for undergarment threading on LEs and pulling up. pt completed grooming skills w SBA. pt cont to benefit from OT to incr ADL, strength, balance, tsf, and visual skills. pt prefers to dc home w spouse, but may not short rehab stay prior to dc home pending progress in therapy.  -     Row Name 10/20/22 1324          Therapy Assessment/Plan (OT)    Rehab Potential (OT) good, to achieve stated therapy goals  -     Criteria for Skilled Therapeutic Interventions Met (OT) yes;meets criteria;skilled treatment is necessary  -     Therapy Frequency (OT) 5 times/wk  -     Row Name 10/20/22 1324          Therapy Plan Review/Discharge Plan (OT)    Anticipated Discharge Disposition (OT) skilled nursing facility;home with home health;home with assist  -     Row Name 10/20/22 1324          Positioning and Restraints    Pre-Treatment Position bathroom  -     Post Treatment Position chair  -KP     In Chair reclined;notified nsg;call light within reach;encouraged to call for assist;exit alarm on  -           User Key  (r) = Recorded By, (t) = Taken By, (c) = Cosigned By    Initials Name Provider Type    Elsa Corcoran OTR  Occupational Therapist               Outcome Measures     Row Name 10/20/22 1330          How much help from another is currently needed...    Putting on and taking off regular lower body clothing? 3  -KP     Bathing (including washing, rinsing, and drying) 3  -KP     Toileting (which includes using toilet bed pan or urinal) 3  -KP     Putting on and taking off regular upper body clothing 3  -KP     Taking care of personal grooming (such as brushing teeth) 3  -KP     Eating meals 3  -KP     AM-PAC 6 Clicks Score (OT) 18  -     Row Name 10/20/22 0900          How much help from another person do you currently need...    Turning from your back to your side while in flat bed without using bedrails? 4  -LC     Moving from lying on back to sitting on the side of a flat bed without bedrails? 3  -LC     Moving to and from a bed to a chair (including a wheelchair)? 3  -LC     Standing up from a chair using your arms (e.g., wheelchair, bedside chair)? 3  -LC     Climbing 3-5 steps with a railing? 3  -LC     To walk in hospital room? 3  -LC     AM-PAC 6 Clicks Score (PT) 19  -LC     Highest level of mobility 6 --> Walked 10 steps or more  -     Row Name 10/20/22 1330          Modified [place] Scale    Modified [place] Scale 4 - Moderately severe disability.  Unable to walk without assistance, and unable to attend to own bodily needs without assistance.  -     Row Name 10/20/22 1330          Functional Assessment    Outcome Measure Options AM-PAC 6 Clicks Daily Activity (OT);Modified Ann  -           User Key  (r) = Recorded By, (t) = Taken By, (c) = Cosigned By    Initials Name Provider Type    Elsa Corcoran OTR Occupational Therapist    Lucinda Barreto, RN Registered Nurse                Occupational Therapy Education     Title: PT OT SLP Therapies (In Progress)     Topic: Occupational Therapy (Done)     Point: ADL training (Done)     Description:   Instruct learner(s) on proper safety adaptation and  remediation techniques during self care or transfers.   Instruct in proper use of assistive devices.              Learning Progress Summary           Patient Acceptance, E,TB,D, VU,NR by  at 10/20/2022 1331    Comment: ed pt on role of OT. benefit of therapy, POC w. OT. ed on safety w ADL and tsf. pt req VC on technique w walking to chair due to visual field cut                   Point: Home exercise program (Done)     Description:   Instruct learner(s) on appropriate technique for monitoring, assisting and/or progressing therapeutic exercises/activities.              Learning Progress Summary           Patient Acceptance, E,TB,D, VU,NR by  at 10/20/2022 1331    Comment: ed pt on role of OT. benefit of therapy, POC w. OT. ed on safety w ADL and tsf. pt req VC on technique w walking to chair due to visual field cut                   Point: Precautions (Done)     Description:   Instruct learner(s) on prescribed precautions during self-care and functional transfers.              Learning Progress Summary           Patient Acceptance, E,TB,D, VU,NR by  at 10/20/2022 1331    Comment: ed pt on role of OT. benefit of therapy, POC w. OT. ed on safety w ADL and tsf. pt req VC on technique w walking to chair due to visual field cut                   Point: Body mechanics (Done)     Description:   Instruct learner(s) on proper positioning and spine alignment during self-care, functional mobility activities and/or exercises.              Learning Progress Summary           Patient Acceptance, E,TB,D, VU,NR by  at 10/20/2022 1331    Comment: ed pt on role of OT. benefit of therapy, POC w. OT. ed on safety w ADL and tsf. pt req VC on technique w walking to chair due to visual field cut                               User Key     Initials Effective Dates Name Provider Type Discipline     06/16/21 -  Elsa Quintana, OTR Occupational Therapist OT              OT Recommendation and Plan  Planned Therapy Interventions  (OT): activity tolerance training, functional balance retraining, BADL retraining, transfer/mobility retraining, strengthening exercise, ROM/therapeutic exercise, patient/caregiver education/training, neuromuscular control/coordination retraining  Therapy Frequency (OT): 5 times/wk  Plan of Care Review  Plan of Care Reviewed With: patient  Progress: improving  Outcome Evaluation: pt it an 82 yr old male admitted from home where he lives w his spouse. He was admitted for a stroke and recent fall. pt this date demo decr visual skills on the R side w decr peripheral vision and field cut. pt demo decent strength in UEs. and was CGA w walker for tsf to toilet and back to chair, walking w walker w VC due to field cut on R side. pt washed up in BR w CGA and mod A to min A for undergarment threading on LEs and pulling up. pt completed grooming skills w SBA. pt cont to benefit from OT to incr ADL, strength, balance, tsf, and visual skills. pt prefers to dc home w spouse, but may not short rehab stay prior to dc home pending progress in therapy.     Time Calculation:    Time Calculation- OT     Row Name 10/20/22 1332             Time Calculation- OT    OT Start Time 1132  -      OT Stop Time 1148  -      OT Time Calculation (min) 16 min  -      Total Timed Code Minutes- OT 10 minute(s)  -      OT Received On 10/20/22  -      OT - Next Appointment 10/21/22  -      OT Goal Re-Cert Due Date 11/03/22  -         Timed Charges    84346 - OT Self Care/Mgmt Minutes 10  -KP         Untimed Charges    OT Eval/Re-eval Minutes 6  -KP         Total Minutes    Timed Charges Total Minutes 10  -KP      Untimed Charges Total Minutes 6  -KP       Total Minutes 16  -KP            User Key  (r) = Recorded By, (t) = Taken By, (c) = Cosigned By    Initials Name Provider Type    Elsa Corcoran, OTR Occupational Therapist              Therapy Charges for Today     Code Description Service Date Service Provider Modifiers Qty     92682078238 HC OT SELF CARE/MGMT/TRAIN EA 15 MIN 10/20/2022 Elsa Quintana, OTR GO 1    03850292913 HC OT EVAL MOD COMPLEXITY 2 10/20/2022 Elsa Quintana OTR GO 1               Elsa Quintana, SHON  10/20/2022

## 2022-10-20 NOTE — PLAN OF CARE
Goal Outcome Evaluation:  Plan of Care Reviewed With: patient           Outcome Evaluation: Patient seen for clinical swallow assessment. Pt NPO d/t failed RN swallow screen. R visual cut and L PCA stroke noted on imaging. Pt and family report anomia for months, perhaps years. Previous clinical swallow assessment with recs for regular and thins. Esophagram 3/3/22 revealed no penetration/aspiration. Pt frustrated by NPO status. Slight right droop noted. One episode of wet voice and throat clear with initial consecutive drinks thins via straw. No other s/s of aspiration. SLP recs regular and thins. Meds as shubham. Will follow for cognitive-linguistic assessment d/t visual spatial/perceptual deficits.      Patient was not wearing a face mask during this therapy encounter. Therapist used appropriate personal protective equipment including mask, eye protection and gloves.  Mask used was standard procedure mask. Appropriate PPE was worn during the entire therapy session. Hand hygiene was completed before and after therapy session. Patient is not in enhanced droplet precautions.

## 2022-10-20 NOTE — PLAN OF CARE
Goal Outcome Evaluation:  Plan of Care Reviewed With: patient        Progress: improving  Outcome Evaluation: pt it an 82 yr old male admitted from home where he lives w his spouse. He was admitted for a stroke and recent fall. pt this date demo decr visual skills on the R side w decr peripheral vision and field cut. pt demo decent strength in UEs. and was CGA w walker for tsf to toilet and back to chair, walking w walker w VC due to field cut on R side. pt washed up in BR w CGA and mod A to min A for undergarment threading on LEs and pulling up. pt completed grooming skills w SBA. pt cont to benefit from OT to incr ADL, strength, balance, tsf, and visual skills. pt prefers to dc home w spouse, but may not short rehab stay prior to dc home pending progress in therapy.

## 2022-10-20 NOTE — PROGRESS NOTES
Case Management Discharge Note      Final Note: CCP followed up with pt who declines rehab eval despite therapy recommendations, and prefers to return home with his wife and HH. Pt selects [facility] from list reviewed ([facility] accepts and is aware of d/c). Pt has cane, walker, and walking stick and denies DME/other d/c needs. Wife to transport home. Elsie Li LCSW         Selected Continued Care - Admitted Since 10/19/2022     Destination    No services have been selected for the patient.              Durable Medical Equipment    No services have been selected for the patient.              Dialysis/Infusion    No services have been selected for the patient.              Home Medical Care Coordination complete.    Service Provider Selected Services Address Phone Fax Patient Preferred    [facility] [address] [place] [id number] [phone] --          Therapy    No services have been selected for the patient.              Community Resources    No services have been selected for the patient.              Community & DME    No services have been selected for the patient.                  Transportation Services  Private: Car    Final Discharge Disposition Code: 06 - home with home health care

## 2022-10-20 NOTE — PROGRESS NOTES
Discharge Planning Assessment  [facility]     Patient Name: Junito Sorto  MRN: [id number]  Today's Date: 10/20/2022    Admit Date: 10/19/2022    Plan: Home with wife, follow for needs   Discharge Needs Assessment     Row Name 10/20/22 1329       Living Environment    People in Home spouse    Name(s) of People in Home Jony Mchugh [phone]    Current Living Arrangements home    Primary Care Provided by self    Provides Primary Care For no one    Family Caregiver if Needed spouse    Quality of Family Relationships helpful;involved;supportive    Able to Return to Prior Arrangements yes       Resource/Environmental Concerns    Resource/Environmental Concerns none       Transition Planning    Patient/Family Anticipates Transition to home with family    Patient/Family Anticipated Services at Transition none    Transportation Anticipated family or friend will provide       Discharge Needs Assessment    Equipment Currently Used at Home none    Concerns to be Addressed discharge planning    Discharge Coordination/Progress Pending               Discharge Plan     Row Name 10/20/22 3579       Plan    Plan Home with wife, follow for needs    Patient/Family in Agreement with Plan yes    Plan Comments CCP met with pt's wife at bedside while pt off floor for testing. Pt resides with his wife in a multi level home in which they stay on main level, uses no DME, and has no h/o HH/SNF. Pt uses [facility] in [place]. PT/OT evals pending. Pt npo per . CCP to follow for needs pending clinical course. Elsie Li LCSW              Continued Care and Services - Admitted Since 10/19/2022    Coordination has not been started for this encounter.       Expected Discharge Date and Time     Expected Discharge Date Expected Discharge Time    Oct 24, 2022          Demographic Summary     Row Name 10/20/22 1329       General Information    Admission Type observation    Arrived From home    Required Notices Provided Observation  Status Notice    Referral Source admission list    Reason for Consult discharge planning    Preferred Language English               Functional Status     Row Name 10/20/22 1329       Functional Status    Usual Activity Tolerance good    Current Activity Tolerance moderate       Functional Status, IADL    Medications independent    Meal Preparation independent    Housekeeping independent    Laundry independent    Shopping independent       Mental Status Summary    Recent Changes in Mental Status/Cognitive Functioning no changes               Psychosocial    No documentation.                Abuse/Neglect    No documentation.                Legal    No documentation.                Substance Abuse    No documentation.                Patient Forms    No documentation.                   Betzaida Li LCSW

## 2022-10-20 NOTE — THERAPY EVALUATION
Acute Care - Speech Language Pathology   Swallow Initial Evaluation [facility]     Patient Name: Junito Sorto  : 1940  MRN: [id number]  Today's Date: 10/20/2022               Admit Date: 10/19/2022    Visit Dx:     ICD-10-CM ICD-9-CM   1. Acute ischemic left PCA stroke (HCC)  I63.532 434.91   2. Atrial fibrillation, unspecified type (HCC)  I48.91 427.31     Patient Active Problem List   Diagnosis   • Hyperglycemia   • Obesity   • Allergic rhinitis   • Permanent atrial fibrillation (HCC)   • HLD (hyperlipidemia)   • Esophageal dysphagia   • Hiatal hernia   • GERD (gastroesophageal reflux disease)   • Prostate cancer (HCC)   • Bladder outlet obstruction   • Sciatica of right side   • Pneumonia of right lower lobe due to infectious organism   • Acute respiratory failure with hypoxia (HCC)   • PVC (premature ventricular contraction)   • Left ventricular dysfunction   • Hypercholesterolemia   • Acute ischemic left PCA stroke (HCC)     Past Medical History:   Diagnosis Date   • Arthritis    • At risk for obstructive sleep apnea     5   • Atrial fibrillation (HCC)    • Bladder outlet obstruction 2019   • BPH (benign prostatic hyperplasia)    • Colon polyp    • Coronary artery disease Few years    Afib   • Dysphagia    • Eczema    • GERD (gastroesophageal reflux disease) 2013    Mentioned as possible   • Hernia     Your office reported hiatal   • Hiatal hernia    • Hyperlipidemia    • Hypertension    • Left ventricular dysfunction    • Low back pain    • Prostate cancer (HCC)    • Skin cancer      Past Surgical History:   Procedure Laterality Date   • APPENDECTOMY  75 yrs ago   • CATARACT EXTRACTION, BILATERAL     • COLONOSCOPY  unknown   • CYSTOSCOPY TRANSURETHRAL RESECTION OF PROSTATE N/A 2019    Procedure: CYSTOSCOPY TRANSURETHRAL RESECTION OF PROSTATE;  Surgeon: Chip Bedolla MD;  Location: [facility];  Service: Urology   • ENDOSCOPY N/A 10/31/2018    LA Grade C reflux  esophagitis, HH, erythematous mucosa in the antrum. Path: Gastritis, esophagitis.    • ENDOSCOPY N/A 03/20/2022    Procedure: ESOPHAGOGASTRODUODENOSCOPY WITH BIOPSIES AND NAILS DILATION #56;  Surgeon: Tony Ramos MD;  Location: [facility] ENDOSCOPY;  Service: Gastroenterology;  Laterality: N/A;  PRE OP - DYSPHAGIA  POST OP- MILD GASTRITIS   • EPIDURAL BLOCK     • ESOPHAGEAL DILATATION     • PROSTATE SURGERY  11/06/2014   • SKIN CANCER EXCISION      multiple, over that last few years   • SKIN CANCER EXCISION Right 09/03/2019    FACE,    • TONSILLECTOMY      around 1950   • UPPER GASTROINTESTINAL ENDOSCOPY  2013       SLP Recommendation and Plan  SLP Swallowing Diagnosis: swallow WFL/no suspected pharyngeal impairment (10/20/22 1000)  SLP Diet Recommendation: regular textures, thin liquids (10/20/22 1000)  Recommended Precautions and Strategies: upright posture during/after eating, assist with feeding (10/20/22 1000)  SLP Rec. for Method of Medication Administration: meds whole, with thin liquids, as tolerated (10/20/22 1000)     Monitor for Signs of Aspiration: yes, notify SLP if any concerns (10/20/22 1000)  Recommended Diagnostics: SLE/Cog/Motor Speech Evaluation (10/20/22 1000)  Swallow Criteria for Skilled Therapeutic Interventions Met: no problems identified which require skilled intervention (10/20/22 1000)  Anticipated Discharge Disposition (SLP): unknown (10/20/22 1000)     Therapy Frequency (Swallow): PRN (10/20/22 1000)  Predicted Duration Therapy Intervention (Days): until discharge (10/20/22 1000)                                     Plan of Care Reviewed With: patient  Outcome Evaluation: Patient seen for clinical swallow assessment. Pt NPO d/t failed RN swallow screen. R visual cut and L PCA stroke noted on imaging. Pt and family report anomia for months, perhaps years. Previous clinical swallow assessment with recs for regular and thins. Esophagram 3/3/22 revealed no penetration/aspiration. Pt  frustrated by NPO status. Slight right droop noted. One episode of wet voice and throat clear with initial consecutive drinks thins via straw. No other s/s of aspiration. SLP recs regular and thins. Meds as shubham. Will follow for cognitive-linguistic assessment d/t visual spatial/perceptual deficits.      SWALLOW EVALUATION (last 72 hours)     SLP Adult Swallow Evaluation     Row Name 10/20/22 1000                   Rehab Evaluation    Document Type evaluation  -SH        Patient Effort fair  -           General Information    Patient Profile Reviewed yes  -        Pertinent History Of Current Problem L occipital lobe infarct  -        Current Method of Nutrition NPO  -        Precautions/Limitations, Vision vision impairment, bilaterally  -        Precautions/Limitations, Hearing WFL;for purposes of eval  -        Prior Level of Function-Communication WFL  -        Prior Level of Function-Swallowing no diet consistency restrictions  -        Plans/Goals Discussed with patient;agreed upon  -        Barriers to Rehab medically complex  -           Pain    Additional Documentation Pain Scale: FACES Pre/Post-Treatment (Group)  -           Pain Scale: FACES Pre/Post-Treatment    Pain: FACES Scale, Pretreatment 0-->no hurt  -           Oral Motor Structure and Function    Dentition Assessment natural, present and adequate  -           Oral Musculature and Cranial Nerve Assessment    Oral Motor General Assessment oral labial or buccal impairment  -           Clinical Swallow Eval    Clinical Swallow Evaluation Summary Patient seen for clinical swallow assessment. Pt NPO d/t failed RN swallow screen. R visual cut and L PCA stroke noted on imaging. Pt and family report anomia for months, perhaps years. Previous clinical swallow assessment with recs for regular and thins. Esophagram 3/3/22 revealed no penetration/aspiration. Pt frustrated by NPO status. Slight right droop noted. One episode of wet  voice and throat clear with initial consecutive drinks thins via straw. No other s/s of aspiration. SLP recs regular and thins. Meds as shubham. Will follow for cognitive-linguistic assessment d/t visual spatial/perceptual deficits.  -           SLP Evaluation Clinical Impression    SLP Swallowing Diagnosis swallow WFL/no suspected pharyngeal impairment  -        Swallow Criteria for Skilled Therapeutic Interventions Met no problems identified which require skilled intervention  -           Recommendations    Therapy Frequency (Swallow) PRN  -        Predicted Duration Therapy Intervention (Days) until discharge  -        SLP Diet Recommendation regular textures;thin liquids  -        Recommended Diagnostics SLE/Cog/Motor Speech Evaluation  -        Recommended Precautions and Strategies upright posture during/after eating;assist with feeding  -        Oral Care Recommendations Oral Care BID/PRN  -        SLP Rec. for Method of Medication Administration meds whole;with thin liquids;as tolerated  -        Monitor for Signs of Aspiration yes;notify SLP if any concerns  -        Anticipated Discharge Disposition (SLP) unknown  -              User Key  (r) = Recorded By, (t) = Taken By, (c) = Cosigned By    Initials Name Effective Dates     Lida Herrmann MS CCC-SLP 06/16/21 -                 EDUCATION  The patient has been educated in the following areas:   Dysphagia (Swallowing Impairment).              Time Calculation:    Time Calculation- SLP     Row Name 10/20/22 1049             Time Calculation- [facility]    SLP Start Time 0800  -      SLP Received On 10/20/22  -         Untimed Charges    [id number]-[id number] Eval Oral Pharyng Swallow Minutes 60  -         Total Minutes    Untimed Charges Total Minutes 60  -       Total Minutes 60  -            User Key  (r) = Recorded By, (t) = Taken By, (c) = Cosigned By    Initials Name Provider Type    Lida Reeves MS CCC-SLP Speech and Language  Pathologist                Therapy Charges for Today     Code Description Service Date Service Provider Modifiers Qty    [id number]  ST EVAL ORAL PHARYNG SWALLOW 4 10/20/2022 Lida Herrmann, MS CCC-SLP GN 1               Lida Herrmann MS CCC-SLP  10/20/2022

## 2022-10-20 NOTE — PLAN OF CARE
Goal Outcome Evaluation:  Plan of Care Reviewed With: patient           Outcome Evaluation: Patient is an 82 y.o male who presents to [facility] following acute ischemic left PCA stroke. Patient AOx3 with cueing this date. Patient wife present at bedside. Patient reports he lives at home with his wife with 1 LINDA. Patient is independent at baseline without the use of an AD. Patient was [facility] upon arrival. Patient performed STS from chair with CGA. Patient ambulated 35ft with minAx2. Patient demonstrates right sided neglect with verbal and tactile cues needed to keep patient from running into wall on R side. Patient very unsteady on turns especially when turning to the right. Patient appears well below baseline levels at this time and would benefit from continued skilled PT intervention to addess deficits in functional mobility as well as maximize safety and independence. PT recommends acute rehab at d/c. Will continue to monitor.

## 2022-10-20 NOTE — THERAPY EVALUATION
Patient Name: Junito Sorto  : 1940    MRN: [id number]                              Today's Date: 10/20/2022       Admit Date: 10/19/2022    Visit Dx:     ICD-10-CM ICD-9-CM   1. Acute ischemic left PCA stroke (HCC)  I63.532 434.91   2. Atrial fibrillation, unspecified type (HCC)  I48.91 427.31     Patient Active Problem List   Diagnosis   • Hyperglycemia   • Obesity   • Allergic rhinitis   • Permanent atrial fibrillation (HCC)   • HLD (hyperlipidemia)   • Esophageal dysphagia   • Hiatal hernia   • GERD (gastroesophageal reflux disease)   • Prostate cancer (HCC)   • Bladder outlet obstruction   • Sciatica of right side   • Pneumonia of right lower lobe due to infectious organism   • Acute respiratory failure with hypoxia (HCC)   • PVC (premature ventricular contraction)   • Left ventricular dysfunction   • Hypercholesterolemia   • Acute ischemic left PCA stroke (HCC)     Past Medical History:   Diagnosis Date   • Arthritis    • At risk for obstructive sleep apnea     5   • Atrial fibrillation (HCC)    • Bladder outlet obstruction 2019   • BPH (benign prostatic hyperplasia)    • Colon polyp    • Coronary artery disease Few years    Afib   • Dysphagia    • Eczema    • GERD (gastroesophageal reflux disease) 2013    Mentioned as possible   • Hernia     Your office reported hiatal   • Hiatal hernia    • Hyperlipidemia    • Hypertension    • Left ventricular dysfunction    • Low back pain    • Prostate cancer (HCC)    • Skin cancer      Past Surgical History:   Procedure Laterality Date   • APPENDECTOMY  75 yrs ago   • CATARACT EXTRACTION, BILATERAL     • COLONOSCOPY  unknown   • CYSTOSCOPY TRANSURETHRAL RESECTION OF PROSTATE N/A 2019    Procedure: CYSTOSCOPY TRANSURETHRAL RESECTION OF PROSTATE;  Surgeon: Chip Bedolla MD;  Location: [facility];  Service: Urology   • ENDOSCOPY N/A 10/31/2018    LA Grade C reflux esophagitis, HH, erythematous mucosa in the antrum. Path: Gastritis,  esophagitis.    • ENDOSCOPY N/A 03/20/2022    Procedure: ESOPHAGOGASTRODUODENOSCOPY WITH BIOPSIES AND NAILS DILATION #56;  Surgeon: Tony Ramos MD;  Location: [facility] ENDOSCOPY;  Service: Gastroenterology;  Laterality: N/A;  PRE OP - DYSPHAGIA  POST OP- MILD GASTRITIS   • EPIDURAL BLOCK     • ESOPHAGEAL DILATATION     • PROSTATE SURGERY  11/06/2014   • SKIN CANCER EXCISION      multiple, over that last few years   • SKIN CANCER EXCISION Right 09/03/2019    FACE,    • TONSILLECTOMY      around 1950   • UPPER GASTROINTESTINAL ENDOSCOPY  2013      General Information     Row Name 10/20/22 1515          Physical Therapy Time and Intention    Document Type evaluation  -     Mode of Treatment individual therapy;physical therapy  -     Row Name 10/20/22 1515          General Information    Patient Profile Reviewed yes  -     Prior Level of Function independent:  -     Existing Precautions/Restrictions fall  -     Barriers to Rehab medically complex  -     Row Name 10/20/22 1515          Living Environment    People in Home spouse  -     Row Name 10/20/22 1515          Home Main Entrance    Number of Stairs, Main Entrance one  -     Row Name 10/20/22 1515          Cognition    Orientation Status (Cognition) oriented x 3  with cueing  -     Row Name 10/20/22 1515          Safety Issues, Functional Mobility    Safety Issues Affecting Function (Mobility) impulsivity;safety precaution awareness;sequencing abilities  -     Impairments Affecting Function (Mobility) balance;endurance/activity tolerance;strength;visual/perceptual  -           User Key  (r) = Recorded By, (t) = Taken By, (c) = Cosigned By    Initials Name Provider Type     Silvia Wall PT Physical Therapist               Mobility     Row Name 10/20/22 1515          Bed Mobility    Comment, (Bed Mobility) Patient [facility] upon arrival  -     Row Name 10/20/22 1515          Bed-Chair Transfer    Bed-Chair [place] (Transfers) not  tested  -     Row Name 10/20/22 1515          Sit-Stand Transfer    Sit-Stand [place] (Transfers) contact guard  -     Assistive Device (Sit-Stand Transfers) walker, front-wheeled  -     Row Name 10/20/22 1515          Gait/Stairs (Locomotion)    [place] Level (Gait) minimum assist (75% patient effort);verbal cues;nonverbal cues (demo/gesture);2 person assist  -     Assistive Device (Gait) other (see comments)  No AD  -     Distance in Feet (Gait) 35ft  -     Deviations/Abnormal Patterns (Gait) femi decreased;gait speed decreased;festinating/shuffling  -     Bilateral Gait Deviations heel strike decreased  -     Comment, (Gait/Stairs) Patient demonstrates right sided neglect with verbal and tactile cues needed to keep patient from running into wall on R side. Patient very unsteady on turns especially when turning to the right.  -           User Key  (r) = Recorded By, (t) = Taken By, (c) = Cosigned By    Initials Name Provider Type     Silvia Wall PT Physical Therapist               Obj/Interventions     Row Name 10/20/22 1518          Range of Motion Comprehensive    General Range of Motion bilateral lower extremity ROM WFL  -     Row Name 10/20/22 1518          Strength Comprehensive (MMT)    General Manual Muscle Testing (MMT) Assessment lower extremity strength deficits identified  -     Comment, General Manual Muscle Testing (MMT) Assessment Generalized LE weakness  -     Row Name 10/20/22 1518          Motor Skills    Motor Skills functional endurance  -     Functional Endurance fair  -     Row Name 10/20/22 1518          Balance    Balance Assessment sitting static balance;sitting dynamic balance;sit to stand dynamic balance;standing static balance;standing dynamic balance  -     Static Sitting Balance supervision  -     Dynamic Sitting Balance standby assist  -     Position, Sitting Balance sitting in chair  -     Sit to Stand Dynamic Balance contact  guard  -SM     Static Standing Balance contact guard  -SM     Dynamic Standing Balance minimal assist  -SM     Position/Device Used, Standing Balance other (see comments)  no AD  -SM     Balance Interventions sitting;standing;sit to stand;static;dynamic  -SM           User Key  (r) = Recorded By, (t) = Taken By, (c) = Cosigned By    Initials Name Provider Type     Silvia Wall PT Physical Therapist               Goals/Plan     Row Name 10/20/22 1522          Bed Mobility Goal 1 (PT)    Activity/Assistive Device (Bed Mobility Goal 1, PT) bed mobility activities, all  -SM     [place] Level/Cues Needed (Bed Mobility Goal 1, PT) modified independence  -SM     Time Frame (Bed Mobility Goal 1, PT) 1 week  -SM     Row Name 10/20/22 1522          Transfer Goal 1 (PT)    Activity/Assistive Device (Transfer Goal 1, PT) sit-to-stand/stand-to-sit;bed-to-chair/chair-to-bed  -SM     [place] Level/Cues Needed (Transfer Goal 1, PT) supervision required  -SM     Time Frame (Transfer Goal 1, PT) 1 week  -SM     Row Name 10/20/22 1522          Gait Training Goal 1 (PT)    Activity/Assistive Device (Gait Training Goal 1, PT) gait (walking locomotion);turning, right;turning, left  -SM     [place] Level (Gait Training Goal 1, PT) standby assist;contact guard required  -SM     Distance (Gait Training Goal 1, PT) 150ft  -SM     Time Frame (Gait Training Goal 1, PT) 1 week  -SM           User Key  (r) = Recorded By, (t) = Taken By, (c) = Cosigned By    Initials Name Provider Type     Silvia Wall PT Physical Therapist               Clinical Impression     Row Name 10/20/22 1518          Pain    Pretreatment Pain Rating 0/10 - no pain  -SM     Posttreatment Pain Rating 0/10 - no pain  -SM     Row Name 10/20/22 1511          Plan of Care Review    Plan of Care Reviewed With patient  -     Outcome Evaluation Patient is an 82 y.o male who presents to [facility] following acute ischemic left PCA stroke. Patient AOx3  with cueing this date. Patient wife present at bedside. Patient reports he lives at home with his wife with 1 LINDA. Patient is independent at baseline without the use of an AD. Patient was [facility] upon arrival. Patient performed STS from chair with CGA. Patient ambulated 35ft with minAx2. Patient demonstrates right sided neglect with verbal and tactile cues needed to keep patient from running into wall on R side. Patient very unsteady on turns especially when turning to the right. Patient appears well below baseline levels at this time and would benefit from continued skilled PT intervention to addess deficits in functional mobility as well as maximize safety and independence. PT recommends acute rehab at d/c. Will continue to monitor.  -     Row Name 10/20/22 1518          Therapy Assessment/Plan (PT)    Rehab Potential (PT) good, to achieve stated therapy goals  -     Criteria for Skilled Interventions Met (PT) yes  -     Therapy Frequency (PT) daily  -     Row Name 10/20/22 1518          Vital Signs    O2 Delivery Pre Treatment room air  -     O2 Delivery Intra Treatment room air  -     O2 Delivery Post Treatment room air  -SM     Pre Patient Position Sitting  -     Intra Patient Position Standing  -     Post Patient Position Sitting  -     Row Name 10/20/22 1518          Positioning and Restraints    Pre-Treatment Position sitting in chair/recliner  -     Post Treatment Position chair  -SM     In Chair notified nsg;reclined;call light within reach;encouraged to call for assist;exit alarm on;with family/caregiver  -           User Key  (r) = Recorded By, (t) = Taken By, (c) = Cosigned By    Initials Name Provider Type     Silvia Wall, PT Physical Therapist               Outcome Measures     Row Name 10/20/22 1523 10/20/22 0900       How much help from another person do you currently need...    Turning from your back to your side while in flat bed without using bedrails? 4  -SM 4  -     Moving from lying on back to sitting on the side of a flat bed without bedrails? 3  -SM 3  -LC    Moving to and from a bed to a chair (including a wheelchair)? 3  -SM 3  -LC    Standing up from a chair using your arms (e.g., wheelchair, bedside chair)? 3  -SM 3  -LC    Climbing 3-5 steps with a railing? 2  -SM 3  -LC    To walk in hospital room? 3  -SM 3  -LC    AM-PAC 6 Clicks Score (PT) 18  -SM 19  -LC    Highest level of mobility 6 --> Walked 10 steps or more  -SM 6 --> Walked 10 steps or more  -LC    Row Name 10/20/22 0820          Modified Moffat Scale    Modified Ann Scale 4 - Moderately severe disability.  Unable to walk without assistance, and unable to attend to own bodily needs without assistance.  -     Row Name 10/20/22 1330          Functional Assessment    Outcome Measure Options AM-PAC 6 Clicks Daily Activity (OT);Modified Moffat  -           User Key  (r) = Recorded By, (t) = Taken By, (c) = Cosigned By    Initials Name Provider Type    Elsa Corcoran, OTR Occupational Therapist    Lucinda Barreto, RN Registered Nurse    Silvia Baltazar PT Physical Therapist                             Physical Therapy Education     Title: PT OT SLP Therapies (In Progress)     Topic: Physical Therapy (In Progress)     Point: Mobility training (Done)     Learning Progress Summary           Patient Acceptance, E, VU by  at 10/20/2022 1524                   Point: Home exercise program (Not Started)     Learner Progress:  Not documented in this visit.          Point: Body mechanics (Done)     Learning Progress Summary           Patient Acceptance, E, VU by  at 10/20/2022 1524                   Point: Precautions (Done)     Learning Progress Summary           Patient Acceptance, E, VU by  at 10/20/2022 1524                               User Key     Initials Effective Dates Name Provider Type [facility] 05/02/22 -  Silvia Wall PT Physical Therapist PT              PT  Recommendation and Plan     Plan of Care Reviewed With: patient  Outcome Evaluation: Patient is an 82 y.o male who presents to [facility] following acute ischemic left PCA stroke. Patient AOx3 with cueing this date. Patient wife present at bedside. Patient reports he lives at home with his wife with 1 LINDA. Patient is independent at baseline without the use of an AD. Patient was [facility] upon arrival. Patient performed STS from chair with CGA. Patient ambulated 35ft with minAx2. Patient demonstrates right sided neglect with verbal and tactile cues needed to keep patient from running into wall on R side. Patient very unsteady on turns especially when turning to the right. Patient appears well below baseline levels at this time and would benefit from continued skilled PT intervention to addess deficits in functional mobility as well as maximize safety and independence. PT recommends acute rehab at d/c. Will continue to monitor.     Time Calculation:    PT Charges     Row Name 10/20/22 1525             Time Calculation    Start Time 1440  -SM      Stop Time 1450  -SM      Time Calculation (min) 10 min  -      PT Received On 10/20/22  -      PT - Next Appointment 10/21/22  -      PT Goal Re-Cert Due Date 10/27/22  -            User Key  (r) = Recorded By, (t) = Taken By, (c) = Cosigned By    Initials Name Provider Type     Silvia Wall PT Physical Therapist              Therapy Charges for Today     Code Description Service Date Service Provider Modifiers Qty    [id number]  PT EVAL LOW COMPLEXITY 3 10/20/2022 Silvia Wall, PT GP 1          PT G-Codes  Outcome Measure Options: AM-PAC 6 Clicks Daily Activity (OT), Modified Ann  AM-PAC 6 Clicks Score (PT): 18  AM-PAC 6 Clicks Score (OT): 18  Modified Ann Scale: 4 - Moderately severe disability.  Unable to walk without assistance, and unable to attend to own bodily needs without assistance.  Patient was intermittently wearing a face mask during this therapy  encounter. Therapist used appropriate personal protective equipment including mask and gloves.  Mask used was standard procedure mask. Appropriate PPE was worn during the entire therapy session. Hand hygiene was completed before and after therapy session. Patient is not in enhanced droplet precautions.     Silvia Wall, PT  10/20/2022

## 2022-10-20 NOTE — CONSULTS
Neurology Consult Note    Consult Date: 10/20/2022    Referring MD: Lencho Lora*  Reason for Consult I have been asked to see the patient in neurological consultation to render advice and opinion regarding stroke    Junito Sorto is a 82 y.o. male R who presented to the ER after seeing optometrist and was suspected to have right hemianopsia.  Past medical history includes A. fib not on anticoagulation, hyperlipidemia, hypertension.  Of note he tells me he has had A. fib for many years maybe 15.  Initially he was put on what he thought was Eliquis but had significant bruising of his arms and so stopped this.  He was in the process of being set up for a watchman's device.  He just had CAHRU on 830 that was negative for thrombus.  He tells me there is no history of GI bleed or other significant bleeding.  No precipitant for following up with cardiology but He was tired of persistent encouragement from family and other providers    On Tuesday he awoke with blurred vision.  He describes that if he looks to the right he could not see.  He was due to see his internist but because of the cancellation he went to optometrist instead.  He denies headache, unilateral numbness or weakness, problems with speech.  No known history of stroke in the past    Social history: Retired , , drinks 1-2 drinks a month, smoked in college    Family history: Patient says he remembers had A. fib as well  Past Medical/Surgical Hx:  Past Medical History:   Diagnosis Date   • Arthritis    • At risk for obstructive sleep apnea     5   • Atrial fibrillation (HCC)    • Bladder outlet obstruction 09/11/2019   • BPH (benign prostatic hyperplasia)    • Colon polyp    • Coronary artery disease Few years    Afib   • Dysphagia    • Eczema    • GERD (gastroesophageal reflux disease) 2013    Mentioned as possible   • Hernia     Your office reported hiatal   • Hiatal hernia    • Hyperlipidemia    • Hypertension    • Left  ventricular dysfunction    • Low back pain    • Prostate cancer (HCC)    • Skin cancer      Past Surgical History:   Procedure Laterality Date   • APPENDECTOMY  75 yrs ago   • CATARACT EXTRACTION, BILATERAL     • COLONOSCOPY  unknown   • CYSTOSCOPY TRANSURETHRAL RESECTION OF PROSTATE N/A 09/11/2019    Procedure: CYSTOSCOPY TRANSURETHRAL RESECTION OF PROSTATE;  Surgeon: Chip Bedolla MD;  Location: [facility] MAIN OR;  Service: Urology   • ENDOSCOPY N/A 10/31/2018    LA Grade C reflux esophagitis, HH, erythematous mucosa in the antrum. Path: Gastritis, esophagitis.    • ENDOSCOPY N/A 03/20/2022    Procedure: ESOPHAGOGASTRODUODENOSCOPY WITH BIOPSIES AND NAILS DILATION #56;  Surgeon: Tony Ramos MD;  Location: [facility] ENDOSCOPY;  Service: Gastroenterology;  Laterality: N/A;  PRE OP - DYSPHAGIA  POST OP- MILD GASTRITIS   • EPIDURAL BLOCK     • ESOPHAGEAL DILATATION     • PROSTATE SURGERY  11/06/2014   • SKIN CANCER EXCISION      multiple, over that last few years   • SKIN CANCER EXCISION Right 09/03/2019    FACE,    • TONSILLECTOMY      around 1950   • UPPER GASTROINTESTINAL ENDOSCOPY  2013       Medications On Admission  Medications Prior to Admission   Medication Sig Dispense Refill Last Dose   • aspirin 81 MG chewable tablet Chew 81 mg Daily.   10/19/2022   • atorvastatin (LIPITOR) 40 MG tablet TAKE ONE TABLET BY MOUTH DAILY 90 tablet 3 10/19/2022   • guaiFENesin 200 MG tablet Take 400 mg by mouth Every 4 (Four) Hours As Needed for Cough.   10/19/2022   • loratadine (CLARITIN) 10 MG tablet Take 10 mg by mouth Daily.   10/19/2022   • metoprolol succinate XL (TOPROL-XL) 25 MG 24 hr tablet Take 1 tablet by mouth Daily. 90 tablet 3 10/19/2022   • omeprazole (priLOSEC) 20 MG capsule Take 1 capsule by mouth Daily. 90 capsule 3 10/19/2022   • fluticasone (Flonase) 50 MCG/ACT nasal spray 2 sprays into the nostril(s) as directed by provider Daily. (Patient taking differently: 2 sprays into the nostril(s) as directed  by provider As Needed.) 15.8 mL 0        Allergies:  Allergies   Allergen Reactions   • Dilaudid [Hydromorphone Hcl] Hallucinations       Social Hx:  Social History     Socioeconomic History   • Marital status:    Tobacco Use   • Smoking status: Former     Types: Pipe     Start date: 1959     Quit date: 1961     Years since quittin.9   • Smokeless tobacco: Never   • Tobacco comments:     50 years ago none since   Vaping Use   • Vaping Use: Never used   Substance and Sexual Activity   • Alcohol use: Yes     Alcohol/week: 5.0 standard drinks     Types: 5 Glasses of wine per week     Comment: Less than 4 oz /wk since this prob started.   • Drug use: No   • Sexual activity: Defer     Comment: 50 years ago       Family Hx:  Family History   Problem Relation Age of Onset   • Cancer Mother         pancreatic   • Liver disease Mother    • Cancer Father         prostate, bladder, colon, lymphoma, leukemia   • Colon cancer Father    • Arrhythmia Father         Afib   • Heart disease Father    • Cancer Brother         prostate   • Arrhythmia Brother         Afib   • Cancer Paternal Grandfather         prostate   • Arrhythmia Paternal Aunt         Irregular beat . Before the time of illness sharing.   • Malig Hyperthermia Neg Hx        Review of systems  Constitutional: [No fevers, chills]  Eye: [No vision loss, diplopia]  HEENT: [no hearing loss, vertigo]  Cardiovascular: [No Chest pain, palpitations]  Neurologic: [No weakness, numbness]  Psychiatric: [No anxiety, depression]    All other systems reviewed and are negative    Exam    BP (!) 142/113 (BP Location: Right arm, Patient Position: Lying)   Pulse 79   Temp 97.6 °F (36.4 °C) (Oral)   Resp 18   Wt 101 kg (223 lb 1.7 oz)   SpO2 93%   BMI 34.94 kg/m²   gen: NAD, vitals reviewed  Eyes: Normal conjunctive   HEENT: no nuchal rigidity  CVS: Irregularly irregular rate 60s on monitor  MS: oriented x3, recent/remote memory intact, normal  attention/concentration, language intact, no neglect, normal fund of knowledge  CN: Dense right HH, visual fields full, PERRL, EOMI, facial sensation equal, no facial droop, hearing symmetric, palate elevates symmetrically  Motor: 5/5 throughout upper and lower extremities, normal tone  Sensation: intact to vibration and temperature throughout  Reflexes: 1+ throughout upper and lower extremities, downgoing plantars  Coordination: no dysmetria with finger to nose bilaterally  Gait: Not tested    DATA:    Lab Results   Component Value Date    GLUCOSE 89 10/19/2022    CALCIUM 10.1 10/19/2022     10/19/2022    K 3.8 10/19/2022    CO2 27.9 10/19/2022     10/19/2022    BUN 15 10/19/2022    CREATININE 0.78 10/19/2022    EGFRIFAFRI 119 10/11/2021    EGFRIFNONA 98 10/11/2021    BCR 19.2 10/19/2022    ANIONGAP 11.1 10/19/2022     Lab Results   Component Value Date    WBC 7.47 10/19/2022    HGB 15.4 10/19/2022    HCT 45.8 10/19/2022    MCV 89.6 10/19/2022     10/19/2022     Lab Results   Component Value Date    LDL 70 10/20/2022    LDL 77 07/22/2022    LDL 63 04/15/2022     Lab Results   Component Value Date    HGBA1C 5.80 (H) 10/20/2022     Lab Results   Component Value Date    INR 1.03 10/19/2022    INR 1.19 (H) 03/20/2022    PROTIME 13.6 10/19/2022    PROTIME 15.0 (H) 03/20/2022       Lab review:     Imaging review:       Diagnoses:  1.  Left PCA stroke  2.  History of A. Fib    Impression: 82-year-old male with past medical history of hypertension, hyperlipidemia, A. fib not on anticoagulation for per preference who presented to the ER after seeing eye doctor with symptom of visual disturbance since Tuesday.  He has right homonymous hemianopsia and head CT revealed significantly sized left PCA infarct.  He has no other neurodeficits, no reports of headaches and symptoms presented days ago.  Likely stroke completed.  MRI brain is ordered and pending.  Etiology consistent with cardioembolic.  He was due to  have a watchman's device.  No need for TTE without question of etiology and TTE recently. Patient agreeable to anticoagulation at this point.  Will discuss with attending r/t timing    PLAN:  1.  Eliquis 2.5 bid starting next Tuesday and high intensity statin  2.  Pt needs f/u with neuro ophthalmology.  I will place refer.  No driving  3.  Will update cardiologist for watchman plans  4.  Discussed return precautions with pt  5.  Will send message to staff for f/u in neuro clinic    No further recs.  Attending communicated with hospitalist.  Pls call if questions    I spent at least 30 minutes interviewing, examining, and counseling patient.  I independently reviewed documentation, laboratory and diagnostic findings, external documentation where applicable, and formulated treatment plan which was discussed with the patient.      Thank you for this consultation.  Discussed above plan with neuro attending, Dr. Hilliard who agrees with above plan.  Neurology team is available for concerns or questions.

## 2022-10-20 NOTE — PLAN OF CARE
Goal Outcome Evaluation:  Plan of Care Reviewed With: patient, spouse        Progress: improving      No new neuro deficits noted or reported. Ct Angio canceled, pt to follow up with neurology outpatient. Patient evaluated by physical therapy, rehab recommended, pt declined rehab evaluation/referral. Patient will be going home with spouse.

## 2022-10-20 NOTE — OUTREACH NOTE
Prep Survey    Flowsheet Row Responses   [facility] facility patient discharged from? [facility]   Is LACE score < 7 ? Yes   Emergency Room discharge w/ pulse ox? No   Eligibility [facility]   Date of Admission 10/19/22   Date of Discharge 10/20/22   Discharge Disposition Home or Self Care   Discharge diagnosis Acute ischemic left PCA stroke Prostate cancer PVC    Does the patient have one of the following disease processes/diagnoses(primary or secondary)? Stroke   Does the patient have Home health ordered? Yes   What is the Home health agency?   [facility]    Is there a DME ordered? No   Prep survey completed? Yes          FRANDY SMITH - Registered Nurse

## 2022-10-20 NOTE — DISCHARGE PLACEMENT REQUEST
"Victor Manuel Holley (82 y.o. Male)     Date of Birth   1940    Social Security Number       Address   [address] [address]    Home Phone   [phone]    MRN   [id number]       Rastafarian   Mosque    Marital Status                               Admission Date   10/19/22    Admission Type   Emergency    Admitting Provider   Lencho Lora MD    Attending Provider   Chris Padgett MD    Department, Room/Bed   [address], P581/1       Discharge Date       Discharge Disposition   Home or Self Care    Discharge Destination                               Attending Provider: Chris Padgett MD    Allergies: Dilaudid [Hydromorphone Hcl]    Isolation: None   Infection: None   Code Status: CPR    Ht: 170.2 cm (67\")   Wt: 101 kg (223 lb 1.7 oz)    Admission Cmt: None   Principal Problem: Acute ischemic left PCA stroke (HCC) [I63.532]                 Active Insurance as of 10/19/2022     Primary Coverage     Payor Plan Insurance Group Employer/Plan Group    MEDICARE MEDICARE A & B      Payor Plan Address Payor Plan Phone Number Payor Plan Fax Number Effective Dates    [address] [phone]  10/1/2005 - None Entered    [facility] [id number]       Subscriber Name Subscriber Birth Date Member ID       VICTOR MANUEL HOLLEY 1940 [id number]           Secondary Coverage     Payor Plan Insurance Group Employer/Plan Group    [facility] SUPP [id number]     Payor Plan Address Payor Plan Phone Number Payor Plan Fax Number Effective Dates    [address]   12/1/2016 - None Entered    [facility] [place] [id number]       Subscriber Name Subscriber Birth Date Member ID       VICTOR MANUEL HOLLEY 1940 [id number]                 Emergency Contacts      (Rel.) Home Phone Work Phone Mobile Phone    Lolita Holley (Spouse) [phone]            "

## 2022-10-20 NOTE — PROGRESS NOTES
[facility] to provide Home Care services. Patient agreeable and denies other HH. PCP and contact information confirmed.

## 2022-10-21 ENCOUNTER — TELEPHONE (OUTPATIENT)
Dept: NEUROLOGY | Facility: CLINIC | Age: 82
End: 2022-10-21

## 2022-10-21 ENCOUNTER — TRANSITIONAL CARE MANAGEMENT TELEPHONE ENCOUNTER (OUTPATIENT)
Dept: CALL CENTER | Facility: HOSPITAL | Age: 82
End: 2022-10-21

## 2022-10-21 NOTE — OUTREACH NOTE
Call Center TCM Note    Flowsheet Row Responses   [facility] patient discharged from? [place]   Does the patient have one of the following disease processes/diagnoses(primary or secondary)? Stroke   TCM attempt successful? Yes   Call start time 1149   Call end time 1156   Discharge diagnosis Acute ischemic left PCA stroke Prostate cancer PVC    Meds reviewed with patient/caregiver? Yes   Is the patient having any side effects they believe may be caused by any medication additions or changes? No   Does the patient have all medications ordered at discharge? Yes   Is the patient taking all medications as directed (includes completed medication regime)? Yes   Comments Appt with PCP on 11/3   Does the patient have an appointment with their PCP within 7 days of discharge? Yes   What is the Home health agency?   [facility]    Has home health visited the patient within 72 hours of discharge? Call prior to 72 hours   Psychosocial issues? No   Does the patient require any assistance with activities of daily living such as eating, bathing, dressing, walking, etc.? Yes   Does the patient have any residual symptoms from stroke/TIA? Yes   Residual symptoms comments vision changes   Does the patient understand the diet ordered at discharge? No   Did the patient receive a copy of their discharge instructions? Yes   Nursing interventions Reviewed instructions with patient   What is the patient's perception of their health status since discharge? Improving   Nursing interventions Nurse provided patient education   Is the patient/caregiver able to teach back signs and symptoms related to disease process for when to call 911? Yes   Is the patient/caregiver able to teach back the hierarchy of who to call/visit for symptoms/problems? PCP, Specialist, Home health nurse, Urgent Care, ED, 911 Yes   TCM call completed? Yes   Wrap up additional comments Doing well, discussed his medications, has an appt with PCP on 11/3.   Call end time  1156   Would this patient benefit from a Referral to [facility] Social Work? No   Is the patient interested in additional calls from an ambulatory ?  NOTE:  applies to high risk patients requiring additional follow-up. No          Tracey Brar RN    10/21/2022, 11:56 EDT

## 2022-10-21 NOTE — TELEPHONE ENCOUNTER
Caller: Junito Sorto    Relationship to patient: Self    Best call back number: [phone]  New or established patient?  [x] New  [] Established    Date of discharge: 10/20/2022    Facility discharged from: [facility]    Diagnosis/Symptoms: CVA    Length of stay (If applicable): 2 NIGHTS    Specialty Only: Did you see a [facility] health provider?    [x] Yes  [] No  If so, who? SHANELLE SALCIDO AND DR. FUENTES    NEEDS 1 MONTH FOLLOW UP WITH DR. FUENTES

## 2022-10-22 ENCOUNTER — HOME CARE VISIT (OUTPATIENT)
Dept: HOME HEALTH SERVICES | Facility: HOME HEALTHCARE | Age: 82
End: 2022-10-22

## 2022-10-22 PROCEDURE — G0299 HHS/HOSPICE OF RN EA 15 MIN: HCPCS

## 2022-10-23 ENCOUNTER — HOME CARE VISIT (OUTPATIENT)
Dept: HOME HEALTH SERVICES | Facility: HOME HEALTHCARE | Age: 82
End: 2022-10-23

## 2022-10-23 VITALS
HEART RATE: 76 BPM | OXYGEN SATURATION: 97 % | DIASTOLIC BLOOD PRESSURE: 98 MMHG | SYSTOLIC BLOOD PRESSURE: 172 MMHG | TEMPERATURE: 97.8 F | RESPIRATION RATE: 20 BRPM

## 2022-10-23 NOTE — HOME HEALTH
SOC 10/22  DIAG: OTHER SEQUELAE OF CEREBRAL INFARCTION  HX: PVC, PROSTATE CA, AFIB, HLD,   Patient is an 83 yo male who lives in his home with his wife.   He is up without any assistive devices, falls safety rev as he was not real steady on his feet.   He is alert and oriented x 3 at visit , wife reports he has trouble with short term memory at times and getting words out.   He was hospitalized after complaining  of vision issues and concerned for having a stroke,  He had c/o right homonymous hermianopsia and MRI revealed findings consistent with acute CVA in the left PCA territory.  They will initiated eliquis on 10/25.   Nursing rev medications in home an compared to d/c instructions , all meds present  , added Alphagan eye drops not on mar.   He has low back pain at times, this is chronic for him. takes tylenol only when needed.   Wife reports many falls, and PT has been ordered.   OT ordered but they have refused , ST ordered and will evaluate as well.   VS obtained and BP was 172/98, Per wife was higher at hosp at d/c but they told her it was expected after a stroke and they are not treating at this time.   They did not want nursing but I talked them into letting us come a few times to monitor bp and call MD which I will do first thing MOnday morning.     FOCUS OF CARE: .T/I ON DISEASE PROCESS FOR OTHER SEQUELAE OF CEREBRAL INFARCTION, CVA, HOME MED MGMT AND HOME SAFETY     NEXT VISIT : FOLLOW UP ON PHONE CALL WITH MD REG BP. CHECK BP.THEY ARE KEEPING TRACK OF BP AND DOCUMENTING,   ASKED THEM TO DO AT LEAST 2 X DAY.

## 2022-10-24 ENCOUNTER — TELEPHONE (OUTPATIENT)
Dept: NEUROLOGY | Facility: CLINIC | Age: 82
End: 2022-10-24

## 2022-10-24 NOTE — TELEPHONE ENCOUNTER
Caller: MACHO   Relationship to patient: WIFE     Best call back number: [phone]    New or established patient?  [x] New  [] Established    Date of discharge: 10.20.22    Facility discharged from: [facility]     Diagnosis/Symptoms: ACUTE ISCHEMIC LEFT  PCA STROKE    Length of stay (If applicable):22 HOURS     Specialty Only: Did you see a [facility] health provider?    [x] Yes  [] No  If so, who? SHANELLE SALCIDO AND DR FUENTES.     PLEASE ADVISE ON APPT . NOTES SAY AVAIL FOR QUESTIONS? NOT F.U ?     PLEASE ADVISE.

## 2022-10-24 NOTE — CASE COMMUNICATION
SOC 10/22  DIAG: OTHER SEQUELAE OF CEREBRAL INFARCTION  HX: PVC, PROSTATE CA, AFIB, HLD,   Patient is an 83 yo male who lives in his home with his wife.   He is up without any assistive devices, falls safety rev as he was not real steady on his feet.   He is alert and oriented x 3 at visit , wife reports he has trouble with short term memory at times and getting words out.   He was hospitalized after complaining  of vision issues and co ncerned for having a stroke,  He had c/o right homonymous hermianopsia and MRI revealed findings consistent with acute CVA in the left PCA territory.  They will initiated eliquis on 10/25.   Nursing rev medications in home an compared to d/c instructions , all meds present  , added Alphagan eye drops not on mar.   He has low back pain at times, this is chronic for him. takes tylenol only when needed.   Wife reports many falls, and PT ha s been ordered.   OT ordered but they have refused , ST ordered and will evaluate as well.   VS obtained and BP was 172/98, Per wife was higher at hosp at d/c but they told her it was expected after a stroke and they are not treating at this time.   They did not want nursing but I talked them into letting us come a few times to monitor bp and call MD which I will do first thing MOnday morning.     FOCUS OF CARE: .T/I ON DISEASE PROCESS  FOR OTHER SEQUELAE OF CEREBRAL INFARCTION, CVA, HOME MED MGMT AND HOME SAFETY     NEXT VISIT : FOLLOW UP ON PHONE CALL WITH MD REG BP. CHECK BP.THEY ARE KEEPING TRACK OF BP AND DOCUMENTING,   ASKED THEM TO DO AT LEAST 2 X DAY.

## 2022-10-25 ENCOUNTER — TELEPHONE (OUTPATIENT)
Dept: FAMILY MEDICINE CLINIC | Facility: CLINIC | Age: 82
End: 2022-10-25

## 2022-10-25 ENCOUNTER — HOME CARE VISIT (OUTPATIENT)
Dept: HOME HEALTH SERVICES | Facility: HOME HEALTHCARE | Age: 82
End: 2022-10-25

## 2022-10-25 VITALS
HEART RATE: 52 BPM | DIASTOLIC BLOOD PRESSURE: 84 MMHG | TEMPERATURE: 97.7 F | RESPIRATION RATE: 18 BRPM | SYSTOLIC BLOOD PRESSURE: 145 MMHG | OXYGEN SATURATION: 97 %

## 2022-10-25 VITALS
SYSTOLIC BLOOD PRESSURE: 145 MMHG | TEMPERATURE: 97.7 F | OXYGEN SATURATION: 97 % | DIASTOLIC BLOOD PRESSURE: 84 MMHG | RESPIRATION RATE: 18 BRPM

## 2022-10-25 PROCEDURE — G0151 HHCP-SERV OF PT,EA 15 MIN: HCPCS

## 2022-10-25 PROCEDURE — G0153 HHCP-SVS OF S/L PATH,EA 15MN: HCPCS

## 2022-10-25 NOTE — TELEPHONE ENCOUNTER
Caller: ANA BERRY WITH [facility]    Relationship: IN HOME PHYSICAL THERAPY SERVICES    Best call back number: [phone]    What orders are you requesting (i.e. lab or imaging): VERBAL ORDERS FOR CONTINUING PHYSICAL THERAPY     In what timeframe would the patient need to come in: 2 TIMES A WEEK FOR 3 WEEKS    Where will you receive your lab/imaging services: IN HOME    Additional notes: PLEASE CALL BACK WITH VERBAL ORDERS FOR PHYSICAL THERAPY

## 2022-10-25 NOTE — HOME HEALTH
REASON FOR REFERRAL:  decreased ability to ambulate in and out of the home following recent hospital stay for acute ischemic left PCA stroke resulting in functional decline and LE weakness.    MEDICAL HISTORY: Premature ventricular contraction, Prostate cancer, Atrial fibrillation, HLD, GERD, CAD, HTN    SKILLED PHYSICAL THERAPY IS MEDICALLY NECESSARY FOR: Instruction/education in lower extremity strengthening HEP, bed mobility/transfers training, gait training, balance training, fall prevention, and activity tolerance training to enable patient to safely exit home for medical appointments.

## 2022-10-27 ENCOUNTER — HOME CARE VISIT (OUTPATIENT)
Dept: HOME HEALTH SERVICES | Facility: HOME HEALTHCARE | Age: 82
End: 2022-10-27

## 2022-10-27 PROCEDURE — G0157 HHC PT ASSISTANT EA 15: HCPCS

## 2022-10-28 ENCOUNTER — HOME CARE VISIT (OUTPATIENT)
Dept: HOME HEALTH SERVICES | Facility: HOME HEALTHCARE | Age: 82
End: 2022-10-28

## 2022-10-28 VITALS
OXYGEN SATURATION: 98 % | HEART RATE: 65 BPM | DIASTOLIC BLOOD PRESSURE: 79 MMHG | SYSTOLIC BLOOD PRESSURE: 126 MMHG | TEMPERATURE: 97.5 F | RESPIRATION RATE: 18 BRPM

## 2022-10-28 PROCEDURE — G0153 HHCP-SVS OF S/L PATH,EA 15MN: HCPCS

## 2022-10-28 NOTE — HOME HEALTH
Patient reports going to the grocery with his spouse with difficulty due to poor balance and vision. F/u with PCP next week to request Neuro appointment.     FALLS SINCE LAST VISIT: No    MEDICATION CHANGES: No    PLAN FOR NEXT VISIT:  Review fall risk and safety management strategies  Review/progress home exercise program

## 2022-10-30 VITALS
TEMPERATURE: 97.7 F | OXYGEN SATURATION: 95 % | HEART RATE: 59 BPM | SYSTOLIC BLOOD PRESSURE: 130 MMHG | DIASTOLIC BLOOD PRESSURE: 83 MMHG | RESPIRATION RATE: 18 BRPM

## 2022-10-31 ENCOUNTER — HOME CARE VISIT (OUTPATIENT)
Dept: HOME HEALTH SERVICES | Facility: HOME HEALTHCARE | Age: 82
End: 2022-10-31

## 2022-10-31 PROCEDURE — G0157 HHC PT ASSISTANT EA 15: HCPCS

## 2022-11-01 ENCOUNTER — HOME CARE VISIT (OUTPATIENT)
Dept: HOME HEALTH SERVICES | Facility: HOME HEALTHCARE | Age: 82
End: 2022-11-01

## 2022-11-01 ENCOUNTER — TELEPHONE (OUTPATIENT)
Dept: NEUROLOGY | Facility: CLINIC | Age: 82
End: 2022-11-01

## 2022-11-01 VITALS
TEMPERATURE: 97 F | RESPIRATION RATE: 16 BRPM | SYSTOLIC BLOOD PRESSURE: 138 MMHG | OXYGEN SATURATION: 99 % | DIASTOLIC BLOOD PRESSURE: 72 MMHG | HEART RATE: 60 BPM

## 2022-11-01 PROCEDURE — G0299 HHS/HOSPICE OF RN EA 15 MIN: HCPCS

## 2022-11-01 PROCEDURE — G0152 HHCP-SERV OF OT,EA 15 MIN: HCPCS

## 2022-11-01 NOTE — HOME HEALTH
HOSPITALIZED 10/20 FOR L PCA CVA WITH R PERIPHERIAL FILED LOSS .  GAVE NUMBER FOR DR CORTEZ AND EYE DR WHO SEES PT FOR POST CVA   HX OF AFIB  BEING WORKED UP FOR WATCHMAN       PT  HAS HX OF PROSTATE CANCER   HAS INCONTINENCE

## 2022-11-01 NOTE — TELEPHONE ENCOUNTER
Emily from home health called and wants to know why patient is scheduled out so far . Patient states Dr. Hilliard told him a month.?? Sent Marline a message to advise

## 2022-11-02 ENCOUNTER — HOME CARE VISIT (OUTPATIENT)
Dept: HOME HEALTH SERVICES | Facility: HOME HEALTHCARE | Age: 82
End: 2022-11-02

## 2022-11-02 ENCOUNTER — OFFICE VISIT (OUTPATIENT)
Dept: CARDIOLOGY | Facility: CLINIC | Age: 82
End: 2022-11-02

## 2022-11-02 ENCOUNTER — TELEPHONE (OUTPATIENT)
Dept: CARDIOLOGY | Facility: CLINIC | Age: 82
End: 2022-11-02

## 2022-11-02 VITALS
DIASTOLIC BLOOD PRESSURE: 72 MMHG | OXYGEN SATURATION: 95 % | RESPIRATION RATE: 18 BRPM | TEMPERATURE: 97.3 F | SYSTOLIC BLOOD PRESSURE: 134 MMHG | HEART RATE: 52 BPM

## 2022-11-02 VITALS
HEART RATE: 70 BPM | SYSTOLIC BLOOD PRESSURE: 140 MMHG | WEIGHT: 221 LBS | BODY MASS INDEX: 34.69 KG/M2 | HEIGHT: 67 IN | DIASTOLIC BLOOD PRESSURE: 90 MMHG

## 2022-11-02 DIAGNOSIS — I48.21 PERMANENT ATRIAL FIBRILLATION: Primary | ICD-10-CM

## 2022-11-02 DIAGNOSIS — I63.532 ACUTE ISCHEMIC LEFT PCA STROKE: ICD-10-CM

## 2022-11-02 DIAGNOSIS — I51.9 LEFT VENTRICULAR DYSFUNCTION: ICD-10-CM

## 2022-11-02 DIAGNOSIS — E78.2 MIXED HYPERLIPIDEMIA: ICD-10-CM

## 2022-11-02 DIAGNOSIS — R03.0 TRANSIENT ELEVATED BLOOD PRESSURE: ICD-10-CM

## 2022-11-02 DIAGNOSIS — I49.3 PVC (PREMATURE VENTRICULAR CONTRACTION): ICD-10-CM

## 2022-11-02 PROCEDURE — G0153 HHCP-SVS OF S/L PATH,EA 15MN: HCPCS

## 2022-11-02 PROCEDURE — 99214 OFFICE O/P EST MOD 30 MIN: CPT | Performed by: NURSE PRACTITIONER

## 2022-11-02 NOTE — PROGRESS NOTES
Date of Office Visit: 2022  Encounter Provider: KOLBY Mireles  Place of Service: [facility]  Patient Name: Junito Sorto  :1940  Primary Cardiologist: Dr. Steve Prater    Chief Complaint   Patient presents with   • Atrial Fibrillation   • Stroke   • Hospital Follow Up Visit   :     HPI: Junito Sorto is a pleasant 82 y.o. male who presents today for hospital follow-up visit for recent stroke.  I have reviewed his medical records.    He has been diagnosed with atrial fibrillation and was initially treated with apixaban.  He stopped the medication due to bruising and has declined anticoagulation over the years.    In 2022, he was hospitalized with right lower lobe pneumonia and acute asthma exacerbation.  Echocardiogram showed EF 46-50%.  A week later he was rehospitalized for shortness of breath.  He was recommended to have outpatient PFTs and noted to have esophageal dysphagia.  Chest x-ray was concerning for pulmonary edema and he was started on furosemide.  Amlodipine was discontinued because of leg swelling.  Once again he declined anticoagulation.  Follow-up Holter monitor showed atrial fibrillation with average heart rate of 60.    In 2022, he followed up with Dr. Prater.  He reported fatigue and his heart rate was running 50-60s.  He was noted to have a CHADS2 Vascor of 3 at that time.  He was not willing to take anticoagulation during that visit.  He was interested in possible left atrial appendage closure/watchman device and was interested in learning more.  He said he would be willing to take anticoagulation for short time period if necessary    He was then referred to Dr. Tony Ceballos for shared decision-making process on left atrial appendage closure.  At the end of 2022, he was set up for a CHARU which showed EF normal, right ventricular cavity borderline dilated, mildly reduced right ventricular systolic function,  saline test negative, and no clot in the left atrial appendage.  Dr. Mobley contacted him in September and was working at the logistics to schedule the procedure.    In October 2022, he presented to the hospital with complaints of strokelike symptoms and was diagnosed with an acute CVA in the left PCA territory.  Neurology recommended apixaban and he agreed.  Continue statin therapy.  He was recommended follow-up in our office.    He presents today for a follow-up visit with his wife accompanying him.  His residual symptom from the stroke is right peripheral vision loss.  He denies chest pain, shortness of air, PND, orthopnea, edema, palpitations, dizziness, syncope, or bleeding.  Blood pressure is running 130s/80s at home.  BP is borderline elevated in office today.  He is curious to see if he could still proceed with the watchman device and I will discuss with Dr. Tony Ceballos.      Past Medical History:   Diagnosis Date   • Arthritis    • At risk for obstructive sleep apnea     5   • Atrial fibrillation (HCC)    • Bladder outlet obstruction 09/11/2019   • BPH (benign prostatic hyperplasia)    • Colon polyp    • Coronary artery disease Few years    Afib   • Dysphagia    • Eczema    • GERD (gastroesophageal reflux disease) 2013    Mentioned as possible   • Hernia     Your office reported hiatal   • Hiatal hernia    • Hyperlipidemia    • Hypertension    • Left ventricular dysfunction    • Low back pain    • Prostate cancer (HCC)    • Skin cancer    • Stroke (HCC) 10/19/2022       Past Surgical History:   Procedure Laterality Date   • APPENDECTOMY  75 yrs ago   • CATARACT EXTRACTION, BILATERAL     • COLONOSCOPY  unknown   • CYSTOSCOPY TRANSURETHRAL RESECTION OF PROSTATE N/A 09/11/2019    Procedure: CYSTOSCOPY TRANSURETHRAL RESECTION OF PROSTATE;  Surgeon: Chip Bedolla MD;  Location: [facility];  Service: Urology   • ENDOSCOPY N/A 10/31/2018    LA Grade C reflux esophagitis, HH, erythematous mucosa in  the antrum. Path: Gastritis, esophagitis.    • ENDOSCOPY N/A 2022    Procedure: ESOPHAGOGASTRODUODENOSCOPY WITH BIOPSIES AND NAILS DILATION #56;  Surgeon: Tony Ramos MD;  Location: [facility] ENDOSCOPY;  Service: Gastroenterology;  Laterality: N/A;  PRE OP - DYSPHAGIA  POST OP- MILD GASTRITIS   • EPIDURAL BLOCK     • ESOPHAGEAL DILATATION     • PROSTATE SURGERY  2014   • SKIN CANCER EXCISION      multiple, over that last few years   • SKIN CANCER EXCISION Right 2019    FACE,    • TONSILLECTOMY      around 1950   • UPPER GASTROINTESTINAL ENDOSCOPY         Social History     Socioeconomic History   • Marital status:    Tobacco Use   • Smoking status: Former     Types: Pipe     Start date: 1959     Quit date: 1961     Years since quittin.0   • Smokeless tobacco: Never   • Tobacco comments:     50 years ago none since   Vaping Use   • Vaping Use: Never used   Substance and Sexual Activity   • Alcohol use: Yes     Alcohol/week: 5.0 standard drinks     Types: 5 Glasses of wine per week     Comment: Less than 4 oz /wk since this prob started.   • Drug use: No   • Sexual activity: Defer     Comment: 50 years ago       Family History   Problem Relation Age of Onset   • Cancer Mother         pancreatic   • Liver disease Mother    • Cancer Father         prostate, bladder, colon, lymphoma, leukemia   • Colon cancer Father    • Arrhythmia Father         Afib   • Heart disease Father    • Cancer Brother         prostate   • Arrhythmia Brother         Afib   • Cancer Paternal Grandfather         prostate   • Arrhythmia Paternal Aunt         Irregular beat . Before the time of illness sharing.   • Malig Hyperthermia Neg Hx        The following portion of the patient's history were reviewed and updated as appropriate: past medical history, past surgical history, past social history, past family history, allergies, current medications, and problem list.    Review of Systems  "  Constitutional: Negative.   Eyes: Positive for vision loss in right eye.   Respiratory: Negative.    Hematologic/Lymphatic: Negative.    Genitourinary: Negative.    Neurological: Negative.        Allergies   Allergen Reactions   • Dilaudid [Hydromorphone Hcl] Hallucinations         Current Outpatient Medications:   •  apixaban (ELIQUIS) 5 MG tablet tablet, Take 1 tablet by mouth 2 (Two) Times a Day., Disp: 180 tablet, Rfl: 3  •  aspirin 81 MG chewable tablet, Chew 81 mg Daily., Disp: , Rfl:   •  brimonidine (Alphagan P) 0.1 % solution ophthalmic solution, Administer 1 drop into the left eye 3 (Three) Times a Day., Disp: , Rfl:   •  loratadine (CLARITIN) 10 MG tablet, Take 1 tablet by mouth Daily As Needed for Allergies., Disp: , Rfl:   •  metoprolol succinate XL (TOPROL-XL) 25 MG 24 hr tablet, Take 1 tablet by mouth Daily., Disp: 90 tablet, Rfl: 3  •  omeprazole (priLOSEC) 20 MG capsule, Take 1 capsule by mouth Daily. (Patient taking differently: Take 2 capsules by mouth Daily.), Disp: 90 capsule, Rfl: 3  •  B Complex Vitamins (vitamin b complex) capsule capsule, Take  by mouth Daily., Disp: , Rfl:   •  Thiamine HCl (vitamin B-1) 50 MG tablet, Take 1 tablet by mouth Daily., Disp: , Rfl:         Objective:     Vitals:    11/02/22 1506   BP: 140/90   BP Location: Left arm   Patient Position: Sitting   Cuff Size: Adult   Pulse: 70   Weight: 100 kg (221 lb)   Height: 170.2 cm (67\")     Body mass index is 34.61 kg/m².    PHYSICAL EXAM:    Vitals Reviewed.   General Appearance: No acute distress, well developed and well nourished. Obese.   Eyes: Conjunctiva and lids: No erythema, swelling, or discharge. Sclera non-icteric. Glasses.   HENT: Atraumatic, normocephalic. External eyes, ears, and nose normal. No hearing loss noted. Mucous membranes normal. Lips not cyanotic. Neck supple with no tenderness. Wearing mask.   Respiratory: No signs of respiratory distress. Respiration rhythm and depth normal.   Clear to " auscultation. No rales, crackles, rhonchi, or wheezing auscultated.   Cardiovascular:  Jugular Venous Pressure: Normal  Heart Rate and Rhythm: Normal, Heart Sounds: Normal S1 and S2. No S3 or S4 noted.  Murmurs: No murmurs noted. No rubs, thrills, or gallops.   Lower Extremities: No edema noted.  Gastrointestinal:  Abdomen soft, non-distended, non-tender.    Musculoskeletal: Normal movement of extremities.  Skin and Nails: General appearance normal. No pallor, cyanosis, diaphoresis. Skin temperature normal. No clubbing of fingernails.   Psychiatric: Patient alert and oriented to person, place, and time. Speech and behavior appropriate. Normal mood and affect.       ECG 12 Lead    Date/Time: 11/4/2022 3:12 PM  Performed by: Suzy Metcalf APRN  Authorized by: Suzy Metcalf APRN   Comparison: compared with previous ECG from 10/19/2022  Similar to previous ECG  Rhythm: atrial fibrillation  Ectopy: unifocal PVCs  Rate: normal  BPM: 70  Conduction: conduction normal  ST Segments: ST segments normal  T Waves: T waves normal  QRS axis: normal  Other findings: poor R wave progression    Clinical impression: abnormal EKG              Assessment:       Diagnosis Plan   1. Permanent atrial fibrillation (HCC)  ECG 12 Lead      2. Acute ischemic left PCA stroke (HCC)        3. Left ventricular dysfunction        4. PVC (premature ventricular contraction)  ECG 12 Lead      5. Transient elevated blood pressure        6. Mixed hyperlipidemia               Plan:       1.  Permanent Atrial Fibrillation: He has declined anticoagulation in the past. Subsequently, he had a stroke.  He is in the process of being worked up for left atrial appendage closure by Dr. Tony Ceballos.  He has agreed to take the apixaban and heart rate is well controlled today.  He has a CHADS2 Vascor of 6, putting him at high risk for thromboembolic event annually.    2.  Recent left PCA stroke felt to be embolic from atrial fibrillation.  He has some  residual right peripheral vision loss.  Continue apixaban and statin.    3.  Left Ventricular Dysfunction: In March 2022, EF 46-50%-.  Per CHARU EF 60% in August 2022.  Euvolemic.    4.  PVCs noted on EKG.  Asymptomatic.  Continue metoprolol.    5.  Blood pressure elevated today.  Typically runs 130s/80s at home.    6.  Hyperlipidemia: On statin therapy.    7.  I will further discuss his plan of care with Dr. Tony Ceballos about the left atrial appendage closure.  He will follow-up with Dr. Prater in February 2023.    As always, it has been a pleasure to participate in your patient's care. Thank you.       Sincerely,         KOLBY Josue  [facility]      · Dictated utilizing Dragon Dictation  · COVID-19 Precautions - Patient was compliant in wearing a mask. When I saw the patient, I used appropriate personal protective equipment (PPE) including mask and eye shield (standard procedure).  Additionally, I used gown and gloves if indicated.  Hand hygiene was completed before and after seeing the patient.

## 2022-11-02 NOTE — CASE COMMUNICATION
OT on 11/01/2022 for evaluation for home safety, falls precautions, ADL safety, DME, education and vital signs. No further need for OT at this time. MD notified.  PT still active. Thank you.

## 2022-11-02 NOTE — HOME HEALTH
Patient said his worst problem is his vision/ the blind spot in his visual field. He reported he is independnet with personal care and feels confident with bathroom mobility so he and spouse agreed to plan to DC OT.

## 2022-11-03 ENCOUNTER — OFFICE VISIT (OUTPATIENT)
Dept: FAMILY MEDICINE CLINIC | Facility: CLINIC | Age: 82
End: 2022-11-03

## 2022-11-03 ENCOUNTER — HOME CARE VISIT (OUTPATIENT)
Dept: HOME HEALTH SERVICES | Facility: HOME HEALTHCARE | Age: 82
End: 2022-11-03

## 2022-11-03 VITALS
WEIGHT: 220 LBS | DIASTOLIC BLOOD PRESSURE: 82 MMHG | HEART RATE: 51 BPM | HEIGHT: 67 IN | BODY MASS INDEX: 34.53 KG/M2 | TEMPERATURE: 97.9 F | SYSTOLIC BLOOD PRESSURE: 112 MMHG | OXYGEN SATURATION: 96 %

## 2022-11-03 VITALS
RESPIRATION RATE: 18 BRPM | OXYGEN SATURATION: 97 % | HEART RATE: 55 BPM | SYSTOLIC BLOOD PRESSURE: 116 MMHG | DIASTOLIC BLOOD PRESSURE: 68 MMHG

## 2022-11-03 DIAGNOSIS — I63.532 ACUTE ISCHEMIC LEFT PCA STROKE: ICD-10-CM

## 2022-11-03 DIAGNOSIS — I48.21 PERMANENT ATRIAL FIBRILLATION: ICD-10-CM

## 2022-11-03 DIAGNOSIS — E78.00 HYPERCHOLESTEROLEMIA: Primary | ICD-10-CM

## 2022-11-03 PROCEDURE — G0157 HHC PT ASSISTANT EA 15: HCPCS

## 2022-11-03 PROCEDURE — 99214 OFFICE O/P EST MOD 30 MIN: CPT | Performed by: INTERNAL MEDICINE

## 2022-11-03 RX ORDER — THIAMINE HCL 50 MG
50 TABLET ORAL DAILY
COMMUNITY
End: 2022-11-18

## 2022-11-03 RX ORDER — VITAMIN B COMPLEX
CAPSULE ORAL DAILY
COMMUNITY

## 2022-11-03 NOTE — HOME HEALTH
Patient has a follow up appointment with PCP  today. Reviewed possible questions to be  asked. Advised patient/spouse to inquire about  Neuro Rehab. Discussion of discharge late  next week. Patient reports good adherence to  home exercise and gait program.        FALLS SINCE LAST VISIT: No    MEDICATION CHANGES: No    PLAN FOR NEXT VISIT:  Review fall risk and safety management strategies  Review/progress home exercise program  gait training indoors/outdoors with LRAD

## 2022-11-04 ENCOUNTER — HOME CARE VISIT (OUTPATIENT)
Dept: HOME HEALTH SERVICES | Facility: HOME HEALTHCARE | Age: 82
End: 2022-11-04

## 2022-11-04 PROCEDURE — G0153 HHCP-SVS OF S/L PATH,EA 15MN: HCPCS

## 2022-11-04 PROCEDURE — 93000 ELECTROCARDIOGRAM COMPLETE: CPT | Performed by: NURSE PRACTITIONER

## 2022-11-04 PROCEDURE — G0180 MD CERTIFICATION HHA PATIENT: HCPCS | Performed by: INTERNAL MEDICINE

## 2022-11-04 PROCEDURE — G0300 HHS/HOSPICE OF LPN EA 15 MIN: HCPCS

## 2022-11-06 VITALS
RESPIRATION RATE: 18 BRPM | DIASTOLIC BLOOD PRESSURE: 80 MMHG | HEART RATE: 63 BPM | TEMPERATURE: 98.1 F | OXYGEN SATURATION: 98 % | SYSTOLIC BLOOD PRESSURE: 140 MMHG

## 2022-11-06 VITALS
TEMPERATURE: 97.7 F | SYSTOLIC BLOOD PRESSURE: 147 MMHG | RESPIRATION RATE: 18 BRPM | HEART RATE: 49 BPM | DIASTOLIC BLOOD PRESSURE: 81 MMHG

## 2022-11-07 ENCOUNTER — HOME CARE VISIT (OUTPATIENT)
Dept: HOME HEALTH SERVICES | Facility: HOME HEALTHCARE | Age: 82
End: 2022-11-07

## 2022-11-07 ENCOUNTER — TELEPHONE (OUTPATIENT)
Dept: FAMILY MEDICINE CLINIC | Facility: CLINIC | Age: 82
End: 2022-11-07

## 2022-11-07 VITALS
SYSTOLIC BLOOD PRESSURE: 139 MMHG | RESPIRATION RATE: 18 BRPM | OXYGEN SATURATION: 98 % | DIASTOLIC BLOOD PRESSURE: 62 MMHG | TEMPERATURE: 97.8 F | HEART RATE: 62 BPM

## 2022-11-07 PROBLEM — C61 PROSTATE CANCER: Status: RESOLVED | Noted: 2019-09-11 | Resolved: 2022-11-07

## 2022-11-07 PROCEDURE — G0153 HHCP-SVS OF S/L PATH,EA 15MN: HCPCS

## 2022-11-07 PROCEDURE — G0157 HHC PT ASSISTANT EA 15: HCPCS

## 2022-11-07 NOTE — TELEPHONE ENCOUNTER
As discussed on 11/2/2022, patient is still interested in left atrial appendage closure.  You said that you would work out the logistics and then contact the patient.  Recently hospitalized for stroke.  Now on apixaban.    Thanks so much.

## 2022-11-07 NOTE — SIGNIFICANT NOTE
11/07/22 1321   [id number] Score for Atrial Fibrillation Stroke Risk   Age in Years 75+   Sex Male   CHF History N   Hypertension History Y   Stroke/TIA/Thromboembolism History Y   Vascular Disease History Y   Diabetes History N   [id number] Score 6

## 2022-11-07 NOTE — TELEPHONE ENCOUNTER
PT IS BEING DISCHARGED FROM [facility] AND PER CALL FROM KADEEM @ [facility] [phone], PLEASE PUT IN A     PHYSICAL THERAPY REFERRAL   DX: [phone]  ACUTE ISCHEMIC LEFT PCA STROKE     PT WANTS TO GO TO [facility] [phone]  [phone]    PLEASE PUT A PHYSICAL THERAPY REFERRAL IN     THANKS

## 2022-11-07 NOTE — HOME HEALTH
Patient reports that he has an ophthalmologist appointment in early  December, and his neurology appointment in early January. The  follow up with his PCP went well, no changes in medication or new  information to provide from the visit. Extensive discussion in  regards to progressing to Neuro physical Therapy. Patient  agreeable, and therapist will contact [facility] as this would be the  facility of choice due to proximity. Discharge notice signed and patient  was agreeable to Physical Therapy discharge at this time.    Plan for next visit: continue gait training with LRAD,  progress/finalize dynamic HEP, Objective measures.

## 2022-11-08 VITALS
OXYGEN SATURATION: 98 % | SYSTOLIC BLOOD PRESSURE: 139 MMHG | TEMPERATURE: 97.7 F | RESPIRATION RATE: 18 BRPM | DIASTOLIC BLOOD PRESSURE: 72 MMHG | HEART RATE: 62 BPM

## 2022-11-08 DIAGNOSIS — I63.532 ACUTE ISCHEMIC LEFT PCA STROKE: Primary | ICD-10-CM

## 2022-11-09 ENCOUNTER — HOME CARE VISIT (OUTPATIENT)
Dept: HOME HEALTH SERVICES | Facility: HOME HEALTHCARE | Age: 82
End: 2022-11-09

## 2022-11-09 VITALS
HEART RATE: 60 BPM | OXYGEN SATURATION: 97 % | DIASTOLIC BLOOD PRESSURE: 84 MMHG | TEMPERATURE: 97.8 F | SYSTOLIC BLOOD PRESSURE: 142 MMHG | RESPIRATION RATE: 20 BRPM

## 2022-11-09 PROCEDURE — G0299 HHS/HOSPICE OF RN EA 15 MIN: HCPCS

## 2022-11-09 PROCEDURE — G0153 HHCP-SVS OF S/L PATH,EA 15MN: HCPCS

## 2022-11-10 ENCOUNTER — HOME CARE VISIT (OUTPATIENT)
Dept: HOME HEALTH SERVICES | Facility: HOME HEALTHCARE | Age: 82
End: 2022-11-10

## 2022-11-10 ENCOUNTER — DOCUMENTATION (OUTPATIENT)
Dept: CARDIOLOGY | Facility: CLINIC | Age: 82
End: 2022-11-10

## 2022-11-10 ENCOUNTER — TELEPHONE (OUTPATIENT)
Dept: FAMILY MEDICINE CLINIC | Facility: CLINIC | Age: 82
End: 2022-11-10

## 2022-11-10 VITALS
TEMPERATURE: 97 F | SYSTOLIC BLOOD PRESSURE: 120 MMHG | HEART RATE: 45 BPM | RESPIRATION RATE: 18 BRPM | OXYGEN SATURATION: 97 % | DIASTOLIC BLOOD PRESSURE: 82 MMHG

## 2022-11-10 VITALS
SYSTOLIC BLOOD PRESSURE: 142 MMHG | OXYGEN SATURATION: 98 % | DIASTOLIC BLOOD PRESSURE: 84 MMHG | RESPIRATION RATE: 18 BRPM | HEART RATE: 60 BPM

## 2022-11-10 DIAGNOSIS — I48.0 PAROXYSMAL ATRIAL FIBRILLATION: Primary | ICD-10-CM

## 2022-11-10 PROCEDURE — G0151 HHCP-SERV OF PT,EA 15 MIN: HCPCS

## 2022-11-10 NOTE — PROGRESS NOTES
I spoke with  And Mr. Sorto this morning about Left Atrial Appendage Occlusion procedure.  We discussed next step is Cardiac CTA. All questions answered.  Structural Heart Team contact information was provided. They are anxious to proceed.  Schedulers will be notified to schedule the CTA ASAP.

## 2022-11-10 NOTE — TELEPHONE ENCOUNTER
SPOKE TO PT'S WIFE AND TOLD HER AS DR NUNEZ HAS ALREADY TOLD THEM WE ARE UNABLE TO GET PT IN ANY SOONER TO SEE THE NEUROLOGIST AND I DID ADVISE THAT SHE CALL THERE OFFICE DAILY TO CHECK AND SEE IF THEY HAVE ANY SOON CANCELLATIONS WITH DR FUENTES.

## 2022-11-10 NOTE — HOME HEALTH
"Subjective: \" I really want for my eyes to get better. They called me from Dr Schulz's office but Holli gorman it was already set up with [facility] at [address].\"    Called [facility] and confirmed that outpatient rehab has been set up."

## 2022-11-10 NOTE — TELEPHONE ENCOUNTER
----- Message from Madison George MA sent at 10/26/2022  9:42 AM EDT -----  Regarding: FW: Recommend neurologist  Contact: [phone]        ----- Message -----  From: Junito Sorto  Sent: 10/26/2022  12:08 AM EDT  To: [facility] Clinical Pool  Subject: Recommend neurologist                            I have had a stroke and am not able to find a neurologist at less than 2+ months. Can you get me an appointment sooner? At either [facility] or [place] please.

## 2022-11-11 DIAGNOSIS — I48.19 ATRIAL FIBRILLATION, PERSISTENT: ICD-10-CM

## 2022-11-11 DIAGNOSIS — I48.21 PERMANENT ATRIAL FIBRILLATION: Primary | ICD-10-CM

## 2022-11-11 NOTE — PROGRESS NOTES
11/14/22 0001   Pre-Procedure Phone Call   Procedure Time Verified Yes   Arrival Time 1000   Procedure Location Verified Yes   Medical History Reviewed No   NPO Status Reinforced Yes   Ride and Caregiver Arranged N/A   Patient Knows to Bring Current Medications No   Bring Outside Films Requested No

## 2022-11-14 ENCOUNTER — HOSPITAL ENCOUNTER (OUTPATIENT)
Dept: CT IMAGING | Facility: HOSPITAL | Age: 82
Discharge: HOME | End: 2022-11-14
Admitting: INTERNAL MEDICINE

## 2022-11-14 VITALS
SYSTOLIC BLOOD PRESSURE: 167 MMHG | DIASTOLIC BLOOD PRESSURE: 92 MMHG | RESPIRATION RATE: 18 BRPM | TEMPERATURE: 98.4 F | HEART RATE: 62 BPM | OXYGEN SATURATION: 97 %

## 2022-11-14 DIAGNOSIS — I48.19 ATRIAL FIBRILLATION, PERSISTENT: ICD-10-CM

## 2022-11-14 DIAGNOSIS — I48.21 PERMANENT ATRIAL FIBRILLATION: ICD-10-CM

## 2022-11-14 LAB
CREAT BLDA-MCNC: 0.6 MG/DL (ref 0.6–1.3)
QT INTERVAL: 443 MS

## 2022-11-14 PROCEDURE — 75574 CT ANGIO HRT W/3D IMAGE: CPT

## 2022-11-14 PROCEDURE — 93005 ELECTROCARDIOGRAM TRACING: CPT | Performed by: INTERNAL MEDICINE

## 2022-11-14 PROCEDURE — 82565 ASSAY OF CREATININE: CPT

## 2022-11-14 PROCEDURE — 75574 CT ANGIO HRT W/3D IMAGE: CPT | Performed by: STUDENT IN AN ORGANIZED HEALTH CARE EDUCATION/TRAINING PROGRAM

## 2022-11-14 PROCEDURE — 0 IOPAMIDOL PER 1 ML: Performed by: INTERNAL MEDICINE

## 2022-11-14 RX ADMIN — IOPAMIDOL 95 ML: 755 INJECTION, SOLUTION INTRAVENOUS at 11:32

## 2022-11-14 NOTE — NURSING NOTE
Pt in xray triage for CTA.  Pt and wife wearing mask and RN wearing mask and eye protection for all pt interactions.

## 2022-11-16 ENCOUNTER — HOME CARE VISIT (OUTPATIENT)
Dept: HOME HEALTH SERVICES | Facility: HOME HEALTHCARE | Age: 82
End: 2022-11-16

## 2022-11-16 PROCEDURE — G0153 HHCP-SVS OF S/L PATH,EA 15MN: HCPCS

## 2022-11-17 VITALS
OXYGEN SATURATION: 98 % | HEART RATE: 54 BPM | RESPIRATION RATE: 16 BRPM | DIASTOLIC BLOOD PRESSURE: 88 MMHG | SYSTOLIC BLOOD PRESSURE: 140 MMHG | TEMPERATURE: 98.8 F

## 2022-11-17 RX ORDER — OMEPRAZOLE 20 MG/1
40 CAPSULE, DELAYED RELEASE ORAL DAILY
Qty: 90 CAPSULE | Refills: 3 | Status: CANCELLED | OUTPATIENT
Start: 2022-11-17

## 2022-11-18 ENCOUNTER — HOME CARE VISIT (OUTPATIENT)
Dept: HOME HEALTH SERVICES | Facility: HOME HEALTHCARE | Age: 82
End: 2022-11-18

## 2022-11-18 VITALS
RESPIRATION RATE: 18 BRPM | HEART RATE: 47 BPM | DIASTOLIC BLOOD PRESSURE: 87 MMHG | TEMPERATURE: 97.7 F | SYSTOLIC BLOOD PRESSURE: 152 MMHG | OXYGEN SATURATION: 97 %

## 2022-11-18 PROCEDURE — G0153 HHCP-SVS OF S/L PATH,EA 15MN: HCPCS

## 2022-11-18 RX ORDER — OMEPRAZOLE 20 MG/1
40 CAPSULE, DELAYED RELEASE ORAL DAILY
Qty: 90 CAPSULE | Refills: 3 | Status: CANCELLED | OUTPATIENT
Start: 2022-11-18

## 2022-11-18 RX ORDER — ATORVASTATIN CALCIUM 80 MG/1
80 TABLET, FILM COATED ORAL DAILY
Qty: 90 TABLET | Refills: 3 | Status: SHIPPED | OUTPATIENT
Start: 2022-11-18

## 2022-11-18 NOTE — PROGRESS NOTES
Subjective   Junito Sorto is a 82 y.o. male. Patient is here today for   Chief Complaint   Patient presents with   • follow to hospital visit   • Atrial Fibrillation          Vitals:    22 1413   BP: 112/82   Pulse: 51   Temp: 97.9 °F (36.6 °C)   SpO2: 96%     Body mass index is 34.45 kg/m².      Past Medical History:   Diagnosis Date   • Arthritis    • At risk for obstructive sleep apnea     5   • Atrial fibrillation (HCC)    • Bladder outlet obstruction 2019   • BPH (benign prostatic hyperplasia)    • Colon polyp    • Coronary artery disease Few years    Afib   • Dysphagia    • Eczema    • GERD (gastroesophageal reflux disease) 2013    Mentioned as possible   • Hernia     Your office reported hiatal   • Hiatal hernia    • Hyperlipidemia    • Hypertension    • Left ventricular dysfunction    • Low back pain    • Prostate cancer (HCC)    • Skin cancer    • Stroke (HCC) 10/19/2022      Allergies   Allergen Reactions   • Dilaudid [Hydromorphone Hcl] Hallucinations      Social History     Socioeconomic History   • Marital status:    Tobacco Use   • Smoking status: Former     Types: Pipe     Start date: 1959     Quit date: 1961     Years since quittin.0   • Smokeless tobacco: Never   • Tobacco comments:     50 years ago none since   Vaping Use   • Vaping Use: Never used   Substance and Sexual Activity   • Alcohol use: Yes     Alcohol/week: 5.0 standard drinks     Types: 5 Glasses of wine per week     Comment: Less than 4 oz /wk since this prob started.   • Drug use: No   • Sexual activity: Defer     Comment: 50 years ago        Current Outpatient Medications:   •  apixaban (ELIQUIS) 5 MG tablet tablet, Take 1 tablet by mouth 2 (Two) Times a Day., Disp: 180 tablet, Rfl: 3  •  aspirin 81 MG chewable tablet, Chew 81 mg Daily., Disp: , Rfl:   •  B Complex Vitamins (vitamin b complex) capsule capsule, Take  by mouth Daily., Disp: , Rfl:   •  brimonidine (Alphagan P) 0.1 % solution  ophthalmic solution, Administer 1 drop into the left eye 3 (Three) Times a Day., Disp: , Rfl:   •  loratadine (CLARITIN) 10 MG tablet, Take 1 tablet by mouth Daily As Needed for Allergies., Disp: , Rfl:   •  metoprolol succinate XL (TOPROL-XL) 25 MG 24 hr tablet, Take 1 tablet by mouth Daily., Disp: 90 tablet, Rfl: 3  •  omeprazole (priLOSEC) 20 MG capsule, Take 1 capsule by mouth Daily. (Patient taking differently: Take 40 mg by mouth Daily. patient taking 80mg nightly), Disp: 90 capsule, Rfl: 3  •  Thiamine HCl (vitamin B-1) 50 MG tablet, Take 1 tablet by mouth Daily., Disp: , Rfl:   •  atorvastatin (LIPITOR) 40 MG tablet, Take 2 tablets by mouth Every Night. patient taking 80mg nightly, Disp: , Rfl:      Objective     History of Present Illness  Mr. Sorto is here for follow-up from his recent hospitalization for a retinal infarct.  He has a history of atrial fibrillation with rhythm control over the years but he had some trepidation about starting any anticoagulation.  He developed visual changes secondary to an Acute CVA of the left PCA territory.  Neurology evaluated him in the hospital and recommended that he start Eliquis and continue on his statin medications.    Good rate control of his chronic atrial fibrillation and his cardiologist was considering a watchman device for him at a later date.       Review of Systems   Constitutional: Negative.    HENT: Negative.    Respiratory: Negative.    Cardiovascular: Negative.    Musculoskeletal: Negative.    Psychiatric/Behavioral: Negative.        Physical Exam  Vitals and nursing note reviewed.   Constitutional:       Appearance: Normal appearance. He is obese.      Comments: Pleasant as always.  He is accompanied by his wife.   Cardiovascular:      Heart sounds: Normal heart sounds. No murmur heard.    No gallop.      Comments: He has an irregularly irregular rhythm with regular rate.  Pulmonary:      Effort: No respiratory distress.      Breath sounds: Normal  breath sounds. No wheezing or rales.   Neurological:      Mental Status: He is alert and oriented to person, place, and time.   Psychiatric:         Mood and Affect: Mood normal.         Behavior: Behavior normal.         Thought Content: Thought content normal.           Problems Addressed this Visit        Cardiac and Vasculature    Permanent atrial fibrillation (HCC)    Hypercholesterolemia - Primary       Neuro    Acute ischemic left PCA stroke (HCC)   Diagnoses       Codes Comments    Hypercholesterolemia    -  Primary ICD-10-CM: E78.00  ICD-9-CM: 272.0     Permanent atrial fibrillation (HCC)     ICD-10-CM: I48.21  ICD-9-CM: 427.31     Acute ischemic left PCA stroke (HCC)     ICD-10-CM: I63.532  ICD-9-CM: 434.91             PLAN  Review of his labs from October 20 reveals an LDL cholesterol 70 which is his target.    Is permanent atrial fibrillation with a controlled rate.  He is on Eliquis for anticoagulation.    He was hopeful that his visual deficits may improve.  He has a follow-up appointment with neurology sometime soon.    Unfortunately, his visual impairment is not likely to improve and he and I discussed that today.      No follow-ups on file.  Answers for HPI/ROS submitted by the patient on 10/27/2022  What is the primary reason for your visit?: Other  Please describe your symptoms.: Imparted  vision. No symptoms . Getting old. Want necessary pills renewed when needed., You may not like it but i feel great., If this disqualifies me for medicare cancel the visit and let me know.  Have you had these symptoms before?: No  How long have you been having these symptoms?: 5-7 days  Please list any medications you are currently taking for this condition.: These are on mychart. Last 2 visits with participating MDs used. Same., Added eliquis and multivitamin B complex with AYESHA

## 2022-11-21 RX ORDER — OMEPRAZOLE 20 MG/1
40 CAPSULE, DELAYED RELEASE ORAL DAILY
Qty: 90 CAPSULE | Refills: 3 | Status: SHIPPED | OUTPATIENT
Start: 2022-11-21 | End: 2022-12-07 | Stop reason: SDUPTHER

## 2022-12-07 RX ORDER — OMEPRAZOLE 20 MG/1
20 CAPSULE, DELAYED RELEASE ORAL DAILY
Qty: 90 CAPSULE | Refills: 3 | Status: SHIPPED | OUTPATIENT
Start: 2022-12-07

## 2023-01-06 ENCOUNTER — OFFICE VISIT (OUTPATIENT)
Dept: NEUROLOGY | Facility: CLINIC | Age: 83
End: 2023-01-06
Payer: MEDICARE

## 2023-01-06 VITALS
BODY MASS INDEX: 34.21 KG/M2 | WEIGHT: 218 LBS | HEIGHT: 67 IN | HEART RATE: 52 BPM | OXYGEN SATURATION: 98 % | DIASTOLIC BLOOD PRESSURE: 82 MMHG | SYSTOLIC BLOOD PRESSURE: 130 MMHG

## 2023-01-06 DIAGNOSIS — G62.9 POLYNEUROPATHY: Primary | ICD-10-CM

## 2023-01-06 PROCEDURE — 99214 OFFICE O/P EST MOD 30 MIN: CPT | Performed by: PHYSICIAN ASSISTANT

## 2023-01-06 RX ORDER — BETAMETHASONE DIPROPIONATE 0.5 MG/G
CREAM TOPICAL AS NEEDED
COMMUNITY
Start: 2022-12-21

## 2023-01-06 NOTE — PROGRESS NOTES
CC: Hospital follow-up    HPI:  Junito Sorto is a  82 y.o.  Right-handed male with past medical history of hyperlipidemia, GERD, pneumonia this past fall and left occipital lobe stroke in October.  He has history of atrial fibrillation and had been reticent to be on anticoagulation in the past.  He apparently awoke 1 morning in October with new onset visual disturbance and was seen by his eye doctor and was suspected to have a right hemianopsia.  Reviewed prior documentation and made amendments as needed.  Apparently he had been on Eliquis 15 years earlier but stopped it because of excess bruising.  He was in the process of being set up for a watchman's device.  CHARU on 8/30 was negative for thrombus.  He has no significant bleeding history.  MRI showed left PCA infarct involving left occipital lobe stroke appearance consistent with cardioembolic etiology.    Since discharge he has had no new neurologic symptoms to note.  He is not driving nor woodworking.  He just went to neuro optometrist and got fitted for prism glasses and now he is able to read.  At times he sees what appears to be someone walking behind him on the right side or what appears to be plants out of his right periphery.  He wants to know if he will improve enough to drive.  He is still being evaluated for watchman's device and I think this is planned for January or February.  He is doing well on the Eliquis with no report of bleeding, dark stool, dizziness or lightheadedness.  He is using a cane and working with PT and OT at [facility].  He describes that he had an episode of pneumonia prior to her stroke and it seems like he just never got his stamina back.  PCP referred him for therapy    Social history:    Family history:      Pain Scale:        ROS:  Review of Systems   Constitutional: Positive for activity change (decreased) and fatigue. Negative for appetite change and unexpected weight change.   HENT: Negative for ear pain, facial swelling,  hearing loss, nosebleeds, tinnitus, trouble swallowing and voice change.    Eyes: Negative for photophobia, pain and visual disturbance.   Respiratory: Negative for choking, chest tightness, shortness of breath, wheezing and stridor.    Cardiovascular: Negative for chest pain and palpitations.   Endocrine: Negative for cold intolerance and heat intolerance.   Genitourinary: Negative for decreased urine volume, difficulty urinating, dysuria, frequency and urgency.   Musculoskeletal: Positive for gait problem (cane). Negative for neck pain and neck stiffness.   Skin: Negative for color change, pallor, rash and wound.   Allergic/Immunologic: Negative for food allergies.   Neurological: Positive for headaches (new glasses). Negative for dizziness, tremors, facial asymmetry, speech difficulty, weakness and numbness.   Hematological: Does not bruise/bleed easily.   Psychiatric/Behavioral: Positive for confusion and decreased concentration. Negative for sleep disturbance. The patient is not nervous/anxious.          Reviewed ROS conducted by MA and wyatt        Physical Exam:  Vitals:    01/06/23 1300   BP: 130/82   Pulse: 52   SpO2: 98%   Weight: 98.9 kg (218 lb)   Height: 170.2 cm (67\")      Orthostatic BP:    Body mass index is 34.14 kg/m².        General: pt well appearing, no distress  HEENT: Normocephalic, conjunctiva normal, external canals normal, no nasal discharge, moist mucous membranes  Neck: No lymphadenopathy, thyroid not enlarged, no JVD  CV: Regular rate and rhythm, no murmurs negative no bruits auscultated at neck, equal pulses  Pulmonary: Normal respiratory effort, clear to auscultation bilaterally  Extremities: no edema, bruising, or skin lesions  Pysch: good eye contact, cooperative, full affect, euthymic mood, good attention, good insight  Mental: alert, conversant, AOx3, provides history, some word paucity and nonfluent speech at time but not aphasic language fluent, names, repeats.  CN: Dense right  hemianopsia worse of the inferior quadrant, PERRL, EOMi, corrective saccade with cover-uncover but no skew, no gaze palsy or nystagmus, intact facial sensation, no facial assymetry, intact hearing, symmetric palate elevation, tongue midline, good SCM strength  Motor: no abnormal movements, no pronator drift, normal  bulk and tone, 5/5 strength b/l UE & LE  Sensory: Reduced sensation to crude touch and temp in glove and stocking distribution of lower extremities, absent vibratory sense at ankles bilaterally  Reflexes: Trace   coordination: no ataxia on finger-nose  Gait: normal gait, no ataxia, positive Romberg        Results:      Lab Results   Component Value Date    GLUCOSE 89 10/19/2022    BUN 15 10/19/2022    CREATININE 0.60 11/14/2022    EGFRIFNONA 98 10/11/2021    EGFRIFAFRI 119 10/11/2021    BCR 19.2 10/19/2022    CO2 27.9 10/19/2022    CALCIUM 10.1 10/19/2022    PROTENTOTREF 6.6 07/22/2022    ALBUMIN 4.30 10/19/2022    LABIL2 1.5 07/22/2022    AST 32 10/19/2022    ALT 24 10/19/2022       Lab Results   Component Value Date    WBC 7.47 10/19/2022    HGB 15.4 10/19/2022    HCT 45.8 10/19/2022    MCV 89.6 10/19/2022     10/19/2022         .No results found for: RPR      Lab Results   Component Value Date    TSH 2.130 03/05/2022         Lab Results   Component Value Date    ZLTRETFJ00 569 02/11/2015         No results found for: FOLATE      Lab Results   Component Value Date    HGBA1C 5.80 (H) 10/20/2022         Lab Results   Component Value Date    GLUCOSE 89 10/19/2022    BUN 15 10/19/2022    CREATININE 0.60 11/14/2022    EGFRIFNONA 98 10/11/2021    EGFRIFAFRI 119 10/11/2021    BCR 19.2 10/19/2022    K 3.8 10/19/2022    CO2 27.9 10/19/2022    CALCIUM 10.1 10/19/2022    PROTENTOTREF 6.6 07/22/2022    ALBUMIN 4.30 10/19/2022    LABIL2 1.5 07/22/2022    AST 32 10/19/2022    ALT 24 10/19/2022         Diagnosis:  1.  History of left PCA stroke  2.  A. Fib  3.  Imbalance    Impression: 82-year-old male with  above-stated medical history who presents for hospital follow-up regarding left PCA stroke felt to be cardioembolic in setting of untreated A. fib.  LAOO planned by cardiology by  sometime in the next month or so they tell me.  He is also d/t see is eye doctor and we will see how his formal VF have evolved.  We discussed that the prognosis can be variable but generally I think we have a good idea of recovery at the 6-month lux and we are not there yet.  Meanwhile he will continue with precautions.  I detected some sensory loss on exam and of ordered some labs.  He also has paucity of speech and is nonfluent, word searching, but there is no complaints of memory on pressing.    Plan:  1.  Eliquis and high intensity statin  2 laboratories to check for reversible neuropathy etiology  3.  Follow-up with eye doctor as scheduled for recheck of visual fields    Follow-up in 4 to 5 months

## 2023-01-09 ENCOUNTER — LAB (OUTPATIENT)
Dept: LAB | Facility: HOSPITAL | Age: 83
End: 2023-01-09
Payer: MEDICARE

## 2023-01-09 DIAGNOSIS — G62.9 POLYNEUROPATHY: ICD-10-CM

## 2023-01-09 LAB
ERYTHROCYTE [SEDIMENTATION RATE] IN BLOOD: 6 MM/HR (ref 0–20)
FOLATE SERPL-MCNC: >20 NG/ML (ref 4.78–24.2)
VIT B12 BLD-MCNC: 484 PG/ML (ref 211–946)

## 2023-01-09 PROCEDURE — 86235 NUCLEAR ANTIGEN ANTIBODY: CPT

## 2023-01-09 PROCEDURE — 84155 ASSAY OF PROTEIN SERUM: CPT

## 2023-01-09 PROCEDURE — 85652 RBC SED RATE AUTOMATED: CPT

## 2023-01-09 PROCEDURE — 82595 ASSAY OF CRYOGLOBULIN: CPT

## 2023-01-09 PROCEDURE — 36415 COLL VENOUS BLD VENIPUNCTURE: CPT

## 2023-01-09 PROCEDURE — 82746 ASSAY OF FOLIC ACID SERUM: CPT

## 2023-01-09 PROCEDURE — 84165 PROTEIN E-PHORESIS SERUM: CPT

## 2023-01-09 PROCEDURE — 86225 DNA ANTIBODY NATIVE: CPT

## 2023-01-09 PROCEDURE — 82525 ASSAY OF COPPER: CPT

## 2023-01-09 PROCEDURE — 82607 VITAMIN B-12: CPT

## 2023-01-10 LAB
ALBUMIN SERPL ELPH-MCNC: 3.6 G/DL (ref 2.9–4.4)
ALBUMIN/GLOB SERPL: 1.4 {RATIO} (ref 0.7–1.7)
ALPHA1 GLOB SERPL ELPH-MCNC: 0.3 G/DL (ref 0–0.4)
ALPHA2 GLOB SERPL ELPH-MCNC: 0.6 G/DL (ref 0.4–1)
B-GLOBULIN SERPL ELPH-MCNC: 0.8 G/DL (ref 0.7–1.3)
CENTROMERE B AB SER-ACNC: <0.2 AI (ref 0–0.9)
CHROMATIN AB SERPL-ACNC: <0.2 AI (ref 0–0.9)
DSDNA AB SER-ACNC: <1 IU/ML (ref 0–9)
ENA JO1 AB SER-ACNC: <0.2 AI (ref 0–0.9)
ENA RNP AB SER-ACNC: <0.2 AI (ref 0–0.9)
ENA SCL70 AB SER-ACNC: <0.2 AI (ref 0–0.9)
ENA SM AB SER-ACNC: <0.2 AI (ref 0–0.9)
ENA SS-A AB SER-ACNC: <0.2 AI (ref 0–0.9)
ENA SS-B AB SER-ACNC: <0.2 AI (ref 0–0.9)
GAMMA GLOB SERPL ELPH-MCNC: 0.9 G/DL (ref 0.4–1.8)
GLOBULIN SER CALC-MCNC: 2.6 G/DL (ref 2.2–3.9)
LABORATORY COMMENT REPORT: NORMAL
Lab: NORMAL
M PROTEIN SERPL ELPH-MCNC: NORMAL G/DL
PROT PATTERN SERPL ELPH-IMP: NORMAL
PROT SERPL-MCNC: 6.2 G/DL (ref 6–8.5)

## 2023-01-12 LAB — COPPER SERPL-MCNC: 84 UG/DL (ref 69–132)

## 2023-01-13 LAB — CRYOGLOB SER QL 1D COLD INC: NORMAL

## 2023-01-20 DIAGNOSIS — Z86.73 HISTORY OF COMPLETED STROKE: Primary | ICD-10-CM

## 2023-02-02 ENCOUNTER — TELEPHONE (OUTPATIENT)
Dept: NEUROLOGY | Facility: CLINIC | Age: 83
End: 2023-02-02

## 2023-02-02 NOTE — TELEPHONE ENCOUNTER
Caller:MACHO     Relationship: WIFE     Best call back number: [phone] CAN LEAVE A DETAILED MESS ON VM IF YOU GET.     What orders are you requesting (i.e. lab or imaging): formal visual fields OPHTHALMOLOGY/     In what timeframe would the patient need to come in: ASAP     Where will you receive your lab/imaging services: [facility] / DR MARYSE EVERETT IF WHO [facility] SEES     [facility] PHONE # [phone]  FAX # [phone]    Additional notes: YES  TO LET HER KNOW WHEN YOU FAX ORDER  TO THEM/ OR IF YOU ONLY WANT THEM TO GO TO [facility]?     ALSO IS THIS A DIFFERENT TEST THAN WHAT HE BROUGHT IN TO YOU? THE ONE YOU HAD TROUBLE READING?      I SEE REF IN Epic IS THIS WHERE YOU WANT THEM TO GO? BRIAN SAID THEY WILL? THEY JUST NEED TO KNOW .     PLEASE ADVISE

## 2023-02-03 ENCOUNTER — OFFICE VISIT (OUTPATIENT)
Dept: CARDIOLOGY | Facility: CLINIC | Age: 83
End: 2023-02-03
Payer: MEDICARE

## 2023-02-03 VITALS
HEIGHT: 67 IN | DIASTOLIC BLOOD PRESSURE: 78 MMHG | BODY MASS INDEX: 34.47 KG/M2 | WEIGHT: 219.6 LBS | SYSTOLIC BLOOD PRESSURE: 100 MMHG | HEART RATE: 61 BPM | OXYGEN SATURATION: 91 %

## 2023-02-03 DIAGNOSIS — I49.3 PVC (PREMATURE VENTRICULAR CONTRACTION): ICD-10-CM

## 2023-02-03 DIAGNOSIS — I48.21 PERMANENT ATRIAL FIBRILLATION: Primary | ICD-10-CM

## 2023-02-03 DIAGNOSIS — E78.2 MIXED HYPERLIPIDEMIA: ICD-10-CM

## 2023-02-03 PROCEDURE — 99214 OFFICE O/P EST MOD 30 MIN: CPT | Performed by: INTERNAL MEDICINE

## 2023-02-03 NOTE — PROGRESS NOTES
Subjective:     Encounter Date:02/03/23      Patient ID: Junito Sorto is a 82 y.o. male.    Chief Complaint:  History of Present Illness      Dear Dr. Schulz,    I had the pleasure of seeing this patient in the office today for follow-up of his atrial fibrillation.    I saw March when he was admitted with a right lower lobe pneumonia and shortness of breath.  Patient had a long history of permanent atrial fibrillation and even treated with propranolol.  At 1 point he had been on apixaban but he had significant bruising and so it was stopped and he decided to never take chronic anticoagulation again.  We was hospitalized to get an echocardiogram, reviewed below, that showed ejection unction of 46-50%.  He was then readmitted a week later with shortness of breath he was started on furosemide 20 mg at that time.  He again declined anticoagulation.    I then referred him to Dr. Tony Ceballos for valuation for potential left atrial appendage closure.  At the end of August 2022, he was set up for a CHARU which showed EF normal, right ventricular cavity borderline dilated, mildly reduced right ventricular systolic function, saline test negative, and no clot in the left atrial appendage.  Dr. Mobley contacted him in September and was working at the logistics to schedule the procedure.     In October 2022, he presented to the hospital with complaints of strokelike symptoms and was diagnosed with an acute CVA in the left PCA territory.  Neurology recommended apixaban and he agreed.  Continue statin therapy.  He was recommended follow-up in our office.    Patient states that he has not had any cardiac complaints.  He is doing well on the apixaban.  He is waiting to hear back from Dr. Ceballos, he is not sure at this point whether he is supposed to stay on apixaban or whether they are going to be setting up for the watchman device.    02/03/23     Junito Sorto  1940     The patient and I have reviewed the shared  decision making a tool for oral anticoagulation in atrial fibrillation.  After discussion, the patient has decided to pursue left atrial appendage occlusion device implantation.  He/she has been deemed to be a suitable candidate for short-term anticoagulation therapy, but unable to take long-term anticoagulation secondary to personal decision.      The following portions of the patient's history were reviewed and updated as appropriate: allergies, current medications, past family history, past medical history, past social history, past surgical history and problem list.    Past Medical History:   Diagnosis Date   • Arthritis    • At risk for obstructive sleep apnea     5   • Atrial fibrillation (HCC)    • Bladder outlet obstruction 09/11/2019   • BPH (benign prostatic hyperplasia)    • Colon polyp    • Coronary artery disease Few years    Afib   • Dysphagia    • Eczema    • GERD (gastroesophageal reflux disease) 2013    Mentioned as possible   • Hernia     Your office reported hiatal   • Hiatal hernia    • Hyperlipidemia    • Hypertension    • Left ventricular dysfunction    • Low back pain    • Prostate cancer (HCC)    • Skin cancer    • Stroke (HCC) 10/19/2022       Past Surgical History:   Procedure Laterality Date   • APPENDECTOMY  75 yrs ago   • CATARACT EXTRACTION, BILATERAL     • COLONOSCOPY  unknown   • CYSTOSCOPY TRANSURETHRAL RESECTION OF PROSTATE N/A 09/11/2019    Procedure: CYSTOSCOPY TRANSURETHRAL RESECTION OF PROSTATE;  Surgeon: Chip Bedolla MD;  Location: [facility] OR;  Service: Urology   • ENDOSCOPY N/A 10/31/2018    LA Grade C reflux esophagitis, HH, erythematous mucosa in the antrum. Path: Gastritis, esophagitis.    • ENDOSCOPY N/A 03/20/2022    Procedure: ESOPHAGOGASTRODUODENOSCOPY WITH BIOPSIES AND NAILS DILATION #56;  Surgeon: Tony Ramos MD;  Location: [facility] ENDOSCOPY;  Service: Gastroenterology;  Laterality: N/A;  PRE OP - DYSPHAGIA  POST OP- MILD GASTRITIS   • EPIDURAL  "BLOCK     • ESOPHAGEAL DILATATION     • PROSTATE SURGERY  11/06/2014   • SKIN CANCER EXCISION      multiple, over that last few years   • SKIN CANCER EXCISION Right 09/03/2019    FACE,    • SKIN CANCER EXCISION      2023   • TONSILLECTOMY      around 1950   • UPPER GASTROINTESTINAL ENDOSCOPY  2013         Procedures       Objective:     Vitals:    02/03/23 1040   BP: 100/78   BP Location: Right arm   Patient Position: Sitting   Pulse: 61   SpO2: 91%   Weight: 99.6 kg (219 lb 9.6 oz)   Height: 170.2 cm (67\")         Constitutional:       General: Not in acute distress.     Appearance: Well-developed. Not diaphoretic.   Eyes:      General:         Right eye: No discharge.         Left eye: No discharge.      Conjunctiva/sclera: Conjunctivae normal.      Pupils: Pupils are equal, round, and reactive to light.   HENT:      Head: Normocephalic and atraumatic.      Nose: Nose normal.   Neck:      Thyroid: No thyromegaly.      Trachea: No tracheal deviation.      Lymphadenopathy: No cervical adenopathy.   Pulmonary:      Effort: Pulmonary effort is normal. No respiratory distress.      Breath sounds: Normal breath sounds. No stridor.   Chest:      Chest wall: Not tender to palpatation.   Cardiovascular:      Normal rate. Irregularly irregular rhythm.      Murmurs: There is no murmur.      No gallop. No S3 gallop.   Pulses:     Intact distal pulses.   Abdominal:      General: Bowel sounds are normal. There is no distension.      Palpations: Abdomen is soft. There is no abdominal mass.      Tenderness: There is no abdominal tenderness. There is no guarding or rebound.   Musculoskeletal: Normal range of motion.         General: No tenderness or deformity.      Cervical back: Normal range of motion and neck supple. Skin:     General: Skin is warm and dry.      Findings: No erythema or rash.   Neurological:      Mental Status: Alert.   Psychiatric:         Thought Content: Thought content normal.         Lab Review:   Lab " Results   Component Value Date    GLUCOSE 89 10/19/2022    BUN 15 10/19/2022    CREATININE 0.60 11/14/2022    EGFRIFNONA 98 10/11/2021    EGFRIFAFRI 119 10/11/2021    BCR 19.2 10/19/2022    K 3.8 10/19/2022    CO2 27.9 10/19/2022    CALCIUM 10.1 10/19/2022    PROTENTOTREF 6.2 01/09/2023    ALBUMIN 3.6 01/09/2023    LABIL2 1.4 01/09/2023    AST 32 10/19/2022    ALT 24 10/19/2022     CBC    CBC 7/22/22 8/22/22 10/19/22   WBC 5.3 6.44 7.47   RBC 5.08 5.07 5.11   Hemoglobin 14.4 15.0 15.4   Hematocrit 43.6 46.4 45.8   MCV 86 91.5 89.6   MCH 28.3 29.6 30.1   MCHC 33.0 32.3 33.6   RDW 14.1 15.1 13.6   Platelets 174 176 197           Lab Results   Component Value Date    PROBNP 825.0 03/17/2022    PROBNP 808.0 03/03/2022    PROBNP 898.6 06/26/2016     No components found for: TROP  Lab Results   Component Value Date    DDIMERQUANT 0.65 (H) 03/17/2022         Results for orders placed during the hospital encounter of 08/30/22    Adult Transesophageal Echo (CHARU) W/ Cont if Necessary Per Protocol    Interpretation Summary  · Saline test results are negative.  · Estimated left ventricular EF = 60% Left ventricular systolic function is normal.  · The right ventricular cavity is borderline dilated.  · Mildly reduced right ventricular systolic function noted.  · Estimated right ventricular systolic pressure from tricuspid regurgitation is normal (<35 mmHg).  · There is spontaneous echo contrast present in the atrial body and in the atrial appendage.    CHADS-VASc Risk Assessment            3 Total Score    1 Hypertension    2 Age >/= 75        Criteria that do not apply:    CHF    DM    PRIOR STROKE/TIA/THROMBO    Vascular Disease    Age 65-74    Sex: Female                Assessment:          Diagnosis Plan   1. Permanent atrial fibrillation (HCC)        2. Mixed hyperlipidemia        3. PVC (premature ventricular contraction)               Plan:       1.  Permanent atrial fibrillation, rate control strategy.  Patient does not  have any symptoms of atrial fibrillation or palpitations  2.  CVA as outlined above, neurology has on apixaban.  3.  Patient has been undergoing evaluation for potential left atrial appendage closure.  They are waiting to hear back on the plans, I will contact Dr. Ceballos and The structural heart team    Thank you very much for allowing us to participate in the care of this pleasant patient.  Please don't hesitate to call if I can be of assistance in any way.      Current Outpatient Medications:   •  apixaban (ELIQUIS) 5 MG tablet tablet, Take 1 tablet by mouth 2 (Two) Times a Day., Disp: 180 tablet, Rfl: 3  •  aspirin 81 MG chewable tablet, Chew 81 mg Daily., Disp: , Rfl:   •  atorvastatin (Lipitor) 80 MG tablet, Take 1 tablet by mouth Daily., Disp: 90 tablet, Rfl: 3  •  B Complex Vitamins (vitamin b complex) capsule capsule, Take  by mouth Daily., Disp: , Rfl:   •  betamethasone dipropionate 0.05 % cream, As Needed., Disp: , Rfl:   •  brimonidine (Alphagan P) 0.1 % solution ophthalmic solution, Administer 1 drop into the left eye 3 (Three) Times a Day., Disp: , Rfl:   •  loratadine (CLARITIN) 10 MG tablet, Take 1 tablet by mouth Daily As Needed for Allergies., Disp: , Rfl:   •  metoprolol succinate XL (TOPROL-XL) 25 MG 24 hr tablet, Take 1 tablet by mouth Daily., Disp: 90 tablet, Rfl: 3  •  omeprazole (priLOSEC) 20 MG capsule, Take 1 capsule by mouth Daily. patient taking 80mg nightly, Disp: 90 capsule, Rfl: 3         Return in about 6 months (around 8/3/2023).

## 2023-02-05 ENCOUNTER — HOSPITAL ENCOUNTER (EMERGENCY)
Facility: HOSPITAL | Age: 83
Discharge: HOME | End: 2023-02-05
Attending: EMERGENCY MEDICINE | Admitting: EMERGENCY MEDICINE
Payer: MEDICARE

## 2023-02-05 VITALS
RESPIRATION RATE: 18 BRPM | BODY MASS INDEX: 34.37 KG/M2 | OXYGEN SATURATION: 98 % | TEMPERATURE: 97.3 F | HEART RATE: 61 BPM | SYSTOLIC BLOOD PRESSURE: 161 MMHG | WEIGHT: 219 LBS | HEIGHT: 67 IN | DIASTOLIC BLOOD PRESSURE: 90 MMHG

## 2023-02-05 DIAGNOSIS — H61.21 IMPACTED CERUMEN OF RIGHT EAR: Primary | ICD-10-CM

## 2023-02-05 PROCEDURE — 69209 REMOVE IMPACTED EAR WAX UNI: CPT

## 2023-02-05 PROCEDURE — 99282 EMERGENCY DEPT VISIT SF MDM: CPT

## 2023-02-05 NOTE — ED PROVIDER NOTES
MD ATTESTATION NOTE    The KATLIN and I have discussed this patient's history, physical exam, and treatment plan.  I have reviewed the documentation and personally had a face to face interaction with the patient. I affirm the documentation and agree with the treatment and plan.  The attached note describes my personal findings.      I provided a substantive portion of the care of the patient.  I personally performed the physical exam in its entirety, and below are my findings.  For this patient encounter, the patient wore surgical mask, I wore full protective PPE including N95 and eye protection.      Brief HPI: This patient is an 82-year-old male with a history of a previous CVA presenting to the emergency room today with sudden onset of hearing loss to his right ear.  He denies unilateral weakness, speech abnormalities, visual changes, or facial drooping.    PHYSICAL EXAM  ED Triage Vitals   Temp Heart Rate Resp BP SpO2   02/05/23 1412 02/05/23 1412 02/05/23 1412 02/05/23 1419 02/05/23 1412   97.3 °F (36.3 °C) (!) 49 18 (!) 193/113 95 %      Temp src Heart Rate Source Patient Position BP Location FiO2 (%)   -- -- -- -- --                GENERAL: Resting comfortably and in no acute distress, nontoxic in appearance  HENT: nares patent, cerumen impaction to the right ear canal  EYES: no scleral icterus  CV: regular rhythm, normal rate, no M/R/G  RESPIRATORY: normal effort, lungs clear bilaterally  MUSCULOSKELETAL: no deformity, no edema  NEURO: alert, moves all extremities, follows commands  PSYCH:  calm, cooperative  SKIN: warm, dry    Vital signs and nursing notes reviewed.        Plan: The patient has a cerumen impaction.  We will clean and irrigate his right ear canal and reassess following.       Husam Cha MD  02/05/23 0461

## 2023-02-05 NOTE — ED PROVIDER NOTES
EMERGENCY DEPARTMENT ENCOUNTER    Room Number:  14/14  Date of encounter:  2/5/2023  PCP: Fan Schulz MD  Historian: Patient, spouse  Chronic or social conditions impacting care: Nothing      I used full protective equipment while examining this patient.  This includes face mask, gloves and protective eyewear.  I washed my hands before entering the room and immediately upon leaving the room      HPI:  Chief Complaint: Hearing loss  A complete HPI/ROS/PMH/PSH/SH/FH are unobtainable due to: Nothing    Context: Junito Sorto is a 82 y.o. male who presents to the ED c/o sudden onset of right-sided hearing loss earlier today.  Patient states he was at home and this occurred.  He states this only affected the right side.  He states he can normally hear well out of his right ear.  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  He denies any fevers, chills, cough.  Family and patient were concerned about possible neurological complications.  Patient did have a stroke in October 2022 which left him with peripheral vision loss.    Review of Medical Records  I reviewed Epic records from admission from 10/19/2022.  Patient had an ischemic stroke to the left PCA.    PAST MEDICAL HISTORY  Active Ambulatory Problems     Diagnosis Date Noted   • Hyperglycemia 02/16/2016   • Obesity 02/25/2016   • Allergic rhinitis 03/28/2016   • Permanent atrial fibrillation (HCC) 03/28/2016   • HLD (hyperlipidemia) 03/28/2016   • Esophageal dysphagia 10/09/2018   • Hiatal hernia 02/05/2019   • GERD (gastroesophageal reflux disease) 02/05/2019   • Bladder outlet obstruction 09/11/2019   • Sciatica of right side 12/10/2020   • Pneumonia of right lower lobe due to infectious organism 03/03/2022   • Acute respiratory failure with hypoxia (HCC) 03/17/2022   • PVC (premature ventricular contraction)    • Left ventricular dysfunction    • Hypercholesterolemia 07/26/2022   • Acute ischemic left PCA stroke (HCC) 10/19/2022      Resolved Ambulatory Problems     Diagnosis Date Noted   • Hypertension 02/25/2016   • Benign essential hypertension 03/28/2016   • Acute cystitis 02/26/2017   • Bacterial conjunctivitis 03/13/2017   • Impacted cerumen of right ear 01/13/2019   • Prostate cancer (HCC) 09/11/2019   • Hypoxia 03/03/2022   • Pneumonia due to infectious organism 03/17/2022   • Orthopnea 03/17/2022     Past Medical History:   Diagnosis Date   • Arthritis    • At risk for obstructive sleep apnea    • Atrial fibrillation (HCC)    • BPH (benign prostatic hyperplasia)    • Colon polyp    • Coronary artery disease Few years   • Dysphagia    • Eczema    • Hernia    • Hyperlipidemia    • Low back pain    • Skin cancer    • Stroke (HCC) 10/19/2022         PAST SURGICAL HISTORY  Past Surgical History:   Procedure Laterality Date   • APPENDECTOMY  75 yrs ago   • CATARACT EXTRACTION, BILATERAL     • COLONOSCOPY  unknown   • CYSTOSCOPY TRANSURETHRAL RESECTION OF PROSTATE N/A 09/11/2019    Procedure: CYSTOSCOPY TRANSURETHRAL RESECTION OF PROSTATE;  Surgeon: Chip Bedolla MD;  Location: [facility] OR;  Service: Urology   • ENDOSCOPY N/A 10/31/2018    LA Grade C reflux esophagitis, HH, erythematous mucosa in the antrum. Path: Gastritis, esophagitis.    • ENDOSCOPY N/A 03/20/2022    Procedure: ESOPHAGOGASTRODUODENOSCOPY WITH BIOPSIES AND NAILS DILATION #56;  Surgeon: Tony Ramos MD;  Location: [facility] ENDOSCOPY;  Service: Gastroenterology;  Laterality: N/A;  PRE OP - DYSPHAGIA  POST OP- MILD GASTRITIS   • EPIDURAL BLOCK     • ESOPHAGEAL DILATATION     • PROSTATE SURGERY  11/06/2014   • SKIN CANCER EXCISION      multiple, over that last few years   • SKIN CANCER EXCISION Right 09/03/2019    FACE,    • SKIN CANCER EXCISION      2023   • TONSILLECTOMY      around 1950   • UPPER GASTROINTESTINAL ENDOSCOPY  2013         FAMILY HISTORY  Family History   Problem Relation Age of Onset   • Cancer Mother         pancreatic   • Liver disease  Mother    • Cancer Father         prostate, bladder, colon, lymphoma, leukemia   • Colon cancer Father    • Arrhythmia Father         Afib   • Heart disease Father    • Cancer Brother         prostate   • Arrhythmia Brother         Afib   • Cancer Paternal Grandfather         prostate   • Arrhythmia Paternal Aunt         Irregular beat . Before the time of illness sharing.   • Malig Hyperthermia Neg Hx          SOCIAL HISTORY  Social History     Socioeconomic History   • Marital status:    Tobacco Use   • Smoking status: Former     Types: Pipe     Start date: 1959     Quit date: 1961     Years since quittin.2   • Smokeless tobacco: Never   • Tobacco comments:     50 years ago none since   Vaping Use   • Vaping Use: Never used   Substance and Sexual Activity   • Alcohol use: Yes     Alcohol/week: 5.0 standard drinks     Types: 5 Glasses of wine per week     Comment: Less than 4 oz /wk since this prob started.   • Drug use: No   • Sexual activity: Defer     Comment: 50 years ago         ALLERGIES  Dilaudid [hydromorphone hcl]        REVIEW OF SYSTEMS  All systems reviewed and negative except for those discussed in HPI.       PHYSICAL EXAM    I have reviewed the triage vital signs and nursing notes.    ED Triage Vitals   Temp Heart Rate Resp BP SpO2   23 1412 23 1412 23 1412 23 1419 23 1412   97.3 °F (36.3 °C) (!) 49 18 (!) 193/113 95 %      Temp src Heart Rate Source Patient Position BP Location FiO2 (%)   -- -- -- -- --              Physical Exam  GENERAL: Alert, oriented, not distressed  HENT: head atraumatic, cerumen impaction on the right.  Left external auditory canal is clear.  EYES: no scleral icterus, EOMI  CV: regular rhythm, regular rate, no murmur  RESPIRATORY: normal effort, CTA  ABDOMEN: soft, nontender  MUSCULOSKELETAL: no deformity, FROM, no calf swelling or tenderness  NEURO: alert, moves all extremities, follows commands  SKIN: warm,  dry    ADDITIONAL ORDERS CONSIDERED BUT NOT ORDERED:  Nothing    I irrigated the patient's right ear with warm water and hydroperoxide.  I removed the cerumen completely.  Patient states his hearing has returned to normal.      PROGRESS, DATA ANALYSIS, CONSULTS, AND MEDICAL DECISION MAKING    All labs have been independently interpreted by myself.  All radiology studies have been independently interpreted by myself and discussed with radiologist dictating the report.   EKG's independently interpreted by myself.  Discussion below represents my analysis of pertinent findings related to patient's condition, differential diagnosis, treatment plan and final disposition.    I have discussed case with Dr. Cha, emergency room physician.  He has performed his own bedside examination and agrees with treatment plan.    ED Course as of 02/05/23 2038   Sun Feb 05, 2023   1438 Patient presents with hearing loss today on the right side.  Patient does appear to have a cerumen impaction on the right.  Plan for irrigation and reevaluation. [EE]   1715 Cerumen has been removed via irrigation.  His hearing has returned to baseline.  He was requesting ENT follow-up for hearing aids.  We will discharge. [EE]      ED Course User Index  [EE] Andrés Cuba PA       AS OF 20:38 EST VITALS:    BP - 161/90  HR - 61  TEMP - 97.3 °F (36.3 °C)  O2 SATS - 98%        DIAGNOSIS  Final diagnoses:   Impacted cerumen of right ear         DISPOSITION  Discharged      Dictated utilizing Dragon dictation     Andrés Cuba PA  02/05/23 2038

## 2023-02-16 DIAGNOSIS — E66.09 CLASS 2 OBESITY DUE TO EXCESS CALORIES WITHOUT SERIOUS COMORBIDITY WITH BODY MASS INDEX (BMI) OF 37.0 TO 37.9 IN ADULT: ICD-10-CM

## 2023-02-16 DIAGNOSIS — I10 BENIGN ESSENTIAL HYPERTENSION: ICD-10-CM

## 2023-02-16 DIAGNOSIS — E78.00 HYPERCHOLESTEROLEMIA: Primary | ICD-10-CM

## 2023-02-17 LAB
ALBUMIN SERPL-MCNC: 4.1 G/DL (ref 3.5–5.2)
ALBUMIN/GLOB SERPL: 2.1 G/DL
ALP SERPL-CCNC: 88 U/L (ref 39–117)
ALT SERPL-CCNC: 23 U/L (ref 1–41)
AST SERPL-CCNC: 29 U/L (ref 1–40)
BASOPHILS # BLD AUTO: 0.03 10*3/MM3 (ref 0–0.2)
BASOPHILS NFR BLD AUTO: 0.6 % (ref 0–1.5)
BILIRUB SERPL-MCNC: 1.5 MG/DL (ref 0–1.2)
BUN SERPL-MCNC: 13 MG/DL (ref 8–23)
BUN/CREAT SERPL: 19.7 (ref 7–25)
CALCIUM SERPL-MCNC: 9.6 MG/DL (ref 8.6–10.5)
CHLORIDE SERPL-SCNC: 104 MMOL/L (ref 98–107)
CHOLEST SERPL-MCNC: 110 MG/DL (ref 0–200)
CHOLEST/HDLC SERPL: 2.08 {RATIO}
CO2 SERPL-SCNC: 30.8 MMOL/L (ref 22–29)
CREAT SERPL-MCNC: 0.66 MG/DL (ref 0.76–1.27)
EGFRCR SERPLBLD CKD-EPI 2021: 93.6 ML/MIN/1.73
EOSINOPHIL # BLD AUTO: 0.62 10*3/MM3 (ref 0–0.4)
EOSINOPHIL NFR BLD AUTO: 11.5 % (ref 0.3–6.2)
ERYTHROCYTE [DISTWIDTH] IN BLOOD BY AUTOMATED COUNT: 12.8 % (ref 12.3–15.4)
GLOBULIN SER CALC-MCNC: 2 GM/DL
GLUCOSE SERPL-MCNC: 102 MG/DL (ref 65–99)
HCT VFR BLD AUTO: 41.2 % (ref 37.5–51)
HDLC SERPL-MCNC: 53 MG/DL (ref 40–60)
HGB BLD-MCNC: 14.1 G/DL (ref 13–17.7)
IMM GRANULOCYTES # BLD AUTO: 0 10*3/MM3 (ref 0–0.05)
IMM GRANULOCYTES NFR BLD AUTO: 0 % (ref 0–0.5)
LDLC SERPL CALC-MCNC: 45 MG/DL (ref 0–100)
LYMPHOCYTES # BLD AUTO: 0.97 10*3/MM3 (ref 0.7–3.1)
LYMPHOCYTES NFR BLD AUTO: 18 % (ref 19.6–45.3)
MCH RBC QN AUTO: 29.9 PG (ref 26.6–33)
MCHC RBC AUTO-ENTMCNC: 34.2 G/DL (ref 31.5–35.7)
MCV RBC AUTO: 87.3 FL (ref 79–97)
MONOCYTES # BLD AUTO: 0.46 10*3/MM3 (ref 0.1–0.9)
MONOCYTES NFR BLD AUTO: 8.5 % (ref 5–12)
NEUTROPHILS # BLD AUTO: 3.31 10*3/MM3 (ref 1.7–7)
NEUTROPHILS NFR BLD AUTO: 61.4 % (ref 42.7–76)
NRBC BLD AUTO-RTO: 0 /100 WBC (ref 0–0.2)
PLATELET # BLD AUTO: 177 10*3/MM3 (ref 140–450)
POTASSIUM SERPL-SCNC: 4.4 MMOL/L (ref 3.5–5.2)
PROT SERPL-MCNC: 6.1 G/DL (ref 6–8.5)
RBC # BLD AUTO: 4.72 10*6/MM3 (ref 4.14–5.8)
SODIUM SERPL-SCNC: 143 MMOL/L (ref 136–145)
TRIGL SERPL-MCNC: 47 MG/DL (ref 0–150)
VLDLC SERPL CALC-MCNC: 12 MG/DL (ref 5–40)
WBC # BLD AUTO: 5.39 10*3/MM3 (ref 3.4–10.8)

## 2023-02-23 ENCOUNTER — OFFICE VISIT (OUTPATIENT)
Dept: FAMILY MEDICINE CLINIC | Facility: CLINIC | Age: 83
End: 2023-02-23
Payer: MEDICARE

## 2023-02-23 VITALS
DIASTOLIC BLOOD PRESSURE: 78 MMHG | HEIGHT: 68 IN | BODY MASS INDEX: 32.58 KG/M2 | WEIGHT: 215 LBS | OXYGEN SATURATION: 96 % | TEMPERATURE: 97.5 F | HEART RATE: 58 BPM | SYSTOLIC BLOOD PRESSURE: 130 MMHG

## 2023-02-23 DIAGNOSIS — E78.00 HYPERCHOLESTEROLEMIA: ICD-10-CM

## 2023-02-23 DIAGNOSIS — I48.21 PERMANENT ATRIAL FIBRILLATION: ICD-10-CM

## 2023-02-23 DIAGNOSIS — K62.5 BRIGHT RED BLOOD PER RECTUM: Primary | ICD-10-CM

## 2023-02-23 PROCEDURE — 99213 OFFICE O/P EST LOW 20 MIN: CPT | Performed by: INTERNAL MEDICINE

## 2023-02-23 NOTE — PROGRESS NOTES
Subjective   Junito Sorto is a 82 y.o. male. Patient is here today for   Chief Complaint   Patient presents with   • Hyperlipidemia     Check lungs   • Hyperglycemia          Vitals:    23 1003   BP: 130/78   Pulse: 58   Temp: 97.5 °F (36.4 °C)   SpO2: 96%     Body mass index is 33.18 kg/m².      Past Medical History:   Diagnosis Date   • Allergic     Airborne   • Arthritis    • At risk for obstructive sleep apnea     5   • Atrial fibrillation (HCC)    • Bladder outlet obstruction 2019   • BPH (benign prostatic hyperplasia)    • Cataract     Replaced   • Colon polyp    • Coronary artery disease Few years    Afib   • Dysphagia    • Eczema    • GERD (gastroesophageal reflux disease) 2013    Mentioned as possible   • Glaucoma Eievated eye pressure   • Hernia     Your office reported hiatal   • Hiatal hernia    • HL (hearing loss)    • Hyperlipidemia    • Hypertension    • Left ventricular dysfunction    • Low back pain    • Obesity    • Pneumonia    • Prostate cancer (HCC)    • Skin cancer    • Stroke (HCC) 10/19/2022   • Visual impairment  stroke    Reading correction      Allergies   Allergen Reactions   • Dilaudid [Hydromorphone Hcl] Hallucinations      Social History     Socioeconomic History   • Marital status:    Tobacco Use   • Smoking status: Former     Types: Pipe     Start date: 1959     Quit date: 1961     Years since quittin.3   • Smokeless tobacco: Never   • Tobacco comments:     Limited by allergies   Vaping Use   • Vaping Use: Never used   Substance and Sexual Activity   • Alcohol use: Yes     Alcohol/week: 5.0 standard drinks     Types: 5 Glasses of wine per week     Comment: Less than 4 oz /wk since this prob started.   • Drug use: No   • Sexual activity: Not Currently     Partners: Female     Birth control/protection: Surgical, None, Vasectomy     Comment: 50 years ago        Current Outpatient Medications:   •  apixaban (ELIQUIS) 5 MG tablet tablet,  Take 1 tablet by mouth 2 (Two) Times a Day., Disp: 180 tablet, Rfl: 3  •  aspirin 81 MG chewable tablet, Chew 81 mg Daily., Disp: , Rfl:   •  atorvastatin (Lipitor) 80 MG tablet, Take 1 tablet by mouth Daily., Disp: 90 tablet, Rfl: 3  •  B Complex Vitamins (vitamin b complex) capsule capsule, Take  by mouth Daily., Disp: , Rfl:   •  betamethasone dipropionate 0.05 % cream, As Needed., Disp: , Rfl:   •  brimonidine (Alphagan P) 0.1 % solution ophthalmic solution, Administer 1 drop into the left eye 3 (Three) Times a Day., Disp: , Rfl:   •  loratadine (CLARITIN) 10 MG tablet, Take 1 tablet by mouth Daily As Needed for Allergies., Disp: , Rfl:   •  metoprolol succinate XL (TOPROL-XL) 25 MG 24 hr tablet, Take 1 tablet by mouth Daily., Disp: 90 tablet, Rfl: 3  •  omeprazole (priLOSEC) 20 MG capsule, Take 1 capsule by mouth Daily. patient taking 80mg nightly, Disp: 90 capsule, Rfl: 3     Objective     History of Present Illness  He is here to follow-up on labs from last week.    Has a cough but it is not productive.  He denies dyspnea.    He does note that he has blood in his stool.  He had seen Dr. Hal Valadez in 2011 who had asked him to follow-up in 5 years for a colonoscopy.  He is overdue for that by now of course and he is noted some blood in his stool.  Hyperlipidemia  Pertinent negatives include no chest pain, myalgias or shortness of breath.   Hyperglycemia  Associated symptoms include coughing and a sore throat. Pertinent negatives include no chest pain, chills, fever, headaches, myalgias or rash.   Cough  This is a new problem. The current episode started in the past 7 days. The problem has been waxing and waning. The problem occurs every few hours. The cough is productive of sputum. Associated symptoms include nasal congestion, rhinorrhea and a sore throat. Pertinent negatives include no chest pain, chills, ear congestion, ear pain, fever, headaches, heartburn, hemoptysis, myalgias, rash, shortness of breath,  sweats, weight loss or wheezing. The symptoms are aggravated by dust, fumes and lying down.        Review of Systems   Constitutional: Negative for chills, fever and weight loss.   HENT: Positive for rhinorrhea and sore throat. Negative for ear pain.    Respiratory: Positive for cough. Negative for hemoptysis, shortness of breath and wheezing.    Cardiovascular: Negative for chest pain.   Gastrointestinal: Positive for blood in stool. Negative for heartburn and rectal pain.   Musculoskeletal: Negative for myalgias.   Skin: Negative for rash.   Neurological: Negative for headaches.       Physical Exam  Vitals and nursing note reviewed.   Constitutional:       Appearance: Normal appearance.      Comments: Pleasant, neatly groomed, no distress.   Cardiovascular:      Heart sounds: Normal heart sounds. No murmur heard.    No gallop.      Comments: He has an irregularly irregular rhythm with regular rate.  Pulmonary:      Effort: No respiratory distress.      Breath sounds: Normal breath sounds. No wheezing or rales.   Neurological:      Mental Status: He is alert and oriented to person, place, and time.   Psychiatric:         Mood and Affect: Mood normal.         Behavior: Behavior normal.         Thought Content: Thought content normal.           Problems Addressed this Visit        Cardiac and Vasculature    Permanent atrial fibrillation (HCC)    Hypercholesterolemia   Other Visit Diagnoses     Bright red blood per rectum    -  Primary    Relevant Orders    Ambulatory Referral to Gastroenterology      Diagnoses       Codes Comments    Bright red blood per rectum    -  Primary ICD-10-CM: K62.5  ICD-9-CM: 569.3     Permanent atrial fibrillation (HCC)     ICD-10-CM: I48.21  ICD-9-CM: 427.31     Hypercholesterolemia     ICD-10-CM: E78.00  ICD-9-CM: 272.0             PLAN  I have referred him to gastroenterology to follow-up on his complaint of bright red blood per rectum.    He has permanent atrial fibrillation.  He has a  controlled ventricular response.  He is on Eliquis 5 mg twice daily for anticoagulation.    He saw Dr. Prater (his cardiologist) recently.  There was some discussion and Dr. Prater note about connecting him with a colleague who might arrange a left atrial appendage closure for him.    His hypercholesterolemia is well controlled on atorvastatin 80 mg daily.    Following his stroke he had some diplopia which he feels is adequately controlled with his new glasses.    I asked him to follow-up with me in about 4 months.  Fasting labs prior to visit should include: Lipid profile, comprehensive metabolic panel,  No follow-ups on file.

## 2023-03-03 ENCOUNTER — TELEPHONE (OUTPATIENT)
Dept: CARDIOLOGY | Facility: HOSPITAL | Age: 83
End: 2023-03-03
Payer: MEDICARE

## 2023-03-03 DIAGNOSIS — I48.21 PERMANENT ATRIAL FIBRILLATION: Primary | ICD-10-CM

## 2023-03-03 DIAGNOSIS — I63.532 ACUTE ISCHEMIC LEFT PCA STROKE: ICD-10-CM

## 2023-03-06 DIAGNOSIS — I63.532 ACUTE ISCHEMIC LEFT PCA STROKE: ICD-10-CM

## 2023-03-06 DIAGNOSIS — I48.21 PERMANENT ATRIAL FIBRILLATION: Primary | ICD-10-CM

## 2023-03-07 ENCOUNTER — TELEPHONE (OUTPATIENT)
Dept: CARDIOLOGY | Facility: HOSPITAL | Age: 83
End: 2023-03-07
Payer: MEDICARE

## 2023-03-07 NOTE — TELEPHONE ENCOUNTER
I reached out to pt to make arrangements for Left Atrial Appendage Closure (LAAO). Procedure day of 3/22 offered and pt would like to schedule.   Arrangements will be made as well as any labs needed prior to. I will return call to pt with detailed instructions. RTRN

## 2023-03-07 NOTE — TELEPHONE ENCOUNTER
Pt is scheduled for Watchman procedure 3/22/23.   I called pt and provided detailed instructions. I will call pt back with Eliquis instructions and mailed letter to pt. He has contact information to coordinators' office should he have questions. RTRN

## 2023-03-08 ENCOUNTER — TELEPHONE (OUTPATIENT)
Dept: NEUROLOGY | Facility: CLINIC | Age: 83
End: 2023-03-08
Payer: MEDICARE

## 2023-03-15 ENCOUNTER — LAB (OUTPATIENT)
Dept: LAB | Facility: HOSPITAL | Age: 83
End: 2023-03-15
Payer: MEDICARE

## 2023-03-15 DIAGNOSIS — I48.21 PERMANENT ATRIAL FIBRILLATION: ICD-10-CM

## 2023-03-15 LAB
ANION GAP SERPL CALCULATED.3IONS-SCNC: 7 MMOL/L (ref 5–15)
BUN SERPL-MCNC: 14 MG/DL (ref 8–23)
BUN/CREAT SERPL: 21.2 (ref 7–25)
CALCIUM SPEC-SCNC: 9.6 MG/DL (ref 8.6–10.5)
CHLORIDE SERPL-SCNC: 104 MMOL/L (ref 98–107)
CO2 SERPL-SCNC: 31 MMOL/L (ref 22–29)
CREAT SERPL-MCNC: 0.66 MG/DL (ref 0.76–1.27)
DEPRECATED RDW RBC AUTO: 42.2 FL (ref 37–54)
EGFRCR SERPLBLD CKD-EPI 2021: 93.6 ML/MIN/1.73
ERYTHROCYTE [DISTWIDTH] IN BLOOD BY AUTOMATED COUNT: 12.8 % (ref 12.3–15.4)
GLUCOSE SERPL-MCNC: 103 MG/DL (ref 65–99)
HCT VFR BLD AUTO: 44.2 % (ref 37.5–51)
HGB BLD-MCNC: 14.3 G/DL (ref 13–17.7)
INR PPP: 1.28 (ref 0.9–1.1)
MCH RBC QN AUTO: 29 PG (ref 26.6–33)
MCHC RBC AUTO-ENTMCNC: 32.4 G/DL (ref 31.5–35.7)
MCV RBC AUTO: 89.7 FL (ref 79–97)
PLATELET # BLD AUTO: 181 10*3/MM3 (ref 140–450)
PMV BLD AUTO: 11 FL (ref 6–12)
POTASSIUM SERPL-SCNC: 4.6 MMOL/L (ref 3.5–5.2)
PROTHROMBIN TIME: 16.2 SECONDS (ref 11.7–14.2)
RBC # BLD AUTO: 4.93 10*6/MM3 (ref 4.14–5.8)
SODIUM SERPL-SCNC: 142 MMOL/L (ref 136–145)
WBC NRBC COR # BLD: 5.79 10*3/MM3 (ref 3.4–10.8)

## 2023-03-15 PROCEDURE — 36415 COLL VENOUS BLD VENIPUNCTURE: CPT

## 2023-03-15 PROCEDURE — 80048 BASIC METABOLIC PNL TOTAL CA: CPT

## 2023-03-15 PROCEDURE — 85027 COMPLETE CBC AUTOMATED: CPT

## 2023-03-15 PROCEDURE — 85610 PROTHROMBIN TIME: CPT

## 2023-03-17 ENCOUNTER — ANESTHESIA EVENT (OUTPATIENT)
Dept: CARDIOLOGY | Facility: HOSPITAL | Age: 83
DRG: 274 | End: 2023-03-17
Payer: MEDICARE

## 2023-03-22 ENCOUNTER — ANCILLARY PROCEDURE (OUTPATIENT)
Dept: PERIOP | Facility: HOSPITAL | Age: 83
DRG: 274 | End: 2023-03-22
Payer: MEDICARE

## 2023-03-22 ENCOUNTER — HOSPITAL ENCOUNTER (INPATIENT)
Facility: HOSPITAL | Age: 83
LOS: 1 days | Discharge: HOME | DRG: 274 | End: 2023-03-23
Attending: INTERNAL MEDICINE | Admitting: INTERNAL MEDICINE
Payer: MEDICARE

## 2023-03-22 ENCOUNTER — ANESTHESIA (OUTPATIENT)
Dept: CARDIOLOGY | Facility: HOSPITAL | Age: 83
DRG: 274 | End: 2023-03-22
Payer: MEDICARE

## 2023-03-22 DIAGNOSIS — I63.532 ACUTE ISCHEMIC LEFT PCA STROKE: ICD-10-CM

## 2023-03-22 DIAGNOSIS — I48.21 PERMANENT ATRIAL FIBRILLATION: ICD-10-CM

## 2023-03-22 LAB
ABO GROUP BLD: NORMAL
ACT BLD: 305 SECONDS (ref 82–152)
BLD GP AB SCN SERPL QL: NEGATIVE
RH BLD: NEGATIVE
T&S EXPIRATION DATE: NORMAL

## 2023-03-22 PROCEDURE — C1769 GUIDE WIRE: HCPCS | Performed by: INTERNAL MEDICINE

## 2023-03-22 PROCEDURE — 86850 RBC ANTIBODY SCREEN: CPT | Performed by: INTERNAL MEDICINE

## 2023-03-22 PROCEDURE — C1889 IMPLANT/INSERT DEVICE, NOC: HCPCS | Performed by: INTERNAL MEDICINE

## 2023-03-22 PROCEDURE — C1894 INTRO/SHEATH, NON-LASER: HCPCS | Performed by: INTERNAL MEDICINE

## 2023-03-22 PROCEDURE — C1893 INTRO/SHEATH, FIXED,NON-PEEL: HCPCS | Performed by: INTERNAL MEDICINE

## 2023-03-22 PROCEDURE — 02L73DK OCCLUSION OF LEFT ATRIAL APPENDAGE WITH INTRALUMINAL DEVICE, PERCUTANEOUS APPROACH: ICD-10-PCS | Performed by: INTERNAL MEDICINE

## 2023-03-22 PROCEDURE — 25010000002 FENTANYL CITRATE (PF) 50 MCG/ML SOLUTION: Performed by: ANESTHESIOLOGY

## 2023-03-22 PROCEDURE — 25010000002 DEXAMETHASONE SODIUM PHOSPHATE 20 MG/5ML SOLUTION: Performed by: ANESTHESIOLOGY

## 2023-03-22 PROCEDURE — 25010000002 HEPARIN (PORCINE) PER 1000 UNITS: Performed by: ANESTHESIOLOGY

## 2023-03-22 PROCEDURE — 85347 COAGULATION TIME ACTIVATED: CPT

## 2023-03-22 PROCEDURE — B24BZZ4 ULTRASONOGRAPHY OF HEART WITH AORTA, TRANSESOPHAGEAL: ICD-10-PCS | Performed by: ANESTHESIOLOGY

## 2023-03-22 PROCEDURE — 86900 BLOOD TYPING SEROLOGIC ABO: CPT | Performed by: INTERNAL MEDICINE

## 2023-03-22 PROCEDURE — 25010000002 HEPARIN (PORCINE) PER 1000 UNITS: Performed by: INTERNAL MEDICINE

## 2023-03-22 PROCEDURE — 93318 ECHO TRANSESOPHAGEAL INTRAOP: CPT | Performed by: ANESTHESIOLOGY

## 2023-03-22 PROCEDURE — 25510000001 IOPAMIDOL PER 1 ML: Performed by: INTERNAL MEDICINE

## 2023-03-22 PROCEDURE — 93355 ECHO TRANSESOPHAGEAL (TEE): CPT | Performed by: ANESTHESIOLOGY

## 2023-03-22 PROCEDURE — 33340 PERQ CLSR TCAT L ATR APNDGE: CPT | Performed by: INTERNAL MEDICINE

## 2023-03-22 PROCEDURE — 25010000002 ONDANSETRON PER 1 MG: Performed by: ANESTHESIOLOGY

## 2023-03-22 PROCEDURE — 25010000002 PROPOFOL 10 MG/ML EMULSION: Performed by: ANESTHESIOLOGY

## 2023-03-22 PROCEDURE — C1760 CLOSURE DEV, VASC: HCPCS | Performed by: INTERNAL MEDICINE

## 2023-03-22 PROCEDURE — 86901 BLOOD TYPING SEROLOGIC RH(D): CPT | Performed by: INTERNAL MEDICINE

## 2023-03-22 DEVICE — LEFT ATRIAL APPENDAGE CLOSURE DEVICE WITH DELIVERY SYSTEM
Type: IMPLANTABLE DEVICE | Status: FUNCTIONAL
Brand: WATCHMAN FLX™

## 2023-03-22 DEVICE — CAP WATCHMAN FLX PROC: Type: IMPLANTABLE DEVICE | Status: FUNCTIONAL

## 2023-03-22 DEVICE — CAP SYS WATCHMAN TRUSEAL ACC PROC: Type: IMPLANTABLE DEVICE | Status: FUNCTIONAL

## 2023-03-22 RX ORDER — SODIUM CHLORIDE, SODIUM LACTATE, POTASSIUM CHLORIDE, CALCIUM CHLORIDE 600; 310; 30; 20 MG/100ML; MG/100ML; MG/100ML; MG/100ML
INJECTION, SOLUTION INTRAVENOUS CONTINUOUS PRN
Status: DISCONTINUED | OUTPATIENT
Start: 2023-03-22 | End: 2023-03-22 | Stop reason: SURG

## 2023-03-22 RX ORDER — LABETALOL HYDROCHLORIDE 5 MG/ML
5 INJECTION, SOLUTION INTRAVENOUS
Status: DISCONTINUED | OUTPATIENT
Start: 2023-03-22 | End: 2023-03-22 | Stop reason: HOSPADM

## 2023-03-22 RX ORDER — ONDANSETRON 2 MG/ML
4 INJECTION INTRAMUSCULAR; INTRAVENOUS EVERY 6 HOURS PRN
Status: DISCONTINUED | OUTPATIENT
Start: 2023-03-22 | End: 2023-03-23 | Stop reason: HOSPADM

## 2023-03-22 RX ORDER — SODIUM CHLORIDE 9 MG/ML
75 INJECTION, SOLUTION INTRAVENOUS CONTINUOUS
Status: DISCONTINUED | OUTPATIENT
Start: 2023-03-22 | End: 2023-03-23 | Stop reason: HOSPADM

## 2023-03-22 RX ORDER — PROPOFOL 10 MG/ML
VIAL (ML) INTRAVENOUS AS NEEDED
Status: DISCONTINUED | OUTPATIENT
Start: 2023-03-22 | End: 2023-03-22 | Stop reason: SURG

## 2023-03-22 RX ORDER — HEPARIN SODIUM 1000 [USP'U]/ML
INJECTION, SOLUTION INTRAVENOUS; SUBCUTANEOUS
Status: DISCONTINUED | OUTPATIENT
Start: 2023-03-22 | End: 2023-03-22 | Stop reason: HOSPADM

## 2023-03-22 RX ORDER — HYDRALAZINE HYDROCHLORIDE 20 MG/ML
5 INJECTION INTRAMUSCULAR; INTRAVENOUS
Status: DISCONTINUED | OUTPATIENT
Start: 2023-03-22 | End: 2023-03-22 | Stop reason: HOSPADM

## 2023-03-22 RX ORDER — HYDROCODONE BITARTRATE AND ACETAMINOPHEN 7.5; 325 MG/1; MG/1
1 TABLET ORAL EVERY 4 HOURS PRN
Status: DISCONTINUED | OUTPATIENT
Start: 2023-03-22 | End: 2023-03-22 | Stop reason: HOSPADM

## 2023-03-22 RX ORDER — DROPERIDOL 2.5 MG/ML
0.62 INJECTION, SOLUTION INTRAMUSCULAR; INTRAVENOUS
Status: DISCONTINUED | OUTPATIENT
Start: 2023-03-22 | End: 2023-03-22 | Stop reason: HOSPADM

## 2023-03-22 RX ORDER — FLUMAZENIL 0.1 MG/ML
0.2 INJECTION INTRAVENOUS AS NEEDED
Status: DISCONTINUED | OUTPATIENT
Start: 2023-03-22 | End: 2023-03-22 | Stop reason: HOSPADM

## 2023-03-22 RX ORDER — PANTOPRAZOLE SODIUM 40 MG/1
40 TABLET, DELAYED RELEASE ORAL
Status: DISCONTINUED | OUTPATIENT
Start: 2023-03-23 | End: 2023-03-23 | Stop reason: HOSPADM

## 2023-03-22 RX ORDER — PROMETHAZINE HYDROCHLORIDE 25 MG/1
25 TABLET ORAL ONCE AS NEEDED
Status: DISCONTINUED | OUTPATIENT
Start: 2023-03-22 | End: 2023-03-22 | Stop reason: HOSPADM

## 2023-03-22 RX ORDER — DEXAMETHASONE SODIUM PHOSPHATE 4 MG/ML
INJECTION, SOLUTION INTRA-ARTICULAR; INTRALESIONAL; INTRAMUSCULAR; INTRAVENOUS; SOFT TISSUE AS NEEDED
Status: DISCONTINUED | OUTPATIENT
Start: 2023-03-22 | End: 2023-03-22 | Stop reason: SURG

## 2023-03-22 RX ORDER — EPHEDRINE SULFATE 50 MG/ML
5 INJECTION, SOLUTION INTRAVENOUS ONCE AS NEEDED
Status: DISCONTINUED | OUTPATIENT
Start: 2023-03-22 | End: 2023-03-22 | Stop reason: HOSPADM

## 2023-03-22 RX ORDER — ONDANSETRON 2 MG/ML
INJECTION INTRAMUSCULAR; INTRAVENOUS AS NEEDED
Status: DISCONTINUED | OUTPATIENT
Start: 2023-03-22 | End: 2023-03-22 | Stop reason: SURG

## 2023-03-22 RX ORDER — GLYCOPYRROLATE 0.2 MG/ML
INJECTION INTRAMUSCULAR; INTRAVENOUS AS NEEDED
Status: DISCONTINUED | OUTPATIENT
Start: 2023-03-22 | End: 2023-03-22 | Stop reason: SURG

## 2023-03-22 RX ORDER — ATORVASTATIN CALCIUM 80 MG/1
80 TABLET, FILM COATED ORAL DAILY
Status: DISCONTINUED | OUTPATIENT
Start: 2023-03-22 | End: 2023-03-23 | Stop reason: HOSPADM

## 2023-03-22 RX ORDER — ACETAMINOPHEN 325 MG/1
650 TABLET ORAL EVERY 4 HOURS PRN
Status: DISCONTINUED | OUTPATIENT
Start: 2023-03-22 | End: 2023-03-23 | Stop reason: HOSPADM

## 2023-03-22 RX ORDER — LIDOCAINE HYDROCHLORIDE 20 MG/ML
INJECTION, SOLUTION INFILTRATION; PERINEURAL AS NEEDED
Status: DISCONTINUED | OUTPATIENT
Start: 2023-03-22 | End: 2023-03-22 | Stop reason: SURG

## 2023-03-22 RX ORDER — SODIUM CHLORIDE 0.9 % (FLUSH) 0.9 %
10 SYRINGE (ML) INJECTION EVERY 12 HOURS SCHEDULED
Status: DISCONTINUED | OUTPATIENT
Start: 2023-03-22 | End: 2023-03-22 | Stop reason: HOSPADM

## 2023-03-22 RX ORDER — IPRATROPIUM BROMIDE AND ALBUTEROL SULFATE 2.5; .5 MG/3ML; MG/3ML
3 SOLUTION RESPIRATORY (INHALATION) ONCE AS NEEDED
Status: DISCONTINUED | OUTPATIENT
Start: 2023-03-22 | End: 2023-03-22 | Stop reason: HOSPADM

## 2023-03-22 RX ORDER — HEPARIN SODIUM 1000 [USP'U]/ML
INJECTION, SOLUTION INTRAVENOUS; SUBCUTANEOUS AS NEEDED
Status: DISCONTINUED | OUTPATIENT
Start: 2023-03-22 | End: 2023-03-22 | Stop reason: SURG

## 2023-03-22 RX ORDER — ONDANSETRON 2 MG/ML
4 INJECTION INTRAMUSCULAR; INTRAVENOUS ONCE AS NEEDED
Status: DISCONTINUED | OUTPATIENT
Start: 2023-03-22 | End: 2023-03-22 | Stop reason: HOSPADM

## 2023-03-22 RX ORDER — ONDANSETRON 4 MG/1
4 TABLET, FILM COATED ORAL EVERY 6 HOURS PRN
Status: DISCONTINUED | OUTPATIENT
Start: 2023-03-22 | End: 2023-03-23 | Stop reason: HOSPADM

## 2023-03-22 RX ORDER — HYDROCODONE BITARTRATE AND ACETAMINOPHEN 5; 325 MG/1; MG/1
1 TABLET ORAL ONCE AS NEEDED
Status: DISCONTINUED | OUTPATIENT
Start: 2023-03-22 | End: 2023-03-22 | Stop reason: HOSPADM

## 2023-03-22 RX ORDER — FENTANYL CITRATE 50 UG/ML
INJECTION, SOLUTION INTRAMUSCULAR; INTRAVENOUS AS NEEDED
Status: DISCONTINUED | OUTPATIENT
Start: 2023-03-22 | End: 2023-03-22 | Stop reason: SURG

## 2023-03-22 RX ORDER — SODIUM CHLORIDE 9 MG/ML
100 INJECTION, SOLUTION INTRAVENOUS CONTINUOUS
Status: ACTIVE | OUTPATIENT
Start: 2023-03-22 | End: 2023-03-22

## 2023-03-22 RX ORDER — NALOXONE HCL 0.4 MG/ML
0.2 VIAL (ML) INJECTION AS NEEDED
Status: DISCONTINUED | OUTPATIENT
Start: 2023-03-22 | End: 2023-03-22 | Stop reason: HOSPADM

## 2023-03-22 RX ORDER — PROMETHAZINE HYDROCHLORIDE 25 MG/1
25 SUPPOSITORY RECTAL ONCE AS NEEDED
Status: DISCONTINUED | OUTPATIENT
Start: 2023-03-22 | End: 2023-03-22 | Stop reason: HOSPADM

## 2023-03-22 RX ORDER — FENTANYL CITRATE 50 UG/ML
25 INJECTION, SOLUTION INTRAMUSCULAR; INTRAVENOUS
Status: DISCONTINUED | OUTPATIENT
Start: 2023-03-22 | End: 2023-03-22 | Stop reason: HOSPADM

## 2023-03-22 RX ORDER — DIPHENHYDRAMINE HYDROCHLORIDE 50 MG/ML
12.5 INJECTION INTRAMUSCULAR; INTRAVENOUS
Status: DISCONTINUED | OUTPATIENT
Start: 2023-03-22 | End: 2023-03-22 | Stop reason: HOSPADM

## 2023-03-22 RX ORDER — HYDROCODONE BITARTRATE AND ACETAMINOPHEN 5; 325 MG/1; MG/1
1 TABLET ORAL EVERY 4 HOURS PRN
Status: DISCONTINUED | OUTPATIENT
Start: 2023-03-22 | End: 2023-03-23 | Stop reason: HOSPADM

## 2023-03-22 RX ORDER — METOPROLOL SUCCINATE 25 MG/1
25 TABLET, EXTENDED RELEASE ORAL DAILY
Status: DISCONTINUED | OUTPATIENT
Start: 2023-03-23 | End: 2023-03-23 | Stop reason: HOSPADM

## 2023-03-22 RX ORDER — ROCURONIUM BROMIDE 10 MG/ML
INJECTION, SOLUTION INTRAVENOUS AS NEEDED
Status: DISCONTINUED | OUTPATIENT
Start: 2023-03-22 | End: 2023-03-22 | Stop reason: SURG

## 2023-03-22 RX ORDER — ASPIRIN 81 MG/1
81 TABLET, CHEWABLE ORAL DAILY
Status: DISCONTINUED | OUTPATIENT
Start: 2023-03-23 | End: 2023-03-23 | Stop reason: HOSPADM

## 2023-03-22 RX ADMIN — APIXABAN 5 MG: 5 TABLET, FILM COATED ORAL at 20:15

## 2023-03-22 RX ADMIN — NOREPINEPHRINE BITARTRATE 0.02 MCG/KG/MIN: 1 INJECTION, SOLUTION, CONCENTRATE INTRAVENOUS at 08:24

## 2023-03-22 RX ADMIN — SUGAMMADEX 200 MG: 100 INJECTION, SOLUTION INTRAVENOUS at 09:26

## 2023-03-22 RX ADMIN — ROCURONIUM BROMIDE 90 MG: 50 INJECTION INTRAVENOUS at 08:18

## 2023-03-22 RX ADMIN — SODIUM CHLORIDE 75 ML/HR: 9 INJECTION, SOLUTION INTRAVENOUS at 07:17

## 2023-03-22 RX ADMIN — SODIUM CHLORIDE, POTASSIUM CHLORIDE, SODIUM LACTATE AND CALCIUM CHLORIDE: 600; 310; 30; 20 INJECTION, SOLUTION INTRAVENOUS at 08:18

## 2023-03-22 RX ADMIN — DEXAMETHASONE SODIUM PHOSPHATE 8 MG: 4 INJECTION, SOLUTION INTRAMUSCULAR; INTRAVENOUS at 08:25

## 2023-03-22 RX ADMIN — GLYCOPYRROLATE 0.2 MG: 0.2 INJECTION INTRAMUSCULAR; INTRAVENOUS at 08:45

## 2023-03-22 RX ADMIN — ONDANSETRON 4 MG: 2 INJECTION INTRAMUSCULAR; INTRAVENOUS at 09:24

## 2023-03-22 RX ADMIN — LIDOCAINE HYDROCHLORIDE 100 MG: 20 INJECTION, SOLUTION INFILTRATION; PERINEURAL at 08:18

## 2023-03-22 RX ADMIN — ATORVASTATIN CALCIUM 80 MG: 80 TABLET, FILM COATED ORAL at 20:15

## 2023-03-22 RX ADMIN — SUGAMMADEX 200 MG: 100 INJECTION, SOLUTION INTRAVENOUS at 09:34

## 2023-03-22 RX ADMIN — HEPARIN SODIUM 15000 UNITS: 1000 INJECTION INTRAVENOUS; SUBCUTANEOUS at 08:49

## 2023-03-22 RX ADMIN — FENTANYL CITRATE 50 MCG: 50 INJECTION, SOLUTION INTRAMUSCULAR; INTRAVENOUS at 08:18

## 2023-03-22 RX ADMIN — PROPOFOL 120 MG: 10 INJECTION, EMULSION INTRAVENOUS at 08:18

## 2023-03-22 RX ADMIN — SODIUM CHLORIDE 100 ML/HR: 9 INJECTION, SOLUTION INTRAVENOUS at 12:31

## 2023-03-22 NOTE — ANESTHESIA PREPROCEDURE EVALUATION
Anesthesia Evaluation     NPO Solid Status: > 8 hours  NPO Liquid Status: > 2 hours           Airway   Mallampati: II  TM distance: >3 FB  Neck ROM: full  no difficulty expected  Dental - normal exam     Pulmonary - normal exam   (+) pneumonia , shortness of breath,   Cardiovascular - normal exam    (+) hypertension, CAD, dysrhythmias Atrial Fib, orthopnea, hyperlipidemia,     ROS comment: ? Saline test results are negative.  ? Estimated left ventricular EF = 60% Left ventricular systolic function is normal.  ? The right ventricular cavity is borderline dilated.  ? Mildly reduced right ventricular systolic function noted.  ? Estimated right ventricular systolic pressure from tricuspid regurgitation is normal (<35 mmHg).  ? There is spontaneous echo contrast present in the atrial body and in the atrial appendage.         Neuro/Psych  (+) numbness,    GI/Hepatic/Renal/Endo    (+) obesity, morbid obesity, hiatal hernia, GERD well controlled,      Musculoskeletal     Abdominal  - normal exam   Substance History      OB/GYN          Other   arthritis,    history of cancer                      Anesthesia Plan    ASA 3     general     intravenous induction     Anesthetic plan, risks, benefits, and alternatives have been provided, discussed and informed consent has been obtained with: patient.        CODE STATUS:

## 2023-03-22 NOTE — ANESTHESIA POSTPROCEDURE EVALUATION
"Patient: Junito Sorto    Procedure Summary     Date: 03/22/23 Room / Location: [facility]    Anesthesia Start: 0800 Anesthesia Stop: 0946    Procedure: Atrial Appendage Occlusion Diagnosis:       Permanent atrial fibrillation (HCC)      Acute ischemic left PCA stroke (HCC)    Providers: Tony Ceballos MD Provider: Aisha Cox MD    Anesthesia Type: general ASA Status: 3          Anesthesia Type: general    Vitals  Vitals Value Taken Time   /87 03/22/23 1045   Temp 36.5 °C (97.7 °F) 03/22/23 0955   Pulse 60 03/22/23 1057   Resp 16 03/22/23 1045   SpO2 97 % 03/22/23 1057   Vitals shown include unvalidated device data.        Post Anesthesia Care and Evaluation    Patient location during evaluation: bedside  Patient participation: complete - patient participated  Level of consciousness: awake  Pain management: adequate    Airway patency: patent  Anesthetic complications: No anesthetic complications    Cardiovascular status: acceptable  Respiratory status: acceptable  Hydration status: acceptable    Comments: /87   Pulse 59   Temp 36.5 °C (97.7 °F) (Oral)   Resp 16   Ht 170.2 cm (67\")   Wt 95.7 kg (211 lb)   SpO2 98%   BMI 33.05 kg/m²       "

## 2023-03-22 NOTE — H&P
[facility] History and Physical       Encounter Date:23  Patient:Junito Sorto  :1940  MRN:[id number]    Date of Admission: 3/22/2023  Date of Encounter Visit: 23  Encounter Provider: Tony Ceballos MD  Place of Service: [facility]  Patient Care Team:  Fan Schulz MD as PCP - General  Steven Cadena MD as Consulting Physician (Gastroenterology)  Steve Prater III, MD as Consulting Physician (Cardiology)      Chief Complaint: Follow-up atrial fibrillation and stroke/bleeding risk      History of Presenting Illness:      Mr. Sorto is a 82 y.o. gentleman with past medical history notable for permanent atrial fibrillation, hypertension, mild cardiomyopathy with ejection fraction in the 45% range, esophageal dysphagia status post dilation of his esophagus, and gastric reflux disease who presents for elective watchman implantation to help manage his stroke risk in the setting of his atrial fibrillation as well as frequent bleeding events while on anticoagulation.  He was evaluated in the office by myself back in July.  In general he has been doing reasonably well.  Unfortunately he did have a recurrent stroke while off of anticoagulation again due to issues with nuisance bleeding but was restarted on this recently to help prevent further strokes.  He has not had any bleeding over the last few months while on anticoagulation however he was deemed not to be a great long-term anticoagulation candidate by his primary cardiologist and based upon review of his study images we felt that he would be a good candidate for watchman implantation.      Review of Systems:  Review of Systems   Constitutional: Negative.   HENT: Negative.    Eyes: Negative.    Cardiovascular: Negative.    Respiratory: Negative.    Endocrine: Negative.    Hematologic/Lymphatic: Negative.    Skin: Negative.    Musculoskeletal: Negative.    Gastrointestinal: Negative.     Genitourinary: Negative.    Neurological: Negative.    Psychiatric/Behavioral: Negative.    Allergic/Immunologic: Negative.        Medications:  Prior to Admission medications    Medication Sig Start Date End Date Taking? Authorizing Provider   aspirin 81 MG chewable tablet Chew 1 tablet Daily.   Yes Afsaneh Hensley MD   atorvastatin (Lipitor) 80 MG tablet Take 1 tablet by mouth Daily. 11/18/22  Yes Fan Schulz MD   B Complex Vitamins (vitamin b complex) capsule capsule Take  by mouth Daily.   Yes Afsaneh Hensley MD   betamethasone dipropionate 0.05 % cream As Needed. 12/21/22  Yes Afsaneh Hensley MD   brimonidine (Alphagan P) 0.1 % solution ophthalmic solution Administer 1 drop into the left eye 3 (Three) Times a Day. 10/22/22  Yes Afsaneh Hensley MD   loratadine (CLARITIN) 10 MG tablet Take 1 tablet by mouth Daily As Needed for Allergies. 10/22/22  Yes Afsaneh Hensley MD   metoprolol succinate XL (TOPROL-XL) 25 MG 24 hr tablet Take 1 tablet by mouth Daily. 7/11/22  Yes Steve Prater III, MD   omeprazole (priLOSEC) 20 MG capsule Take 1 capsule by mouth Daily. patient taking 80mg nightly 12/7/22  Yes Fan Schulz MD   apixaban (ELIQUIS) 5 MG tablet tablet Take 1 tablet by mouth 2 (Two) Times a Day. 11/2/22   Suzy Metcalf APRN       Allergies   Allergen Reactions   • Dilaudid [Hydromorphone Hcl] Hallucinations       Past Medical History:   Diagnosis Date   • Allergic     Airborne   • Arrhythmia    • Arthritis    • At risk for obstructive sleep apnea     5   • Atrial fibrillation (HCC)    • Bladder outlet obstruction 09/11/2019   • BPH (benign prostatic hyperplasia)    • Cataract     Replaced   • Colon polyp    • Coronary artery disease Few years    Afib   • Dysphagia    • Eczema    • GERD (gastroesophageal reflux disease) 2013    Mentioned as possible   • Glaucoma Eievated eye pressure   • Hernia     Your office reported hiatal   • Hiatal hernia    • HL (hearing loss)     • Hyperlipidemia    • Hypertension    • Left ventricular dysfunction    • Low back pain    • Obesity    • Pneumonia    • Prostate cancer (HCC)    • Skin cancer    • Stroke (HCC) 10/19/2022    RESIDUAL: LOSS OF RT PERIPHERAL VISION   • Visual impairment  stroke    Reading correction       Past Surgical History:   Procedure Laterality Date   • CATARACT EXTRACTION, BILATERAL     • COLONOSCOPY  unknown   • CYSTOSCOPY TRANSURETHRAL RESECTION OF PROSTATE N/A 2019    Procedure: CYSTOSCOPY TRANSURETHRAL RESECTION OF PROSTATE;  Surgeon: Chip Bedolla MD;  Location: [facility] MAIN OR;  Service: Urology   • ENDOSCOPY N/A 10/31/2018    LA Grade C reflux esophagitis, HH, erythematous mucosa in the antrum. Path: Gastritis, esophagitis.    • ENDOSCOPY N/A 2022    Procedure: ESOPHAGOGASTRODUODENOSCOPY WITH BIOPSIES AND NAILS DILATION #56;  Surgeon: Tony Ramos MD;  Location: [facility] ENDOSCOPY;  Service: Gastroenterology;  Laterality: N/A;  PRE OP - DYSPHAGIA  POST OP- MILD GASTRITIS   • EPIDURAL BLOCK     • ESOPHAGEAL DILATATION     • EYE SURGERY      Cataracts   • PROSTATE SURGERY  2014   • SKIN CANCER EXCISION      multiple, over that last few years   • SKIN CANCER EXCISION Right 2019    FACE,    • SKIN CANCER EXCISION         • TONSILLECTOMY      around 1950   • TRANSURETHRAL RESECTION OF BLADDER N/A    • UPPER GASTROINTESTINAL ENDOSCOPY     • VASECTOMY         Social History     Socioeconomic History   • Marital status:    Tobacco Use   • Smoking status: Former     Types: Pipe     Start date: 1959     Quit date: 1961     Years since quittin.4   • Smokeless tobacco: Never   • Tobacco comments:     Limited by allergies   Vaping Use   • Vaping Use: Never used   Substance and Sexual Activity   • Alcohol use: Yes     Alcohol/week: 5.0 standard drinks     Types: 5 Glasses of wine per week     Comment: Less than 4 oz /wk since this prob started.   • Drug  use: No   • Sexual activity: Not Currently     Partners: Female     Birth control/protection: Surgical, None, Vasectomy     Comment: 50 years ago       Family History   Problem Relation Age of Onset   • Cancer Mother         pancreatic   • Liver disease Mother    • Arthritis Mother    • Cancer Father         prostate, bladder, colon, lymphoma, leukemia   • Colon cancer Father    • Arrhythmia Father         Afib   • Heart disease Father    • Cancer Brother         prostate   • Arrhythmia Brother         Afib   • Hearing loss Brother    • Cancer Paternal Grandfather         prostate   • Arrhythmia Paternal Aunt         Irregular beat . Before the time of illness sharing.   • Arthritis Sister    • Cancer Son         Melanoma   • Other Son         Melanoma   • Cancer Son         None   • Heart disease Son         Afib   • Malig Hyperthermia Neg Hx          The following portions of the patient's history were reviewed and updated as appropriate: allergies, current medications, past family history, past medical history, past social history, past surgical history and problem list.         Objective:      Temp:  [97.3 °F (36.3 °C)] 97.3 °F (36.3 °C)  Heart Rate:  [64] 64  Resp:  [18] 18  BP: (157)/(101) 157/101   No intake or output data in the 24 hours ending 03/22/23 0710  Body mass index is 33.05 kg/m².      03/22/23  0703   Weight: 95.7 kg (211 lb)           Physical Exam:  Constitutional: Well appearing, well developed, no acute distress   HENT: Oropharynx clear and membrane moist  Eyes: Normal conjunctiva, no sclera icterus.  Neck: Supple, no carotid bruit bilaterally.  Cardiovascular: Irregularly irregular rate and rhythm, No Murmur, No bilateral lower extremity edema.  Pulmonary: Normal respiratory effort, normal lung sounds, no wheezing.  Abdominal: Soft, nontender, no hepatosplenomegaly, liver is non-pulsatile.  Neurological: Alert and orient x 3.   Skin: Warm, dry, no ecchymosis, no rash.  Psych: Appropriate mood  and affect. Normal judgment and insight.        Lab Review:   Results from last 7 days   Lab Units 03/15/23  1154   SODIUM mmol/L 142   POTASSIUM mmol/L 4.6   CHLORIDE mmol/L 104   CO2 mmol/L 31.0*   BUN mg/dL 14   CREATININE mg/dL 0.66*   GLUCOSE mg/dL 103*   CALCIUM mg/dL 9.6         Results from last 7 days   Lab Units 03/15/23  1154   WBC 10*3/mm3 5.79   HEMOGLOBIN g/dL 14.3   HEMATOCRIT % 44.2   PLATELETS 10*3/mm3 181     Results from last 7 days   Lab Units 03/15/23  1154   INR  1.28*               Invalid input(s): LDLCALC  The ASCVD Risk score (Diane DOWELL, et al., 2019) failed to calculate for the following reasons:    The 2019 ASCVD risk score is only valid for ages 40 to 79    The patient has a prior MI or stroke diagnosis                   Assessment:           Permanent atrial fibrillation (HCC)    Acute ischemic left PCA stroke (HCC)         Plan:       Mr. Sorto is a 82 y.o. gentleman with past medical history notable for permanent atrial fibrillation, hypertension, mild cardiomyopathy with ejection fraction in the 45% range, esophageal dysphagia status post dilation of his esophagus, and gastric reflux disease who presents for elective watchman implantation to help manage his stroke risk in the setting of his atrial fibrillation as well as frequent bleeding events while on anticoagulation.  .        Permanent atrial fibrillation:  · Patient currently has been on anticoagulation but deemed not to be a great long-term anticoagulation candidate thus presents for watchman implantation today.               Tony Ceballos MD  [facility]  03/22/23  07:10 EDT

## 2023-03-22 NOTE — ADDENDUM NOTE
Addendum  created 03/22/23 1228 by Aisha Cox MD    Clinical Note Signed, Intraprocedure Blocks edited, SmartForm saved

## 2023-03-22 NOTE — ANESTHESIA PROCEDURE NOTES
Diagnostic IntraOp Anesthesia Santi    Procedure Performed: Diagnostic IntraOp Anesthesia Santi       Start Time:  3/22/2023 8:36 AM       End Time:   3/22/2023 9:17 AM    Preanesthesia Checklist:  Patient identified, IV assessed, risks and benefits discussed, monitors and equipment assessed, procedure being performed at surgeon's request and anesthesia consent obtained.    General Procedure Information  Diagnostic Indications for Echo:  assessment of surgical repair, defect repair evaluation and hemodynamic monitoring  Physician Requesting Echo: Tony Ceballos MD  ICD Code for Medical Necessity:  Non Rheumatic MR  Location performed:  Cath lab  Intubated  Bite block placed  Heart visualized  Probe Insertion:  Easy  Probe Type:  Multiplane  Modalities:  2D only, color flow mapping, continuous wave Doppler and pulse wave Doppler    Echocardiographic and Doppler Measurements    Ventricles    Right Ventricle:  Cavity size dilated.  Global function normal.    Left Ventricle:  Cavity size normal.  Diastolic dimension 5.5 cm.  Global Function mildly impaired.  Ejection Fraction 50%.          Valves    Aortic Valve:  Stenosis not present.  Regurgitation trace.  Leaflets normal.      Mitral Valve:  Annulus dilated.  Regurgitation trace.      Tricuspid Valve:  Annulus normal.  Regurgitation trace.    Pulmonic Valve:  Annulus normal.  Regurgitation mild.        Aorta    Ascending Aorta:  Size normal.  Diameter 3.8 cm.  Dissection not present.  Plaque thickness less than 3 mm.  Mobile plaque not present.    Aortic Arch:  Size normal.  Dissection not present.  Plaque thickness less than 3 mm.  Mobile plaque not present.    Descending Aorta:  Size normal.  Dissection not present.  Plaque thickness less than 3 mm.  Mobile plaque not present.        Atria    Right Atrium:  Size normal.  Spontaneous echo contrast not present.  Thrombus not present.  Tumor not present.  Device not present.      Left Atrium:  Size dilated.   Spontaneous echo contrast present.  Thrombus not present.  Tumor not present.  Device present.    Left atrial appendage normal.      Septa        Ventricular Septum:  Intra-ventricular septum morphology normal.          Other Findings  Pericardium:  normal  Pleural Effusion:  none  Pulmonary Arteries:  normal  Pulmonary Venous Flow:  blunted (decreased) systolic flow    Anesthesia Information  Performed Personally  Anesthesiologist:  Aisha Cox MD      Echocardiogram Comments:       MIKEY appendage measured at the direction of cardiology and Watchman device representative.   Baseline MIKEY without clot, although prominent pectinate muscles, no pericardial effusion at baseline.   Rest of report as above.   Post MIKEY closure with Watchman 24mm Device, well seated device without obvious sharon device leak, with adequate compression.   No pericardial effusion noted post manipulation of the heart and deployment.

## 2023-03-22 NOTE — ANESTHESIA PROCEDURE NOTES
Airway  Urgency: elective    Date/Time: 3/22/2023 8:21 AM  Airway not difficult    General Information and Staff    Patient location during procedure: OR  Anesthesiologist: Aisha Cox MD    Indications and Patient Condition  Indications for airway management: airway protection    Preoxygenated: yes  MILS maintained throughout  Mask difficulty assessment: 1 - vent by mask    Final Airway Details  Final airway type: endotracheal airway      Successful airway: ETT  Cuffed: yes   Successful intubation technique: direct laryngoscopy  Endotracheal tube insertion site: oral  Blade: Kristine  Blade size: 3  ETT size (mm): 7.5  Cormack-Lehane Classification: grade I - full view of glottis  Placement verified by: chest auscultation and capnometry   Measured from: teeth  Number of attempts at approach: 1  Assessment: lips, teeth, and gum same as pre-op and atraumatic intubation    Additional Comments  Chip to upper bridge unchanged

## 2023-03-23 ENCOUNTER — APPOINTMENT (OUTPATIENT)
Dept: CARDIOLOGY | Facility: HOSPITAL | Age: 83
DRG: 274 | End: 2023-03-23
Payer: MEDICARE

## 2023-03-23 ENCOUNTER — READMISSION MANAGEMENT (OUTPATIENT)
Dept: CALL CENTER | Facility: HOSPITAL | Age: 83
End: 2023-03-23
Payer: MEDICARE

## 2023-03-23 VITALS
DIASTOLIC BLOOD PRESSURE: 94 MMHG | SYSTOLIC BLOOD PRESSURE: 153 MMHG | HEART RATE: 86 BPM | TEMPERATURE: 97.8 F | WEIGHT: 211 LBS | BODY MASS INDEX: 33.12 KG/M2 | RESPIRATION RATE: 16 BRPM | OXYGEN SATURATION: 95 % | HEIGHT: 67 IN

## 2023-03-23 DIAGNOSIS — I48.20 CHRONIC A-FIB: ICD-10-CM

## 2023-03-23 DIAGNOSIS — Q20.8 ABNORMALITY OF LEFT ATRIAL APPENDAGE: Primary | ICD-10-CM

## 2023-03-23 LAB
ANION GAP SERPL CALCULATED.3IONS-SCNC: 10 MMOL/L (ref 5–15)
BH CV ECHO MEAS - RAP SYSTOLE: 3 MMHG
BH CV VAS BP RIGHT ARM: NORMAL MMHG
BUN SERPL-MCNC: 14 MG/DL (ref 8–23)
BUN/CREAT SERPL: 20.9 (ref 7–25)
CALCIUM SPEC-SCNC: 9.2 MG/DL (ref 8.6–10.5)
CHLORIDE SERPL-SCNC: 105 MMOL/L (ref 98–107)
CO2 SERPL-SCNC: 25 MMOL/L (ref 22–29)
CREAT SERPL-MCNC: 0.67 MG/DL (ref 0.76–1.27)
DEPRECATED RDW RBC AUTO: 41.6 FL (ref 37–54)
EGFRCR SERPLBLD CKD-EPI 2021: 93.2 ML/MIN/1.73
ERYTHROCYTE [DISTWIDTH] IN BLOOD BY AUTOMATED COUNT: 12.8 % (ref 12.3–15.4)
GLUCOSE SERPL-MCNC: 135 MG/DL (ref 65–99)
HCT VFR BLD AUTO: 43.2 % (ref 37.5–51)
HGB BLD-MCNC: 14.2 G/DL (ref 13–17.7)
MAXIMAL PREDICTED HEART RATE: 138 BPM
MCH RBC QN AUTO: 29.3 PG (ref 26.6–33)
MCHC RBC AUTO-ENTMCNC: 32.9 G/DL (ref 31.5–35.7)
MCV RBC AUTO: 89.1 FL (ref 79–97)
PLATELET # BLD AUTO: 190 10*3/MM3 (ref 140–450)
PMV BLD AUTO: 11.2 FL (ref 6–12)
POTASSIUM SERPL-SCNC: 3.9 MMOL/L (ref 3.5–5.2)
QT INTERVAL: 394 MS
RBC # BLD AUTO: 4.85 10*6/MM3 (ref 4.14–5.8)
SODIUM SERPL-SCNC: 140 MMOL/L (ref 136–145)
STRESS TARGET HR: 117 BPM
WBC NRBC COR # BLD: 8.87 10*3/MM3 (ref 3.4–10.8)

## 2023-03-23 PROCEDURE — 93010 ELECTROCARDIOGRAM REPORT: CPT | Performed by: INTERNAL MEDICINE

## 2023-03-23 PROCEDURE — 85027 COMPLETE CBC AUTOMATED: CPT | Performed by: INTERNAL MEDICINE

## 2023-03-23 PROCEDURE — 80048 BASIC METABOLIC PNL TOTAL CA: CPT | Performed by: INTERNAL MEDICINE

## 2023-03-23 PROCEDURE — 93325 DOPPLER ECHO COLOR FLOW MAPG: CPT | Performed by: INTERNAL MEDICINE

## 2023-03-23 PROCEDURE — 99239 HOSP IP/OBS DSCHRG MGMT >30: CPT | Performed by: INTERNAL MEDICINE

## 2023-03-23 PROCEDURE — 93005 ELECTROCARDIOGRAM TRACING: CPT | Performed by: INTERNAL MEDICINE

## 2023-03-23 PROCEDURE — 93325 DOPPLER ECHO COLOR FLOW MAPG: CPT

## 2023-03-23 PROCEDURE — 93308 TTE F-UP OR LMTD: CPT

## 2023-03-23 PROCEDURE — 93308 TTE F-UP OR LMTD: CPT | Performed by: INTERNAL MEDICINE

## 2023-03-23 RX ADMIN — ASPIRIN 81 MG: 81 TABLET, CHEWABLE ORAL at 08:53

## 2023-03-23 RX ADMIN — APIXABAN 5 MG: 5 TABLET, FILM COATED ORAL at 08:53

## 2023-03-23 RX ADMIN — PANTOPRAZOLE SODIUM 40 MG: 40 TABLET, DELAYED RELEASE ORAL at 05:55

## 2023-03-23 RX ADMIN — METOPROLOL SUCCINATE 25 MG: 25 TABLET, EXTENDED RELEASE ORAL at 08:53

## 2023-03-23 NOTE — OUTREACH NOTE
Prep Survey    Flowsheet Row Responses   [facility] facility patient discharged from? [place]   Is LACE score < 7 ? No   Eligibility [facility]   Date of Admission 03/22/23   Date of Discharge 03/23/23   Discharge Disposition Home or Self Care   Discharge diagnosis Permanent atrial fibrillation Acute ischemic left PCA stroke    Does the patient have one of the following disease processes/diagnoses(primary or secondary)? Stroke   Does the patient have Home health ordered? No   Is there a DME ordered? No   Prep survey completed? Yes          FRANDY SMITH - Registered Nurse

## 2023-03-23 NOTE — DISCHARGE SUMMARY
[facility] Discharge Summary      Patient Name: Junito Sorto  Age/Sex: 82 y.o. male  : 1940  MRN: [id number]    Encounter Provider: Tony Ceballos MD  Referring Provider: Tony Ceballos MD  Place of Service: [facility] CARDIOLOGY  Patient Care Team:  Fan Schulz MD as PCP - General  Steven Cadena MD as Consulting Physician (Gastroenterology)  Steve Prater III, MD as Consulting Physician (Cardiology)         Date of Discharge:  3/23/2023     Date of Admit: 3/22/2023      Discharge Diagnosis:   Permanent atrial fibrillation (HCC)    Acute ischemic left PCA stroke (HCC)        Hospital Course    Mr. Sorto is a 82 y.o. gentleman with past medical history notable for permanent atrial fibrillation, history of stroke, hypertension, mild ischemic cardiomyopathy with ejection fraction in the 45% range, esophageal dysphagia status post dilation of his esophagus, and gastric reflux disease who presents for elective watchman implantation to help manage his stroke risk in the setting of his atrial fibrillation as well as frequent bleeding events while on anticoagulation in the past causing him to decline use of anticoagulation.  He has been on anticoagulation since his recurrent stroke came in for his Watchman procedure and underwent implantation of a 24 mm Watchman device on 3/22/2023.  His procedure was uncomplicated.  Follow-up limited echocardiogram today demonstrates no significant pericardial effusion and overall patient access site also looks well-healed.  We will get him discharged home today on his anticoagulation and follow-up in the office in a week or 2 to check access site.  We will set up a 45-day transesophageal echocardiogram and office visit as part of his follow-up to decide on further anticoagulation management.           Physical Examination:   Constitutional: Well appearing, well developed, no acute distress   HENT:  Oropharynx clear and membrane moist  Eyes: Normal conjunctiva, no sclera icterus.  Neck: Supple, no carotid bruit bilaterally.  Cardiovascular: Irregularly irregular rate and rhythm, No Murmur, No bilateral lower extremity edema.  Pulmonary: Normal respiratory effort, normal lung sounds, no wheezing.  Abdominal: Soft, nontender, no hepatosplenomegaly, liver is non-pulsatile.  Neurological: Alert and orient x 3.   Skin: Warm, dry, no ecchymosis, no rash.  Psych: Appropriate mood and affect. Normal judgment and insight.    Procedures Performed:  Procedure(s):  Atrial Appendage Occlusion         Consults:  Consults     No orders found for last 30 day(s).          Pertinent Test Results:    Results from last 7 days   Lab Units 03/23/23  0403   SODIUM mmol/L 140   POTASSIUM mmol/L 3.9   CHLORIDE mmol/L 105   CO2 mmol/L 25.0   BUN mg/dL 14   CREATININE mg/dL 0.67*   GLUCOSE mg/dL 135*   CALCIUM mg/dL 9.2           Results from last 7 days   Lab Units 03/23/23  0403   WBC 10*3/mm3 8.87   HEMOGLOBIN g/dL 14.2   HEMATOCRIT % 43.2   PLATELETS 10*3/mm3 190                               Discharge Medications     Your medication list      CONTINUE taking these medications      Instructions Last Dose Given Next Dose Due   Alphagan P 0.1 % solution ophthalmic solution  Generic drug: brimonidine      Administer 1 drop into the left eye 3 (Three) Times a Day.       apixaban 5 MG tablet tablet  Commonly known as: ELIQUIS      Take 1 tablet by mouth 2 (Two) Times a Day.       aspirin 81 MG chewable tablet      Chew 1 tablet Daily.       atorvastatin 80 MG tablet  Commonly known as: Lipitor      Take 1 tablet by mouth Daily.       betamethasone dipropionate 0.05 % cream      As Needed.       loratadine 10 MG tablet  Commonly known as: CLARITIN      Take 1 tablet by mouth Daily As Needed for Allergies.       metoprolol succinate XL 25 MG 24 hr tablet  Commonly known as: TOPROL-XL      Take 1 tablet by mouth Daily.       omeprazole 20 MG  capsule  Commonly known as: priLOSEC      Take 1 capsule by mouth Daily. patient taking 80mg nightly       vitamin b complex capsule capsule      Take  by mouth Daily.                Discharge Diet:   Dietary Orders (From admission, onward)     Start     Ordered    03/23/23 0557  NPO Diet NPO Type: Strict NPO  Diet Effective Midnight        Comments: Old order   Question:  NPO Type  Answer:  Strict NPO    03/23/23 0556                    Discharge disposition: Home    Discharge condition: Stable      Follow-up Appointments  Future Appointments   Date Time Provider Department Center   5/4/2023  2:00 PM Steven Cadena MD [facility]   5/24/2023  4:00 PM Aileen Holman PA [facility]   7/5/2023  9:00 AM [facility]   7/11/2023  9:30 AM Fan Schulz MD [facility]   8/8/2023 10:45 AM Steve Prater III, MD [facility]      Follow-up Information     Tony Ceballos MD Follow up in 1 week(s).    Specialty: Cardiology  Why: With Dr. Ceballos's nurse practitioner, Samanta Navas in 1-2 weeks  Contact information:  [address] [place] [address]  [phone]                           Test Results Pending at Discharge         Time:   I spent 32 minutes on this discharge activity which included: face-to-face encounter with the patient, reviewing the data in the system, coordination of the care with the nursing staff as well as consultants, documentation, and entering orders.          Tony Ceballos MD  [facility]  03/23/23  08:46 EDT

## 2023-03-24 ENCOUNTER — TRANSITIONAL CARE MANAGEMENT TELEPHONE ENCOUNTER (OUTPATIENT)
Dept: CALL CENTER | Facility: HOSPITAL | Age: 83
End: 2023-03-24
Payer: MEDICARE

## 2023-03-24 NOTE — OUTREACH NOTE
Call Center TCM Note    Flowsheet Row Responses   [facility] [place] [facility] discharged [facility]? [place]   Does the patient have one of the following disease processes/diagnoses(primary or secondary)? Stroke   TCM attempt successful? Yes   Call start time 1532   Call end time 1541   Meds reviewed with patient/caregiver? Yes   Is the patient having any side effects they believe may be caused by any medication additions or changes? No   Does the patient have all medications ordered at discharge? N/A   Is the patient taking all medications as directed (includes completed medication regime)? Yes   Comments PCP appt 07/11/23 which is outside of TCM guidelines. Patient requests office to call him if Dr Schulz feels that he needs earlier appt.   Does the patient have an appointment with their PCP within 7 days of discharge? No   Nursing Interventions Routed TCM call to PCP office, Patient declined scheduling/rescheduling appointment at this time   Has home health visited the patient within 72 hours of discharge? N/A   Psychosocial issues? No   ADL comments States had stroke back in October. No recent strokes. Uses a cane. States lost some sight.   Does the patient have any residual symptoms from stroke/TIA? Yes   Residual symptoms comments Continues to work with PT. Some difficulty making decisions.   Does the patient understand the diet ordered at discharge? Yes   Did the patient receive a copy of their discharge instructions? Yes   Nursing interventions Reviewed instructions with patient   What is the patient's perception of their health status since discharge? Improving   Nursing interventions Nurse provided patient education   Is the patient/caregiver able to teach back the risk factors for a stroke? High blood pressure-goal below 120/80, History of Afib   Is the patient/caregiver able to teach back signs and symptoms related to disease process for when to call PCP? Yes   Is the patient/caregiver able to teach back signs  and symptoms related to disease process for when to call 911? Yes   If the patient is a current smoker, are they able to teach back resources for cessation? Not a smoker   Is the patient/caregiver able to teach back the hierarchy of who to call/visit for symptoms/problems? PCP, Specialist, Home health nurse, Urgent Care, ED, 911 Yes   Is the patient able to teach back FAST for Stroke? B alance: Watch for sudden loss of balance, E yes: Check for vision loss, F ace: Look for an uneven smile, A rm: Check if one arm is weak, S peech: Listen for slurred speech, T gregory: Call 9-1-1 right away   TCM call completed? Yes   Wrap up additional comments Patient states is doing well. States access site clean, intact without s/s of infection. Denies any chest pain or new stroke s/s. States there was no new stroke dx upon this admission-had Watchman device implanted. Denies any needs/concerns today. TCM complete.   Call end time 1541   Would this patient benefit from a Referral to [facility] Social Work? No   Is the patient interested in additional calls from an ambulatory ?  NOTE:  applies to high risk patients requiring additional follow-up. No          Sunni Motley RN    3/24/2023, 15:42 EDT

## 2023-03-24 NOTE — OUTREACH NOTE
Call Center TCM Note    Flowsheet Row Responses   [facility] facility patient discharged from? [place]   Does the patient have one of the following disease processes/diagnoses(primary or secondary)? Stroke   TCM attempt successful? No   Unsuccessful attempts Attempt 1          Sunni Motley RN    3/24/2023, 11:54 EDT

## 2023-03-29 NOTE — PROGRESS NOTES
[facility] Follow Up Office Note     Encounter Date:23  Patient:Junito Sorto  :1940  MRN:[id number]      Chief Complaint:   Chief Complaint   Patient presents with   • Atrial Fibrillation         History of Presenting Illness:        Junito Sorto is a 82 y.o. male who is here for follow-up.  He is a patient of Dr Prater and Dr Ceballos.    Patient has past medical history that is significant for permanent atrial fibrillation, history of stroke, essential hypertension, mild cardiomyopathy with LVEF 45%, esophageal dysphagia with prior dilation, GERD. Unfortunately, patient had recent stroke 10/2022 of left PCA with peripheral vision loss.    Patient is s/p elective Watchman procedure with 24mm device on 3/22 due to risk of stroke with atrial fibrillation with frequent bleeding events causing him to decline use of anticoagulation. Patient was discharged to continue on apixaban until deemed appropriate to stop.    Patient reports he has done well since his procedure. A right radial hematoma developed prior to discharge which has improved since that time. He has noted bruising to groin without hematoma. He denies dizziness, chest pain, palpitations, HADLEY, orthopnea. He denies bleeding concerns aside from his procedural sites.    Review of Systems:  Review of Systems   Cardiovascular: Negative for chest pain, dyspnea on exertion, leg swelling, orthopnea and palpitations.   Respiratory: Negative for shortness of breath.        Current Outpatient Medications on File Prior to Visit   Medication Sig Dispense Refill   • apixaban (ELIQUIS) 5 MG tablet tablet Take 1 tablet by mouth 2 (Two) Times a Day. 180 tablet 3   • aspirin 81 MG chewable tablet Chew 1 tablet Daily.     • atorvastatin (Lipitor) 80 MG tablet Take 1 tablet by mouth Daily. 90 tablet 3   • B Complex Vitamins (vitamin b complex) capsule capsule Take  by mouth Daily.     • betamethasone dipropionate 0.05 % cream As Needed.     •  brimonidine (Alphagan P) 0.1 % solution ophthalmic solution Administer 1 drop into the left eye 3 (Three) Times a Day.     • loratadine (CLARITIN) 10 MG tablet Take 1 tablet by mouth Daily As Needed for Allergies.     • metoprolol succinate XL (TOPROL-XL) 25 MG 24 hr tablet Take 1 tablet by mouth Daily. 90 tablet 3   • omeprazole (priLOSEC) 20 MG capsule Take 1 capsule by mouth Daily. patient taking 80mg nightly 90 capsule 3     No current facility-administered medications on file prior to visit.       Allergies   Allergen Reactions   • Dilaudid [Hydromorphone Hcl] Hallucinations       Past Medical History:   Diagnosis Date   • Allergic     Airborne   • Arrhythmia    • Arthritis    • At risk for obstructive sleep apnea     5   • Atrial fibrillation (HCC)    • Bladder outlet obstruction 09/11/2019   • BPH (benign prostatic hyperplasia)    • Cataract     Replaced   • Colon polyp    • Coronary artery disease Few years    Afib   • Dysphagia    • Eczema    • GERD (gastroesophageal reflux disease) 2013    Mentioned as possible   • Glaucoma Eievated eye pressure   • Hernia     Your office reported hiatal   • Hiatal hernia    • HL (hearing loss)    • Hyperlipidemia    • Hypertension    • Left ventricular dysfunction    • Low back pain    • Obesity    • Pneumonia 2022   • Prostate cancer (HCC)    • Skin cancer    • Stroke (HCC) 10/19/2022    RESIDUAL: LOSS OF RT PERIPHERAL VISION   • Visual impairment 2022 stroke    Reading correction       Past Surgical History:   Procedure Laterality Date   • ATRIAL APPENDAGE EXCLUSION LEFT WITH TRANSESOPHAGEAL ECHOCARDIOGRAM N/A 3/22/2023    Procedure: Atrial Appendage Occlusion;  Surgeon: Tony Ceballos MD;  Location: [facility] INVASIVE LOCATION;  Service: Cardiovascular;  Laterality: N/A;   • CATARACT EXTRACTION, BILATERAL     • COLONOSCOPY  unknown   • CYSTOSCOPY TRANSURETHRAL RESECTION OF PROSTATE N/A 09/11/2019    Procedure: CYSTOSCOPY TRANSURETHRAL RESECTION OF PROSTATE;   Surgeon: Chip Bedolla MD;  Location: [facility] MAIN OR;  Service: Urology   • ENDOSCOPY N/A 10/31/2018    LA Grade C reflux esophagitis, HH, erythematous mucosa in the antrum. Path: Gastritis, esophagitis.    • ENDOSCOPY N/A 2022    Procedure: ESOPHAGOGASTRODUODENOSCOPY WITH BIOPSIES AND NAILS DILATION #56;  Surgeon: Tony Ramos MD;  Location: [facility] ENDOSCOPY;  Service: Gastroenterology;  Laterality: N/A;  PRE OP - DYSPHAGIA  POST OP- MILD GASTRITIS   • EPIDURAL BLOCK     • ESOPHAGEAL DILATATION     • EYE SURGERY      Cataracts   • PROSTATE SURGERY  2014   • SKIN CANCER EXCISION      multiple, over that last few years   • SKIN CANCER EXCISION Right 2019    FACE,    • SKIN CANCER EXCISION         • TONSILLECTOMY      around    • TRANSURETHRAL RESECTION OF BLADDER N/A    • UPPER GASTROINTESTINAL ENDOSCOPY     • VASECTOMY         Social History     Socioeconomic History   • Marital status:    Tobacco Use   • Smoking status: Former     Types: Pipe     Start date: 1959     Quit date: 1961     Years since quittin.4   • Smokeless tobacco: Never   • Tobacco comments:     Limited by allergies   Vaping Use   • Vaping Use: Never used   Substance and Sexual Activity   • Alcohol use: Yes     Alcohol/week: 5.0 standard drinks     Types: 5 Glasses of wine per week     Comment: Less than 4 oz /wk since this prob started.   • Drug use: No   • Sexual activity: Not Currently     Partners: Female     Birth control/protection: Surgical, None, Vasectomy     Comment: 50 years ago       Family History   Problem Relation Age of Onset   • Cancer Mother         pancreatic   • Liver disease Mother    • Arthritis Mother    • Cancer Father         prostate, bladder, colon, lymphoma, leukemia   • Colon cancer Father    • Arrhythmia Father         Afib   • Heart disease Father    • Cancer Brother         prostate   • Arrhythmia Brother         Afib   • Hearing loss Brother   "  • Cancer Paternal Grandfather         prostate   • Arrhythmia Paternal Aunt         Irregular beat . Before the time of illness sharing.   • Arthritis Sister    • Cancer Son         Melanoma   • Other Son         Melanoma   • Cancer Son         None   • Heart disease Son         Afib   • Malig Hyperthermia Neg Hx        The following portions of the patient's history were reviewed and updated as appropriate: allergies, current medications, past family history, past medical history, past social history, past surgical history and problem list.       Objective:       Vitals:    03/30/23 1024   BP: 130/80   Pulse: 59   Weight: 97.5 kg (215 lb)   Height: 170.2 cm (67\")         Physical Exam:  Constitutional: Alert and conversant   HENT: Oropharynx clear and membrane moist  Eyes: Normal conjunctiva, no sclera icterus  Neck: Supple, no carotid bruit bilaterally  Cardiovascular: Irregularly irregular rate and rhythm, No Murmur, No bilateral lower extremity edema  Pulmonary: Normal respiratory effort, normal lung sounds, no wheezing  Neurological: Alert and orient x 3  Skin: Warm, dry, no ecchymosis, no rash  Psych: Appropriate mood and affect. Normal judgment and insight   Right groin no hematoma, ecchymosis noted. Right radial stick site soft hematoma, intact distal perfusion.       Lab Results   Component Value Date     03/23/2023     03/15/2023    K 3.9 03/23/2023    K 4.6 03/15/2023     03/23/2023     03/15/2023    CO2 25.0 03/23/2023    CO2 31.0 (H) 03/15/2023    BUN 14 03/23/2023    BUN 14 03/15/2023    CREATININE 0.67 (L) 03/23/2023    CREATININE 0.66 (L) 03/15/2023    EGFRIFNONA 98 10/11/2021    EGFRIFNONA 114 04/01/2021    EGFRIFAFRI 119 10/11/2021    EGFRIFAFRI 138 04/01/2021    GLUCOSE 135 (H) 03/23/2023    GLUCOSE 103 (H) 03/15/2023    CALCIUM 9.2 03/23/2023    CALCIUM 9.6 03/15/2023    PROTENTOTREF 6.1 02/16/2023    PROTENTOTREF 6.2 01/09/2023    ALBUMIN 4.1 02/16/2023    ALBUMIN 3.6 " 01/09/2023    BILITOT 1.5 (H) 02/16/2023    BILITOT 1.3 (H) 10/19/2022    AST 29 02/16/2023    AST 32 10/19/2022    ALT 23 02/16/2023    ALT 24 10/19/2022     Lab Results   Component Value Date    WBC 8.87 03/23/2023    WBC 5.79 03/15/2023    HGB 14.2 03/23/2023    HGB 14.3 03/15/2023    HCT 43.2 03/23/2023    HCT 44.2 03/15/2023    MCV 89.1 03/23/2023    MCV 89.7 03/15/2023     03/23/2023     03/15/2023     Lab Results   Component Value Date    CHOL 143 10/20/2022    TRIG 47 02/16/2023    TRIG 62 10/20/2022    HDL 53 02/16/2023    HDL 60 10/20/2022    LDL 45 02/16/2023    LDL 70 10/20/2022     Lab Results   Component Value Date    PROBNP 825.0 03/17/2022    PROBNP 808.0 03/03/2022     Lab Results   Component Value Date    TROPONINT <0.010 03/17/2022     Lab Results   Component Value Date    TSH 2.130 03/05/2022           ECG 12 Lead    Date/Time: 3/30/2023 10:36 AM  Performed by: Samanta Navas APRN  Authorized by: Smaanta Navas APRN   Comparison: compared with previous ECG from 3/23/2023  Similar to previous ECG  Rhythm: atrial fibrillation  Ectopy: multifocal PVCs  Rate: normal  BPM: 59  ST Segments: ST segments normal                 Assessment:          Diagnosis Plan   1. Chronic a-fib (HCC)  ECG 12 Lead             Plan:       Permanent atrial fibrillation - s/p Watchman procedure. Groin and radial sites appropriate, soft and improving hematoma of right radial access site. Continue apixaban until deemed appropriate to stop. 45 day CHARU and follow-up scheduled    Chronic systolic heart failure -  Mild reduction in LVEF. Appears compensated on exam. Continue metoprolol succinate    History of CVA - apixaban and high dose statin    Patient is doing well since procedure. Continue apixaban. CHARU and follow-up appointment are scheduled.    Orders Placed This Encounter   Procedures   • ECG 12 Lead     This order was created via procedure documentation     Order Specific Question:   Release to  patient     Answer:   Routine Release        KOLBY De León  [facility]  03/30/23  10:57 EDT

## 2023-03-30 ENCOUNTER — OFFICE VISIT (OUTPATIENT)
Dept: CARDIOLOGY | Facility: CLINIC | Age: 83
End: 2023-03-30
Payer: MEDICARE

## 2023-03-30 VITALS
BODY MASS INDEX: 33.74 KG/M2 | WEIGHT: 215 LBS | HEART RATE: 59 BPM | DIASTOLIC BLOOD PRESSURE: 80 MMHG | SYSTOLIC BLOOD PRESSURE: 130 MMHG | HEIGHT: 67 IN

## 2023-03-30 DIAGNOSIS — I48.20 CHRONIC A-FIB: Primary | ICD-10-CM

## 2023-04-20 ENCOUNTER — LAB (OUTPATIENT)
Dept: LAB | Facility: HOSPITAL | Age: 83
End: 2023-04-20
Payer: MEDICARE

## 2023-04-20 DIAGNOSIS — Q20.8 ABNORMALITY OF LEFT ATRIAL APPENDAGE: ICD-10-CM

## 2023-04-20 LAB
ANION GAP SERPL CALCULATED.3IONS-SCNC: 6.9 MMOL/L (ref 5–15)
BUN SERPL-MCNC: 17 MG/DL (ref 8–23)
BUN/CREAT SERPL: 23.9 (ref 7–25)
CALCIUM SPEC-SCNC: 9.6 MG/DL (ref 8.6–10.5)
CHLORIDE SERPL-SCNC: 107 MMOL/L (ref 98–107)
CO2 SERPL-SCNC: 29.1 MMOL/L (ref 22–29)
CREAT SERPL-MCNC: 0.71 MG/DL (ref 0.76–1.27)
DEPRECATED RDW RBC AUTO: 43 FL (ref 37–54)
EGFRCR SERPLBLD CKD-EPI 2021: 91.6 ML/MIN/1.73
ERYTHROCYTE [DISTWIDTH] IN BLOOD BY AUTOMATED COUNT: 12.9 % (ref 12.3–15.4)
GLUCOSE SERPL-MCNC: 93 MG/DL (ref 65–99)
HCT VFR BLD AUTO: 41 % (ref 37.5–51)
HGB BLD-MCNC: 13.5 G/DL (ref 13–17.7)
MCH RBC QN AUTO: 30 PG (ref 26.6–33)
MCHC RBC AUTO-ENTMCNC: 32.9 G/DL (ref 31.5–35.7)
MCV RBC AUTO: 91.1 FL (ref 79–97)
PLATELET # BLD AUTO: 191 10*3/MM3 (ref 140–450)
PMV BLD AUTO: 10.6 FL (ref 6–12)
POTASSIUM SERPL-SCNC: 4.6 MMOL/L (ref 3.5–5.2)
RBC # BLD AUTO: 4.5 10*6/MM3 (ref 4.14–5.8)
SODIUM SERPL-SCNC: 143 MMOL/L (ref 136–145)
WBC NRBC COR # BLD: 5.94 10*3/MM3 (ref 3.4–10.8)

## 2023-04-20 PROCEDURE — 85027 COMPLETE CBC AUTOMATED: CPT

## 2023-04-20 PROCEDURE — 80048 BASIC METABOLIC PNL TOTAL CA: CPT

## 2023-04-20 PROCEDURE — 36415 COLL VENOUS BLD VENIPUNCTURE: CPT

## 2023-04-25 ENCOUNTER — HOSPITAL ENCOUNTER (OUTPATIENT)
Dept: CARDIOLOGY | Facility: HOSPITAL | Age: 83
Discharge: HOME | End: 2023-04-25
Admitting: INTERNAL MEDICINE
Payer: MEDICARE

## 2023-04-25 VITALS
HEIGHT: 67 IN | RESPIRATION RATE: 20 BRPM | BODY MASS INDEX: 33.56 KG/M2 | WEIGHT: 213.85 LBS | DIASTOLIC BLOOD PRESSURE: 95 MMHG | OXYGEN SATURATION: 94 % | HEART RATE: 70 BPM | SYSTOLIC BLOOD PRESSURE: 168 MMHG

## 2023-04-25 DIAGNOSIS — Q20.8 ABNORMALITY OF LEFT ATRIAL APPENDAGE: ICD-10-CM

## 2023-04-25 DIAGNOSIS — I48.20 CHRONIC A-FIB: ICD-10-CM

## 2023-04-25 LAB
BH CV ECHO MEAS - LVOT AREA: 3.6 CM2
BH CV ECHO MEAS - LVOT DIAM: 2.13 CM
BH CV ECHO MEAS - TR MAX VEL: 150.1 CM/SEC
BH CV ECHO SHUNT ASSESSMENT PERFORMED (HIDDEN SCRIPTING): 1
MAXIMAL PREDICTED HEART RATE: 138 BPM
SINUS: 3.2 CM
STJ: 3.2 CM
STRESS TARGET HR: 117 BPM

## 2023-04-25 PROCEDURE — A9270 NON-COVERED ITEM OR SERVICE: HCPCS | Performed by: INTERNAL MEDICINE

## 2023-04-25 PROCEDURE — 25010000002 MIDAZOLAM PER 1 MG: Performed by: INTERNAL MEDICINE

## 2023-04-25 PROCEDURE — 93312 ECHO TRANSESOPHAGEAL: CPT

## 2023-04-25 PROCEDURE — 63710000001 LIDOCAINE VISCOUS HCL 2 % SOLUTION: Performed by: INTERNAL MEDICINE

## 2023-04-25 PROCEDURE — 93320 DOPPLER ECHO COMPLETE: CPT

## 2023-04-25 PROCEDURE — 93325 DOPPLER ECHO COLOR FLOW MAPG: CPT

## 2023-04-25 RX ORDER — SODIUM CHLORIDE 9 MG/ML
INJECTION, SOLUTION INTRAVENOUS
Status: COMPLETED | OUTPATIENT
Start: 2023-04-25 | End: 2023-04-25

## 2023-04-25 RX ORDER — LIDOCAINE HYDROCHLORIDE 20 MG/ML
SOLUTION OROPHARYNGEAL
Status: COMPLETED | OUTPATIENT
Start: 2023-04-25 | End: 2023-04-25

## 2023-04-25 RX ORDER — MIDAZOLAM HYDROCHLORIDE 1 MG/ML
INJECTION INTRAMUSCULAR; INTRAVENOUS
Status: COMPLETED | OUTPATIENT
Start: 2023-04-25 | End: 2023-04-25

## 2023-04-25 RX ADMIN — SODIUM CHLORIDE 50 ML/HR: 9 INJECTION, SOLUTION INTRAVENOUS at 08:03

## 2023-04-25 RX ADMIN — MIDAZOLAM 2 MG: 1 INJECTION INTRAMUSCULAR; INTRAVENOUS at 08:10

## 2023-04-25 RX ADMIN — LIDOCAINE HYDROCHLORIDE 10 ML: 20 SOLUTION ORAL; TOPICAL at 08:03

## 2023-04-25 RX ADMIN — MIDAZOLAM 1 MG: 1 INJECTION INTRAMUSCULAR; INTRAVENOUS at 08:17

## 2023-04-25 RX ADMIN — MIDAZOLAM 2 MG: 1 INJECTION INTRAMUSCULAR; INTRAVENOUS at 08:13

## 2023-04-27 ENCOUNTER — OFFICE VISIT (OUTPATIENT)
Dept: CARDIOLOGY | Facility: CLINIC | Age: 83
End: 2023-04-27
Payer: MEDICARE

## 2023-04-27 VITALS
WEIGHT: 215.6 LBS | HEIGHT: 67 IN | BODY MASS INDEX: 33.84 KG/M2 | DIASTOLIC BLOOD PRESSURE: 70 MMHG | HEART RATE: 64 BPM | SYSTOLIC BLOOD PRESSURE: 128 MMHG

## 2023-04-27 DIAGNOSIS — I48.21 PERMANENT ATRIAL FIBRILLATION: Primary | ICD-10-CM

## 2023-04-27 NOTE — PATIENT INSTRUCTIONS
Can stay on Eliquis but if any issues with bleeding would recommend stopping Eliquis and transitioning to Clopidogrel  Follow up with Dr. Prater as scheduled   Follow up with Dr. Ceballos in 6 months

## 2023-04-27 NOTE — PROGRESS NOTES
[facility] Follow Up Office Note     Encounter Date:23  Patient:Junito Sorto  :1940  MRN:[id number]    Referring Provider: Steve Prater MD    Chief Complaint:   Chief Complaint   Patient presents with   • 1 month post watchmen     History of Presenting Illness:      Mr. Sorto is a 82 y.o. gentleman with past medical history notable for permanent atrial fibrillation, history of stroke, hypertension, mild cardiomyopathy with ejection fraction in the 45% range, esophageal dysphagia status post dilation of his esophagus, and gastric reflux disease who presents for scheduled follow-up after recent watchman implantation.  In general he is done well since his procedure.  He had his implant approximately 6 weeks ago.  This was done due to concerns of intolerance to anticoagulation and concerns for bleeding with the patient.  Since having his implant he is actually recovered nicely had no issues with the procedure itself.  He had a subsequent transesophageal echocardiogram done 2 days ago showing normal-appearing watchman implant and no evidence of leak.  He has had some falls lately in fact he fell out of bed and had a hematoma on the back of his head.      Review of Systems:  Review of Systems   Constitutional: Negative.   HENT: Negative.    Eyes: Negative.    Cardiovascular: Negative.    Respiratory: Negative.    Endocrine: Negative.    Hematologic/Lymphatic: Negative.    Skin: Negative.    Musculoskeletal: Positive for falls.   Gastrointestinal: Negative.    Genitourinary: Negative.    Neurological: Negative.    Psychiatric/Behavioral: Negative.    Allergic/Immunologic: Negative.         Current Outpatient Medications on File Prior to Visit   Medication Sig Dispense Refill   • apixaban (ELIQUIS) 5 MG tablet tablet Take 1 tablet by mouth 2 (Two) Times a Day. 180 tablet 3   • aspirin 81 MG chewable tablet Chew 1 tablet Daily.     • atorvastatin (Lipitor) 80 MG tablet Take 1 tablet  by mouth Daily. 90 tablet 3   • B Complex Vitamins (vitamin b complex) capsule capsule Take  by mouth Daily.     • betamethasone dipropionate 0.05 % cream As Needed.     • brimonidine (Alphagan P) 0.1 % solution ophthalmic solution Administer 1 drop into the left eye 3 (Three) Times a Day.     • loratadine (CLARITIN) 10 MG tablet Take 1 tablet by mouth Daily As Needed for Allergies.     • metoprolol succinate XL (TOPROL-XL) 25 MG 24 hr tablet Take 1 tablet by mouth Daily. 90 tablet 3   • omeprazole (priLOSEC) 20 MG capsule Take 1 capsule by mouth Daily. patient taking 80mg nightly 90 capsule 3     No current facility-administered medications on file prior to visit.       Allergies   Allergen Reactions   • Dilaudid [Hydromorphone Hcl] Hallucinations       Past Medical History:   Diagnosis Date   • Allergic     Airborne   • Arrhythmia    • Arthritis    • At risk for obstructive sleep apnea     5   • Atrial fibrillation    • Bladder outlet obstruction 09/11/2019   • BPH (benign prostatic hyperplasia)    • Cataract     Replaced   • Colon polyp    • Coronary artery disease Few years    Afib   • Dysphagia    • Eczema    • GERD (gastroesophageal reflux disease) 2013    Mentioned as possible   • Glaucoma Eievated eye pressure   • Hernia     Your office reported hiatal   • Hiatal hernia    • HL (hearing loss)    • Hyperlipidemia    • Hypertension    • Left ventricular dysfunction    • Low back pain    • Obesity    • Pneumonia 2022   • Prostate cancer    • Skin cancer    • Stroke 10/19/2022    RESIDUAL: LOSS OF RT PERIPHERAL VISION   • Visual impairment 2022 stroke    Reading correction       Past Surgical History:   Procedure Laterality Date   • ATRIAL APPENDAGE EXCLUSION LEFT WITH TRANSESOPHAGEAL ECHOCARDIOGRAM N/A 3/22/2023    Procedure: Atrial Appendage Occlusion;  Surgeon: Tony Ceballos MD;  Location: [facility] INVASIVE LOCATION;  Service: Cardiovascular;  Laterality: N/A;   • CATARACT EXTRACTION, BILATERAL     •  COLONOSCOPY  unknown   • CYSTOSCOPY TRANSURETHRAL RESECTION OF PROSTATE N/A 2019    Procedure: CYSTOSCOPY TRANSURETHRAL RESECTION OF PROSTATE;  Surgeon: Chip Bedolla MD;  Location: [facility] OR;  Service: Urology   • ENDOSCOPY N/A 10/31/2018    LA Grade C reflux esophagitis, HH, erythematous mucosa in the antrum. Path: Gastritis, esophagitis.    • ENDOSCOPY N/A 2022    Procedure: ESOPHAGOGASTRODUODENOSCOPY WITH BIOPSIES AND NAILS DILATION #56;  Surgeon: Tony Ramos MD;  Location: [facility] ENDOSCOPY;  Service: Gastroenterology;  Laterality: N/A;  PRE OP - DYSPHAGIA  POST OP- MILD GASTRITIS   • EPIDURAL BLOCK     • ESOPHAGEAL DILATATION     • EYE SURGERY      Cataracts   • PROSTATE SURGERY  2014   • SKIN CANCER EXCISION      multiple, over that last few years   • SKIN CANCER EXCISION Right 2019    FACE,    • SKIN CANCER EXCISION         • TONSILLECTOMY      around 1950   • TRANSURETHRAL RESECTION OF BLADDER N/A    • UPPER GASTROINTESTINAL ENDOSCOPY     • VASECTOMY         Social History     Socioeconomic History   • Marital status:    Tobacco Use   • Smoking status: Former     Types: Pipe     Start date: 1959     Quit date: 1961     Years since quittin.4   • Smokeless tobacco: Never   • Tobacco comments:     Limited by allergies   Vaping Use   • Vaping Use: Never used   Substance and Sexual Activity   • Alcohol use: Yes     Alcohol/week: 5.0 standard drinks     Types: 5 Glasses of wine per week     Comment: Less than 4 oz /wk since this prob started.   • Drug use: No   • Sexual activity: Not Currently     Partners: Female     Birth control/protection: Surgical, None, Vasectomy     Comment: 50 years ago       Family History   Problem Relation Age of Onset   • Cancer Mother         pancreatic   • Liver disease Mother    • Arthritis Mother    • Cancer Father         prostate, bladder, colon, lymphoma, leukemia   • Colon cancer Father    •  "Arrhythmia Father         Afib   • Heart disease Father    • Cancer Brother         prostate   • Arrhythmia Brother         Afib   • Hearing loss Brother    • Cancer Paternal Grandfather         prostate   • Arrhythmia Paternal Aunt         Irregular beat . Before the time of illness sharing.   • Arthritis Sister    • Cancer Son         Melanoma   • Other Son         Melanoma   • Cancer Son         None   • Heart disease Son         Afib   • Malig Hyperthermia Neg Hx        The following portions of the patient's history were reviewed and updated as appropriate: allergies, current medications, past family history, past medical history, past social history, past surgical history and problem list.       Objective:       Vitals:    04/27/23 0948   BP: 128/70   BP Location: Left arm   Patient Position: Sitting   Pulse: 64   Weight: 97.8 kg (215 lb 9.6 oz)   Height: 170 cm (66.93\")       Body mass index is 33.84 kg/m².    Physical Exam:  Constitutional: Well appearing, Well-developed, No acute distress   HENT: Oropharynx clear and membrane moist  Eyes: Normal conjunctiva, no sclera icterus.  Neck: Supple, no carotid bruit bilaterally.  Cardiovascular: Irregularly irregular rate and rhythm, No Murmur, No bilateral lower extremity edema.  Pulmonary: Normal respiratory effort, Normal lung sounds, no wheezing.  Abdominal: Soft, nontender, no hepatosplenomegaly, liver is non-pulsatile.  Neurological: Alert and orient x 3.   Skin: Warm, dry, no ecchymosis, no rash.  Psych: Appropriate mood and affect. Normal judgment and insight.      Lab Results   Component Value Date     04/20/2023     03/23/2023    K 4.6 04/20/2023    K 3.9 03/23/2023     04/20/2023     03/23/2023    CO2 29.1 (H) 04/20/2023    CO2 25.0 03/23/2023    BUN 17 04/20/2023    BUN 14 03/23/2023    CREATININE 0.71 (L) 04/20/2023    CREATININE 0.67 (L) 03/23/2023    EGFRIFNONA 98 10/11/2021    EGFRIFNONA 114 04/01/2021    EGFRIFAFRI 119 " 10/11/2021    EGFRIFAFRI 138 04/01/2021    GLUCOSE 93 04/20/2023    GLUCOSE 135 (H) 03/23/2023    CALCIUM 9.6 04/20/2023    CALCIUM 9.2 03/23/2023    PROTENTOTREF 6.1 02/16/2023    PROTENTOTREF 6.2 01/09/2023    ALBUMIN 4.1 02/16/2023    ALBUMIN 3.6 01/09/2023    BILITOT 1.5 (H) 02/16/2023    BILITOT 1.3 (H) 10/19/2022    AST 29 02/16/2023    AST 32 10/19/2022    ALT 23 02/16/2023    ALT 24 10/19/2022     Lab Results   Component Value Date    WBC 5.94 04/20/2023    WBC 8.87 03/23/2023    HGB 13.5 04/20/2023    HGB 14.2 03/23/2023    HCT 41.0 04/20/2023    HCT 43.2 03/23/2023    MCV 91.1 04/20/2023    MCV 89.1 03/23/2023     04/20/2023     03/23/2023     Lab Results   Component Value Date    CHOL 143 10/20/2022    TRIG 47 02/16/2023    TRIG 62 10/20/2022    HDL 53 02/16/2023    HDL 60 10/20/2022    LDL 45 02/16/2023    LDL 70 10/20/2022     Lab Results   Component Value Date    PROBNP 825.0 03/17/2022    PROBNP 808.0 03/03/2022     Lab Results   Component Value Date    TROPONINT <0.010 03/17/2022     Lab Results   Component Value Date    TSH 2.130 03/05/2022         ECG 12 Lead    Date/Time: 4/27/2023 12:43 PM  Performed by: Tony Ceballos MD  Authorized by: Tony Ceballos MD   Comparison: compared with previous ECG from 3/23/2023  Similar to previous ECG  Rhythm: atrial fibrillation  Conduction: left anterior fascicular block  Q waves: V1, V2 and V3              Transesophageal echocardiogram 4/25/2023 with images reviewed myself:  · There is akinesis of the inferior base  · Left ventricular systolic function is low normal. Left ventricular ejection fraction appears to be 51 - 55%.  · The right ventricular cavity is mildly dilated. Normal right ventricular systolic function noted.  · The left atrial cavity is severely dilated.  · There is a Watchman device which appears to be in good position. There is no thrombus in the proximal left atrial appendage or adjacent to the Watchman device  itself  · The right atrial cavity is moderately dilated.  · Mild mitral valve regurgitation is present  · Mild tricuspid valve regurgitation is present.  · The main pulmonary artery is mildly dilated.  · There is a trivial pericardial effusion.    Echocardiogram 3/23/2023 images reviewed by myself:  · Limited echocardiogram to assess pericardial effusion  · No Pericardial effusion is present    Watchman implantation 4/27/2023:  · Successful implantation of 24 mm Watchman device      Holter monitor 4/8/2022:  Permanent atrial fibrillation noted throughout study with an average heart rate of 60 bpm and a range of 32 bpm up to 93 bpm    Echocardiogram 3/9/2022:  · Left ventricular ejection fraction estimated approximately 46 to 50%  · Indeterminate diastolic function  · Left atrial volume is severely increased  · Right atrial volume is moderately dilated        Assessment:          Diagnosis Plan   1. Permanent atrial fibrillation  ECG 12 Lead             Plan:       Mr. Sorto is a 82 y.o. gentleman with past medical history notable for permanent atrial fibrillation, history of stroke, hypertension, mild cardiomyopathy with ejection fraction in the 45% range, esophageal dysphagia status post dilation of his esophagus, and gastric reflux disease who presents for scheduled follow-up after recent watchman implantation.  Overall he is done well with his watchman implantation.  His transesophageal echocardiogram 2 days ago shows normal device appearance.  There is no evidence of thrombus on the device or any evidence of leak around the device.  At this juncture would be perfectly acceptable and safe to transition him from Eliquis and aspirin over to clopidogrel and aspirin.  I did discuss this with the patient specially in light of his recent fall would be a reasonable consideration.  The patient himself and his wife had some concerns about coming off of the Eliquis.  I did explain to him the whole purpose of the watchman  was in order to safely do this but they would like to try and continue because outside of his falls he really has not had much of in the way of any bleeding issues.  Would like to discuss further with their primary care and primary cardiologist which I think is reasonable and again perfectly safe to make this transition does have follow-up appointments with him in a few months but I will also reach out to them as well.  For the time being we can continue with his current regimen but again I did stress the importance that if he is having more falls would be appropriate to stop the Eliquis and start clopidogrel at this juncture.    Permanent atrial fibrillation:  · [id number] score 6  · Status post watchman implantation 3/24/2023 with 24 mm device  · Currently on aspirin 81 mg and Eliquis 5 mg twice daily  · Transesophageal echocardiogram 4/25/2023 demonstrates normal device function no evidence of thrombus or leak and perfectly safe to transition over to dual antiplatelet therapy.      Follow-up:  As scheduled in 6 months otherwise follow-up with primary cardiologist as scheduled in Aug      Thank you for allowing me to participate in the care of Junito Sorto. Feel free to contact me directly with any further questions or concerns.    Tony Ceballos MD  [facility]  04/27/23  12:49 EDT

## 2023-05-04 ENCOUNTER — OFFICE VISIT (OUTPATIENT)
Dept: GASTROENTEROLOGY | Facility: CLINIC | Age: 83
End: 2023-05-04
Payer: MEDICARE

## 2023-05-04 VITALS
OXYGEN SATURATION: 93 % | HEART RATE: 59 BPM | TEMPERATURE: 96.3 F | HEIGHT: 67 IN | WEIGHT: 217.6 LBS | SYSTOLIC BLOOD PRESSURE: 152 MMHG | DIASTOLIC BLOOD PRESSURE: 79 MMHG | BODY MASS INDEX: 34.15 KG/M2

## 2023-05-04 DIAGNOSIS — K59.00 CONSTIPATION, UNSPECIFIED CONSTIPATION TYPE: Primary | ICD-10-CM

## 2023-05-04 DIAGNOSIS — K62.5 RECTAL BLEEDING: ICD-10-CM

## 2023-05-04 PROCEDURE — 1160F RVW MEDS BY RX/DR IN RCRD: CPT | Performed by: INTERNAL MEDICINE

## 2023-05-04 PROCEDURE — 99213 OFFICE O/P EST LOW 20 MIN: CPT | Performed by: INTERNAL MEDICINE

## 2023-05-04 PROCEDURE — 1159F MED LIST DOCD IN RCRD: CPT | Performed by: INTERNAL MEDICINE

## 2023-05-04 RX ORDER — HYDROCORTISONE 25 MG/G
CREAM TOPICAL 2 TIMES DAILY
Qty: 28 G | Refills: 2 | Status: SHIPPED | OUTPATIENT
Start: 2023-05-04

## 2023-05-04 NOTE — PROGRESS NOTES
Chief Complaint   Patient presents with   • Med Management   • Rectal Bleeding       Junito Sorto is a  82 y.o. male here for a follow up visit for bleeding.    HPI     82-year-old male with history of A-fib, coronary artery disease, GERD hypertension and hyperlipidemia complaining of intermittent rectal bleeding.  Patient reports cut down on water intake because of his BPH but with constipation developing from dehydration can cause blood in discomfort as he passed a hard stool.  Patient reports typically only lasts a day and that is done.  Patient with no history of anemia referred for recommendations.  Patient denies any weight loss no change in diet or appetite.    Past Medical History:   Diagnosis Date   • Allergic     Airborne   • Arrhythmia    • Arthritis    • At risk for obstructive sleep apnea     5   • Atrial fibrillation    • Bladder outlet obstruction 09/11/2019   • BPH (benign prostatic hyperplasia)    • Cataract     Replaced   • Colon polyp    • Coronary artery disease Few years    Afib   • Dysphagia    • Eczema    • GERD (gastroesophageal reflux disease) 2013    Mentioned as possible   • Glaucoma Eievated eye pressure   • Hernia     Your office reported hiatal   • Hiatal hernia    • HL (hearing loss)    • Hyperlipidemia    • Hypertension    • Left ventricular dysfunction    • Low back pain    • Obesity    • Pneumonia 2022   • Prostate cancer    • Skin cancer    • Stroke 10/19/2022    RESIDUAL: LOSS OF RT PERIPHERAL VISION   • Visual impairment 2022 stroke    Reading correction         Current Outpatient Medications:   •  apixaban (ELIQUIS) 5 MG tablet tablet, Take 1 tablet by mouth 2 (Two) Times a Day., Disp: 180 tablet, Rfl: 3  •  aspirin 81 MG chewable tablet, Chew 1 tablet Daily., Disp: , Rfl:   •  atorvastatin (Lipitor) 80 MG tablet, Take 1 tablet by mouth Daily., Disp: 90 tablet, Rfl: 3  •  B Complex Vitamins (vitamin b complex) capsule capsule, Take  by mouth Daily., Disp: , Rfl:   •   betamethasone dipropionate 0.05 % cream, As Needed., Disp: , Rfl:   •  brimonidine (Alphagan P) 0.1 % solution ophthalmic solution, Administer 1 drop into the left eye 3 (Three) Times a Day., Disp: , Rfl:   •  loratadine (CLARITIN) 10 MG tablet, Take 1 tablet by mouth Daily As Needed for Allergies., Disp: , Rfl:   •  metoprolol succinate XL (TOPROL-XL) 25 MG 24 hr tablet, Take 1 tablet by mouth Daily., Disp: 90 tablet, Rfl: 3  •  omeprazole (priLOSEC) 20 MG capsule, Take 1 capsule by mouth Daily. patient taking 80mg nightly (Patient taking differently: Take 1 capsule by mouth Daily. Taking 20 mg in the morning), Disp: 90 capsule, Rfl: 3  •  Hydrocortisone, Perianal, (ANUSOL-HC) 2.5 % rectal cream, Insert  into the rectum 2 (Two) Times a Day. For 10 days, Disp: 28 g, Rfl: 2    Allergies   Allergen Reactions   • Dilaudid [Hydromorphone Hcl] Hallucinations       Social History     Socioeconomic History   • Marital status:    Tobacco Use   • Smoking status: Former     Types: Pipe     Start date: 1959     Quit date: 1961     Years since quittin.5   • Smokeless tobacco: Never   • Tobacco comments:     Limited by allergies   Vaping Use   • Vaping Use: Never used   Substance and Sexual Activity   • Alcohol use: Yes     Alcohol/week: 5.0 standard drinks     Types: 5 Glasses of wine per week     Comment: Less than 4 oz /wk since this prob started.   • Drug use: No   • Sexual activity: Not Currently     Partners: Female     Birth control/protection: Surgical, None, Vasectomy     Comment: 50 years ago       Family History   Problem Relation Age of Onset   • Cancer Mother         pancreatic   • Liver disease Mother    • Arthritis Mother    • Cancer Father         prostate, bladder, colon, lymphoma, leukemia   • Colon cancer Father    • Arrhythmia Father         Afib   • Heart disease Father    • Cancer Brother         prostate   • Arrhythmia Brother         Afib   • Hearing loss Brother    • Cancer  Paternal Grandfather         prostate   • Arrhythmia Paternal Aunt         Irregular beat . Before the time of illness sharing.   • Arthritis Sister    • Cancer Son         Melanoma   • Other Son         Melanoma   • Cancer Son         None   • Heart disease Son         Afib   • Malig Hyperthermia Neg Hx        Review of Systems   Constitutional: Negative.    Respiratory: Negative.    Cardiovascular: Negative.    Gastrointestinal: Positive for anal bleeding, constipation and rectal pain. Negative for abdominal distention, abdominal pain, diarrhea, nausea and vomiting.   Musculoskeletal: Negative.    Skin: Negative.    Hematological: Negative.        Vitals:    05/04/23 1450   BP: 152/79   Pulse: 59   Temp: 96.3 °F (35.7 °C)   SpO2: 93%       Physical Exam  Vitals reviewed.   Constitutional:       Appearance: Normal appearance. He is well-developed.   HENT:      Head: Normocephalic and atraumatic.   Eyes:      General: No scleral icterus.     Pupils: Pupils are equal, round, and reactive to light.   Cardiovascular:      Rate and Rhythm: Normal rate and regular rhythm.      Heart sounds: Normal heart sounds.   Pulmonary:      Effort: Pulmonary effort is normal. No respiratory distress.      Breath sounds: Normal breath sounds.   Abdominal:      General: Bowel sounds are normal. There is no distension.      Palpations: Abdomen is soft.      Tenderness: There is no abdominal tenderness.   Skin:     General: Skin is warm and dry.      Coloration: Skin is not jaundiced.      Findings: No rash.   Neurological:      General: No focal deficit present.      Mental Status: He is alert and oriented to person, place, and time.      Cranial Nerves: No cranial nerve deficit.   Psychiatric:         Mood and Affect: Mood normal.         Behavior: Behavior normal.         Thought Content: Thought content normal.         Hospital Outpatient Visit on 04/25/2023   Component Date Value Ref Range Status   • Target HR (85%) 04/25/2023 117   bpm Final   • Max. Pred. HR (100%) 04/25/2023 138  bpm Final   • LVOT area 04/25/2023 3.6  cm2 Final   • LVOT diam 04/25/2023 2.13  cm Final   • TR max clarissa 04/25/2023 150.1  cm/sec Final   • Sinus 04/25/2023 3.2  cm Final   • STJ 04/25/2023 3.2  cm Final   •  CV ECHO SHUNT ASSESSMENT PERFOR* 04/25/2023 1   Final   Lab on 04/20/2023   Component Date Value Ref Range Status   • Glucose 04/20/2023 93  65 - 99 mg/dL Final   • BUN 04/20/2023 17  8 - 23 mg/dL Final   • Creatinine 04/20/2023 0.71 (L)  0.76 - 1.27 mg/dL Final   • Sodium 04/20/2023 143  136 - 145 mmol/L Final   • Potassium 04/20/2023 4.6  3.5 - 5.2 mmol/L Final   • Chloride 04/20/2023 107  98 - 107 mmol/L Final   • CO2 04/20/2023 29.1 (H)  22.0 - 29.0 mmol/L Final   • Calcium 04/20/2023 9.6  8.6 - 10.5 mg/dL Final   • BUN/Creatinine Ratio 04/20/2023 23.9  7.0 - 25.0 Final   • Anion Gap 04/20/2023 6.9  5.0 - 15.0 mmol/L Final   • eGFR 04/20/2023 91.6  >60.0 mL/min/1.73 Final   • WBC 04/20/2023 5.94  3.40 - 10.80 10*3/mm3 Final   • RBC 04/20/2023 4.50  4.14 - 5.80 10*6/mm3 Final   • Hemoglobin 04/20/2023 13.5  13.0 - 17.7 g/dL Final   • Hematocrit 04/20/2023 41.0  37.5 - 51.0 % Final   • MCV 04/20/2023 91.1  79.0 - 97.0 fL Final   • MCH 04/20/2023 30.0  26.6 - 33.0 pg Final   • MCHC 04/20/2023 32.9  31.5 - 35.7 g/dL Final   • RDW 04/20/2023 12.9  12.3 - 15.4 % Final   • RDW-SD 04/20/2023 43.0  37.0 - 54.0 fl Final   • MPV 04/20/2023 10.6  6.0 - 12.0 fL Final   • Platelets 04/20/2023 191  140 - 450 10*3/mm3 Final   Admission on 03/22/2023, Discharged on 03/23/2023   Component Date Value Ref Range Status   • ABO Type 03/22/2023 A   Final   • RH type 03/22/2023 Negative   Final   • Antibody Screen 03/22/2023 Negative   Final   • T&S Expiration Date 03/22/2023 3/25/2023 11:59:59 PM   Final   • Activated Clotting Time  03/22/2023 305 (H)  82 - 152 Seconds Final   • RAP systole 03/23/2023 3.0  mmHg Final   • Target HR (85%) 03/23/2023 117  bpm Final   • Max. Pred.  HR (100%) 03/23/2023 138  bpm Final   • BH CV VAS BP RIGHT ARM 03/23/2023 150/93  mmHg Final   • WBC 03/23/2023 8.87  3.40 - 10.80 10*3/mm3 Final   • RBC 03/23/2023 4.85  4.14 - 5.80 10*6/mm3 Final   • Hemoglobin 03/23/2023 14.2  13.0 - 17.7 g/dL Final   • Hematocrit 03/23/2023 43.2  37.5 - 51.0 % Final   • MCV 03/23/2023 89.1  79.0 - 97.0 fL Final   • MCH 03/23/2023 29.3  26.6 - 33.0 pg Final   • MCHC 03/23/2023 32.9  31.5 - 35.7 g/dL Final   • RDW 03/23/2023 12.8  12.3 - 15.4 % Final   • RDW-SD 03/23/2023 41.6  37.0 - 54.0 fl Final   • MPV 03/23/2023 11.2  6.0 - 12.0 fL Final   • Platelets 03/23/2023 190  140 - 450 10*3/mm3 Final   • Glucose 03/23/2023 135 (H)  65 - 99 mg/dL Final   • BUN 03/23/2023 14  8 - 23 mg/dL Final   • Creatinine 03/23/2023 0.67 (L)  0.76 - 1.27 mg/dL Final   • Sodium 03/23/2023 140  136 - 145 mmol/L Final   • Potassium 03/23/2023 3.9  3.5 - 5.2 mmol/L Final   • Chloride 03/23/2023 105  98 - 107 mmol/L Final   • CO2 03/23/2023 25.0  22.0 - 29.0 mmol/L Final   • Calcium 03/23/2023 9.2  8.6 - 10.5 mg/dL Final   • BUN/Creatinine Ratio 03/23/2023 20.9  7.0 - 25.0 Final   • Anion Gap 03/23/2023 10.0  5.0 - 15.0 mmol/L Final   • eGFR 03/23/2023 93.2  >60.0 mL/min/1.73 Final   • QT Interval 03/23/2023 394  ms Final   Lab on 03/15/2023   Component Date Value Ref Range Status   • WBC 03/15/2023 5.79  3.40 - 10.80 10*3/mm3 Final   • RBC 03/15/2023 4.93  4.14 - 5.80 10*6/mm3 Final   • Hemoglobin 03/15/2023 14.3  13.0 - 17.7 g/dL Final   • Hematocrit 03/15/2023 44.2  37.5 - 51.0 % Final   • MCV 03/15/2023 89.7  79.0 - 97.0 fL Final   • MCH 03/15/2023 29.0  26.6 - 33.0 pg Final   • MCHC 03/15/2023 32.4  31.5 - 35.7 g/dL Final   • RDW 03/15/2023 12.8  12.3 - 15.4 % Final   • RDW-SD 03/15/2023 42.2  37.0 - 54.0 fl Final   • MPV 03/15/2023 11.0  6.0 - 12.0 fL Final   • Platelets 03/15/2023 181  140 - 450 10*3/mm3 Final   • Glucose 03/15/2023 103 (H)  65 - 99 mg/dL Final   • BUN 03/15/2023 14  8 - 23 mg/dL  Final   • Creatinine 03/15/2023 0.66 (L)  0.76 - 1.27 mg/dL Final   • Sodium 03/15/2023 142  136 - 145 mmol/L Final   • Potassium 03/15/2023 4.6  3.5 - 5.2 mmol/L Final   • Chloride 03/15/2023 104  98 - 107 mmol/L Final   • CO2 03/15/2023 31.0 (H)  22.0 - 29.0 mmol/L Final   • Calcium 03/15/2023 9.6  8.6 - 10.5 mg/dL Final   • BUN/Creatinine Ratio 03/15/2023 21.2  7.0 - 25.0 Final   • Anion Gap 03/15/2023 7.0  5.0 - 15.0 mmol/L Final   • eGFR 03/15/2023 93.6  >60.0 mL/min/1.73 Final   • Protime 03/15/2023 16.2 (H)  11.7 - 14.2 Seconds Final   • INR 03/15/2023 1.28 (H)  0.90 - 1.10 Final       Diagnoses and all orders for this visit:    1. Constipation, unspecified constipation type (Primary)    2. Rectal bleeding    Other orders  -     Hydrocortisone, Perianal, (ANUSOL-HC) 2.5 % rectal cream; Insert  into the rectum 2 (Two) Times a Day. For 10 days  Dispense: 28 g; Refill: 2      Patient 82-year-old male with history of hypertension, hyperlipidemia, BPH presenting with rectal bleeding intermittently.  Patient notes that rectal bleeding occurs after hard bowel movement.  Patient reports has been cutting back on fluids because of his BPH seems to get dehydrated intermittently causing the constipation.  Patient reports typically will be painful to pass a hard stool and noticed some blood which last less than a day.  Patient with no anemia.  Patient is overdue for colonoscopy but at this point is not interested though unlikely related anything more than hemorrhoids.  Would recommend proceeding with Anusol 2.5% twice daily for 10 days for the hemorrhoids and encouraged him to think about possible colonoscopy.  We will follow-up clinically.

## 2023-05-22 NOTE — PROGRESS NOTES
CC: Hospital follow-up    HPI:  Junito Sorto is a  82 y.o.  Right-handed male with past medical history of hyperlipidemia, GERD, pneumonia this past fall and left occipital lobe stroke in October.  He has history of atrial fibrillation and had been reticent to be on anticoagulation in the past.  He apparently awoke 1 morning in October with new onset visual disturbance and was seen by his eye doctor and was suspected to have a right hemianopsia.  Reviewed prior documentation and made amendments as needed.  Apparently he had been on Eliquis 15 years earlier but stopped it because of excess bruising.  He was in the process of being set up for a watchman's device.  CHARU on 8/30 was negative for thrombus.  He has no significant bleeding history.  MRI showed left PCA infarct involving left occipital lobe stroke appearance consistent with cardioembolic etiology.    He has had no new neurologic symptoms to note.  He is not driving nor woodworking.  He just went to neuro optometrist and got fitted for prism glasses and now he is able to read.  At times he sees what appears to be someone walking behind him on the right side or what appears to be plants out of his right periphery.  He wants to know if he will improve enough to drive.  He is still being evaluated for watchman's device and I think this is planned for January or February.  He is doing well on the Eliquis with no report of bleeding, dark stool, dizziness or lightheadedness.  He is using a cane and working with PT and OT at [facility]  He describes that he had an episode of pneumonia prior to her stroke and it seems like he just never got his stamina back.  PCP referred him for therapy    Back in March she had watchman's device.  He is also had follow-up CHARU and he is okay to come off Eliquis.  He and wife are reluctant to do so.  He tells me that he is got 1 more 3-month prescription and then plans to go on the Plavix and aspirin.  He brought an updated visual  "field test for me today.  They still endorse some untrue visual information from the right side seeing trees and people walking.  He has problems with balance which is not new.  We checked neuropathy labs (WOJCIECH, sed, B12/folate, cryoglobulin, SPEP PRISCILA) last time and these were unremarkable    Social history:    Family history:      Pain Scale:        ROS:  Review of Systems   Musculoskeletal: Positive for gait problem.   Psychiatric/Behavioral: Positive for confusion and decreased concentration.       Reviewed ROS conducted by MA and agree        Physical Exam:  Vitals:    05/24/23 1359   BP: 120/80   Pulse: 54   SpO2: 94%   Weight: 98.4 kg (217 lb)   Height: 170.2 cm (67\")      Orthostatic BP:    Body mass index is 33.99 kg/m².        General: pt well appearing, no distress  HEENT: Normocephalic, conjunctiva normal, external canals normal, no nasal discharge, moist mucous membranes  Neck: No lymphadenopathy, thyroid not enlarged, no JVD  CV: Regular rate and rhythm, no murmurs negative no bruits auscultated at neck, equal pulses  Pulmonary: Normal respiratory effort, clear to auscultation bilaterally  Extremities: no edema, bruising, or skin lesions  Pysch: good eye contact, cooperative, full affect, euthymic mood, good attention, good insight  Mental: alert, conversant, AOx3, provides history, some word paucity and nonfluent speech at time but not aphasic language fluent, names, repeats.  CN: Dense right hemianopsia worse of the inferior quadrant, PERRL, EOMi, corrective saccade with cover-uncover but no skew, no gaze palsy or nystagmus, intact facial sensation, no facial assymetry, intact hearing, symmetric palate elevation, tongue midline, good SCM strength  Motor: no abnormal movements, no pronator drift, normal  bulk and tone, 5/5 strength b/l UE & LE  Sensory: Reduced sensation to crude touch and temp in glove and stocking distribution of lower extremities, absent vibratory sense at ankles " bilaterally  Reflexes: Trace   coordination: no ataxia on finger-nose  Gait: normal gait, no ataxia, positive Romberg        Results:      Lab Results   Component Value Date    GLUCOSE 93 04/20/2023    BUN 17 04/20/2023    CREATININE 0.71 (L) 04/20/2023    EGFRIFNONA 98 10/11/2021    EGFRIFAFRI 119 10/11/2021    BCR 23.9 04/20/2023    CO2 29.1 (H) 04/20/2023    CALCIUM 9.6 04/20/2023    PROTENTOTREF 6.1 02/16/2023    ALBUMIN 4.1 02/16/2023    LABIL2 2.1 02/16/2023    AST 29 02/16/2023    ALT 23 02/16/2023       Lab Results   Component Value Date    WBC 5.94 04/20/2023    HGB 13.5 04/20/2023    HCT 41.0 04/20/2023    MCV 91.1 04/20/2023     04/20/2023         .No results found for: RPR      Lab Results   Component Value Date    TSH 2.130 03/05/2022         Lab Results   Component Value Date    [id number] 484 01/09/2023         Lab Results   Component Value Date    FOLATE >20.00 01/09/2023         Lab Results   Component Value Date    HGBA1C 5.80 (H) 10/20/2022         Lab Results   Component Value Date    GLUCOSE 93 04/20/2023    BUN 17 04/20/2023    CREATININE 0.71 (L) 04/20/2023    EGFRIFNONA 98 10/11/2021    EGFRIFAFRI 119 10/11/2021    BCR 23.9 04/20/2023    K 4.6 04/20/2023    CO2 29.1 (H) 04/20/2023    CALCIUM 9.6 04/20/2023    PROTENTOTREF 6.1 02/16/2023    ALBUMIN 4.1 02/16/2023    LABIL2 2.1 02/16/2023    AST 29 02/16/2023    ALT 23 02/16/2023         Diagnosis:  1.  History of left PCA stroke  2.  A. Fib   3.  Polyneuropathy    Impression: 82-year-old male with above-stated medical history  left PCA stroke felt to be cardioembolic in setting of untreated A. fib back in January.  He had LAOO 2 months ago.  He has updated visual field testing but was not told of driving restrictions by the eye doctor he has evidence of neuropathy in the labs including B12/folate, sed, WOJCIECH, copper, SPEP and PRISCILA were unremarkable.  His sensory symptoms are static.  I do not feel EMG warranted.      Plan:  1.  Follow-up  with cardiology.  Watchman's device.  Continue on statin    Control risk factors to prevent stroke which means keep BP at or below 130/80, take your statin if able and try to have LDL measurements < 70, stop smoking if you smoke, control your blood sugar, and get regular moderate exercise four times weekly, and avoid prolonged sitting.  Adopt a healthy diet such as the Mediterranean diet which includes vegetables, fruits, whole grains, low-fat dairy, poultry, fish, legumes, nontropical fish oils, and nuts.  Limit sweets, sugary drinks, and red meats.  Reduce or eliminate alcohol intake.    2.  We will try to quite visual field loss to a degree to help instruct on restriction    Follow-up in 1 year sooner if problems    I spent at least 40 minutes interviewing, examining, and counseling patient.  I independently reviewed documentation, laboratory and diagnostic findings, external documentation where applicable, and formulated treatment plan which was discussed with the patient.

## 2023-05-24 ENCOUNTER — TELEPHONE (OUTPATIENT)
Dept: NEUROLOGY | Facility: CLINIC | Age: 83
End: 2023-05-24

## 2023-05-24 ENCOUNTER — OFFICE VISIT (OUTPATIENT)
Dept: NEUROLOGY | Facility: CLINIC | Age: 83
End: 2023-05-24
Payer: MEDICARE

## 2023-05-24 VITALS
BODY MASS INDEX: 34.06 KG/M2 | SYSTOLIC BLOOD PRESSURE: 120 MMHG | WEIGHT: 217 LBS | DIASTOLIC BLOOD PRESSURE: 80 MMHG | HEIGHT: 67 IN | OXYGEN SATURATION: 94 % | HEART RATE: 54 BPM

## 2023-05-24 DIAGNOSIS — Z86.73 HISTORY OF COMPLETED STROKE: ICD-10-CM

## 2023-05-24 DIAGNOSIS — Z95.818 PRESENCE OF WATCHMAN LEFT ATRIAL APPENDAGE CLOSURE DEVICE: ICD-10-CM

## 2023-05-24 DIAGNOSIS — G62.9 POLYNEUROPATHY: Primary | ICD-10-CM

## 2023-05-24 DIAGNOSIS — I48.91 ATRIAL FIBRILLATION, UNSPECIFIED TYPE: ICD-10-CM

## 2023-05-24 DIAGNOSIS — G30.9 ALZHEIMER'S DISEASE: ICD-10-CM

## 2023-05-24 DIAGNOSIS — F02.80 ALZHEIMER'S DISEASE: ICD-10-CM

## 2023-05-24 NOTE — LETTER
May 24, 2023       No Recipients    Patient: Junito Sorto   YOB: 1940   Date of Visit: 5/24/2023       Dear Dr. Goodman Recipients:    Thank you for referring Junito Sorto to me for evaluation. Below are the relevant portions of my assessment and plan of care.    If you have questions, please do not hesitate to call me. I look forward to following Junito along with you.         Sincerely,        SYLVESTER Sebastian        CC:   No Recipients    Aileen Holman PA  05/24/23 1501  Signed  CC: Hospital follow-up    HPI:  Junito Sorto is a  82 y.o.  Right-handed male with past medical history of hyperlipidemia, GERD, pneumonia this past fall and left occipital lobe stroke in October.  He has history of atrial fibrillation and had been reticent to be on anticoagulation in the past.  He apparently awoke 1 morning in October with new onset visual disturbance and was seen by his eye doctor and was suspected to have a right hemianopsia.  Reviewed prior documentation and made amendments as needed.  Apparently he had been on Eliquis 15 years earlier but stopped it because of excess bruising.  He was in the process of being set up for a watchman's device.  CHARU on 8/30 was negative for thrombus.  He has no significant bleeding history.  MRI showed left PCA infarct involving left occipital lobe stroke appearance consistent with cardioembolic etiology.    He has had no new neurologic symptoms to note.  He is not driving nor woodworking.  He just went to neuro optometrist and got fitted for prism glasses and now he is able to read.  At times he sees what appears to be someone walking behind him on the right side or what appears to be plants out of his right periphery.  He wants to know if he will improve enough to drive.  He is still being evaluated for watchman's device and I think this is planned for January or February.  He is doing well on the Eliquis with no report of bleeding, dark stool, dizziness  "or lightheadedness.  He is using a cane and working with PT and OT at [facility].  He describes that he had an episode of pneumonia prior to her stroke and it seems like he just never got his stamina back.  PCP referred him for therapy    Back in March she had watchman's device.  He is also had follow-up CHARU and he is okay to come off Eliquis.  He and wife are reluctant to do so.  He tells me that he is got 1 more 3-month prescription and then plans to go on the Plavix and aspirin.  He brought an updated visual field test for me today.  They still endorse some untrue visual information from the right side seeing trees and people walking.  He has problems with balance which is not new.  We checked neuropathy labs (WOJCIECH, sed, B12/folate, cryoglobulin, SPEP PRISCILA) last time and these were unremarkable    Social history:    Family history:      Pain Scale:        ROS:  Review of Systems   Musculoskeletal: Positive for gait problem.   Psychiatric/Behavioral: Positive for confusion and decreased concentration.       Reviewed ROS conducted by MA and wyatt        Physical Exam:  Vitals:    05/24/23 1359   BP: 120/80   Pulse: 54   SpO2: 94%   Weight: 98.4 kg (217 lb)   Height: 170.2 cm (67\")      Orthostatic BP:    Body mass index is 33.99 kg/m².        General: pt well appearing, no distress  HEENT: Normocephalic, conjunctiva normal, external canals normal, no nasal discharge, moist mucous membranes  Neck: No lymphadenopathy, thyroid not enlarged, no JVD  CV: Regular rate and rhythm, no murmurs negative no bruits auscultated at neck, equal pulses  Pulmonary: Normal respiratory effort, clear to auscultation bilaterally  Extremities: no edema, bruising, or skin lesions  Pysch: good eye contact, cooperative, full affect, euthymic mood, good attention, good insight  Mental: alert, conversant, AOx3, provides history, some word paucity and nonfluent speech at time but not aphasic language fluent, names, repeats.  CN: Dense right " hemianopsia worse of the inferior quadrant, PERRL, EOMi, corrective saccade with cover-uncover but no skew, no gaze palsy or nystagmus, intact facial sensation, no facial assymetry, intact hearing, symmetric palate elevation, tongue midline, good SCM strength  Motor: no abnormal movements, no pronator drift, normal  bulk and tone, 5/5 strength b/l UE & LE  Sensory: Reduced sensation to crude touch and temp in glove and stocking distribution of lower extremities, absent vibratory sense at ankles bilaterally  Reflexes: Trace   coordination: no ataxia on finger-nose  Gait: normal gait, no ataxia, positive Romberg        Results:      Lab Results   Component Value Date    GLUCOSE 93 04/20/2023    BUN 17 04/20/2023    CREATININE 0.71 (L) 04/20/2023    EGFRIFNONA 98 10/11/2021    EGFRIFAFRI 119 10/11/2021    BCR 23.9 04/20/2023    CO2 29.1 (H) 04/20/2023    CALCIUM 9.6 04/20/2023    PROTENTOTREF 6.1 02/16/2023    ALBUMIN 4.1 02/16/2023    LABIL2 2.1 02/16/2023    AST 29 02/16/2023    ALT 23 02/16/2023       Lab Results   Component Value Date    WBC 5.94 04/20/2023    HGB 13.5 04/20/2023    HCT 41.0 04/20/2023    MCV 91.1 04/20/2023     04/20/2023         .No results found for: RPR      Lab Results   Component Value Date    TSH 2.130 03/05/2022         Lab Results   Component Value Date    NYINCGPH01 484 01/09/2023         Lab Results   Component Value Date    FOLATE >20.00 01/09/2023         Lab Results   Component Value Date    HGBA1C 5.80 (H) 10/20/2022         Lab Results   Component Value Date    GLUCOSE 93 04/20/2023    BUN 17 04/20/2023    CREATININE 0.71 (L) 04/20/2023    EGFRIFNONA 98 10/11/2021    EGFRIFAFRI 119 10/11/2021    BCR 23.9 04/20/2023    K 4.6 04/20/2023    CO2 29.1 (H) 04/20/2023    CALCIUM 9.6 04/20/2023    PROTENTOTREF 6.1 02/16/2023    ALBUMIN 4.1 02/16/2023    LABIL2 2.1 02/16/2023    AST 29 02/16/2023    ALT 23 02/16/2023         Diagnosis:  1.  History of left PCA stroke  2.  A. Fib   3.   Polyneuropathy    Impression: 82-year-old male with above-stated medical history  left PCA stroke felt to be cardioembolic in setting of untreated A. fib back in January.  He had LAOO 2 months ago.  He has updated visual field testing but was not told of driving restrictions by the eye doctor he has evidence of neuropathy in the labs including B12/folate, sed, WOJCIECH, copper, SPEP and PRISCILA were unremarkable.  His sensory symptoms are static.  I do not feel EMG warranted.      Plan:  1.  Follow-up with cardiology.  Watchman's device.  Continue on statin    Control risk factors to prevent stroke which means keep BP at or below 130/80, take your statin if able and try to have LDL measurements < 70, stop smoking if you smoke, control your blood sugar, and get regular moderate exercise four times weekly, and avoid prolonged sitting.  Adopt a healthy diet such as the Mediterranean diet which includes vegetables, fruits, whole grains, low-fat dairy, poultry, fish, legumes, nontropical fish oils, and nuts.  Limit sweets, sugary drinks, and red meats.  Reduce or eliminate alcohol intake.    2.  We will try to quite visual field loss to a degree to help instruct on restriction    Follow-up in 1 year sooner if problems    I spent at least 40 minutes interviewing, examining, and counseling patient.  I independently reviewed documentation, laboratory and diagnostic findings, external documentation where applicable, and formulated treatment plan which was discussed with the patient.

## 2023-05-30 ENCOUNTER — TELEPHONE (OUTPATIENT)
Dept: NEUROLOGY | Facility: CLINIC | Age: 83
End: 2023-05-30

## 2023-05-30 NOTE — TELEPHONE ENCOUNTER
----- Message from SYLVESTER Sebastian sent at 5/24/2023  3:01 PM EDT -----  Can we reach out to [facility].  I have a copy of his formal visual fields.  But is hard for me to determine the degree of deficit.  Do they have specific recommendations about his return to drive?    -C

## 2023-06-07 ENCOUNTER — OFFICE VISIT (OUTPATIENT)
Dept: FAMILY MEDICINE CLINIC | Facility: CLINIC | Age: 83
End: 2023-06-07
Payer: MEDICARE

## 2023-06-07 VITALS
HEIGHT: 67 IN | DIASTOLIC BLOOD PRESSURE: 72 MMHG | OXYGEN SATURATION: 95 % | WEIGHT: 213 LBS | BODY MASS INDEX: 33.43 KG/M2 | HEART RATE: 55 BPM | SYSTOLIC BLOOD PRESSURE: 118 MMHG

## 2023-06-07 DIAGNOSIS — M54.50 LUMBAR SPINE PAIN: Primary | ICD-10-CM

## 2023-06-07 PROCEDURE — 99214 OFFICE O/P EST MOD 30 MIN: CPT | Performed by: INTERNAL MEDICINE

## 2023-06-07 RX ORDER — HYDROCODONE BITARTRATE AND ACETAMINOPHEN 5; 325 MG/1; MG/1
1 TABLET ORAL EVERY 6 HOURS PRN
Qty: 20 TABLET | Refills: 0 | Status: SHIPPED | OUTPATIENT
Start: 2023-06-07

## 2023-06-07 RX ORDER — LATANOPROST 50 UG/ML
SOLUTION/ DROPS OPHTHALMIC
COMMUNITY
Start: 2023-05-24

## 2023-06-09 NOTE — PROGRESS NOTES
Subjective   Junito Sorto is a 82 y.o. male. Patient is here today for   Chief Complaint   Patient presents with    Eye Problem     Left eye red     Back Pain          Vitals:    23 1301   BP: 118/72   Pulse: 55   SpO2: 95%     Body mass index is 33.36 kg/m².      Past Medical History:   Diagnosis Date    Allergic     Airborne    Arrhythmia     Arthritis     At risk for obstructive sleep apnea     5    Atrial fibrillation     Bladder outlet obstruction 2019    BPH (benign prostatic hyperplasia)     Cataract     Replaced    Colon polyp     Coronary artery disease Few years    Afib    Dysphagia     Eczema     GERD (gastroesophageal reflux disease)     Mentioned as possible    Glaucoma Eievated eye pressure    Hernia     Your office reported hiatal    Hiatal hernia     History of medical problems     Xs pressure in left eye. Now being treated in     HL (hearing loss)     Hyperlipidemia     Hypertension     Left ventricular dysfunction     Low back pain     Obesity     Pneumonia     Prostate cancer     Skin cancer     Stroke 10/19/2022    RESIDUAL: LOSS OF RT PERIPHERAL VISION    Visual impairment  stroke    Reading correction      Allergies   Allergen Reactions    Dilaudid [Hydromorphone Hcl] Hallucinations      Social History     Socioeconomic History    Marital status:    Tobacco Use    Smoking status: Former     Types: Pipe     Start date: 1959     Quit date: 1961     Years since quittin.6    Smokeless tobacco: Never    Tobacco comments:     Limited by allergies   Vaping Use    Vaping Use: Never used   Substance and Sexual Activity    Alcohol use: Yes     Alcohol/week: 5.0 standard drinks     Types: 5 Glasses of wine per week     Comment: Less than 4 oz /wk since this prob started.    Drug use: Never    Sexual activity: Not Currently     Partners: Female     Birth control/protection: Surgical, None, Vasectomy     Comment: 50 years ago        Current  Outpatient Medications:     apixaban (ELIQUIS) 5 MG tablet tablet, Take 1 tablet by mouth 2 (Two) Times a Day., Disp: 180 tablet, Rfl: 3    aspirin 81 MG chewable tablet, Chew 1 tablet Daily., Disp: , Rfl:     atorvastatin (Lipitor) 80 MG tablet, Take 1 tablet by mouth Daily., Disp: 90 tablet, Rfl: 3    B Complex Vitamins (vitamin b complex) capsule capsule, Take  by mouth Daily., Disp: , Rfl:     betamethasone dipropionate 0.05 % cream, As Needed., Disp: , Rfl:     Hydrocortisone, Perianal, (ANUSOL-HC) 2.5 % rectal cream, Insert  into the rectum 2 (Two) Times a Day. For 10 days, Disp: 28 g, Rfl: 2    latanoprost (XALATAN) 0.005 % ophthalmic solution, , Disp: , Rfl:     loratadine (CLARITIN) 10 MG tablet, Take 1 tablet by mouth Daily As Needed for Allergies., Disp: , Rfl:     metoprolol succinate XL (TOPROL-XL) 25 MG 24 hr tablet, Take 1 tablet by mouth Daily., Disp: 90 tablet, Rfl: 3    omeprazole (priLOSEC) 20 MG capsule, Take 1 capsule by mouth Daily. patient taking 80mg nightly (Patient taking differently: Take 1 capsule by mouth Daily. Taking 20 mg in the morning), Disp: 90 capsule, Rfl: 3    HYDROcodone-acetaminophen (NORCO) 5-325 MG per tablet, Take 1 tablet by mouth Every 6 (Six) Hours As Needed for Severe Pain., Disp: 20 tablet, Rfl: 0     Objective     History of Present Illness  He tells me that he has had back pain for more than a year.  He has bilateral lower extremity weakness that accompanies his chronic pain.  Eye Problem   Associated symptoms include weakness. Pertinent negatives include no fever or tingling.   Back Pain  This is a chronic problem. The current episode started more than 1 year ago. The problem occurs daily. The problem is unchanged. The quality of the pain is described as aching. The pain does not radiate. The pain is at a severity of 4/10. The pain is The same all the time. The symptoms are aggravated by lying down and standing. Stiffness is present In the morning. Associated  symptoms include weakness. Pertinent negatives include no abdominal pain, bladder incontinence, bowel incontinence, chest pain, dysuria, fever, headaches, leg pain, numbness, paresis, paresthesias, pelvic pain, perianal numbness, tingling or weight loss. Risk factors include history of cancer, lack of exercise, obesity and sedentary lifestyle.      Review of Systems   Constitutional:  Negative for fever and weight loss.   Cardiovascular:  Negative for chest pain.   Gastrointestinal:  Negative for abdominal pain and bowel incontinence.   Genitourinary:  Negative for bladder incontinence, dysuria and pelvic pain.   Musculoskeletal:  Positive for back pain.   Neurological:  Positive for weakness. Negative for tingling, numbness, headaches and paresthesias.     Physical Exam  Vitals and nursing note reviewed.   Constitutional:       Appearance: Normal appearance. He is obese.      Comments: Pleasant, neatly groomed, no distress.  He is accompanied by his wife today.   Cardiovascular:      Rate and Rhythm: Regular rhythm.      Heart sounds: Normal heart sounds. No murmur heard.    No gallop.   Pulmonary:      Effort: No respiratory distress.      Breath sounds: Normal breath sounds. No wheezing or rales.   Neurological:      Mental Status: He is alert and oriented to person, place, and time.   Psychiatric:         Mood and Affect: Mood normal.         Behavior: Behavior normal.         Thought Content: Thought content normal.         Problems Addressed this Visit    None  Visit Diagnoses       Lumbar spine pain    -  Primary    Relevant Medications    HYDROcodone-acetaminophen (NORCO) 5-325 MG per tablet    Other Relevant Orders    MRI Lumbar Spine Without Contrast          Diagnoses         Codes Comments    Lumbar spine pain    -  Primary ICD-10-CM: M54.50  ICD-9-CM: 724.2               PLAN  Is got chronic lumbar spine pain and bilateral lower extremity weakness.  He needs an MRI of his lumbar spine I put an order in  for that.    I sent out a prescription for hydrocodone acetaminophen 5/325 4 times daily as needed pain.      No follow-ups on file.Answers submitted by the patient for this visit:  Primary Reason for Visit (Submitted on 6/6/2023)  What is the primary reason for your visit?: Back Pain

## 2023-06-29 ENCOUNTER — TELEPHONE (OUTPATIENT)
Dept: FAMILY MEDICINE CLINIC | Facility: CLINIC | Age: 83
End: 2023-06-29

## 2023-06-29 NOTE — TELEPHONE ENCOUNTER
Caller: [facility]    Relationship: Other    Best call back number: [phone]     What orders are you requesting (i.e. lab or imaging): LABS    In what timeframe would the patient need to come in: ASAP    Where will you receive your lab/imaging services: [facility]    Additional notes: PATIENT IS CURRENTLY AT THE OUTPATIENT LABS AND NEEDS ORDERS ENTERED.    PLEASE ENTER ORDERS ASAP

## 2023-07-11 PROBLEM — M54.50 LUMBAR SPINE PAIN: Status: ACTIVE | Noted: 2023-07-11

## 2023-07-28 ENCOUNTER — HOSPITAL ENCOUNTER (OUTPATIENT)
Dept: MRI IMAGING | Facility: HOSPITAL | Age: 83
Discharge: HOME | End: 2023-07-28
Admitting: INTERNAL MEDICINE
Payer: MEDICARE

## 2023-07-28 PROCEDURE — 72148 MRI LUMBAR SPINE W/O DYE: CPT

## 2023-08-07 ENCOUNTER — TELEPHONE (OUTPATIENT)
Dept: NEUROLOGY | Facility: CLINIC | Age: 83
End: 2023-08-07

## 2023-08-07 NOTE — TELEPHONE ENCOUNTER
Provider: ENRIQUE SALCIDO  Caller: [facility]  Relationship to Patient: SELF    Phone Number: [phone]  Reason for Call: PATIENT CALLED AND STATED THAT HE IS SEEING RIZWAN DRAPER @ [facility] ON 8/15/23 FOR THEIR NEURO PROGRAM AND THEY ARE REQUESTING THE PATIENT HAVE HIS NEURO RECORDS SENT OVER FOR HIS VISIT.    PLEASE SEND IF POSSIBLE.       PLEASE REVIEW  THANK YOU

## 2023-08-08 ENCOUNTER — OFFICE VISIT (OUTPATIENT)
Dept: CARDIOLOGY | Facility: CLINIC | Age: 83
End: 2023-08-08
Payer: MEDICARE

## 2023-08-08 VITALS
WEIGHT: 217.8 LBS | HEART RATE: 53 BPM | OXYGEN SATURATION: 93 % | DIASTOLIC BLOOD PRESSURE: 72 MMHG | HEIGHT: 67 IN | SYSTOLIC BLOOD PRESSURE: 120 MMHG | BODY MASS INDEX: 34.18 KG/M2

## 2023-08-08 DIAGNOSIS — Z95.818 PRESENCE OF WATCHMAN LEFT ATRIAL APPENDAGE CLOSURE DEVICE: ICD-10-CM

## 2023-08-08 DIAGNOSIS — I51.9 LEFT VENTRICULAR DYSFUNCTION: ICD-10-CM

## 2023-08-08 DIAGNOSIS — I48.21 PERMANENT ATRIAL FIBRILLATION: Primary | ICD-10-CM

## 2023-08-08 DIAGNOSIS — E78.2 MIXED HYPERLIPIDEMIA: ICD-10-CM

## 2023-08-08 DIAGNOSIS — E78.00 HYPERCHOLESTEROLEMIA: ICD-10-CM

## 2023-08-08 PROCEDURE — 1160F RVW MEDS BY RX/DR IN RCRD: CPT | Performed by: INTERNAL MEDICINE

## 2023-08-08 PROCEDURE — 1159F MED LIST DOCD IN RCRD: CPT | Performed by: INTERNAL MEDICINE

## 2023-08-08 PROCEDURE — 99214 OFFICE O/P EST MOD 30 MIN: CPT | Performed by: INTERNAL MEDICINE

## 2023-08-08 RX ORDER — CLOPIDOGREL BISULFATE 75 MG/1
75 TABLET ORAL DAILY
Qty: 90 TABLET | Refills: 3 | Status: SHIPPED | OUTPATIENT
Start: 2023-08-08

## 2023-08-08 RX ORDER — GUAIFENESIN 400 MG/1
400 TABLET ORAL DAILY
COMMUNITY

## 2023-08-08 NOTE — PROGRESS NOTES
Subjective:     Encounter Date:08/08/23      Patient ID: Junito Sorto is a 82 y.o. male.    Chief Complaint:  History of Present Illness      Dear Dr. Schulz,    I had the pleasure of seeing this patient in the office today for follow-up of his atrial fibrillation, status post Watchman device.  He has a history of CVA, hypertension, mild cardiomyopathy with ejection ration 45%, as well as esophageal dysphagia status postdilatation and GERD.    He comes in today for routine follow-up.  He was seen by Dr. Ceballos April 27, 2023 for follow-up of his Watchman device.  At that time he was recommended to stop his Eliquis but he said he was still uncomfortable to do so.  There was concerned about occasional falling.    Patient says he has fallen a couple more times but has not injured himself.  He says that just because he is active.  No chest pain or chest discomfort.  He says he thought about conversation last time and he is concluded that it probably is wise to come off the anticoagulation.  That is why the Watchman device was implanted in the first place.    He had a Watchman device implanted March 22, 2023.     In October 2022, he presented to the hospital with complaints of strokelike symptoms and was diagnosed with an acute CVA in the left PCA territory.  Neurology recommended apixaban and he agreed.  Continue statin therapy.  He was recommended follow-up in our office.    The following portions of the patient's history were reviewed and updated as appropriate: allergies, current medications, past family history, past medical history, past social history, past surgical history and problem list.    Past Medical History:   Diagnosis Date    Allergic     Airborne    Arrhythmia     Arthritis     At risk for obstructive sleep apnea     5    Atrial fibrillation     Bladder outlet obstruction 09/11/2019    BPH (benign prostatic hyperplasia)     Cataract     Replaced    Colon polyp     Coronary artery disease Few  years    Afib    Dysphagia     Eczema     GERD (gastroesophageal reflux disease) 2013    Mentioned as possible    Glaucoma Eievated eye pressure    Hernia     Your office reported hiatal    Hiatal hernia     History of medical problems 2022    Xs pressure in left eye. Now being treated in 2023    HL (hearing loss)     Hyperlipidemia     Hypertension     Left ventricular dysfunction     Low back pain     Obesity     Pneumonia 2022    Prostate cancer     Skin cancer     Stroke 10/19/2022    RESIDUAL: LOSS OF RT PERIPHERAL VISION    Visual impairment 2022 stroke    Reading correction       Past Surgical History:   Procedure Laterality Date    APPENDECTOMY  1950s    ATRIAL APPENDAGE EXCLUSION LEFT WITH TRANSESOPHAGEAL ECHOCARDIOGRAM N/A 03/22/2023    Procedure: Atrial Appendage Occlusion;  Surgeon: Tony Ceballos MD;  Location: [facility] CATH INVASIVE LOCATION;  Service: Cardiovascular;  Laterality: N/A;    CARDIAC SURGERY  2023    Watchman inserted because of continuous afib    CATARACT EXTRACTION, BILATERAL      COLONOSCOPY  unknown    CYSTOSCOPY TRANSURETHRAL RESECTION OF PROSTATE N/A 09/11/2019    Procedure: CYSTOSCOPY TRANSURETHRAL RESECTION OF PROSTATE;  Surgeon: Chip Bedolla MD;  Location: [facility] MAIN OR;  Service: Urology    ENDOSCOPY N/A 10/31/2018    LA Grade C reflux esophagitis, HH, erythematous mucosa in the antrum. Path: Gastritis, esophagitis.     ENDOSCOPY N/A 03/20/2022    Procedure: ESOPHAGOGASTRODUODENOSCOPY WITH BIOPSIES AND NAILS DILATION #56;  Surgeon: Tony Ramos MD;  Location: [facility] ENDOSCOPY;  Service: Gastroenterology;  Laterality: N/A;  PRE OP - DYSPHAGIA  POST OP- MILD GASTRITIS    EPIDURAL BLOCK      ESOPHAGEAL DILATATION      EYE SURGERY      Cataracts    PROSTATE SURGERY  11/06/2014    SKIN CANCER EXCISION      multiple, over that last few years    SKIN CANCER EXCISION Right 09/03/2019    FACE,     SKIN CANCER EXCISION      2023    TONSILLECTOMY      around 1950     "TRANSURETHRAL RESECTION OF BLADDER N/A     UPPER GASTROINTESTINAL ENDOSCOPY  2013    VASECTOMY  1980s         Procedures       Objective:     Vitals:    08/08/23 1045   BP: 120/72   BP Location: Left arm   Patient Position: Sitting   Pulse: 53   SpO2: 93%   Weight: 98.8 kg (217 lb 12.8 oz)   Height: 170.2 cm (67.01\")         Constitutional:       General: Not in acute distress.     Appearance: Well-developed. Not diaphoretic.   Eyes:      General:         Right eye: No discharge.         Left eye: No discharge.      Conjunctiva/sclera: Conjunctivae normal.      Pupils: Pupils are equal, round, and reactive to light.   HENT:      Head: Normocephalic and atraumatic.      Nose: Nose normal.   Neck:      Thyroid: No thyromegaly.      Trachea: No tracheal deviation.      Lymphadenopathy: No cervical adenopathy.   Pulmonary:      Effort: Pulmonary effort is normal. No respiratory distress.      Breath sounds: Normal breath sounds. No stridor.   Chest:      Chest wall: Not tender to palpatation.   Cardiovascular:      Normal rate. Irregularly irregular rhythm.      Murmurs: There is no murmur.      No gallop. No S3 gallop.   Pulses:     Intact distal pulses.   Abdominal:      General: Bowel sounds are normal. There is no distension.      Palpations: Abdomen is soft. There is no abdominal mass.      Tenderness: There is no abdominal tenderness. There is no guarding or rebound.   Musculoskeletal: Normal range of motion.         General: No tenderness or deformity.      Cervical back: Normal range of motion and neck supple. Skin:     General: Skin is warm and dry.      Findings: No erythema or rash.   Neurological:      Mental Status: Alert.   Psychiatric:         Thought Content: Thought content normal.       Lab Review:   Lab Results   Component Value Date    GLUCOSE 100 (H) 06/29/2023    BUN 14 06/29/2023    CREATININE 0.67 (L) 06/29/2023    EGFRIFNONA 98 10/11/2021    EGFRIFAFRI 119 10/11/2021    BCR 20.9 06/29/2023    K " 4.6 06/29/2023    CO2 30.0 (H) 06/29/2023    CALCIUM 10.1 06/29/2023    PROTENTOTREF 6.1 02/16/2023    ALBUMIN 4.0 06/29/2023    LABIL2 2.1 02/16/2023    AST 31 06/29/2023    ALT 26 06/29/2023     CBC          3/23/2023    04:03 4/20/2023    12:27 6/29/2023    12:05   CBC   WBC 8.87  5.94  6.12    RBC 4.85  4.50  4.79    Hemoglobin 14.2  13.5  14.1    Hematocrit 43.2  41.0  42.7    MCV 89.1  91.1  89.1    MCH 29.3  30.0  29.4    MCHC 32.9  32.9  33.0    RDW 12.8  12.9  13.2    Platelets 190  191  164      Lab Results   Component Value Date    PROBNP 825.0 03/17/2022    PROBNP 808.0 03/03/2022    PROBNP 898.6 06/26/2016     No components found for: TROP  Lab Results   Component Value Date    DDIMERQUANT 0.65 (H) 03/17/2022         Results for orders placed during the hospital encounter of 04/25/23    Adult Transesophageal Echo (CHARU) W/ Cont if Necessary Per Protocol    Interpretation Summary    There is akinesis of the inferior base    Left ventricular systolic function is low normal. Left ventricular ejection fraction appears to be 51 - 55%.    The right ventricular cavity is mildly dilated. Normal right ventricular systolic function noted.    The left atrial cavity is severely dilated.    There is a Watchman device which appears to be in good position. There is no thrombus in the proximal left atrial appendage or adjacent to the Watchman device itself    The right atrial cavity is moderately dilated.    Mild mitral valve regurgitation is present    Mild tricuspid valve regurgitation is present.    The main pulmonary artery is mildly dilated.    There is a trivial pericardial effusion.    CHADS-VASc Risk Assessment              3 Total Score    1 Hypertension    2 Age >/= 75        Criteria that do not apply:    CHF    DM    PRIOR STROKE/TIA/THROMBO    Vascular Disease    Age 65-74    Sex: Female                  Assessment:          Diagnosis Plan   1. Permanent atrial fibrillation        2. Left ventricular  dysfunction        3. Hypercholesterolemia        4. Presence of Watchman left atrial appendage closure device               Plan:       1.  Permanent atrial fibrillation, rate control strategy.  Patient does not have any symptoms of atrial fibrillation or palpitations  2.  CVA as outlined above, neurology following  3.  History of left atrial appendage occlusion device.  As noted previously has some stability issues after his CVA, now that he has had the Watchman device he feels comfortable discontinuing the anticoagulation so as previously recommended we will stop the apixaban, replace with clopidogrel, continue aspirin.    Thank you very much for allowing us to participate in the care of this pleasant patient.  Please don't hesitate to call if I can be of assistance in any way.      Current Outpatient Medications:     apixaban (ELIQUIS) 5 MG tablet tablet, Take 1 tablet by mouth 2 (Two) Times a Day., Disp: 180 tablet, Rfl: 3    aspirin 81 MG chewable tablet, Chew 1 tablet Daily., Disp: , Rfl:     atorvastatin (Lipitor) 80 MG tablet, Take 1 tablet by mouth Daily., Disp: 90 tablet, Rfl: 3    B Complex Vitamins (vitamin b complex) capsule capsule, Take  by mouth Daily., Disp: , Rfl:     betamethasone dipropionate 0.05 % cream, As Needed., Disp: , Rfl:     guaiFENesin (HUMIBID 3) 400 MG tablet, Take 1 tablet by mouth Daily., Disp: , Rfl:     latanoprost (XALATAN) 0.005 % ophthalmic solution, , Disp: , Rfl:     loratadine (CLARITIN) 10 MG tablet, Take 1 tablet by mouth Daily As Needed for Allergies., Disp: , Rfl:     metoprolol succinate XL (TOPROL-XL) 25 MG 24 hr tablet, TAKE ONE TABLET BY MOUTH DAILY, Disp: 90 tablet, Rfl: 3    omeprazole (priLOSEC) 20 MG capsule, Take 1 capsule by mouth Daily. patient taking 80mg nightly (Patient taking differently: Take 1 capsule by mouth Daily. Taking 20 mg in the morning), Disp: 90 capsule, Rfl: 3    HYDROcodone-acetaminophen (NORCO) 5-325 MG per tablet, Take 1 tablet by mouth  Every 6 (Six) Hours As Needed for Severe Pain. (Patient not taking: Reported on 8/8/2023), Disp: 20 tablet, Rfl: 0    Hydrocortisone, Perianal, (ANUSOL-HC) 2.5 % rectal cream, Insert  into the rectum 2 (Two) Times a Day. For 10 days (Patient not taking: Reported on 8/8/2023), Disp: 28 g, Rfl: 2         No follow-ups on file.

## 2023-08-21 ENCOUNTER — TELEPHONE (OUTPATIENT)
Dept: CARDIOLOGY | Facility: CLINIC | Age: 83
End: 2023-08-21
Payer: MEDICARE

## 2023-08-21 RX ORDER — FAMOTIDINE 40 MG/1
40 TABLET, FILM COATED ORAL DAILY
Qty: 90 TABLET | Refills: 3 | Status: SHIPPED | OUTPATIENT
Start: 2023-08-21

## 2023-08-23 ENCOUNTER — TELEPHONE (OUTPATIENT)
Dept: GASTROENTEROLOGY | Facility: CLINIC | Age: 83
End: 2023-08-23
Payer: MEDICARE

## 2023-08-23 RX ORDER — PANTOPRAZOLE SODIUM 40 MG/1
40 TABLET, DELAYED RELEASE ORAL DAILY
Qty: 90 TABLET | Refills: 3 | Status: SHIPPED | OUTPATIENT
Start: 2023-08-23

## 2023-08-23 NOTE — TELEPHONE ENCOUNTER
----- Message from Steven Cadena MD sent at 8/23/2023  1:06 PM EDT -----  Regarding: FW: Medication change   Contact: [phone]  Looks like they are trying to change him to Plavix which cannot be used with omeprazole.  Change him to pantoprazole 40 mg daily.  ----- Message -----  From: Norma Noble RN  Sent: 8/22/2023   9:07 AM EDT  To: Steven Cadena MD  Subject: FW: Medication change                              ----- Message -----  From: Junito Sorto  Sent: 8/21/2023   2:50 PM EDT  To: [facility]  Subject: Medication change                                Dr Prater cardiology has asked me to stop omeprazole  and replace it with something  but i missed the name. I am sure he has a good reason and I will change if you agree.  My question is do you agree? I fought omeprazole for some time because I don't like more pills but (and this is confidential) I'd rather die of a heart  attack tonight than live a year with throat or related cancer. My brother in law demonstrated how tough he was and he still lost the muniz.  Sorry to bother but computers are the best way to get an answer theses days.  Thank you.    Lolita is resting much better since her last visit.

## 2023-08-23 NOTE — TELEPHONE ENCOUNTER
Pantoprazole 40 mg one tablet daily #90 refill 3 e-scribed to patient's pharmacy.     Patient notified via my chart.

## 2023-09-25 ENCOUNTER — OFFICE VISIT (OUTPATIENT)
Dept: CARDIOLOGY | Facility: CLINIC | Age: 83
End: 2023-09-25

## 2023-09-25 VITALS
SYSTOLIC BLOOD PRESSURE: 128 MMHG | HEIGHT: 67 IN | WEIGHT: 218 LBS | BODY MASS INDEX: 34.21 KG/M2 | DIASTOLIC BLOOD PRESSURE: 74 MMHG | HEART RATE: 54 BPM

## 2023-09-25 DIAGNOSIS — I48.21 PERMANENT ATRIAL FIBRILLATION: Primary | ICD-10-CM

## 2023-09-25 PROCEDURE — 1159F MED LIST DOCD IN RCRD: CPT | Performed by: INTERNAL MEDICINE

## 2023-09-25 PROCEDURE — 93000 ELECTROCARDIOGRAM COMPLETE: CPT | Performed by: INTERNAL MEDICINE

## 2023-09-25 PROCEDURE — 1160F RVW MEDS BY RX/DR IN RCRD: CPT | Performed by: INTERNAL MEDICINE

## 2023-09-25 PROCEDURE — 99214 OFFICE O/P EST MOD 30 MIN: CPT | Performed by: INTERNAL MEDICINE

## 2023-09-25 NOTE — PROGRESS NOTES
[facility] Structural Follow Up Office Note     Encounter Date:23  Patient:Junito Sorto  :1940  MRN:[id number]    Referring Provider: Steve Prater MD    Chief Complaint:   Chief Complaint   Patient presents with    6 month post watchman     History of Presenting Illness:      Mr. Sorto is a 82 y.o. gentleman with past medical history notable for permanent atrial fibrillation, history of stroke, hypertension, mild cardiomyopathy with ejection fraction in the 45% range, esophageal dysphagia status post dilation of his esophagus, and gastric reflux disease who presents for scheduled follow-up after recent watchman implantation.  In general he is done well since his procedure.  He is now 6 months out from his procedure.  When we saw him at the 6-week lux he was doing well he actually wanted to stay on his blood thinner for a little bit but subsequently has transitioned over to dual antiplatelet therapy.  He is done well with this.  He is now at the 6-month lux we can work on de-escalating his therapy further.    Review of Systems:  Review of Systems   Constitutional: Negative.   HENT: Negative.     Eyes: Negative.    Cardiovascular: Negative.    Respiratory: Negative.     Endocrine: Negative.    Hematologic/Lymphatic: Negative.    Skin: Negative.    Musculoskeletal:  Positive for falls.   Gastrointestinal: Negative.    Genitourinary: Negative.    Neurological: Negative.    Psychiatric/Behavioral: Negative.     Allergic/Immunologic: Negative.       Current Outpatient Medications on File Prior to Visit   Medication Sig Dispense Refill    aspirin 81 MG chewable tablet Chew 1 tablet Daily.      atorvastatin (Lipitor) 80 MG tablet Take 1 tablet by mouth Daily. 90 tablet 3    B Complex Vitamins (vitamin b complex) capsule capsule Take  by mouth Daily.      betamethasone dipropionate 0.05 % cream As Needed.      famotidine (Pepcid) 40 MG tablet Take 1 tablet by mouth Daily. 90 tablet 3     HYDROcodone-acetaminophen (NORCO) 5-325 MG per tablet Take 1 tablet by mouth Every 6 (Six) Hours As Needed for Severe Pain. 20 tablet 0    Hydrocortisone, Perianal, (ANUSOL-HC) 2.5 % rectal cream Insert  into the rectum 2 (Two) Times a Day. For 10 days 28 g 2    latanoprost (XALATAN) 0.005 % ophthalmic solution       metoprolol succinate XL (TOPROL-XL) 25 MG 24 hr tablet TAKE ONE TABLET BY MOUTH DAILY 90 tablet 3    [DISCONTINUED] clopidogrel (PLAVIX) 75 MG tablet Take 1 tablet by mouth Daily. 90 tablet 3    [DISCONTINUED] guaiFENesin (HUMIBID 3) 400 MG tablet Take 1 tablet by mouth Daily.      [DISCONTINUED] loratadine (CLARITIN) 10 MG tablet Take 1 tablet by mouth Daily As Needed for Allergies.      [DISCONTINUED] pantoprazole (PROTONIX) 40 MG EC tablet Take 1 tablet by mouth Daily. 90 tablet 3     No current facility-administered medications on file prior to visit.       Allergies   Allergen Reactions    Dilaudid [Hydromorphone Hcl] Hallucinations       Past Medical History:   Diagnosis Date    Allergic     Airborne    Arrhythmia     Arthritis     At risk for obstructive sleep apnea     5    Atrial fibrillation     Bladder outlet obstruction 09/11/2019    BPH (benign prostatic hyperplasia)     Cataract     Replaced    Colon polyp     Coronary artery disease Few years    Afib    Dysphagia     Eczema     GERD (gastroesophageal reflux disease) 2013    Mentioned as possible    Glaucoma Eievated eye pressure    Hernia     Your office reported hiatal    Hiatal hernia     History of medical problems 2022    Xs pressure in left eye. Now being treated in 2023    HL (hearing loss)     Hyperlipidemia     Hypertension     Left ventricular dysfunction     Low back pain     Obesity     Pneumonia 2022    Prostate cancer     Skin cancer     Stroke 10/19/2022    RESIDUAL: LOSS OF RT PERIPHERAL VISION    Visual impairment 2022 stroke    Reading correction       Past Surgical History:   Procedure Laterality Date    APPENDECTOMY   1950s    ATRIAL APPENDAGE EXCLUSION LEFT WITH TRANSESOPHAGEAL ECHOCARDIOGRAM N/A 2023    Procedure: Atrial Appendage Occlusion;  Surgeon: Tony Ceballos MD;  Location: [facility] CATH INVASIVE LOCATION;  Service: Cardiovascular;  Laterality: N/A;    CARDIAC SURGERY      Watchman inserted because of continuous afib    CATARACT EXTRACTION, BILATERAL      COLONOSCOPY  unknown    CYSTOSCOPY TRANSURETHRAL RESECTION OF PROSTATE N/A 2019    Procedure: CYSTOSCOPY TRANSURETHRAL RESECTION OF PROSTATE;  Surgeon: Chip Bedolla MD;  Location: [facility] OR;  Service: Urology    ENDOSCOPY N/A 10/31/2018    LA Grade C reflux esophagitis, HH, erythematous mucosa in the antrum. Path: Gastritis, esophagitis.     ENDOSCOPY N/A 2022    Procedure: ESOPHAGOGASTRODUODENOSCOPY WITH BIOPSIES AND NAILS DILATION #56;  Surgeon: Tony Ramos MD;  Location: [facility] ENDOSCOPY;  Service: Gastroenterology;  Laterality: N/A;  PRE OP - DYSPHAGIA  POST OP- MILD GASTRITIS    EPIDURAL BLOCK      ESOPHAGEAL DILATATION      EYE SURGERY      Cataracts    PROSTATE SURGERY  2014    SKIN CANCER EXCISION      multiple, over that last few years    SKIN CANCER EXCISION Right 2019    FACE,     SKIN CANCER EXCISION          TONSILLECTOMY      around 1950    TRANSURETHRAL RESECTION OF BLADDER N/A     UPPER GASTROINTESTINAL ENDOSCOPY      VASECTOMY         Social History     Socioeconomic History    Marital status:    Tobacco Use    Smoking status: Former     Types: Pipe     Start date: 1959     Quit date: 1961     Years since quittin.9    Smokeless tobacco: Never    Tobacco comments:     Limited by allergies   Vaping Use    Vaping Use: Never used   Substance and Sexual Activity    Alcohol use: Yes     Alcohol/week: 5.0 standard drinks     Types: 5 Glasses of wine per week     Comment: Less than 4 oz /wk since this prob started.    Drug use: Never    Sexual activity: Not Currently  "    Partners: Female     Birth control/protection: Surgical, None, Vasectomy     Comment: 50 years ago       Family History   Problem Relation Age of Onset    Cancer Mother         Pancriatic    Liver disease Mother     Arthritis Mother     Cancer Father         Colon,    Colon cancer Father     Arrhythmia Father         Afib    Heart disease Father     Cancer Brother         Prostate    Arrhythmia Brother         Afib    Hearing loss Brother     Cancer Paternal Grandfather         Prostate    Arrhythmia Paternal Aunt         Irregular beat . Before the time of illness sharing.    Arthritis Sister     Cancer Son         Melanoma    Other Son         Melanoma    Cancer Son         None    Heart disease Son         Afib    Malig Hyperthermia Neg Hx        The following portions of the patient's history were reviewed and updated as appropriate: allergies, current medications, past family history, past medical history, past social history, past surgical history and problem list.       Objective:       Vitals:    09/25/23 1041   BP: 128/74   BP Location: Left arm   Patient Position: Sitting   Pulse: 54   Weight: 98.9 kg (218 lb)   Height: 170.2 cm (67.01\")       Body mass index is 34.13 kg/m².    Physical Exam:  Constitutional: Well appearing, Frail, No acute distress   HENT: Oropharynx clear and membrane moist  Eyes: Normal conjunctiva, no sclera icterus.  Neck: Supple, no carotid bruit bilaterally.  Cardiovascular: Irregularly irregular rate and rhythm, No Murmur, No bilateral lower extremity edema.  Pulmonary: Normal respiratory effort, normal lung sounds, no wheezing.  Neurological: Alert and orient x 3.   Skin: Warm, dry, no ecchymosis, no rash.  Psych: Appropriate mood and affect. Normal judgment and insight.       Lab Results   Component Value Date     06/29/2023     04/20/2023    K 4.6 06/29/2023    K 4.6 04/20/2023     06/29/2023     04/20/2023    CO2 30.0 (H) 06/29/2023    CO2 29.1 (H) " 04/20/2023    BUN 14 06/29/2023    BUN 17 04/20/2023    CREATININE 0.67 (L) 06/29/2023    CREATININE 0.71 (L) 04/20/2023    EGFRIFNONA 98 10/11/2021    EGFRIFNONA 114 04/01/2021    EGFRIFAFRI 119 10/11/2021    EGFRIFAFRI 138 04/01/2021    GLUCOSE 100 (H) 06/29/2023    GLUCOSE 93 04/20/2023    CALCIUM 10.1 06/29/2023    CALCIUM 9.6 04/20/2023    PROTENTOTREF 6.1 02/16/2023    PROTENTOTREF 6.2 01/09/2023    ALBUMIN 4.0 06/29/2023    ALBUMIN 4.1 02/16/2023    BILITOT 1.5 (H) 06/29/2023    BILITOT 1.5 (H) 02/16/2023    AST 31 06/29/2023    AST 29 02/16/2023    ALT 26 06/29/2023    ALT 23 02/16/2023     Lab Results   Component Value Date    WBC 6.12 06/29/2023    WBC 5.94 04/20/2023    HGB 14.1 06/29/2023    HGB 13.5 04/20/2023    HCT 42.7 06/29/2023    HCT 41.0 04/20/2023    MCV 89.1 06/29/2023    MCV 91.1 04/20/2023     06/29/2023     04/20/2023     Lab Results   Component Value Date    CHOL 124 06/29/2023    CHOL 143 10/20/2022    TRIG 48 06/29/2023    TRIG 47 02/16/2023    HDL 57 06/29/2023    HDL 53 02/16/2023    LDL 56 06/29/2023    LDL 45 02/16/2023     Lab Results   Component Value Date    PROBNP 825.0 03/17/2022    PROBNP 808.0 03/03/2022     Lab Results   Component Value Date    TROPONINT <0.010 03/17/2022     Lab Results   Component Value Date    TSH 2.130 03/05/2022         ECG 12 Lead    Date/Time: 9/25/2023 11:05 AM  Performed by: Tony Ceballos MD  Authorized by: Tony Ceballos MD   Comparison: compared with previous ECG from 4/27/2023  Similar to previous ECG  Rhythm: atrial fibrillation      Transesophageal echocardiogram 4/25/2023:  There is akinesis of the inferior base  Left ventricular systolic function is low normal. Left ventricular ejection fraction appears to be 51 - 55%.  The right ventricular cavity is mildly dilated. Normal right ventricular systolic function noted.  The left atrial cavity is severely dilated.  There is a Watchman device which appears to be in good  position. There is no thrombus in the proximal left atrial appendage or adjacent to the Watchman device itself  The right atrial cavity is moderately dilated.  Mild mitral valve regurgitation is present  Mild tricuspid valve regurgitation is present.  The main pulmonary artery is mildly dilated.  There is a trivial pericardial effusion.    Echocardiogram 3/23/2023:  Limited echocardiogram to assess pericardial effusion  No Pericardial effusion is present    Watchman implantation 4/27/2023:  Successful implantation of 24 mm Watchman device    Holter monitor 4/8/2022:  Permanent atrial fibrillation noted throughout study with an average heart rate of 60 bpm and a range of 32 bpm up to 93 bpm    Echocardiogram 3/9/2022:  Left ventricular ejection fraction estimated approximately 46 to 50%  Indeterminate diastolic function  Left atrial volume is severely increased  Right atrial volume is moderately dilated        Assessment:          Diagnosis Plan   1. Permanent atrial fibrillation  ECG 12 Lead             Plan:       Mr. Sorto is a 82 y.o. gentleman with past medical history notable for permanent atrial fibrillation, history of stroke, hypertension, mild cardiomyopathy with ejection fraction in the 45% range, esophageal dysphagia status post dilation of his esophagus, and gastric reflux disease who presents for scheduled follow-up after recent watchman implantation.  Overall he is done well with his watchman implantation.  He was able to come off of anticoagulation as a 6-week CHARU demonstrated good device function with no leak.  He will be due for a another CHARU in about 6 months which would be his 1 year CHARU and we will work on getting this scheduled.  For the time being we can stop his clopidogrel he will stay on aspirin 81    Permanent atrial fibrillation:  [id number] score 6  Status post watchman implantation 3/24/2023 with 24 mm device  Currently on aspirin 81 mg and Plavix 75 mg  We will stop Plavix continue  aspirin 81 mg  Transesophageal echocardiogram 4/25/2023 demonstrates normal device function no evidence of thrombus or leak and we will work on scheduling follow-up transesophageal echocardiogram in approximately 6 months to assess his device at 1 year.      Follow-up:  We will follow-up in approximately 6 months after repeat transesophageal echocardiogram      Thank you for allowing me to participate in the care of Junito GABINO Macario. Feel free to contact me directly with any further questions or concerns.    Tony Ceballos MD  [facility]  09/25/23  11:06 EDT

## 2023-09-29 ENCOUNTER — APPOINTMENT (OUTPATIENT)
Dept: GENERAL RADIOLOGY | Facility: HOSPITAL | Age: 83
End: 2023-09-29
Payer: MEDICARE

## 2023-09-29 ENCOUNTER — APPOINTMENT (OUTPATIENT)
Dept: CT IMAGING | Facility: HOSPITAL | Age: 83
End: 2023-09-29
Payer: MEDICARE

## 2023-09-29 ENCOUNTER — HOSPITAL ENCOUNTER (OUTPATIENT)
Facility: HOSPITAL | Age: 83
Discharge: HOME | End: 2023-09-30
Attending: EMERGENCY MEDICINE | Admitting: STUDENT IN AN ORGANIZED HEALTH CARE EDUCATION/TRAINING PROGRAM
Payer: MEDICARE

## 2023-09-29 DIAGNOSIS — H54.62 VISION LOSS OF LEFT EYE: Primary | ICD-10-CM

## 2023-09-29 PROBLEM — H54.7 LOSS OF VISION: Status: ACTIVE | Noted: 2023-09-29

## 2023-09-29 PROBLEM — Z86.73 HISTORY OF STROKE: Status: ACTIVE | Noted: 2023-09-29

## 2023-09-29 LAB
ABO GROUP BLD: NORMAL
ALBUMIN SERPL-MCNC: 4.1 G/DL (ref 3.5–5.2)
ALBUMIN/GLOB SERPL: 1.5 G/DL
ALP SERPL-CCNC: 86 U/L (ref 39–117)
ALT SERPL W P-5'-P-CCNC: 28 U/L (ref 1–41)
ANION GAP SERPL CALCULATED.3IONS-SCNC: 10.6 MMOL/L (ref 5–15)
APTT PPP: 33.2 SECONDS (ref 22.7–35.4)
AST SERPL-CCNC: 37 U/L (ref 1–40)
BASOPHILS # BLD AUTO: 0.03 10*3/MM3 (ref 0–0.2)
BASOPHILS NFR BLD AUTO: 0.5 % (ref 0–1.5)
BILIRUB SERPL-MCNC: 1.4 MG/DL (ref 0–1.2)
BLD GP AB SCN SERPL QL: NEGATIVE
BUN SERPL-MCNC: 20 MG/DL (ref 8–23)
BUN/CREAT SERPL: 26 (ref 7–25)
CALCIUM SPEC-SCNC: 9.9 MG/DL (ref 8.6–10.5)
CHLORIDE SERPL-SCNC: 109 MMOL/L (ref 98–107)
CO2 SERPL-SCNC: 24.4 MMOL/L (ref 22–29)
CREAT SERPL-MCNC: 0.77 MG/DL (ref 0.76–1.27)
DEPRECATED RDW RBC AUTO: 43.7 FL (ref 37–54)
EGFRCR SERPLBLD CKD-EPI 2021: 89.4 ML/MIN/1.73
EOSINOPHIL # BLD AUTO: 0.15 10*3/MM3 (ref 0–0.4)
EOSINOPHIL NFR BLD AUTO: 2.5 % (ref 0.3–6.2)
ERYTHROCYTE [DISTWIDTH] IN BLOOD BY AUTOMATED COUNT: 13.6 % (ref 12.3–15.4)
GLOBULIN UR ELPH-MCNC: 2.7 GM/DL
GLUCOSE BLDC GLUCOMTR-MCNC: 95 MG/DL (ref 70–130)
GLUCOSE SERPL-MCNC: 85 MG/DL (ref 65–99)
HCT VFR BLD AUTO: 43.2 % (ref 37.5–51)
HGB BLD-MCNC: 14.7 G/DL (ref 13–17.7)
HOLD SPECIMEN: NORMAL
HOLD SPECIMEN: NORMAL
IMM GRANULOCYTES # BLD AUTO: 0.01 10*3/MM3 (ref 0–0.05)
IMM GRANULOCYTES NFR BLD AUTO: 0.2 % (ref 0–0.5)
INR PPP: 1.11 (ref 0.9–1.1)
LYMPHOCYTES # BLD AUTO: 1.68 10*3/MM3 (ref 0.7–3.1)
LYMPHOCYTES NFR BLD AUTO: 28.3 % (ref 19.6–45.3)
MCH RBC QN AUTO: 30.2 PG (ref 26.6–33)
MCHC RBC AUTO-ENTMCNC: 34 G/DL (ref 31.5–35.7)
MCV RBC AUTO: 88.7 FL (ref 79–97)
MONOCYTES # BLD AUTO: 0.94 10*3/MM3 (ref 0.1–0.9)
MONOCYTES NFR BLD AUTO: 15.8 % (ref 5–12)
NEUTROPHILS NFR BLD AUTO: 3.13 10*3/MM3 (ref 1.7–7)
NEUTROPHILS NFR BLD AUTO: 52.7 % (ref 42.7–76)
NRBC BLD AUTO-RTO: 0 /100 WBC (ref 0–0.2)
PLATELET # BLD AUTO: 176 10*3/MM3 (ref 140–450)
PMV BLD AUTO: 10.9 FL (ref 6–12)
POTASSIUM SERPL-SCNC: 3.7 MMOL/L (ref 3.5–5.2)
PROT SERPL-MCNC: 6.8 G/DL (ref 6–8.5)
PROTHROMBIN TIME: 14.5 SECONDS (ref 11.7–14.2)
RBC # BLD AUTO: 4.87 10*6/MM3 (ref 4.14–5.8)
RH BLD: NEGATIVE
SODIUM SERPL-SCNC: 144 MMOL/L (ref 136–145)
T&S EXPIRATION DATE: NORMAL
TROPONIN T SERPL HS-MCNC: 38 NG/L
WBC NRBC COR # BLD: 5.94 10*3/MM3 (ref 3.4–10.8)
WHOLE BLOOD HOLD COAG: NORMAL
WHOLE BLOOD HOLD SPECIMEN: NORMAL

## 2023-09-29 PROCEDURE — 70496 CT ANGIOGRAPHY HEAD: CPT

## 2023-09-29 PROCEDURE — 70498 CT ANGIOGRAPHY NECK: CPT

## 2023-09-29 PROCEDURE — 71045 X-RAY EXAM CHEST 1 VIEW: CPT

## 2023-09-29 PROCEDURE — 82948 REAGENT STRIP/BLOOD GLUCOSE: CPT

## 2023-09-29 PROCEDURE — 86850 RBC ANTIBODY SCREEN: CPT | Performed by: EMERGENCY MEDICINE

## 2023-09-29 PROCEDURE — 25510000001 IOPAMIDOL PER 1 ML: Performed by: EMERGENCY MEDICINE

## 2023-09-29 PROCEDURE — 84484 ASSAY OF TROPONIN QUANT: CPT | Performed by: EMERGENCY MEDICINE

## 2023-09-29 PROCEDURE — 85610 PROTHROMBIN TIME: CPT | Performed by: EMERGENCY MEDICINE

## 2023-09-29 PROCEDURE — 85730 THROMBOPLASTIN TIME PARTIAL: CPT | Performed by: EMERGENCY MEDICINE

## 2023-09-29 PROCEDURE — 99285 EMERGENCY DEPT VISIT HI MDM: CPT

## 2023-09-29 PROCEDURE — 0042T HC CT CEREBRAL PERFUSION W/WO CONTRAST: CPT

## 2023-09-29 PROCEDURE — 80053 COMPREHEN METABOLIC PANEL: CPT | Performed by: EMERGENCY MEDICINE

## 2023-09-29 PROCEDURE — G0378 HOSPITAL OBSERVATION PER HR: HCPCS

## 2023-09-29 PROCEDURE — 93005 ELECTROCARDIOGRAM TRACING: CPT | Performed by: EMERGENCY MEDICINE

## 2023-09-29 PROCEDURE — 86900 BLOOD TYPING SEROLOGIC ABO: CPT | Performed by: EMERGENCY MEDICINE

## 2023-09-29 PROCEDURE — 85025 COMPLETE CBC W/AUTO DIFF WBC: CPT | Performed by: EMERGENCY MEDICINE

## 2023-09-29 PROCEDURE — 86901 BLOOD TYPING SEROLOGIC RH(D): CPT | Performed by: EMERGENCY MEDICINE

## 2023-09-29 RX ORDER — FAMOTIDINE 20 MG/1
40 TABLET, FILM COATED ORAL DAILY
Status: DISCONTINUED | OUTPATIENT
Start: 2023-09-30 | End: 2023-10-01 | Stop reason: HOSPADM

## 2023-09-29 RX ORDER — ASPIRIN 300 MG/1
300 SUPPOSITORY RECTAL DAILY
Status: DISCONTINUED | OUTPATIENT
Start: 2023-09-30 | End: 2023-09-30

## 2023-09-29 RX ORDER — SODIUM CHLORIDE 9 MG/ML
75 INJECTION, SOLUTION INTRAVENOUS CONTINUOUS
Status: DISCONTINUED | OUTPATIENT
Start: 2023-09-29 | End: 2023-10-01 | Stop reason: HOSPADM

## 2023-09-29 RX ORDER — SODIUM CHLORIDE 0.9 % (FLUSH) 0.9 %
10 SYRINGE (ML) INJECTION AS NEEDED
Status: DISCONTINUED | OUTPATIENT
Start: 2023-09-29 | End: 2023-10-01 | Stop reason: HOSPADM

## 2023-09-29 RX ORDER — BISACODYL 10 MG
10 SUPPOSITORY, RECTAL RECTAL DAILY PRN
Status: DISCONTINUED | OUTPATIENT
Start: 2023-09-29 | End: 2023-10-01 | Stop reason: HOSPADM

## 2023-09-29 RX ORDER — ATORVASTATIN CALCIUM 80 MG/1
80 TABLET, FILM COATED ORAL NIGHTLY
Status: DISCONTINUED | OUTPATIENT
Start: 2023-09-29 | End: 2023-09-30

## 2023-09-29 RX ORDER — ASPIRIN 325 MG
325 TABLET ORAL DAILY
Status: DISCONTINUED | OUTPATIENT
Start: 2023-09-30 | End: 2023-09-30

## 2023-09-29 RX ORDER — ACETAMINOPHEN 650 MG/1
650 SUPPOSITORY RECTAL EVERY 4 HOURS PRN
Status: DISCONTINUED | OUTPATIENT
Start: 2023-09-29 | End: 2023-10-01 | Stop reason: HOSPADM

## 2023-09-29 RX ORDER — HYDRALAZINE HYDROCHLORIDE 50 MG/1
50 TABLET, FILM COATED ORAL EVERY 8 HOURS PRN
Status: DISCONTINUED | OUTPATIENT
Start: 2023-09-29 | End: 2023-10-01 | Stop reason: HOSPADM

## 2023-09-29 RX ORDER — ACETAMINOPHEN 325 MG/1
650 TABLET ORAL EVERY 4 HOURS PRN
Status: DISCONTINUED | OUTPATIENT
Start: 2023-09-29 | End: 2023-10-01 | Stop reason: HOSPADM

## 2023-09-29 RX ORDER — ONDANSETRON 2 MG/ML
4 INJECTION INTRAMUSCULAR; INTRAVENOUS EVERY 6 HOURS PRN
Status: DISCONTINUED | OUTPATIENT
Start: 2023-09-29 | End: 2023-10-01 | Stop reason: HOSPADM

## 2023-09-29 RX ORDER — METOPROLOL SUCCINATE 25 MG/1
25 TABLET, EXTENDED RELEASE ORAL DAILY
Status: DISCONTINUED | OUTPATIENT
Start: 2023-09-30 | End: 2023-10-01 | Stop reason: HOSPADM

## 2023-09-29 RX ADMIN — IOPAMIDOL 150 ML: 755 INJECTION, SOLUTION INTRAVENOUS at 19:48

## 2023-09-29 NOTE — ED TRIAGE NOTES
"Pt arrives ambulatory to triage desk via PV.    Pt states \"I had a stroke about a year ago that presented with vision problems. And tonight around 6pm I noticed I was having troubles with my left eye, it is like I am looking through an icecube.\"    Pt with steady gait, no apparent facial droop, or speech problems at this time.    "

## 2023-09-30 ENCOUNTER — APPOINTMENT (OUTPATIENT)
Dept: MRI IMAGING | Facility: HOSPITAL | Age: 83
End: 2023-09-30
Payer: MEDICARE

## 2023-09-30 ENCOUNTER — APPOINTMENT (OUTPATIENT)
Dept: CARDIOLOGY | Facility: HOSPITAL | Age: 83
End: 2023-09-30
Payer: MEDICARE

## 2023-09-30 VITALS
TEMPERATURE: 97.5 F | HEART RATE: 64 BPM | OXYGEN SATURATION: 93 % | WEIGHT: 215 LBS | DIASTOLIC BLOOD PRESSURE: 69 MMHG | HEIGHT: 67 IN | SYSTOLIC BLOOD PRESSURE: 149 MMHG | BODY MASS INDEX: 33.74 KG/M2 | RESPIRATION RATE: 18 BRPM

## 2023-09-30 LAB
ALBUMIN SERPL-MCNC: 3.7 G/DL (ref 3.5–5.2)
ALBUMIN/GLOB SERPL: 1.4 G/DL
ALP SERPL-CCNC: 74 U/L (ref 39–117)
ALT SERPL W P-5'-P-CCNC: 28 U/L (ref 1–41)
ANION GAP SERPL CALCULATED.3IONS-SCNC: 8.9 MMOL/L (ref 5–15)
ASCENDING AORTA: 3.6 CM
AST SERPL-CCNC: 41 U/L (ref 1–40)
BH CV ECHO MEAS - ACS: 1.55 CM
BH CV ECHO MEAS - AO MAX PG: 20.3 MMHG
BH CV ECHO MEAS - AO MEAN PG: 8 MMHG
BH CV ECHO MEAS - AO ROOT DIAM: 3.2 CM
BH CV ECHO MEAS - AO V2 MAX: 225 CM/SEC
BH CV ECHO MEAS - AO V2 VTI: 44.3 CM
BH CV ECHO MEAS - AVA(I,D): 0.94 CM2
BH CV ECHO MEAS - EDV(CUBED): 157.5 ML
BH CV ECHO MEAS - EDV(MOD-SP2): 65 ML
BH CV ECHO MEAS - EDV(MOD-SP4): 87 ML
BH CV ECHO MEAS - EF(MOD-BP): 56.1 %
BH CV ECHO MEAS - EF(MOD-SP2): 52.3 %
BH CV ECHO MEAS - EF(MOD-SP4): 57.5 %
BH CV ECHO MEAS - ESV(CUBED): 38.9 ML
BH CV ECHO MEAS - ESV(MOD-SP2): 31 ML
BH CV ECHO MEAS - ESV(MOD-SP4): 37 ML
BH CV ECHO MEAS - FS: 37.2 %
BH CV ECHO MEAS - IVS/LVPW: 1 CM
BH CV ECHO MEAS - IVSD: 1.2 CM
BH CV ECHO MEAS - LAT PEAK E' VEL: 8.6 CM/SEC
BH CV ECHO MEAS - LV MASS(C)D: 264.4 GRAMS
BH CV ECHO MEAS - LV MAX PG: 2.1 MMHG
BH CV ECHO MEAS - LV MEAN PG: 1.16 MMHG
BH CV ECHO MEAS - LV V1 MAX: 72.4 CM/SEC
BH CV ECHO MEAS - LV V1 VTI: 13.9 CM
BH CV ECHO MEAS - LVIDD: 5.4 CM
BH CV ECHO MEAS - LVIDS: 3.4 CM
BH CV ECHO MEAS - LVOT AREA: 3 CM2
BH CV ECHO MEAS - LVOT DIAM: 1.95 CM
BH CV ECHO MEAS - LVPWD: 1.2 CM
BH CV ECHO MEAS - MED PEAK E' VEL: 8.2 CM/SEC
BH CV ECHO MEAS - MV DEC SLOPE: 528.7 CM/SEC2
BH CV ECHO MEAS - MV DEC TIME: 0.14 SEC
BH CV ECHO MEAS - MV E MAX VEL: 72.8 CM/SEC
BH CV ECHO MEAS - MV MAX PG: 4.6 MMHG
BH CV ECHO MEAS - MV MEAN PG: 1.3 MMHG
BH CV ECHO MEAS - MV P1/2T: 57.6 MSEC
BH CV ECHO MEAS - MV V2 VTI: 26.2 CM
BH CV ECHO MEAS - MVA(P1/2T): 3.8 CM2
BH CV ECHO MEAS - MVA(VTI): 1.59 CM2
BH CV ECHO MEAS - PA ACC TIME: 0.14 SEC
BH CV ECHO MEAS - PA V2 MAX: 100.4 CM/SEC
BH CV ECHO MEAS - RAP SYSTOLE: 3 MMHG
BH CV ECHO MEAS - RV MAX PG: 2.27 MMHG
BH CV ECHO MEAS - RV V1 MAX: 75.4 CM/SEC
BH CV ECHO MEAS - RV V1 VTI: 12.8 CM
BH CV ECHO MEAS - RVSP: 30 MMHG
BH CV ECHO MEAS - SV(LVOT): 41.7 ML
BH CV ECHO MEAS - SV(MOD-SP2): 34 ML
BH CV ECHO MEAS - SV(MOD-SP4): 50 ML
BH CV ECHO MEAS - TAPSE (>1.6): 2.5 CM
BH CV ECHO MEAS - TR MAX PG: 27.7 MMHG
BH CV ECHO MEAS - TR MAX VEL: 263.3 CM/SEC
BH CV ECHO MEASUREMENTS AVERAGE E/E' RATIO: 8.67
BH CV XLRA - RV BASE: 4.4 CM
BH CV XLRA - RV LENGTH: 6.2 CM
BH CV XLRA - RV MID: 3.9 CM
BH CV XLRA - TDI S': 14.7 CM/SEC
BILIRUB SERPL-MCNC: 1.9 MG/DL (ref 0–1.2)
BUN SERPL-MCNC: 12 MG/DL (ref 8–23)
BUN/CREAT SERPL: 24 (ref 7–25)
CALCIUM SPEC-SCNC: 9.2 MG/DL (ref 8.6–10.5)
CHLORIDE SERPL-SCNC: 106 MMOL/L (ref 98–107)
CHOLEST SERPL-MCNC: 114 MG/DL (ref 0–200)
CO2 SERPL-SCNC: 25.1 MMOL/L (ref 22–29)
CREAT SERPL-MCNC: 0.5 MG/DL (ref 0.76–1.27)
DEPRECATED RDW RBC AUTO: 43.3 FL (ref 37–54)
EGFRCR SERPLBLD CKD-EPI 2021: 101.8 ML/MIN/1.73
ERYTHROCYTE [DISTWIDTH] IN BLOOD BY AUTOMATED COUNT: 13.2 % (ref 12.3–15.4)
ERYTHROCYTE [SEDIMENTATION RATE] IN BLOOD: 3 MM/HR (ref 0–20)
GEN 5 2HR TROPONIN T REFLEX: 38 NG/L
GLOBULIN UR ELPH-MCNC: 2.6 GM/DL
GLUCOSE BLDC GLUCOMTR-MCNC: 84 MG/DL (ref 70–130)
GLUCOSE SERPL-MCNC: 86 MG/DL (ref 65–99)
HBA1C MFR BLD: 5.7 % (ref 4.8–5.6)
HCT VFR BLD AUTO: 40.7 % (ref 37.5–51)
HDLC SERPL-MCNC: 55 MG/DL (ref 40–60)
HGB BLD-MCNC: 13.8 G/DL (ref 13–17.7)
LDLC SERPL CALC-MCNC: 48 MG/DL (ref 0–100)
LDLC/HDLC SERPL: 0.91 {RATIO}
LEFT ATRIUM VOLUME INDEX: 45.9 ML/M2
MCH RBC QN AUTO: 30.5 PG (ref 26.6–33)
MCHC RBC AUTO-ENTMCNC: 33.9 G/DL (ref 31.5–35.7)
MCV RBC AUTO: 89.8 FL (ref 79–97)
PLATELET # BLD AUTO: 161 10*3/MM3 (ref 140–450)
PMV BLD AUTO: 11 FL (ref 6–12)
POTASSIUM SERPL-SCNC: 3.2 MMOL/L (ref 3.5–5.2)
PROT SERPL-MCNC: 6.3 G/DL (ref 6–8.5)
RBC # BLD AUTO: 4.53 10*6/MM3 (ref 4.14–5.8)
SINUS: 3.3 CM
SODIUM SERPL-SCNC: 140 MMOL/L (ref 136–145)
STJ: 3.3 CM
TRIGL SERPL-MCNC: 45 MG/DL (ref 0–150)
TROPONIN T DELTA: -3 NG/L
TROPONIN T SERPL HS-MCNC: 41 NG/L
TSH SERPL DL<=0.05 MIU/L-ACNC: 1.47 UIU/ML (ref 0.27–4.2)
VLDLC SERPL-MCNC: 11 MG/DL (ref 5–40)
WBC NRBC COR # BLD: 5.42 10*3/MM3 (ref 3.4–10.8)

## 2023-09-30 PROCEDURE — 80053 COMPREHEN METABOLIC PANEL: CPT | Performed by: NURSE PRACTITIONER

## 2023-09-30 PROCEDURE — 80061 LIPID PANEL: CPT | Performed by: NURSE PRACTITIONER

## 2023-09-30 PROCEDURE — A9270 NON-COVERED ITEM OR SERVICE: HCPCS | Performed by: STUDENT IN AN ORGANIZED HEALTH CARE EDUCATION/TRAINING PROGRAM

## 2023-09-30 PROCEDURE — 83036 HEMOGLOBIN GLYCOSYLATED A1C: CPT | Performed by: NURSE PRACTITIONER

## 2023-09-30 PROCEDURE — 36415 COLL VENOUS BLD VENIPUNCTURE: CPT | Performed by: NURSE PRACTITIONER

## 2023-09-30 PROCEDURE — 63710000001 METOPROLOL SUCCINATE XL 25 MG TABLET SUSTAINED-RELEASE 24 HOUR: Performed by: STUDENT IN AN ORGANIZED HEALTH CARE EDUCATION/TRAINING PROGRAM

## 2023-09-30 PROCEDURE — 82948 REAGENT STRIP/BLOOD GLUCOSE: CPT

## 2023-09-30 PROCEDURE — 70551 MRI BRAIN STEM W/O DYE: CPT

## 2023-09-30 PROCEDURE — 85027 COMPLETE CBC AUTOMATED: CPT | Performed by: NURSE PRACTITIONER

## 2023-09-30 PROCEDURE — G0378 HOSPITAL OBSERVATION PER HR: HCPCS

## 2023-09-30 PROCEDURE — 93306 TTE W/DOPPLER COMPLETE: CPT | Performed by: STUDENT IN AN ORGANIZED HEALTH CARE EDUCATION/TRAINING PROGRAM

## 2023-09-30 PROCEDURE — 84484 ASSAY OF TROPONIN QUANT: CPT | Performed by: NURSE PRACTITIONER

## 2023-09-30 PROCEDURE — 63710000001 ASPIRIN 325 MG TABLET: Performed by: NURSE PRACTITIONER

## 2023-09-30 PROCEDURE — 63710000001 FAMOTIDINE 20 MG TABLET: Performed by: STUDENT IN AN ORGANIZED HEALTH CARE EDUCATION/TRAINING PROGRAM

## 2023-09-30 PROCEDURE — 99204 OFFICE O/P NEW MOD 45 MIN: CPT | Performed by: STUDENT IN AN ORGANIZED HEALTH CARE EDUCATION/TRAINING PROGRAM

## 2023-09-30 PROCEDURE — A9270 NON-COVERED ITEM OR SERVICE: HCPCS | Performed by: NURSE PRACTITIONER

## 2023-09-30 PROCEDURE — 63710000001 CLOPIDOGREL 75 MG TABLET: Performed by: STUDENT IN AN ORGANIZED HEALTH CARE EDUCATION/TRAINING PROGRAM

## 2023-09-30 PROCEDURE — 85652 RBC SED RATE AUTOMATED: CPT | Performed by: PSYCHIATRY & NEUROLOGY

## 2023-09-30 PROCEDURE — 25810000003 SODIUM CHLORIDE 0.9 % SOLUTION: Performed by: NURSE PRACTITIONER

## 2023-09-30 PROCEDURE — 63710000001 ATORVASTATIN 80 MG TABLET: Performed by: NURSE PRACTITIONER

## 2023-09-30 PROCEDURE — 84443 ASSAY THYROID STIM HORMONE: CPT | Performed by: NURSE PRACTITIONER

## 2023-09-30 PROCEDURE — 97165 OT EVAL LOW COMPLEX 30 MIN: CPT

## 2023-09-30 PROCEDURE — 93306 TTE W/DOPPLER COMPLETE: CPT

## 2023-09-30 PROCEDURE — 63710000001 ATORVASTATIN 20 MG TABLET: Performed by: STUDENT IN AN ORGANIZED HEALTH CARE EDUCATION/TRAINING PROGRAM

## 2023-09-30 RX ORDER — ATORVASTATIN CALCIUM 40 MG/1
40 TABLET, FILM COATED ORAL NIGHTLY
Qty: 30 TABLET | Refills: 0 | Status: SHIPPED | OUTPATIENT
Start: 2023-09-30 | End: 2023-09-30 | Stop reason: SDUPTHER

## 2023-09-30 RX ORDER — ATORVASTATIN CALCIUM 20 MG/1
40 TABLET, FILM COATED ORAL NIGHTLY
Status: DISCONTINUED | OUTPATIENT
Start: 2023-09-30 | End: 2023-10-01 | Stop reason: HOSPADM

## 2023-09-30 RX ORDER — CLOPIDOGREL BISULFATE 75 MG/1
75 TABLET ORAL DAILY
COMMUNITY
Start: 2023-08-08 | End: 2023-10-01 | Stop reason: HOSPADM

## 2023-09-30 RX ORDER — ATORVASTATIN CALCIUM 40 MG/1
40 TABLET, FILM COATED ORAL NIGHTLY
Qty: 30 TABLET | Refills: 0 | Status: SHIPPED | OUTPATIENT
Start: 2023-09-30

## 2023-09-30 RX ORDER — CLOPIDOGREL BISULFATE 75 MG/1
75 TABLET ORAL DAILY
Status: DISCONTINUED | OUTPATIENT
Start: 2023-09-30 | End: 2023-09-30

## 2023-09-30 RX ADMIN — ATORVASTATIN CALCIUM 40 MG: 20 TABLET, FILM COATED ORAL at 20:32

## 2023-09-30 RX ADMIN — ATORVASTATIN CALCIUM 80 MG: 80 TABLET, FILM COATED ORAL at 00:38

## 2023-09-30 RX ADMIN — FAMOTIDINE 40 MG: 20 TABLET, FILM COATED ORAL at 08:15

## 2023-09-30 RX ADMIN — ASPIRIN 325 MG: 325 TABLET ORAL at 08:15

## 2023-09-30 RX ADMIN — CLOPIDOGREL BISULFATE 75 MG: 75 TABLET, FILM COATED ORAL at 08:15

## 2023-09-30 RX ADMIN — METOPROLOL SUCCINATE 25 MG: 25 TABLET, EXTENDED RELEASE ORAL at 08:15

## 2023-09-30 RX ADMIN — SODIUM CHLORIDE 75 ML/HR: 9 INJECTION, SOLUTION INTRAVENOUS at 00:38

## 2023-09-30 NOTE — ED NOTES
Nursing report ED to floor  Junito Sorto  82 y.o.  male    HPI :   Chief Complaint   Patient presents with    Loss of Vision    Blurred Vision    Stroke     Rule out        Admitting doctor:   Lida Wade MD    Admitting diagnosis:   The encounter diagnosis was Vision loss of left eye.    Code status:   Current Code Status       Date Active Code Status Order ID Comments User Context       9/29/2023 2123 CPR (Attempt to Resuscitate) [id number]  Debbi Llanos APRN ED        Question Answer    Code Status (Patient has no pulse and is not breathing) CPR (Attempt to Resuscitate)    Medical Interventions (Patient has pulse or is breathing) Full Support                    Allergies:   Dilaudid [hydromorphone hcl]    Isolation:   No active isolations    Intake and Output  No intake or output data in the 24 hours ending 09/29/23 2244    Weight:       09/29/23 1957   Weight: 97.9 kg (215 lb 13.3 oz)       Most recent vitals:   Vitals:    09/29/23 2110 09/29/23 2111 09/29/23 2115 09/29/23 2120   BP:  156/94     BP Location:       Patient Position:       Pulse: 71  63 64   Resp:       Temp:       TempSrc:       SpO2: 94%  91% 93%   Weight:       Height:           Active LDAs/IV Access:   Lines, Drains & Airways       Active LDAs       Name Placement date Placement time Site Days    Peripheral IV Right;Posterior Hand --  --  Hand  --    Peripheral IV 09/29/23 1930 Anterior;Distal;Left;Upper Arm 09/29/23 1930  Arm  less than 1                    Labs (abnormal labs have a star):   Labs Reviewed   COMPREHENSIVE METABOLIC PANEL - Abnormal; Notable for the following components:       Result Value    Chloride 109 (*)     Total Bilirubin 1.4 (*)     BUN/Creatinine Ratio 26.0 (*)     All other components within normal limits    Narrative:     GFR Normal >60  Chronic Kidney Disease <60  Kidney Failure <15    The GFR formula is only valid for adults with stable renal function between ages 18 and 70.   PROTIME-INR -  Abnormal; Notable for the following components:    Protime 14.5 (*)     INR 1.11 (*)     All other components within normal limits   SINGLE HSTROPONIN T - Abnormal; Notable for the following components:    HS Troponin T 38 (*)     All other components within normal limits    Narrative:     High Sensitive Troponin T Reference Range:  <10.0 ng/L- Negative Female for AMI  <15.0 ng/L- Negative Male for AMI  >=10 - Abnormal Female indicating possible myocardial injury.  >=15 - Abnormal Male indicating possible myocardial injury.   Clinicians would have to utilize clinical acumen, EKG, Troponin, and serial changes to determine if it is an Acute Myocardial Infarction or myocardial injury due to an underlying chronic condition.        CBC WITH AUTO DIFFERENTIAL - Abnormal; Notable for the following components:    Monocyte % 15.8 (*)     Monocytes, Absolute 0.94 (*)     All other components within normal limits   APTT - Normal   POCT GLUCOSE FINGERSTICK - Normal   RAINBOW DRAW    Narrative:     The following orders were created for panel order [facility] Draw.  Procedure                               Abnormality         Status                     ---------                               -----------         ------                     Green Top (Gel)[[id number]]                                  Final result               Lavender Top[[id number]]                                     Final result               Gold Top - SST[[id number]]                                   Final result               Light Blue Top[[id number]]                                   Final result                 Please view results for these tests on the individual orders.   CBC (NO DIFF)   COMPREHENSIVE METABOLIC PANEL   HEMOGLOBIN A1C   LIPID PANEL   TSH   POCT GLUCOSE FINGERSTICK   POCT GLUCOSE FINGERSTICK   POCT GLUCOSE FINGERSTICK   POCT GLUCOSE FINGERSTICK   POCT GLUCOSE FINGERSTICK   TYPE AND SCREEN   GREEN TOP   LAVENDER TOP   GOLD TOP - SST   LIGHT BLUE  TOP   CBC AND DIFFERENTIAL    Narrative:     The following orders were created for panel order CBC & Differential.  Procedure                               Abnormality         Status                     ---------                               -----------         ------                     CBC Auto Differential[[id number]]        Abnormal            Final result                 Please view results for these tests on the individual orders.       EKG:   ECG 12 Lead Stroke Evaluation   Preliminary Result   HEART RATE= 71  bpm   RR Interval= 845  ms   KS Interval=   ms   P Horizontal Axis=   deg   P Front Axis=   deg   QRSD Interval= 109  ms   QT Interval= 409  ms   QTcB= 445  ms   QRS Axis= -54  deg   T Wave Axis= 48  deg   - ABNORMAL ECG -   Atrial fibrillation   Ventricular bigeminy   LAD, consider left anterior fascicular block   Abnormal R-wave progression, late transition   Electronically Signed By:    Date and Time of Study: 2023-09-29 20:01:08          Meds given in ED:   Medications   sodium chloride 0.9 % flush 10 mL (has no administration in time range)   sodium chloride 0.9 % flush 10 mL (has no administration in time range)   sodium chloride 0.9 % infusion (has no administration in time range)   acetaminophen (TYLENOL) tablet 650 mg (has no administration in time range)     Or   acetaminophen (TYLENOL) suppository 650 mg (has no administration in time range)   aspirin tablet 325 mg (has no administration in time range)     Or   aspirin suppository 300 mg (has no administration in time range)   atorvastatin (LIPITOR) tablet 80 mg (has no administration in time range)   ondansetron (ZOFRAN) injection 4 mg (has no administration in time range)   bisacodyl (DULCOLAX) suppository 10 mg (has no administration in time range)   iopamidol (ISOVUE-370) 76 % injection 150 mL (150 mL Intravenous Given 9/29/23 1948)       Imaging results:  CT Angiogram Neck    Result Date: 9/29/2023  1. Small (6 mm) area of increased Tmax  in the left occipital lobe localizes to a chronic left occipital/PCA territory infarct. 2. Patent major arterial vasculature within the head and neck.     Radiation dose reduction techniques were utilized, including automated exposure control and exposure modulation based on body size.  This report was finalized on 2023 8:18 PM by Dr. Lencho Cabral M.D.      CT Angiogram Head w AI Analysis of LVO    Result Date: 2023  1. Small (6 mm) area of increased Tmax in the left occipital lobe localizes to a chronic left occipital/PCA territory infarct. 2. Patent major arterial vasculature within the head and neck.     Radiation dose reduction techniques were utilized, including automated exposure control and exposure modulation based on body size.  This report was finalized on 2023 8:18 PM by Dr. Lencho Cabral M.D.      CT CEREBRAL PERFUSION WITH & WITHOUT CONTRAST    Result Date: 2023  1. Small (6 mm) area of increased Tmax in the left occipital lobe localizes to a chronic left occipital/PCA territory infarct. 2. Patent major arterial vasculature within the head and neck.     Radiation dose reduction techniques were utilized, including automated exposure control and exposure modulation based on body size.  This report was finalized on 2023 8:18 PM by Dr. Lencho Cabral M.D.       Ambulatory status:   -assist x2    Social issues:   Social History     Socioeconomic History    Marital status:    Tobacco Use    Smoking status: Former     Types: Pipe     Start date: 1959     Quit date: 1961     Years since quittin.9    Smokeless tobacco: Never    Tobacco comments:     Limited by allergies   Vaping Use    Vaping Use: Never used   Substance and Sexual Activity    Alcohol use: Yes     Alcohol/week: 5.0 standard drinks     Types: 5 Glasses of wine per week     Comment: Less than 4 oz /wk since this prob started.    Drug use: Never    Sexual activity: Not Currently     Partners:  Female     Birth control/protection: Surgical, None, Vasectomy     Comment: 50 years ago       NIH Stroke Scale:  Interval: baseline    Gio Bedolla RN  09/29/23 22:44 EDT

## 2023-09-30 NOTE — CONSULTS
Neurology Consult Note    Consult Date: 9/30/2023    Referring MD: Lida Wade MD    Reason for Consult I have been asked to see the patient in neurological consultation to render advice and opinion regarding left eye vision loss.    Junito Sorto is a 82 y.o. 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, atrial fibrillation status post Watchman device 6 months ago currently on dual antiplatelet therapy with aspirin and Plavix, essential hypertension, mixed hyperlipidemia, elevated pressure on the left eye using drops latanoprost (XALATAN) 0.005 % ophthalmic solution to decrease pressure at baseline he stated that yesterday he was sitting in the evening at 6 PM reading a paper then suddenly felt something on his left eye he felt it was an eyelash, he tried to wipe it out but did not help, he used over-the-counter drops but did not help then he decided to come to the emergency department for evaluation, he denied pain but he stated he felt pressure behind the left eye on the way to the ED he stated his left eye vision got worse to the point that he could not see, he described that he was seeing through and ice with the left eye, he does have residual right hemianopsia from his old stroke.  On exam today no focal neurological symptoms other than the known right hemianopsia.  He was able to count fingers with the left eyes and identify colors.  He still feels there is a spot on his left eye that he cannot see through at it moves when he moves his eyes.    Past Medical History:   Diagnosis Date    Allergic     Airborne    Arrhythmia     Arthritis     At risk for obstructive sleep apnea     5    Atrial fibrillation     Bladder outlet obstruction 09/11/2019    BPH (benign prostatic hyperplasia)     Cataract     Replaced    Colon polyp     Coronary artery disease Few years    Afib    Dysphagia     Eczema     GERD (gastroesophageal reflux disease) 2013     "Mentioned as possible    Glaucoma Eievated eye pressure    Hernia     Your office reported hiatal    Hiatal hernia     History of medical problems 2022    Xs pressure in left eye. Now being treated in 2023    HL (hearing loss)     Hyperlipidemia     Hypertension     Left ventricular dysfunction     Low back pain     Obesity     Pneumonia 2022    Prostate cancer     Skin cancer     Stroke 10/19/2022    RESIDUAL: LOSS OF RT PERIPHERAL VISION    Visual impairment 2022 stroke    Reading correction       Exam  /83 (BP Location: Right arm, Patient Position: Lying)   Pulse 59   Temp 97.5 °F (36.4 °C) (Oral)   Resp 20   Ht 170.2 cm (67.01\")   Wt 97.9 kg (215 lb 13.3 oz)   SpO2 95%   BMI 33.79 kg/m²   Gen: NAD, vitals reviewed  MS: oriented x3, recent/remote memory intact, normal attention/concentration, language intact, no neglect.  CN: Right hemianopsia, PERRL, EOMI, no facial droop, no dysarthria  Motor: 5/5 throughout upper and lower extremities, normal tone    DATA:    Lab Results   Component Value Date    GLUCOSE 86 09/30/2023    CALCIUM 9.2 09/30/2023     09/30/2023    K 3.2 (L) 09/30/2023    CO2 25.1 09/30/2023     09/30/2023    BUN 12 09/30/2023    CREATININE 0.50 (L) 09/30/2023    EGFRIFAFRI 119 10/11/2021    EGFRIFNONA 98 10/11/2021    BCR 24.0 09/30/2023    ANIONGAP 8.9 09/30/2023     Lab Results   Component Value Date    WBC 5.42 09/30/2023    HGB 13.8 09/30/2023    HCT 40.7 09/30/2023    MCV 89.8 09/30/2023     09/30/2023       Lab review: CBC and BMP unremarkable, TSH normal LDL 48, A1c 5.7.    Imaging review: I personally reviewed MRI brain which showed no acute abnormality.  CT angio head and neck showed no significant stenosis or occlusion in the intracranial or extracranial blood vessels.    Diagnoses:  Left eye visual loss etiology could be an embolic stroke versus glaucoma versus retinal hemorrhage or other eye problems.    Pre-stroke MRS: 2  NIHSS: 3    Comments: He was " recently seen by cardiology on 9/25 for his Watchman device and per the note it was functioning and not leaking.    PLAN:   1.  I highly recommend a dilated eye exam by ophthalmology prior to discharge.  2.  Patient already on dual antiplatelet therapy.  He stated that he was told to stop Plavix during that watchman placed over 6 months now but he did not stop it.  I would hold Plavix until an eye exam to rule out retinal hemorrhage.  3.  Decrease Lipitor to 40 mg daily, his LDL 48 with goal less than 70.  4.  We will order limited 2D echo to rule out cardiac thrombus.    Stroke will continue to follow and advise, thank you for the consult.    Medical Decision Making for this neurology consultation consists of the following:  Review of previous chart, including H/P, provider and nursing notes as applicable.  Review of medications and vital signs.  Review of previous labs, neuroimaging, and additional relevant diagnostics.   Interpretation of laboratory, imaging, and other diagnostic results  Discussion with other providers and family   Total face-to-face/floor time: 60 minutes.

## 2023-09-30 NOTE — NURSING NOTE
Attempted to place ophthalmology consult with MD Galeano as directed to do so by the on-call list. Answering service informed this RN that the MD would have to be called directly. Called the MD at the number listed on the on-call list and voicemail left with the unit phone number.

## 2023-09-30 NOTE — PLAN OF CARE
Problem: Adult Inpatient Plan of Care  Goal: Plan of Care Review  Outcome: Ongoing, Progressing   Goal Outcome Evaluation:               Bedside swallow evaluation not performed his date. RN stated pt is passed the swallow screen and is now tolerating a regular diet with thin liquids.

## 2023-09-30 NOTE — H&P
"      Patient Name:  Junito Sorto  YOB: 1940  MRN:  [id number]  Admit Date:  9/29/2023  Patient Care Team:  Fan Schulz MD as PCP - General  Steven Cadena MD as Consulting Physician (Gastroenterology)  Steve Prater III, MD as Consulting Physician (Cardiology)      Subjective   History Present Illness     Chief Complaint   Patient presents with    Loss of Vision    Blurred Vision    Stroke     Rule out        Mr. Sorto is a 82 y.o. former smoker with a history of ischemic left PCA stroke, permanent atrial fibrillation, hyperlipidemia, and GERD that presents to [facility] complaining of vision loss in his left eye.  He reports that around 6 PM tonight, he had a sudden haziness in his left eye that progressively worsened over the next 20 to 30 minutes.  He reports it was if he was \"looking through a frosted window.\"  He states he had a stroke about a year ago and he had blurred vision at that time, so he presented to the ED for further evaluation.  He reports chronic decreased peripheral vision in his right eye due to the previous stroke.  He denies headache, weakness, paresthesias, facial asymmetry, and speech disturbance.  He states he has a history of ocular hypertension for which he currently uses eye drops prescribed by his optometrist.  He reports his vision has improved in his left eye but is not completely back to his baseline. CTA of the head/neck and CTA head showed evidence of a chronic left occipital/PCA territory infarct and patent major arterial vasculature within the head and neck. He is being admitted for further evaluation.      History of Present Illness  Review of Systems   Constitutional:  Negative for chills and fever.   HENT:  Negative for congestion and sore throat.    Eyes:  Positive for visual disturbance. Negative for photophobia, pain, discharge, redness and itching.   Respiratory:  Negative for cough and shortness of breath.  "   Cardiovascular:  Negative for chest pain, palpitations and leg swelling.   Gastrointestinal:  Negative for abdominal pain, nausea and vomiting.   Endocrine: Negative for polydipsia, polyphagia and polyuria.   Musculoskeletal:  Negative for arthralgias and myalgias.   Neurological:  Negative for dizziness, facial asymmetry, speech difficulty, weakness, light-headedness, numbness and headaches.      Personal History     Past Medical History:   Diagnosis Date    Allergic     Airborne    Arrhythmia     Arthritis     At risk for obstructive sleep apnea     5    Atrial fibrillation     Bladder outlet obstruction 09/11/2019    BPH (benign prostatic hyperplasia)     Cataract     Replaced    Colon polyp     Coronary artery disease Few years    Afib    Dysphagia     Eczema     GERD (gastroesophageal reflux disease) 2013    Mentioned as possible    Glaucoma Eievated eye pressure    Hernia     Your office reported hiatal    Hiatal hernia     History of medical problems 2022    Xs pressure in left eye. Now being treated in 2023    HL (hearing loss)     Hyperlipidemia     Hypertension     Left ventricular dysfunction     Low back pain     Obesity     Pneumonia 2022    Prostate cancer     Skin cancer     Stroke 10/19/2022    RESIDUAL: LOSS OF RT PERIPHERAL VISION    Visual impairment 2022 stroke    Reading correction     Past Surgical History:   Procedure Laterality Date    APPENDECTOMY  1950s    ATRIAL APPENDAGE EXCLUSION LEFT WITH TRANSESOPHAGEAL ECHOCARDIOGRAM N/A 03/22/2023    Procedure: Atrial Appendage Occlusion;  Surgeon: Tony Ceballos MD;  Location: [facility] INVASIVE LOCATION;  Service: Cardiovascular;  Laterality: N/A;    CARDIAC SURGERY  2023    Watchman inserted because of continuous afib    CATARACT EXTRACTION, BILATERAL      COLONOSCOPY  unknown    CYSTOSCOPY TRANSURETHRAL RESECTION OF PROSTATE N/A 09/11/2019    Procedure: CYSTOSCOPY TRANSURETHRAL RESECTION OF PROSTATE;  Surgeon: Chip Bedolla MD;   Location: [facility] MAIN OR;  Service: Urology    ENDOSCOPY N/A 10/31/2018    LA Grade C reflux esophagitis, HH, erythematous mucosa in the antrum. Path: Gastritis, esophagitis.     ENDOSCOPY N/A 2022    Procedure: ESOPHAGOGASTRODUODENOSCOPY WITH BIOPSIES AND NAILS DILATION #56;  Surgeon: Tony Ramos MD;  Location: [facility] ENDOSCOPY;  Service: Gastroenterology;  Laterality: N/A;  PRE OP - DYSPHAGIA  POST OP- MILD GASTRITIS    EPIDURAL BLOCK      ESOPHAGEAL DILATATION      EYE SURGERY      Cataracts    PROSTATE SURGERY  2014    SKIN CANCER EXCISION      multiple, over that last few years    SKIN CANCER EXCISION Right 2019    FACE,     SKIN CANCER EXCISION          TONSILLECTOMY      around 1950    TRANSURETHRAL RESECTION OF BLADDER N/A     UPPER GASTROINTESTINAL ENDOSCOPY      VASECTOMY       Family History   Problem Relation Age of Onset    Cancer Mother         Pancriatic    Liver disease Mother     Arthritis Mother     Cancer Father         Colon,    Colon cancer Father     Arrhythmia Father         Afib    Heart disease Father     Cancer Brother         Prostate    Arrhythmia Brother         Afib    Hearing loss Brother     Cancer Paternal Grandfather         Prostate    Arrhythmia Paternal Aunt         Irregular beat . Before the time of illness sharing.    Arthritis Sister     Cancer Son         Melanoma    Other Son         Melanoma    Cancer Son         None    Heart disease Son         Afib    Malig Hyperthermia Neg Hx      Social History     Tobacco Use    Smoking status: Former     Types: Pipe     Start date: 1959     Quit date: 1961     Years since quittin.9    Smokeless tobacco: Never    Tobacco comments:     Limited by allergies   Vaping Use    Vaping Use: Never used   Substance Use Topics    Alcohol use: Yes     Alcohol/week: 5.0 standard drinks     Types: 5 Glasses of wine per week     Comment: Less than 4 oz /wk since this prob started.    Drug use:  Never     (Not in a hospital admission)    Allergies:    Allergies   Allergen Reactions    Dilaudid [Hydromorphone Hcl] Hallucinations       Objective    Objective     Vital Signs  Temp:  [98.7 °F (37.1 °C)] 98.7 °F (37.1 °C)  Heart Rate:  [77-84] 82  Resp:  [18-20] 20  BP: (154-182)/() 154/90  SpO2:  [90 %-98 %] 90 %  on   ;   Device (Oxygen Therapy): room air  Body mass index is 33.79 kg/m².    Physical Exam  Vitals and nursing note reviewed.   Constitutional:       Appearance: Normal appearance.   HENT:      Head: Normocephalic and atraumatic.      Nose: Nose normal.      Mouth/Throat:      Mouth: Mucous membranes are moist.      Pharynx: Oropharynx is clear.   Eyes:      Extraocular Movements: Extraocular movements intact.      Conjunctiva/sclera: Conjunctivae normal.      Pupils: Pupils are equal, round, and reactive to light.   Cardiovascular:      Rate and Rhythm: Normal rate. Rhythm irregular.      Pulses: Normal pulses.      Heart sounds: Normal heart sounds.   Pulmonary:      Effort: Pulmonary effort is normal.      Breath sounds: Normal breath sounds.   Abdominal:      General: Bowel sounds are normal.      Palpations: Abdomen is soft.   Musculoskeletal:         General: No swelling. Normal range of motion.      Cervical back: Normal range of motion and neck supple.   Skin:     General: Skin is warm.      Comments: Abrasion to right knee   Neurological:      General: No focal deficit present.      Mental Status: He is alert and oriented to person, place, and time.   Psychiatric:         Mood and Affect: Mood normal.         Behavior: Behavior normal.       Results Review:  I reviewed the patient's new clinical results.  I reviewed the patient's new imaging results and agree with the interpretation.  I reviewed the patient's other test results and agree with the interpretation  I personally viewed and interpreted the patient's EKG/Telemetry data  Discussed with ED provider.    Lab Results (last 24  hours)       Procedure Component Value Units Date/Time    POC Glucose Once [[id number]]  (Normal) Collected: 09/29/23 1923    Specimen: Blood Updated: 09/29/23 1934     Glucose 95 mg/dL     Comprehensive Metabolic Panel [[id number]]  (Abnormal) Collected: 09/29/23 1930    Specimen: Blood Updated: 09/29/23 2004     Glucose 85 mg/dL      BUN 20 mg/dL      Creatinine 0.77 mg/dL      Sodium 144 mmol/L      Potassium 3.7 mmol/L      Chloride 109 mmol/L      CO2 24.4 mmol/L      Calcium 9.9 mg/dL      Total Protein 6.8 g/dL      Albumin 4.1 g/dL      ALT (SGPT) 28 U/L      AST (SGOT) 37 U/L      Alkaline Phosphatase 86 U/L      Total Bilirubin 1.4 mg/dL      Globulin 2.7 gm/dL      A/G Ratio 1.5 g/dL      BUN/Creatinine Ratio 26.0     Anion Gap 10.6 mmol/L      eGFR 89.4 mL/min/1.73     Narrative:      GFR Normal >60  Chronic Kidney Disease <60  Kidney Failure <15    The GFR formula is only valid for adults with stable renal function between ages 18 and 70.    Protime-INR [[id number]]  (Abnormal) Collected: 09/29/23 1930    Specimen: Blood Updated: 09/29/23 2003     Protime 14.5 Seconds      INR 1.11    aPTT [[id number]]  (Normal) Collected: 09/29/23 1930    Specimen: Blood Updated: 09/29/23 2003     PTT 33.2 seconds     Single High Sensitivity Troponin T [[id number]]  (Abnormal) Collected: 09/29/23 1930    Specimen: Blood Updated: 09/29/23 2004     HS Troponin T 38 ng/L     Narrative:      High Sensitive Troponin T Reference Range:  <10.0 ng/L- Negative Female for AMI  <15.0 ng/L- Negative Male for AMI  >=10 - Abnormal Female indicating possible myocardial injury.  >=15 - Abnormal Male indicating possible myocardial injury.   Clinicians would have to utilize clinical acumen, EKG, Troponin, and serial changes to determine if it is an Acute Myocardial Infarction or myocardial injury due to an underlying chronic condition.         CBC & Differential [[id number]]  (Abnormal) Collected: 09/29/23 1930    Specimen: Blood Updated:  09/29/23 2022    Narrative:      The following orders were created for panel order CBC & Differential.  Procedure                               Abnormality         Status                     ---------                               -----------         ------                     CBC Auto Differential[[id number]]        Abnormal            Final result                 Please view results for these tests on the individual orders.    CBC Auto Differential [[id number]]  (Abnormal) Collected: 09/29/23 1930    Specimen: Blood Updated: 09/29/23 2022     WBC 5.94 10*3/mm3      RBC 4.87 10*6/mm3      Hemoglobin 14.7 g/dL      Hematocrit 43.2 %      MCV 88.7 fL      MCH 30.2 pg      MCHC 34.0 g/dL      RDW 13.6 %      RDW-SD 43.7 fl      MPV 10.9 fL      Platelets 176 10*3/mm3      Neutrophil % 52.7 %      Lymphocyte % 28.3 %      Monocyte % 15.8 %      Eosinophil % 2.5 %      Basophil % 0.5 %      Immature Grans % 0.2 %      Neutrophils, Absolute 3.13 10*3/mm3      Lymphocytes, Absolute 1.68 10*3/mm3      Monocytes, Absolute 0.94 10*3/mm3      Eosinophils, Absolute 0.15 10*3/mm3      Basophils, Absolute 0.03 10*3/mm3      Immature Grans, Absolute 0.01 10*3/mm3      nRBC 0.0 /100 WBC             Imaging Results (Last 24 Hours)       Procedure Component Value Units Date/Time    XR Chest 1 View [[id number]] Collected: 09/29/23 2048     Updated: 09/29/23 2052    Narrative:       XR CHEST 1 VW-     HISTORY: Acute stroke protocol, loss of vision in left eye.     COMPARISON: Chest radiograph 10/19/2022     FINDINGS:    A single view of the chest was obtained.  The cardiac silhouette is enlarged. There is calcific aortic  atherosclerosis. There is dense airspace opacity at the left lung base,  which could reflect a combination of pleural fluid and pulmonary  opacification, possibly atelectasis or pneumonia in the appropriate  clinical setting. There is asymmetric elevation of the right  hemidiaphragm.  Recommended short-term follow-up  radiographs and/or further evaluation  with CT chest.     This report was finalized on 9/29/2023 8:49 PM by Dr. Zarina Renae M.D.       CT Angiogram Head w AI Analysis of LVO [[id number]] Collected: 09/29/23 2007     Updated: 09/29/23 2021    Narrative:      CT ANGIOGRAM OF THE HEAD AND NECK WITH CONTRAST with AI analysis of LVO     CLINICAL HISTORY: Vision changes in the left eye.     TECHNIQUE: CT angiogram of the head and neck was obtained with 1 mm  axial images following the administration of IV contrast. Sagittal,  coronal, and 3-dimensional reconstructed images were obtained.  Additionally, a CT scan of the head was obtained with 3 mm axial images  before and after the administration of IV contrast.     COMPARISON: CT head 10/19/2022.     FINDINGS:     Noncontrast CT: Chronic left occipital/PCA territory infarct. No CT  evidence of acute territorial infarct. No intracranial hemorrhage.  No  midline shift or mass effect.  No extraxial collections. No  hydrocephalus.  Clear paranasal sinuses and mastoid air cells.       CTA NECK: Patent major arterial vasculature. Mild bilateral carotid bulb  atherosclerotic disease without significant stenosis. No dissection or  pseudoaneurysm.       CTA HEAD: Patent major arterial vasculature.  No aneurysm.     Perfusion: Small (6 mm) area of increased Tmax in the left occipital  lobe localizes to the chronic left PCA territory infarct.          Impression:      1. Small (6 mm) area of increased Tmax in the left occipital lobe  localizes to a chronic left occipital/PCA territory infarct.  2. Patent major arterial vasculature within the head and neck.              Radiation dose reduction techniques were utilized, including automated  exposure control and exposure modulation based on body size.     This report was finalized on 9/29/2023 8:18 PM by Dr. Lencho Cabral M.D.       CT Angiogram Neck [[id number]] Collected: 09/29/23 2007     Updated: 09/29/23 2021    Narrative:       CT ANGIOGRAM OF THE HEAD AND NECK WITH CONTRAST with AI analysis of LVO     CLINICAL HISTORY: Vision changes in the left eye.     TECHNIQUE: CT angiogram of the head and neck was obtained with 1 mm  axial images following the administration of IV contrast. Sagittal,  coronal, and 3-dimensional reconstructed images were obtained.  Additionally, a CT scan of the head was obtained with 3 mm axial images  before and after the administration of IV contrast.     COMPARISON: CT head 10/19/2022.     FINDINGS:     Noncontrast CT: Chronic left occipital/PCA territory infarct. No CT  evidence of acute territorial infarct. No intracranial hemorrhage.  No  midline shift or mass effect.  No extraxial collections. No  hydrocephalus.  Clear paranasal sinuses and mastoid air cells.       CTA NECK: Patent major arterial vasculature. Mild bilateral carotid bulb  atherosclerotic disease without significant stenosis. No dissection or  pseudoaneurysm.       CTA HEAD: Patent major arterial vasculature.  No aneurysm.     Perfusion: Small (6 mm) area of increased Tmax in the left occipital  lobe localizes to the chronic left PCA territory infarct.          Impression:      1. Small (6 mm) area of increased Tmax in the left occipital lobe  localizes to a chronic left occipital/PCA territory infarct.  2. Patent major arterial vasculature within the head and neck.              Radiation dose reduction techniques were utilized, including automated  exposure control and exposure modulation based on body size.     This report was finalized on 9/29/2023 8:18 PM by Dr. Lencho Cabral M.D.       CT CEREBRAL PERFUSION WITH & WITHOUT CONTRAST [[id number]] Collected: 09/29/23 2007     Updated: 09/29/23 2021    Narrative:      CT ANGIOGRAM OF THE HEAD AND NECK WITH CONTRAST with AI analysis of LVO     CLINICAL HISTORY: Vision changes in the left eye.     TECHNIQUE: CT angiogram of the head and neck was obtained with 1 mm  axial images following the  administration of IV contrast. Sagittal,  coronal, and 3-dimensional reconstructed images were obtained.  Additionally, a CT scan of the head was obtained with 3 mm axial images  before and after the administration of IV contrast.     COMPARISON: CT head 10/19/2022.     FINDINGS:     Noncontrast CT: Chronic left occipital/PCA territory infarct. No CT  evidence of acute territorial infarct. No intracranial hemorrhage.  No  midline shift or mass effect.  No extraxial collections. No  hydrocephalus.  Clear paranasal sinuses and mastoid air cells.       CTA NECK: Patent major arterial vasculature. Mild bilateral carotid bulb  atherosclerotic disease without significant stenosis. No dissection or  pseudoaneurysm.       CTA HEAD: Patent major arterial vasculature.  No aneurysm.     Perfusion: Small (6 mm) area of increased Tmax in the left occipital  lobe localizes to the chronic left PCA territory infarct.          Impression:      1. Small (6 mm) area of increased Tmax in the left occipital lobe  localizes to a chronic left occipital/PCA territory infarct.  2. Patent major arterial vasculature within the head and neck.              Radiation dose reduction techniques were utilized, including automated  exposure control and exposure modulation based on body size.     This report was finalized on 9/29/2023 8:18 PM by Dr. Lencho Cabral M.D.               Results for orders placed during the hospital encounter of 04/25/23    Adult Transesophageal Echo (CHARU) W/ Cont if Necessary Per Protocol    Interpretation Summary    There is akinesis of the inferior base    Left ventricular systolic function is low normal. Left ventricular ejection fraction appears to be 51 - 55%.    The right ventricular cavity is mildly dilated. Normal right ventricular systolic function noted.    The left atrial cavity is severely dilated.    There is a Watchman device which appears to be in good position. There is no thrombus in the proximal left  atrial appendage or adjacent to the Watchman device itself    The right atrial cavity is moderately dilated.    Mild mitral valve regurgitation is present    Mild tricuspid valve regurgitation is present.    The main pulmonary artery is mildly dilated.    There is a trivial pericardial effusion.      ECG 12 Lead Stroke Evaluation   Preliminary Result   HEART RATE= 71  bpm   RR Interval= 845  ms   MT Interval=   ms   P Horizontal Axis=   deg   P Front Axis=   deg   QRSD Interval= 109  ms   QT Interval= 409  ms   QTcB= 445  ms   QRS Axis= -54  deg   T Wave Axis= 48  deg   - ABNORMAL ECG -   Atrial fibrillation   Ventricular bigeminy   LAD, consider left anterior fascicular block   Abnormal R-wave progression, late transition   Electronically Signed By:    Date and Time of Study: 2023-09-29 20:01:08           Assessment/Plan     Active Hospital Problems    Diagnosis  POA    History of stroke [Z86.73]  Not Applicable    GERD (gastroesophageal reflux disease) [K21.9]  Yes    Permanent atrial fibrillation [I48.21]  Yes    HLD (hyperlipidemia) [E78.5]  Yes       Vision Loss  -Stroke work up has been negative thus far  -Admit to a telemetry unit  -Neurology consult  -Check MRI of the brain without contrast  -Echo performed 4/25/23 showed EF 51-55%, mitral valve regurgitation, and tricuspid valve regurgitation  -Neuro checks/NIHSS  -Check TSH, lipid panel, and hgb A1C to assess for secondary risk factors  -PT/OT consults  -History of ocular hypertension. Bedside ultrasound of left eye performed in the ED was negative for evidence of retinal detachment or lens dislocation    Permanent Atrial Fibrillation  -S/p Watchman device implantation 3/24/2023  -Rate controlled. EKG shows atrial fibrillation with ventricular bigeminy. Continue Metoprolol  -Continue Plavix  -His troponin is mildly elevated. He denies chest pain and there are no ischemic changes on EKG repeat troponin in AM    Hyperlipidemia/History of Ischemic Left PCA  Stroke  -Initiate daily aspirin and high dose statin  -Continue Plavix    GERD  -Continue home dose of Pepcid    -I discussed the patients findings and my recommendations with patient.    VTE Prophylaxis - SCDs.  Code Status - Full code.       KOLBY Umanzor  [facility]  09/29/23  21:32 EDT

## 2023-09-30 NOTE — PROGRESS NOTES
[facility] Acute Rehab    Stroke screening per stroke order set via Epic. Please note that this is a screening. If you feel that this pt is or will be appropriate for Acute Rehab, please call the Admission Office at [phone] and a full evaluation will be initiated.     Thank You  Fernanda Hampton RN  Acute Rehab Admission Nurse

## 2023-09-30 NOTE — DISCHARGE INSTRUCTIONS
Follow up with ophthalmology Monday   Hold aspirin and Plavix until evaluated by ophthalmology  Restart aspirin and Plavix once the need for procedure has been determined.

## 2023-09-30 NOTE — PROGRESS NOTES
Name: Junito Sorto ADMIT: 2023   : 1940  PCP: Fan Schulz MD    MRN: [id number] LOS: 0 days   AGE/SEX: 82 y.o. male  ROOM: [facility]     Subjective   Subjective   Patient sitting comfortably in bed, no complaints.  Would like to go home as soon as is possible.  Patient been seen by Bry and Aylin in the past for glaucoma.  States that vision is unchanged from yesterday feels like there is seaweed floating in the left eye.  Denies any eye pain pain    Review of Systems   Constitutional:  Negative for chills and fever.   Respiratory:  Negative for cough and shortness of breath.    Cardiovascular:  Negative for chest pain and palpitations.   Gastrointestinal:  Negative for abdominal pain, diarrhea, nausea and vomiting.   As above     Objective   Objective   Vital Signs  Temp:  [97.5 °F (36.4 °C)-98.7 °F (37.1 °C)] 97.5 °F (36.4 °C)  Heart Rate:  [59-90] 74  Resp:  [18-20] 18  BP: (125-187)/() 160/94  SpO2:  [90 %-98 %] 96 %  on   ;   Device (Oxygen Therapy): room air  Body mass index is 33.79 kg/m².  Physical Exam  Vitals and nursing note reviewed.   Constitutional:       General: He is not in acute distress.  Cardiovascular:      Rate and Rhythm: Regular rhythm. Rhythm irregular.   Pulmonary:      Effort: Pulmonary effort is normal. No respiratory distress.   Abdominal:      General: Abdomen is flat. There is no distension.      Tenderness: There is no abdominal tenderness.   Musculoskeletal:         General: No swelling or deformity.   Skin:     General: Skin is warm and dry.   Neurological:      General: No focal deficit present.      Mental Status: He is alert. Mental status is at baseline.      Comments: Decreased vision of left eye       Results Review     I reviewed the patient's new clinical results.  Results from last 7 days   Lab Units 23  0612 23  1930   WBC 10*3/mm3 5.42 5.94   HEMOGLOBIN g/dL 13.8 14.7   PLATELETS 10*3/mm3 161 176     Results from last 7 days    Lab Units 09/30/23  0612 09/29/23  1930   SODIUM mmol/L 140 144   POTASSIUM mmol/L 3.2* 3.7   CHLORIDE mmol/L 106 109*   CO2 mmol/L 25.1 24.4   BUN mg/dL 12 20   CREATININE mg/dL 0.50* 0.77   GLUCOSE mg/dL 86 85   Estimated Creatinine Clearance: 127 mL/min (A) (by C-G formula based on SCr of 0.5 mg/dL (L)).  Results from last 7 days   Lab Units 09/30/23  0612 09/29/23  1930   ALBUMIN g/dL 3.7 4.1   BILIRUBIN mg/dL 1.9* 1.4*   ALK PHOS U/L 74 86   AST (SGOT) U/L 41* 37   ALT (SGPT) U/L 28 28     Results from last 7 days   Lab Units 09/30/23  0612 09/29/23  1930   CALCIUM mg/dL 9.2 9.9   ALBUMIN g/dL 3.7 4.1       COVID19   Date Value Ref Range Status   08/27/2022 Not Detected Not Detected - Ref. Range Final   03/17/2022 Not Detected Not Detected - Ref. Range Final     Hemoglobin A1C   Date/Time Value Ref Range Status   09/30/2023 0612 5.70 (H) 4.80 - 5.60 % Final     Glucose   Date/Time Value Ref Range Status   09/30/2023 0510 84 70 - 130 mg/dL Final   09/29/2023 1923 95 70 - 130 mg/dL Final       MRI Brain Without Contrast  Narrative: BRAIN MRI WITHOUT CONTRAST     HISTORY: vision loss; R/O stroke; H54.62-Unqualified visual loss, left  eye, normal vision right eye     COMPARISON: October 20, 2022.     FINDINGS:  Multiplanar images of the head were obtained without  gadolinium. No areas of restricted diffusion are seen to suggest acute  infarct. There is diffuse atrophy. There is periventricular and deep  white matter microangiopathic change. There is an old left PCA territory  infarct. There is no midline shift or mass effect. The intracranial flow  voids are intact. 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 No other areas of susceptibility  artifact are noted. There is a trace right mastoid effusion. There is  partial  opacification of the paranasal sinuses. Orbits appear  unremarkable.     Impression: 1. No acute intracranial abnormality. 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        This report was finalized on 9/30/2023 4:29 AM by Dr. Mary Welsh M.D.       I reviewed the patient's daily medications.  Scheduled Medications  atorvastatin, 40 mg, Oral, Nightly  famotidine, 40 mg, Oral, Daily  metoprolol succinate XL, 25 mg, Oral, Daily    Infusions  sodium chloride, 75 mL/hr, Last Rate: 75 mL/hr (09/30/23 0659)    Diet  Diet: Cardiac Diets; Healthy Heart (2-3 Na+); Texture: Regular Texture (IDDSI 7); Fluid Consistency: Thin (IDDSI 0)         I have personally reviewed:  [x]  Laboratory   []  Microbiology   [x]  Radiology   [x]  EKG/Telemetry   [x]  Cardiology/Vascular   []  Pathology   [x]  Records     Assessment/Plan     Active Hospital Problems    Diagnosis  POA    **Loss of vision [H54.7]  Yes    History of stroke [Z86.73]  Not Applicable    GERD (gastroesophageal reflux disease) [K21.9]  Yes    Permanent atrial fibrillation [I48.21]  Yes    HLD (hyperlipidemia) [E78.5]  Yes      Resolved Hospital Problems   No resolved problems to display.       82 y.o. male admitted with Loss of vision.    Left sided painless vision loss  History of CVA  -Neurology consulted-echo pending to rule out cardiac thrombus  -Pending work-up, will plan to consult ophthalmology most likely  -MRI brain without any evidence of acute stroke.  -Ophthalmology consulted  -Continue statin.  Hold aspirin and Plavix until ophthalmology sees him     Atrial fibrillation with Watchman device  -Continue metoprolol     GERD  -Continue Pepcid     Hypertension  -Continue home metoprolol  -Hydralazine as needed    SCDs for DVT prophylaxis.  Full code.  Discussed with patient and nursing staff.  Anticipate discharge home in 1-2 days.  When okay with consultants    Expected Discharge Date: 9/30/2023; Expected Discharge Time:  3:00 PM      Miguel Garland MD  [facility]  09/30/23  13:24 EDT

## 2023-09-30 NOTE — ED PROVIDER NOTES
EMERGENCY DEPARTMENT ENCOUNTER    Room Number:  15/15  PCP: Fan Schulz MD      HPI:  Chief Complaint: Left eye vision loss  A complete HPI/ROS/PMH/PSH/SH/FH are unobtainable due to: None  Context: Junito Sorto is a 82 y.o. male who presents to the ED c/o left eye vision loss.  He states that it started around 6 PM.  Has progressively worsened over the ensuing 20 to 30 minutes.  However he feels that vision changes now stabilized.  It feels as if he is looking through frosted glass in his left eye.  Right eye is at baseline although he normally has a visual clip from prior stroke affecting the right side of his visual fields.          PAST MEDICAL HISTORY  Active Ambulatory Problems     Diagnosis Date Noted    Hyperglycemia 02/16/2016    Obesity 02/25/2016    Allergic rhinitis 03/28/2016    Permanent atrial fibrillation 03/28/2016    HLD (hyperlipidemia) 03/28/2016    Esophageal dysphagia 10/09/2018    Hiatal hernia 02/05/2019    GERD (gastroesophageal reflux disease) 02/05/2019    Bladder outlet obstruction 09/11/2019    Sciatica of right side 12/10/2020    Pneumonia of right lower lobe due to infectious organism 03/03/2022    Acute respiratory failure with hypoxia 03/17/2022    PVC (premature ventricular contraction)     Left ventricular dysfunction     Hypercholesterolemia 07/26/2022    Acute ischemic left PCA stroke 10/19/2022    Lumbar spine pain 07/11/2023    Presence of Watchman left atrial appendage closure device 08/08/2023     Resolved Ambulatory Problems     Diagnosis Date Noted    Hypertension 02/25/2016    Benign essential hypertension 03/28/2016    Acute cystitis 02/26/2017    Bacterial conjunctivitis 03/13/2017    Impacted cerumen of right ear 01/13/2019    Prostate cancer 09/11/2019    Hypoxia 03/03/2022    Pneumonia due to infectious organism 03/17/2022    Orthopnea 03/17/2022     Past Medical History:   Diagnosis Date    Allergic     Arrhythmia     Arthritis     At risk for obstructive  sleep apnea     Atrial fibrillation     BPH (benign prostatic hyperplasia)     Cataract     Colon polyp     Coronary artery disease Few years    Dysphagia     Eczema     Glaucoma Eievated eye pressure    Hernia     History of medical problems 2022    HL (hearing loss)     Hyperlipidemia     Low back pain     Pneumonia 2022    Skin cancer     Stroke 10/19/2022    Visual impairment 2022 stroke         PAST SURGICAL HISTORY  Past Surgical History:   Procedure Laterality Date    APPENDECTOMY  1950s    ATRIAL APPENDAGE EXCLUSION LEFT WITH TRANSESOPHAGEAL ECHOCARDIOGRAM N/A 03/22/2023    Procedure: Atrial Appendage Occlusion;  Surgeon: Tony Ceballos MD;  Location: [facility] CATH INVASIVE LOCATION;  Service: Cardiovascular;  Laterality: N/A;    CARDIAC SURGERY  2023    Watchman inserted because of continuous afib    CATARACT EXTRACTION, BILATERAL      COLONOSCOPY  unknown    CYSTOSCOPY TRANSURETHRAL RESECTION OF PROSTATE N/A 09/11/2019    Procedure: CYSTOSCOPY TRANSURETHRAL RESECTION OF PROSTATE;  Surgeon: Chip Bedolla MD;  Location: [facility] OR;  Service: Urology    ENDOSCOPY N/A 10/31/2018    LA Grade C reflux esophagitis, HH, erythematous mucosa in the antrum. Path: Gastritis, esophagitis.     ENDOSCOPY N/A 03/20/2022    Procedure: ESOPHAGOGASTRODUODENOSCOPY WITH BIOPSIES AND NAILS DILATION #56;  Surgeon: Tony Ramos MD;  Location: [facility] ENDOSCOPY;  Service: Gastroenterology;  Laterality: N/A;  PRE OP - DYSPHAGIA  POST OP- MILD GASTRITIS    EPIDURAL BLOCK      ESOPHAGEAL DILATATION      EYE SURGERY      Cataracts    PROSTATE SURGERY  11/06/2014    SKIN CANCER EXCISION      multiple, over that last few years    SKIN CANCER EXCISION Right 09/03/2019    FACE,     SKIN CANCER EXCISION      2023    TONSILLECTOMY      around 1950    TRANSURETHRAL RESECTION OF BLADDER N/A     UPPER GASTROINTESTINAL ENDOSCOPY  2013    VASECTOMY  1980s         FAMILY HISTORY  Family History   Problem Relation Age of Onset     Cancer Mother         Pancriatic    Liver disease Mother     Arthritis Mother     Cancer Father         Colon,    Colon cancer Father     Arrhythmia Father         Afib    Heart disease Father     Cancer Brother         Prostate    Arrhythmia Brother         Afib    Hearing loss Brother     Cancer Paternal Grandfather         Prostate    Arrhythmia Paternal Aunt         Irregular beat . Before the time of illness sharing.    Arthritis Sister     Cancer Son         Melanoma    Other Son         Melanoma    Cancer Son         None    Heart disease Son         Afib    Malig Hyperthermia Neg Hx          SOCIAL HISTORY  Social History     Socioeconomic History    Marital status:    Tobacco Use    Smoking status: Former     Types: Pipe     Start date: 1959     Quit date: 1961     Years since quittin.9    Smokeless tobacco: Never    Tobacco comments:     Limited by allergies   Vaping Use    Vaping Use: Never used   Substance and Sexual Activity    Alcohol use: Yes     Alcohol/week: 5.0 standard drinks     Types: 5 Glasses of wine per week     Comment: Less than 4 oz /wk since this prob started.    Drug use: Never    Sexual activity: Not Currently     Partners: Female     Birth control/protection: Surgical, None, Vasectomy     Comment: 50 years ago         ALLERGIES  Dilaudid [hydromorphone hcl]        REVIEW OF SYSTEMS  Review of Systems     All systems reviewed and negative except for those discussed in HPI.       PHYSICAL EXAM  ED Triage Vitals   Temp Heart Rate Resp BP SpO2   23   98.7 °F (37.1 °C) 84 18 (!) 182/137 98 %      Temp src Heart Rate Source Patient Position BP Location FiO2 (%)   23 --   Tympanic Monitor Lying Right arm        Physical Exam      GENERAL: no acute distress  HENT: nares patent  EYES: no scleral icterus  CV: regular rhythm, normal rate  RESPIRATORY:  normal effort  ABDOMEN: soft, nontender  MUSCULOSKELETAL: no deformity  NEURO:   NIH Stroke Scale      Interval: Baseline  Time: 23:23 EDT  Person Administering Scale: Kenyon Clark II, MD    Administer stroke scale items in the order listed. Record performance in each category after each subscale exam. Do not go back and change scores. Follow directions provided for each exam technique. Scores should reflect what the patient does, not what the clinician thinks the patient can do. The clinician should record answers while administering the exam and work quickly. Except where indicated, the patient should not be coached (i.e., repeated requests to patient to make a special effort).      1a  Level of consciousness: 0=alert; keenly responsive   1b. LOC questions:  0=Performs both tasks correctly   1c. LOC commands: 0=Answers both questions correctly   2.  Best Gaze: 0=normal   3.  Visual: 1=Partial hemianopia   4. Facial Palsy: 0=Normal symmetric movement   5a.  Motor left arm: 0=No drift, limb holds 90 (or 45) degrees for full 10 seconds   5b.  Motor right arm: 0=No drift, limb holds 90 (or 45) degrees for full 10 seconds   6a. motor left le=No drift, limb holds 90 (or 45) degrees for full 10 seconds   6b  Motor right le=No drift, limb holds 90 (or 45) degrees for full 10 seconds   7. Limb Ataxia: 0=Absent   8.  Sensory: 0=Normal; no sensory loss   9. Best Language:  0=No aphasia, normal   10. Dysarthria: 0=Normal   11. Extinction and Inattention: 0=No abnormality   12. Distal motor function: 0=Normal    Total:   1     PSYCH:  calm, cooperative  SKIN: warm, dry    Vital signs and nursing notes reviewed.          LAB RESULTS  Recent Results (from the past 24 hour(s))   POC Glucose Once    Collection Time: 23  7:23 PM    Specimen: Blood   Result Value Ref Range    Glucose 95 70 - 130 mg/dL   Green Top (Gel)    Collection Time: 23  7:30 PM   Result Value Ref Range    Extra Tube Hold for add-ons.     Lavender Top    Collection Time: 09/29/23  7:30 PM   Result Value Ref Range    Extra Tube hold for add-on    Gold Top - SST    Collection Time: 09/29/23  7:30 PM   Result Value Ref Range    Extra Tube Hold for add-ons.    Light Blue Top    Collection Time: 09/29/23  7:30 PM   Result Value Ref Range    Extra Tube Hold for add-ons.    Comprehensive Metabolic Panel    Collection Time: 09/29/23  7:30 PM    Specimen: Blood   Result Value Ref Range    Glucose 85 65 - 99 mg/dL    BUN 20 8 - 23 mg/dL    Creatinine 0.77 0.76 - 1.27 mg/dL    Sodium 144 136 - 145 mmol/L    Potassium 3.7 3.5 - 5.2 mmol/L    Chloride 109 (H) 98 - 107 mmol/L    CO2 24.4 22.0 - 29.0 mmol/L    Calcium 9.9 8.6 - 10.5 mg/dL    Total Protein 6.8 6.0 - 8.5 g/dL    Albumin 4.1 3.5 - 5.2 g/dL    ALT (SGPT) 28 1 - 41 U/L    AST (SGOT) 37 1 - 40 U/L    Alkaline Phosphatase 86 39 - 117 U/L    Total Bilirubin 1.4 (H) 0.0 - 1.2 mg/dL    Globulin 2.7 gm/dL    A/G Ratio 1.5 g/dL    BUN/Creatinine Ratio 26.0 (H) 7.0 - 25.0    Anion Gap 10.6 5.0 - 15.0 mmol/L    eGFR 89.4 >60.0 mL/min/1.73   Protime-INR    Collection Time: 09/29/23  7:30 PM    Specimen: Blood   Result Value Ref Range    Protime 14.5 (H) 11.7 - 14.2 Seconds    INR 1.11 (H) 0.90 - 1.10   aPTT    Collection Time: 09/29/23  7:30 PM    Specimen: Blood   Result Value Ref Range    PTT 33.2 22.7 - 35.4 seconds   Single High Sensitivity Troponin T    Collection Time: 09/29/23  7:30 PM    Specimen: Blood   Result Value Ref Range    HS Troponin T 38 (H) <15 ng/L   CBC Auto Differential    Collection Time: 09/29/23  7:30 PM    Specimen: Blood   Result Value Ref Range    WBC 5.94 3.40 - 10.80 10*3/mm3    RBC 4.87 4.14 - 5.80 10*6/mm3    Hemoglobin 14.7 13.0 - 17.7 g/dL    Hematocrit 43.2 37.5 - 51.0 %    MCV 88.7 79.0 - 97.0 fL    MCH 30.2 26.6 - 33.0 pg    MCHC 34.0 31.5 - 35.7 g/dL    RDW 13.6 12.3 - 15.4 %    RDW-SD 43.7 37.0 - 54.0 fl    MPV 10.9 6.0 - 12.0 fL    Platelets 176 140 - 450 10*3/mm3     Neutrophil % 52.7 42.7 - 76.0 %    Lymphocyte % 28.3 19.6 - 45.3 %    Monocyte % 15.8 (H) 5.0 - 12.0 %    Eosinophil % 2.5 0.3 - 6.2 %    Basophil % 0.5 0.0 - 1.5 %    Immature Grans % 0.2 0.0 - 0.5 %    Neutrophils, Absolute 3.13 1.70 - 7.00 10*3/mm3    Lymphocytes, Absolute 1.68 0.70 - 3.10 10*3/mm3    Monocytes, Absolute 0.94 (H) 0.10 - 0.90 10*3/mm3    Eosinophils, Absolute 0.15 0.00 - 0.40 10*3/mm3    Basophils, Absolute 0.03 0.00 - 0.20 10*3/mm3    Immature Grans, Absolute 0.01 0.00 - 0.05 10*3/mm3    nRBC 0.0 0.0 - 0.2 /100 WBC   Type & Screen    Collection Time: 09/29/23  7:59 PM    Specimen: Blood   Result Value Ref Range    ABO Type A     RH type Negative     Antibody Screen Negative     T&S Expiration Date 10/2/2023 11:59:59 PM    ECG 12 Lead Stroke Evaluation    Collection Time: 09/29/23  8:01 PM   Result Value Ref Range    QT Interval 409 ms    QTC Interval 445 ms       Ordered the above labs and reviewed the results.        RADIOLOGY  XR Chest 1 View    Result Date: 9/29/2023   XR CHEST 1 VW-  HISTORY: Acute stroke protocol, loss of vision in left eye.  COMPARISON: Chest radiograph 10/19/2022  FINDINGS:  A single view of the chest was obtained. The cardiac silhouette is enlarged. There is calcific aortic atherosclerosis. There is dense airspace opacity at the left lung base, which could reflect a combination of pleural fluid and pulmonary opacification, possibly atelectasis or pneumonia in the appropriate clinical setting. There is asymmetric elevation of the right hemidiaphragm. Recommended short-term follow-up radiographs and/or further evaluation with CT chest.  This report was finalized on 9/29/2023 8:49 PM by Dr. Zarina Renae M.D.      CT Angiogram Head w AI Analysis of LVO, CT Angiogram Neck, CT CEREBRAL PERFUSION WITH & WITHOUT CONTRAST    Result Date: 9/29/2023  CT ANGIOGRAM OF THE HEAD AND NECK WITH CONTRAST with AI analysis of LVO  CLINICAL HISTORY: Vision changes in the left eye.   TECHNIQUE: CT angiogram of the head and neck was obtained with 1 mm axial images following the administration of IV contrast. Sagittal, coronal, and 3-dimensional reconstructed images were obtained. Additionally, a CT scan of the head was obtained with 3 mm axial images before and after the administration of IV contrast.  COMPARISON: CT head 10/19/2022.  FINDINGS:  Noncontrast CT: Chronic left occipital/PCA territory infarct. No CT evidence of acute territorial infarct. No intracranial hemorrhage.  No midline shift or mass effect.  No extraxial collections. No hydrocephalus.  Clear paranasal sinuses and mastoid air cells.   CTA NECK: Patent major arterial vasculature. Mild bilateral carotid bulb atherosclerotic disease without significant stenosis. No dissection or pseudoaneurysm.   CTA HEAD: Patent major arterial vasculature.  No aneurysm.  Perfusion: Small (6 mm) area of increased Tmax in the left occipital lobe localizes to the chronic left PCA territory infarct.       1. Small (6 mm) area of increased Tmax in the left occipital lobe localizes to a chronic left occipital/PCA territory infarct. 2. Patent major arterial vasculature within the head and neck.     Radiation dose reduction techniques were utilized, including automated exposure control and exposure modulation based on body size.  This report was finalized on 9/29/2023 8:18 PM by Dr. Lencho Cabral M.D.       Ordered the above noted radiological studies. Reviewed by me in PACS.          PROCEDURES  Critical Care  Performed by: Kenyon Clark II, MD  Authorized by: Kenyon Clark II, MD     Critical care provider statement:     Critical care time (minutes):  33    Critical care was necessary to treat or prevent imminent or life-threatening deterioration of the following conditions:  CNS failure or compromise    Critical care was time spent personally by me on the following activities:  Ordering and performing treatments and interventions,  ordering and review of laboratory studies, ordering and review of radiographic studies, pulse oximetry, re-evaluation of patient's condition, discussions with consultants, evaluation of patient's response to treatment, examination of patient and obtaining history from patient or surrogate          MEDICATIONS GIVEN IN ER  Medications   sodium chloride 0.9 % flush 10 mL (has no administration in time range)   sodium chloride 0.9 % flush 10 mL (has no administration in time range)   sodium chloride 0.9 % infusion (has no administration in time range)   acetaminophen (TYLENOL) tablet 650 mg (has no administration in time range)     Or   acetaminophen (TYLENOL) suppository 650 mg (has no administration in time range)   aspirin tablet 325 mg (has no administration in time range)     Or   aspirin suppository 300 mg (has no administration in time range)   atorvastatin (LIPITOR) tablet 80 mg (has no administration in time range)   ondansetron (ZOFRAN) injection 4 mg (has no administration in time range)   bisacodyl (DULCOLAX) suppository 10 mg (has no administration in time range)   iopamidol (ISOVUE-370) 76 % injection 150 mL (150 mL Intravenous Given 9/29/23 1948)         MEDICAL DECISION MAKING, PROGRESS, and CONSULTS    Discussion below represents my analysis of pertinent findings related to patient's condition, differential diagnosis, treatment plan and final disposition.      Orders placed during this visit:  Orders Placed This Encounter   Procedures    CT Angiogram Head w AI Analysis of LVO    CT Angiogram Neck    CT CEREBRAL PERFUSION WITH & WITHOUT CONTRAST    XR Chest 1 View    MRI Brain Without Contrast    [facility] Draw    Comprehensive Metabolic Panel    Protime-INR    aPTT    Single High Sensitivity Troponin T    CBC Auto Differential    CBC (No Diff)    Comprehensive Metabolic Panel    Hemoglobin A1c    Lipid Panel    TSH    NPO Diet NPO Type: Strict NPO    Initiate Department's Acute Stroke Process (Team D,  Code 19, etc.)    Perform NIH Stroke Scale    Measure Actual Weight    Notify MD for SBP < 80 or > 200    Notify Provider for SBP greater than 140 if hemorrhagic stroke    Head of Bed 30 Degrees or Less    Undress and Gown    Vital Signs    Neuro Checks    No Hypotonic Fluids    Nursing Dysphagia Screening (Complete Prior to Giving anything PO)    RN to Place Order SLP Consult (IF swallow screen failed) - Eval & Treat Choosing Reason of RN Dysphagia Screen Failed    Vital Signs    Pulse Oximetry, Continuous    Telemetry - Maintain IV Access    Telemetry - Place Orders & Notify Provider of Results When Patient Experiences Acute Chest Pain, Dysrhythmia or Respiratory Distress    Notify physician of changes in level of consciousness, worsening of stroke symptoms, acute headache or severe nausea and vomiting or any of the following vital sign parameters:    Nursing Dysphagia Screening    RN to Place Order SLP Consult - Eval & Treat Choosing Reason of RN Dysphagia Screen Failed    Nurse to Call MD or Nutrition Services for Diet if Patient Passes Dysphagia Screen    Intake and Output    Neuro Checks    NIHSS Assessment    Order CT Head Without Contrast for Neurological Decline    Provide Stroke Education Material    Tobacco Cessation Education    Place Sequential Compression Device    Maintain Sequential Compression Device    Activity As Tolerated    Code Status and Medical Interventions:    Inpatient Neurology Consult Stroke    Inpatient Neurology Consult Stroke    LHA (on-call MD unless specified) Details    Notify Stroke Coordinator    Inpatient Rehab Admission Consult    Consult to Case Management     Consult to Diabetes Educator    Inpatient Neurology Consult Stroke    OT Consult: Eval & Treat    PT Consult: Eval & Treat as tolerated    Oxygen Therapy-    SLP Consult: Eval & Treat Communication Disorder    POC Glucose Once    POC Glucose Once    POC Glucose Q6H    ECG 12 Lead Stroke Evaluation    ECG  12 Lead Chest Pain    Type & Screen    Insert peripheral IV    Insert Large-Bore Peripheral IV - RIGHT AC Preferred    Initiate Observation Status    Fall Precautions    Green Top (Gel)    Lavender Top    Gold Top - SST    Light Blue Top    CBC & Differential               Differential diagnosis:    Stroke, TIA, lens dislocation, vitreous hemorrhage, retinal detachment        Independent interpretation of labs, radiology studies, and discussions with consultants:  ED Course as of 09/29/23 2323   Fri Sep 29, 2023   1935 I discussed the case with Dr. Quintana, stroke neurology.  He recommends obtaining team D imaging.  Reviewed the patient's history and exam. [TD]   2041 I performed bedside ultrasound of the patient's left eye. No evidence of retinal detachment or lens dislocation. [TD]   2041 Patient and wife of clarify the patient normally does have a visual cut to the right side of the left and right eye. [TD]   2051 EKG independently interpreted by myself.  Time 8:01 PM.  Atrial fibrillation.  Heart rate 71.  Ventricular bigeminy.  Prolonged R wave progression.  No acute ST abnormality. [TD]      ED Course User Index  [TD] Kenyon Clark II, MD         Patient is easily able to count fingers to his left thigh at this time.    After further discussion with stroke neurology, we feel this is less likely to be stroke.  We feel strongly that the risks of TNK outweigh the benefits.  I discussed this thought process with the patient and his wife.  They are in agreement with the plan to avoid TNK.  I believe the patient would benefit from further stroke work-up to ensure there is no acute infarct however I do not think this correlates with his symptoms today.  He would also benefit from ophthalmology evaluation as this is likely more ocular in nature.  I see no evidence of retinal detachment on bedside ultrasound.  No lens dislocation.          DIAGNOSIS  Final diagnoses:   Vision loss of left eye          DISPOSITION  Admit      Latest Documented Vital Signs:  As of 23:23 EDT  BP- 168/90 HR- 72 Temp- 98.7 °F (37.1 °C) (Tympanic) O2 sat- 95%      --    Please note that portions of this were completed with a voice recognition program.       Note Disclaimer: At [facility], we believe that sharing information builds trust and better relationships. You are receiving this note because you are receiving care at [facility] or recently visited. It is possible you will see health information before a provider has talked with you about it. This kind of information can be easy to misunderstand. To help you fully understand what it means for your health, we urge you to discuss this note with your provider.         Kenyon Clark II, MD  09/29/23 [id number]

## 2023-09-30 NOTE — CONSULTS
"OPHTHALMOLOGY CONSULT NOTE    Patient Identification:  Name: Junito Sorto  Age: 82 y.o.  Sex: male  :  1940  MRN: [id number]                                               Requesting Physician: per order  Reason for consult: Painless vision loss    History of Present Illness:  82 y.o. male prior hx of CVA presents for CC of vision loss. Pt denies any history of trauma. Reports yesterday this developed all of the sudden. He notes that he felt he was looking through a \"hair\" before it turned completely gray for a period of 45 mins. He reports prior hx of mild glaucoma, retinal tear with laser 20 plus years ago, and cataract surgery in both eyes.     Pt initially seen in the ED. R/O stroke with Mri given hx which was negative.         Problem List:  Principal Problem:    Loss of vision  Active Problems:    Permanent atrial fibrillation    HLD (hyperlipidemia)    GERD (gastroesophageal reflux disease)    History of stroke      Past Medical History:  Past Medical History:   Diagnosis Date    Allergic     Airborne    Arrhythmia     Arthritis     At risk for obstructive sleep apnea     5    Atrial fibrillation     Bladder outlet obstruction 2019    BPH (benign prostatic hyperplasia)     Cataract     Replaced    Colon polyp     Coronary artery disease Few years    Afib    Dysphagia     Eczema     GERD (gastroesophageal reflux disease)     Mentioned as possible    Glaucoma Eievated eye pressure    Hernia     Your office reported hiatal    Hiatal hernia     History of medical problems     Xs pressure in left eye. Now being treated in     HL (hearing loss)     Hyperlipidemia     Hypertension     Left ventricular dysfunction     Low back pain     Obesity     Pneumonia     Prostate cancer     Skin cancer     Stroke 10/19/2022    RESIDUAL: LOSS OF RT PERIPHERAL VISION    Visual impairment  stroke    Reading correction       Past Surgical History:  Past Surgical History:   Procedure Laterality " Date    APPENDECTOMY  1950s    ATRIAL APPENDAGE EXCLUSION LEFT WITH TRANSESOPHAGEAL ECHOCARDIOGRAM N/A 03/22/2023    Procedure: Atrial Appendage Occlusion;  Surgeon: Tony Ceballos MD;  Location: [facility] CATH INVASIVE LOCATION;  Service: Cardiovascular;  Laterality: N/A;    CARDIAC SURGERY  2023    Watchman inserted because of continuous afib    CATARACT EXTRACTION, BILATERAL      COLONOSCOPY  unknown    CYSTOSCOPY TRANSURETHRAL RESECTION OF PROSTATE N/A 09/11/2019    Procedure: CYSTOSCOPY TRANSURETHRAL RESECTION OF PROSTATE;  Surgeon: Chip Bedolla MD;  Location: [facility] MAIN OR;  Service: Urology    ENDOSCOPY N/A 10/31/2018    LA Grade C reflux esophagitis, HH, erythematous mucosa in the antrum. Path: Gastritis, esophagitis.     ENDOSCOPY N/A 03/20/2022    Procedure: ESOPHAGOGASTRODUODENOSCOPY WITH BIOPSIES AND NAILS DILATION #56;  Surgeon: Tony Ramos MD;  Location: [facility] ENDOSCOPY;  Service: Gastroenterology;  Laterality: N/A;  PRE OP - DYSPHAGIA  POST OP- MILD GASTRITIS    EPIDURAL BLOCK      ESOPHAGEAL DILATATION      EYE SURGERY      Cataracts    PROSTATE SURGERY  11/06/2014    SKIN CANCER EXCISION      multiple, over that last few years    SKIN CANCER EXCISION Right 09/03/2019    FACE,     SKIN CANCER EXCISION      2023    TONSILLECTOMY      around 1950    TRANSURETHRAL RESECTION OF BLADDER N/A     UPPER GASTROINTESTINAL ENDOSCOPY  2013    VASECTOMY  1980s        Past Ocular History:    ROS:  Pertinent items are noted in HPI    Eyes: Per above  Ocular Medication: Latanoprost OU QHS    Home Meds:  Medications Prior to Admission   Medication Sig Dispense Refill Last Dose    aspirin 81 MG chewable tablet Chew 1 tablet Daily.   9/30/2023    atorvastatin (Lipitor) 80 MG tablet Take 1 tablet by mouth Daily. 90 tablet 3 9/29/2023    B Complex Vitamins (vitamin b complex) capsule capsule Take  by mouth Daily.   9/30/2023    betamethasone dipropionate 0.05 % cream As Needed.       clopidogrel  (PLAVIX) 75 MG tablet Take 1 tablet by mouth Daily.       famotidine (Pepcid) 40 MG tablet Take 1 tablet by mouth Daily. 90 tablet 3 9/30/2023    HYDROcodone-acetaminophen (NORCO) 5-325 MG per tablet Take 1 tablet by mouth Every 6 (Six) Hours As Needed for Severe Pain. 20 tablet 0     Hydrocortisone, Perianal, (ANUSOL-HC) 2.5 % rectal cream Insert  into the rectum 2 (Two) Times a Day. For 10 days 28 g 2     latanoprost (XALATAN) 0.005 % ophthalmic solution    9/29/2023    metoprolol succinate XL (TOPROL-XL) 25 MG 24 hr tablet TAKE ONE TABLET BY MOUTH DAILY 90 tablet 3 9/30/2023       Current Meds:     Current Facility-Administered Medications:     acetaminophen (TYLENOL) tablet 650 mg, 650 mg, Oral, Q4H PRN **OR** acetaminophen (TYLENOL) suppository 650 mg, 650 mg, Rectal, Q4H PRN, Debbi Llanos APRN    atorvastatin (LIPITOR) tablet 40 mg, 40 mg, Oral, Nightly, Carl Sands MD    bisacodyl (DULCOLAX) suppository 10 mg, 10 mg, Rectal, Daily PRN, Debbi Llanos APRN    famotidine (PEPCID) tablet 40 mg, 40 mg, Oral, Daily, Lida Wade MD, 40 mg at 09/30/23 0815    hydrALAZINE (APRESOLINE) tablet 50 mg, 50 mg, Oral, Q8H PRN, Lida Wade MD    metoprolol succinate XL (TOPROL-XL) 24 hr tablet 25 mg, 25 mg, Oral, Daily, Lida Wade MD, 25 mg at 09/30/23 0815    ondansetron (ZOFRAN) injection 4 mg, 4 mg, Intravenous, Q6H PRN, Debbi Llanos APRN    sodium chloride 0.9 % flush 10 mL, 10 mL, Intravenous, PRN, Kenyon Clark II, MD    sodium chloride 0.9 % flush 10 mL, 10 mL, Intravenous, PRN, Kenyon Clark II, MD    sodium chloride 0.9 % infusion, 75 mL/hr, Intravenous, Continuous, Debbi Llanos, APRN, Last Rate: 75 mL/hr at 09/30/23 0659, 75 mL/hr at 09/30/23 0659    Allergies:  Allergies   Allergen Reactions    Dilaudid [Hydromorphone Hcl] Hallucinations       Social History:   Social History     Tobacco Use    Smoking status: Former     Types: Pipe     Start  date: 1959     Quit date: 1961     Years since quittin.9    Smokeless tobacco: Never    Tobacco comments:     Limited by allergies   Substance Use Topics    Alcohol use: Yes     Alcohol/week: 5.0 standard drinks     Types: 5 Glasses of wine per week     Comment: Less than 4 oz /wk since this prob started.        Family History:  Denies h/o glaucoma, retinal detachment, strabismus, amblyopia    Objective:  General Appearance: NAD    Exam:    [facility] near P T EOM    20/ PH 20/ 40 5->3mm no apd SOFT Full    20/ PH 20/ 40-2 5->3mm no apd SOFT Full      SLE/PLE         OD OS   External/Lid wnl wnl   Conj/sclera White/quiet White/quiet   Cornea clear clear   Anterior Chamber formed formed   Iris Round/reactive Round/reactive   Lens pciol pciol   Vitreous clear +heme     Dilated Fundus Exam: 12:57 EDT  OD - 0.4 optic nerve pink and sharp, macula, vessels, periphery  OS - 0.4 optic nerve pink and sharp, few drusen, inferior periphery with small horseshoe tear. Moderate amount of associated blood. No signs of detachment.     Imaging:  MRI Brain Without Contrast   Final Result   1. No acute intracranial abnormality. 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           This report was finalized on 2023 4:29 AM by Dr. Mary Welsh M.D.          XR Chest 1 View   Final Result      CT Angiogram Neck   Final Result   1. Small (6 mm) area of increased Tmax in the left occipital lobe   localizes to a chronic left occipital/PCA territory infarct.   2. Patent major arterial vasculature within the head and neck.                   Radiation dose reduction techniques were utilized, including automated   exposure control and exposure modulation based on body size.       This report was finalized on 2023 8:18 PM by Dr. Lencho Cabral M.D.          CT CEREBRAL PERFUSION WITH & WITHOUT CONTRAST   Final Result   1. Small (6 mm) area of increased Tmax in the left occipital lobe   localizes to a chronic left  occipital/PCA territory infarct.   2. Patent major arterial vasculature within the head and neck.                   Radiation dose reduction techniques were utilized, including automated   exposure control and exposure modulation based on body size.       This report was finalized on 9/29/2023 8:18 PM by Dr. Lencho Cabral M.D.          CT Angiogram Head w AI Analysis of LVO   Final Result   1. Small (6 mm) area of increased Tmax in the left occipital lobe   localizes to a chronic left occipital/PCA territory infarct.   2. Patent major arterial vasculature within the head and neck.                   Radiation dose reduction techniques were utilized, including automated   exposure control and exposure modulation based on body size.       This report was finalized on 9/29/2023 8:18 PM by Dr. Lencho Cabral M.D.              Data Review:      Assessment/Recommendations:  Junito Sorto is a 82 y.o. presents for CC of left eye vision loss    1) Peripheral Retinal tear with associated vitreous heme OS  -No signs of current detachment but will need urgent follow up for retinopexy on Monday  -Pt sees [facility] currently. Arranged follow up apt with Retina Specialist Dr. Davidson for Monday 10/2 at 7:45 am at their [facility]  -Pt to call me personally with any significant change in the mean time. Cell phone provided.   -Pt agreeable to plan.   -Ok to d/c from Ophthalmology standpoint    Thank you for the consult.    Aden Galeano MD  9/30/2023 12:57 EDT

## 2023-09-30 NOTE — PLAN OF CARE
Problem: Adult Inpatient Plan of Care  Goal: Absence of Hospital-Acquired Illness or Injury  Intervention: Identify and Manage Fall Risk  Recent Flowsheet Documentation  Taken 9/30/2023 1800 by Lily Blankenship RN  Safety Promotion/Fall Prevention: safety round/check completed  Taken 9/30/2023 1600 by Lily Blankenship, RN  Safety Promotion/Fall Prevention:   safety round/check completed   room organization consistent   nonskid shoes/slippers when out of bed   fall prevention program maintained   clutter free environment maintained   assistive device/personal items within reach   activity supervised  Taken 9/30/2023 1400 by Lily Blankenship, RN  Safety Promotion/Fall Prevention:   safety round/check completed   room organization consistent   nonskid shoes/slippers when out of bed   fall prevention program maintained   clutter free environment maintained   assistive device/personal items within reach   activity supervised  Taken 9/30/2023 1200 by Lily Blankenship, RN  Safety Promotion/Fall Prevention: safety round/check completed  Taken 9/30/2023 1000 by Lily Blankenship, RN  Safety Promotion/Fall Prevention:   safety round/check completed   room organization consistent   nonskid shoes/slippers when out of bed   fall prevention program maintained   clutter free environment maintained   assistive device/personal items within reach   activity supervised  Taken 9/30/2023 0800 by Lily Blankenship, RN  Safety Promotion/Fall Prevention:   safety round/check completed   room organization consistent   nonskid shoes/slippers when out of bed   fall prevention program maintained   clutter free environment maintained   assistive device/personal items within reach   activity supervised

## 2023-09-30 NOTE — CASE MANAGEMENT/SOCIAL WORK
Case Management Discharge Note      Final Note: home         Selected Continued Care - Discharged on 9/30/2023 Admission date: 9/29/2023 - Discharge disposition: Home or Self Care      Destination    No services have been selected for the patient.                Durable Medical Equipment    No services have been selected for the patient.                Dialysis/Infusion    No services have been selected for the patient.                Home Medical Care    No services have been selected for the patient.                Therapy    No services have been selected for the patient.                Community Resources    No services have been selected for the patient.                Community & DME    No services have been selected for the patient.                    Transportation Services  Private: Car    Final Discharge Disposition Code: 01 - home or self-care

## 2023-09-30 NOTE — THERAPY EVALUATION
Patient Name: Junito Sorto  : 1940    MRN: [id number]                              Today's Date: 2023       Admit Date: 2023    Visit Dx:     ICD-10-CM ICD-9-CM   1. Vision loss of left eye  H54.62 369.8     Patient Active Problem List   Diagnosis    Hyperglycemia    Obesity    Allergic rhinitis    Permanent atrial fibrillation    HLD (hyperlipidemia)    Esophageal dysphagia    Hiatal hernia    GERD (gastroesophageal reflux disease)    Bladder outlet obstruction    Sciatica of right side    Pneumonia of right lower lobe due to infectious organism    Acute respiratory failure with hypoxia    PVC (premature ventricular contraction)    Left ventricular dysfunction    Hypercholesterolemia    Acute ischemic left PCA stroke    Lumbar spine pain    Presence of Watchman left atrial appendage closure device    History of stroke    Loss of vision     Past Medical History:   Diagnosis Date    Allergic     Airborne    Arrhythmia     Arthritis     At risk for obstructive sleep apnea     5    Atrial fibrillation     Bladder outlet obstruction 2019    BPH (benign prostatic hyperplasia)     Cataract     Replaced    Colon polyp     Coronary artery disease Few years    Afib    Dysphagia     Eczema     GERD (gastroesophageal reflux disease)     Mentioned as possible    Glaucoma Eievated eye pressure    Hernia     Your office reported hiatal    Hiatal hernia     History of medical problems     Xs pressure in left eye. Now being treated in     HL (hearing loss)     Hyperlipidemia     Hypertension     Left ventricular dysfunction     Low back pain     Obesity     Pneumonia     Prostate cancer     Skin cancer     Stroke 10/19/2022    RESIDUAL: LOSS OF RT PERIPHERAL VISION    Visual impairment  stroke    Reading correction     Past Surgical History:   Procedure Laterality Date    APPENDECTOMY      ATRIAL APPENDAGE EXCLUSION LEFT WITH TRANSESOPHAGEAL ECHOCARDIOGRAM N/A 2023     Procedure: Atrial Appendage Occlusion;  Surgeon: Tony Ceballos MD;  Location: [facility] CATH INVASIVE LOCATION;  Service: Cardiovascular;  Laterality: N/A;    CARDIAC SURGERY  2023    Watchman inserted because of continuous afib    CATARACT EXTRACTION, BILATERAL      COLONOSCOPY  unknown    CYSTOSCOPY TRANSURETHRAL RESECTION OF PROSTATE N/A 09/11/2019    Procedure: CYSTOSCOPY TRANSURETHRAL RESECTION OF PROSTATE;  Surgeon: Chip Bedolla MD;  Location: [facility] MAIN OR;  Service: Urology    ENDOSCOPY N/A 10/31/2018    LA Grade C reflux esophagitis, HH, erythematous mucosa in the antrum. Path: Gastritis, esophagitis.     ENDOSCOPY N/A 03/20/2022    Procedure: ESOPHAGOGASTRODUODENOSCOPY WITH BIOPSIES AND NAILS DILATION #56;  Surgeon: Tony Ramos MD;  Location: [facility] ENDOSCOPY;  Service: Gastroenterology;  Laterality: N/A;  PRE OP - DYSPHAGIA  POST OP- MILD GASTRITIS    EPIDURAL BLOCK      ESOPHAGEAL DILATATION      EYE SURGERY      Cataracts    PROSTATE SURGERY  11/06/2014    SKIN CANCER EXCISION      multiple, over that last few years    SKIN CANCER EXCISION Right 09/03/2019    FACE,     SKIN CANCER EXCISION      2023    TONSILLECTOMY      around 1950    TRANSURETHRAL RESECTION OF BLADDER N/A     UPPER GASTROINTESTINAL ENDOSCOPY  2013    VASECTOMY  1980s      General Information       Row Name 09/30/23 1045          OT Time and Intention    Document Type evaluation  -CW     Mode of Treatment occupational therapy  -CW       Row Name 09/30/23 1045          General Information    Prior Level of Function independent:;ADL's  cane level  -CW     Existing Precautions/Restrictions fall  -CW       Row Name 09/30/23 1045          Cognition    Orientation Status (Cognition) oriented x 4  -CW       Row Name 09/30/23 1045          Safety Issues, Functional Mobility    Impairments Affecting Function (Mobility) balance;visual/perceptual  -CW               User Key  (r) = Recorded By, (t) = Taken By, (c) = Cosigned By  "     Initials Name Provider Type    CW Dominga Herndon OTR Occupational Therapist                     Mobility/ADL's       Row Name 09/30/23 1047          Bed Mobility    Bed Mobility supine-sit-supine  -CW     Supine-Sit-Supine [place] (Bed Mobility) supervision  -     Assistive Device (Bed Mobility) bed rails  -       Row Name 09/30/23 1047          Transfers    Transfers sit-stand transfer;stand-sit transfer  -       Row Name 09/30/23 1047          Sit-Stand Transfer    Sit-Stand [place] (Transfers) contact guard  -CW     Assistive Device (Sit-Stand Transfers) cane, straight  -CW       Row Name 09/30/23 1047          Stand-Sit Transfer    Stand-Sit [place] (Transfers) contact guard  -CW     Assistive Device (Stand-Sit Transfers) cane, straight  -CW       Row Name 09/30/23 1047          Activities of Daily Living    BADL Assessment/Intervention lower body dressing;grooming  -       Row Name 09/30/23 1050 09/30/23 1047       Lower Body Dressing Assessment/Training    [place] Level (Lower Body Dressing) pants/bottoms;minimum assist (75% patient effort);socks  -CW socks;minimum assist (75% patient effort)  -CW    Position (Lower Body Dressing) -- edge of bed sitting  -      Row Name 09/30/23 1047          Grooming Assessment/Training    [place] Level (Grooming) grooming skills;hair care, combing/brushing;set up  -CW     Position (Grooming) sitting up in bed  -CW               User Key  (r) = Recorded By, (t) = Taken By, (c) = Cosigned By      Initials Name Provider Type    CW Dominga Herndon OTR Occupational Therapist                   Obj/Interventions       Row Name 09/30/23 1051          Vision Assessment/Intervention    Visual Impairment/Limitations peripheral vision impaired right  -     Vision Assessment Comment Impaired vision R side peripheral residual from prevoius stroke. L eye \"foggy\".  -CW       Row Name 09/30/23 1051          Range of Motion Comprehensive    General " Range of Motion bilateral upper extremity ROM WNL  -CW       Row Name 09/30/23 1051          Strength Comprehensive (MMT)    Comment, General Manual Muscle Testing (MMT) Assessment BUE 4/5 general  -CW       Row Name 09/30/23 1051          Balance    Balance Assessment standing static balance;standing dynamic balance  -CW     Static Standing Balance contact guard  -CW     Dynamic Standing Balance contact guard  -CW     Balance Interventions occupation based/functional task  -CW               User Key  (r) = Recorded By, (t) = Taken By, (c) = Cosigned By      Initials Name Provider Type     Dominga Herndon OTR Occupational Therapist                   Goals/Plan       Row Name 09/30/23 1103          Transfer Goal 1 (OT)    Activity/Assistive Device (Transfer Goal 1, OT) sit-to-stand/stand-to-sit;bed-to-chair/chair-to-bed;toilet  -CW     [place] Level/Cues Needed (Transfer Goal 1, OT) modified independence  -CW     Time Frame (Transfer Goal 1, OT) 2 weeks  -CW       Row Name 09/30/23 1103          Dressing Goal 1 (OT)    Activity/Device (Dressing Goal 1, OT) dressing skills, all;upper body dressing;lower body dressing  -CW     Time Frame (Dressing Goal 1, OT) 2 weeks  -CW       Row Name 09/30/23 1103          Therapy Assessment/Plan (OT)    Planned Therapy Interventions (OT) activity tolerance training;BADL retraining;transfer/mobility retraining;functional balance retraining  -CW               User Key  (r) = Recorded By, (t) = Taken By, (c) = Cosigned By      Initials Name Provider Type     Dominga Herndon OTR Occupational Therapist                   Clinical Impression       Row Name 09/30/23 1054          Pain Assessment    Pretreatment Pain Rating 0/10 - no pain  -CW     Posttreatment Pain Rating 0/10 - no pain  -CW       [facility] Name 09/30/23 1054          Plan of Care Review    Plan of Care Reviewed With patient;spouse  -CW     Outcome Evaluation OT-Pt. admitted to [facility] with loss of vision L eye.  Hx falls,  "stroke. PLOF -Lives with spouse and was indep with ADL's-ambulated with a cane. Hx impaired vision R side peripherial from previous stroke. Pt. reports L eye vision is \"foggy\" with vision changes. Grooming indep. after set up. LE dressing Danial seated EOB for pants and socks. Unsteadiness when standing during LE dressing.  No UE coordination deficits noted. Finger to nose test completed L side sucessfully even with vision impairment. CGA sit to stand using wx. Pt. may benefit from OT short term to maximize indep with self care, safety/balance. Anticpate D/c to home with spouse. Pt. has a built in shower seat and cane at home.  -CW       Row Name 09/30/23 1054          Therapy Assessment/Plan (OT)    Therapy Frequency (OT) 5 times/wk  -CW       Row Name 09/30/23 1054          Therapy Plan Review/Discharge Plan (OT)    Anticipated Discharge Disposition (OT) home with assist  -CW       Row Name 09/30/23 1054          Positioning and Restraints    Pre-Treatment Position in bed  -CW     Post Treatment Position bed  -CW     In Bed supine;with family/caregiver;call light within reach;exit alarm on;encouraged to call for assist  -CW               User Key  (r) = Recorded By, (t) = Taken By, (c) = Cosigned By      Initials Name Provider Type    CW Dominga Herndon OTR Occupational Therapist                   Outcome Measures       Row Name 09/30/23 1103          How much help from another is currently needed...    Putting on and taking off regular lower body clothing? 3  -CW     Bathing (including washing, rinsing, and drying) 3  -CW     Toileting (which includes using toilet bed pan or urinal) 3  -CW     Putting on and taking off regular upper body clothing 4  -CW     Taking care of personal grooming (such as brushing teeth) 3  -CW     Eating meals 4  -CW     AM-PAC 6 Clicks Score (OT) 20  -CW       Row Name 09/30/23 0800 09/29/23 5953       How much help from another person do you currently need...    Turning from your back " to your side while in flat bed without using bedrails? 4  -MB 4  -KL    Moving from lying on back to sitting on the side of a flat bed without bedrails? 4  -MB 4  -KL    Moving to and from a bed to a chair (including a wheelchair)? 4  -MB 4  -KL    Standing up from a chair using your arms (e.g., wheelchair, bedside chair)? 4  -MB 4  -KL    Climbing 3-5 steps with a railing? 3  -MB 3  -KL    To walk in hospital room? 3  -MB 3  -KL    AM-PAC 6 Clicks Score (PT) 22  -MB 22  -KL      Row Name 09/30/23 1106 09/30/23 1103       Functional Assessment    Outcome Measure Options Modified [place]  -CW AM-PAC 6 Clicks Daily Activity (OT)  -CW              User Key  (r) = Recorded By, (t) = Taken By, (c) = Cosigned By      Initials Name Provider Type    CW Dominga Herndon OTR Occupational Therapist    Marii Rao, RN Registered Nurse    Lily Alaniz RN Registered Nurse                    Occupational Therapy Education       Title: PT OT SLP Therapies (Done)       Topic: Occupational Therapy (Done)       Point: ADL training (Done)       Description:   Instruct learner(s) on proper safety adaptation and remediation techniques during self care or transfers.   Instruct in proper use of assistive devices.                  Learning Progress Summary             Patient Acceptance, E, VU by  at 9/30/2023 1104    Comment: Role of OT and poc.                                         User Key       Initials Effective Dates Name Provider Type Discipline     06/16/21 -  Dominga Herndon OTR Occupational Therapist OT                  OT Recommendation and Plan  Planned Therapy Interventions (OT): activity tolerance training, BADL retraining, transfer/mobility retraining, functional balance retraining  Therapy Frequency (OT): 5 times/wk  Plan of Care Review  Plan of Care Reviewed With: patient, spouse  Outcome Evaluation: OT-[facility]. [facility] with loss of vision L eye.  Hx falls, stroke. PLOF -Lives with spouse and was indep  "with ADL's-ambulated with a cane. Hx impaired vision R side peripherial from previous stroke. Pt. reports L eye vision is \"foggy\" with vision changes. Grooming indep. after set up. LE dressing Danial seated EOB for pants and socks. Unsteadiness when standing during LE dressing.  No UE coordination deficits noted. Finger to nose test completed L side sucessfully even with vision impairment. CGA sit to stand using wx. Pt. may benefit from OT short term to maximize indep with self care, safety/balance. Anticpate D/c to home with spouse. Pt. has a built in shower seat and cane at home.     Time Calculation:   Evaluation Complexity (OT)  Review Occupational Profile/Medical/Therapy History Complexity: brief/low complexity  Assessment, Occupational Performance/Identification of Deficit Complexity: 1-3 performance deficits  Clinical Decision Making Complexity (OT): problem focused assessment/low complexity  Overall Complexity of Evaluation (OT): low complexity     Time Calculation- OT       Row Name 09/30/23 1107             Time Calculation- OT    OT Start Time 1030  -CW      OT Stop Time 1044  -CW      OT Time Calculation (min) 14 min  -CW      OT Received On 09/30/23  -CW      OT - Next Appointment 10/02/23  -      OT Goal Re-Cert Due Date 10/14/23  -                User Key  (r) = Recorded By, (t) = Taken By, (c) = Cosigned By      Initials Name Provider Type    Dominga Barrera OTR Occupational Therapist                  Therapy Charges for Today       Code Description Service Date Service Provider Modifiers Qty    [id number]  OT EVAL LOW COMPLEXITY 2 9/30/2023 Dominga Herndon OTR GO 1                 SHON Yang  9/30/2023  "

## 2023-10-01 ENCOUNTER — READMISSION MANAGEMENT (OUTPATIENT)
Dept: CALL CENTER | Facility: HOSPITAL | Age: 83
End: 2023-10-01
Payer: MEDICARE

## 2023-10-01 LAB
QT INTERVAL: 409 MS
QTC INTERVAL: 445 MS

## 2023-10-01 NOTE — PLAN OF CARE
Problem: Adult Inpatient Plan of Care  Goal: Plan of Care Review  Outcome: Adequate for Care Transition  Flowsheets (Taken 10/1/2023 0137)  Plan of Care Reviewed With: patient  Goal: Patient-Specific Goal (Individualized)  Outcome: Adequate for Care Transition  Goal: Absence of Hospital-Acquired Illness or Injury  Outcome: Adequate for Care Transition  Intervention: Identify and Manage Fall Risk  Recent Flowsheet Documentation  Taken 9/30/2023 2000 by Arun Rico RN  Safety Promotion/Fall Prevention: safety round/check completed  Intervention: Prevent Skin Injury  Recent Flowsheet Documentation  Taken 9/30/2023 2000 by Arun Rico RN  Body Position: position changed independently  Goal: Optimal Comfort and Wellbeing  Outcome: Adequate for Care Transition  Goal: Readiness for Transition of Care  Outcome: Adequate for Care Transition     Problem: Fall Injury Risk  Goal: Absence of Fall and Fall-Related Injury  Outcome: Adequate for Care Transition  Intervention: Identify and Manage Contributors  Recent Flowsheet Documentation  Taken 9/30/2023 2000 by Arun Rico RN  Medication Review/Management: medications reviewed  Intervention: Promote Injury-Free Environment  Recent Flowsheet Documentation  Taken 9/30/2023 2000 by Arun Rico RN  Safety Promotion/Fall Prevention: safety round/check completed     Problem: Skin Injury Risk Increased  Goal: Skin Health and Integrity  Outcome: Adequate for Care Transition   Goal Outcome Evaluation:  Plan of Care Reviewed With: patient            Patient is adequate for discharge, all instructions have been explained with no further questions asked.  IV removed and patient left unit in wheelchair with spouse, going home via car.

## 2023-10-01 NOTE — OUTREACH NOTE
Prep Survey      Flowsheet Row Responses   [facility] patient discharged from? [facility]   Is LACE score < 7 ? Yes   Eligibility [facility]   Date of Admission 09/29/23   Date of Discharge 09/30/23   Discharge Disposition Home or Self Care   Discharge diagnosis Loss of vision   Does the patient have one of the following disease processes/diagnoses(primary or secondary)? Other   Does the patient have Home health ordered? No   Is there a DME ordered? No   Medication alerts for this patient see AVS for all meds   Prep survey completed? Yes            Edwina FRANZ - Registered Nurse

## 2023-10-01 NOTE — DISCHARGE SUMMARY
Patient Name: Junito Sorto  : 1940  MRN: [id number]    Date of Admission: 2023  Date of Discharge:  2023  Primary Care Physician: Fan Schulz MD      Chief Complaint:   Loss of Vision, Blurred Vision, and Stroke (Rule out )      Discharge Diagnoses     Active Hospital Problems    Diagnosis  POA    **Loss of vision [H54.7]  Yes    History of stroke [Z86.73]  Not Applicable    GERD (gastroesophageal reflux disease) [K21.9]  Yes    Permanent atrial fibrillation [I48.21]  Yes    HLD (hyperlipidemia) [E78.5]  Yes      Resolved Hospital Problems   No resolved problems to display.        Hospital Course     Mr. Sorto is a 82 y.o. male with a history of atrial fibrillation with Watchman device, history of CVA, GERD, hypertension presenting with painless left-sided vision loss.  Ophthalmology was consulted and patient has a peripheral retinal tear.  Has arranged follow-up with retina specialist on Monday morning at 7:45 AM at [facility].  Provided his cell phone to the patient for any emergencies.  Ophthalmology was okay with discharge.  Neurology was consulted, MRI brain without any evidence of acute infarct.  Echo was ordered that did not show any cardiac thrombus.  Patient plans to follow-up with his outpatient cardiologist to further discuss DAPT    At the time of discharge patient was told to take all medications as prescribed, keep all follow-up appointments, and call their doctor or return to the hospital with any worsening or concerning symptoms.    Day of Discharge     Subjective:  Patient sitting comfortably in bed, no complaints. Would like to go home as soon as is possible. Patient been seen by Bry and Aylin in the past for glaucoma. States that vision is unchanged from yesterday feels like there is seaweed floating in the left eye. Denies any eye pain     Review of Systems   Constitutional:  Negative for chills and fever.   Respiratory:  Negative for cough and  shortness of breath.    Cardiovascular:  Negative for chest pain and palpitations.   Gastrointestinal:  Negative for abdominal pain, diarrhea, nausea and vomiting.     Physical Exam:  Temp:  [97.5 °F (36.4 °C)-97.7 °F (36.5 °C)] 97.5 °F (36.4 °C)  Heart Rate:  [59-74] 60  Resp:  [18-20] 18  BP: (125-168)/() 160/64  Body mass index is 33.67 kg/m².  Physical Exam  itals and nursing note reviewed.   Constitutional:       General: He is not in acute distress.  Cardiovascular:      Rate and Rhythm: Regular rhythm. Rhythm irregular.   Pulmonary:      Effort: Pulmonary effort is normal. No respiratory distress.   Abdominal:      General: Abdomen is flat. There is no distension.      Tenderness: There is no abdominal tenderness.   Musculoskeletal:         General: No swelling or deformity.   Skin:     General: Skin is warm and dry.   Neurological:      General: No focal deficit present.      Mental Status: He is alert. Mental status is at baseline.      Comments: Decreased vision of left eye   Consultants     Consult Orders (all) (From admission, onward)       Start     Ordered    09/30/23 0734  Inpatient Ophthalmology Consult  Once        Specialty:  Ophthalmology  Provider:  Aden Galeano MD    09/30/23 0753    09/30/23 0702  Inpatient Ophthalmology Consult  Once        Specialty:  Ophthalmology  Provider:  Chip Hunter MD    09/30/23 0702    09/29/23 2118  Notify Stroke Coordinator  Once        Provider:  (Not yet assigned)    09/29/23 2123 09/29/23 2118  Inpatient Rehab Admission Consult  Once        Provider:  (Not yet assigned)    09/29/23 2123 09/29/23 2118  Consult to Case Management   Once        Provider:  (Not yet assigned)    09/29/23 2123 09/29/23 2118  Consult to Diabetes Educator  Once        Provider:  (Not yet assigned)    09/29/23 2123 09/29/23 2118  Inpatient Neurology Consult Stroke  Once        Specialty:  Neurology  Provider:  Darrell Quintana,  MD    09/29/23 2123 09/29/23 2059  [facility] (on-call MD unless specified) Details  Once        Specialty:  Hospitalist  Provider:  (Not yet assigned)    09/29/23 2058 09/29/23 1935  Inpatient Neurology Consult Stroke  Once        Specialty:  Neurology  Provider:  (Not yet assigned)    09/29/23 1934 09/29/23 1935  Inpatient Neurology Consult Stroke  Once        Specialty:  Neurology  Provider:  (Not yet assigned)    09/29/23 1934                  Procedures     Imaging Results (All)       Procedure Component Value Units Date/Time    MRI Brain Without Contrast [[id number]] Collected: 09/30/23 0424     Updated: 09/30/23 0432    Narrative:      BRAIN MRI WITHOUT CONTRAST     HISTORY: vision loss; R/O stroke; H54.62-Unqualified visual loss, left  eye, normal vision right eye     COMPARISON: October 20, 2022.     FINDINGS:  Multiplanar images of the head were obtained without  gadolinium. No areas of restricted diffusion are seen to suggest acute  infarct. There is diffuse atrophy. There is periventricular and deep  white matter microangiopathic change. There is an old left PCA territory  infarct. There is no midline shift or mass effect. The intracranial flow  voids are intact. 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 No other areas of susceptibility  artifact are noted. There is a trace right mastoid effusion. There is  partial opacification of the paranasal sinuses. Orbits appear  unremarkable.       Impression:      1. No acute intracranial abnormality. 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        This report was finalized on 9/30/2023 4:29 AM by Dr. Mary Welsh M.D.       XR Chest 1 View [[id number]] Collected: 09/29/23 2048     Updated: 09/29/23 2052    Narrative:       XR CHEST 1 VW-     HISTORY: Acute stroke protocol,  loss of vision in left eye.     COMPARISON: Chest radiograph 10/19/2022     FINDINGS:    A single view of the chest was obtained.  The cardiac silhouette is enlarged. There is calcific aortic  atherosclerosis. There is dense airspace opacity at the left lung base,  which could reflect a combination of pleural fluid and pulmonary  opacification, possibly atelectasis or pneumonia in the appropriate  clinical setting. There is asymmetric elevation of the right  hemidiaphragm.  Recommended short-term follow-up radiographs and/or further evaluation  with CT chest.     This report was finalized on 9/29/2023 8:49 PM by Dr. Zarina Renae M.D.       CT Angiogram Head w AI Analysis of LVO [[id number]] Collected: 09/29/23 2007     Updated: 09/29/23 2021    Narrative:      CT ANGIOGRAM OF THE HEAD AND NECK WITH CONTRAST with AI analysis of LVO     CLINICAL HISTORY: Vision changes in the left eye.     TECHNIQUE: CT angiogram of the head and neck was obtained with 1 mm  axial images following the administration of IV contrast. Sagittal,  coronal, and 3-dimensional reconstructed images were obtained.  Additionally, a CT scan of the head was obtained with 3 mm axial images  before and after the administration of IV contrast.     COMPARISON: CT head 10/19/2022.     FINDINGS:     Noncontrast CT: Chronic left occipital/PCA territory infarct. No CT  evidence of acute territorial infarct. No intracranial hemorrhage.  No  midline shift or mass effect.  No extraxial collections. No  hydrocephalus.  Clear paranasal sinuses and mastoid air cells.       CTA NECK: Patent major arterial vasculature. Mild bilateral carotid bulb  atherosclerotic disease without significant stenosis. No dissection or  pseudoaneurysm.       CTA HEAD: Patent major arterial vasculature.  No aneurysm.     Perfusion: Small (6 mm) area of increased Tmax in the left occipital  lobe localizes to the chronic left PCA territory infarct.          Impression:      1. Small (6  mm) area of increased Tmax in the left occipital lobe  localizes to a chronic left occipital/PCA territory infarct.  2. Patent major arterial vasculature within the head and neck.              Radiation dose reduction techniques were utilized, including automated  exposure control and exposure modulation based on body size.     This report was finalized on 9/29/2023 8:18 PM by Dr. Lencho Cabral M.D.       CT Angiogram Neck [[id number]] Collected: 09/29/23 2007     Updated: 09/29/23 2021    Narrative:      CT ANGIOGRAM OF THE HEAD AND NECK WITH CONTRAST with AI analysis of LVO     CLINICAL HISTORY: Vision changes in the left eye.     TECHNIQUE: CT angiogram of the head and neck was obtained with 1 mm  axial images following the administration of IV contrast. Sagittal,  coronal, and 3-dimensional reconstructed images were obtained.  Additionally, a CT scan of the head was obtained with 3 mm axial images  before and after the administration of IV contrast.     COMPARISON: CT head 10/19/2022.     FINDINGS:     Noncontrast CT: Chronic left occipital/PCA territory infarct. No CT  evidence of acute territorial infarct. No intracranial hemorrhage.  No  midline shift or mass effect.  No extraxial collections. No  hydrocephalus.  Clear paranasal sinuses and mastoid air cells.       CTA NECK: Patent major arterial vasculature. Mild bilateral carotid bulb  atherosclerotic disease without significant stenosis. No dissection or  pseudoaneurysm.       CTA HEAD: Patent major arterial vasculature.  No aneurysm.     Perfusion: Small (6 mm) area of increased Tmax in the left occipital  lobe localizes to the chronic left PCA territory infarct.          Impression:      1. Small (6 mm) area of increased Tmax in the left occipital lobe  localizes to a chronic left occipital/PCA territory infarct.  2. Patent major arterial vasculature within the head and neck.              Radiation dose reduction techniques were utilized, including  automated  exposure control and exposure modulation based on body size.     This report was finalized on 9/29/2023 8:18 PM by Dr. Lencho Cabral M.D.       CT CEREBRAL PERFUSION WITH & WITHOUT CONTRAST [[id number]] Collected: 09/29/23 2007     Updated: 09/29/23 2021    Narrative:      CT ANGIOGRAM OF THE HEAD AND NECK WITH CONTRAST with AI analysis of LVO     CLINICAL HISTORY: Vision changes in the left eye.     TECHNIQUE: CT angiogram of the head and neck was obtained with 1 mm  axial images following the administration of IV contrast. Sagittal,  coronal, and 3-dimensional reconstructed images were obtained.  Additionally, a CT scan of the head was obtained with 3 mm axial images  before and after the administration of IV contrast.     COMPARISON: CT head 10/19/2022.     FINDINGS:     Noncontrast CT: Chronic left occipital/PCA territory infarct. No CT  evidence of acute territorial infarct. No intracranial hemorrhage.  No  midline shift or mass effect.  No extraxial collections. No  hydrocephalus.  Clear paranasal sinuses and mastoid air cells.       CTA NECK: Patent major arterial vasculature. Mild bilateral carotid bulb  atherosclerotic disease without significant stenosis. No dissection or  pseudoaneurysm.       CTA HEAD: Patent major arterial vasculature.  No aneurysm.     Perfusion: Small (6 mm) area of increased Tmax in the left occipital  lobe localizes to the chronic left PCA territory infarct.          Impression:      1. Small (6 mm) area of increased Tmax in the left occipital lobe  localizes to a chronic left occipital/PCA territory infarct.  2. Patent major arterial vasculature within the head and neck.              Radiation dose reduction techniques were utilized, including automated  exposure control and exposure modulation based on body size.     This report was finalized on 9/29/2023 8:18 PM by Dr. Lencho Cabral M.D.               Pertinent Labs     Results from last 7 days   Lab Units  09/30/23 0612 09/29/23 1930   WBC 10*3/mm3 5.42 5.94   HEMOGLOBIN g/dL 13.8 14.7   PLATELETS 10*3/mm3 161 176     Results from last 7 days   Lab Units 09/30/23 0612 09/29/23 1930   SODIUM mmol/L 140 144   POTASSIUM mmol/L 3.2* 3.7   CHLORIDE mmol/L 106 109*   CO2 mmol/L 25.1 24.4   BUN mg/dL 12 20   CREATININE mg/dL 0.50* 0.77   GLUCOSE mg/dL 86 85   Estimated Creatinine Clearance: 126.8 mL/min (A) (by C-G formula based on SCr of 0.5 mg/dL (L)).  Results from last 7 days   Lab Units 09/30/23 0612 09/29/23 1930   ALBUMIN g/dL 3.7 4.1   BILIRUBIN mg/dL 1.9* 1.4*   ALK PHOS U/L 74 86   AST (SGOT) U/L 41* 37   ALT (SGPT) U/L 28 28     Results from last 7 days   Lab Units 09/30/23 0612 09/29/23 1930   CALCIUM mg/dL 9.2 9.9   ALBUMIN g/dL 3.7 4.1       Results from last 7 days   Lab Units 09/30/23  0815 09/30/23 0612 09/29/23 1930   HSTROP T ng/L 38* 41* 38*       Results from last 7 days   Lab Units 09/30/23 0612   CHOLESTEROL mg/dL 114   TRIGLYCERIDES mg/dL 45   HDL CHOL mg/dL 55   LDL CHOL mg/dL 48           Test Results Pending at Discharge       Discharge Details        Discharge Medications        Changes to Medications        Instructions Start Date   atorvastatin 40 MG tablet  Commonly known as: LIPITOR  What changed:   medication strength  how much to take  when to take this   40 mg, Oral, Nightly             Continue These Medications        Instructions Start Date   betamethasone dipropionate 0.05 % cream   As Needed      famotidine 40 MG tablet  Commonly known as: Pepcid   40 mg, Oral, Daily      HYDROcodone-acetaminophen 5-325 MG per tablet  Commonly known as: NORCO   1 tablet, Oral, Every 6 Hours PRN      Hydrocortisone (Perianal) 2.5 % rectal cream  Commonly known as: ANUSOL-HC   Rectal, 2 Times Daily, For 10 days      latanoprost 0.005 % ophthalmic solution  Commonly known as: XALATAN       metoprolol succinate XL 25 MG 24 hr tablet  Commonly known as: TOPROL-XL   TAKE ONE TABLET BY MOUTH  DAILY      vitamin b complex capsule capsule   Oral, Daily             Stop These Medications      aspirin 81 MG chewable tablet     clopidogrel 75 MG tablet  Commonly known as: PLAVIX              Allergies   Allergen Reactions    Dilaudid [Hydromorphone Hcl] Hallucinations         Discharge Disposition:  Home or Self Care    Discharge Diet:  Diet Order   Procedures    Diet: Cardiac Diets; Healthy Heart (2-3 Na+); Texture: Regular Texture (IDDSI 7); Fluid Consistency: Thin (IDDSI 0)       Discharge Activity:   As tolerated    CODE STATUS:    Code Status and Medical Interventions:   Ordered at: 09/29/23 2123     Code Status (Patient has no pulse and is not breathing):    CPR (Attempt to Resuscitate)     Medical Interventions (Patient has pulse or is breathing):    Full Support       Future Appointments   Date Time Provider Department Center   1/18/2024 10:15 AM Fan Schulz MD [facility]   3/4/2024 10:45 AM Tony Ceballos MD [facility]   5/24/2024  2:00 PM Aileen Holman PA [facility]   8/9/2024 10:30 AM Steve Prater III, MD [facility]      Follow-up Information       Fan Schulz MD .    Specialty: Internal Medicine  Contact information:  [id number] [place], [place] [id number] [phone]                             Time Spent on Discharge:  Greater than 30 minutes      Miguel Garland MD  [facility]  09/30/23  20:52 EDT

## 2023-10-02 ENCOUNTER — TRANSITIONAL CARE MANAGEMENT TELEPHONE ENCOUNTER (OUTPATIENT)
Dept: CALL CENTER | Facility: HOSPITAL | Age: 83
End: 2023-10-02
Payer: MEDICARE

## 2023-10-02 NOTE — OUTREACH NOTE
Call Center TCM Note      Flowsheet Row Responses   [facility] patient discharged from? [place]   Does the patient have one of the following disease processes/diagnoses(primary or secondary)? Other   TCM attempt successful? No   Unsuccessful attempts Attempt 1            Tracey Brar RN    10/2/2023, 13:10 EDT

## 2023-10-02 NOTE — OUTREACH NOTE
Call Center TCM Note      Flowsheet Row Responses   [facility] patient discharged from? [place]   Does the patient have one of the following disease processes/diagnoses(primary or secondary)? Other   TCM attempt successful? Yes   Call start time 1406   Call end time 1409   Discharge diagnosis Loss of vision   Does the patient have all medications ordered at discharge? Yes   Is the patient taking all medications as directed (includes completed medication regime)? Yes   Comments will be following up with Bry Viera (opthalmology) tomorrow (10/3) for a retina tear and will be seeing audiology as well tomorrow (10/3)   Does the patient have an appointment with their PCP within 7-14 days of discharge? No   Nursing Interventions Patient declined scheduling/rescheduling appointment at this time, Patient desires to follow up with specialty only   Has home health visited the patient within 72 hours of discharge? N/A   Psychosocial issues? No   Did the patient receive a copy of their discharge instructions? Yes   Nursing interventions Reviewed instructions with patient   What is the patient's perception of their health status since discharge? Improving   Is the patient/caregiver able to teach back signs and symptoms related to disease process for when to call PCP? Yes   Is the patient/caregiver able to teach back signs and symptoms related to disease process for when to call 911? Yes   Is the patient/caregiver able to teach back the hierarchy of who to call/visit for symptoms/problems? PCP, Specialist, Home health nurse, Urgent Care, ED, 911 Yes   TCM call completed? Yes   Wrap up additional comments Doing well, no questions, declined to make a f/u appt with PCP at this time, states he has several other f/u appts.   Call end time 1409   Would this patient benefit from a Referral to Amb Social Work? No   Is the patient interested in additional calls from an ambulatory ? No            Tracey Brar,  RN    10/2/2023, 14:09 EDT

## 2023-11-06 LAB — CREAT BLDA-MCNC: 0.7 MG/DL (ref 0.6–1.3)

## 2023-11-13 ENCOUNTER — TELEPHONE (OUTPATIENT)
Dept: CARDIOLOGY | Facility: CLINIC | Age: 83
End: 2023-11-13
Payer: MEDICARE

## 2023-11-13 NOTE — TELEPHONE ENCOUNTER
We have received a fax for Mr. Sorto from Dr. Davis office requesting cardiac clearance for teeth extractions.    Fax left in your inbox.     Surgery Date: TB    LOV: 9/25/2023 w/ Dr. Lin Jimenez  NOV: 3/4/2024 w/ Dr Lin Jimenez  NOV w/Dr Prater 8/9/2024

## 2023-11-16 NOTE — TELEPHONE ENCOUNTER
Rx Refill Note  Requested Prescriptions     Pending Prescriptions Disp Refills    atorvastatin (LIPITOR) 80 MG tablet [Pharmacy Med Name: ATORVASTATIN 80 MG TABLET] 90 tablet 3     Sig: TAKE ONE TABLET BY MOUTH DAILY      Last office visit with prescribing clinician: 7/11/2023   Last telemedicine visit with prescribing clinician: Visit date not found   Next office visit with prescribing clinician: 1/18/2024

## 2023-11-17 RX ORDER — ATORVASTATIN CALCIUM 80 MG/1
80 TABLET, FILM COATED ORAL DAILY
Qty: 90 TABLET | Refills: 3 | Status: SHIPPED | OUTPATIENT
Start: 2023-11-17

## 2024-01-08 ENCOUNTER — TELEPHONE (OUTPATIENT)
Dept: FAMILY MEDICINE CLINIC | Facility: CLINIC | Age: 84
End: 2024-01-08

## 2024-01-08 DIAGNOSIS — Z13.21 ENCOUNTER FOR VITAMIN DEFICIENCY SCREENING: ICD-10-CM

## 2024-01-08 DIAGNOSIS — Z86.39 H/O VITAMIN D DEFICIENCY: ICD-10-CM

## 2024-01-08 DIAGNOSIS — E55.9 VITAMIN D DEFICIENCY: ICD-10-CM

## 2024-01-08 DIAGNOSIS — E78.00 HYPERCHOLESTEROLEMIA: Primary | ICD-10-CM

## 2024-01-08 NOTE — TELEPHONE ENCOUNTER
Caller: Junito Sorto    Relationship: Self    Best call back number: [phone]    What orders are you requesting (i.e. lab or imaging): LABS    In what timeframe would the patient need to come in: ASAP    Where will you receive your lab/imaging services: [facility]    Additional notes: PLEASE CALL AND ADVISE WHEN ORDERS ARE IN SO HE CAN GO GET THEM DONE.

## 2024-01-10 ENCOUNTER — LAB (OUTPATIENT)
Dept: LAB | Facility: HOSPITAL | Age: 84
End: 2024-01-10
Payer: MEDICARE

## 2024-01-10 LAB
25(OH)D3 SERPL-MCNC: 22.9 NG/ML (ref 30–100)
ALBUMIN SERPL-MCNC: 4.2 G/DL (ref 3.5–5.2)
ALBUMIN/GLOB SERPL: 1.7 G/DL
ALP SERPL-CCNC: 87 U/L (ref 39–117)
ALT SERPL W P-5'-P-CCNC: 31 U/L (ref 1–41)
ANION GAP SERPL CALCULATED.3IONS-SCNC: 10.5 MMOL/L (ref 5–15)
AST SERPL-CCNC: 30 U/L (ref 1–40)
BASOPHILS # BLD AUTO: 0.03 10*3/MM3 (ref 0–0.2)
BASOPHILS NFR BLD AUTO: 0.6 % (ref 0–1.5)
BILIRUB SERPL-MCNC: 1.9 MG/DL (ref 0–1.2)
BUN SERPL-MCNC: 10 MG/DL (ref 8–23)
BUN/CREAT SERPL: 12.5 (ref 7–25)
CALCIUM SPEC-SCNC: 10.3 MG/DL (ref 8.6–10.5)
CHLORIDE SERPL-SCNC: 102 MMOL/L (ref 98–107)
CHOLEST SERPL-MCNC: 140 MG/DL (ref 0–200)
CO2 SERPL-SCNC: 27.5 MMOL/L (ref 22–29)
CREAT SERPL-MCNC: 0.8 MG/DL (ref 0.76–1.27)
DEPRECATED RDW RBC AUTO: 42.2 FL (ref 37–54)
EGFRCR SERPLBLD CKD-EPI 2021: 87.8 ML/MIN/1.73
EOSINOPHIL # BLD AUTO: 0.23 10*3/MM3 (ref 0–0.4)
EOSINOPHIL NFR BLD AUTO: 4.5 % (ref 0.3–6.2)
ERYTHROCYTE [DISTWIDTH] IN BLOOD BY AUTOMATED COUNT: 12.9 % (ref 12.3–15.4)
GLOBULIN UR ELPH-MCNC: 2.5 GM/DL
GLUCOSE SERPL-MCNC: 86 MG/DL (ref 65–99)
HCT VFR BLD AUTO: 44.8 % (ref 37.5–51)
HDLC SERPL-MCNC: 71 MG/DL (ref 40–60)
HGB BLD-MCNC: 15.2 G/DL (ref 13–17.7)
IMM GRANULOCYTES # BLD AUTO: 0.02 10*3/MM3 (ref 0–0.05)
IMM GRANULOCYTES NFR BLD AUTO: 0.4 % (ref 0–0.5)
LDLC SERPL CALC-MCNC: 59 MG/DL (ref 0–100)
LDLC/HDLC SERPL: 0.85 {RATIO}
LYMPHOCYTES # BLD AUTO: 1 10*3/MM3 (ref 0.7–3.1)
LYMPHOCYTES NFR BLD AUTO: 19.5 % (ref 19.6–45.3)
MCH RBC QN AUTO: 30.8 PG (ref 26.6–33)
MCHC RBC AUTO-ENTMCNC: 33.9 G/DL (ref 31.5–35.7)
MCV RBC AUTO: 90.7 FL (ref 79–97)
MONOCYTES # BLD AUTO: 0.39 10*3/MM3 (ref 0.1–0.9)
MONOCYTES NFR BLD AUTO: 7.6 % (ref 5–12)
NEUTROPHILS NFR BLD AUTO: 3.45 10*3/MM3 (ref 1.7–7)
NEUTROPHILS NFR BLD AUTO: 67.4 % (ref 42.7–76)
NRBC BLD AUTO-RTO: 0 /100 WBC (ref 0–0.2)
PLATELET # BLD AUTO: 176 10*3/MM3 (ref 140–450)
PMV BLD AUTO: 10.8 FL (ref 6–12)
POTASSIUM SERPL-SCNC: 4 MMOL/L (ref 3.5–5.2)
PROT SERPL-MCNC: 6.7 G/DL (ref 6–8.5)
RBC # BLD AUTO: 4.94 10*6/MM3 (ref 4.14–5.8)
SODIUM SERPL-SCNC: 140 MMOL/L (ref 136–145)
TRIGL SERPL-MCNC: 45 MG/DL (ref 0–150)
VLDLC SERPL-MCNC: 10 MG/DL (ref 5–40)
WBC NRBC COR # BLD AUTO: 5.12 10*3/MM3 (ref 3.4–10.8)

## 2024-01-10 PROCEDURE — 80061 LIPID PANEL: CPT | Performed by: INTERNAL MEDICINE

## 2024-01-10 PROCEDURE — 80307 DRUG TEST PRSMV CHEM ANLYZR: CPT | Performed by: INTERNAL MEDICINE

## 2024-01-10 PROCEDURE — 82306 VITAMIN D 25 HYDROXY: CPT | Performed by: INTERNAL MEDICINE

## 2024-01-10 PROCEDURE — 80053 COMPREHEN METABOLIC PANEL: CPT | Performed by: INTERNAL MEDICINE

## 2024-01-10 PROCEDURE — 85025 COMPLETE CBC W/AUTO DIFF WBC: CPT | Performed by: INTERNAL MEDICINE

## 2024-01-10 PROCEDURE — 36415 COLL VENOUS BLD VENIPUNCTURE: CPT | Performed by: INTERNAL MEDICINE

## 2024-01-18 ENCOUNTER — OFFICE VISIT (OUTPATIENT)
Dept: FAMILY MEDICINE CLINIC | Facility: CLINIC | Age: 84
End: 2024-01-18
Payer: MEDICARE

## 2024-01-18 VITALS
HEIGHT: 67 IN | WEIGHT: 208.5 LBS | DIASTOLIC BLOOD PRESSURE: 84 MMHG | TEMPERATURE: 97 F | HEART RATE: 67 BPM | BODY MASS INDEX: 32.72 KG/M2 | OXYGEN SATURATION: 96 % | SYSTOLIC BLOOD PRESSURE: 140 MMHG | RESPIRATION RATE: 16 BRPM

## 2024-01-18 DIAGNOSIS — Z95.818 PRESENCE OF WATCHMAN LEFT ATRIAL APPENDAGE CLOSURE DEVICE: ICD-10-CM

## 2024-01-18 DIAGNOSIS — E66.09 CLASS 2 OBESITY DUE TO EXCESS CALORIES WITHOUT SERIOUS COMORBIDITY WITH BODY MASS INDEX (BMI) OF 37.0 TO 37.9 IN ADULT: ICD-10-CM

## 2024-01-18 DIAGNOSIS — E78.00 HYPERCHOLESTEROLEMIA: ICD-10-CM

## 2024-01-18 DIAGNOSIS — I48.21 PERMANENT ATRIAL FIBRILLATION: Primary | ICD-10-CM

## 2024-01-18 DIAGNOSIS — E55.9 VITAMIN D DEFICIENCY: ICD-10-CM

## 2024-01-18 RX ORDER — ATORVASTATIN CALCIUM 40 MG/1
80 TABLET, FILM COATED ORAL DAILY
Qty: 90 TABLET | Refills: 3 | Status: SHIPPED | OUTPATIENT
Start: 2024-01-18

## 2024-01-18 RX ORDER — ASPIRIN 81 MG/1
81 TABLET ORAL DAILY
COMMUNITY

## 2024-01-18 RX ORDER — OMEPRAZOLE 20 MG/1
CAPSULE, DELAYED RELEASE ORAL
COMMUNITY
Start: 2023-05-25

## 2024-01-18 RX ORDER — LATANOPROSTENE BUNOD 0.24 MG/ML
SOLUTION/ DROPS OPHTHALMIC
COMMUNITY
Start: 2023-12-11

## 2024-01-18 RX ORDER — TIMOLOL MALEATE 5 MG/ML
SOLUTION/ DROPS OPHTHALMIC
COMMUNITY
Start: 2024-01-07

## 2024-01-19 PROBLEM — E55.9 VITAMIN D DEFICIENCY: Status: ACTIVE | Noted: 2024-01-19

## 2024-01-19 NOTE — PROGRESS NOTES
Subjective   Junito Sorto is a 83 y.o. male. Patient is here today for   Chief Complaint   Patient presents with    Hyperlipidemia          Vitals:    24 0959   BP: 140/84   Pulse: 67   Resp: 16   Temp: 97 °F (36.1 °C)   SpO2: 96%     Body mass index is 32.65 kg/m².      Past Medical History:   Diagnosis Date    Allergic     Airborne    Arrhythmia     Arthritis     At risk for obstructive sleep apnea     5    Atrial fibrillation     Bladder outlet obstruction 2019    BPH (benign prostatic hyperplasia)     Cataract     Replaced    Colon polyp     Coronary artery disease Few years    Afib    Dysphagia     Eczema     GERD (gastroesophageal reflux disease)     Mentioned as possible    Glaucoma Eievated eye pressure    Hernia     Your office reported hiatal    Hiatal hernia     History of medical problems     Xs pressure in left eye. Now being treated in     HL (hearing loss)     Hyperlipidemia     Hypertension     Left ventricular dysfunction     Low back pain     Obesity     Pneumonia     Prostate cancer     Skin cancer     Stroke 10/19/2022    RESIDUAL: LOSS OF RT PERIPHERAL VISION    Visual impairment  stroke    Reading correction      Allergies   Allergen Reactions    Dilaudid [Hydromorphone Hcl] Hallucinations    Hydromorphone Anxiety and Other (See Comments)     Other reaction(s): Hallucinations      Social History     Socioeconomic History    Marital status:    Tobacco Use    Smoking status: Former     Types: Pipe     Start date: 1959     Quit date: 1961     Years since quittin.2    Smokeless tobacco: Never    Tobacco comments:     Limited by allergies   Vaping Use    Vaping Use: Never used   Substance and Sexual Activity    Alcohol use: Yes     Alcohol/week: 5.0 standard drinks of alcohol     Types: 5 Glasses of wine per week     Comment: Less than 4 oz /wk since this prob started.    Drug use: Never    Sexual activity: Not Currently     Partners:  Female     Birth control/protection: Surgical, None, Vasectomy     Comment: 50 years ago        Current Outpatient Medications:     aspirin 81 MG EC tablet, Take 1 tablet by mouth Daily., Disp: , Rfl:     atorvastatin (LIPITOR) 40 MG tablet, Take 2 tablets by mouth Daily., Disp: 90 tablet, Rfl: 3    B Complex Vitamins (vitamin b complex) capsule capsule, Take  by mouth Daily., Disp: , Rfl:     betamethasone dipropionate 0.05 % cream, As Needed., Disp: , Rfl:     famotidine (Pepcid) 40 MG tablet, Take 1 tablet by mouth Daily., Disp: 90 tablet, Rfl: 3    metoprolol succinate XL (TOPROL-XL) 25 MG 24 hr tablet, TAKE ONE TABLET BY MOUTH DAILY, Disp: 90 tablet, Rfl: 3    Vyzulta 0.024 % solution, , Disp: , Rfl:     omeprazole (priLOSEC) 20 MG capsule, , Disp: , Rfl:     timolol (TIMOPTIC) 0.5 % ophthalmic solution, , Disp: , Rfl:      Objective     History of Present Illness  This patient is here to follow-up on hypercholesterolemia, obesity, hyperglycemia,  Hyperlipidemia         Review of Systems   Constitutional: Negative.    HENT: Negative.     Respiratory: Negative.     Cardiovascular: Negative.    Musculoskeletal: Negative.    Psychiatric/Behavioral: Negative.         Physical Exam  Vitals and nursing note reviewed.   Constitutional:       Appearance: Normal appearance. He is obese.      Comments: Pleasant, neatly groomed, no distress.   HENT:      Head: Normocephalic and atraumatic.   Neck:      Vascular: No carotid bruit.   Cardiovascular:      Heart sounds: Normal heart sounds. No murmur heard.     No gallop.      Comments: He has an irregularly irregular rhythm with regular rate.  Pulmonary:      Effort: No respiratory distress.      Breath sounds: Normal breath sounds. No wheezing or rales.   Neurological:      Mental Status: He is alert and oriented to person, place, and time.   Psychiatric:         Mood and Affect: Mood normal.         Behavior: Behavior normal.         Thought Content: Thought content  normal.           Problems Addressed this Visit          Cardiac and Vasculature    Permanent atrial fibrillation - Primary    Hypercholesterolemia    Relevant Medications    atorvastatin (LIPITOR) 40 MG tablet    Presence of Watchman left atrial appendage closure device       Endocrine and Metabolic    Obesity    Vitamin D deficiency     Diagnoses         Codes Comments    Permanent atrial fibrillation    -  Primary ICD-10-CM: I48.21  ICD-9-CM: 427.31     Hypercholesterolemia     ICD-10-CM: E78.00  ICD-9-CM: 272.0     Presence of Watchman left atrial appendage closure device     ICD-10-CM: Z95.818  ICD-9-CM: V45.09     Class 2 obesity due to excess calories without serious comorbidity with body mass index (BMI) of 37.0 to 37.9 in adult     ICD-10-CM: E66.09, Z68.37  ICD-9-CM: 278.00, V85.37     Vitamin D deficiency     ICD-10-CM: E55.9  ICD-9-CM: 268.9               PLAN  Is atrial fibrillation with controlled ventricular response.  He is watching his device.    He is obese with BMI of 32.  I have asked him to do what he could losing some with regular aerobic activity decrease caloric intake.    He has vitamin D deficiency.  I asked him to start taking vitamin D over-the-counter 4000 units once daily.    I asked him to follow-up with me in About 6 months.  Fasting labs prior to that visit should include: Lipid profile, comprehensive metabolic panel, CBC, urinalysis and vitamin D level.  No follow-ups on file.  Answers submitted by the patient for this visit:  Primary Reason for Visit (Submitted on 1/11/2024)  What is the primary reason for your visit?: Other  Other (Submitted on 1/11/2024)  Please describe your symptoms.: Occasional lower back pain  Have you had these symptoms before?: Yes  How long have you been having these symptoms?: Greater than 2 weeks  Please list any medications you are currently taking for this condition.: Aspirin or naproxen  Please describe any probable cause for these symptoms. : Age,  heavy lifting in past

## 2024-01-23 RX ORDER — ATORVASTATIN CALCIUM 40 MG/1
80 TABLET, FILM COATED ORAL DAILY
Qty: 90 TABLET | Refills: 3 | Status: SHIPPED | OUTPATIENT
Start: 2024-01-23

## 2024-01-23 NOTE — TELEPHONE ENCOUNTER
Caller: Junito Sorto    Relationship: Self    Best call back number: [phone]    Requested Prescriptions:   Requested Prescriptions     Pending Prescriptions Disp Refills    atorvastatin (LIPITOR) 40 MG tablet 90 tablet 3     Sig: Take 2 tablets by mouth Daily.        Pharmacy where request should be sent: [facility] [id number] [facility] [address] [facility]  AT [address] [phone] [facility] [phone]      Last office visit with prescribing clinician: 1/18/2024   Last telemedicine visit with prescribing clinician: Visit date not found   Next office visit with prescribing clinician: 7/25/2024     Additional details provided by patient: LAST PRESCRIPTION DID NOT GO THROUGH. WOULD LIKE IT FOR 3 MOS SUPPLY    Does the patient have less than a 3 day supply:  [x] Yes  [] No    Would you like a call back once the refill request has been completed: [] Yes [x] No    If the office needs to give you a call back, can they leave a voicemail: [] Yes [x] No    Madyson James Rep   01/23/24 15:05 EST

## 2024-01-30 ENCOUNTER — TELEPHONE (OUTPATIENT)
Dept: CARDIOLOGY | Facility: HOSPITAL | Age: 84
End: 2024-01-30
Payer: MEDICARE

## 2024-01-30 RX ORDER — AMOXICILLIN 500 MG/1
2000 CAPSULE ORAL SEE ADMIN INSTRUCTIONS
Qty: 4 CAPSULE | Refills: 0 | Status: SHIPPED | OUTPATIENT
Start: 2024-01-30

## 2024-01-30 NOTE — TELEPHONE ENCOUNTER
Pt is scheduled for dental cleaning tomorrow & is calling to request Abx prophylaxis. He denies allergies to PCN.  Amoxicillin 2g sent to Rx. RTRN

## 2024-02-13 DIAGNOSIS — I48.20 CHRONIC A-FIB: ICD-10-CM

## 2024-02-13 DIAGNOSIS — Z95.818 PRESENCE OF WATCHMAN LEFT ATRIAL APPENDAGE CLOSURE DEVICE: Primary | ICD-10-CM

## 2024-02-18 ENCOUNTER — HOSPITAL ENCOUNTER (EMERGENCY)
Facility: HOSPITAL | Age: 84
Discharge: HOME | End: 2024-02-18
Attending: EMERGENCY MEDICINE | Admitting: EMERGENCY MEDICINE
Payer: MEDICARE

## 2024-02-18 VITALS
SYSTOLIC BLOOD PRESSURE: 156 MMHG | HEART RATE: 74 BPM | WEIGHT: 205 LBS | OXYGEN SATURATION: 98 % | DIASTOLIC BLOOD PRESSURE: 103 MMHG | RESPIRATION RATE: 18 BRPM | TEMPERATURE: 97.7 F | BODY MASS INDEX: 31.07 KG/M2 | HEIGHT: 68 IN

## 2024-02-18 DIAGNOSIS — R09.89 LABILE HYPERTENSION: ICD-10-CM

## 2024-02-18 DIAGNOSIS — Z86.69 HISTORY OF GLAUCOMA: ICD-10-CM

## 2024-02-18 DIAGNOSIS — H54.7 DECREASED VISUAL ACUITY: Primary | ICD-10-CM

## 2024-02-18 PROCEDURE — 99283 EMERGENCY DEPT VISIT LOW MDM: CPT

## 2024-02-18 RX ORDER — CLONIDINE HYDROCHLORIDE 0.1 MG/1
0.2 TABLET ORAL ONCE
Status: COMPLETED | OUTPATIENT
Start: 2024-02-18 | End: 2024-02-18

## 2024-02-18 RX ORDER — TETRACAINE HYDROCHLORIDE 5 MG/ML
2 SOLUTION OPHTHALMIC ONCE
Status: COMPLETED | OUTPATIENT
Start: 2024-02-18 | End: 2024-02-18

## 2024-02-18 RX ADMIN — TETRACAINE HYDROCHLORIDE 2 DROP: 5 SOLUTION OPHTHALMIC at 22:23

## 2024-02-18 RX ADMIN — CLONIDINE HYDROCHLORIDE 0.2 MG: 0.1 TABLET ORAL at 22:22

## 2024-02-19 NOTE — ED PROVIDER NOTES
" EMERGENCY DEPARTMENT ENCOUNTER  Room Number:  23/23  PCP: Fan Schulz MD  Independent Historians: Patient, wife at bedside      HPI:  Chief Complaint: had concerns including Eye Problem (Left vision change since 1830 PM).  Blurry vision left eye    A complete HPI/ROS/PMH/PSH/SH/FH are unobtainable due to:   Chronic or social conditions impacting patient care (Social Determinants of Health):       Context: Junito Sorto is a 83 y.o. male with a medical history of stroke, retinal tear, atrial fibrillation who presents to the ED c/o acute visual changes in the left eye.  Around 630 tonight patient had onset of blurry vision in his left eye.  He said the vision was not \"fuzzy\".  He also had \"sparkles\" in his left eye.  Generally his right eye is his bad eye and his left eyes is Ryan 1.  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  He also was hospitalized in September with visual changes felt to be related to retinal tear.  Patient also has history of glaucoma and is compliant with medications.  He denies eye pain.  Denies recent illness.  Denies chest pain or trouble breathing.  Denies weakness or numbness.  Denies change in speech or comprehension.      Review of prior external notes (non-ED) -and- Review of prior external test results outside of this encounter:   I reviewed prior medical records including admission from September 2023      Prescription drug monitoring program review:         PAST MEDICAL HISTORY  Active Ambulatory Problems     Diagnosis Date Noted    Hyperglycemia 02/16/2016    Obesity 02/25/2016    Allergic rhinitis 03/28/2016    Permanent atrial fibrillation 03/28/2016    HLD (hyperlipidemia) 03/28/2016    Esophageal dysphagia 10/09/2018    Hiatal hernia 02/05/2019    GERD (gastroesophageal reflux disease) 02/05/2019    Bladder outlet obstruction 09/11/2019    Sciatica of right side 12/10/2020    Pneumonia of right lower lobe due to infectious organism " 03/03/2022    Acute respiratory failure with hypoxia 03/17/2022    PVC (premature ventricular contraction)     Left ventricular dysfunction     Hypercholesterolemia 07/26/2022    Acute ischemic left PCA stroke 10/19/2022    Lumbar spine pain 07/11/2023    Presence of Watchman left atrial appendage closure device 08/08/2023    History of stroke 09/29/2023    Loss of vision 09/29/2023    Vitamin D deficiency 01/19/2024     Resolved Ambulatory Problems     Diagnosis Date Noted    Hypertension 02/25/2016    Benign essential hypertension 03/28/2016    Acute cystitis 02/26/2017    Bacterial conjunctivitis 03/13/2017    Impacted cerumen of right ear 01/13/2019    Prostate cancer 09/11/2019    Hypoxia 03/03/2022    Pneumonia due to infectious organism 03/17/2022    Orthopnea 03/17/2022     Past Medical History:   Diagnosis Date    Allergic     Arrhythmia     Arthritis     At risk for obstructive sleep apnea     Atrial fibrillation     BPH (benign prostatic hyperplasia)     Cataract     Colon polyp     Coronary artery disease Few years    Dysphagia     Eczema     Glaucoma Eievated eye pressure    Hernia     History of medical problems 2022    HL (hearing loss)     Hyperlipidemia     Low back pain     Pneumonia 2022    Skin cancer     Stroke 10/19/2022    Visual impairment 2022 stroke         PAST SURGICAL HISTORY  Past Surgical History:   Procedure Laterality Date    APPENDECTOMY  1950s    ATRIAL APPENDAGE EXCLUSION LEFT WITH TRANSESOPHAGEAL ECHOCARDIOGRAM N/A 03/22/2023    Procedure: Atrial Appendage Occlusion;  Surgeon: Tony Ceballos MD;  Location: [facility] INVASIVE LOCATION;  Service: Cardiovascular;  Laterality: N/A;    CARDIAC SURGERY  2023    Watchman inserted because of continuous afib    CATARACT EXTRACTION, BILATERAL      COLONOSCOPY  unknown    CYSTOSCOPY TRANSURETHRAL RESECTION OF PROSTATE N/A 09/11/2019    Procedure: CYSTOSCOPY TRANSURETHRAL RESECTION OF PROSTATE;  Surgeon: Chip Bedolla MD;   Location: [facility] MAIN OR;  Service: Urology    ENDOSCOPY N/A 10/31/2018    LA Grade C reflux esophagitis, HH, erythematous mucosa in the antrum. Path: Gastritis, esophagitis.     ENDOSCOPY N/A 2022    Procedure: ESOPHAGOGASTRODUODENOSCOPY WITH BIOPSIES AND NAILS DILATION #56;  Surgeon: Tony Ramos MD;  Location: [facility] ENDOSCOPY;  Service: Gastroenterology;  Laterality: N/A;  PRE OP - DYSPHAGIA  POST OP- MILD GASTRITIS    EPIDURAL BLOCK      ESOPHAGEAL DILATATION      EYE SURGERY      Cataracts    PROSTATE SURGERY  2014    SKIN CANCER EXCISION      multiple, over that last few years    SKIN CANCER EXCISION Right 2019    FACE,     SKIN CANCER EXCISION          TONSILLECTOMY      around 1950    TRANSURETHRAL RESECTION OF BLADDER N/A     UPPER GASTROINTESTINAL ENDOSCOPY      VASECTOMY           FAMILY HISTORY  Family History   Problem Relation Age of Onset    Cancer Mother         Pancriatic    Liver disease Mother     Arthritis Mother     Cancer Father         Colon,    Colon cancer Father     Arrhythmia Father         Afib    Heart disease Father     Cancer Brother         Prostate    Arrhythmia Brother         Afib    Hearing loss Brother     Cancer Paternal Grandfather         Prostate    Arrhythmia Paternal Aunt         Irregular beat . Before the time of illness sharing.    Arthritis Sister     Cancer Son         Melanoma    Other Son         Melanoma    Cancer Son         None    Heart disease Son         Afib    Malig Hyperthermia Neg Hx          SOCIAL HISTORY  Social History     Socioeconomic History    Marital status:    Tobacco Use    Smoking status: Former     Types: Pipe     Start date: 1959     Quit date: 1961     Years since quittin.3    Smokeless tobacco: Never    Tobacco comments:     Limited by allergies   Vaping Use    Vaping Use: Never used   Substance and Sexual Activity    Alcohol use: Yes     Alcohol/week: 5.0 standard drinks of  alcohol     Types: 5 Glasses of wine per week     Comment: Less than 4 oz /wk since this prob started.    Drug use: Never    Sexual activity: Not Currently     Partners: Female     Birth control/protection: Surgical, None, Vasectomy     Comment: 50 years ago         ALLERGIES  Dilaudid [hydromorphone hcl] and Hydromorphone      REVIEW OF SYSTEMS  Review of Systems   Constitutional:  Negative for fever.   Eyes:  Positive for visual disturbance. Negative for photophobia and pain.   Respiratory:  Negative for shortness of breath.    Cardiovascular:  Negative for chest pain.   All other systems reviewed and are negative.    Included in HPI  All systems reviewed and negative except for those discussed in HPI.      PHYSICAL EXAM    I have reviewed the triage vital signs and nursing notes.    ED Triage Vitals   Temp Heart Rate Resp BP SpO2   02/18/24 2013 02/18/24 2013 02/18/24 2013 02/18/24 2049 02/18/24 2013   97.7 °F (36.5 °C) 74 18 (!) 188/121 98 %      Temp src Heart Rate Source Patient Position BP Location FiO2 (%)   -- -- 02/18/24 2049 02/18/24 2049 --     Lying Right arm        Physical Exam  GENERAL: Alert and pleasant male in no obvious distress.  Triage vitals reviewed notable for initial pressure 188/121.  Temperature, heart rate and O2 sats benign  SKIN: Warm, dry  HENT: Normocephalic, atraumatic  EYES: no scleral icterus-eyes grossly normal to exam.  No erythema or discharge  Extraocular muscles are intact  Visual confrontational fields-there is fairly significant deficit to the right lateral visual field.  Vision seems to be grossly intact to confrontation in the left eye.  He is able to make count fingers readily through the left eye.  CV: regular rhythm, regular rate  RESPIRATORY: normal effort, lungs clear  ABDOMEN: soft, nontender, nondistended  MUSCULOSKELETAL: no deformity  NEURO: alert, moves all extremities, follows commands      LAB RESULTS  No results found for this or any previous visit (from the  past 24 hour(s)).      RADIOLOGY  No Radiology Exams Resulted Within Past 24 Hours      MEDICATIONS GIVEN IN ER  Medications   tetracaine (ALTACAINE) 0.5 % ophthalmic solution 2 drop (2 drops Both Eyes Given 2/18/24 2223)   cloNIDine (CATAPRES) tablet 0.2 mg (0.2 mg Oral Given 2/18/24 2222)         ORDERS PLACED DURING THIS VISIT:  Orders Placed This Encounter   Procedures    Visual Acuity Screening    Supplies To Bedside - Notify MD When Ready- George Pen    Ophthalmology (on-call MD unless specified)         OUTPATIENT MEDICATION MANAGEMENT:  No current Epic-ordered facility-administered medications on file.     Current Outpatient Medications Ordered in Epic   Medication Sig Dispense Refill    amoxicillin (AMOXIL) 500 MG capsule Take 4 capsules by mouth See Admin Instructions. 4 capsules 1 hour prior to procedure 4 capsule 0    aspirin 81 MG EC tablet Take 1 tablet by mouth Daily.      atorvastatin (LIPITOR) 40 MG tablet Take 2 tablets by mouth Daily. 90 tablet 3    B Complex Vitamins (vitamin b complex) capsule capsule Take  by mouth Daily.      betamethasone dipropionate 0.05 % cream As Needed.      famotidine (Pepcid) 40 MG tablet Take 1 tablet by mouth Daily. 90 tablet 3    metoprolol succinate XL (TOPROL-XL) 25 MG 24 hr tablet TAKE ONE TABLET BY MOUTH DAILY 90 tablet 3    omeprazole (priLOSEC) 20 MG capsule  (Patient not taking: Reported on 1/18/2024)      timolol (TIMOPTIC) 0.5 % ophthalmic solution  (Patient not taking: Reported on 1/18/2024)      Vyzulta 0.024 % solution            PROCEDURES  Procedures            PROGRESS, DATA ANALYSIS, CONSULTS, AND MEDICAL DECISION MAKING  All labs have been independently interpreted by me.  All radiology studies have been reviewed by me. All EKG's have been independently viewed and interpreted by me.  Discussion below represents my analysis of pertinent findings related to patient's condition, differential diagnosis, treatment plan and final disposition.        ED  Course as of 02/18/24 2337   [place] Feb 18, 2024 2118 MDM-complicated male with history of cerebral stroke resulting in visual deficit as well as glaucoma and retinal tears presents with left eye blurry vision onset earlier tonight.    On exam he was hypertensive on initial reading but has an otherwise benign neurologic exam without strength weakness or cerebellar deficit.      Assessment-etiology of symptoms unclear.  Exam would go against stroke in my opinion there is really not a significant new visual field deficit.  I suspect that the right eye visual deficit is more chronic.  I am concerned about possible glaucoma although he is not having eye pain or having a red steamy pupil.  Will need to check pressures.    This may likely be a retinal issue and may need to see ophthalmology.  His ophthalmologist is Dr. Merino with [facility]. [DB]   2119 Considered stroke but do not think it is likely given physical exam and findings. [DB]   2155 Patient has remained hypertensive with systolics in the 180s and diastolics running in the 120s to 110s.    He is not having headache or chest pain.  Visual disturbance might be related to poorly controlled hypertension.  Will go ahead and give some oral Catapres. [DB]   2226 Pressure checked in the left eye using the George-Pen is 20.4 on 6 readings.    Blood pressure remains elevated and he was given some oral Catapres.    I think the symptoms are more related to ocular problems rather than stroke in my opinion.  Will discuss with the ophthalmologist on with Dr. Merino. [DB]   2228 Visual acuity per nurse:  OD-20/25  OS-20/100 [DB]   2326 I spoke with the on-call provider from [facility] who agrees that patient needs outpatient ophthalmology evaluation and they are open tomorrow despite pain presents today.  He should be able to follow-up in the [facility] appointment without trouble according to the on-call provider.    Repeat blood pressure much improved with last  reading with diastolic in the 90s. [DB]      ED Course User Index  [DB] Francesco Vines MD             AS OF 23:37 EST VITALS:    BP - (!) 156/103  HR - 74  TEMP - 97.7 °F (36.5 °C)  O2 SATS - 98%    COMPLEXITY OF CARE  Complicated patient with history of prior ophthalmic stroke with homonymous hemianopsia, retinal tear and glaucoma presents with diminished vision in his left eye earlier tonight.    Vitals notable for elevated blood pressure which improved after oral Catapres.    Exam did not show weakness numbness or signs suggestive of stroke.  Visual acuity showed some diminished visual field in the right lateral field likely related to prior stroke.  No new obvious visual acuity deficit and no acuity field deficits in the left eye.  He did have some blurry vision with acuity of 20/100 versus 20/25 in the right eye.    Glaucoma was considered and is pressure in the left eye was 20 mm.    Findings were not felt to be consistent with stroke but more related to MRI problems and I have spoken with on-call ophthalmology who is prepared to follow-up with him tomorrow in the office.    I did discuss with patient wife about his elevated blood pressure and will ask him to do a 5-day blood pressure check with follow-up with primary care provider if blood pressure remains elevated.      DIAGNOSIS  Final diagnoses:   Decreased visual acuity   Labile hypertension   History of glaucoma         DISPOSITION  ED Disposition       ED Disposition   Discharge    Condition   Stable    Comment   --                Please note that portions of this document were completed with a voice recognition program.    Note Disclaimer: At [facility], we believe that sharing information builds trust and better relationships. You are receiving this note because you recently visited [facility]. It is possible you will see health information before a provider has talked with you about it. This kind of information can be easy to misunderstand.  To help you fully understand what it means for your health, we urge you to discuss this note with your provider.         Francesco Vines MD  02/18/24 2963

## 2024-02-19 NOTE — DISCHARGE INSTRUCTIONS
Please check BP daily for 5 days and share with your MD.    Please follow-up with ophthalmology tomorrow for further evaluation by eye specialist with their specialized equipment

## 2024-02-20 ENCOUNTER — PATIENT OUTREACH (OUTPATIENT)
Dept: CASE MANAGEMENT | Facility: OTHER | Age: 84
End: 2024-02-20
Payer: MEDICARE

## 2024-02-20 NOTE — OUTREACH NOTE
"AMBULATORY CASE MANAGEMENT NOTE    Name and Relationship of Patient/Support Person: Junito Sorto - Self    Patient Outreach  RN-ACM outreach with patient. Discussed 2/18/24 ED visit regarding .decreased visual acuity; hypertension and history of glaucoma. Patient treated and discharged home.  Patient states to be compliant with ED recommendations; states to have had ophthalmology appointment; states surgery has been recommended and awaiting call back with information. Patient states vision in left eye \" a little fuzzy\" and states to have history of episodes of double vision. Patient states to be compliant with medications and monitoring of blood pressure with value today of 130/70 and voiced intent continue monitoring and follow up with PCP.  Patient states no difficulty with headache; chest pain; SOB; appetite or sleeping. Reviewed with patient ED AVS recommendations; education; role of RN-ACM and HRCM case management services. Patient verbalized understanding and states to appreciate outreach. Patient's wife has question regarding previous cardiology recommendation and voiced intent to follow up with cardiology. No further questions voiced at this time.   Adult Patient Profile  Questions/Answers      Flowsheet Row Most Recent Value   Symptoms/Conditions Managed at Home HEENT (head, eyes, ears, nose, throat), cardiovascular   Cardiovascular Symptoms/Conditions hypertension   Cardiovascular Management Strategies medication therapy, other (see comments)  [Physician follow up]   HEENT Symptoms/Conditions vision problem(s), other (see comments)  [Decrease in visual acuity.]   Vision Problems glaucoma  [decrease in visual activity]   HEENT Management Strategies other (see comments)  [Physician follow up]   Barriers to Taking Medication as Prescribed none   Equipment Currently Used at Home bp cuff   Primary Source of Support/Comfort spouse   People in Home spouse        Social Work Assessment  Questions/Answers  "     Flowsheet Row Most Recent Value   People in Home spouse   Functional Status Comments Patient states to be independent with ADL's,  ambulating with cane and receiving assistance with transportation   Equipment Currently Used at Home bp cuff        Send Education  Questions/Answers      Flowsheet Row Most Recent Value   Other Patient Education/Resources  24/7 [facility] Nurse Call Line, Advanced Care Planning, MyChart   24/7 Nurse Call Line Education Method Verbal   ACP Education Method Verbal   MyChart Education Method Verbal   Advanced Directives: Patient Has        SDOH updated and reviewed with the patient during this program:  --     Food Insecurity: No Food Insecurity (2/20/2024)    Hunger Vital Sign     Worried About Running Out of Food in the Last Year: Never true     Ran Out of Food in the Last Year: Never true      --     Transportation Needs: No Transportation Needs (2/20/2024)    PRAPARE - Transportation     Lack of Transportation (Medical): No     Lack of Transportation (Non-Medical): No       Education Documentation  Unresolved/Worsening Symptoms, taught by Brigitte Benjamin, RN at 2/20/2024  2:30 PM.  Learner: Patient  Readiness: Acceptance  Method: Explanation  Response: Verbalizes Understanding    Provider Follow-Up, taught by Brigitte Benjamin, RN at 2/20/2024  2:30 PM.  Learner: Patient  Readiness: Acceptance  Method: Explanation  Response: Verbalizes Understanding    Medication Management, taught by Brigitte Benjamin, RN at 2/20/2024  2:30 PM.  Learner: Patient  Readiness: Acceptance  Method: Explanation  Response: Verbalizes Understanding    Blood Pressure Monitoring, taught by Brigitte Benjamin, RN at 2/20/2024  2:30 PM.  Learner: Patient  Readiness: Acceptance  Method: Explanation  Response: Verbalizes Understanding          Brigitte CHURCH  Ambulatory Case Management    2/20/2024, 14:31 EST

## 2024-02-23 DIAGNOSIS — Z12.11 COLON CANCER SCREENING: Primary | ICD-10-CM

## 2024-02-23 RX ORDER — ATORVASTATIN CALCIUM 40 MG/1
40 TABLET, FILM COATED ORAL DAILY
Qty: 90 TABLET | Refills: 3 | Status: SHIPPED | OUTPATIENT
Start: 2024-02-23

## 2024-02-26 ENCOUNTER — TELEPHONE (OUTPATIENT)
Dept: FAMILY MEDICINE CLINIC | Facility: CLINIC | Age: 84
End: 2024-02-26
Payer: MEDICARE

## 2024-03-04 ENCOUNTER — OFFICE VISIT (OUTPATIENT)
Age: 84
End: 2024-03-04
Payer: MEDICARE

## 2024-03-04 VITALS
DIASTOLIC BLOOD PRESSURE: 82 MMHG | HEART RATE: 53 BPM | BODY MASS INDEX: 32.13 KG/M2 | WEIGHT: 212 LBS | SYSTOLIC BLOOD PRESSURE: 140 MMHG | HEIGHT: 68 IN

## 2024-03-04 DIAGNOSIS — I48.21 PERMANENT ATRIAL FIBRILLATION: ICD-10-CM

## 2024-03-04 DIAGNOSIS — I10 ESSENTIAL HYPERTENSION: ICD-10-CM

## 2024-03-04 DIAGNOSIS — Z95.818 PRESENCE OF WATCHMAN LEFT ATRIAL APPENDAGE CLOSURE DEVICE: Primary | ICD-10-CM

## 2024-03-04 PROCEDURE — 93000 ELECTROCARDIOGRAM COMPLETE: CPT | Performed by: INTERNAL MEDICINE

## 2024-03-04 PROCEDURE — 1159F MED LIST DOCD IN RCRD: CPT | Performed by: INTERNAL MEDICINE

## 2024-03-04 PROCEDURE — 1160F RVW MEDS BY RX/DR IN RCRD: CPT | Performed by: INTERNAL MEDICINE

## 2024-03-04 PROCEDURE — 99214 OFFICE O/P EST MOD 30 MIN: CPT | Performed by: INTERNAL MEDICINE

## 2024-03-04 NOTE — PROGRESS NOTES
[facility] Structural Follow Up Office Note     Encounter Date:24  Patient:Junito Sorto  :1940  MRN:[id number]    Referring Provider: Steve Prater MD    Chief Complaint:   Chief Complaint   Patient presents with    1 year post watchman     History of Presenting Illness:      Mr. Sorto is a 83 y.o. gentleman with past medical history notable for permanent atrial fibrillation, history of stroke, hypertension, mild cardiomyopathy with ejection fraction in the 45% range, esophageal dysphagia status post dilation of his esophagus, and gastric reflux disease who presents for scheduled follow-up after recent watchman implantation.  He was last seen approximately 6 months ago at that visit we were able to stop his dual antiplatelet therapy and continue aspirin alone.  He did have a recent emergency room visit with concern that maybe he was having a stroke however turned out that his cataract lens had fallen off and that was causing his vision issues he elected having eye surgery this Friday.  Otherwise he is doing well.    Review of Systems:  Review of Systems   Constitutional: Negative.   HENT: Negative.     Eyes: Negative.    Cardiovascular: Negative.    Respiratory: Negative.     Endocrine: Negative.    Hematologic/Lymphatic: Negative.    Skin: Negative.    Musculoskeletal:  Positive for falls.   Gastrointestinal: Negative.    Genitourinary: Negative.    Neurological: Negative.    Psychiatric/Behavioral: Negative.     Allergic/Immunologic: Negative.         Current Outpatient Medications on File Prior to Visit   Medication Sig Dispense Refill    aspirin 81 MG EC tablet Take 1 tablet by mouth Daily.      atorvastatin (LIPITOR) 40 MG tablet Take 1 tablet by mouth Daily. 90 tablet 3    B Complex Vitamins (vitamin b complex) capsule capsule Take  by mouth Daily.      betamethasone dipropionate 0.05 % cream As Needed.      famotidine (Pepcid) 40 MG tablet Take 1 tablet by mouth Daily. 90 tablet  3    metoprolol succinate XL (TOPROL-XL) 25 MG 24 hr tablet TAKE ONE TABLET BY MOUTH DAILY 90 tablet 3    Vyzulta 0.024 % solution       amoxicillin (AMOXIL) 500 MG capsule Take 4 capsules by mouth See Admin Instructions. 4 capsules 1 hour prior to procedure (Patient not taking: Reported on 3/4/2024) 4 capsule 0     No current facility-administered medications on file prior to visit.       Allergies   Allergen Reactions    Dilaudid [Hydromorphone Hcl] Hallucinations    Hydromorphone Anxiety and Other (See Comments)     Other reaction(s): Hallucinations       Past Medical History:   Diagnosis Date    Allergic     Airborne    Arrhythmia     Arthritis     At risk for obstructive sleep apnea     5    Atrial fibrillation     Bladder outlet obstruction 09/11/2019    BPH (benign prostatic hyperplasia)     Cataract     Replaced    Colon polyp     Coronary artery disease Few years    Afib    Dysphagia     Eczema     GERD (gastroesophageal reflux disease) 2013    Mentioned as possible    Glaucoma Eievated eye pressure    Hernia     Your office reported hiatal    Hiatal hernia     History of medical problems 2022    Xs pressure in left eye. Now being treated in 2023    HL (hearing loss)     Hyperlipidemia     Hypertension     Left ventricular dysfunction     Low back pain     Obesity     Pneumonia 2022    Prostate cancer     Skin cancer     Stroke 10/19/2022    RESIDUAL: LOSS OF RT PERIPHERAL VISION    Visual impairment 2022 stroke    Reading correction       Past Surgical History:   Procedure Laterality Date    APPENDECTOMY  1950s    ATRIAL APPENDAGE EXCLUSION LEFT WITH TRANSESOPHAGEAL ECHOCARDIOGRAM N/A 03/22/2023    Procedure: Atrial Appendage Occlusion;  Surgeon: Tony Ceballos MD;  Location: [facility] INVASIVE LOCATION;  Service: Cardiovascular;  Laterality: N/A;    CARDIAC SURGERY  2023    Watchman inserted because of continuous afib    CATARACT EXTRACTION, BILATERAL      COLONOSCOPY  unknown    CYSTOSCOPY  TRANSURETHRAL RESECTION OF PROSTATE N/A 2019    Procedure: CYSTOSCOPY TRANSURETHRAL RESECTION OF PROSTATE;  Surgeon: Chip Bedolla MD;  Location: [facility] MAIN OR;  Service: Urology    ENDOSCOPY N/A 10/31/2018    LA Grade C reflux esophagitis, HH, erythematous mucosa in the antrum. Path: Gastritis, esophagitis.     ENDOSCOPY N/A 2022    Procedure: ESOPHAGOGASTRODUODENOSCOPY WITH BIOPSIES AND NAILS DILATION #56;  Surgeon: Tony Ramos MD;  Location: [facility] ENDOSCOPY;  Service: Gastroenterology;  Laterality: N/A;  PRE OP - DYSPHAGIA  POST OP- MILD GASTRITIS    EPIDURAL BLOCK      ESOPHAGEAL DILATATION      EYE SURGERY      Cataracts    PROSTATE SURGERY  2014    SKIN CANCER EXCISION      multiple, over that last few years    SKIN CANCER EXCISION Right 2019    FACE,     SKIN CANCER EXCISION          TONSILLECTOMY      around 1950    TRANSURETHRAL RESECTION OF BLADDER N/A     UPPER GASTROINTESTINAL ENDOSCOPY      VASECTOMY         Social History     Socioeconomic History    Marital status:    Tobacco Use    Smoking status: Former     Types: Pipe     Start date: 1959     Quit date: 1961     Years since quittin.3    Smokeless tobacco: Never    Tobacco comments:     Limited by allergies   Vaping Use    Vaping status: Never Used   Substance and Sexual Activity    Alcohol use: Yes     Alcohol/week: 5.0 standard drinks of alcohol     Types: 5 Glasses of wine per week     Comment: Less than 4 oz /wk since this prob started.    Drug use: Never    Sexual activity: Not Currently     Partners: Female     Birth control/protection: Surgical, None, Vasectomy     Comment: 50 years ago       Family History   Problem Relation Age of Onset    Cancer Mother         Pancriatic    Liver disease Mother     Arthritis Mother     Cancer Father         Colon,    Colon cancer Father     Arrhythmia Father         Afib    Heart disease Father     Cancer Brother         Prostate  "   Arrhythmia Brother         Afib    Hearing loss Brother     Cancer Paternal Grandfather         Prostate    Arrhythmia Paternal Aunt         Irregular beat . Before the time of illness sharing.    Arthritis Sister     Cancer Son         Melanoma    Other Son         Melanoma    Cancer Son         None    Heart disease Son         Afib    Malig Hyperthermia Neg Hx        The following portions of the patient's history were reviewed and updated as appropriate: allergies, current medications, past family history, past medical history, past social history, past surgical history and problem list.       Objective:       Vitals:    03/04/24 1037   BP: 140/82   BP Location: Left arm   Patient Position: Sitting   Pulse: 53   Weight: 96.2 kg (212 lb)   Height: 171.5 cm (67.5\")       Body mass index is 32.71 kg/m².    Physical Exam:  Constitutional: Well appearing, Frail, No acute distress   HENT: Oropharynx clear and membrane moist  Eyes: Normal conjunctiva, no sclera icterus.  Neck: Supple, no carotid bruit bilaterally.  Cardiovascular: Irregularly irregular rate and rhythm, No Murmur, No bilateral lower extremity edema.  Pulmonary: Normal respiratory effort, normal lung sounds, no wheezing.  Neurological: Alert and orient x 3.   Skin: Warm, dry, no ecchymosis, no rash.  Psych: Appropriate mood and affect. Normal judgment and insight.       Lab Results   Component Value Date     01/10/2024     09/30/2023    K 4.0 01/10/2024    K 3.2 (L) 09/30/2023     01/10/2024     09/30/2023    CO2 27.5 01/10/2024    CO2 25.1 09/30/2023    BUN 10 01/10/2024    BUN 12 09/30/2023    CREATININE 0.80 01/10/2024    CREATININE 0.50 (L) 09/30/2023    EGFRIFNONA 98 10/11/2021    EGFRIFNONA 114 04/01/2021    EGFRIFAFRI 119 10/11/2021    EGFRIFAFRI 138 04/01/2021    GLUCOSE 86 01/10/2024    GLUCOSE 86 09/30/2023    CALCIUM 10.3 01/10/2024    CALCIUM 9.2 09/30/2023    PROTENTOTREF 6.1 02/16/2023    PROTENTOTREF 6.2 " 01/09/2023    ALBUMIN 4.2 01/10/2024    ALBUMIN 3.7 09/30/2023    BILITOT 1.9 (H) 01/10/2024    BILITOT 1.9 (H) 09/30/2023    AST 30 01/10/2024    AST 41 (H) 09/30/2023    ALT 31 01/10/2024    ALT 28 09/30/2023     Lab Results   Component Value Date    WBC 5.12 01/10/2024    WBC 5.42 09/30/2023    HGB 15.2 01/10/2024    HGB 13.8 09/30/2023    HCT 44.8 01/10/2024    HCT 40.7 09/30/2023    MCV 90.7 01/10/2024    MCV 89.8 09/30/2023     01/10/2024     09/30/2023     Lab Results   Component Value Date    CHOL 140 01/10/2024    CHOL 114 09/30/2023    TRIG 45 01/10/2024    TRIG 45 09/30/2023    HDL 71 (H) 01/10/2024    HDL 55 09/30/2023    LDL 59 01/10/2024    LDL 48 09/30/2023     Lab Results   Component Value Date    PROBNP 825.0 03/17/2022    PROBNP 808.0 03/03/2022     Lab Results   Component Value Date    TROPONINT 38 (H) 09/30/2023     Lab Results   Component Value Date    TSH 1.470 09/30/2023    TSH 2.130 03/05/2022         ECG 12 Lead    Date/Time: 3/4/2024 11:08 AM  Performed by: Tony Ceballos MD    Authorized by: Tony Ceballos MD  Comparison: compared with previous ECG from 9/29/2023  Similar to previous ECG  Rhythm: atrial fibrillation  QRS axis: left        Transesophageal echocardiogram 4/25/2023:  There is akinesis of the inferior base  Left ventricular systolic function is low normal. Left ventricular ejection fraction appears to be 51 - 55%.  The right ventricular cavity is mildly dilated. Normal right ventricular systolic function noted.  The left atrial cavity is severely dilated.  There is a Watchman device which appears to be in good position. There is no thrombus in the proximal left atrial appendage or adjacent to the Watchman device itself  The right atrial cavity is moderately dilated.  Mild mitral valve regurgitation is present  Mild tricuspid valve regurgitation is present.  The main pulmonary artery is mildly dilated.  There is a trivial pericardial  effusion.    Echocardiogram 3/23/2023:  Limited echocardiogram to assess pericardial effusion  No Pericardial effusion is present    Watchman implantation 4/27/2023:  Successful implantation of 24 mm Watchman device    Holter monitor 4/8/2022:  Permanent atrial fibrillation noted throughout study with an average heart rate of 60 bpm and a range of 32 bpm up to 93 bpm    Echocardiogram 3/9/2022:  Left ventricular ejection fraction estimated approximately 46 to 50%  Indeterminate diastolic function  Left atrial volume is severely increased  Right atrial volume is moderately dilated        Assessment:          Diagnosis Plan   1. Presence of Watchman left atrial appendage closure device  ECG 12 Lead      2. Permanent atrial fibrillation  ECG 12 Lead               Plan:       Mr. Sorto is a 83 y.o. gentleman with past medical history notable for permanent atrial fibrillation, history of stroke, hypertension, mild cardiomyopathy with ejection fraction in the 45% range, esophageal dysphagia status post dilation of his esophagus, and gastric reflux disease who presents for scheduled follow-up after recent watchman implantation.  Overall patient's doing well with regards to his de-escalation of anticoagulation.  He is due for a transesophageal echocardiogram I have ordered that previously but I have actually walked him over to our schedulers so that we can get that scheduled today.    Permanent atrial fibrillation/presence of Watchman:  [id number] score 6  Status post watchman implantation 3/24/2023 with 24 mm device  Currently on aspirin 81 mg   Transesophageal echocardiogram 4/25/2023 demonstrates normal device function no evidence of thrombus or leak and we will work on scheduling follow-up transesophageal echocardiogram in the coming weeks    Essential hypertension:  Blood pressure borderline elevated today however patient brought blood pressure log with him today blood pressures typically run in the 130s systolic  over 80s no changes needed with his medical regimen    Follow-up:  Pending CHARU results      Thank you for allowing me to participate in the care of Junito Sorto. Feel free to contact me directly with any further questions or concerns.    Tony Ceballos MD  [facility]  03/04/24  11:10 EST

## 2024-03-07 ENCOUNTER — PATIENT OUTREACH (OUTPATIENT)
Dept: CASE MANAGEMENT | Facility: OTHER | Age: 84
End: 2024-03-07
Payer: MEDICARE

## 2024-03-07 NOTE — OUTREACH NOTE
AMBULATORY CASE MANAGEMENT NOTE    Name and Relationship of Patient/Support Person: Junito Sorto - Self    Patient Outreach  RN-ACM outreach with patient. Patient states to have had 3/4/24 cardiology appointment; states to have monitored blood pressure with values ranging from 127/64 to 145/82 and received recommendations from cardiology. Patient states to be scheduled for 3/8/24 eye surgery with [facility] stating he has difficulty with vision and having double vision and needs to contact their office. Reviewed with patient medical appointments and education. Patient verbalized understanding.  Patient  states to appreciate outreach and declines needs for further outreach at this time. No further questions voiced at this time.     Education Documentation  Provider Follow-Up, taught by Brigitte Benjamin, RN at 3/7/2024  1:10 PM.  Learner: Patient  Readiness: Acceptance  Method: Explanation  Response: Verbalizes Understanding          Brigitte CHURCH  Ambulatory Case Management    3/7/2024, 13:10 EST

## 2024-03-08 ENCOUNTER — TELEPHONE (OUTPATIENT)
Dept: GASTROENTEROLOGY | Facility: CLINIC | Age: 84
End: 2024-03-08
Payer: MEDICARE

## 2024-03-08 NOTE — TELEPHONE ENCOUNTER
LAST C/S 12/8/11  IN EPIC      PERSONAL HX OF POLYPS    NO FAMILY HX OF POLYPS     FAMILY HX OF COLON CA          LIST OF  MEDICATIONS    METOPROLOL  FAMOTIDINE  ATORVASTATIN    VYZULTA EYE DROPS  D3  B COMPLEX  GUAIFENESIN  LORATADINE  ASPRIN   NAPROXEN                  OA QUESTIONNAIRE SCANNED IN MEDIA

## 2024-03-10 ENCOUNTER — PREP FOR SURGERY (OUTPATIENT)
Dept: OTHER | Facility: HOSPITAL | Age: 84
End: 2024-03-10
Payer: MEDICARE

## 2024-03-10 DIAGNOSIS — Z12.11 ENCOUNTER FOR SCREENING FOR MALIGNANT NEOPLASM OF COLON: Primary | ICD-10-CM

## 2024-03-18 ENCOUNTER — TELEPHONE (OUTPATIENT)
Dept: GASTROENTEROLOGY | Facility: CLINIC | Age: 84
End: 2024-03-18
Payer: MEDICARE

## 2024-03-19 ENCOUNTER — ANESTHESIA EVENT (OUTPATIENT)
Dept: POSTOP/PACU | Facility: HOSPITAL | Age: 84
End: 2024-03-19
Payer: MEDICARE

## 2024-03-19 ENCOUNTER — TELEPHONE (OUTPATIENT)
Dept: CARDIOLOGY | Facility: CLINIC | Age: 84
End: 2024-03-19
Payer: MEDICARE

## 2024-03-19 ENCOUNTER — ANESTHESIA (OUTPATIENT)
Dept: POSTOP/PACU | Facility: HOSPITAL | Age: 84
End: 2024-03-19
Payer: MEDICARE

## 2024-03-19 ENCOUNTER — HOSPITAL ENCOUNTER (OUTPATIENT)
Dept: POSTOP/PACU | Facility: HOSPITAL | Age: 84
Discharge: HOME | End: 2024-03-19
Payer: MEDICARE

## 2024-03-19 VITALS
TEMPERATURE: 98.2 F | RESPIRATION RATE: 18 BRPM | OXYGEN SATURATION: 96 % | HEART RATE: 62 BPM | SYSTOLIC BLOOD PRESSURE: 136 MMHG | DIASTOLIC BLOOD PRESSURE: 80 MMHG

## 2024-03-19 VITALS — OXYGEN SATURATION: 100 % | DIASTOLIC BLOOD PRESSURE: 69 MMHG | SYSTOLIC BLOOD PRESSURE: 116 MMHG | HEART RATE: 62 BPM

## 2024-03-19 DIAGNOSIS — Z95.818 PRESENCE OF WATCHMAN LEFT ATRIAL APPENDAGE CLOSURE DEVICE: ICD-10-CM

## 2024-03-19 DIAGNOSIS — I48.20 CHRONIC A-FIB: ICD-10-CM

## 2024-03-19 LAB
BH CV ECHO MEAS - RAP SYSTOLE: 3 MMHG
BH CV ECHO MEAS - RVSP: 23 MMHG
BH CV ECHO MEAS - TR MAX PG: 20 MMHG
BH CV ECHO MEAS - TR MAX VEL: 222.7 CM/SEC
LV EF 2D ECHO EST: 50 %

## 2024-03-19 PROCEDURE — 93312 ECHO TRANSESOPHAGEAL: CPT

## 2024-03-19 PROCEDURE — 93325 DOPPLER ECHO COLOR FLOW MAPG: CPT

## 2024-03-19 PROCEDURE — 25010000002 LIDOCAINE 1 % SOLUTION

## 2024-03-19 PROCEDURE — 25010000002 PROPOFOL 10 MG/ML EMULSION

## 2024-03-19 PROCEDURE — 25810000003 LACTATED RINGERS PER 1000 ML

## 2024-03-19 PROCEDURE — 93320 DOPPLER ECHO COMPLETE: CPT

## 2024-03-19 RX ORDER — SODIUM CHLORIDE, SODIUM LACTATE, POTASSIUM CHLORIDE, CALCIUM CHLORIDE 600; 310; 30; 20 MG/100ML; MG/100ML; MG/100ML; MG/100ML
INJECTION, SOLUTION INTRAVENOUS CONTINUOUS PRN
Status: DISCONTINUED | OUTPATIENT
Start: 2024-03-19 | End: 2024-03-19 | Stop reason: SURG

## 2024-03-19 RX ORDER — PROPOFOL 10 MG/ML
VIAL (ML) INTRAVENOUS AS NEEDED
Status: DISCONTINUED | OUTPATIENT
Start: 2024-03-19 | End: 2024-03-19 | Stop reason: SURG

## 2024-03-19 RX ORDER — LIDOCAINE HYDROCHLORIDE 10 MG/ML
INJECTION, SOLUTION INFILTRATION; PERINEURAL AS NEEDED
Status: DISCONTINUED | OUTPATIENT
Start: 2024-03-19 | End: 2024-03-19 | Stop reason: SURG

## 2024-03-19 RX ADMIN — SODIUM CHLORIDE, SODIUM LACTATE, POTASSIUM CHLORIDE, AND CALCIUM CHLORIDE: 600; 310; 30; 20 INJECTION, SOLUTION INTRAVENOUS at 07:10

## 2024-03-19 RX ADMIN — PROPOFOL 150 MCG/KG/MIN: 10 INJECTION, EMULSION INTRAVENOUS at 07:17

## 2024-03-19 RX ADMIN — LIDOCAINE HYDROCHLORIDE 100 ML: 10 INJECTION, SOLUTION INFILTRATION; PERINEURAL at 07:16

## 2024-03-19 RX ADMIN — PROPOFOL 80 MG: 10 INJECTION, EMULSION INTRAVENOUS at 07:16

## 2024-03-19 NOTE — ANESTHESIA POSTPROCEDURE EVALUATION
Patient: Junito Sorto    Procedure Summary       Date: 03/19/24 Room / Location: [facility] PACU    Anesthesia Start: 0710 Anesthesia Stop: 0737    Procedure: ADULT TRANSESOPHAGEAL ECHO (CHARU) W/ CONT IF NECESSARY PER PROTOCOL Diagnosis:       Presence of Watchman left atrial appendage closure device      Chronic a-fib      (Re-evaluation of Prior CHARU for Interval Change)    Scheduled Providers: Hany Jones MD Provider: Vamsi Macias MD    Anesthesia Type: MAC ASA Status: 3            Anesthesia Type: MAC    Vitals  Vitals Value Taken Time   /100 03/19/24 0805   Temp     Pulse 58 03/19/24 0806   Resp 18 03/19/24 0800   SpO2 98 % 03/19/24 0806   Vitals shown include unfiled device data.        Post Anesthesia Care and Evaluation    Patient location during evaluation: bedside  Patient participation: complete - patient participated  Level of consciousness: awake  Pain management: adequate    Airway patency: patent  Anesthetic complications: No anesthetic complications    Cardiovascular status: acceptable  Respiratory status: acceptable  Hydration status: acceptable    Comments: */80 (BP Location: Left arm, Patient Position: Lying)   Pulse 62   Temp 36.8 °C (98.2 °F) (Oral)   Resp 18   SpO2 96%

## 2024-03-19 NOTE — ANESTHESIA PREPROCEDURE EVALUATION
Anesthesia Evaluation     no history of anesthetic complications:   NPO Solid Status: > 8 hours  NPO Liquid Status: > 2 hours           Airway   Mallampati: II  Neck ROM: full  no difficulty expected  Dental - normal exam     Pulmonary - normal exam   (+) pneumonia , a smoker Former,  (-) COPD, asthma, sleep apnea    PE comment: nonlabored  Cardiovascular   Exercise tolerance: poor (<4 METS)    Rhythm: irregular  Rate: abnormal    (+) hypertension, CAD, dysrhythmias Atrial Fib, hyperlipidemia  (-) valvular problems/murmurs, past MI, angina      Neuro/Psych  (+) CVA, numbness  (-) seizures, TIA  GI/Hepatic/Renal/Endo    (+) obesity, hiatal hernia, GERD  (-) liver disease, no renal disease, diabetes, no thyroid disorder    Musculoskeletal     (+) arthralgias  Abdominal    Substance History   (+) alcohol use     OB/GYN          Other   arthritis,   history of cancer (skin cancer, prostate cancer)                Anesthesia Plan    ASA 3     MAC       Anesthetic plan, risks, benefits, and alternatives have been provided, discussed and informed consent has been obtained with: patient.    CODE STATUS:

## 2024-03-19 NOTE — TELEPHONE ENCOUNTER
Called and talked with patient regarding his watchman.  He is 1 year out his CHARU shows normal device function.  No device related thrombus or leak around the device.  Would continue with current  Medical therapy.  No changes are needed at this time he will follow-up with his primary cardiologist

## 2024-03-20 ENCOUNTER — TELEPHONE (OUTPATIENT)
Dept: GASTROENTEROLOGY | Facility: CLINIC | Age: 84
End: 2024-03-20
Payer: MEDICARE

## 2024-03-22 ENCOUNTER — TELEPHONE (OUTPATIENT)
Dept: GASTROENTEROLOGY | Facility: CLINIC | Age: 84
End: 2024-03-22
Payer: MEDICARE

## 2024-03-22 PROBLEM — Z12.11 ENCOUNTER FOR SCREENING FOR MALIGNANT NEOPLASM OF COLON: Status: ACTIVE | Noted: 2024-03-10

## 2024-03-22 RX ORDER — ATORVASTATIN CALCIUM 40 MG/1
40 TABLET, FILM COATED ORAL DAILY
Qty: 90 TABLET | Refills: 1 | Status: SHIPPED | OUTPATIENT
Start: 2024-03-22

## 2024-03-22 NOTE — TELEPHONE ENCOUNTER
Caller: Junito Sorto    Relationship: Self    Best call back number: [phone]    Requested Prescriptions:   Requested Prescriptions     Pending Prescriptions Disp Refills    atorvastatin (LIPITOR) 40 MG tablet 90 tablet 3     Sig: Take 1 tablet by mouth Daily.        Pharmacy where request should be sent: [facility] [id number] [facility] [place] [address] [facility]  AT [address] [phone] [facility] [phone]      Last office visit with prescribing clinician: 1/18/2024   Last telemedicine visit with prescribing clinician: Visit date not found   Next office visit with prescribing clinician: 7/25/2024     Additional details provided by patient: HAS ENOUGH TILL NEXT WEEK    Does the patient have less than a 3 day supply:  [] Yes  [x] No    Would you like a call back once the refill request has been completed: [] Yes [x] No    If the office needs to give you a call back, can they leave a voicemail: [] Yes [x] No    Madyson James Rep   03/22/24 10:42 EDT

## 2024-03-22 NOTE — TELEPHONE ENCOUNTER
ROLAN HERNANDEZ  FOR COLON AT [facility] ON       07/19/2024       ARRIVE AT 1130AM

## 2024-03-26 ENCOUNTER — TELEPHONE (OUTPATIENT)
Dept: NEUROLOGY | Facility: CLINIC | Age: 84
End: 2024-03-26
Payer: MEDICARE

## 2024-03-26 NOTE — TELEPHONE ENCOUNTER
Left VM, MyCdelmyt (if applicable) & mailed reminder regarding appt reschedule due to provider being out of office.  Had scheduled pt for May 29th

## 2024-05-28 NOTE — PROGRESS NOTES
CC: f/u    HPI:  Junito Sorto is a  82 y.o.  Right-handed male with past medical history of hyperlipidemia, GERD,  left occipital lobe stroke in October 2022.  He has history of atrial fibrillation and had been reticent to be on anticoagulation in the past.  He apparently awoke one morning in October with new onset visual disturbance and was seen by his eye doctor - he was suspected to have a right hemianopsia.      Reviewed prior documentation and made amendments as needed.  Apparently he had been on Eliquis 15 years earlier but stopped it because of excess bruising.  He was in the process of being set up for a watchman's device.  CHARU on 8/30 was negative for thrombus.  He has no significant bleeding history.  MRI showed left PCA infarct involving left occipital lobe stroke appearance consistent with cardioembolic etiology.  He was discharged on eliquis    Back in March 2023 he had watchman's device.  He is also had follow-up CHARU and he is okay to come off Eliquis.  Back at his last appt he was endorse some untrue visual information from the right side- seeing trees and people walking.  He has problems with balance which is not new.  We checked neuropathy labs (WOJCIECH, sed, B12/folate, cryoglobulin, SPEP PRISCILA) last time and these were unremarkable    He has had no new neurologic symptoms to note.  He is not driving nor woodworking.  He just went to neuro optometrist and got fitted for prism glasses and now he is able to read.  At times he sees what appears to be someone walking behind him on the right side or what appears to be plants out of his right periphery.  He wants to know if he will improve enough to drive.      Interval hx:   Forgetfulness is a problem.  He continues with balance trouble and uses a cane.  There is numbness in legs and pain at times      Social history:    Family history:      Pain Scale:        ROS:  Review of Systems   Musculoskeletal: Positive for gait problem.   Psychiatric/Behavioral:  "Positive for confusion and decreased concentration.       Reviewed ROS conducted by MA and wyatt        Physical Exam:  Vitals:    05/24/23 1359   BP: 120/80   Pulse: 54   SpO2: 94%   Weight: 98.4 kg (217 lb)   Height: 170.2 cm (67\")      Orthostatic BP:    Body mass index is 33.99 kg/m².        General: pt well appearing, no distress  HEENT: Normocephalic, conjunctiva normal, external canals normal, no nasal discharge, moist mucous membranes  Neck: No lymphadenopathy, thyroid not enlarged, no JVD  CV: Regular rate and rhythm, no murmurs negative no bruits auscultated at neck, equal pulses  Pulmonary: Normal respiratory effort, clear to auscultation bilaterally  Extremities: no edema, bruising, or skin lesions  Pysch: good eye contact, cooperative, full affect, euthymic mood, good attention, good insight  Mental: alert, conversant, AOx3, provides history, some word paucity and nonfluent speech at time but not aphasic language fluent, names, repeats.  CN: Dense right hemianopsia worse of the inferior quadrant, PERRL, EOMi, no gaze palsy or nystagmus, intact facial sensation, no facial assymetry, intact hearing, symmetric palate elevation, tongue midline, good SCM strength  Motor: no abnormal movements, no pronator drift, normal  bulk and tone, 5/5 strength b/l UE & LE  Sensory: Reduced sensation to crude touch and temp in glove and stocking distribution of lower extremities, absent vibratory sense at ankles bilaterally  Reflexes: Trace   coordination: no ataxia on finger-nose  Gait: normal gait, no ataxia, positive Romberg        Results:      Lab Results   Component Value Date    GLUCOSE 93 04/20/2023    BUN 17 04/20/2023    CREATININE 0.71 (L) 04/20/2023    EGFRIFNONA 98 10/11/2021    EGFRIFAFRI 119 10/11/2021    BCR 23.9 04/20/2023    CO2 29.1 (H) 04/20/2023    CALCIUM 9.6 04/20/2023    PROTENTOTREF 6.1 02/16/2023    ALBUMIN 4.1 02/16/2023    LABIL2 2.1 02/16/2023    AST 29 02/16/2023    ALT 23 02/16/2023 "       Lab Results   Component Value Date    WBC 5.94 04/20/2023    HGB 13.5 04/20/2023    HCT 41.0 04/20/2023    MCV 91.1 04/20/2023     04/20/2023         .No results found for: RPR      Lab Results   Component Value Date    TSH 2.130 03/05/2022         Lab Results   Component Value Date    XQHYRVCJ15 484 01/09/2023         Lab Results   Component Value Date    FOLATE >20.00 01/09/2023         Lab Results   Component Value Date    HGBA1C 5.80 (H) 10/20/2022         Lab Results   Component Value Date    GLUCOSE 93 04/20/2023    BUN 17 04/20/2023    CREATININE 0.71 (L) 04/20/2023    EGFRIFNONA 98 10/11/2021    EGFRIFAFRI 119 10/11/2021    BCR 23.9 04/20/2023    K 4.6 04/20/2023    CO2 29.1 (H) 04/20/2023    CALCIUM 9.6 04/20/2023    PROTENTOTREF 6.1 02/16/2023    ALBUMIN 4.1 02/16/2023    LABIL2 2.1 02/16/2023    AST 29 02/16/2023    ALT 23 02/16/2023         Diagnosis:  1.  History of left PCA stroke  2.  A. Fib   3.  Polyneuropathy    Impression: 82-year-old male with above-stated medical history  left PCA stroke felt to be cardioembolic in setting of untreated A. fib back in January.  He had LAOO 2 months ago.  He has updated visual field testing but was not told of driving restrictions by the eye doctor he has evidence of neuropathy in the labs including B12/folate, sed, WOJCIECH, copper, SPEP and PRISCILA were unremarkable.  For the most part his sensory symptoms are static and I did not pursue EMG.  His biggest complaint today of is of cognitive problems, word searching and forgetfulness.  He takes claritin daily.  Encouraged to stop this as able.      Plan  1 PT referral  2 cont vascular risk factor reduction, needs BP monitoring, aerobic exercise, good sleep hygiene.  MOCA next visit          Control risk factors to prevent stroke which means keep BP at or below 130/80, take your statin if able and try to have LDL measurements < 70, stop smoking if you smoke, control your blood sugar, and get regular moderate  exercise four times weekly, and avoid prolonged sitting.  Adopt a healthy diet such as the Mediterranean diet which includes vegetables, fruits, whole grains, low-fat dairy, poultry, fish, legumes, nontropical fish oils, and nuts.  Limit sweets, sugary drinks, and red meats.  Reduce or eliminate alcohol intake.      I spent at least 40 minutes interviewing, examining, and counseling patient.  I independently reviewed documentation, laboratory and diagnostic findings, external documentation where applicable, and formulated treatment plan which was discussed with the patient.

## 2024-05-29 ENCOUNTER — OFFICE VISIT (OUTPATIENT)
Dept: NEUROLOGY | Facility: CLINIC | Age: 84
End: 2024-05-29
Payer: MEDICARE

## 2024-05-29 VITALS
SYSTOLIC BLOOD PRESSURE: 150 MMHG | WEIGHT: 207 LBS | OXYGEN SATURATION: 97 % | BODY MASS INDEX: 31.37 KG/M2 | HEART RATE: 68 BPM | HEIGHT: 68 IN | DIASTOLIC BLOOD PRESSURE: 80 MMHG

## 2024-05-29 DIAGNOSIS — R26.89 BALANCE PROBLEM: ICD-10-CM

## 2024-05-29 DIAGNOSIS — G62.9 POLYNEUROPATHY: Primary | ICD-10-CM

## 2024-05-29 RX ORDER — PREDNISOLONE ACETATE 10 MG/ML
SUSPENSION/ DROPS OPHTHALMIC
COMMUNITY
Start: 2024-04-30

## 2024-05-29 RX ORDER — OFLOXACIN 3 MG/ML
SOLUTION/ DROPS OPHTHALMIC
COMMUNITY
Start: 2024-03-06

## 2024-05-29 RX ORDER — DORZOLAMIDE HYDROCHLORIDE AND TIMOLOL MALEATE 20; 5 MG/ML; MG/ML
SOLUTION/ DROPS OPHTHALMIC
COMMUNITY
Start: 2024-05-16

## 2024-05-29 NOTE — LETTER
May 29, 2024       No Recipients    Patient: Junito Sorto   YOB: 1940   Date of Visit: 5/29/2024     Dear Fan Schulz MD:       Thank you for referring Junito Sorto to me for evaluation. Below are the relevant portions of my assessment and plan of care.    If you have questions, please do not hesitate to call me. I look forward to following Junito along with you.         Sincerely,        SYLVESTER Sebastian        CC:   No Recipients

## 2024-06-04 NOTE — TELEPHONE ENCOUNTER
Called pt lvm adv of Silvia JARRETT recommendation.     Nat Torres MA  8/21/23 117P  
Let's have him stop omeprazole and try 40 mg famotidine daily. I sent it to the pharmacy.     Karoline  
Our records say he is taking 80 mg omeprazole nightly. Has he ever been on other medications for GERD?    Thanks!  KOLBY Starks    
Received correspondence from [facility] regarding medications clopidogrel and omeprazole conflicting. Placed in Dr. Prater inbox.    Nat Torres MA  8/21/23 103c   
Spoke with pt whom states he takes omeprazole in the mornings, he has never taken any other medications for GERD. Thank you.     Nat Torres MA  8/21/23 [id number]  
Affective dysregulation/Impulsivity/Noncompliance with treatment

## 2024-06-18 DIAGNOSIS — I48.19 ATRIAL FIBRILLATION, PERSISTENT: ICD-10-CM

## 2024-06-19 ENCOUNTER — PATIENT MESSAGE (OUTPATIENT)
Dept: FAMILY MEDICINE CLINIC | Facility: CLINIC | Age: 84
End: 2024-06-19
Payer: MEDICARE

## 2024-06-19 RX ORDER — METOPROLOL SUCCINATE 25 MG/1
25 TABLET, EXTENDED RELEASE ORAL DAILY
Qty: 90 TABLET | Refills: 1 | Status: SHIPPED | OUTPATIENT
Start: 2024-06-19

## 2024-06-26 ENCOUNTER — TELEPHONE (OUTPATIENT)
Dept: FAMILY MEDICINE CLINIC | Facility: CLINIC | Age: 84
End: 2024-06-26

## 2024-06-26 NOTE — TELEPHONE ENCOUNTER
Caller: Junito Sorto    Relationship: Self    Best call back number:     [phone] (Mobile)       What orders are you requesting (i.e. lab or imaging): LABS     In what timeframe would the patient need to come in: ABOUT ONE WEEK BEFORE HIS APPOINTMENT.     Where will you receive your lab/imaging services: [facility]     Additional notes: PLEASE NOTIFY PATIENT WHEN PLACED.

## 2024-06-28 DIAGNOSIS — Z00.00 ROUTINE GENERAL MEDICAL EXAMINATION AT A HEALTH CARE FACILITY: ICD-10-CM

## 2024-06-28 DIAGNOSIS — E78.00 HYPERCHOLESTEROLEMIA: Primary | ICD-10-CM

## 2024-06-28 DIAGNOSIS — E55.9 VITAMIN D DEFICIENCY: ICD-10-CM

## 2024-07-16 ENCOUNTER — TELEPHONE (OUTPATIENT)
Dept: CARDIOLOGY | Facility: HOSPITAL | Age: 84
End: 2024-07-16

## 2024-07-16 NOTE — TELEPHONE ENCOUNTER
Mr. Sorto is s/p LAAO 3/22/23.  He is scheduled for Colonoscopy 7/19/24 and asked if he needed to take antibiotics preop.  I discussed with Dr. Ceballos and he said out of abundance of caution, he prefers Mr. Sorto to take 1 gram Amoxicillin 1 hour prior to procedure.  Mr. Sorto is aware and the med has been called to [facility] pharmacy.

## 2024-07-17 ENCOUNTER — TELEPHONE (OUTPATIENT)
Dept: GASTROENTEROLOGY | Facility: CLINIC | Age: 84
End: 2024-07-17
Payer: MEDICARE

## 2024-07-17 RX ORDER — FLUTICASONE PROPIONATE 50 MCG
2 SPRAY, SUSPENSION (ML) NASAL DAILY
Qty: 11.1 ML | Refills: 0 | Status: SHIPPED | OUTPATIENT
Start: 2024-07-17

## 2024-07-17 NOTE — TELEPHONE ENCOUNTER
Hub staff attempted to follow warm transfer process and was unsuccessful     Caller: uJnito Sorto    Relationship to patient: Self    Best call back number: [phone]     Patient is needing: PT IS CALLING TO INFORM DR. ESPINOSA THAT HE HAS BEEN INSTRUCTED BY CARDIOLOGIST TO TAKE AMOXICILLIN BEFORE ANY PROCEDURE AND WILL TAKE THAT BEFORE HIS SCOPE ON FRIDAY IN CASE ANY POLYPS NEED TO BE TAKEN OUT.

## 2024-07-22 ENCOUNTER — TELEPHONE (OUTPATIENT)
Dept: GASTROENTEROLOGY | Facility: CLINIC | Age: 84
End: 2024-07-22
Payer: MEDICARE

## 2024-07-22 ENCOUNTER — HOSPITAL ENCOUNTER (OUTPATIENT)
Dept: PHYSICAL THERAPY | Facility: HOSPITAL | Age: 84
Setting detail: THERAPIES SERIES
Discharge: HOME | End: 2024-07-22
Payer: MEDICARE

## 2024-07-22 ENCOUNTER — PREP FOR SURGERY (OUTPATIENT)
Dept: OTHER | Facility: HOSPITAL | Age: 84
End: 2024-07-22
Payer: MEDICARE

## 2024-07-22 DIAGNOSIS — R26.89 BALANCE PROBLEM: ICD-10-CM

## 2024-07-22 DIAGNOSIS — Z91.81 AT HIGH RISK FOR FALLS: ICD-10-CM

## 2024-07-22 DIAGNOSIS — G62.9 POLYNEUROPATHY: Primary | ICD-10-CM

## 2024-07-22 DIAGNOSIS — Z86.73 HISTORY OF STROKE: ICD-10-CM

## 2024-07-22 DIAGNOSIS — Z80.0 FAMILY HISTORY OF GI MALIGNANCY: Primary | ICD-10-CM

## 2024-07-22 PROCEDURE — 97162 PT EVAL MOD COMPLEX 30 MIN: CPT

## 2024-07-22 NOTE — THERAPY EVALUATION
.Outpatient Physical Therapy Neuro Initial Evaluation  [facility]     Patient Name: Junito Sorto  : 1940  MRN: [id number]  Today's Date: 2024      Visit Date: 2024    Patient Active Problem List   Diagnosis    Hyperglycemia    Obesity    Allergic rhinitis    Permanent atrial fibrillation    HLD (hyperlipidemia)    Esophageal dysphagia    Hiatal hernia    GERD (gastroesophageal reflux disease)    Bladder outlet obstruction    Sciatica of right side    Pneumonia of right lower lobe due to infectious organism    Acute respiratory failure with hypoxia    PVC (premature ventricular contraction)    Left ventricular dysfunction    Hypercholesterolemia    Acute ischemic left PCA stroke    Lumbar spine pain    Presence of Watchman left atrial appendage closure device    History of stroke    Loss of vision    Vitamin D deficiency    Encounter for screening for malignant neoplasm of colon        Past Medical History:   Diagnosis Date    Allergic     Airborne    Arrhythmia     Arthritis     At risk for obstructive sleep apnea     5    Atrial fibrillation     Bladder outlet obstruction 2019    BPH (benign prostatic hyperplasia)     Cataract     Replaced    Colon polyp     Coronary artery disease Few years    Afib    Dysphagia     Eczema     GERD (gastroesophageal reflux disease)     Mentioned as possible    Glaucoma Eievated eye pressure    Hernia     Your office reported hiatal    Hiatal hernia     History of medical problems     Xs pressure in left eye. Now being treated in     HL (hearing loss)     Hyperlipidemia     Hypertension     Left ventricular dysfunction     Low back pain     Obesity     Pneumonia     Prostate cancer     Skin cancer     Stroke 10/19/2022    RESIDUAL: LOSS OF RT PERIPHERAL VISION    Visual impairment  stroke    Reading correction        Past Surgical History:   Procedure Laterality Date    APPENDECTOMY      ATRIAL APPENDAGE EXCLUSION LEFT WITH  TRANSESOPHAGEAL ECHOCARDIOGRAM N/A 03/22/2023    Procedure: Atrial Appendage Occlusion;  Surgeon: Tony Ceballos MD;  Location: [facility] CATH INVASIVE LOCATION;  Service: Cardiovascular;  Laterality: N/A;    CARDIAC SURGERY  2023    Watchman inserted because of continuous afib    CATARACT EXTRACTION, BILATERAL      COLONOSCOPY  unknown    CYSTOSCOPY TRANSURETHRAL RESECTION OF PROSTATE N/A 09/11/2019    Procedure: CYSTOSCOPY TRANSURETHRAL RESECTION OF PROSTATE;  Surgeon: Chip Bedolla MD;  Location: [facility] OR;  Service: Urology    ENDOSCOPY N/A 10/31/2018    LA Grade C reflux esophagitis, HH, erythematous mucosa in the antrum. Path: Gastritis, esophagitis.     ENDOSCOPY N/A 03/20/2022    Procedure: ESOPHAGOGASTRODUODENOSCOPY WITH BIOPSIES AND NAILS DILATION #56;  Surgeon: Tony Ramos MD;  Location: [facility] ENDOSCOPY;  Service: Gastroenterology;  Laterality: N/A;  PRE OP - DYSPHAGIA  POST OP- MILD GASTRITIS    EPIDURAL BLOCK      ESOPHAGEAL DILATATION      EYE SURGERY      Cataracts    PROSTATE SURGERY  11/06/2014    SKIN CANCER EXCISION      multiple, over that last few years    SKIN CANCER EXCISION Right 09/03/2019    FACE,     SKIN CANCER EXCISION      2023    TONSILLECTOMY      around 1950    TRANSURETHRAL RESECTION OF BLADDER N/A     UPPER GASTROINTESTINAL ENDOSCOPY  2013    VASECTOMY  1980s         Visit Dx:     ICD-10-CM ICD-9-CM   1. Polyneuropathy  G62.9 356.9   2. Balance problem  R26.89 781.99   3. History of stroke  Z86.73 V12.54   4. At high risk for falls  Z91.81 V15.88        Patient History       Row Name 07/22/24 1250 07/19/24 2033          History    Chief Complaint Balance Problems;Difficulty Walking;Difficulty with daily activities;Falls/history of falls;Impaired sensation  -LEEANNE Balance Problems (P)   -patient     Date Current Problem(s) Began 07/17/24  referral date  -LEEANNE --     Brief Description of Current Complaint Pt is referred to PT with polyneuropathy polyneuropathy, h/o  stroke 10/2022 with R hemianopsia. Pt has history of falls.  -LEEANNE --     Patient/Caregiver Goals Improve mobility;Improve strength  -LEEANNE Other (comment) (P)   -patient     Hand Dominance right-handed  -LEEANNE --     Occupation/sports/leisure activities -- None (P)   -patient        Pain     Pain at Present --  did not mention pain  -LEEANNE --        Fall Risk Assessment    Any falls in the past year: Yes  -LEEANNE --     Number of falls reported in the last 12 months several -- not sure how many, mostly outside in yard  -LEEANNE --     Factors that contributed to the fall: Uneven surface;Didn't use assistive device  -LEEANNE --        Daily Activities    Primary Language English  -LEEANNE --     Patient is concerned about/has problems with Difficulty with self care (i.e. bathing, dressing, toileting:;Transfers (getting out of a chair, bed);Walking;Performing home management (household chores, shopping, care of dependents)  -LEEANNE --     Barriers to learning Visual  -LEEANNE --               User Key  (r) = Recorded By, (t) = Taken By, (c) = Cosigned By      Initials Name Provider Type    LEEANNE Sangeetha Berumen, PT Physical Therapist    patient Junito Sorto --                         PT Neuro       Row Name 07/22/24 1250             Subjective    Subjective Comments Patient reports he uses cane in the house at nighttime and out in the community.  Otherwise at home and out in his yard he does not use a device.  -LEEANNE         Precautions and Contraindications    Precautions/Limitations fall precautions;other (see comments)  no vision R visual field  -LEEANNE         Subjective Pain    Subjective Pain Comment Did not mention pain  -LEEANNE         Home Living    Current Living Arrangements home  Two-story home with wife who is in good health  -LEEANNE      Home Accessibility stairs to enter home;stairs within home  -LEEANNE      Number of Stairs, Main Entrance two;four  To to enter through garage, forward to enter front door  -LEEANNE      Stair Railings, Main Entrance railings on  both sides of stairs  -LEEANNE      Stairs, Within Home, Primary Stairs to basement and to second floor, bedroom on main floor  -LEEANNE      Home Equipment Cane  -LEEANNE         Vision-Basic Assessment    Current Vision Wears glasses only for reading  -LEEANNE      Visual History Other (Comment)  No right visual field since occipital lobe stroke of 10/2022  -LEEANNE      Patient Visual Report --  Patient has prism glasses but states that he helps him for reading but not always.  -LEEANNE         Cognition    Memory Decreased short term memory;Decreased recall of biographical information  -LEEANNE      Orientation Level Oriented to person;Oriented to situation;Oriented to place  -LEEANNE      Safety Judgment Decreased awareness of need for safety  Not using cane all the time at home.  -LEEANNE      Deficits Fully aware of deficits  Patient aware that he needs to scan to the right due to decreased vision  -LEEANNE         Sensation    Additional Comments Light touch and deep pressure absent bilateral feet  -LEEANNE         Proprioception    Proprioception Intact bilateral feet/ ankles  -LEEANNE         Posture/Observations    Posture/Observations Comments Patient ambulating with 2 canes and wife by side.  He reports that he both came to the PT could look at both of them to see which was the best.  -LEEANNE         General ROM    GENERAL ROM COMMENTS Active range of motion within functional was all extremities.  -LEEANNE         MMT (Manual Muscle Testing)    General MMT Comments Strength of bilateral upper extremities is 4+ out of 5.  Bilateral lower extremities overall 4 to 4+ out of 5 except left dorsiflexion 4- out of 5, hip flexion and knee flexion on the left 4- out of 5.  -LEEANNE         Bed Mobility    Bed Mobility bed mobility (all) activities  -LEEANNE      All Activities, [place] (Bed Mobility) modified independence;verbal cues  -LEEANNE      Comment, (Bed Mobility) Cues to place lower extremities in hook lying in order to roll instantly  -LEEANNE         Transfers    Sit-Stand  [place] (Transfers) modified independence  -LEEANNE      Stand-Sit [place] (Transfers) supervision;modified independence  Patient reaches back prior to sitting down due to decreased vision  -LEEANNE      Transfer, Impairments impaired vision;impaired balance  -LEEANNE         Gait/Stairs (Locomotion)    [place] Level (Gait) modified independence;standby assist  -LEEANNE      Assistive Device (Gait) cane, straight  -LEEANNE      Distance in Feet (Gait) --  80' x 3  -LEEANNE      Deviations/Abnormal Patterns (Gait) bilateral deviations;base of support, wide;gait speed decreased  -LEEANNE      Bilateral Gait Deviations forward flexed posture;heel strike decreased  scans to R more often due to decreased vision  -LEEANNE      Right Sided Gait Deviations Trendelenburg sign  -LEEANNE      [place] Level (Stairs) stand by assist;contact guard  -LEEANNE      Assistive Device (Stairs) cane, straight  -LEEANNE      Handrail Location (Stairs) left side (ascending);left side (descending)  -LEEANNE      Number of Steps (Stairs) 4  -LEEANNE      Ascending Technique (Stairs) step-to-step;step-over-step  -LEEANNE      Descending Technique (Stairs) step-to-step;step-over-step  -LEEANNE      Stairs, Impairments impaired vision;impaired balance  -LEEANNE      Comment, (Gait/Stairs) see Tinetti and TUG  -LEEANNE         Balance Skills Training    Balance Comments see COY  -LEEANNE                User Key  (r) = Recorded By, (t) = Taken By, (c) = Cosigned By      Initials Name Provider Type    Sangeetha Padilla, PT Physical Therapist                            Therapy Education  Education Details: Discussed PT plan of care.  Given: Fall prevention and home safety, Mobility training  How Provided: Verbal  Provided to: Patient, Caregiver  Level of Understanding: Verbalized, Teach back education performed     PT OP Goals       Row Name 07/22/24 1250          PT Short Term Goals    STG Date to Achieve 08/19/24  -LEEANNE     STG 1 Pt will be independent with initial HEP to improve strength and balance.  -LEEANNE      STG 2 Patient will transfer to sit turning fully around prior to sit down.  -     STG 3 Patient to perform Romberg for 1 minute independently.  -     STG 4 Patient to score 38/56 on the Pugh balance test indicate improved balance and decrease risk for falls.  -     STG 5 evaluate balance using FGA.  -        Long Term Goals    LTG Date to Achieve 09/09/24  -     LTG 1 Patient will be independent with advanced HEP to improve strength and balance.  -     LTG 2 Patient to score 42/56 on the Pugh balance test indicate improved balance and decrease risk for falls.  -     LTG 3 Patient to score 23/28 on the Tinetti balance test for improved balance and reduced risk for falls.  -     LTG 4 Patient will ambulate in the community 200 feet with cane, SBA.  -     LTG 5 Pt to complete TUG in < or equal to 12 seconds for improved balance with gait related task.  -     LTG 6 Patient to perform single-leg stance 2-3 seconds each LE.  -     LTG 8 Patient will have increase strength of lower extremities by one half muscle grade.  -        Time Calculation    PT Goal Re-Cert Due Date 08/19/24  -               User Key  (r) = Recorded By, (t) = Taken By, (c) = Cosigned By      Initials Name Provider Type    Sangeetha Padilla, PT Physical Therapist                     PT Assessment/Plan       Row Name 07/22/24 1632          PT Assessment    Functional Limitations Decreased safety during functional activities;Impaired gait;Limitation in home management;Limitations in functional capacity and performance;Performance in self-care ADL  -     Impairments Balance;Cognition;Gait;Peripheral nerve integrity;Sensation;Muscle strength;Other (comment)  R hemianopsia  -     Assessment Comments Patient is an 83-year-old male referred to PT with polyneuropathy and balance problems.  Patient's past medical history includes left occipital lobe stroke October 2022 in which patient has right hemianopsia, A-fib,  confusion,decreased sensation B feet, history of falls.  Patient has mild weakness of left hip.  Patient has deficits with transfers, ambulation and balance.  Outcome measures of Pugh and Tinetti indicate patient is at risk for falls.  Patient will benefit from outpatient physical therapy to work on strengthening,transfers, ambulation and balance.  -LEEANNE     Please refer to paper survey for additional self-reported information Yes  -LEEANNE     Rehab Potential Good  -LEEANNE     Patient/caregiver participated in establishment of treatment plan and goals Yes  -LEEANNE     Patient would benefit from skilled therapy intervention Yes  -LEEANNE        PT Plan    PT Frequency 2x/week  -LEEANNE     Predicted Duration of Therapy Intervention (PT) 6-8 weeks  -LEEANNE     Planned CPT's? PT EVAL MOD COMPLELITY: 19119;PT THER PROC EA 15 MIN: 85638;PT THER ACT EA 15 MIN: 59826;PT NEUROMUSC RE-EDUCATION EA 15 MIN: 68110;PT GAIT TRAINING EA 15 MIN: 55293  -LEEANNE     Physical Therapy Interventions (Optional Details) balance training;gait training;home exercise program;neuromuscular re-education;patient/family education;stair training;strengthening;transfer training  -LEEANNE               User Key  (r) = Recorded By, (t) = Taken By, (c) = Cosigned By      Initials Name Provider Type    Sangeetha Padilla, PT Physical Therapist                        OP Exercises       Row Name 07/22/24 1250             Subjective    Subjective Comments Patient reports he uses cane in the house at nighttime and out in the community.  Otherwise at home and out in his yard he does not use a device.  -LEEANNE         Subjective Pain    Subjective Pain Comment Did not mention pain  -LEEANNE                User Key  (r) = Recorded By, (t) = Taken By, (c) = Cosigned By      Initials Name Provider Type    Sangeetha Padilla, PT Physical Therapist                                Outcome Measure Options: Pugh Balance, Tinetti, Timed Up and Go (TUG)  Pugh Balance Scale  Sitting to Standing: able to stand  "without using hands and stabilize independently  Standing Unsupported: able to stand safely for 2 minutes  Sitting with Back Unsupported but Feet Supported on Floor or on Stool: able to sit safely and securely for 2 minutes  Standing to Sitting: controls descent by using hands  Transfers: able to transfer safely definite need of hands  Standing Unsupported with Eyes Closed: able to stand 10 seconds with supervision  Standing Unsupported with Feet Together: needs help to attain position but able to stand 15 seconds feet together  Reaching Forward with Outstretched Arm While Standing: can reach forward 5 cm (2 inches)   Object From the Floor From a Standing Position: unable to  but reaches 2-5 cm(1-2 inches) from object and keeps balance independently  Turning to Look Behind Over Left and Right Shoulders While Standing: turns sideways only but maintains balance  Turn 360 Degrees: able to turn 360 degrees safely but slowly  Place Alternate Foot on Step or Stool While Standing Unsupported: able to complete > 2 steps needs minimal assist  Standing Unsupported with One Foot in Front: needs help to step but can hold 15 seconds  Standing on One Leg: tries to lift leg unable to hold 3 seconds but remains standing independently  Pugh Total Score: 33  Tinetti Assessment  Tinetti Assessment: yes  Sitting Balance: Steady,safe  Arises: Able in 1 attempt  Attempts to Rise: Able in 1 attempt  Immediate Standing Balance (first 5 sec): Steady, with support  Standing Balance: Narrow stance, without support  Sternal Nudge (feet close together): Stagger, grabs, catches self  Eyes Closed (feet close together): Unsteady  Turning 360 Degrees- Steps: Continuous steps  Turning 360 Degrees- Steadiness: Steady  Sitting Down: Uses arms or not a smooth motion  Tinetti Balance Score: 12  Gait Initiation (immediate after told \"go\"): No hesitancy  Step Length- Right Swing: Right swing foot passes Left stance leg  Step Length- Left " Swing: Left swing foot passes Right  Foot Clearance- Right Foot: Right foot completely clears floor  Foot Clearance- Left Foot: Left foot completely clears floor  Step Symmetry: Right and Left step length equal  Step Continuity: Steps appear continuous  Path (excursion): Mild/moderate deviation or use of aid  Trunk: Marked sway or uses device  Base of Support: Heels apart  Gait Score: 8  Tinetti Total Score: 20  Tinetti Assistive Device: straight cane  Timed Up and Go (TUG)  TUG Test 1: 15 seconds (using cane)    Time Calculation:   Start Time: 1250  Stop Time: 1335  Time Calculation (min): 45 min  Total Timed Code Minutes- PT: 45 minute(s)  Untimed Charges  PT Eval/Re-eval Minutes: 45  Total Minutes  Untimed Charges Total Minutes: 45   Total Minutes: 45   Therapy Charges for Today       Code Description Service Date Service Provider Modifiers Qty    30156589559 HC PT EVAL MOD COMPLEXITY 4 7/22/2024 Sangeetha Berumen, PT GP 1            PT G-Codes  Outcome Measure Options: Pugh Balance, Tinetti, Timed Up and Go (TUG)  Pugh Total Score: 33  Tinetti Total Score: 20  TUG Test 1: 15 seconds (using cane)         Sangeetha Berumen, PT  7/22/2024

## 2024-07-23 ENCOUNTER — TELEPHONE (OUTPATIENT)
Dept: NEUROLOGY | Facility: CLINIC | Age: 84
End: 2024-07-23

## 2024-07-23 NOTE — TELEPHONE ENCOUNTER
Caller: Lolita Sorto    Relationship to patient: Emergency Contact    Best call back number: [phone]     Chief complaint: SCHEDULING CONFLICT    Type of visit: FU    Requested date: 8-20-24 THRU 8-28-24    If rescheduling, when is the original appointment: 8-19-24    Additional notes:  PT JUST STARTED PHYSICAL THERAPY AND ORIGINAL APPT CONFLICTS WITH OTHER APPTS.    PLEASE CALL BRIAN BACK TO RESCHEDULE THIS APPT. BRIAN WILL NOT BE AVAILABLE TODAY FROM 10 AM TO 11 AM.

## 2024-07-24 ENCOUNTER — HOSPITAL ENCOUNTER (OUTPATIENT)
Dept: PHYSICAL THERAPY | Facility: HOSPITAL | Age: 84
Setting detail: THERAPIES SERIES
Discharge: HOME | End: 2024-07-24
Payer: MEDICARE

## 2024-07-24 DIAGNOSIS — R26.89 BALANCE PROBLEM: ICD-10-CM

## 2024-07-24 DIAGNOSIS — Z86.73 HISTORY OF STROKE: ICD-10-CM

## 2024-07-24 DIAGNOSIS — Z91.81 AT HIGH RISK FOR FALLS: ICD-10-CM

## 2024-07-24 DIAGNOSIS — G62.9 POLYNEUROPATHY: Primary | ICD-10-CM

## 2024-07-24 PROCEDURE — 97110 THERAPEUTIC EXERCISES: CPT

## 2024-07-24 PROCEDURE — 97112 NEUROMUSCULAR REEDUCATION: CPT

## 2024-07-24 NOTE — THERAPY TREATMENT NOTE
Outpatient Physical Therapy Neuro Treatment Note  [facility]     Patient Name: Junito Sorto  : 1940  MRN: [id number]  Today's Date: 2024      Visit Date: 2024    Visit Dx:    ICD-10-CM ICD-9-CM   1. Polyneuropathy  G62.9 356.9   2. Balance problem  R26.89 781.99   3. History of stroke  Z86.73 V12.54   4. At high risk for falls  Z91.81 V15.88       Patient Active Problem List   Diagnosis    Hyperglycemia    Obesity    Allergic rhinitis    Permanent atrial fibrillation    HLD (hyperlipidemia)    Esophageal dysphagia    Hiatal hernia    GERD (gastroesophageal reflux disease)    Bladder outlet obstruction    Sciatica of right side    Pneumonia of right lower lobe due to infectious organism    Acute respiratory failure with hypoxia    PVC (premature ventricular contraction)    Left ventricular dysfunction    Hypercholesterolemia    Acute ischemic left PCA stroke    Lumbar spine pain    Presence of Watchman left atrial appendage closure device    History of stroke    Loss of vision    Vitamin D deficiency    Encounter for screening for malignant neoplasm of colon            PT Neuro       Row Name 24 1315             Subjective    Subjective Comments Pt brought in prism glasses.  Pt is not sure if they help much with his balance  -LB         Precautions and Contraindications    Precautions/Limitations fall precautions  -LB      Precautions visual field cut on the right  -LB         Subjective Pain    Able to rate subjective pain? yes  -LB      Pre-Treatment Pain Level 0  -LB      Post-Treatment Pain Level 0  -LB         Posture/Observations    Posture/Observations Comments Patient ambulated up to therapy with the use of single tip cane.  -LB         Transfers    Sit-Stand [place] (Transfers) modified independence  -LB      Stand-Sit [place] (Transfers) supervision;modified independence  -LB      Transfers, Sit-Stand-Sit, Assist Device straight cane  -LB      Transfer,  Impairments impaired vision;impaired balance  -LB      Comment, (Transfers) wide base of support  -LB         Gait/Stairs (Locomotion)    [place] Level (Gait) modified independence;standby assist  -LB      Assistive Device (Gait) cane, straight  -LB      Distance in Feet (Gait) 100  60; 50  -LB      Pattern (Gait) step-through  -LB      Deviations/Abnormal Patterns (Gait) bilateral deviations;base of support, wide;gait speed decreased  -LB      Bilateral Gait Deviations forward flexed posture;heel strike decreased  pt scanning to the right.  -LB      Right Sided Gait Deviations Trendelenburg sign  -LB      Gait Assessment/Intervention right trunk sidebend  -LB         Balance Skills Training    Standing-Level of Assistance Contact guard  -LB      Static Standing Balance Support Right upper extremity supported;Left upper extremity supported;No upper extremity supported  Transitioned from UE support to no UE support  -LB      Standing-Balance Activities Feet together;Semi-tandum  -LB      Standing Balance # of Minutes With semitandem position both on right/left pt slightly unsteady.  Added horizontal head turns which increased ankle instability requiring CGA to min A for balance.  -LB      Gait Balance-Level of Assistance Contact guard  -LB      Gait Balance Support Right upper extremity supported;Left upper extremity supported  -LB      Gait Balance Activities side-stepping;backwards;stepping over object;hurdles  -LB      Gait Balance # of Minutes When stepping over jere the patient did not clear times 1 and provided hand held and assistance for balance.  -LB      SLS 4-5 secs on right; 10 secs on left  -LB                User Key  (r) = Recorded By, (t) = Taken By, (c) = Cosigned By      Initials Name Provider Type    Laura Tuttle PT Physical Therapist                             PT Assessment/Plan       Row Name 07/24/24 8780          PT Assessment    Assessment Comments Discussed different sources for  balance concerns including vision, strength and sensation.  Pt did try using prism glasses with stepping over various objects and same objects without glasses. Did not see any physical difference and the patient did not need anymore assistance either way.  The patient did feel more unsteady when having to stand on one leg longer then the other during the activity.  Pt reports glasses just make his vision clearer and less blurry.  Ankle/foot reactions noted with semitandem position, more unsteady on his right.  -LB               User Key  (r) = Recorded By, (t) = Taken By, (c) = Cosigned By      Initials Name Provider Type    Laura Tuttle PT Physical Therapist                        OP Exercises       Row Name 07/24/24 1445 07/24/24 1315          Subjective    Subjective Comments -- Pt brought in prism glasses.  Pt is not sure if they help much with his balance  -LB        Subjective Pain    Able to rate subjective pain? -- yes  -LB     Pre-Treatment Pain Level -- 0  -LB     Post-Treatment Pain Level -- 0  -LB        Total Minutes    66371 - PT Therapeutic Exercise Minutes 20  -LB --     83828 -  PT Neuromuscular Reeducation Minutes 23  -LB --        Exercise 1    Exercise Name 1 -- seated hip flex/abd/add  -LB     Sets 1 -- 1  -LB     Reps 1 -- 15  -LB     Additional Comments -- RTB around thighs  -LB        Exercise 2    Exercise Name 2 -- seated ankle PF  -LB     Reps 2 -- 15  -LB     Additional Comments -- RTB  -LB               User Key  (r) = Recorded By, (t) = Taken By, (c) = Cosigned By      Initials Name Provider Type    Laura Tuttle PT Physical Therapist                                    Therapy Education  Education Details: HEP, seated ther ex with RTB  Given: HEP  Program: New  How Provided: Verbal, Demonstration, Written  Provided to: Patient  Level of Understanding: Verbalized              Time Calculation:   Start Time: 1247  Stop Time: 1330  Time Calculation (min): 43 min  Timed  Charges  64462 - PT Therapeutic Exercise Minutes: 20  86993 -  PT Neuromuscular Reeducation Minutes: 23  Total Minutes  Timed Charges Total Minutes: 43   Total Minutes: 43   Therapy Charges for Today       Code Description Service Date Service Provider Modifiers Qty    98655914422 HC PT NEUROMUSC RE EDUCATION EA 15 MIN 7/24/2024 Laura Linares, PT GP 2    33628696474 HC PT THER PROC EA 15 MIN 7/24/2024 Laura Linares, PT GP 1                      Laura Linares, PT  7/24/2024

## 2024-07-29 ENCOUNTER — APPOINTMENT (OUTPATIENT)
Dept: PHYSICAL THERAPY | Facility: HOSPITAL | Age: 84
End: 2024-07-29
Payer: MEDICARE

## 2024-07-29 RX ORDER — FAMOTIDINE 40 MG/1
40 TABLET, FILM COATED ORAL DAILY
Qty: 90 TABLET | Refills: 3 | Status: SHIPPED | OUTPATIENT
Start: 2024-07-29

## 2024-07-31 ENCOUNTER — HOSPITAL ENCOUNTER (OUTPATIENT)
Dept: PHYSICAL THERAPY | Facility: HOSPITAL | Age: 84
Discharge: HOME | End: 2024-07-31
Admitting: PHYSICIAN ASSISTANT
Payer: MEDICARE

## 2024-07-31 DIAGNOSIS — G62.9 POLYNEUROPATHY: Primary | ICD-10-CM

## 2024-07-31 DIAGNOSIS — Z86.73 HISTORY OF STROKE: ICD-10-CM

## 2024-07-31 DIAGNOSIS — R26.89 BALANCE PROBLEM: ICD-10-CM

## 2024-07-31 DIAGNOSIS — Z91.81 AT HIGH RISK FOR FALLS: ICD-10-CM

## 2024-07-31 PROCEDURE — 97110 THERAPEUTIC EXERCISES: CPT

## 2024-07-31 PROCEDURE — 97112 NEUROMUSCULAR REEDUCATION: CPT

## 2024-07-31 NOTE — THERAPY TREATMENT NOTE
Outpatient Physical Therapy Neuro Treatment Note  [facility]     Patient Name: Junito Sorto  : 1940  MRN: [id number]  Today's Date: 2024      Visit Date: 2024    Visit Dx:    ICD-10-CM ICD-9-CM   1. Polyneuropathy  G62.9 356.9   2. Balance problem  R26.89 781.99   3. History of stroke  Z86.73 V12.54   4. At high risk for falls  Z91.81 V15.88       Patient Active Problem List   Diagnosis    Hyperglycemia    Obesity    Allergic rhinitis    Permanent atrial fibrillation    HLD (hyperlipidemia)    Esophageal dysphagia    Hiatal hernia    GERD (gastroesophageal reflux disease)    Bladder outlet obstruction    Sciatica of right side    Pneumonia of right lower lobe due to infectious organism    Acute respiratory failure with hypoxia    PVC (premature ventricular contraction)    Left ventricular dysfunction    Hypercholesterolemia    Acute ischemic left PCA stroke    Lumbar spine pain    Presence of Watchman left atrial appendage closure device    History of stroke    Loss of vision    Vitamin D deficiency    Encounter for screening for malignant neoplasm of colon            PT Neuro       Row Name 24 0977             Subjective    Subjective Comments My brain doesnt tell my right side what to do all the time  -LB         Precautions and Contraindications    Precautions/Limitations fall precautions  -LB      Precautions visual field cut on the right  -LB         Subjective Pain    Able to rate subjective pain? yes  -LB      Pre-Treatment Pain Level 0  -LB      Post-Treatment Pain Level 0  -LB         Cognition    Overall Cognitive Status WFL  -LB      Arousal/Alertness Appropriate responses to stimuli  -LB      Memory Appears intact  -LB      Orientation Level Oriented X4  -LB      Safety Judgment Good awareness of safety precautions  -LB      Deficits Fully aware of deficits  -LB         Posture/Observations    Posture/Observations Comments Patient ambulated up to therapy with the use of  single tip cane.  -LB         Transfers    Sit-Stand [place] (Transfers) modified independence  -LB      Stand-Sit [place] (Transfers) supervision;modified independence  -LB      Transfers, Sit-Stand-Sit, Assist Device straight cane  -LB         Gait/Stairs (Locomotion)    [place] Level (Gait) modified independence;standby assist  -LB      Assistive Device (Gait) cane, straight  -LB      Distance in Feet (Gait) 110  40  -LB      Pattern (Gait) step-through  -LB      Deviations/Abnormal Patterns (Gait) bilateral deviations;base of support, wide;gait speed decreased  -LB      Bilateral Gait Deviations forward flexed posture;heel strike decreased  -LB      Right Sided Gait Deviations Trendelenburg sign  -LB      Gait Assessment/Intervention right trunk sidebend: cues to scan to the right  -LB         Balance Skills Training    Standing-Level of Assistance Contact guard  -LB      Static Standing Balance Support No upper extremity supported  -LB      Standing-Balance Activities Foam square  -LB      Standing Balance # of Minutes While on foam square pt performed chest press times 10 reps with 2# bar and elbow flex/ext with 4# bar.  Slight posterior lean  -LB      Gait Balance-Level of Assistance Contact guard;Close supervision  -LB      Gait Balance Support Right upper extremity supported;Left upper extremity supported  -LB      Gait Balance Activities side-stepping  -LB      Gait Balance # of Minutes RTB for 8 ft times 4  -LB                User Key  (r) = Recorded By, (t) = Taken By, (c) = Cosigned By      Initials Name Provider Type    Laura Tuttle PT Physical Therapist                             PT Assessment/Plan       Row Name 07/31/24 7421          PT Assessment    Assessment Comments The patient tolerated session well with adding more standing and seated exercises with resistance bands.  The patient was able to maintain balance while standing on a compliant surface while performing UE  strengthening.  Vision certainly affects balance and some impaired coordination noted with some acitivites.  -LB               User Key  (r) = Recorded By, (t) = Taken By, (c) = Cosigned By      Initials Name Provider Type    Laura Tuttle PT Physical Therapist                        OP Exercises       Row Name 07/31/24 1034 07/31/24 0930          Subjective    Subjective Comments -- My brain doesnt tell my right side what to do all the time  -LB        Subjective Pain    Able to rate subjective pain? -- yes  -LB     Pre-Treatment Pain Level -- 0  -LB     Post-Treatment Pain Level -- 0  -LB        Total Minutes    54756 - PT Therapeutic Exercise Minutes 23  -LB --     07801 -  PT Neuromuscular Reeducation Minutes 18  -LB --        Exercise 1    Exercise Name 1 -- seated hip flex/abd/add  -LB     Sets 1 -- 2  -LB     Reps 1 -- 10  -LB     Additional Comments -- RTB  -LB        Exercise 2    Exercise Name 2 -- seated ankle PF  -LB     Reps 2 -- 15  -LB     Additional Comments -- RTB  -LB        Exercise 3    Exercise Name 3 -- seated alternating shoulder flex/hip flex  -LB     Sets 3 -- 2  -LB     Reps 3 -- 5  -LB        Exercise 4    Exercise Name 4 -- standing hip abd/flex/ext  -LB     Reps 4 -- 10  -LB     Additional Comments -- RTB  -LB        Exercise 5    Exercise Name 5 -- standing knee flexion  -LB     Reps 5 -- 10  -LB     Additional Comments -- RTB  -LB        Exercise 6    Exercise Name 6 -- LAQs  -LB     Reps 6 -- 10  -LB     Additional Comments -- 2.5# ankle weight  -LB               User Key  (r) = Recorded By, (t) = Taken By, (c) = Cosigned By      Initials Name Provider Type    Laura Tuttle PT Physical Therapist                                    Therapy Education  Education Details: Coordinating with performing 2 activities at one time  Given: Symptoms/condition management  Program: New  How Provided: Verbal  Provided to: Patient  Level of Understanding: Verbalized              Time  Calculation:   Start Time: 0930  Stop Time: 1015  Time Calculation (min): 45 min  Timed Charges  37314 - PT Therapeutic Exercise Minutes: 23  35775 -  PT Neuromuscular Reeducation Minutes: 18  Total Minutes  Timed Charges Total Minutes: 41   Total Minutes: 41   Therapy Charges for Today       Code Description Service Date Service Provider Modifiers Qty    33918162112  PT NEUROMUSC RE EDUCATION EA 15 MIN 7/31/2024 Laura Linares, PT GP 1    71462238071 HC PT THER PROC EA 15 MIN 7/31/2024 Laura Linares, PT GP 2                      Laura Linares PT  7/31/2024

## 2024-08-01 ENCOUNTER — HOSPITAL ENCOUNTER (OUTPATIENT)
Dept: PHYSICAL THERAPY | Facility: HOSPITAL | Age: 84
Setting detail: THERAPIES SERIES
Discharge: HOME | End: 2024-08-01
Payer: MEDICARE

## 2024-08-01 DIAGNOSIS — G62.9 POLYNEUROPATHY: Primary | ICD-10-CM

## 2024-08-01 DIAGNOSIS — Z91.81 AT HIGH RISK FOR FALLS: ICD-10-CM

## 2024-08-01 DIAGNOSIS — Z86.73 HISTORY OF STROKE: ICD-10-CM

## 2024-08-01 DIAGNOSIS — R26.89 BALANCE PROBLEM: ICD-10-CM

## 2024-08-01 PROCEDURE — 97112 NEUROMUSCULAR REEDUCATION: CPT

## 2024-08-01 PROCEDURE — 97110 THERAPEUTIC EXERCISES: CPT

## 2024-08-01 NOTE — THERAPY TREATMENT NOTE
"  Outpatient Physical Therapy Neuro Treatment Note  [facility]     Patient Name: Junito Sorto  : 1940  MRN: [id number]  Today's Date: 2024      Visit Date: 2024    Visit Dx:    ICD-10-CM ICD-9-CM   1. Polyneuropathy  G62.9 356.9   2. Balance problem  R26.89 781.99   3. History of stroke  Z86.73 V12.54   4. At high risk for falls  Z91.81 V15.88       Patient Active Problem List   Diagnosis    Hyperglycemia    Obesity    Allergic rhinitis    Permanent atrial fibrillation    HLD (hyperlipidemia)    Esophageal dysphagia    Hiatal hernia    GERD (gastroesophageal reflux disease)    Bladder outlet obstruction    Sciatica of right side    Pneumonia of right lower lobe due to infectious organism    Acute respiratory failure with hypoxia    PVC (premature ventricular contraction)    Left ventricular dysfunction    Hypercholesterolemia    Acute ischemic left PCA stroke    Lumbar spine pain    Presence of Watchman left atrial appendage closure device    History of stroke    Loss of vision    Vitamin D deficiency    Encounter for screening for malignant neoplasm of colon            PT Neuro       Row Name 24 1415             Subjective    Subjective Comments \" I wish my right leg doesn't work well  -LB         Precautions and Contraindications    Precautions/Limitations fall precautions  -LB      Precautions visual field cut on the right  -LB         Subjective Pain    Able to rate subjective pain? yes  -LB      Pre-Treatment Pain Level 0  -LB      Post-Treatment Pain Level 0  -LB         Cognition    Overall Cognitive Status WFL  -LB      Arousal/Alertness Appropriate responses to stimuli  -LB      Memory Appears intact  -LB      Orientation Level Oriented X4  -LB      Safety Judgment Good awareness of safety precautions  -LB      Deficits Fully aware of deficits  -LB         Posture/Observations    Posture/Observations Comments Patient ambulated up to therapy with the use of single tip cane.  " -LB         Transfers    Sit-Stand [employer] (Transfers) modified independence  -LB      Stand-Sit [employer] (Transfers) supervision;modified independence  -LB      Transfers, Sit-Stand-Sit, Assist Device straight cane  -LB         Gait/Stairs (Locomotion)    [employer] Level (Gait) modified independence;standby assist  -LB      Assistive Device (Gait) cane, straight  -LB      Distance in Feet (Gait) 120  -LB      Pattern (Gait) step-through  -LB      Deviations/Abnormal Patterns (Gait) bilateral deviations;base of support, wide;gait speed decreased  -LB      Bilateral Gait Deviations forward flexed posture;heel strike decreased  -LB      Right Sided Gait Deviations Trendelenburg sign  -LB      Gait Assessment/Intervention right trunk sidebend: cues to scan to the right  -LB      Negotiation (Ramp) ramp independence;ramp assistive device  -LB      [employer] Level (Ramp) contact guard  -LB      Assistive Device (Ramp) cane, straight  -LB      Stairs Assessment/Intervention With ascending ramp right foot not clear completely x 1  -LB      Comment, (Gait/Stairs) Patient ambulated with and without prism glasses.  Performed on solid ground also with turns and stepping over objects.  Patient reports no difference.  Patient did have 1 balance impairment when he did not have them on but patient felt that it had nothing to do with his glasses  -LB         Curb Negotiation (Mobility)    [employer], Curb Negotiation verbal cues;standby assist  -LB      Assistive Device (Curb Negotiation) cane, straight  -LB      Comment, [place] Negotiation (Mobility) performed times 2 with cane, initally cues for sequence  -LB         Balance Skills Training    Standing-Level of Assistance Contact guard  -LB      Static Standing Balance Support No upper extremity supported  -LB      Standing-Balance Activities Foam square  -LB      Standing Balance # of Minutes Narrow KAYLEE on foam square; head turns  -LB      Gait Balance-Level of  "Assistance Close supervision;Contact guard  -LB      Gait Balance Support assistive device  -LB      Gait Balance Activities stepping over object  turns around cones  -LB      Gait Balance # of Minutes Sidestepping, monster steps, backward steps with red Thera-Band and upper extremity support on parallel bars  -LB                User Key  (r) = Recorded By, (t) = Taken By, (c) = Cosigned By      Initials Name Provider Type    Laura Tuttle PT Physical Therapist                             PT Assessment/Plan       Row Name 08/01/24 1546          PT Assessment    Assessment Comments Patient was agreeable to try prism glasses again.  Difficult to tell if they assist with his balance.  Patient voices no change except that things are clearer.  Discussed that a clear vision might assist with taking turns and having the floor more level.  Patient not certain if that would help.  During session patient did have an instance with decreased right foot clearance when negotiating ramp.  -LB               User Key  (r) = Recorded By, (t) = Taken By, (c) = Cosigned By      Initials Name Provider Type    Laura Tuttle PT Physical Therapist                        OP Exercises       Row Name 08/01/24 1548 08/01/24 1500 08/01/24 1415       Subjective    Subjective Comments -- -- \" I wish my right leg doesn't work well  -LB       Subjective Pain    Able to rate subjective pain? -- -- yes  -LB    Pre-Treatment Pain Level -- -- 0  -LB    Post-Treatment Pain Level -- -- 0  -LB       Total Minutes    71817 - PT Therapeutic Exercise Minutes 20  -LB -- --    58779 -  PT Neuromuscular Reeducation Minutes 20  -LB -- --       Exercise 1    Exercise Name 1 -- seated hip flex/abd/add  -LB --    Reps 1 -- 10  -LB --    Additional Comments -- RTB  -LB --       Exercise 3    Exercise Name 3 -- Seated marching with alternating shoulder flexion  -LB --    Reps 3 -- 10  -LB --              User Key  (r) = Recorded By, (t) = Taken By, (c) = " Cosigned By      Initials Name Provider Type    Laura Tuttle, PT Physical Therapist                                    Therapy Education  Given: Symptoms/condition management  Program: Reinforced  How Provided: Verbal  Provided to: Patient  Level of Understanding: Verbalized, Teach back education performed              Time Calculation:   Start Time: 1340  Stop Time: 1420  Time Calculation (min): 40 min  Timed Charges  82670 - PT Therapeutic Exercise Minutes: 20  95433 -  PT Neuromuscular Reeducation Minutes: 20  Total Minutes  Timed Charges Total Minutes: 40   Total Minutes: 40   Therapy Charges for Today       Code Description Service Date Service Provider Modifiers Qty    [id number]  PT NEUROMUSC RE EDUCATION EA 15 MIN 8/1/2024 Laura Linares, PT GP 2    [id number]  PT THER PROC EA 15 MIN 8/1/2024 Laura Linares, PT GP 1                      Laura Linares PT  8/1/2024

## 2024-08-05 ENCOUNTER — HOSPITAL ENCOUNTER (OUTPATIENT)
Dept: PHYSICAL THERAPY | Facility: HOSPITAL | Age: 84
Setting detail: THERAPIES SERIES
Discharge: HOME | End: 2024-08-05
Payer: MEDICARE

## 2024-08-05 DIAGNOSIS — Z91.81 AT HIGH RISK FOR FALLS: ICD-10-CM

## 2024-08-05 DIAGNOSIS — Z86.73 HISTORY OF STROKE: ICD-10-CM

## 2024-08-05 DIAGNOSIS — R26.89 BALANCE PROBLEM: ICD-10-CM

## 2024-08-05 DIAGNOSIS — G62.9 POLYNEUROPATHY: Primary | ICD-10-CM

## 2024-08-05 PROCEDURE — 97110 THERAPEUTIC EXERCISES: CPT

## 2024-08-05 PROCEDURE — 97112 NEUROMUSCULAR REEDUCATION: CPT

## 2024-08-05 NOTE — THERAPY TREATMENT NOTE
Outpatient Physical Therapy Neuro Treatment Note  [facility]     Patient Name: Junito Sorto  : 1940  MRN: [id number]  Today's Date: 2024      Visit Date: 2024    Visit Dx:    ICD-10-CM ICD-9-CM   1. Polyneuropathy  G62.9 356.9   2. Balance problem  R26.89 781.99   3. History of stroke  Z86.73 V12.54   4. At high risk for falls  Z91.81 V15.88       Patient Active Problem List   Diagnosis    Hyperglycemia    Obesity    Allergic rhinitis    Permanent atrial fibrillation    HLD (hyperlipidemia)    Esophageal dysphagia    Hiatal hernia    GERD (gastroesophageal reflux disease)    Bladder outlet obstruction    Sciatica of right side    Pneumonia of right lower lobe due to infectious organism    Acute respiratory failure with hypoxia    PVC (premature ventricular contraction)    Left ventricular dysfunction    Hypercholesterolemia    Acute ischemic left PCA stroke    Lumbar spine pain    Presence of Watchman left atrial appendage closure device    History of stroke    Loss of vision    Vitamin D deficiency    Encounter for screening for malignant neoplasm of colon            PT Neuro       Row Name 24 1245             Subjective    Subjective Comments No falls since PT began. Either uses cane or rake/other tool to use for support outside.  -LEEANNE         Precautions and Contraindications    Precautions/Limitations fall precautions  -LEEANNE      Precautions visual field cut on the right  -LEEANNE         Subjective Pain    Able to rate subjective pain? yes  -LEEANNE      Pre-Treatment Pain Level 0  -LEEANNE      Post-Treatment Pain Level 0  -LEEANNE         Cognition    Overall Cognitive Status WFL  -LEEANNE         Posture/Observations    Posture/Observations Comments Patient ambulated up to therapy with the use of single tip cane. wife by his side  -LEEANNE         Transfers    Sit-Stand [place] (Transfers) modified independence  -LEEANNE      Stand-Sit [place] (Transfers) supervision;modified independence  -LEEANNE       Transfers, Sit-Stand-Sit, Assist Device straight cane  -LEEANNE      Type (Floor Transfer) mat to the floor & floor to mat  -LEEANNE      [place] Level (Floor Transfer) modified independence  -LEEANNE      Transfer, Impairments impaired vision;impaired balance  -LEEANNE         Gait/Stairs (Locomotion)    [place] Level (Gait) modified independence;standby assist  -LEEANNE      Assistive Device (Gait) cane, straight;other (see comments)  1 hiking stick  -LEEANNE      Distance in Feet (Gait) --  Cane: 200 feet, walking stick: 80 feet, 60 feet  -LEEANNE      Deviations/Abnormal Patterns (Gait) bilateral deviations;base of support, wide;gait speed decreased  -LEEANNE      Bilateral Gait Deviations forward flexed posture;heel strike decreased  -LEAENNE      Right Sided Gait Deviations Trendelenburg sign;leans right  -LEEANNE      Gait Assessment/Intervention Tried to place cane in left hand but patient prefers it in his right hand.  -LEEANNE         Balance Skills Training    Standing-Level of Assistance Contact guard  -LEEANNE      Static Standing Balance Support No upper extremity supported;parallel bars  -LEEANNE      Standing-Balance Activities Foam square  Feet 2 to 4 inches apart and heels -head turns, head nods frequent loss of balance and leaned into bar for support.  -LEEANNE      Gait Balance-Level of Assistance Close supervision;Contact guard  -LEEANNE      Gait Balance Support assistive device  cane  -LEEANNE      Gait Balance Activities uneven surface  red mat --sidestep, forward block, backward; dual tasking while patient reports when the letter was provided and naming 5 states she has been to.  Gait slowed with dual tasking.  -LEEANNE                User Key  (r) = Recorded By, (t) = Taken By, (c) = Cosigned By      Initials Name Provider Type    Sangeetha Padilla, PT Physical Therapist                             PT Assessment/Plan       Row Name 08/05/24 1608          PT Assessment    Assessment Comments Patient reported no change in balance when he uses prism glasses in the  last sessions so he did not try at this time.  Patient did well with floor to mat transfers.  However, patient to bring his phone with him at all times when he is outdoors just in case he needs help.  Gait slowed with dual tasking.  -LEEANNE               User Key  (r) = Recorded By, (t) = Taken By, (c) = Cosigned By      Initials Name Provider Type    Sangeetha Padilla, PT Physical Therapist                        OP Exercises       Row Name 08/05/24 1456 08/05/24 1245          Subjective    Subjective Comments -- No falls since PT began. Either uses cane or rake/other tool to use for support outside.  -LEEANNE        Subjective Pain    Able to rate subjective pain? -- yes  -LEEANNE     Pre-Treatment Pain Level -- 0  -LEEANNE     Post-Treatment Pain Level -- 0  -LEEANNE        Total Minutes    84688 - PT Therapeutic Exercise Minutes 24  -LEEANNE --     54353 -  PT Neuromuscular Reeducation Minutes 20  -LEEANNE --               User Key  (r) = Recorded By, (t) = Taken By, (c) = Cosigned By      Initials Name Provider Type    Sangeetha Padilla PT Physical Therapist                                    Therapy Education  Education Details: Encouraged pt to bring his phone with him when he goes outdoors to work in his yard or walk in neighborhood just in case he needs to call for help.  Practiced floor transfer; discussed with patient using his prism glasses but he felt it did not make a difference with his gait.  Therefore not tried during the session  Given: Fall prevention and home safety, Mobility training  Program: Reinforced (Patient reports compliance with HEP using Thera-Band exercises and reviewed with PT how he was doing at home.)  How Provided: Verbal, Demonstration  Provided to: Patient  Level of Understanding: Teach back education performed, Verbalized, Demonstrated              Time Calculation:   Start Time: 1245  Stop Time: 1329  Time Calculation (min): 44 min  Total Timed Code Minutes- PT: 44 minute(s)  Timed Charges  20304 - PT  Therapeutic Exercise Minutes: 24  68007 -  PT Neuromuscular Reeducation Minutes: 20  Total Minutes  Timed Charges Total Minutes: 44   Total Minutes: 44   Therapy Charges for Today       Code Description Service Date Service Provider Modifiers Qty    80608812761  PT NEUROMUSC RE EDUCATION EA 15 MIN 8/5/2024 Sangeetha Berumen, PT GP 1    [id number]  PT THER PROC EA 15 MIN 8/5/2024 Sangeetha Berumen, PT GP 2                      Sangeetha Berumen, PT  8/5/2024

## 2024-08-08 ENCOUNTER — HOSPITAL ENCOUNTER (OUTPATIENT)
Dept: PHYSICAL THERAPY | Facility: HOSPITAL | Age: 84
Setting detail: THERAPIES SERIES
Discharge: HOME | End: 2024-08-08
Payer: MEDICARE

## 2024-08-08 DIAGNOSIS — Z91.81 AT HIGH RISK FOR FALLS: ICD-10-CM

## 2024-08-08 DIAGNOSIS — R26.89 BALANCE PROBLEM: ICD-10-CM

## 2024-08-08 DIAGNOSIS — Z86.73 HISTORY OF STROKE: ICD-10-CM

## 2024-08-08 DIAGNOSIS — G62.9 POLYNEUROPATHY: Primary | ICD-10-CM

## 2024-08-08 PROCEDURE — 97110 THERAPEUTIC EXERCISES: CPT

## 2024-08-08 PROCEDURE — 97112 NEUROMUSCULAR REEDUCATION: CPT

## 2024-08-08 NOTE — THERAPY TREATMENT NOTE
Outpatient Physical Therapy Neuro Treatment Note  [facility]     Patient Name: Junito Sorto  : 1940  MRN: [id number]  Today's Date: 2024      Visit Date: 2024    Visit Dx:    ICD-10-CM ICD-9-CM   1. Polyneuropathy  G62.9 356.9   2. Balance problem  R26.89 781.99   3. History of stroke  Z86.73 V12.54   4. At high risk for falls  Z91.81 V15.88       Patient Active Problem List   Diagnosis    Hyperglycemia    Obesity    Allergic rhinitis    Permanent atrial fibrillation    HLD (hyperlipidemia)    Esophageal dysphagia    Hiatal hernia    GERD (gastroesophageal reflux disease)    Bladder outlet obstruction    Sciatica of right side    Pneumonia of right lower lobe due to infectious organism    Acute respiratory failure with hypoxia    PVC (premature ventricular contraction)    Left ventricular dysfunction    Hypercholesterolemia    Acute ischemic left PCA stroke    Lumbar spine pain    Presence of Watchman left atrial appendage closure device    History of stroke    Loss of vision    Vitamin D deficiency    Encounter for screening for malignant neoplasm of colon            PT Neuro       Row Name 24 1530             Subjective    Subjective Comments Willing to try prism glasses today  -LB         Precautions and Contraindications    Precautions/Limitations fall precautions  -LB      Precautions visual field cut on the right  -LB         Subjective Pain    Able to rate subjective pain? yes  -LB      Pre-Treatment Pain Level 0  -LB      Post-Treatment Pain Level 0  -LB         Cognition    Overall Cognitive Status WFL  -LB         Posture/Observations    Posture/Observations Comments Patient ambulated up to therapy with the use of single tip cane. wife by his side  -LB         Transfers    Sit-Stand [place] (Transfers) modified independence  -LB      Stand-Sit [place] (Transfers) modified independence  -LB      Transfers, Sit-Stand-Sit, Assist Device straight cane  -LB          Gait/Stairs (Locomotion)    [place] Level (Gait) modified independence;standby assist  -LB      Assistive Device (Gait) cane, straight;other (see comments)  -LB      Distance in Feet (Gait) 75  60  -LB      Deviations/Abnormal Patterns (Gait) bilateral deviations;base of support, wide;gait speed decreased  -LB      Bilateral Gait Deviations forward flexed posture;heel strike decreased  -LB      Right Sided Gait Deviations Trendelenburg sign;leans right  -LB         Balance Skills Training    Standing-Level of Assistance Contact guard  -LB      Static Standing Balance Support Right upper extremity supported;Left upper extremity supported;No upper extremity supported  Transitioned from UE to no UE support  -LB      Standing-Balance Activities Foam square;Standing on 1/2 roll  -LB      Standing Balance # of Minutes While standing on foam square had patient pt held onto 4# ball moving in multiple directions with CGA  -LB      Gait Balance-Level of Assistance Close supervision;Contact guard  -LB      Gait Balance Support assistive device  -LB      Gait Balance Activities scanning environment R/L;backwards  -LB      Gait Balance # of Minutes 40 ft timess 4  -LB                User Key  (r) = Recorded By, (t) = Taken By, (c) = Cosigned By      Initials Name Provider Type    Laura Tuttle PT Physical Therapist                             PT Assessment/Plan       Row Name 08/08/24 1610          PT Assessment    Assessment Comments The patient did better with ankle and balance reactions while standing on compliant surfaces.  Pt is compliant with HEP at home with improved LE strength, provided blue bands for more resistance.  Pt did well with horizontal head turns while wearing prism glasses, no LOB noted.  -LB               User Key  (r) = Recorded By, (t) = Taken By, (c) = Cosigned By      Initials Name Provider Type    Laura Tuttle PT Physical Therapist                        OP Exercises       Row Name  08/08/24 1612 08/08/24 1600 08/08/24 1530       Subjective    Subjective Comments -- -- Willing to try prism glasses today  -LB       Subjective Pain    Able to rate subjective pain? -- -- yes  -LB    Pre-Treatment Pain Level -- -- 0  -LB    Post-Treatment Pain Level -- -- 0  -LB       Total Minutes    90418 - PT Therapeutic Exercise Minutes 23  -LB -- --    78321 -  PT Neuromuscular Reeducation Minutes 22  -LB -- --       Exercise 2    Exercise Name 2 -- seated ankle PF/DF  -LB --    Reps 2 -- 10  -LB --    Additional Comments -- RTB  -LB --       Exercise 7    Exercise Name 7 -- seated hamstring curls  -LB --    Reps 7 -- 10  -LB --    Additional Comments -- BTB  -LB --              User Key  (r) = Recorded By, (t) = Taken By, (c) = Cosigned By      Initials Name Provider Type    Laura Tuttle PT Physical Therapist                                    Therapy Education  Education Details: Encouraged trial use of prism glasses to see if will help with balance  Given: Mobility training  Program: Reinforced  How Provided: Verbal  Provided to: Patient  Level of Understanding: Verbalized, Teach back education performed              Time Calculation:   Start Time: 1315  Stop Time: 1400  Time Calculation (min): 45 min  Timed Charges  79093 - PT Therapeutic Exercise Minutes: 23  70730 -  PT Neuromuscular Reeducation Minutes: 22  Total Minutes  Timed Charges Total Minutes: 45   Total Minutes: 45   Therapy Charges for Today       Code Description Service Date Service Provider Modifiers Qty    [id number]  PT NEUROMUSC RE EDUCATION EA 15 MIN 8/8/2024 Laura Linares, PT GP 1    [id number]  PT THER PROC EA 15 MIN 8/8/2024 Laura Linares PT GP 2                      Laura Linares PT  8/8/2024

## 2024-08-09 NOTE — PROGRESS NOTES
CC: f/u    HPI:  Junito Sorto is a  82 y.o.  Right-handed male with past medical history of hyperlipidemia, GERD,  left occipital lobe stroke in October 2022.  He has history of atrial fibrillation and had been reticent to be on anticoagulation in the past.  He apparently awoke one morning in October with new onset visual disturbance and was seen by his eye doctor - he was suspected to have a right hemianopsia.      Reviewed prior documentation and made amendments as needed.  Apparently he had been on Eliquis 15 years earlier but stopped it because of excess bruising.  He was in the process of being set up for a watchman's device.  CHARU on 8/30 was negative for thrombus.  He has no significant bleeding history.  MRI showed left PCA infarct involving left occipital lobe stroke appearance consistent with cardioembolic etiology.  He was discharged on eliquis    In March 2023 he had watchman's device.  He is also had follow-up CHARU and he is okay to come off Eliquis.  Back at his last appt he was endorsing some untrue visual information from the right side- seeing trees and people walking.  He has problems with balance which is not new.  We checked neuropathy labs (WOJCIECH, sed, B12/folate, cryoglobulin, SPEP PRISCILA) last time and these were unremarkable    He is not driving nor woodworking.  He went to neuro optometrist and got fitted for prism glasses and now he is able to read.  At times he sees what appears to be someone walking behind him on the right side or what appears to be plants out of his right periphery.  He wants to know if he will improve enough to drive.      At his last appt in May he was describing problems with memory and thinking of words. He is here with wife again today.  They do not think memory has worsened.  He has no issues with executive tasks or iADLs.     He continues with balance trouble and uses a cane.  There is numbness in legs and pain at times.  Sometimes sxs feel worse in R leg.  He is in  "PT.  It was recommended to wear his prism glasses while walking.  He has developed 2 sores on foot, did not know that he injured them.  His sensory symptoms may be worsening faster than expected.  He has L spine MRI and degenerative changes rose at L3/4 with foraminal narrowing onto L L3 nerve root, and at L5 on L5 nerve roots b/l back in 2023.      Additionally, he has lost some 50lbs over 2 yrs after teeth removed.  Gets 5-7 hrs sleep a night, takes daytime naps.  He would describe his sleep as terrible, but mostly for urinary sxs.  Issues with constipation intermittently, uses pharmacologic agent regularly.  Feels dizzy sometimes when bending over to  branches in yard      Social history:     Family history: no family hx dementia or AD      Pain Scale:        ROS:  Review of Systems   Musculoskeletal: Positive for gait problem.   Psychiatric/Behavioral: Positive for confusion and decreased concentration.       Reviewed ROS conducted by MA and wyatt        Physical Exam:   8/20/2024 9:17 AM   /82   Pulse 89   SpO2 96 %   Weight 90.7 kg (200 lb)   Height 170.2 cm (67\")       Body mass index is 33.99 kg/m².        General: pt well appearing, no distress  HEENT: Normocephalic, conjunctiva normal, external canals normal, no nasal discharge, moist mucous membranes  Neck: No lymphadenopathy, thyroid not enlarged, no JVD  CV: Regular rate and rhythm, no murmurs negative no bruits auscultated at neck, equal pulses  Pulmonary: Normal respiratory effort, clear to auscultation bilaterally  Extremities: no edema, bruising, or skin lesions  Pysch: good eye contact, cooperative, full affect, euthymic mood, good attention, good insight  Mental: alert, conversant, AOx3, provides history, some word paucity and nonfluent speech at time but not aphasic language fluent, names, repeats, MOCA 25/30 with deficits in memory and, missing lines in cube draw, hands of clock  CN: Dense right hemianopsia worse of the inferior " quadrant, PERRL, EOMi, no gaze palsy or nystagmus, intact facial sensation, no facial assymetry, intact hearing, symmetric palate elevation, tongue midline, good SCM strength  Motor: no abnormal movements, no pronator drift, normal bulk and tone, 5/5 strength b/l UE & LE  Sensory: Reduced sensation to crude touch and temp in glove and stocking distribution of lower extremities, absent vibratory sense at toes b/l  Reflexes: Trace   coordination: no ataxia on finger-nose  Gait: normal gait, no ataxia, wide based, uses cane, positive Romberg        Results:      Lab Results   Component Value Date    GLUCOSE 93 04/20/2023    BUN 17 04/20/2023    CREATININE 0.71 (L) 04/20/2023    EGFRIFNONA 98 10/11/2021    EGFRIFAFRI 119 10/11/2021    BCR 23.9 04/20/2023    CO2 29.1 (H) 04/20/2023    CALCIUM 9.6 04/20/2023    PROTENTOTREF 6.1 02/16/2023    ALBUMIN 4.1 02/16/2023    LABIL2 2.1 02/16/2023    AST 29 02/16/2023    ALT 23 02/16/2023       Lab Results   Component Value Date    WBC 5.94 04/20/2023    HGB 13.5 04/20/2023    HCT 41.0 04/20/2023    MCV 91.1 04/20/2023     04/20/2023         .No results found for: RPR      Lab Results   Component Value Date    TSH 2.130 03/05/2022         Lab Results   Component Value Date    KMAJVDQA50 484 01/09/2023         Lab Results   Component Value Date    FOLATE >20.00 01/09/2023         Lab Results   Component Value Date    HGBA1C 5.80 (H) 10/20/2022         Lab Results   Component Value Date    GLUCOSE 93 04/20/2023    BUN 17 04/20/2023    CREATININE 0.71 (L) 04/20/2023    EGFRIFNONA 98 10/11/2021    EGFRIFAFRI 119 10/11/2021    BCR 23.9 04/20/2023    K 4.6 04/20/2023    CO2 29.1 (H) 04/20/2023    CALCIUM 9.6 04/20/2023    PROTENTOTREF 6.1 02/16/2023    ALBUMIN 4.1 02/16/2023    LABIL2 2.1 02/16/2023    AST 29 02/16/2023    ALT 23 02/16/2023     B12 and tsh at goal.  Reviewed most recent MRI 9/23 brain with pt and wife, large encephalomalacia of L PCA territory    Diagnosis:  1.   History of left PCA stroke  2.  A. Fib   3.  Polyneuropathy  4   MCI    Impression: 82-year-old male with above-stated medical history  left PCA stroke felt to be cardioembolic in setting of untreated A. fib back in 2022.  He had LAOO and is on ASA and lipitor 40mg.  He has updated visual field testing but was not told of driving restrictions by the eye doctor.  There is also evidence of neuropathy in the labs including B12/folate, sed, WOJCIECH, copper, SPEP and PRISCILA were unremarkable.  For the most part his sensory symptoms are static and I did not pursue EMG.  He does have significant lumbar stenosis possibly contributing.  Let's start with PT, he doesn't seem interested in more w/u or intervention.  His cognitive sxs are static.  MOCA today is 25/30.  There are impairments of memory and visuospatial to be explained by his visual agnosia possibly.  Though elements of his history raise thought of motor apraxias to suggest AD variant.  We discuss this and diagnosis of MCI today.  He scores high risk for sleep apnea, but not interested in eval.      Plan  1 PT referral for lumbar stenosis  2 consider sleep eval  3 cont ASA ,statin for primary prevention, LDL at goal  4 cont vascular risk factor reduction, needs BP monitoring, aerobic exercise, good sleep hygiene, consider cognitive therapy    F/u in 4-6 mos    Control risk factors to prevent stroke which means keep BP at or below 130/80, take your statin if able and try to have LDL measurements < 70, stop smoking if you smoke, control your blood sugar, and get regular moderate exercise four times weekly, and avoid prolonged sitting.  Adopt a healthy diet such as the Mediterranean diet which includes vegetables, fruits, whole grains, low-fat dairy, poultry, fish, legumes, nontropical fish oils, and nuts.  Limit sweets, sugary drinks, and red meats.  Reduce or eliminate alcohol intake.      I spent at least 40 minutes interviewing, examining, and counseling patient.  I  independently reviewed documentation, laboratory and diagnostic findings, external documentation where applicable, and formulated treatment plan which was discussed with the patient.

## 2024-08-13 ENCOUNTER — OFFICE VISIT (OUTPATIENT)
Dept: CARDIOLOGY | Facility: CLINIC | Age: 84
End: 2024-08-13
Payer: MEDICARE

## 2024-08-13 VITALS
BODY MASS INDEX: 31.67 KG/M2 | DIASTOLIC BLOOD PRESSURE: 80 MMHG | HEIGHT: 67 IN | HEART RATE: 58 BPM | SYSTOLIC BLOOD PRESSURE: 138 MMHG | WEIGHT: 201.8 LBS

## 2024-08-13 DIAGNOSIS — Z95.818 PRESENCE OF WATCHMAN LEFT ATRIAL APPENDAGE CLOSURE DEVICE: ICD-10-CM

## 2024-08-13 DIAGNOSIS — I48.21 PERMANENT ATRIAL FIBRILLATION: Primary | ICD-10-CM

## 2024-08-13 DIAGNOSIS — E78.00 HYPERCHOLESTEROLEMIA: ICD-10-CM

## 2024-08-13 PROCEDURE — 1159F MED LIST DOCD IN RCRD: CPT | Performed by: INTERNAL MEDICINE

## 2024-08-13 PROCEDURE — 1160F RVW MEDS BY RX/DR IN RCRD: CPT | Performed by: INTERNAL MEDICINE

## 2024-08-13 PROCEDURE — 93000 ELECTROCARDIOGRAM COMPLETE: CPT | Performed by: INTERNAL MEDICINE

## 2024-08-13 PROCEDURE — 99214 OFFICE O/P EST MOD 30 MIN: CPT | Performed by: INTERNAL MEDICINE

## 2024-08-13 NOTE — PROGRESS NOTES
Subjective:     Encounter Date:08/13/24      Patient ID: Junito Sorto is a 83 y.o. male.    Chief Complaint:  History of Present Illness      Dear Dr. Schulz,    I had the pleasure of seeing this patient in the office today for follow-up of his atrial fibrillation, status post Watchman device.  He has a history of CVA, hypertension, mild cardiomyopathy with ejection ration 45%, as well as esophageal dysphagia status postdilatation and GERD.  He also has polyneuropathy and follows with neurology and physical therapy for this.    He comes in today for routine follow-up.  He was seen by Dr. Ceballos March 4 2024 for follow-up of his Watchman device.  He then had a 1 year transesophageal echocardiogram that showed normal device function.    He denies any chest pain, pressure, tightness, squeezing, or heartburn.  He has not experienced any feeling of palpitations, tachycardia or heart racing and no presyncope or syncope.  There has not been any problems with dizziness or lightheadedness.  Issues with stability but no recent falls.    He had a Watchman device implanted March 22, 2023.     In October 2022, he presented to the hospital with complaints of strokelike symptoms and was diagnosed with an acute CVA in the left PCA territory.  Neurology recommended apixaban and he agreed.  Continue statin therapy.  He was recommended follow-up in our office.    The following portions of the patient's history were reviewed and updated as appropriate: allergies, current medications, past family history, past medical history, past social history, past surgical history and problem list.    Past Medical History:   Diagnosis Date    Allergic     Airborne    Arrhythmia     Arthritis     At risk for obstructive sleep apnea     5    Atrial fibrillation     Bladder outlet obstruction 09/11/2019    BPH (benign prostatic hyperplasia)     Cataract     Replaced    Colon polyp     Coronary artery disease Few years    Afib    Dysphagia      Eczema     GERD (gastroesophageal reflux disease) 2013    Mentioned as possible    Glaucoma Eievated eye pressure    Heart murmur Years ago, some docs never    Hernia     Your office reported hiatal    Hiatal hernia     History of medical problems 2022    Xs pressure in left eye. Now being treated in 2023    HL (hearing loss)     Hyperlipidemia     Hypertension     Left ventricular dysfunction     Low back pain     Obesity     Pneumonia 2022    Prostate cancer     Skin cancer     Stroke 10/19/2022    RESIDUAL: LOSS OF RT PERIPHERAL VISION    Visual impairment 2022 stroke    Reading correction       Past Surgical History:   Procedure Laterality Date    APPENDECTOMY  1950s    ATRIAL APPENDAGE EXCLUSION LEFT WITH TRANSESOPHAGEAL ECHOCARDIOGRAM N/A 03/22/2023    Procedure: Atrial Appendage Occlusion;  Surgeon: Tony Ceballos MD;  Location: [facility] CATH INVASIVE LOCATION;  Service: Cardiovascular;  Laterality: N/A;    CARDIAC SURGERY  2023    Watchman inserted because of continuous afib    CATARACT EXTRACTION, BILATERAL      COLONOSCOPY  unknown    CYSTOSCOPY TRANSURETHRAL RESECTION OF PROSTATE N/A 09/11/2019    Procedure: CYSTOSCOPY TRANSURETHRAL RESECTION OF PROSTATE;  Surgeon: Chip Bedolla MD;  Location: [facility] MAIN OR;  Service: Urology    ENDOSCOPY N/A 10/31/2018    LA Grade C reflux esophagitis, HH, erythematous mucosa in the antrum. Path: Gastritis, esophagitis.     ENDOSCOPY N/A 03/20/2022    Procedure: ESOPHAGOGASTRODUODENOSCOPY WITH BIOPSIES AND NAILS DILATION #56;  Surgeon: Tony Ramos MD;  Location: [facility] ENDOSCOPY;  Service: Gastroenterology;  Laterality: N/A;  PRE OP - DYSPHAGIA  POST OP- MILD GASTRITIS    EPIDURAL BLOCK      ESOPHAGEAL DILATATION      EYE SURGERY      Cataracts    PROSTATE SURGERY  11/06/2014    SKIN CANCER EXCISION      multiple, over that last few years    SKIN CANCER EXCISION Right 09/03/2019    FACE,     SKIN CANCER EXCISION      2023    TONSILLECTOMY      around  "1950    TRANSURETHRAL RESECTION OF BLADDER N/A     UPPER GASTROINTESTINAL ENDOSCOPY  2013    VASECTOMY  1980s           ECG 12 Lead    Date/Time: 8/13/2024 2:43 PM  Performed by: Steve Prater III, MD    Authorized by: Steve Prater III, MD  Comparison: compared with previous ECG   Similar to previous ECG  Rhythm: atrial fibrillation  Ectopy: unifocal PVCs  Rate: normal  Conduction: conduction normal  ST Segments: ST segments normal  T Waves: T waves normal  QRS axis: normal  Other findings: poor R wave progression    Clinical impression: abnormal EKG             Objective:     Vitals:    08/13/24 1424   BP: 140/80   BP Location: Left arm   Patient Position: Sitting   Cuff Size: Adult   Pulse: 58   Weight: 91.5 kg (201 lb 12.8 oz)   Height: 170.2 cm (67\")         Constitutional:       General: Not in acute distress.     Appearance: Well-developed. Not diaphoretic.   Eyes:      General:         Right eye: No discharge.         Left eye: No discharge.      Conjunctiva/sclera: Conjunctivae normal.      Pupils: Pupils are equal, round, and reactive to light.   HENT:      Head: Normocephalic and atraumatic.      Nose: Nose normal.   Neck:      Thyroid: No thyromegaly.      Trachea: No tracheal deviation.      Lymphadenopathy: No cervical adenopathy.   Pulmonary:      Effort: Pulmonary effort is normal. No respiratory distress.      Breath sounds: Normal breath sounds. No stridor.   Chest:      Chest wall: Not tender to palpatation.   Cardiovascular:      Normal rate. Irregularly irregular rhythm.      Murmurs: There is no murmur.      No gallop. No S3 gallop.   Pulses:     Intact distal pulses.   Abdominal:      General: Bowel sounds are normal. There is no distension.      Palpations: Abdomen is soft. There is no abdominal mass.      Tenderness: There is no abdominal tenderness. There is no guarding or rebound.   Musculoskeletal: Normal range of motion.         General: No tenderness or deformity.      Cervical back: " "Normal range of motion and neck supple. Skin:     General: Skin is warm and dry.      Findings: No erythema or rash.   Neurological:      Mental Status: Alert.   Psychiatric:         Thought Content: Thought content normal.         Lab Review:   Lab Results   Component Value Date    GLUCOSE 86 01/10/2024    BUN 10 01/10/2024    CREATININE 0.80 01/10/2024    EGFRIFNONA 98 10/11/2021    EGFRIFAFRI 119 10/11/2021    BCR 12.5 01/10/2024    K 4.0 01/10/2024    CO2 27.5 01/10/2024    CALCIUM 10.3 01/10/2024    PROTENTOTREF 6.1 02/16/2023    ALBUMIN 4.2 01/10/2024    LABIL2 2.1 02/16/2023    AST 30 01/10/2024    ALT 31 01/10/2024     CBC          9/29/2023    19:30 9/30/2023    06:12 1/10/2024    11:22   CBC   WBC 5.94  5.42  5.12    RBC 4.87  4.53  4.94    Hemoglobin 14.7  13.8  15.2    Hematocrit 43.2  40.7  44.8    MCV 88.7  89.8  90.7    MCH 30.2  30.5  30.8    MCHC 34.0  33.9  33.9    RDW 13.6  13.2  12.9    Platelets 176  161  176      Lab Results   Component Value Date    PROBNP 825.0 03/17/2022    PROBNP 808.0 03/03/2022    PROBNP 898.6 06/26/2016     No components found for: \"TROP\"  Lab Results   Component Value Date    DDIMERQUANT 0.65 (H) 03/17/2022         Results for orders placed during the hospital encounter of 03/19/24    Adult Transesophageal Echo (CHARU) W/ Cont if Necessary Per Protocol    Interpretation Summary    There is a Watchman device which is in good position within the left atrial appendage. There is good apposition of the Watchman device with no residual flow. There is no thrombus on the Watchman device    There is akinesis of the inferior base    Left ventricular systolic function is low normal. Estimated left ventricular EF = 50%    Left ventricular wall thickness is consistent with mild concentric hypertrophy    The right ventricular cavity is mildly dilated. Normal right ventricular systolic function noted.    The left atrial cavity is severely dilated    No evidence of a patent foramen " ovale. Saline test results are negative.    The right atrial cavity is moderately dilated.    Trace to mild tricuspid valve regurgitation is present.    Calculated right ventricular systolic pressure from tricuspid regurgitation is 23 mmHg.    The main pulmonary artery is mildly dilated    There is no evidence of pericardial effusion.    CHADS-VASc Risk Assessment              3 Total Score    1 Hypertension    2 Age >/= 75        Criteria that do not apply:    CHF    DM    PRIOR STROKE/TIA/THROMBO    Vascular Disease    Age 65-74    Sex: Female                  Assessment:          Diagnosis Plan   1. Permanent atrial fibrillation  ECG 12 Lead      2. Presence of Watchman left atrial appendage closure device  ECG 12 Lead      3. Hypercholesterolemia  ECG 12 Lead               Plan:       1.  Permanent atrial fibrillation, rate control strategy.  Patient does not have any symptoms of atrial fibrillation or palpitations  2.  CVA as outlined above, neurology following  3.  History of left atrial appendage occlusion device.  Just had CHARU performed by Dr Ceballos    Thank you very much for allowing us to participate in the care of this pleasant patient.  Please don't hesitate to call if I can be of assistance in any way.      Current Outpatient Medications:     aspirin 81 MG EC tablet, Take 1 tablet by mouth Daily., Disp: , Rfl:     atorvastatin (LIPITOR) 40 MG tablet, Take 1 tablet by mouth Daily., Disp: 90 tablet, Rfl: 1    betamethasone dipropionate 0.05 % cream, As Needed., Disp: , Rfl:     dorzolamide-timolol (COSOPT) 2-0.5 % ophthalmic solution, , Disp: , Rfl:     famotidine (PEPCID) 40 MG tablet, TAKE 1 TABLET BY MOUTH DAILY, Disp: 90 tablet, Rfl: 3    metoprolol succinate XL (TOPROL-XL) 25 MG 24 hr tablet, TAKE 1 TABLET BY MOUTH DAILY, Disp: 90 tablet, Rfl: 1    multivitamin with minerals tablet tablet, Take 1 tablet by mouth Daily., Disp: , Rfl:     prednisoLONE acetate (PRED FORTE) 1 % ophthalmic suspension, ,  Disp: , Rfl:          No follow-ups on file.

## 2024-08-19 ENCOUNTER — HOSPITAL ENCOUNTER (OUTPATIENT)
Dept: PHYSICAL THERAPY | Facility: HOSPITAL | Age: 84
Setting detail: THERAPIES SERIES
Discharge: HOME | End: 2024-08-19
Payer: MEDICARE

## 2024-08-19 DIAGNOSIS — Z86.73 HISTORY OF STROKE: ICD-10-CM

## 2024-08-19 DIAGNOSIS — Z91.81 AT HIGH RISK FOR FALLS: ICD-10-CM

## 2024-08-19 DIAGNOSIS — G62.9 POLYNEUROPATHY: Primary | ICD-10-CM

## 2024-08-19 DIAGNOSIS — R26.89 BALANCE PROBLEM: ICD-10-CM

## 2024-08-19 PROCEDURE — 97112 NEUROMUSCULAR REEDUCATION: CPT

## 2024-08-19 PROCEDURE — 97110 THERAPEUTIC EXERCISES: CPT

## 2024-08-19 NOTE — THERAPY PROGRESS REPORT/RE-CERT
Outpatient Physical Therapy Neuro Progress Note  [facility]     Patient Name: Junito Sorto  : 1940  MRN: [id number]  Today's Date: 2024      Visit Date: 2024    Visit Dx:    ICD-10-CM ICD-9-CM   1. Polyneuropathy  G62.9 356.9   2. Balance problem  R26.89 781.99   3. History of stroke  Z86.73 V12.54   4. At high risk for falls  Z91.81 V15.88       Patient Active Problem List   Diagnosis    Hyperglycemia    Obesity    Allergic rhinitis    Permanent atrial fibrillation    HLD (hyperlipidemia)    Esophageal dysphagia    Hiatal hernia    GERD (gastroesophageal reflux disease)    Bladder outlet obstruction    Sciatica of right side    Pneumonia of right lower lobe due to infectious organism    Acute respiratory failure with hypoxia    PVC (premature ventricular contraction)    Left ventricular dysfunction    Hypercholesterolemia    Acute ischemic left PCA stroke    Lumbar spine pain    Presence of Watchman left atrial appendage closure device    History of stroke    Loss of vision    Vitamin D deficiency    Encounter for screening for malignant neoplasm of colon            PT Neuro       Row Name 24 1250             Subjective    Subjective Comments I did a lot of work yesterday and after squatting for a while I couldnt get back up  -LB         Precautions and Contraindications    Precautions/Limitations fall precautions  -LB      Precautions visual field cut on the right  -LB         Subjective Pain    Able to rate subjective pain? yes  -LB         Cognition    Overall Cognitive Status WFL  -LB      Arousal/Alertness Appropriate responses to stimuli  -LB      Memory Appears intact  -LB      Orientation Level Oriented X4  -LB      Safety Judgment Good awareness of safety precautions  -LB      Deficits Fully aware of deficits  -LB         Posture/Observations    Posture/Observations Comments Patient ambulated up to therapy with the use of single tip cane. wife by his side  -LB          Transfers    Sit-Stand Erskine (Transfers) modified independence  -LB      Stand-Sit Erskine (Transfers) modified independence  -LB      Transfers, Sit-Stand-Sit, Assist Device straight cane  -LB      Comment, (Transfers) wide base of support  -LB         Gait/Stairs (Locomotion)    Erskine Level (Gait) modified independence;standby assist  -LB      Assistive Device (Gait) cane, straight;other (see comments)  -LB      Distance in Feet (Gait) 60  50; 75  -LB      Pattern (Gait) step-through  -LB      Deviations/Abnormal Patterns (Gait) bilateral deviations;base of support, wide;gait speed decreased  -LB      Bilateral Gait Deviations forward flexed posture;heel strike decreased  -LB      Right Sided Gait Deviations Trendelenburg sign;leans right  -LB      Erskine Level (Stairs) stand by assist;contact guard  -LB      Assistive Device (Stairs) cane, straight  -LB      Handrail Location (Stairs) left side (ascending);left side (descending)  -LB      Number of Steps (Stairs) 4  -LB      Descending Technique (Stairs) step-over-step  -LB      Stairs, Safety Issues sequencing ability decreased  -LB      Stairs, Impairments impaired vision;impaired balance  -LB         Curb Negotiation (Mobility)    Erskine, Curb Negotiation verbal cues;contact guard;standby assist  -LB      Assistive Device (Curb Negotiation) cane, straight  -LB         Balance Skills Training    Standing-Level of Assistance Close supervision  -LB      Static Standing Balance Support No upper extremity supported  -LB      Standing-Balance Activities Foam square;Reaching across midline  -LB      Standing Balance # of Minutes while on foam square, pt reaching across midline  -LB      Gait Balance-Level of Assistance Contact guard;Close supervision  -LB      Gait Balance Support No upper extremity supported  -LB      Gait Balance Activities stepping over object;scanning environment R/L;backwards  -LB      SLS 1-2 secs  -LB       Rhomberg 30 secs with patient looking down at the floor  -LB      Balance Comments see FGA, Tinetti and COY  -LB                User Key  (r) = Recorded By, (t) = Taken By, (c) = Cosigned By      Initials Name Provider Type    Laura Tuttle PT Physical Therapist                             PT Assessment/Plan       Row Name 08/19/24 1448          PT Assessment    Assessment Comments The patient shown improvements over the last month with both strength and balance.  Patient was able to meet his short-term goals with improvements noted on both the Coy and Tinetti.  Completed the FGA which was difficult for patient particularly heel toe walking, patient was unable to maintain balance for 1 step.  Several sessions have used his prism glasses and discussed how it might assist but patient still reluctant to use them and feels that they do not offer him any assistance when it comes to balance.  Patient is compliant with home exercise program at home for strengthening.  Patient is also safer particularly with stand to sit activities lining up with the chair and controlling the descent.  Patient and spouse gave 2 different versions of his knees giving out yesterday.  Due to the progress but ongoing balance issues feel that patient is benefiting from ongoing therapy at this time to help decrease the risk for falls.  -LB               User Key  (r) = Recorded By, (t) = Taken By, (c) = Cosigned By      Initials Name Provider Type    Laura Tuttle PT Physical Therapist                        OP Exercises       Row Name 08/19/24 1450 08/19/24 1400 08/19/24 1250       Subjective    Subjective Comments -- -- I did a lot of work yesterday and after squatting for a while I couldnt get back up  -LB       Subjective Pain    Able to rate subjective pain? -- -- yes  -LB       Total Minutes    95018 - PT Therapeutic Exercise Minutes 13  -LB -- --    47818 -  PT Neuromuscular Reeducation Minutes 30  -LB -- --       Exercise 8     Exercise Name 8 -- elbow flex/ext; and chest press  -LB --    Reps 8 -- 15  -LB --    Additional Comments -- 4# bar  -LB --              User Key  (r) = Recorded By, (t) = Taken By, (c) = Cosigned By      Initials Name Provider Type    Laura Tuttle, PT Physical Therapist                                 PT OP Goals       Row Name 08/19/24 1300          PT Short Term Goals    STG 1 Pt will be independent with initial HEP to improve strength and balance.  -LB     STG 1 Progress Met  -LB     STG 2 Patient will transfer to sit turning fully around prior to sit down.  -LB     STG 2 Progress Met  -LB     STG 3 Patient to perform Romberg for 1 minute independently.  -LB     STG 3 Progress Progressing  -LB     STG 3 Progress Comments 30 secs  -LB     STG 4 Patient to score 38/56 on the Pugh balance test indicate improved balance and decrease risk for falls.  -LB     STG 4 Progress Met  -LB     STG 5 evaluate balance using FGA.  -LB     STG 5 Progress Met  -LB        Long Term Goals    LTG Date to Achieve 09/16/24  -LB     LTG 1 Patient will be independent with advanced HEP to improve strength and balance.  -LB     LTG 1 Progress Ongoing  -LB     LTG 2 Patient to score 42/56 on the Pugh balance test indicate improved balance and decrease risk for falls.  -LB     LTG 2 Progress Progressing  -LB     LTG 3 Patient to score 23/28 on the Tinetti balance test for improved balance and reduced risk for falls.  -LB     LTG 3 Progress Progressing  -LB     LTG 4 Patient will ambulate in the community 200 feet with cane, SBA.  -LB     LTG 4 Progress Progressing  -LB     LTG 5 Pt to complete TUG in < or equal to 12 seconds for improved balance with gait related task.  -LB     LTG 5 Progress Ongoing  -LB     LTG 6 Patient to perform single-leg stance 2-3 seconds each LE.  -LB     LTG 6 Progress Ongoing  -LB     LTG 6 Progress Comments 1-2 secs today  -LB        Time Calculation    PT Goal Re-Cert Due Date 09/16/24  -LB                User Key  (r) = Recorded By, (t) = Taken By, (c) = Cosigned By      Initials Name Provider Type    Laura Tuttle, PT Physical Therapist                    Therapy Education  Education Details: Recommend ongoing therapy to continue to improve balance  Given: Other (comment)  Program: Reinforced  How Provided: Verbal  Provided to: Patient  Level of Understanding: Verbalized    Outcome Measure Options: FGA (Functional Gait Assessment)  Pugh Balance Scale  Sitting to Standing: able to stand without using hands and stabilize independently  Standing Unsupported: able to stand safely for 2 minutes  Sitting with Back Unsupported but Feet Supported on Floor or on Stool: able to sit safely and securely for 2 minutes  Standing to Sitting: sits safely with minimal use of hands  Transfers: able to transfer safely with minor use of hands  Standing Unsupported with Eyes Closed: able to stand 10 seconds with supervision  Standing Unsupported with Feet Together: able to place feet together independently and stand 1 minute with supervision  Reaching Forward with Outstretched Arm While Standing: can reach forward 12 cm (5 inches)   Object From the Floor From a Standing Position: able to  object but needs supervision  Turning to Look Behind Over Left and Right Shoulders While Standing: turns sideways only but maintains balance  Turn 360 Degrees: able to turn 360 degrees safely but slowly  Place Alternate Foot on Step or Stool While Standing Unsupported: able to complete > 2 steps needs minimal assist  Standing Unsupported with One Foot in Front: able to take small step independently and hold 30 seconds  Standing on One Leg: tries to lift leg unable to hold 3 seconds but remains standing independently  Pugh Total Score: 40  Functional Gait Assessment (FGA)  Gait Level Surface: Mild Impairment  Change in Gait Speed: Mild Impairment  Gait with Horizontal Head Turns: Mild Impairment  Gait with Vertical Head Turns: Mild  "Impairment  Gait and Pivot Turn: Normal  Step Over Obstacle: Mild Impairment  Gait with Narrow Base of Support: Severe Impairment  Gait with Eyes Closed: Mild Impairment  Ambulating Backwards: Mild Impairment  Steps: Mild Impairment  FGA Total Score: 19  Tinetti Assessment  Sitting Balance: Steady,safe  Arises: Able in 1 attempt  Attempts to Rise: Able in 1 attempt  Immediate Standing Balance (first 5 sec): Steady without support  Standing Balance: Narrow stance, without support  Sternal Nudge (feet close together): Steady  Eyes Closed (feet close together): Unsteady  Turning 360 Degrees- Steps: Continuous steps  Turning 360 Degrees- Steadiness: Steady  Sitting Down: Safe, smooth motion  Tinetti Balance Score: 15  Gait Initiation (immediate after told \"go\"): No hesitancy  Step Length- Right Swing: Right swing foot passes Left stance leg  Step Length- Left Swing: Left swing foot passes Right  Foot Clearance- Right Foot: Right foot completely clears floor  Foot Clearance- Left Foot: Left foot completely clears floor  Step Symmetry: Right and Left step length equal  Step Continuity: Steps appear continuous  Path (excursion): Mild/moderate deviation or use of aid  Trunk: No sway but knee or trunk flexion or spread arms while walking  Base of Support: Heels apart  Gait Score: 9  Tinetti Total Score: 24      Time Calculation:   Start Time: 1250  Stop Time: 1333  Time Calculation (min): 43 min  Timed Charges  68532 - PT Therapeutic Exercise Minutes: 13  37315 -  PT Neuromuscular Reeducation Minutes: 30  Total Minutes  Timed Charges Total Minutes: 43   Total Minutes: 43   Therapy Charges for Today       Code Description Service Date Service Provider Modifiers Qty    45886364366 HC PT NEUROMUSC RE EDUCATION EA 15 MIN 8/19/2024 Laura Linares, PT GP 2    86391089440 HC PT THER PROC EA 15 MIN 8/19/2024 Laura Linares, PT GP 1            PT G-Codes  Outcome Measure Options: FGA (Functional Gait Assessment)  Pugh Total Score: " 40  FGA Total Score: 19  Tinetti Total Score: 24         Laura Linares, PT  8/19/2024

## 2024-08-20 ENCOUNTER — LAB (OUTPATIENT)
Dept: LAB | Facility: HOSPITAL | Age: 84
End: 2024-08-20
Payer: MEDICARE

## 2024-08-20 ENCOUNTER — OFFICE VISIT (OUTPATIENT)
Dept: NEUROLOGY | Facility: CLINIC | Age: 84
End: 2024-08-20
Payer: MEDICARE

## 2024-08-20 VITALS
OXYGEN SATURATION: 96 % | SYSTOLIC BLOOD PRESSURE: 120 MMHG | BODY MASS INDEX: 31.39 KG/M2 | WEIGHT: 200 LBS | HEIGHT: 67 IN | HEART RATE: 89 BPM | DIASTOLIC BLOOD PRESSURE: 82 MMHG

## 2024-08-20 DIAGNOSIS — Z86.73 HISTORY OF STROKE: Primary | ICD-10-CM

## 2024-08-20 DIAGNOSIS — R41.3 MEMORY LOSS: ICD-10-CM

## 2024-08-20 DIAGNOSIS — M54.50 LUMBAR SPINE PAIN: ICD-10-CM

## 2024-08-20 DIAGNOSIS — Z95.818 PRESENCE OF WATCHMAN LEFT ATRIAL APPENDAGE CLOSURE DEVICE: ICD-10-CM

## 2024-08-20 LAB
25(OH)D3 SERPL-MCNC: 32.3 NG/ML (ref 30–100)
ALBUMIN SERPL-MCNC: 4.1 G/DL (ref 3.5–5.2)
ALBUMIN/GLOB SERPL: 1.8 G/DL
ALP SERPL-CCNC: 87 U/L (ref 39–117)
ALT SERPL W P-5'-P-CCNC: 24 U/L (ref 1–41)
ANION GAP SERPL CALCULATED.3IONS-SCNC: 8 MMOL/L (ref 5–15)
AST SERPL-CCNC: 36 U/L (ref 1–40)
BASOPHILS # BLD AUTO: 0.04 10*3/MM3 (ref 0–0.2)
BASOPHILS NFR BLD AUTO: 0.8 % (ref 0–1.5)
BILIRUB SERPL-MCNC: 1.6 MG/DL (ref 0–1.2)
BUN SERPL-MCNC: 14 MG/DL (ref 8–23)
BUN/CREAT SERPL: 19.7 (ref 7–25)
CALCIUM SPEC-SCNC: 9.8 MG/DL (ref 8.6–10.5)
CHLORIDE SERPL-SCNC: 106 MMOL/L (ref 98–107)
CHOLEST SERPL-MCNC: 122 MG/DL (ref 0–200)
CO2 SERPL-SCNC: 28 MMOL/L (ref 22–29)
CREAT SERPL-MCNC: 0.71 MG/DL (ref 0.76–1.27)
DEPRECATED RDW RBC AUTO: 43.9 FL (ref 37–54)
EGFRCR SERPLBLD CKD-EPI 2021: 91 ML/MIN/1.73
EOSINOPHIL # BLD AUTO: 0.38 10*3/MM3 (ref 0–0.4)
EOSINOPHIL NFR BLD AUTO: 7.7 % (ref 0.3–6.2)
ERYTHROCYTE [DISTWIDTH] IN BLOOD BY AUTOMATED COUNT: 12.9 % (ref 12.3–15.4)
GLOBULIN UR ELPH-MCNC: 2.3 GM/DL
GLUCOSE SERPL-MCNC: 92 MG/DL (ref 65–99)
HBA1C MFR BLD: 5.6 % (ref 4.8–5.6)
HCT VFR BLD AUTO: 44.6 % (ref 37.5–51)
HDLC SERPL-MCNC: 63 MG/DL (ref 40–60)
HGB BLD-MCNC: 14.6 G/DL (ref 13–17.7)
IMM GRANULOCYTES # BLD AUTO: 0.01 10*3/MM3 (ref 0–0.05)
IMM GRANULOCYTES NFR BLD AUTO: 0.2 % (ref 0–0.5)
LDLC SERPL CALC-MCNC: 49 MG/DL (ref 0–100)
LDLC/HDLC SERPL: 0.82 {RATIO}
LYMPHOCYTES # BLD AUTO: 1.22 10*3/MM3 (ref 0.7–3.1)
LYMPHOCYTES NFR BLD AUTO: 24.6 % (ref 19.6–45.3)
MCH RBC QN AUTO: 30.5 PG (ref 26.6–33)
MCHC RBC AUTO-ENTMCNC: 32.7 G/DL (ref 31.5–35.7)
MCV RBC AUTO: 93.3 FL (ref 79–97)
MONOCYTES # BLD AUTO: 0.43 10*3/MM3 (ref 0.1–0.9)
MONOCYTES NFR BLD AUTO: 8.7 % (ref 5–12)
NEUTROPHILS NFR BLD AUTO: 2.87 10*3/MM3 (ref 1.7–7)
NEUTROPHILS NFR BLD AUTO: 58 % (ref 42.7–76)
NRBC BLD AUTO-RTO: 0 /100 WBC (ref 0–0.2)
PLATELET # BLD AUTO: 166 10*3/MM3 (ref 140–450)
PMV BLD AUTO: 10.5 FL (ref 6–12)
POTASSIUM SERPL-SCNC: 3.7 MMOL/L (ref 3.5–5.2)
PROT SERPL-MCNC: 6.4 G/DL (ref 6–8.5)
RBC # BLD AUTO: 4.78 10*6/MM3 (ref 4.14–5.8)
SODIUM SERPL-SCNC: 142 MMOL/L (ref 136–145)
TRIGL SERPL-MCNC: 38 MG/DL (ref 0–150)
VLDLC SERPL-MCNC: 10 MG/DL (ref 5–40)
WBC NRBC COR # BLD AUTO: 4.95 10*3/MM3 (ref 3.4–10.8)

## 2024-08-20 PROCEDURE — 82306 VITAMIN D 25 HYDROXY: CPT | Performed by: INTERNAL MEDICINE

## 2024-08-20 PROCEDURE — 85025 COMPLETE CBC W/AUTO DIFF WBC: CPT | Performed by: INTERNAL MEDICINE

## 2024-08-20 PROCEDURE — 80061 LIPID PANEL: CPT | Performed by: INTERNAL MEDICINE

## 2024-08-20 PROCEDURE — 83036 HEMOGLOBIN GLYCOSYLATED A1C: CPT | Performed by: INTERNAL MEDICINE

## 2024-08-20 PROCEDURE — 80053 COMPREHEN METABOLIC PANEL: CPT | Performed by: INTERNAL MEDICINE

## 2024-08-20 NOTE — LETTER
August 20, 2024       No Recipients    Patient: Junito Sorto   YOB: 1940   Date of Visit: 8/20/2024     Dear Fan Schulz MD:       Thank you for referring Junito Sorto to me for evaluation. Below are the relevant portions of my assessment and plan of care.    If you have questions, please do not hesitate to call me. I look forward to following Junito along with you.         Sincerely,        SYLVESTER Sebastian        CC:   No Recipients    Aileen Holman PA  08/20/24 1416  Sign when Signing Visit  CC: f/u    HPI:  Junito Sorto is a  82 y.o.  Right-handed male with past medical history of hyperlipidemia, GERD,  left occipital lobe stroke in October 2022.  He has history of atrial fibrillation and had been reticent to be on anticoagulation in the past.  He apparently awoke one morning in October with new onset visual disturbance and was seen by his eye doctor - he was suspected to have a right hemianopsia.      Reviewed prior documentation and made amendments as needed.  Apparently he had been on Eliquis 15 years earlier but stopped it because of excess bruising.  He was in the process of being set up for a watchman's device.  CHARU on 8/30 was negative for thrombus.  He has no significant bleeding history.  MRI showed left PCA infarct involving left occipital lobe stroke appearance consistent with cardioembolic etiology.  He was discharged on eliquis    In March 2023 he had watchman's device.  He is also had follow-up CHARU and he is okay to come off Eliquis.  Back at his last appt he was endorsing some untrue visual information from the right side- seeing trees and people walking.  He has problems with balance which is not new.  We checked neuropathy labs (WOJCIECH, sed, B12/folate, cryoglobulin, SPEP PRISCILA) last time and these were unremarkable    He is not driving nor woodworking.  He went to neuro optometrist and got fitted for prism glasses and now he is able to read.  At times he sees  "what appears to be someone walking behind him on the right side or what appears to be plants out of his right periphery.  He wants to know if he will improve enough to drive.      At his last appt in May he was describing problems with memory and thinking of words. He has no issues with executive tasks or iADLs.   He continues with balance trouble and uses a cane.  There is numbness in legs and pain at times.  He is in PT.  It was recommended to wear his prism glasses while walking.  He is here with wife again today.  They do not think memory has worsened.  He has developed 2 sores on foot, did not know that he injured them.  His sensory symptoms may be worsening faster than expected.  He has L spine MRI and degenerative changes rose at L3/4 with foraminal narrowing onto L L3 nerve root, and at L5 on L5 nerve roots b/l back in 2023.      Social history:     Family history: no family hx dementia or AD      Pain Scale:        ROS:  Review of Systems   Musculoskeletal: Positive for gait problem.   Psychiatric/Behavioral: Positive for confusion and decreased concentration.       Reviewed ROS conducted by MA and wytat        Physical Exam:   8/20/2024 9:17 AM   /82   Pulse 89   SpO2 96 %   Weight 90.7 kg (200 lb)   Height 170.2 cm (67\")       Body mass index is 33.99 kg/m².        General: pt well appearing, no distress  HEENT: Normocephalic, conjunctiva normal, external canals normal, no nasal discharge, moist mucous membranes  Neck: No lymphadenopathy, thyroid not enlarged, no JVD  CV: Regular rate and rhythm, no murmurs negative no bruits auscultated at neck, equal pulses  Pulmonary: Normal respiratory effort, clear to auscultation bilaterally  Extremities: no edema, bruising, or skin lesions  Pysch: good eye contact, cooperative, full affect, euthymic mood, good attention, good insight  Mental: alert, conversant, AOx3, provides history, some word paucity and nonfluent speech at time but not aphasic language " fluent, names, repeats, MOCA 25/30 with deficits in memory and, missing lines in cube draw, hands of clock  CN: Dense right hemianopsia worse of the inferior quadrant, PERRL, EOMi, no gaze palsy or nystagmus, intact facial sensation, no facial assymetry, intact hearing, symmetric palate elevation, tongue midline, good SCM strength  Motor: no abnormal movements, no pronator drift, normal bulk and tone, 5/5 strength b/l UE & LE  Sensory: Reduced sensation to crude touch and temp in glove and stocking distribution of lower extremities, absent vibratory sense at toes b/l  Reflexes: Trace   coordination: no ataxia on finger-nose  Gait: normal gait, no ataxia, wide based, uses cane, positive Romberg        Results:      Lab Results   Component Value Date    GLUCOSE 93 04/20/2023    BUN 17 04/20/2023    CREATININE 0.71 (L) 04/20/2023    EGFRIFNONA 98 10/11/2021    EGFRIFAFRI 119 10/11/2021    BCR 23.9 04/20/2023    CO2 29.1 (H) 04/20/2023    CALCIUM 9.6 04/20/2023    PROTENTOTREF 6.1 02/16/2023    ALBUMIN 4.1 02/16/2023    LABIL2 2.1 02/16/2023    AST 29 02/16/2023    ALT 23 02/16/2023       Lab Results   Component Value Date    WBC 5.94 04/20/2023    HGB 13.5 04/20/2023    HCT 41.0 04/20/2023    MCV 91.1 04/20/2023     04/20/2023         .No results found for: RPR      Lab Results   Component Value Date    TSH 2.130 03/05/2022         Lab Results   Component Value Date    [id number] 484 01/09/2023         Lab Results   Component Value Date    FOLATE >20.00 01/09/2023         Lab Results   Component Value Date    HGBA1C 5.80 (H) 10/20/2022         Lab Results   Component Value Date    GLUCOSE 93 04/20/2023    BUN 17 04/20/2023    CREATININE 0.71 (L) 04/20/2023    EGFRIFNONA 98 10/11/2021    EGFRIFAFRI 119 10/11/2021    BCR 23.9 04/20/2023    K 4.6 04/20/2023    CO2 29.1 (H) 04/20/2023    CALCIUM 9.6 04/20/2023    PROTENTOTREF 6.1 02/16/2023    ALBUMIN 4.1 02/16/2023    LABIL2 2.1 02/16/2023    AST 29 02/16/2023     ALT 23 02/16/2023     B12 and tsh at goal    Diagnosis:  1.  History of left PCA stroke  2.  A. Fib   3.  Polyneuropathy  4   MCI    Impression: 82-year-old male with above-stated medical history  left PCA stroke felt to be cardioembolic in setting of untreated A. fib back in 2022.  He had LAOO and is on ASA and lipitor 40mg.  He has updated visual field testing but was not told of driving restrictions by the eye doctor.  There is also evidence of neuropathy in the labs including B12/folate, sed, WOJCIECH, copper, SPEP and PRISCILA were unremarkable.  For the most part his sensory symptoms are static and I did not pursue EMG.  He does have significant lumbar stenosis possibly contributing.  Let's start with PT, he doesn't seem interested in more w/u or intervention.  His cognitive sxs are static.  MOCA today is 25/30.  There are impairments of memory and visuospatial to be explained by his visual agnosia possibly.  Though elements of his history raise thought of motor apraxias to suggest AD variant.  We discuss this and diagnosis of MCI today.  He scores high risk for sleep apnea, but not interested in eval.      Plan  1 PT referral for lumbar stenosis  2 consider sleep eval  3 cont ASA ,statin for primary prevention, LDL at goal  4 cont vascular risk factor reduction, needs BP monitoring, aerobic exercise, good sleep hygiene, consider cognitive therapy    F/u in 4-6 mos    Control risk factors to prevent stroke which means keep BP at or below 130/80, take your statin if able and try to have LDL measurements < 70, stop smoking if you smoke, control your blood sugar, and get regular moderate exercise four times weekly, and avoid prolonged sitting.  Adopt a healthy diet such as the Mediterranean diet which includes vegetables, fruits, whole grains, low-fat dairy, poultry, fish, legumes, nontropical fish oils, and nuts.  Limit sweets, sugary drinks, and red meats.  Reduce or eliminate alcohol intake.      I spent at least 40  minutes interviewing, examining, and counseling patient.  I independently reviewed documentation, laboratory and diagnostic findings, external documentation where applicable, and formulated treatment plan which was discussed with the patient.

## 2024-08-26 ENCOUNTER — HOSPITAL ENCOUNTER (OUTPATIENT)
Dept: PHYSICAL THERAPY | Facility: HOSPITAL | Age: 84
Discharge: HOME | End: 2024-08-26
Admitting: PHYSICIAN ASSISTANT
Payer: MEDICARE

## 2024-08-26 DIAGNOSIS — G62.9 POLYNEUROPATHY: Primary | ICD-10-CM

## 2024-08-26 DIAGNOSIS — R26.89 BALANCE PROBLEM: ICD-10-CM

## 2024-08-26 DIAGNOSIS — Z91.81 AT HIGH RISK FOR FALLS: ICD-10-CM

## 2024-08-26 DIAGNOSIS — Z86.73 HISTORY OF STROKE: ICD-10-CM

## 2024-08-26 PROCEDURE — 97112 NEUROMUSCULAR REEDUCATION: CPT

## 2024-08-26 PROCEDURE — 97110 THERAPEUTIC EXERCISES: CPT

## 2024-08-26 NOTE — THERAPY TREATMENT NOTE
Outpatient Physical Therapy Neuro Treatment Note  [facility]     Patient Name: Junito Sorto  : 1940  MRN: [id number]  Today's Date: 2024      Visit Date: 2024    Visit Dx:    ICD-10-CM ICD-9-CM   1. Polyneuropathy  G62.9 356.9   2. Balance problem  R26.89 781.99   3. History of stroke  Z86.73 V12.54   4. At high risk for falls  Z91.81 V15.88       Patient Active Problem List   Diagnosis    Hyperglycemia    Obesity    Allergic rhinitis    Permanent atrial fibrillation    HLD (hyperlipidemia)    Esophageal dysphagia    Hiatal hernia    GERD (gastroesophageal reflux disease)    Bladder outlet obstruction    Sciatica of right side    Pneumonia of right lower lobe due to infectious organism    Acute respiratory failure with hypoxia    PVC (premature ventricular contraction)    Left ventricular dysfunction    Hypercholesterolemia    Acute ischemic left PCA stroke    Lumbar spine pain    Presence of Watchman left atrial appendage closure device    History of stroke    Loss of vision    Vitamin D deficiency    Encounter for screening for malignant neoplasm of colon            PT Neuro       Row Name 24 1018             Subjective    Subjective Comments Out in yard picking up sticks, keeps a fork to help with balance. Compliant with HEP. Using cane in the community and home  -LEEANNE         Precautions and Contraindications    Precautions/Limitations fall precautions  -LEEANNE      Precautions visual field cut on the right  -LEEANNE         Subjective Pain    Able to rate subjective pain? yes  -LEEANNE      Pre-Treatment Pain Level 0  -LEEANNE      Post-Treatment Pain Level 0  -LEEANNE      Subjective Pain Comment chronic LBP in supine only  -LEEANNE         Vision-Basic Assessment    Visual History --  doesn't think prism glasses helped so did not wear them today  -LEEANNE         Cognition    Overall Cognitive Status WFL  -LEEANNE         Posture/Observations    Posture/Observations Comments Patient ambulated up to therapy with the  "use of single tip cane. wife by his side  -LEEANNE         Bed Mobility    All Activities, [place] (Bed Mobility) modified independence;verbal cues  -LEEANNE         Transfers    Sit-Stand [place] (Transfers) modified independence  -LEEANNE      Stand-Sit [place] (Transfers) modified independence  -LEEANNE         Gait/Stairs (Locomotion)    [place] Level (Gait) modified independence;standby assist  -LEEANNE      Assistive Device (Gait) cane, straight;other (see comments)  -LEEANNE      Distance in Feet (Gait) --  80' x 3  -LEEANNE      Deviations/Abnormal Patterns (Gait) bilateral deviations;base of support, wide;gait speed decreased  -LEEANNE      Bilateral Gait Deviations forward flexed posture;heel strike decreased  -LEEANNE      Right Sided Gait Deviations Trendelenburg sign;leans right  -LEEANNE         Balance Skills Training    Standing-Level of Assistance Close supervision  -LEEANNE      Static Standing Balance Support No upper extremity supported  near table for support  -LEEANNE      Standing-Balance Activities --  touching colored dots on floor forward, lateral and backward each LE - more difficult with R LE stance  -LEEANNE      Gait Balance-Level of Assistance Contact guard;Close supervision  -LEEANNE      Gait Balance Support assistive device  -LEEANNE      Gait Balance Activities hurdles  -LEEANNE      SLS 1-2 seconds ech LE  -LEEANNE                User Key  (r) = Recorded By, (t) = Taken By, (c) = Cosigned By      Initials Name Provider Type    LEEANNE Sangeetha Berumen, PT Physical Therapist                             PT Assessment/Plan       Row Name 08/26/24 1987          PT Assessment    Assessment Comments Patient continues to be reluctant to use his prism glasses for therapy.  Patient reported compliance with HEP and that the green Thera-Band was\" easy\".  Therefore PT provided patient with blue Thera-Band today and additional HEP to focus on hip abductor strength.  -LEEANNE               User Key  (r) = Recorded By, (t) = Taken By, (c) = Cosigned By      Initials " Name Provider Type    Sangeetha Padilla, PT Physical Therapist                        OP Exercises       Row Name 08/26/24 1230 08/26/24 1018          Subjective    Subjective Comments -- Out in yard picking up sticks, keeps a fork to help with balance. Compliant with HEP. Using cane in the community and home  -LEEANNE        Subjective Pain    Able to rate subjective pain? -- yes  -LEEANNE     Pre-Treatment Pain Level -- 0  -LEEANNE     Post-Treatment Pain Level -- 0  -LEEANNE     Subjective Pain Comment -- chronic LBP in supine only  -LEEANNE        Total Minutes    92543 - PT Therapeutic Exercise Minutes 26  -LEEANNE --     44785 -  PT Neuromuscular Reeducation Minutes 17  -LEEANNE --        Exercise 1    Exercise Name 1 -- seated hip flex/abd using BTB  -LEEANNE     Cueing 1 -- Verbal  -LEEANNE     Sets 1 -- 2  -LEEANNE     Reps 1 -- 10  -LEEANNE        Exercise 9    Exercise Name 9 -- Sidelying: clamshells  -LEEANNE     Cueing 9 -- Verbal;Tactile  -LEEANNE     Reps 9 -- 10 each  -LEEANNE        Exercise 10    Exercise Name 10 -- NuStep level 3, all extremities  -LEEANNE     Cueing 10 -- Verbal;Tactile  -LEEANNE     Time 10 -- 5 Minutes  -LEEANNE        Exercise 11    Exercise Name 11 -- Standing with 1 hand on table for support: Move or squat back-and-forth with 1 foot to work strength of hip abductor on stance leg.  -LEEANNE     Cueing 11 -- Verbal;Tactile;Demo  -LEEANNE     Reps 11 -- 10 each  -LEEANNE               User Key  (r) = Recorded By, (t) = Taken By, (c) = Cosigned By      Initials Name Provider Type    Sangeetha Padilla PT Physical Therapist                                    Therapy Education  Education Details: Provided pt with BTB to use for seated hip flexion and abduction. Also sidelying R & L clamshells.  Given: HEP, Fall prevention and home safety, Mobility training  Program: Progressed, Reinforced  How Provided: Verbal, Demonstration, Written  Provided to: Patient  Level of Understanding: Teach back education performed, Verbalized, Demonstrated              Time Calculation:    Start Time: 1018  Stop Time: 1101  Time Calculation (min): 43 min  Total Timed Code Minutes- PT: 43 minute(s)  Timed Charges  07893 - PT Therapeutic Exercise Minutes: 26  90471 -  PT Neuromuscular Reeducation Minutes: 17  Total Minutes  Timed Charges Total Minutes: 43   Total Minutes: 43   Therapy Charges for Today       Code Description Service Date Service Provider Modifiers Qty    47058450016 HC PT NEUROMUSC RE EDUCATION EA 15 MIN 8/26/2024 Sangeetha Berumen, PT GP 1    55243085351 HC PT THER PROC EA 15 MIN 8/26/2024 Sangeetha Berumen, PT GP 2                      Sangeetha Berumen, PT  8/26/2024

## 2024-08-29 ENCOUNTER — HOSPITAL ENCOUNTER (OUTPATIENT)
Dept: PHYSICAL THERAPY | Facility: HOSPITAL | Age: 84
Setting detail: THERAPIES SERIES
Discharge: HOME | End: 2024-08-29
Payer: MEDICARE

## 2024-08-29 DIAGNOSIS — G62.9 POLYNEUROPATHY: Primary | ICD-10-CM

## 2024-08-29 DIAGNOSIS — Z91.81 AT HIGH RISK FOR FALLS: ICD-10-CM

## 2024-08-29 DIAGNOSIS — Z86.73 HISTORY OF STROKE: ICD-10-CM

## 2024-08-29 DIAGNOSIS — R26.89 BALANCE PROBLEM: ICD-10-CM

## 2024-08-29 PROCEDURE — 97110 THERAPEUTIC EXERCISES: CPT

## 2024-08-29 PROCEDURE — 97112 NEUROMUSCULAR REEDUCATION: CPT

## 2024-08-29 NOTE — THERAPY TREATMENT NOTE
"  Outpatient Physical Therapy Neuro Treatment Note  [facility]     Patient Name: Junito Sorto  : 1940  MRN: [id number]  Today's Date: 2024      Visit Date: 2024    Visit Dx:    ICD-10-CM ICD-9-CM   1. Polyneuropathy  G62.9 356.9   2. Balance problem  R26.89 781.99   3. History of stroke  Z86.73 V12.54   4. At high risk for falls  Z91.81 V15.88       Patient Active Problem List   Diagnosis    Hyperglycemia    Obesity    Allergic rhinitis    Permanent atrial fibrillation    HLD (hyperlipidemia)    Esophageal dysphagia    Hiatal hernia    GERD (gastroesophageal reflux disease)    Bladder outlet obstruction    Sciatica of right side    Pneumonia of right lower lobe due to infectious organism    Acute respiratory failure with hypoxia    PVC (premature ventricular contraction)    Left ventricular dysfunction    Hypercholesterolemia    Acute ischemic left PCA stroke    Lumbar spine pain    Presence of Watchman left atrial appendage closure device    History of stroke    Loss of vision    Vitamin D deficiency    Encounter for screening for malignant neoplasm of colon            PT Neuro       Row Name 24 1015             Subjective    Subjective Comments Saw dermatologist on Tuesday and had skin cancer removed L scapula area. Uses cane in community all the time and \"most\" of time at home.  -LEEANNE         Precautions and Contraindications    Precautions/Limitations fall precautions  -LEEANNE      Precautions visual field cut on the right  -LEEANNE         Subjective Pain    Able to rate subjective pain? yes  -LEEANNE      Pre-Treatment Pain Level 0  -LEEANNE      Post-Treatment Pain Level 0  -LEEANNE         Vision-Basic Assessment    Current Vision --  did not wear glasses today  -LEEANNE         Cognition    Overall Cognitive Status WFL  -LEEANNE         Posture/Observations    Posture/Observations Comments Patient ambulated up to therapy with the use of single tip cane. wife by his side  -LEEANNE         Transfers    Sit-Stand " [place] (Transfers) modified independence  -LEEANNE      Stand-Sit [place] (Transfers) modified independence  -LEEANNE      Transfers, Sit-Stand-Sit, Assist Device straight cane  -LEEANNE      Transfer, Impairments impaired vision;impaired balance  -LEEANNE      Comment, (Transfers) wide KAYLEE  -LEEANNE         Gait/Stairs (Locomotion)    [place] Level (Gait) modified independence;standby assist  -LEEANNE      Assistive Device (Gait) cane, straight  -LEEANNE      Distance in Feet (Gait) 90  x  3  -LEEANNE      Pattern (Gait) step-through  -LEEANNE      Deviations/Abnormal Patterns (Gait) bilateral deviations;base of support, wide;gait speed decreased  -LEEANNE      Bilateral Gait Deviations forward flexed posture;heel strike decreased  -LEEANNE      Right Sided Gait Deviations Trendelenburg sign;leans right  -LEEANNE      Gait Assessment/Intervention Standing posture: knees slightly flexed, extension of trunk. Pt reports having lost 50 # over past year.  -         Balance Skills Training    Standing-Level of Assistance Close supervision  -LEEANNE      Static Standing Balance Support No upper extremity supported  near table  -LEEANNE      Standing-Balance Activities --  worked on static stance posture -- knees extended, trunk slightly flexed forward. Alternate heel raises.  -LEEANNE      Gait Balance-Level of Assistance Contact guard;Close supervision  -LEEANNE      Gait Balance Support assistive device  cane  -LEEANNE      Gait Balance Activities hurdles;stepping over object;backwards;side-stepping;scanning environment R/L  -                User Key  (r) = Recorded By, (t) = Taken By, (c) = Cosigned By      Initials Name Provider Type    Sangeetha Padilla, PT Physical Therapist                             PT Assessment/Plan       Row Name 08/29/24 1929          PT Assessment    Assessment Comments Focused on pt's standing and sitting posture to improve strength and alignment. Pt reported feeling better after doing pelvic mobility exercises.  -               User Key  (r) =  "Recorded By, (t) = Taken By, (c) = Cosigned By      Initials Name Provider Type    Sangeetha Padilla PT Physical Therapist                        OP Exercises       Row Name 08/29/24 1120 08/29/24 1015          Subjective    Subjective Comments -- Saw dermatologist on Tuesday and had skin cancer removed L scapula area. Uses cane in community all the time and \"most\" of time at home.  -LEEANNE        Subjective Pain    Able to rate subjective pain? -- yes  -LEEANNE     Pre-Treatment Pain Level -- 0  -LEEANNE     Post-Treatment Pain Level -- 0  -LEEANNE        Total Minutes    70007 - PT Therapeutic Exercise Minutes 19  -LEEANNE --     92592 -  PT Neuromuscular Reeducation Minutes 26  -LEEANNE --        Exercise 10    Exercise Name 10 -- NuStep level 4, all extremities  -LEEANNE     Cueing 10 -- Verbal;Tactile  -LEEANNE     Time 10 -- 5 Minutes  -LEEANNE        Exercise 12    Exercise Name 12 -- Sitting: anterior pelvic tils, lateral weight shift with forearms on bedside table.  -LEEANNE     Cueing 12 -- Verbal;Tactile;Demo  -LEEANNE     Reps 12 -- 15-20 each  -LEEANNE     Additional Comments -- lots of tactile cueing initially for anterior pelvic tilt.  -LEEANNE               User Key  (r) = Recorded By, (t) = Taken By, (c) = Cosigned By      Initials Name Provider Type    Sangeetha Padilla PT Physical Therapist                                    Therapy Education  Education Details: HEP: seated anterior pelvic tilt and lateral weight shift. Also worked on pt being more aware of posture in standing, not lean against counter as much when shaving or in kitchen to focus on trunk strengthening. Also encouraged pt to ambulate a little more often at home to reduce sitting time.  Given: HEP, Fall prevention and home safety, Mobility training, Posture/body mechanics  Program: Progressed, Reinforced  How Provided: Verbal, Demonstration, Written  Provided to: Patient  Level of Understanding: Teach back education performed, Verbalized, Demonstrated              Time Calculation: "   Start Time: 1015  Stop Time: 1100  Time Calculation (min): 45 min  Total Timed Code Minutes- PT: 45 minute(s)  Timed Charges  61732 - PT Therapeutic Exercise Minutes: 19  22963 -  PT Neuromuscular Reeducation Minutes: 26  Total Minutes  Timed Charges Total Minutes: 45   Total Minutes: 45   Therapy Charges for Today       Code Description Service Date Service Provider Modifiers Qty    98736980743 HC PT NEUROMUSC RE EDUCATION EA 15 MIN 8/29/2024 Sangeetha Berumen, PT GP 2    00624012140 HC PT THER PROC EA 15 MIN 8/29/2024 Sangeetha Berumen, PT GP 1                      Sangeetha Berumen, PT  8/29/2024

## 2024-09-03 ENCOUNTER — HOSPITAL ENCOUNTER (OUTPATIENT)
Dept: PHYSICAL THERAPY | Facility: HOSPITAL | Age: 84
Setting detail: THERAPIES SERIES
Discharge: HOME | End: 2024-09-03
Payer: MEDICARE

## 2024-09-03 DIAGNOSIS — Z86.73 HISTORY OF STROKE: ICD-10-CM

## 2024-09-03 DIAGNOSIS — R26.89 BALANCE PROBLEM: ICD-10-CM

## 2024-09-03 DIAGNOSIS — Z91.81 AT HIGH RISK FOR FALLS: ICD-10-CM

## 2024-09-03 DIAGNOSIS — G62.9 POLYNEUROPATHY: Primary | ICD-10-CM

## 2024-09-03 PROCEDURE — 97110 THERAPEUTIC EXERCISES: CPT

## 2024-09-03 PROCEDURE — 97112 NEUROMUSCULAR REEDUCATION: CPT

## 2024-09-03 NOTE — THERAPY TREATMENT NOTE
"  Outpatient Physical Therapy Neuro Treatment Note  [facility]     Patient Name: Junito Sorto  : 1940  MRN: [id number]  Today's Date: 9/3/2024      Visit Date: 2024    Visit Dx:    ICD-10-CM ICD-9-CM   1. Polyneuropathy  G62.9 356.9   2. Balance problem  R26.89 781.99   3. History of stroke  Z86.73 V12.54   4. At high risk for falls  Z91.81 V15.88       Patient Active Problem List   Diagnosis    Hyperglycemia    Obesity    Allergic rhinitis    Permanent atrial fibrillation    HLD (hyperlipidemia)    Esophageal dysphagia    Hiatal hernia    GERD (gastroesophageal reflux disease)    Bladder outlet obstruction    Sciatica of right side    Pneumonia of right lower lobe due to infectious organism    Acute respiratory failure with hypoxia    PVC (premature ventricular contraction)    Left ventricular dysfunction    Hypercholesterolemia    Acute ischemic left PCA stroke    Lumbar spine pain    Presence of Watchman left atrial appendage closure device    History of stroke    Loss of vision    Vitamin D deficiency    Encounter for screening for malignant neoplasm of colon            PT Neuro       Row Name 24 1100             Subjective    Subjective Comments Ready for discharge.  \"I know the cane helps to balance me\"  -LB         Precautions and Contraindications    Precautions/Limitations fall precautions  -LB      Precautions visual field cut on the right  -LB         Subjective Pain    Able to rate subjective pain? yes  -LB      Pre-Treatment Pain Level 0  -LB      Post-Treatment Pain Level 0  -LB         Cognition    Overall Cognitive Status WFL  -LB         Posture/Observations    Posture/Observations Comments Patient ambulated up to therapy with the use of single tip cane. wife by his side  -LB         Transfers    Sit-Stand [place] (Transfers) modified independence  -LB      Stand-Sit [place] (Transfers) modified independence  -LB      Transfers, Sit-Stand-Sit, Assist Device " straight cane  -LB      Comment, (Transfers) wide KAYLEE, decreased anterior weight shift  -LB         Gait/Stairs (Locomotion)    [place] Level (Gait) modified independence;standby assist  -LB      Assistive Device (Gait) cane, straight  -LB      Distance in Feet (Gait) 80  65  -LB      Pattern (Gait) step-through  -LB      Deviations/Abnormal Patterns (Gait) bilateral deviations;base of support, wide;gait speed decreased  -LB      Bilateral Gait Deviations forward flexed posture;heel strike decreased  -LB      Left Sided Gait Deviations decreased knee extension  -LB      Right Sided Gait Deviations Trendelenburg sign;leans right;decreased knee extension  right hip slightly elevated  -LB         Balance Skills Training    Standing-Level of Assistance Close supervision;Contact guard  -LB      Static Standing Balance Support No upper extremity supported  -LB      Standing-Balance Activities Foam square  -LB      Standing Balance # of Minutes While on foam square performed elbow flexion/extension and chest press with 4 pound bar 2 sets of 10  -LB      Gait Balance-Level of Assistance Contact guard;Close supervision  -LB      Gait Balance Support No upper extremity supported  -LB      Gait Balance Activities backwards  -LB      [place] 20 to 30 seconds on each lower extremity with head rotation and CGA/standby  -LB                User Key  (r) = Recorded By, (t) = Taken By, (c) = Cosigned By      Initials Name Provider Type    Laura Tuttle, PT Physical Therapist                             PT Assessment/Plan       Row Name 09/03/24 1212          PT Assessment    Assessment Comments Patient reports he has been trying to work on his core strengthening and not leaning on tables and objects in front of him as before.  Patient does report this is harder but understands why he needs to work on it.  Added hamstring stretch to help increase knee extension and pelvic mobility.  No loss of balance noted during session but  "when standing on foam slight posterior lean noted.  -LB               User Key  (r) = Recorded By, (t) = Taken By, (c) = Cosigned By      Initials Name Provider Type    Laura Tuttle PT Physical Therapist                        OP Exercises       Row Name 09/03/24 1213 09/03/24 1200 09/03/24 1100       Subjective    Subjective Comments -- -- Ready for discharge.  \"I know the cane helps to balance me\"  -LB       Subjective Pain    Able to rate subjective pain? -- -- yes  -LB    Pre-Treatment Pain Level -- -- 0  -LB    Post-Treatment Pain Level -- -- 0  -LB       Total Minutes    48222 - PT Therapeutic Exercise Minutes 30  -LB -- --    47852 -  PT Neuromuscular Reeducation Minutes 13  -LB -- --       Exercise 7    Exercise Name 7 -- Seated hamstring stretch  -LB --    Sets 7 -- 1  -LB --    Reps 7 -- 2  -LB --    Time 7 -- 60  -LB --       Exercise 12    Exercise Name 12 -- Seated pelvic tilts  -LB --    Reps 12 -- 15  -LB --       Exercise 13    Exercise Name 13 -- Standing shoulder rows with scapular retraction  -LB --    Reps 13 -- 15  -LB --    Additional Comments -- GTB  -LB --       Exercise 14    Exercise Name 14 -- Lateral side bends  -LB --    Reps 14 -- 10  -LB --              User Key  (r) = Recorded By, (t) = Taken By, (c) = Cosigned By      Initials Name Provider Type    Laura Tuttle PT Physical Therapist                                    Therapy Education  Education Details: Hamstring stretch  Given: HEP  Program: Progressed  How Provided: Verbal, Demonstration, Written  Provided to: Patient  Level of Understanding: Demonstrated              Time Calculation:   Start Time: 1017  Stop Time: 1100  Time Calculation (min): 43 min  Timed Charges  25191 - PT Therapeutic Exercise Minutes: 30  94241 -  PT Neuromuscular Reeducation Minutes: 13  Total Minutes  Timed Charges Total Minutes: 43   Total Minutes: 43   Therapy Charges for Today       Code Description Service Date Service Provider Modifiers Qty "    75104438483 HC PT NEUROMUSC RE EDUCATION EA 15 MIN 9/3/2024 Laura Linares, PT GP 1    41689294156 HC PT THER PROC EA 15 MIN 9/3/2024 Laura Linares, PT GP 2                      Laura Linares, PT  9/3/2024

## 2024-09-05 ENCOUNTER — HOSPITAL ENCOUNTER (OUTPATIENT)
Dept: PHYSICAL THERAPY | Facility: HOSPITAL | Age: 84
Setting detail: THERAPIES SERIES
Discharge: HOME | End: 2024-09-05
Payer: MEDICARE

## 2024-09-05 DIAGNOSIS — Z86.73 HISTORY OF STROKE: ICD-10-CM

## 2024-09-05 DIAGNOSIS — G62.9 POLYNEUROPATHY: Primary | ICD-10-CM

## 2024-09-05 DIAGNOSIS — Z91.81 AT HIGH RISK FOR FALLS: ICD-10-CM

## 2024-09-05 DIAGNOSIS — R26.89 BALANCE PROBLEM: ICD-10-CM

## 2024-09-05 PROCEDURE — 97110 THERAPEUTIC EXERCISES: CPT

## 2024-09-05 PROCEDURE — 97112 NEUROMUSCULAR REEDUCATION: CPT

## 2024-09-05 NOTE — THERAPY DISCHARGE NOTE
"    Outpatient Physical Therapy Neuro Treatment Note/Discharge Summary  [facility]     Patient Name: Junito Sorto  : 1940  MRN: [id number]  Today's Date: 2024      Visit Date: 2024    Visit Dx:    ICD-10-CM ICD-9-CM   1. Polyneuropathy  G62.9 356.9   2. Balance problem  R26.89 781.99   3. History of stroke  Z86.73 V12.54   4. At high risk for falls  Z91.81 V15.88       Patient Active Problem List   Diagnosis    Hyperglycemia    Obesity    Allergic rhinitis    Permanent atrial fibrillation    HLD (hyperlipidemia)    Esophageal dysphagia    Hiatal hernia    GERD (gastroesophageal reflux disease)    Bladder outlet obstruction    Sciatica of right side    Pneumonia of right lower lobe due to infectious organism    Acute respiratory failure with hypoxia    PVC (premature ventricular contraction)    Left ventricular dysfunction    Hypercholesterolemia    Acute ischemic left PCA stroke    Lumbar spine pain    Presence of Watchman left atrial appendage closure device    History of stroke    Loss of vision    Vitamin D deficiency    Encounter for screening for malignant neoplasm of colon             PT Neuro       Row Name 24 1014             Subjective    Subjective Comments Feels like therapy has helped and \"you have brought common sense to the forefront\" like use cane. At initial evaluation, pt was not using device. Now using cane at all times in the community, at home intermittently. Pt requests d/c PT at this time, feels he is doing well.  -LEEANNE         Precautions and Contraindications    Precautions/Limitations fall precautions  -LEEANNE      Precautions visual field cut on the right  -LEEANNE         Subjective Pain    Able to rate subjective pain? yes  -LEEANNE      Pre-Treatment Pain Level 0  -LEEANNE      Post-Treatment Pain Level 0  -LEEANNE         Vision-Basic Assessment    Current Vision --  wearing prism glasses today; wife states pt does not wear prism glasses much and wishes he would.  -LEEANNE         " Cognition    Overall Cognitive Status WFL  -LEEANNE      Memory Appears intact  -LEEANNE      Orientation Level Oriented X4  -LEEANNE      Safety Judgment Good awareness of safety precautions  -LEEANNE      Deficits Fully aware of deficits  -LEENANE         Posture/Observations    Posture/Observations Comments Patient ambulated up to therapy with the use of single tip cane. wife by his side  -LEEANNE         Transfers    Sit-Stand [place] (Transfers) modified independence  -LEEANNE      Stand-Sit [place] (Transfers) modified independence  -LEEANNE      Transfers, Sit-Stand-Sit, Assist Device straight cane;other (see comments)  no AD without UE assit to come to stand.  -LEEANNE      Transfer, Impairments impaired vision;impaired balance  -LEEANNE      Comment, (Transfers) to stand and to sit in armchair and armless chair  -LEEANNE         Gait/Stairs (Locomotion)    [place] Level (Gait) modified independence;supervision  supervision mostly for outdoor ambulation for scanning, not get too close to edge of sidewalk  -LEEANNE      Assistive Device (Gait) cane, straight  -LEEANNE      Distance in Feet (Gait) 80  x 2  -LEEANNE      Pattern (Gait) step-through  -LEEANNE      Deviations/Abnormal Patterns (Gait) bilateral deviations;base of support, wide;gait speed decreased  -LEEANNE      Bilateral Gait Deviations forward flexed posture;heel strike decreased  -LEEANNE      Left Sided Gait Deviations decreased knee extension  -LEEANNE      Right Sided Gait Deviations Trendelenburg sign;leans right;decreased knee extension  -LEEANNE      Comment, (Gait/Stairs) see Tinetti and TUG  -LEEANNE         Rough/Uneven Surface Gait Skills (Mobility)    [place], Gait on Rough/Uneven Surface (Mobility) supervision;verbal cues  to scan to R  to not get close to edge of sidewalk  -LEEANNE      Assistive Device (Rough/Uneven Surface Gait) cane, straight  prism glasses  -LEEANNE      Distance in Feet (Rough/Uneven Surface Gait) 200  -LEEANNE      Comment, Gait Rough/Uneven Surface (Mobility) sidewalk, incline, decline, grass  -LEEANNE    "      Balance Skills Training    Balance Comments see COY  -LEEANNE                User Key  (r) = Recorded By, (t) = Taken By, (c) = Cosigned By      Initials Name Provider Type    Sangeetha Padilla, PT Physical Therapist                             PT Assessment/Plan       Row Name 09/05/24 1131          PT Assessment    Assessment Comments Patient has been seen in physical therapy for approximately 6 weeks focusing on strengthening, stretching, ambulation and balance.  Patient arrived at initial evaluation not using his cane much but now uses cane at all times in the community and intermittently at home as well.  He reports he feels the cane does provide improved balance.  Patient improved balance outcome measures of the Coy and the Tinetti but the Coy score is 42 out of 56 indicating he still is at a minimal risk for falls.  Patient's balance is challenged by his right visual field cut from previous stroke approximately 2 years ago in which she has to be sure he scans to his right side.  This was more evident while patient was ambulating outdoors today requiring some cues to scan to the right.  Patient has HEP and reports compliance.  Patient has met all short-term goals and all but 2 of long-term goals.  Patient is ready to be discharged from PT at this time.  -LEEANNE               User Key  (r) = Recorded By, (t) = Taken By, (c) = Cosigned By      Initials Name Provider Type    Sangeetha Padilla, PT Physical Therapist                          OP Exercises       Row Name 09/05/24 1134 09/05/24 1014          Subjective    Subjective Comments -- Feels like therapy has helped and \"you have brought common sense to the forefront\" like use cane. At initial evaluation, pt was not using device. Now using cane at all times in the community, at home intermittently. Pt requests d/c PT at this time, feels he is doing well.  -LEEANNE        Subjective Pain    Able to rate subjective pain? -- yes  -LEEANNE     Pre-Treatment Pain Level " -- 0  -LEEANNE     Post-Treatment Pain Level -- 0  -LEEANNE        Total Minutes    43704 - PT Therapeutic Exercise Minutes 18  -LEEANNE --     08562 -  PT Neuromuscular Reeducation Minutes 30  -LEEANNE --        Exercise 1    Exercise Name 1 -- seated hip flex/abd using GTB  -LEEANNE     Cueing 1 -- Verbal  -LEEANNE     Sets 1 -- 2  -LEEANNE     Reps 1 -- 10  -LEEANNE        Exercise 2    Exercise Name 2 -- seated ankle PF with BTB  -LEEANNE     Reps 2 -- 10  -LEEANNE        Exercise 7    Exercise Name 7 -- Seated hamstring stretch  -LEEANNE     Reps 7 -- 1 each  -LEEANNE     Time 7 -- 60  -LEEANNE               User Key  (r) = Recorded By, (t) = Taken By, (c) = Cosigned By      Initials Name Provider Type    Sangeetha Padilla, PT Physical Therapist                                   PT OP Goals       Row Name 09/05/24 1014          PT Short Term Goals    STG 1 Pt will be independent with initial HEP to improve strength and balance.  -LEEANNE     STG 1 Progress Met  -LEEANNE     STG 2 Patient will transfer to sit turning fully around prior to sit down.  -     STG 2 Progress Met  -LEEANNE     STG 3 Patient to perform Romberg for 1 minute independently.  -LEEANNE     STG 3 Progress Partially Met  -LEEANNE     STG 3 Progress Comments standing in corner of room, SBA  -     STG 4 Patient to score 38/56 on the Pugh balance test indicate improved balance and decrease risk for falls.  -     STG 4 Progress Met  -LEEANNE     STG 4 Progress Comments 42 out of 56 on retest today  -     STG 5 evaluate balance using FGA.  -     STG 5 Progress Met  -LEEANNE        Long Term Goals    LTG 1 Patient will be independent with advanced HEP to improve strength and balance.  -LEEANNE     LTG 1 Progress Met  -LEEANNE     LTG 2 Patient to score 42/56 on the Pugh balance test indicate improved balance and decrease risk for falls.  -LEEANNE     LTG 2 Progress Met  -LEEANNE     LTG 2 Progress Comments 42 out of 56 on retest today  -     LTG 3 Patient to score 23/28 on the Tinetti balance test for improved balance and reduced risk for falls.  -LEEANNE      LTG 3 Progress Met  -LEEANNE     LTG 3 Progress Comments 25 out of 28 on retest today  -LEEANNE     LTG 4 Patient will ambulate in the community 200 feet with cane, SBA.  -LEEANNE     LTG 4 Progress Met  -LEEANNE     LTG 5 Pt to complete TUG in < or equal to 12 seconds for improved balance with gait related task.  -LEEANNE     LTG 5 Progress Not Met  -LEEANNE     LTG 5 Progress Comments Completed tug with prism glasses in 13 seconds; without prism glasses 14-second  -LEEANNE     LTG 6 Patient to perform single-leg stance 2-3 seconds each LE.  -LEEANNE     LTG 6 Progress Not Met  -LEEANNE     LTG 6 Progress Comments 1-2 secs today  -LEEANNE               User Key  (r) = Recorded By, (t) = Taken By, (c) = Cosigned By      Initials Name Provider Type    Sangeetha Padilla, PT Physical Therapist                    Therapy Education  Education Details: Reviewed HEP previously issued with final HEP -- seated hip flexion/hip abduction using GTB, seated PF with BTB, seated hamstring stretch, seated anterior pelvic tilt. Reminded pt of scanning R due to decreased vision especially outdoors.  Given: HEP, Fall prevention and home safety, Mobility training  Program: Reinforced, Modified  How Provided: Verbal, Demonstration  Provided to: Patient  Level of Understanding: Teach back education performed, Verbalized, Demonstrated    Outcome Measure Options: Pugh Balance, Tinetti, Timed Up and Go (TUG)  Pugh Balance Scale  Sitting to Standing: able to stand without using hands and stabilize independently  Standing Unsupported: able to stand safely for 2 minutes  Sitting with Back Unsupported but Feet Supported on Floor or on Stool: able to sit safely and securely for 2 minutes  Standing to Sitting: sits safely with minimal use of hands  Transfers: able to transfer safely with minor use of hands  Standing Unsupported with Eyes Closed: able to stand 10 seconds with supervision  Standing Unsupported with Feet Together: able to place feet together independently and stand 1 minute  "with supervision  Reaching Forward with Outstretched Arm While Standing: can reach forward 12 cm (5 inches)   Object From the Floor From a Standing Position: able to  object safely and easily  Turning to Look Behind Over Left and Right Shoulders While Standing: looks behind one side only other side shows less weight shift  Turn 360 Degrees: able to turn 360 degrees safely but slowly  Place Alternate Foot on Step or Stool While Standing Unsupported: able to complete > 2 steps needs minimal assist  Standing Unsupported with One Foot in Front: able to take small step independently and hold 30 seconds  Standing on One Leg: tries to lift leg unable to hold 3 seconds but remains standing independently  Pugh Total Score: 42  Tinetti Assessment  Sitting Balance: Steady,safe  Arises: Able in 1 attempt  Attempts to Rise: Able in 1 attempt  Immediate Standing Balance (first 5 sec): Steady without support  Standing Balance: Narrow stance, without support  Sternal Nudge (feet close together): Steady  Eyes Closed (feet close together): Unsteady  Turning 360 Degrees- Steps: Continuous steps  Turning 360 Degrees- Steadiness: Steady  Sitting Down: Safe, smooth motion  Tinetti Balance Score: 15  Gait Initiation (immediate after told \"go\"): No hesitancy  Step Length- Right Swing: Right swing foot passes Left stance leg  Step Length- Left Swing: Left swing foot passes Right  Foot Clearance- Right Foot: Right foot completely clears floor  Foot Clearance- Left Foot: Left foot completely clears floor  Step Symmetry: Right and Left step length equal  Step Continuity: Steps appear continuous  Path (excursion): Mild/moderate deviation or use of aid  Trunk: No sway but knee or trunk flexion or spread arms while walking  Base of Support: Heels close while walking  Gait Score: 10  Tinetti Total Score: 25  Tinetti Assistive Device: straight cane  Timed Up and Go (TUG)  TUG Test 1: 13 seconds (wearing prism glasses)  TUG Test 2: 14 " seconds (not wearing prism glasses)    Time Calculation:   Start Time: 1014  Stop Time: 1102  Time Calculation (min): 48 min  Total Timed Code Minutes- PT: 48 minute(s)  Timed Charges  98312 - PT Therapeutic Exercise Minutes: 18  78162 -  PT Neuromuscular Reeducation Minutes: 30  Total Minutes  Timed Charges Total Minutes: 48   Total Minutes: 48     Therapy Charges for Today       Code Description Service Date Service Provider Modifiers Qty    28837318750 HC PT NEUROMUSC RE EDUCATION EA 15 MIN 9/5/2024 Sangeetha Berumen, PT GP 2    23194221631 HC PT THER PROC EA 15 MIN 9/5/2024 Sangeetha Berumen, PT GP 1            PT G-Codes  Outcome Measure Options: Pugh Balance, Tinetti, Timed Up and Go (TUG)  Pugh Total Score: 42  Tinetti Total Score: 25  TUG Test 1: 13 seconds (wearing prism glasses)  TUG Test 2: 14 seconds (not wearing prism glasses)     OP PT Discharge Summary  Date of Discharge: 09/05/24  Reason for Discharge: Maximum functional potential achieved, Independent  Outcomes Achieved: Patient able to partially acheive established goals, Able to achieve all goals within established timeline  Discharge Destination: Home without follow-up, Home with home program        Sangeetha Berumen, PT  9/5/2024

## 2024-09-16 RX ORDER — ATORVASTATIN CALCIUM 40 MG/1
40 TABLET, FILM COATED ORAL DAILY
Qty: 30 TABLET | Refills: 0 | Status: SHIPPED | OUTPATIENT
Start: 2024-09-16

## 2024-09-18 ENCOUNTER — HOSPITAL ENCOUNTER (OUTPATIENT)
Dept: GENERAL RADIOLOGY | Facility: HOSPITAL | Age: 84
Discharge: HOME | End: 2024-09-18
Admitting: NURSE PRACTITIONER
Payer: MEDICARE

## 2024-09-18 ENCOUNTER — OFFICE VISIT (OUTPATIENT)
Dept: FAMILY MEDICINE CLINIC | Facility: CLINIC | Age: 84
End: 2024-09-18
Payer: MEDICARE

## 2024-09-18 VITALS
RESPIRATION RATE: 16 BRPM | WEIGHT: 199.9 LBS | OXYGEN SATURATION: 97 % | DIASTOLIC BLOOD PRESSURE: 80 MMHG | BODY MASS INDEX: 31.37 KG/M2 | TEMPERATURE: 98.6 F | HEART RATE: 62 BPM | SYSTOLIC BLOOD PRESSURE: 130 MMHG | HEIGHT: 67 IN

## 2024-09-18 DIAGNOSIS — K59.00 CONSTIPATION, UNSPECIFIED CONSTIPATION TYPE: ICD-10-CM

## 2024-09-18 DIAGNOSIS — R63.0 APPETITE IMPAIRED: ICD-10-CM

## 2024-09-18 DIAGNOSIS — K59.00 CONSTIPATION, UNSPECIFIED CONSTIPATION TYPE: Primary | ICD-10-CM

## 2024-09-18 DIAGNOSIS — E78.00 HYPERCHOLESTEROLEMIA: ICD-10-CM

## 2024-09-18 DIAGNOSIS — R63.4 WEIGHT LOSS, UNINTENTIONAL: ICD-10-CM

## 2024-09-18 PROCEDURE — 1126F AMNT PAIN NOTED NONE PRSNT: CPT | Performed by: NURSE PRACTITIONER

## 2024-09-18 PROCEDURE — 99214 OFFICE O/P EST MOD 30 MIN: CPT | Performed by: NURSE PRACTITIONER

## 2024-09-18 PROCEDURE — 1159F MED LIST DOCD IN RCRD: CPT | Performed by: NURSE PRACTITIONER

## 2024-09-18 PROCEDURE — 74019 RADEX ABDOMEN 2 VIEWS: CPT

## 2024-09-18 PROCEDURE — 1160F RVW MEDS BY RX/DR IN RCRD: CPT | Performed by: NURSE PRACTITIONER

## 2024-09-19 PROBLEM — Z80.0 FAMILY HISTORY OF GI MALIGNANCY: Status: ACTIVE | Noted: 2024-07-22

## 2024-10-14 RX ORDER — ATORVASTATIN CALCIUM 40 MG/1
40 TABLET, FILM COATED ORAL DAILY
Qty: 30 TABLET | Refills: 2 | Status: SHIPPED | OUTPATIENT
Start: 2024-10-14

## 2024-11-19 ENCOUNTER — TELEPHONE (OUTPATIENT)
Dept: GASTROENTEROLOGY | Facility: CLINIC | Age: 84
End: 2024-11-19
Payer: MEDICARE

## 2024-11-20 NOTE — TELEPHONE ENCOUNTER
Hub staff attempted to follow warm transfer process and was unsuccessful     Caller: Junito Sorto    Relationship to patient: Self    Best call back number: [phone]    Patient is needing: PT IS RETURNING A MISSED CALL. IT'S OK TO [facility].

## 2024-11-25 ENCOUNTER — TELEPHONE (OUTPATIENT)
Dept: GASTROENTEROLOGY | Facility: CLINIC | Age: 84
End: 2024-11-25
Payer: MEDICARE

## 2024-11-25 RX ORDER — CEPHALEXIN 500 MG/1
CAPSULE ORAL
COMMUNITY
Start: 2024-08-27

## 2024-11-25 RX ORDER — BRIMONIDINE TARTRATE AND TIMOLOL MALEATE 2; 5 MG/ML; MG/ML
SOLUTION OPHTHALMIC
COMMUNITY
Start: 2024-09-23

## 2024-11-25 NOTE — SIGNIFICANT NOTE
Education provided the Patient on the following:    - Nothing to Eat or Drink after MN the night before the procedure    - Avoid red/purple fluids while completing their bowel prep as ordered by physician  -Contact Gastrointerologist office for any questions about specific details regarding colon prep    -You will need to have someone drive you home after your colonoscopy and remain with you for 24 hours after the procedure  - The date of your Surgery, you may have one visitor at bedside or within 10-15 minutes of [facility]  - Please wear warm socks when you arrive for your colonoscopy  - Remove all jewelry and leave any valuables before arriving the day of your procedure (all will have to be removed before leaving preop)  - You will need to arrive at 1200 on 11-26-24 for your colonoscopy    -Feel free to contact us at: [phone] with any additional questions/concerns

## 2024-11-25 NOTE — TELEPHONE ENCOUNTER
Caller: Junito Sorto    Relationship to patient: Self    Best call back number: [phone]      Patient is needing: WANTING TO KNOW IF HE CAN LEAVE HIS FALSE TEETH IN OR BE BETTER TO LEAVE THEM OUT FOR HIS PROCEDURE ON 11-26-24.  IF PT DOESN'T ANSWER HE STATED TO LEAVE A VOICEMAIL SAYING YES OR NO.

## 2024-11-25 NOTE — TELEPHONE ENCOUNTER
Returned patient's phone call. Advised he may leave his teeth in and the anesthesiologist will decide if he needs to take them out. He verb understanding.

## 2024-11-26 ENCOUNTER — ANESTHESIA EVENT (OUTPATIENT)
Dept: SURGERY | Facility: SURGERY CENTER | Age: 84
End: 2024-11-26
Payer: MEDICARE

## 2024-11-26 ENCOUNTER — HOSPITAL ENCOUNTER (OUTPATIENT)
Facility: SURGERY CENTER | Age: 84
Setting detail: HOSPITAL OUTPATIENT SURGERY
Discharge: HOME | End: 2024-11-26
Attending: INTERNAL MEDICINE | Admitting: INTERNAL MEDICINE
Payer: MEDICARE

## 2024-11-26 ENCOUNTER — ANESTHESIA (OUTPATIENT)
Dept: SURGERY | Facility: SURGERY CENTER | Age: 84
End: 2024-11-26
Payer: MEDICARE

## 2024-11-26 VITALS
HEIGHT: 67 IN | OXYGEN SATURATION: 95 % | BODY MASS INDEX: 30.61 KG/M2 | TEMPERATURE: 98 F | WEIGHT: 195 LBS | RESPIRATION RATE: 18 BRPM | HEART RATE: 83 BPM | SYSTOLIC BLOOD PRESSURE: 132 MMHG | DIASTOLIC BLOOD PRESSURE: 91 MMHG

## 2024-11-26 DIAGNOSIS — Z80.0 FAMILY HISTORY OF GI MALIGNANCY: ICD-10-CM

## 2024-11-26 PROCEDURE — 25010000002 GLYCOPYRROLATE 1 MG/5ML SOLUTION: Performed by: NURSE ANESTHETIST, CERTIFIED REGISTERED

## 2024-11-26 PROCEDURE — 45385 COLONOSCOPY W/LESION REMOVAL: CPT | Performed by: INTERNAL MEDICINE

## 2024-11-26 PROCEDURE — 25010000002 LIDOCAINE 1 % SOLUTION: Performed by: NURSE ANESTHETIST, CERTIFIED REGISTERED

## 2024-11-26 PROCEDURE — 25010000002 PROPOFOL 1000 MG/100ML EMULSION: Performed by: NURSE ANESTHETIST, CERTIFIED REGISTERED

## 2024-11-26 PROCEDURE — S0260 H&P FOR SURGERY: HCPCS | Performed by: INTERNAL MEDICINE

## 2024-11-26 PROCEDURE — 88305 TISSUE EXAM BY PATHOLOGIST: CPT | Performed by: INTERNAL MEDICINE

## 2024-11-26 RX ORDER — LIDOCAINE HYDROCHLORIDE 10 MG/ML
0.5 INJECTION, SOLUTION INFILTRATION; PERINEURAL ONCE AS NEEDED
Status: DISCONTINUED | OUTPATIENT
Start: 2024-11-26 | End: 2024-11-26 | Stop reason: HOSPADM

## 2024-11-26 RX ORDER — LOTEPREDNOL ETABONATE 5 MG/G
1 GEL OPHTHALMIC 4 TIMES DAILY
COMMUNITY

## 2024-11-26 RX ORDER — SODIUM CHLORIDE 0.9 % (FLUSH) 0.9 %
10 SYRINGE (ML) INJECTION AS NEEDED
Status: DISCONTINUED | OUTPATIENT
Start: 2024-11-26 | End: 2024-11-26 | Stop reason: HOSPADM

## 2024-11-26 RX ORDER — LIDOCAINE HYDROCHLORIDE 10 MG/ML
INJECTION, SOLUTION INFILTRATION; PERINEURAL AS NEEDED
Status: DISCONTINUED | OUTPATIENT
Start: 2024-11-26 | End: 2024-11-26 | Stop reason: SURG

## 2024-11-26 RX ORDER — EPHEDRINE SULFATE 50 MG/ML
INJECTION INTRAVENOUS AS NEEDED
Status: DISCONTINUED | OUTPATIENT
Start: 2024-11-26 | End: 2024-11-26 | Stop reason: SURG

## 2024-11-26 RX ORDER — PROPOFOL 10 MG/ML
INJECTION, EMULSION INTRAVENOUS AS NEEDED
Status: DISCONTINUED | OUTPATIENT
Start: 2024-11-26 | End: 2024-11-26 | Stop reason: SURG

## 2024-11-26 RX ORDER — GLYCOPYRROLATE 0.2 MG/ML
INJECTION INTRAMUSCULAR; INTRAVENOUS AS NEEDED
Status: DISCONTINUED | OUTPATIENT
Start: 2024-11-26 | End: 2024-11-26 | Stop reason: SURG

## 2024-11-26 RX ADMIN — GLYCOPYRROLATE 0.2 MG: 0.2 INJECTION, SOLUTION INTRAMUSCULAR; INTRAVENOUS at 09:29

## 2024-11-26 RX ADMIN — EPHEDRINE SULFATE 10 MG: 50 INJECTION, SOLUTION INTRAVENOUS at 09:50

## 2024-11-26 RX ADMIN — PROPOFOL 50 MG: 10 INJECTION, EMULSION INTRAVENOUS at 09:31

## 2024-11-26 RX ADMIN — PROPOFOL 100 MCG/KG/MIN: 10 INJECTION, EMULSION INTRAVENOUS at 09:32

## 2024-11-26 RX ADMIN — LIDOCAINE HYDROCHLORIDE 60 MG: 10 INJECTION, SOLUTION INFILTRATION; PERINEURAL at 09:31

## 2024-11-26 NOTE — ANESTHESIA PREPROCEDURE EVALUATION
Anesthesia Evaluation     no history of anesthetic complications:   NPO Solid Status: > 8 hours  NPO Liquid Status: > 2 hours           Airway   Mallampati: II  Neck ROM: full  no difficulty expected  Dental - normal exam     Pulmonary - normal exam   (+) pneumonia , a smoker Former,  (-) COPD, asthma, sleep apnea    PE comment: nonlabored  Cardiovascular   Exercise tolerance: poor (<4 METS)    Rhythm: irregular  Rate: abnormal    (+) hypertension, CAD, dysrhythmias Atrial Fib, hyperlipidemia  (-) valvular problems/murmurs, past MI, angina    ROS comment: ·  There is a Watchman device which is in good position within the left atrial appendage. There is good apposition of the Watchman device with no residual flow. There is no thrombus on the Watchman device  ·  There is akinesis of the inferior base  ·  Left ventricular systolic function is low normal. Estimated left ventricular EF = 50%  ·  Left ventricular wall thickness is consistent with mild concentric hypertrophy  ·  The right ventricular cavity is mildly dilated. Normal right ventricular systolic function noted.  ·  The left atrial cavity is severely dilated  ·  No evidence of a patent foramen ovale. Saline test results are negative.  ·  The right atrial cavity is moderately dilated.  ·  Trace to mild tricuspid valve regurgitation is present.  ·  Calculated right ventricular systolic pressure from tricuspid regurgitation is 23 mmHg.  ·  The main pulmonary artery is mildly dilated  ·  There is no evidence of pericardial effusion.      Neuro/Psych  (+) CVA, numbness  (-) seizures, TIA  GI/Hepatic/Renal/Endo    (+) obesity, hiatal hernia, GERD  (-) liver disease, no renal disease, diabetes, no thyroid disorder    Musculoskeletal     (+) arthralgias  Abdominal    Substance History   (+) alcohol use     OB/GYN          Other   arthritis,   history of cancer (skin cancer, prostate cancer)                    Anesthesia Plan    ASA 3     MAC       Anesthetic plan,  risks, benefits, and alternatives have been provided, discussed and informed consent has been obtained with: patient.      CODE STATUS:

## 2024-11-26 NOTE — H&P
[facility]  Pre Procedure History & Physical    Chief Complaint:   Time for my colonoscopy    Subjective     HPI:   84 y.o. male presenting to endoscopy unit today for surveillance colonoscopy.    Past Medical History:   Past Medical History:   Diagnosis Date    Allergic     Airborne    Arrhythmia     Arthritis     At risk for obstructive sleep apnea     5    Atrial fibrillation     Bladder outlet obstruction 09/11/2019    BPH (benign prostatic hyperplasia)     Cataract     Replaced    Colon polyp     Coronary artery disease Few years    Afib    Dysphagia     Eczema     GERD (gastroesophageal reflux disease) 2013    Mentioned as possible    Glaucoma Eievated eye pressure    Heart murmur Years ago, some docs never    Hernia     Your office reported hiatal    Hiatal hernia     History of medical problems 2022    Xs pressure in left eye. Now being treated in 2023    HL (hearing loss)     Hyperlipidemia     Hypertension     Left ventricular dysfunction     Low back pain     Obesity     Pneumonia 2022    Prostate cancer     Skin cancer     Stroke 10/19/2022    RESIDUAL: LOSS OF RT PERIPHERAL VISION    Visual impairment 2022 stroke    Reading correction       Family History:  Family History   Problem Relation Age of Onset    Cancer Mother         Pancriatic    Liver disease Mother     Arthritis Mother     Cancer Father         Colon,    Colon cancer Father     Arrhythmia Father         Afib    Heart disease Father     Cancer Brother         Prostate    Arrhythmia Brother         Afib    Hearing loss Brother     Cancer Paternal Grandfather         Prostate    Arrhythmia Paternal Aunt         Irregular beat . Before the time of illness sharing.    Arthritis Sister     Cancer Son         Melanoma    Other Son         Melanoma    Cancer Son         None    Heart disease Son         Afib    Malig Hyperthermia Neg Hx        Social History:   reports that he quit smoking about 63 years ago. His smoking  "use included pipe and cigarettes. He started smoking about 65 years ago. He has been exposed to tobacco smoke. He has never used smokeless tobacco. He reports current alcohol use of about 5.0 standard drinks of alcohol per week. He reports that he does not use drugs.    Medications:   Medications Prior to Admission   Medication Sig Dispense Refill Last Dose/Taking    aspirin 81 MG EC tablet Take 1 tablet by mouth Daily.   11/25/2024    atorvastatin (LIPITOR) 40 MG tablet TAKE 1 TABLET BY MOUTH DAILY 30 tablet 2 11/25/2024    brimonidine-timolol (COMBIGAN) 0.2-0.5 % ophthalmic solution    11/25/2024    cephalexin (KEFLEX) 500 MG capsule    Taking    famotidine (PEPCID) 40 MG tablet TAKE 1 TABLET BY MOUTH DAILY 90 tablet 3 11/25/2024    loteprednol etabonate (LOTEMAX) 0.5 % gel ophthalmic gel Apply 1 drop to eye(s) as directed by provider 4 (Four) Times a Day. Left eye only   11/25/2024    metoprolol succinate XL (TOPROL-XL) 25 MG 24 hr tablet TAKE 1 TABLET BY MOUTH DAILY 90 tablet 1 11/26/2024 Morning    multivitamin with minerals tablet tablet Take 1 tablet by mouth Daily.   11/25/2024    betamethasone dipropionate 0.05 % cream As Needed.   More than a month       Allergies:  Dilaudid [hydromorphone hcl] and Hydromorphone    Objective     Blood pressure (!) 164/105, pulse 71, temperature 96 °F (35.6 °C), temperature source Temporal, resp. rate 18, height 170.2 cm (67\"), weight 88.5 kg (195 lb), SpO2 97%.  Physical Exam:   General: patient awake, alert and cooperative    Assessment & Plan     Diagnosis:  Personal hx of colon polyps    Anticipated Surgical Procedure:  Colonoscopy    The risks, benefits, and alternatives of this procedure have been discussed with the patient or the responsible party- the patient understands and agrees to proceed.                                                                 "

## 2024-11-26 NOTE — DISCHARGE INSTRUCTIONS
ENDOSCOPY - EGD/COLONOSCOPY       ADULT CARE DISCHARGE  INSTRUCTIONS     Symptoms you may temporarily experience:      Bloating/Cramping     Dizziness     IV Irritation/tenderness     Gas or Belching     Slight fever     Small amount of blood in vomit or stool       Call Your Doctor for the following Problems: ______________________     ________________     Fever of 101 degrees or higher       Sharp abdominal  pain     Red streak up the arm from the IV site     Severe cramping        Large amount of blood in stool or vomit       Instructions for the next 24 hours after your Procedure:     Adult supervision     Do NOT drink any alcohol      Do not work today     NO important decisions     DO NOT sign any legal documents     You may shower/ bathe       DO NOT  DRIVE or operate machinery     Resume normal activity tomorrow       Discharge  Diet:     Avoid spicy/ greasy foods     Avoid any food that will cause more gas or bloating       *** Seek IMMEDIATE medical attention and call 911 if you develop symptoms such as:     Chest pain     Shortness of breath     Severe bleeding

## 2024-11-26 NOTE — ANESTHESIA POSTPROCEDURE EVALUATION
"Patient: Junito Sorto    Procedure Summary       Date: 11/26/24 Room / Location: [facility] / [facility]    Anesthesia Start: 0925 Anesthesia Stop: 1001    Procedure: COLONOSCOPY to Cecum with Polypectomy Diagnosis:       Family history of GI malignancy      (Family history of GI malignancy [Z80.0])    Surgeons: Tony Ramos MD Provider: Romeo Duenas MD    Anesthesia Type: MAC ASA Status: 3            Anesthesia Type: MAC    Vitals  Vitals Value Taken Time   /91 11/26/24 1026   Temp 36.7 °C (98 °F) 11/26/24 0959   Pulse 74 11/26/24 1020   Resp 18 11/26/24 1018   SpO2 94 % 11/26/24 1020   Vitals shown include unfiled device data.        Post Anesthesia Care and Evaluation    Patient location during evaluation: bedside  Patient participation: complete - patient participated  Level of consciousness: awake and alert  Pain management: adequate    Airway patency: patent  Anesthetic complications: No anesthetic complications    Cardiovascular status: acceptable  Respiratory status: acceptable  Hydration status: acceptable    Comments: /91   Pulse 83   Temp 36.7 °C (98 °F)   Resp 18   Ht 170.2 cm (67\")   Wt 88.5 kg (195 lb)   SpO2 95%   BMI 30.54 kg/m²     "

## 2024-11-27 ENCOUNTER — TELEPHONE (OUTPATIENT)
Dept: GASTROENTEROLOGY | Facility: CLINIC | Age: 84
End: 2024-11-27
Payer: MEDICARE

## 2024-11-27 NOTE — TELEPHONE ENCOUNTER
Sent pt Jianhart msg advising of results and recommendations. Advised to call if any questions.     Colonoscopy placed in  and recall for 11/26/27.

## 2024-11-27 NOTE — TELEPHONE ENCOUNTER
----- Message from Tony Ramos sent at 11/27/2024 10:18 AM EST -----  Tubular adenoma colon polyp  Colonoscopy recall 3 years

## 2024-12-13 DIAGNOSIS — I48.19 ATRIAL FIBRILLATION, PERSISTENT: ICD-10-CM

## 2024-12-13 RX ORDER — METOPROLOL SUCCINATE 25 MG/1
25 TABLET, EXTENDED RELEASE ORAL DAILY
Qty: 90 TABLET | Refills: 1 | Status: SHIPPED | OUTPATIENT
Start: 2024-12-13

## 2025-01-02 ENCOUNTER — OFFICE VISIT (OUTPATIENT)
Dept: FAMILY MEDICINE CLINIC | Facility: CLINIC | Age: 85
End: 2025-01-02
Payer: COMMERCIAL

## 2025-01-02 VITALS
DIASTOLIC BLOOD PRESSURE: 82 MMHG | TEMPERATURE: 96.7 F | SYSTOLIC BLOOD PRESSURE: 122 MMHG | RESPIRATION RATE: 16 BRPM | BODY MASS INDEX: 31.22 KG/M2 | WEIGHT: 198.9 LBS | HEIGHT: 67 IN | HEART RATE: 61 BPM | OXYGEN SATURATION: 97 %

## 2025-01-02 DIAGNOSIS — I48.21 PERMANENT ATRIAL FIBRILLATION: ICD-10-CM

## 2025-01-02 DIAGNOSIS — E66.812 CLASS 2 OBESITY DUE TO EXCESS CALORIES WITHOUT SERIOUS COMORBIDITY WITH BODY MASS INDEX (BMI) OF 37.0 TO 37.9 IN ADULT: ICD-10-CM

## 2025-01-02 DIAGNOSIS — E66.09 CLASS 2 OBESITY DUE TO EXCESS CALORIES WITHOUT SERIOUS COMORBIDITY WITH BODY MASS INDEX (BMI) OF 37.0 TO 37.9 IN ADULT: ICD-10-CM

## 2025-01-02 DIAGNOSIS — Z95.818 PRESENCE OF WATCHMAN LEFT ATRIAL APPENDAGE CLOSURE DEVICE: ICD-10-CM

## 2025-01-02 DIAGNOSIS — E78.00 HYPERCHOLESTEROLEMIA: Primary | ICD-10-CM

## 2025-01-02 NOTE — PROGRESS NOTES
Subjective   The ABCs of the Annual Wellness Visit  Medicare Wellness Visit      Junito Sorto is a 84 y.o. patient who presents for a Medicare Wellness Visit.    The following portions of the patient's history were reviewed and   updated as appropriate: allergies, current medications, past family history, past medical history, past social history, past surgical history, and problem list.    Compared to one year ago, the patient's physical   health is better.  Compared to one year ago, the patient's mental   health is better.    Recent Hospitalizations:  He was not admitted within the past 365 days    Current Medical Providers:  Patient Care Team:  Fan Schulz MD as PCP - General  Steven Cadena MD as Consulting Physician (Gastroenterology)  Steve Prater III, MD as Consulting Physician (Cardiology)    Outpatient Medications Prior to Visit   Medication Sig Dispense Refill    aspirin 81 MG EC tablet Take 1 tablet by mouth Daily.      atorvastatin (LIPITOR) 40 MG tablet TAKE 1 TABLET BY MOUTH DAILY 30 tablet 2    betamethasone dipropionate 0.05 % cream As Needed.      brimonidine-timolol (COMBIGAN) 0.2-0.5 % ophthalmic solution       famotidine (PEPCID) 40 MG tablet TAKE 1 TABLET BY MOUTH DAILY 90 tablet 3    loteprednol etabonate (LOTEMAX) 0.5 % gel ophthalmic gel Apply 1 drop to eye(s) as directed by provider 4 (Four) Times a Day. Left eye only      metoprolol succinate XL (TOPROL-XL) 25 MG 24 hr tablet TAKE 1 TABLET BY MOUTH DAILY 90 tablet 1    multivitamin with minerals tablet tablet Take 1 tablet by mouth Daily.      cephalexin (KEFLEX) 500 MG capsule        No facility-administered medications prior to visit.     No opioid medication identified on active medication list. I have reviewed chart for other potential  high risk medication/s and harmful drug interactions in the elderly.      Aspirin is on active medication list. Aspirin use is indicated based on review of current medical  "condition/s. Pros and cons of this therapy have been discussed today. Benefits of this medication outweigh potential harm.  Patient has been encouraged to continue taking this medication.  .      Patient Active Problem List   Diagnosis    Hyperglycemia    Obesity    Allergic rhinitis    Permanent atrial fibrillation    HLD (hyperlipidemia)    Esophageal dysphagia    Hiatal hernia    GERD (gastroesophageal reflux disease)    Bladder outlet obstruction    Sciatica of right side    Pneumonia of right lower lobe due to infectious organism    Acute respiratory failure with hypoxia    PVC (premature ventricular contraction)    Left ventricular dysfunction    Hypercholesterolemia    Acute ischemic left PCA stroke    Lumbar spine pain    Presence of Watchman left atrial appendage closure device    History of stroke    Loss of vision    Vitamin D deficiency    Encounter for screening for malignant neoplasm of colon    Weight loss, unintentional    Appetite impaired    Constipation    Family history of GI malignancy     Advance Care Planning Advance Directive is on file.  ACP discussion was held with the patient during this visit. Patient has an advance directive in EMR which is still valid.             Objective   Vitals:    01/02/25 1318   BP: 122/82   BP Location: Right arm   Patient Position: Sitting   Cuff Size: Adult   Pulse: 61   Resp: 16   Temp: 96.7 °F (35.9 °C)   TempSrc: Temporal   SpO2: 97%   Weight: 90.2 kg (198 lb 14.4 oz)   Height: 170.2 cm (67.01\")       Estimated body mass index is 31.14 kg/m² as calculated from the following:    Height as of this encounter: 170.2 cm (67.01\").    Weight as of this encounter: 90.2 kg (198 lb 14.4 oz).                Does the patient have evidence of cognitive impairment? No                                                                                                Health  Risk Assessment    Smoking Status:  Social History     Tobacco Use   Smoking Status Former    Current " packs/day: 0.00    Types: Pipe, Cigarettes    Start date: 1959    Quit date: 1961    Years since quittin.1    Passive exposure: Past   Smokeless Tobacco Never   Tobacco Comments    Limited by allergies     Alcohol Consumption:  Social History     Substance and Sexual Activity   Alcohol Use Yes    Alcohol/week: 5.0 standard drinks of alcohol    Types: 5 Glasses of wine per week    Comment: Less than 4 oz /wk since this prob started.       Fall Risk Screen  STEADI Fall Risk Assessment was completed, and patient is at HIGH risk for falls. Assessment completed on:2025    Depression Screening   Little interest or pleasure in doing things? Not at all   Feeling down, depressed, or hopeless? Not at all   PHQ-2 Total Score 0      Health Habits and Functional and Cognitive Screenin/26/2024     8:35 AM   Functional & Cognitive Status   Do you have difficulty preparing food and eating? No    Do you have difficulty bathing yourself, getting dressed or grooming yourself? No    Do you have difficulty using the toilet? No    Do you have difficulty moving around from place to place? No    Do you have trouble with steps or getting out of a bed or a chair? No    Current Diet Well Balanced Diet    Dental Exam Up to date    Eye Exam Up to date    Exercise (times per week) Other    Current Exercises Include Gardening;House Cleaning    Do you need help using the phone?  No    Are you deaf or do you have serious difficulty hearing?  No    Do you need help to go to places out of walking distance? Yes    Do you need help shopping? No    Do you need help preparing meals?  No    Do you need help with housework?  No    Do you need help with laundry? No    Do you need help taking your medications? No    Do you need help managing money? No    Do you ever drive or ride in a car without wearing a seat belt? No    Have you felt unusual stress, anger or loneliness in the last month? No    Who do you live with? Spouse     If you need help, do you have trouble finding someone available to you? No    Have you been bothered in the last four weeks by sexual problems? No    Do you have difficulty concentrating, remembering or making decisions? No        Patient-reported           Age-appropriate Screening Schedule:  Refer to the list below for future screening recommendations based on patient's age, sex and/or medical conditions. Orders for these recommended tests are listed in the plan section. The patient has been provided with a written plan.    Health Maintenance List  Health Maintenance   Topic Date Due    URINE MICROALBUMIN  Never done    ANNUAL WELLNESS VISIT  08/14/2021    COVID-19 Vaccine (8 - 2024-25 season) 11/12/2024    DIABETIC EYE EXAM  01/25/2025    HEMOGLOBIN A1C  02/20/2025    BMI FOLLOWUP  07/17/2025    LIPID PANEL  08/20/2025    COLORECTAL CANCER SCREENING  11/26/2027    TDAP/TD VACCINES (2 - Td or Tdap) 10/01/2029    RSV Vaccine - Adults  Completed    INFLUENZA VACCINE  Completed    Pneumococcal Vaccine 65+  Completed    ZOSTER VACCINE  Completed                                                                                                                                                CMS Preventative Services Quick Reference  Risk Factors Identified During Encounter  None Identified    The above risks/problems have been discussed with the patient.  Pertinent information has been shared with the patient in the After Visit Summary.  An After Visit Summary and PPPS were made available to the patient.    Follow Up:   Next Medicare Wellness visit to be scheduled in 1 year.         Additional E&M Note during same encounter follows:  Patient has additional, significant, and separately identifiable condition(s)/problem(s) that require work above and beyond the Medicare Wellness Visit     Chief Complaint  Medicare Wellness-subsequent    Subjective    Here today for Medicare annual wellness visit.    He has a history of  permanent atrial fibrillation.  For some time after finding this out he declined taking anticoagulation.    Eventually presented with a CVA which is resulted in significant visual field deficits.  No longer drives.    He is on aspirin currently for anticoagulation.  He had a Watchman device placed in March 2023.             The patient presents for a wellness visit.    He reports an improvement in his physical health compared to the previous year, with no significant emotional changes. He has not required hospitalization within the past year. He has been managing his irregular heartbeat with a machine provided by Dr. Prater. He has experienced significant weight loss, from 250 to 260 pounds to 125 pounds, which he attributes to dietary modifications, including reduced meat intake and increased consumption of vegetables and fruits. He is currently on a daily regimen of baby aspirin, multivitamins, and vitamin D3. His blood glucose level was approximately 100 during his last check. He has an advanced directive in place and does not report any cognitive impairment. However, he acknowledges a decrease in verbal communication and occasional word-finding difficulties. He has ceased driving and reading but is attempting to engage with audiobooks. He has been under the care of a urologist for his prostate condition for the past 25 years.    He experienced a cerebrovascular accident approximately 2 years ago, during which he sustained a fall while relearning to ambulate. This incident resulted in ocular trauma, necessitating lens replacement. His ophthalmologist is currently managing his intraocular pressure. Post-stroke, he has been unable to read due to visual field deficits. He has appointments scheduled with Dr. Prater in 08/2024 and with a neurologist, Felicity Holman, on 01/29/2025.    He is on metoprolol for blood pressure management.    He is on atorvastatin for cholesterol management.    He is on timolol eyedrops for  "glaucoma management.    He is on famotidine for GERD management.    Supplemental Information  He has a history of pneumonia, which required hospitalization for a week, followed by another week-long stay.    MEDICATIONS  metoprolol, atorvastatin, timolol eyedrop, famotidine, baby aspirin, multivitamin, D3    IMMUNIZATIONS  He has had 3 doses of the COVID-19 vaccine, with the most recent one in September 2024. He has also received the influenza vaccine.  Review of Systems   Constitutional: Negative.    HENT: Negative.     Respiratory: Negative.     Cardiovascular: Negative.    Musculoskeletal: Negative.    Psychiatric/Behavioral: Negative.            Objective   Vital Signs:  /82 (BP Location: Right arm, Patient Position: Sitting, Cuff Size: Adult)   Pulse 61   Temp 96.7 °F (35.9 °C) (Temporal)   Resp 16   Ht 170.2 cm (67.01\")   Wt 90.2 kg (198 lb 14.4 oz)   SpO2 97%   BMI 31.14 kg/m²   Physical Exam  Vitals and nursing note reviewed.   Constitutional:       Appearance: Normal appearance.      Comments: Pleasant, neatly groomed, no distress.   Neck:      Vascular: No carotid bruit.   Cardiovascular:      Rate and Rhythm: Rhythm irregular.      Heart sounds: Normal heart sounds. No murmur heard.     No gallop.      Comments: He has an irregularly irregular rhythm with regular rate.  Pulmonary:      Effort: No respiratory distress.      Breath sounds: Normal breath sounds. No wheezing or rales.   Neurological:      Mental Status: He is alert and oriented to person, place, and time.   Psychiatric:         Mood and Affect: Mood normal.         Behavior: Behavior normal.         Thought Content: Thought content normal.           Lungs are clear.              Results  Laboratory Studies  LDL cholesterol was 49. Blood sugar was 92.              Assessment and Plan        1. Health maintenance.  His LDL cholesterol levels have shown a significant decrease, with the most recent reading being 49. His blood glucose " levels have remained within the normal range, with a reading of 92 four months ago. He has demonstrated a commendable improvement in his dietary habits, which has positively impacted his overall health status. He is up to date on his immunizations, including COVID-19 and influenza vaccines. He is advised to continue his current medication regimen, which includes metoprolol, atorvastatin, timolol eyedrops, and famotidine. He is also taking baby aspirin once daily as recommended by his cardiologist. Routine lab work will be conducted every six months to monitor his health status.    2. Cerebrovascular accident.  He experienced a stroke approximately two years ago, which has resulted in visual field deficits and difficulty reading. He reports no significant cognitive disability but does experience some slight expressive aphasia. He is scheduled to see Dr. Prater in August 2024 and has an appointment with a neurologist, Felicity Holman, on 01/29/2025.    3. Hypertension.  He is currently taking metoprolol for blood pressure management. His heart rate is noted to be around 50, and he reports occasional irregular heartbeats. He is advised to continue his current medication regimen and follow up with his cardiologist as needed.    4. Hyperlipidemia.  He is taking atorvastatin, which has effectively lowered his LDL cholesterol to 49. He is advised to continue this medication to reduce the risk of future strokes and heart attacks.    5. Glaucoma.  He is using timolol eyedrops for glaucoma management. He reports that his eye pressure is still being adjusted by his ophthalmologist. He is advised to continue his current treatment and follow up with his eye doctor as scheduled.    6. Gastroesophageal reflux disease.  He is taking famotidine (Pepcid) for GERD, prescribed by his cardiologist. He is advised to continue this medication and report any new or worsening symptoms.    PROCEDURE  The patient underwent lens replacement  surgery following a fall approximately 2 years ago.            Follow Up   No follow-ups on file.  Patient was given instructions and counseling regarding his condition or for health maintenance advice. Please see specific information pulled into the AVS if appropriate.  Patient or patient representative verbalized consent for the use of Ambient Listening during the visit with  Fan Schulz MD for chart documentation. 1/3/2025  13:43 EST

## 2025-01-08 RX ORDER — ATORVASTATIN CALCIUM 40 MG/1
40 TABLET, FILM COATED ORAL DAILY
Qty: 30 TABLET | Refills: 2 | Status: SHIPPED | OUTPATIENT
Start: 2025-01-08

## 2025-01-29 ENCOUNTER — OFFICE VISIT (OUTPATIENT)
Dept: FAMILY MEDICINE CLINIC | Facility: CLINIC | Age: 85
End: 2025-01-29
Payer: MEDICARE

## 2025-01-29 VITALS
SYSTOLIC BLOOD PRESSURE: 144 MMHG | BODY MASS INDEX: 32.21 KG/M2 | DIASTOLIC BLOOD PRESSURE: 90 MMHG | WEIGHT: 200.4 LBS | TEMPERATURE: 97.1 F | HEIGHT: 66 IN | OXYGEN SATURATION: 95 % | HEART RATE: 65 BPM

## 2025-01-29 DIAGNOSIS — K21.9 GASTROESOPHAGEAL REFLUX DISEASE WITHOUT ESOPHAGITIS: ICD-10-CM

## 2025-01-29 DIAGNOSIS — Z86.73 HISTORY OF STROKE: Primary | ICD-10-CM

## 2025-01-29 DIAGNOSIS — I48.21 PERMANENT ATRIAL FIBRILLATION: ICD-10-CM

## 2025-01-29 DIAGNOSIS — E78.2 MIXED HYPERLIPIDEMIA: ICD-10-CM

## 2025-01-29 PROCEDURE — 1160F RVW MEDS BY RX/DR IN RCRD: CPT | Performed by: STUDENT IN AN ORGANIZED HEALTH CARE EDUCATION/TRAINING PROGRAM

## 2025-01-29 PROCEDURE — G2211 COMPLEX E/M VISIT ADD ON: HCPCS | Performed by: STUDENT IN AN ORGANIZED HEALTH CARE EDUCATION/TRAINING PROGRAM

## 2025-01-29 PROCEDURE — 1126F AMNT PAIN NOTED NONE PRSNT: CPT | Performed by: STUDENT IN AN ORGANIZED HEALTH CARE EDUCATION/TRAINING PROGRAM

## 2025-01-29 PROCEDURE — 99214 OFFICE O/P EST MOD 30 MIN: CPT | Performed by: STUDENT IN AN ORGANIZED HEALTH CARE EDUCATION/TRAINING PROGRAM

## 2025-01-29 PROCEDURE — 1159F MED LIST DOCD IN RCRD: CPT | Performed by: STUDENT IN AN ORGANIZED HEALTH CARE EDUCATION/TRAINING PROGRAM

## 2025-01-29 RX ORDER — DORZOLAMIDE HYDROCHLORIDE AND TIMOLOL MALEATE 20; 5 MG/ML; MG/ML
1 SOLUTION/ DROPS OPHTHALMIC 2 TIMES DAILY
COMMUNITY

## 2025-01-29 NOTE — PROGRESS NOTES
"Chief Complaint  Establish Care (Establishing care, not fasting, no concerns for today's visit. )    Subjective        Junito Sorto presents to [facility]  History of Present Illness  84-year-old male with atrial fibrillation s/p left atrial occlusion device, history of CVA with residual vision deficits, hyperlipidemia, GERD who presents to establish care today.    Former patient of Dr. NUNEZ.    History of CVA (left PCA) in 2022 with slight expressive aphasia, visual field deficits.  Thought to be cardioembolic in nature due to A-fib.  He is on aspirin and statin.  Follows with neurology.    Atrial fibrillation controlled with metoprolol.  He has had a left atrial occlusion device so is only on baby aspirin.  Follows with Dr. Prater.    GERD controlled with Pepcid.    Followed by ophthalmology.  He is on Combigan eyedrops but has never been diagnosed with glaucoma. Dr. Damon B/SINA.    Follows with dermatology for history of skin cancer.            Objective   Vital Signs:  /90   Pulse 65   Temp 97.1 °F (36.2 °C)   Ht 166.4 cm (65.5\")   Wt 90.9 kg (200 lb 6.4 oz)   SpO2 95%   BMI 32.84 kg/m²   Estimated body mass index is 32.84 kg/m² as calculated from the following:    Height as of this encounter: 166.4 cm (65.5\").    Weight as of this encounter: 90.9 kg (200 lb 6.4 oz).            Physical Exam  Constitutional:       General: He is not in acute distress.  Eyes:      Conjunctiva/sclera: Conjunctivae normal.   Cardiovascular:      Rate and Rhythm: Normal rate. Rhythm irregular.   Pulmonary:      Effort: Pulmonary effort is normal. No respiratory distress.   Abdominal:      Palpations: Abdomen is soft.      Tenderness: There is no abdominal tenderness.   Neurological:      Mental Status: He is alert and oriented to person, place, and time.   Psychiatric:         Mood and Affect: Mood normal.         Behavior: Behavior normal.        Result Review :  The following data was " reviewed by: Nydia Flores MD on 01/29/2025:  Common labs          8/20/2024    08:50   Common Labs   Glucose 92    BUN 14    Creatinine 0.71    Sodium 142    Potassium 3.7    Chloride 106    Calcium 9.8    Albumin 4.1    Total Bilirubin 1.6    Alkaline Phosphatase 87    AST (SGOT) 36    ALT (SGPT) 24    WBC 4.95    Hemoglobin 14.6    Hematocrit 44.6    Platelets 166    Total Cholesterol 122    Triglycerides 38    HDL Cholesterol 63    LDL Cholesterol  49    Hemoglobin A1C 5.60      Data reviewed : see HPI           Assessment and Plan   Diagnoses and all orders for this visit:    1. History of stroke (Primary)  Assessment & Plan:  Left PCA infarct in 2022  Residual visual field deficits/expressive aphasia  On [id number] and statin. Continue.  Goal BP <140/90, LDL <75, A1c <6.5%      2. Gastroesophageal reflux disease without esophagitis  Assessment & Plan:  Controlled with pepcid. Continue.       3. Mixed hyperlipidemia  Assessment & Plan:  Well controlled on atorvastatin.   Hx of CVA. Continue.       4. Permanent atrial fibrillation  Assessment & Plan:  Rate controlled with metoprolol.   Follows with Cardiology - Dr. Prater  Hx of MIKEY closure device. Also on [id number] (for hx of CVA).  Continue.               Follow Up   Return in about 6 months (around 7/29/2025) for Recheck.  Patient was given instructions and counseling regarding his condition or for health maintenance advice. Please see specific information pulled into the AVS if appropriate.

## 2025-02-05 PROBLEM — E78.00 HYPERCHOLESTEROLEMIA: Status: RESOLVED | Noted: 2022-07-26 | Resolved: 2025-02-05

## 2025-02-05 PROBLEM — J18.9 PNEUMONIA OF RIGHT LOWER LOBE DUE TO INFECTIOUS ORGANISM: Status: RESOLVED | Noted: 2022-03-03 | Resolved: 2025-02-05

## 2025-02-05 PROBLEM — J96.01 ACUTE RESPIRATORY FAILURE WITH HYPOXIA: Status: RESOLVED | Noted: 2022-03-17 | Resolved: 2025-02-05

## 2025-02-05 NOTE — ASSESSMENT & PLAN NOTE
Left PCA infarct in 2022  Residual visual field deficits/expressive aphasia  On [id number] and statin. Continue.  Goal BP <140/90, LDL <75, A1c <6.5%

## 2025-02-05 NOTE — ASSESSMENT & PLAN NOTE
Rate controlled with metoprolol.   Follows with Cardiology - Dr. Prater  Hx of MIKEY closure device. Also on [id number] (for hx of CVA).  Continue.

## 2025-03-20 ENCOUNTER — TRANSCRIBE ORDERS (OUTPATIENT)
Dept: LAB | Facility: HOSPITAL | Age: 85
End: 2025-03-20
Payer: MEDICARE

## 2025-03-20 ENCOUNTER — LAB (OUTPATIENT)
Dept: LAB | Facility: HOSPITAL | Age: 85
End: 2025-03-20
Payer: MEDICARE

## 2025-03-20 DIAGNOSIS — C61 MALIGNANT NEOPLASM OF PROSTATE: ICD-10-CM

## 2025-03-20 DIAGNOSIS — C61 MALIGNANT NEOPLASM OF PROSTATE: Primary | ICD-10-CM

## 2025-03-20 LAB — PSA SERPL-MCNC: 3.86 NG/ML (ref 0–4)

## 2025-03-20 PROCEDURE — 84153 ASSAY OF PSA TOTAL: CPT

## 2025-03-20 PROCEDURE — 36415 COLL VENOUS BLD VENIPUNCTURE: CPT

## 2025-04-07 RX ORDER — ATORVASTATIN CALCIUM 40 MG/1
40 TABLET, FILM COATED ORAL DAILY
Qty: 30 TABLET | Refills: 2 | OUTPATIENT
Start: 2025-04-07

## 2025-04-14 RX ORDER — ATORVASTATIN CALCIUM 40 MG/1
40 TABLET, FILM COATED ORAL DAILY
Qty: 30 TABLET | Refills: 2 | OUTPATIENT
Start: 2025-04-14

## 2025-04-15 RX ORDER — ATORVASTATIN CALCIUM 40 MG/1
40 TABLET, FILM COATED ORAL DAILY
Qty: 30 TABLET | Refills: 2 | Status: SHIPPED | OUTPATIENT
Start: 2025-04-15

## 2025-04-15 NOTE — TELEPHONE ENCOUNTER
Rx Refill Note  Requested Prescriptions     Pending Prescriptions Disp Refills    atorvastatin (LIPITOR) 40 MG tablet 30 tablet 2     Sig: Take 1 tablet by mouth Daily.      Last office visit with prescribing clinician: 1/29/2025   Last telemedicine visit with prescribing clinician: Visit date not found   Next office visit with prescribing clinician: 7/29/2025                         Would you like a call back once the refill request has been completed: [] Yes [] No    If the office needs to give you a call back, can they leave a voicemail: [] Yes [] No    Angela Lanier MA  04/15/25, 10:26 EDT

## 2025-04-29 ENCOUNTER — APPOINTMENT (OUTPATIENT)
Dept: GENERAL RADIOLOGY | Facility: HOSPITAL | Age: 85
End: 2025-04-29
Payer: MEDICARE

## 2025-04-29 ENCOUNTER — HOSPITAL ENCOUNTER (EMERGENCY)
Facility: HOSPITAL | Age: 85
Discharge: HOME | End: 2025-04-29
Attending: EMERGENCY MEDICINE
Payer: MEDICARE

## 2025-04-29 VITALS
RESPIRATION RATE: 16 BRPM | OXYGEN SATURATION: 93 % | SYSTOLIC BLOOD PRESSURE: 168 MMHG | TEMPERATURE: 98 F | HEART RATE: 68 BPM | DIASTOLIC BLOOD PRESSURE: 99 MMHG

## 2025-04-29 DIAGNOSIS — M25.562 ACUTE PAIN OF LEFT KNEE: Primary | ICD-10-CM

## 2025-04-29 PROCEDURE — 73560 X-RAY EXAM OF KNEE 1 OR 2: CPT

## 2025-04-29 PROCEDURE — 99283 EMERGENCY DEPT VISIT LOW MDM: CPT

## 2025-04-29 NOTE — ED PROVIDER NOTES
EMERGENCY DEPARTMENT ENCOUNTER    Room Number:  S03/03  Date of encounter:  4/29/2025  PCP: Nydia Flores MD  Historian: Patient, spouse  Chronic or social conditions impacting care (social determinants of health): Nothing    HPI:  Chief Complaint: Left knee pain  A complete HPI/ROS/PMH/PSH/SH/FH are unobtainable due to: Nothing    Context: Junito Sorto is a 84 y.o. male with a history of stroke, A-fib, who presents to the ED c/o acute left knee pain x 2 weeks.  Patient states the pain started off very minor and is gradually become worse.  The pain is worse first thing in the morning and seems to improve throughout the day.  Pain is localized to the knee without any radiation of pain distally.  He denies any back pain.  He denies any fevers or chills.  Denies any chest pain or shortness of breath.    Review of prior external notes (non-ED):   I reviewed a family medicine office visit dated 1/29/2025.  Patient seen for history of stroke, hyperlipidemia.    Review of prior external test results outside of this encounter:  I reviewed a CMP dated 8/20/2024.  Creatinine 0.71, potassium 3.7    PAST MEDICAL HISTORY  Active Ambulatory Problems     Diagnosis Date Noted    Hyperglycemia 02/16/2016    Obesity 02/25/2016    Allergic rhinitis 03/28/2016    Permanent atrial fibrillation 03/28/2016    HLD (hyperlipidemia) 03/28/2016    Esophageal dysphagia 10/09/2018    Hiatal hernia 02/05/2019    GERD (gastroesophageal reflux disease) 02/05/2019    Bladder outlet obstruction 09/11/2019    Sciatica of right side 12/10/2020    PVC (premature ventricular contraction)     Left ventricular dysfunction     Acute ischemic left PCA stroke 10/19/2022    Lumbar spine pain 07/11/2023    Presence of Watchman left atrial appendage closure device 08/08/2023    History of stroke 09/29/2023    Loss of vision 09/29/2023    Vitamin D deficiency 01/19/2024    Encounter for screening for malignant neoplasm of colon 03/10/2024    Weight loss,  unintentional 09/18/2024    Appetite impaired 09/18/2024    Constipation 09/18/2024    Family history of GI malignancy 07/22/2024     Resolved Ambulatory Problems     Diagnosis Date Noted    Hypertension 02/25/2016    Benign essential hypertension 03/28/2016    Acute cystitis 02/26/2017    Bacterial conjunctivitis 03/13/2017    Impacted cerumen of right ear 01/13/2019    Prostate cancer 09/11/2019    Pneumonia of right lower lobe due to infectious organism 03/03/2022    Hypoxia 03/03/2022    Pneumonia due to infectious organism 03/17/2022    Acute respiratory failure with hypoxia 03/17/2022    Orthopnea 03/17/2022    Hypercholesterolemia 07/26/2022     Past Medical History:   Diagnosis Date    Allergic     Arrhythmia     Arthritis     At risk for obstructive sleep apnea     Atrial fibrillation     BPH (benign prostatic hyperplasia)     Cataract     Colon polyp     Coronary artery disease Few years    Dysphagia     Eczema     Heart murmur Years ago, some docs never    Hernia     History of medical problems 2022    HL (hearing loss)     Hyperlipidemia     Low back pain     Pneumonia 2022    Skin cancer     Stroke 10/19/2022    Visual impairment 2022 stroke         PAST SURGICAL HISTORY  Past Surgical History:   Procedure Laterality Date    ATRIAL APPENDAGE EXCLUSION LEFT WITH TRANSESOPHAGEAL ECHOCARDIOGRAM N/A 03/22/2023    Procedure: Atrial Appendage Occlusion;  Surgeon: Tony Ceballos MD;  Location: [facility] INVASIVE LOCATION;  Service: Cardiovascular;  Laterality: N/A;    CARDIAC SURGERY  2023    Watchman inserted because of continuous afib    CATARACT EXTRACTION, BILATERAL      COLONOSCOPY  unknown    COLONOSCOPY N/A 11/26/2024    Procedure: COLONOSCOPY to Cecum with Polypectomy;  Surgeon: Tony Ramos MD;  Location: [facility] MAIN OR;  Service: Gastroenterology;  Laterality: N/A;  Diverticulosis, Sigmoid Colon Polyp x2  with a Hot Snare and Cold Snare, Hemorrhoids    CYSTOSCOPY TRANSURETHRAL RESECTION OF  PROSTATE N/A 2019    Procedure: CYSTOSCOPY TRANSURETHRAL RESECTION OF PROSTATE;  Surgeon: Chip Bedolla MD;  Location: [facility] MAIN OR;  Service: Urology    ENDOSCOPY N/A 10/31/2018    LA Grade C reflux esophagitis, HH, erythematous mucosa in the antrum. Path: Gastritis, esophagitis.     ENDOSCOPY N/A 2022    Procedure: ESOPHAGOGASTRODUODENOSCOPY WITH BIOPSIES AND NAILS DILATION #56;  Surgeon: Tony Ramos MD;  Location[facility] ENDOSCOPY;  Service: Gastroenterology;  Laterality: N/A;  PRE OP - DYSPHAGIA  POST OP- MILD GASTRITIS    EPIDURAL BLOCK      ESOPHAGEAL DILATATION      EYE SURGERY      Cataracts    PROSTATE SURGERY  2014    SKIN CANCER EXCISION      multiple, over that last few years    SKIN CANCER EXCISION Right 2019    FACE,     SKIN CANCER EXCISION          TONSILLECTOMY      around 1950    TRANSURETHRAL RESECTION OF BLADDER N/A     UPPER GASTROINTESTINAL ENDOSCOPY  2013    VASECTOMY  1980s         FAMILY HISTORY  Family History   Problem Relation Age of Onset    Cancer Mother         Pancriatic    Liver disease Mother     Arthritis Mother     Cancer Father         Colon,    Colon cancer Father     Arrhythmia Father         Afib    Heart disease Father     Cancer Brother         Prostate    Arrhythmia Brother         Afib    Hearing loss Brother     Cancer Paternal Grandfather         Prostate    Arrhythmia Paternal Aunt         Irregular beat . Before the time of illness sharing.    Arthritis Sister     Cancer Son         Melanoma    Cancer Son         None    Heart disease Son         Afib    Malig Hyperthermia Neg Hx          SOCIAL HISTORY  Social History     Socioeconomic History    Marital status:    Tobacco Use    Smoking status: Former     Current packs/day: 0.00     Types: Cigarettes, Pipe     Start date: 1959     Quit date: 1961     Years since quittin.5     Passive exposure: Past    Smokeless tobacco: Never    Tobacco comments:      Limited by allergies   Vaping Use    Vaping status: Never Used   Substance and Sexual Activity    Alcohol use: Yes     Alcohol/week: 5.0 standard drinks of alcohol     Types: 5 Glasses of wine per week     Comment: Less than 4 oz /wk since this prob started.    Drug use: Never    Sexual activity: Not Currently     Partners: Female     Birth control/protection: None, Vasectomy, Surgical     Comment: 50 years ago         ALLERGIES  Dilaudid [hydromorphone hcl] and Hydromorphone        REVIEW OF SYSTEMS  All systems reviewed and negative except for those discussed in HPI.       PHYSICAL EXAM    I have reviewed the triage vital signs and nursing notes.    ED Triage Vitals   Temp Heart Rate Resp BP SpO2   04/29/25 1556 04/29/25 1556 04/29/25 1556 04/29/25 1610 04/29/25 1556   98 °F (36.7 °C) 68 16 (!) 179/108 93 %      Temp src Heart Rate Source Patient Position BP Location FiO2 (%)   -- -- -- -- --              Physical Exam  GENERAL: Alert, oriented, not distressed  HENT: head atraumatic, no nuchal rigidity  EYES: no scleral icterus, EOMI  CV: Irregular rhythm, regular rate, no murmur  RESPIRATORY: normal effort, CTA  ABDOMEN: soft, nontender  MUSCULOSKELETAL: Mild swelling to the left knee.  No erythema.  No laxity or significant tenderness.  Left calf is soft and nontender.  Pulses intact in the foot.  Painless range of motion of the left foot.  NEURO: alert, moves all extremities, follows commands  SKIN: warm, dry    RADIOLOGY  XR Knee 1 or 2 View Left  Result Date: 4/29/2025  XR KNEE 1 OR 2 VW LEFT-  DATE OF EXAM: 4/29/2025 5:22 PM  INDICATION: Left knee pain for 2 weeks.  COMPARISON: None available.  TECHNIQUE: 2 views of the knee were obtained.  FINDINGS: No evidence of acute fracture or dislocation. The joint spaces are fairly well-maintained. No significant joint effusion. Mild diffuse soft tissue swelling.      Mild diffuse soft tissue swelling, without radiographic evidence of acute fracture or dislocation.   This report was finalized on 4/29/2025 5:55 PM by Marty Odom MD on Workstation: [id number]        I ordered the above noted radiological studies. Reviewed by me and discussed with radiologist.  See dictation for official radiology interpretation.      MEDICATIONS GIVEN IN ER    Medications - No data to display      ADDITIONAL ORDERS CONSIDERED BUT NOT ORDERED:  Admission was considered but after careful review of the patient's presentation, physical examination, diagnostic results, and response to treatment the patient may be safely discharged with outpatient follow-up.       PROGRESS, DATA ANALYSIS, CONSULTS, AND MEDICAL DECISION MAKING    All labs have been independently interpreted by myself.  All radiology studies have been independently interpreted by myself and discussed with radiologist dictating the report.   EKGs independently interpreted by myself.  Discussion below represents my analysis of pertinent findings related to patient's condition, differential diagnosis, treatment plan and final disposition.    I have discussed case with Dr. Cha, emergency room physician.  He has performed his own bedside examination and agrees with treatment plan.    ED Course as of 04/29/25 1810 Tue Apr 29, 2025   1702 Patient presents with atraumatic left knee pain x 1-2 weeks.  No significant swelling, erythema.  No calf tenderness.  Neurovascularly intact distally.  Plan for x-rays. [EE]   1732 Left knee imaging independently interpreted myself shows no evidence of acute fracture. [EE]   1807 Updated patient on workup.  He does complain of pretty diffuse vague pain including his knee and foot.  There is no significant calf swelling or tenderness.  Calf is soft.  There is no numbness or tingling distal to the knee.  Suspect likely knee sprain.  We will have him follow-up with orthopedist [EE]      ED Course User Index  [EE] Andrés Cuba PA       AS OF 18:10 EDT VITALS:    BP - (!) 179/108  HR - 68  TEMP - 98 °F  (36.7 °C)  O2 SATS - 93%        DIAGNOSIS  Final diagnoses:   Acute pain of left knee         DISPOSITION  Discharged    Admission was considered but after careful review of the patient's presentation, physical examination, diagnostic results, and response to treatment the patient may be safely discharged with outpatient follow-up.         Dictated utilizing Paperlit dictation     nAdrés Cuba PA  04/29/25 9232

## 2025-05-05 ENCOUNTER — OFFICE VISIT (OUTPATIENT)
Dept: ORTHOPEDIC SURGERY | Facility: CLINIC | Age: 85
End: 2025-05-05
Payer: MEDICARE

## 2025-05-05 VITALS — TEMPERATURE: 97.7 F | BODY MASS INDEX: 33.29 KG/M2 | HEIGHT: 64 IN | WEIGHT: 195 LBS

## 2025-05-05 DIAGNOSIS — M25.562 PATELLOFEMORAL ARTHRALGIA OF LEFT KNEE: Primary | ICD-10-CM

## 2025-05-05 PROCEDURE — 1160F RVW MEDS BY RX/DR IN RCRD: CPT | Performed by: NURSE PRACTITIONER

## 2025-05-05 PROCEDURE — 1159F MED LIST DOCD IN RCRD: CPT | Performed by: NURSE PRACTITIONER

## 2025-05-05 PROCEDURE — 99213 OFFICE O/P EST LOW 20 MIN: CPT | Performed by: NURSE PRACTITIONER

## 2025-05-05 RX ORDER — CEPHALEXIN 500 MG/1
CAPSULE ORAL
COMMUNITY
Start: 2025-04-29

## 2025-05-05 NOTE — PROGRESS NOTES
[facility]              New Problem      Patient Name: Junito Sorto  : 1940  Primary Care Physician: Nydia Flores MD        Chief Complaint:  Left knee pain    HPI:   Junito Sorto is a 84 y.o. year old who presents today for evaluation.  History of Present Illness  The patient presents for evaluation of left knee pain.    He reports experiencing discomfort in his left knee, which he attributes to increased physical activity, specifically ascending and descending stairs, during a recent visit from his son and grandson on 2025. The pain, initially expected to subside within two days, has persisted. He describes the pain as intermittent, radiating down the inside of his leg, and primarily localized to the knee. He also reports a sensation of tightness upon standing after sitting for extended periods. He does not experience any numbness or tingling but notes a lack of sensation when his toes are injured. He reported he has been diagnosed with neuropathy. Does have some underlying lumbar spine pathology as well. He has been using an old walker for mobility and has not observed any significant swelling in the knee. His sleep is not disturbed by the knee pain. He has found some relief with heat application and Tylenol. He feels like it is improving somewhat.    He has a history of stroke approximately two years ago, which has resulted in residual instability. He has undergone physical therapy since his stroke, including the use of exercise bands, but did not find them beneficial. He underwent an MRI of his back in . He has lost approximately 50 pounds over the past year, from 250 to 190 pounds.    He has prostate cancer.            Past Medical/Surgical, Social and Family History:  I have reviewed and/or updated pertinent history as noted in the medical record including:  Past Medical History:   Diagnosis Date    Allergic     Airborne    Arrhythmia     Arthritis     At risk for  obstructive sleep apnea     5    Atrial fibrillation     Bladder outlet obstruction 09/11/2019    BPH (benign prostatic hyperplasia)     Cataract     Replaced    Colon polyp     Coronary artery disease Few years    Afib    Dysphagia     Eczema     GERD (gastroesophageal reflux disease) 2013    Mentioned as possible    Heart murmur Years ago, some docs never    Hernia     Your office reported hiatal    Hiatal hernia     History of medical problems 2022    Xs pressure in left eye. Now being treated in 2023    HL (hearing loss)     Hyperlipidemia     Hypertension     Left ventricular dysfunction     Low back pain     Obesity     Pneumonia 2022    Prostate cancer     Skin cancer     Stroke 10/19/2022    RESIDUAL: LOSS OF RT PERIPHERAL VISION    Visual impairment 2022 stroke    Reading correction     Past Surgical History:   Procedure Laterality Date    ATRIAL APPENDAGE EXCLUSION LEFT WITH TRANSESOPHAGEAL ECHOCARDIOGRAM N/A 03/22/2023    Procedure: Atrial Appendage Occlusion;  Surgeon: Tony Ceballos MD;  Location: [facility] INVASIVE LOCATION;  Service: Cardiovascular;  Laterality: N/A;    CARDIAC SURGERY  2023    Watchman inserted because of continuous afib    CATARACT EXTRACTION, BILATERAL      COLONOSCOPY  unknown    COLONOSCOPY N/A 11/26/2024    Procedure: COLONOSCOPY to Cecum with Polypectomy;  Surgeon: Tony Ramos MD;  Location: [facility] OR;  Service: Gastroenterology;  Laterality: N/A;  Diverticulosis, Sigmoid Colon Polyp x2  with a Hot Snare and Cold Snare, Hemorrhoids    CYSTOSCOPY TRANSURETHRAL RESECTION OF PROSTATE N/A 09/11/2019    Procedure: CYSTOSCOPY TRANSURETHRAL RESECTION OF PROSTATE;  Surgeon: Chip Bedolla MD;  Location: [facility] OR;  Service: Urology    ENDOSCOPY N/A 10/31/2018    LA Grade C reflux esophagitis, HH, erythematous mucosa in the antrum. Path: Gastritis, esophagitis.     ENDOSCOPY N/A 03/20/2022    Procedure: ESOPHAGOGASTRODUODENOSCOPY WITH BIOPSIES AND NAILS  DILATION #56;  Surgeon: Tony Ramos MD;  Location: [facility] ENDOSCOPY;  Service: Gastroenterology;  Laterality: N/A;  PRE OP - DYSPHAGIA  POST OP- MILD GASTRITIS    EPIDURAL BLOCK      ESOPHAGEAL DILATATION      EYE SURGERY      Cataracts    PROSTATE SURGERY  2014    SKIN CANCER EXCISION      multiple, over that last few years    SKIN CANCER EXCISION Right 2019    FACE,     SKIN CANCER EXCISION          SKIN CANCER EXCISION Left 2025    left side of neck    TONSILLECTOMY      around 1950    TRANSURETHRAL RESECTION OF BLADDER N/A     UPPER GASTROINTESTINAL ENDOSCOPY      VASECTOMY       Social History     Occupational History     Comment: retired   Tobacco Use    Smoking status: Former     Current packs/day: 0.00     Types: Cigarettes, Pipe     Start date: 1959     Quit date: 1961     Years since quittin.5     Passive exposure: Past    Smokeless tobacco: Never    Tobacco comments:     Limited by allergies   Vaping Use    Vaping status: Never Used   Substance and Sexual Activity    Alcohol use: Yes     Alcohol/week: 5.0 standard drinks of alcohol     Types: 5 Glasses of wine per week     Comment: Less than 4 oz /wk since this prob started.    Drug use: Never    Sexual activity: Not Currently     Partners: Female     Birth control/protection: None, Vasectomy, Surgical     Comment: 50 years ago          Allergies:   Allergies   Allergen Reactions    Dilaudid [Hydromorphone Hcl] Hallucinations    Hydromorphone Anxiety and Other (See Comments)     Other reaction(s): Hallucinations       Medications:   Home Medications:  Current Outpatient Medications on File Prior to Visit   Medication Sig    Acetaminophen (TYLENOL PO) Take  by mouth As Needed.    aspirin 81 MG EC tablet Take 1 tablet by mouth Daily.    atorvastatin (LIPITOR) 40 MG tablet Take 1 tablet by mouth Daily.    betamethasone dipropionate 0.05 % cream As Needed.    brimonidine-timolol (COMBIGAN) 0.2-0.5 % ophthalmic  solution     cephalexin (KEFLEX) 500 MG capsule     famotidine (PEPCID) 40 MG tablet TAKE 1 TABLET BY MOUTH DAILY    metoprolol succinate XL (TOPROL-XL) 25 MG 24 hr tablet TAKE 1 TABLET BY MOUTH DAILY    multivitamin with minerals tablet tablet Take 1 tablet by mouth Daily.    NON FORMULARY Lotoprednol eye drops    dorzolamide-timolol (COSOPT) 2-0.5 % ophthalmic solution 1 drop 2 (Two) Times a Day. (Patient not taking: Reported on 5/5/2025)     No current facility-administered medications on file prior to visit.         ROS:  ROS negative except as listed in the HPI.    Physical Exam:   84 y.o. male  Body mass index is 33.47 kg/m²., 88.5 kg (195 lb)  Vitals:    05/05/25 1008   Temp: 97.7 °F (36.5 °C)     General: Alert, cooperative, appears well and in no observable distress. Appears stated age and BMI as listed above.  HEENT: Normocephalic, atraumatic on external visual inspection.  CV: No significant peripheral edema.  Respiratory: Normal respiratory effort.  Skin: Warm & well perfused; appropriate skin turgor.  Psych: Appropriate mood & affect.  Neuro: Gross sensation and motor intact in affected extremity/extremities.  Vascular: Peripheral pulses palpable in affected extremity/extremities.   Physical Exam      MSK Exam:  Left Knee  No deformity or wounds appreciated. No significant redness or warmth.  Trace effusion noted  Tenderness along the joint line appreciated none  ROM 0-130, +crepitus  Ligamentous exam grossly stable  Negative Emmy  Negative Bounce home  Quad strength 4 /5    Right Knee  No deformity or wounds appreciated. No significant redness or warmth.  No significant effusion noted.  No significant tenderness appreciated about the joint.  ROM 0-130 and painless.  Ligamentous exam grossly stable  Quad strength 4+ to 5/5    Brief hip exam in the affected extremity(ies) grossly unremarkable.  Moves ankle and toes up and down, no significant pain or swelling in the foot, ankle or calf.           Radiology:    Images were taken prior to the visit today and were independently reviewed by me. I agree with the findings. I appreciate some mild patellofemoral OA but joint spaces appear reasonable however this was a non-standing film.    XR KNEE 1 OR 2 VW LEFT-     DATE OF EXAM: 4/29/2025 5:22 PM     INDICATION: Left knee pain for 2 weeks.     COMPARISON: None available.     TECHNIQUE: 2 views of the knee were obtained.     FINDINGS:  No evidence of acute fracture or dislocation. The joint spaces are  fairly well-maintained. No significant joint effusion. Mild diffuse soft  tissue swelling.     IMPRESSION:  Mild diffuse soft tissue swelling, without radiographic evidence of  acute fracture or dislocation.     This report was finalized on 4/29/2025 5:55 PM by Marty Odom MD on  Workstation: [id number]  Results      Procedure:   N/A      Misc. Data/Labs: N/A    Assessment & Plan:      ICD-10-CM ICD-9-CM   1. Patellofemoral arthralgia of left knee  M25.562 719.46     No orders of the defined types were placed in this encounter.    No orders of the defined types were placed in this encounter.      Assessment & Plan  1. Left knee pain: I suspect this is primarily patellofemoral pain although his exam was grossly unremarkable today. This all started after excessive stair climbing which is out of the usual routine for him.    Symptoms suggest a potential exacerbation of mild arthritis in the left knee, possibly due to overexertion. Pain may also be a result of compensatory mechanisms due to overall weakness, leading to referred pain. Weight loss could have contributed to muscle weakness, further exacerbating the knee pain. Continue using the [employer] bike at low resistance and incorporate other resistance exercises, particularly for the hip muscles. I would like him to add Voltaren gel twice daily to alleviate inflammation. If pain persists, consider a cortisone injection. If there is no improvement, further  diagnostic measures such as an MRI or a review of the back condition may be necessary. We did talk about PT formally but he would prefer a home exercise regimen first and still has his therabands and handouts.    Follow-up  Follow up in 4 weeks- sooner if needed.    Continue with activity modifications as needed/discussed.  Continue ICE and/or HEAT PRN.  Recommend to continue activity as tolerated, focus on quad and hip/core strengthening as well as gentle stretching.  Recommend lowering or maintaining BMI within a healthy range to reduce symptoms.   Patient encouraged to call with questions or concerns prior to follow up.  Will discuss with attending as needed.   Consider additional referrals, work up and/or advanced imaging as indicated or if patient fails to respond to conservative care.    Return in about 4 weeks (around 6/2/2025) for Recheck. If symptoms change call for sooner appt..    Patient or patient representative verbalized consent for the use of Ambient Listening during the visit with  KOLBY Barillas for chart documentation. 5/5/2025  12:32 EDT        KOLBY Zimmerman    Dictation software was used to complete a portion or all of this note.

## 2025-05-07 DIAGNOSIS — Z00.00 ROUTINE GENERAL MEDICAL EXAMINATION AT A HEALTH CARE FACILITY: Primary | ICD-10-CM

## 2025-05-07 DIAGNOSIS — E11.9 TYPE 2 DIABETES MELLITUS WITHOUT COMPLICATION, WITHOUT LONG-TERM CURRENT USE OF INSULIN: ICD-10-CM

## 2025-05-07 DIAGNOSIS — Z13.0 SCREENING FOR DEFICIENCY ANEMIA: ICD-10-CM

## 2025-05-07 DIAGNOSIS — Z13.6 SCREENING FOR ISCHEMIC HEART DISEASE: ICD-10-CM

## 2025-05-15 ENCOUNTER — TELEPHONE (OUTPATIENT)
Dept: CARDIOLOGY | Facility: HOSPITAL | Age: 85
End: 2025-05-15
Payer: MEDICARE

## 2025-05-15 NOTE — TELEPHONE ENCOUNTER
I called & spoke with Junito as a 2-year follow up after LAAO/Watchman 3/22/23. Patient reports he is doing fine r/t the Watchman device. He is currently taking ASA 81mg daily without problems. No hospital admissions within the last year. He reports feeling more tired and his legs are weaker. We discussed his current activities and exercise, he notes he is not exercising outside of some light housework. I encouraged him to go for short walks and gradually build up those walks. He recently injured his knee but is just now walking with it better. I encouraged Junito to reach out with any concerns. RTRN

## 2025-06-09 DIAGNOSIS — I48.19 ATRIAL FIBRILLATION, PERSISTENT: ICD-10-CM

## 2025-06-09 RX ORDER — METOPROLOL SUCCINATE 25 MG/1
25 TABLET, EXTENDED RELEASE ORAL DAILY
Qty: 90 TABLET | Refills: 1 | Status: SHIPPED | OUTPATIENT
Start: 2025-06-09

## 2025-07-14 RX ORDER — ATORVASTATIN CALCIUM 40 MG/1
40 TABLET, FILM COATED ORAL DAILY
Qty: 30 TABLET | Refills: 2 | Status: SHIPPED | OUTPATIENT
Start: 2025-07-14

## 2025-07-21 ENCOUNTER — LAB (OUTPATIENT)
Dept: LAB | Facility: HOSPITAL | Age: 85
End: 2025-07-21
Payer: MEDICARE

## 2025-07-21 DIAGNOSIS — Z00.00 ROUTINE GENERAL MEDICAL EXAMINATION AT A HEALTH CARE FACILITY: ICD-10-CM

## 2025-07-21 DIAGNOSIS — Z13.0 SCREENING FOR DEFICIENCY ANEMIA: ICD-10-CM

## 2025-07-21 LAB
BASOPHILS # BLD AUTO: 0.04 10*3/MM3 (ref 0–0.2)
BASOPHILS NFR BLD AUTO: 0.8 % (ref 0–1.5)
DEPRECATED RDW RBC AUTO: 42.2 FL (ref 37–54)
EOSINOPHIL # BLD AUTO: 0.41 10*3/MM3 (ref 0–0.4)
EOSINOPHIL NFR BLD AUTO: 8.5 % (ref 0.3–6.2)
ERYTHROCYTE [DISTWIDTH] IN BLOOD BY AUTOMATED COUNT: 12.4 % (ref 12.3–15.4)
HCT VFR BLD AUTO: 40.4 % (ref 37.5–51)
HGB BLD-MCNC: 13.8 G/DL (ref 13–17.7)
IMM GRANULOCYTES # BLD AUTO: 0.02 10*3/MM3 (ref 0–0.05)
IMM GRANULOCYTES NFR BLD AUTO: 0.4 % (ref 0–0.5)
LYMPHOCYTES # BLD AUTO: 1.23 10*3/MM3 (ref 0.7–3.1)
LYMPHOCYTES NFR BLD AUTO: 25.4 % (ref 19.6–45.3)
MCH RBC QN AUTO: 31.7 PG (ref 26.6–33)
MCHC RBC AUTO-ENTMCNC: 34.2 G/DL (ref 31.5–35.7)
MCV RBC AUTO: 92.9 FL (ref 79–97)
MONOCYTES # BLD AUTO: 0.45 10*3/MM3 (ref 0.1–0.9)
MONOCYTES NFR BLD AUTO: 9.3 % (ref 5–12)
NEUTROPHILS NFR BLD AUTO: 2.69 10*3/MM3 (ref 1.7–7)
NEUTROPHILS NFR BLD AUTO: 55.6 % (ref 42.7–76)
NRBC BLD AUTO-RTO: 0 /100 WBC (ref 0–0.2)
PLATELET # BLD AUTO: 165 10*3/MM3 (ref 140–450)
PMV BLD AUTO: 10.8 FL (ref 6–12)
RBC # BLD AUTO: 4.35 10*6/MM3 (ref 4.14–5.8)
WBC NRBC COR # BLD AUTO: 4.84 10*3/MM3 (ref 3.4–10.8)

## 2025-07-21 PROCEDURE — 36415 COLL VENOUS BLD VENIPUNCTURE: CPT

## 2025-07-21 PROCEDURE — 85025 COMPLETE CBC W/AUTO DIFF WBC: CPT

## 2025-07-23 ENCOUNTER — OFFICE VISIT (OUTPATIENT)
Dept: FAMILY MEDICINE CLINIC | Facility: CLINIC | Age: 85
End: 2025-07-23
Payer: MEDICARE

## 2025-07-23 VITALS
DIASTOLIC BLOOD PRESSURE: 84 MMHG | SYSTOLIC BLOOD PRESSURE: 150 MMHG | WEIGHT: 194 LBS | OXYGEN SATURATION: 96 % | HEART RATE: 42 BPM | BODY MASS INDEX: 33.12 KG/M2 | HEIGHT: 64 IN | TEMPERATURE: 98.4 F

## 2025-07-23 DIAGNOSIS — E78.2 MIXED HYPERLIPIDEMIA: ICD-10-CM

## 2025-07-23 DIAGNOSIS — R63.4 WEIGHT LOSS, UNINTENTIONAL: ICD-10-CM

## 2025-07-23 DIAGNOSIS — E11.9 TYPE 2 DIABETES MELLITUS WITHOUT COMPLICATION, WITHOUT LONG-TERM CURRENT USE OF INSULIN: ICD-10-CM

## 2025-07-23 DIAGNOSIS — I48.21 PERMANENT ATRIAL FIBRILLATION: Primary | ICD-10-CM

## 2025-07-23 DIAGNOSIS — Z86.73 HISTORY OF STROKE: ICD-10-CM

## 2025-07-23 RX ORDER — ATORVASTATIN CALCIUM 40 MG/1
40 TABLET, FILM COATED ORAL NIGHTLY
Qty: 90 TABLET | Refills: 3 | Status: SHIPPED | OUTPATIENT
Start: 2025-07-23

## 2025-07-23 NOTE — PROGRESS NOTES
"Chief Complaint  Hyperlipidemia    Subjective        Junito Sorto presents to [facility]  History of Present Illness  84-year-old male with atrial fibrillation s/p left atrial occlusion device, history of CVA with residual vision deficits, hyperlipidemia, GERD who presents for follow up of Afib/CVA/HLD.    History of CVA (left PCA) in 2022 with slight expressive aphasia, visual field deficits.  Thought to be cardioembolic in nature due to A-fib.  He is on aspirin and statin.  Follows with neurology.    Atrial fibrillation controlled with metoprolol.  He has had a left atrial occlusion device so is only on baby aspirin.  Follows with Dr. Prater.  Follows with dermatology for history of skin cancer. Plans to have SCC/BCC removal within next month.            Objective   Vital Signs:  /84   Pulse (!) 42   Temp 98.4 °F (36.9 °C)   Ht 162.6 cm (64\")   Wt 88 kg (194 lb)   SpO2 96%   BMI 33.30 kg/m²   Estimated body mass index is 33.3 kg/m² as calculated from the following:    Height as of this encounter: 162.6 cm (64\").    Weight as of this encounter: 88 kg (194 lb).        Physical Exam  Constitutional:       General: He is not in acute distress.  Eyes:      Conjunctiva/sclera: Conjunctivae normal.   Cardiovascular:      Rate and Rhythm: Normal rate. Rhythm irregular.   Pulmonary:      Effort: Pulmonary effort is normal. No respiratory distress.   Abdominal:      Palpations: Abdomen is soft.      Tenderness: There is no abdominal tenderness.   Neurological:      Mental Status: He is alert and oriented to person, place, and time.   Psychiatric:         Mood and Affect: Mood normal.         Behavior: Behavior normal.        Result Review :  The following data was reviewed by: Nydia Flores MD on 07/23/2025:  Common labs          8/20/2024    08:50 3/20/2025    11:31 7/21/2025    10:06   Common Labs   Glucose 92      BUN 14      Creatinine 0.71      Sodium 142      Potassium " 3.7      Chloride 106      Calcium 9.8      Albumin 4.1      Total Bilirubin 1.6      Alkaline Phosphatase 87      AST (SGOT) 36      ALT (SGPT) 24      WBC 4.95   4.84    Hemoglobin 14.6   13.8    Hematocrit 44.6   40.4    Platelets 166   165    Total Cholesterol 122      Triglycerides 38      HDL Cholesterol 63      LDL Cholesterol  49      Hemoglobin A1C 5.60      PSA  3.860       Data reviewed : none          Assessment and Plan   Diagnoses and all orders for this visit:    1. Permanent atrial fibrillation (Primary)  Assessment & Plan:  Rate controlled with metoprolol.   Follows with Cardiology - Dr. Prater  Hx of MIKEY closure device. Also on [id number] (for hx of CVA).  Continue.      2. Weight loss, unintentional  Assessment & Plan:  Has stabilized around 200lbs.  Continue to monitor.      3. History of stroke  -     atorvastatin (LIPITOR) 40 MG tablet; Take 1 tablet by mouth Every Night.  Dispense: 90 tablet; Refill: 3    4. Type 2 diabetes mellitus without complication, without long-term current use of insulin  Assessment & Plan:  Well controlled with diet/lifestyle.  Last A1c 5.6%  Continue to monitor.     Orders:  -     atorvastatin (LIPITOR) 40 MG tablet; Take 1 tablet by mouth Every Night.  Dispense: 90 tablet; Refill: 3    5. Mixed hyperlipidemia  Assessment & Plan:  Well controlled on atorvastatin.   Hx of CVA. Continue.   Repeat lipid panel yearly. Goal LDL <80               Follow Up   Return in about 6 months (around 1/23/2026) for Recheck.  Patient was given instructions and counseling regarding his condition or for health maintenance advice. Please see specific information pulled into the AVS if appropriate.

## 2025-07-23 NOTE — ASSESSMENT & PLAN NOTE
Rate controlled with metoprolol.   Follows with Cardiology - Dr. Prater  Hx of MIKEY closure device. Also on [id number] (for hx of CVA).  Continue.

## 2025-07-29 ENCOUNTER — LAB (OUTPATIENT)
Dept: LAB | Facility: HOSPITAL | Age: 85
End: 2025-07-29
Payer: MEDICARE

## 2025-07-29 DIAGNOSIS — E11.9 TYPE 2 DIABETES MELLITUS WITHOUT COMPLICATION, WITHOUT LONG-TERM CURRENT USE OF INSULIN: ICD-10-CM

## 2025-07-29 DIAGNOSIS — Z00.00 ROUTINE GENERAL MEDICAL EXAMINATION AT A HEALTH CARE FACILITY: ICD-10-CM

## 2025-07-29 DIAGNOSIS — Z13.6 SCREENING FOR ISCHEMIC HEART DISEASE: ICD-10-CM

## 2025-07-29 LAB
ALBUMIN SERPL-MCNC: 4 G/DL (ref 3.5–5.2)
ALBUMIN UR-MCNC: <1.2 MG/DL
ALBUMIN/GLOB SERPL: 1.7 G/DL
ALP SERPL-CCNC: 72 U/L (ref 39–117)
ALT SERPL W P-5'-P-CCNC: 22 U/L (ref 1–41)
ANION GAP SERPL CALCULATED.3IONS-SCNC: 9.6 MMOL/L (ref 5–15)
AST SERPL-CCNC: 30 U/L (ref 1–40)
BILIRUB SERPL-MCNC: 1.3 MG/DL (ref 0–1.2)
BUN SERPL-MCNC: 14 MG/DL (ref 8–23)
BUN/CREAT SERPL: 21.5 (ref 7–25)
CALCIUM SPEC-SCNC: 9.8 MG/DL (ref 8.6–10.5)
CHLORIDE SERPL-SCNC: 102 MMOL/L (ref 98–107)
CHOLEST SERPL-MCNC: 127 MG/DL (ref 0–200)
CO2 SERPL-SCNC: 28.4 MMOL/L (ref 22–29)
CREAT SERPL-MCNC: 0.65 MG/DL (ref 0.76–1.27)
CREAT UR-MCNC: 44.2 MG/DL
EGFRCR SERPLBLD CKD-EPI 2021: 92.9 ML/MIN/1.73
GLOBULIN UR ELPH-MCNC: 2.4 GM/DL
GLUCOSE SERPL-MCNC: 90 MG/DL (ref 65–99)
HBA1C MFR BLD: 5.5 % (ref 4.8–5.6)
HDLC SERPL-MCNC: 66 MG/DL (ref 40–60)
LDLC SERPL CALC-MCNC: 50 MG/DL (ref 0–100)
LDLC/HDLC SERPL: 0.78 {RATIO}
MICROALBUMIN/CREAT UR: NORMAL MG/G{CREAT}
POTASSIUM SERPL-SCNC: 3.9 MMOL/L (ref 3.5–5.2)
PROT SERPL-MCNC: 6.4 G/DL (ref 6–8.5)
SODIUM SERPL-SCNC: 140 MMOL/L (ref 136–145)
TRIGL SERPL-MCNC: 49 MG/DL (ref 0–150)
VLDLC SERPL-MCNC: 11 MG/DL (ref 5–40)

## 2025-07-29 PROCEDURE — 82570 ASSAY OF URINE CREATININE: CPT

## 2025-07-29 PROCEDURE — 83036 HEMOGLOBIN GLYCOSYLATED A1C: CPT

## 2025-07-29 PROCEDURE — 82043 UR ALBUMIN QUANTITATIVE: CPT

## 2025-07-29 PROCEDURE — 80053 COMPREHEN METABOLIC PANEL: CPT

## 2025-07-29 PROCEDURE — 80061 LIPID PANEL: CPT

## 2025-07-29 PROCEDURE — 36415 COLL VENOUS BLD VENIPUNCTURE: CPT

## 2025-08-15 ENCOUNTER — OFFICE VISIT (OUTPATIENT)
Dept: CARDIOLOGY | Age: 85
End: 2025-08-15
Payer: MEDICARE

## 2025-08-15 VITALS
SYSTOLIC BLOOD PRESSURE: 138 MMHG | WEIGHT: 190 LBS | OXYGEN SATURATION: 98 % | HEIGHT: 64 IN | DIASTOLIC BLOOD PRESSURE: 70 MMHG | BODY MASS INDEX: 32.44 KG/M2 | HEART RATE: 42 BPM

## 2025-08-15 DIAGNOSIS — I49.3 PVC (PREMATURE VENTRICULAR CONTRACTION): ICD-10-CM

## 2025-08-15 DIAGNOSIS — I51.9 LEFT VENTRICULAR DYSFUNCTION: ICD-10-CM

## 2025-08-15 DIAGNOSIS — Z95.818 PRESENCE OF WATCHMAN LEFT ATRIAL APPENDAGE CLOSURE DEVICE: ICD-10-CM

## 2025-08-15 DIAGNOSIS — I48.21 PERMANENT ATRIAL FIBRILLATION: Primary | ICD-10-CM

## 2025-08-15 DIAGNOSIS — E78.2 MIXED HYPERLIPIDEMIA: ICD-10-CM

## 2025-08-15 DIAGNOSIS — Z86.73 HISTORY OF STROKE: ICD-10-CM

## 2025-08-15 PROCEDURE — 99214 OFFICE O/P EST MOD 30 MIN: CPT | Performed by: INTERNAL MEDICINE

## 2025-08-15 PROCEDURE — 93000 ELECTROCARDIOGRAM COMPLETE: CPT | Performed by: INTERNAL MEDICINE

## 2025-08-15 RX ORDER — GUAIFENESIN 600 MG/1
1200 TABLET, EXTENDED RELEASE ORAL AS NEEDED
COMMUNITY

## 2025-08-15 RX ORDER — PREDNISOLONE ACETATE 10 MG/ML
SUSPENSION/ DROPS OPHTHALMIC
COMMUNITY
Start: 2025-07-28

## 2025-08-15 RX ORDER — LATANOPROST 50 UG/ML
SOLUTION/ DROPS OPHTHALMIC
COMMUNITY
Start: 2025-07-29

## (undated) DEVICE — Device: Brand: DEFENDO AIR/WATER/SUCTION AND BIOPSY VALVE

## (undated) DEVICE — INTRO PERFORMER CHECKFLO/LG RAD/BND NO/GW 16F .038IN 30CM

## (undated) DEVICE — CANN NASL CO2 TRULINK W/O2 A/

## (undated) DEVICE — BAG,DRAINAGE,4L,A/R TOWER,LL,SLIDE TAP: Brand: MEDLINE

## (undated) DEVICE — GOWN ,SIRUS,NONREINFORCED 3XL: Brand: MEDLINE

## (undated) DEVICE — DIL VESL 10FR .038 20CM

## (undated) DEVICE — ADAPT CLN BIOGUARD AIR/H2O DISP

## (undated) DEVICE — VIAL FORMLN CAP 10PCT 20ML

## (undated) DEVICE — TUBING, SUCTION, 1/4" X 10', STRAIGHT: Brand: MEDLINE

## (undated) DEVICE — BITEBLOCK OMNI BLOC

## (undated) DEVICE — 1 X VERSACROSS CONNECT TRANSSEPTAL DILATOR (INCLUDING 1 X J-TIP MECHANICAL GUIDEWIRE); 1 X VERSACROSS RF WIRE (INCLUDING 1 X CONNECTOR CABLE (SINGLE USE)); 1 X DISPERSIVE ELECTRODE: Brand: VERSACROSS CONNECT LAAC ACCESS SOLUTION

## (undated) DEVICE — FLEX ADVANTAGE 1500CC: Brand: FLEX ADVANTAGE

## (undated) DEVICE — DIL VESL 14F.038 20CM

## (undated) DEVICE — Device

## (undated) DEVICE — PK CATH CARD 40

## (undated) DEVICE — GOWN ISOL W/THUMB UNIV BLU BX/15

## (undated) DEVICE — MAT FLR ABSORBENT LG 4FT 10 2.5FT

## (undated) DEVICE — TIDISHIELD UROLOGY DRAIN BAGS FROSTY VINYL STERILE FITS SIEMENS UROSKOP ACCESS 20 PER CASE: Brand: TIDISHIELD

## (undated) DEVICE — KT ORCA ORCAPOD DISP STRL

## (undated) DEVICE — ACCESS SHEATH WITH DILATOR: Brand: WATCHMAN FXD CURVE™ ACCESS SYSTEM

## (undated) DEVICE — MTS LHK BHS BAPTIST LOUISVILLE: Brand: NAMIC

## (undated) DEVICE — GW AMPLTZ SUPERSTIFF SHT/TPR STR .035IN 260CM

## (undated) DEVICE — CANN O2 ETCO2 FITS ALL CONN CO2 SMPL A/ 7IN DISP LF

## (undated) DEVICE — SINGLE-USE POLYPECTOMY SNARE: Brand: SENSATION SHORT THROW

## (undated) DEVICE — GLV SURG BIOGEL LTX PF 7

## (undated) DEVICE — SNAR POLYP CAPTIVATOR/COLD STFF RND 10MM 240CM

## (undated) DEVICE — FRCP BX RADJAW4 NDL 2.8 240CM LG OG BX40

## (undated) DEVICE — DIL VESL 12F.038 20CM

## (undated) DEVICE — CLEARSIGHT FINGER CUFF MEDIUM MULTI PACK: Brand: CLEARSIGHT

## (undated) DEVICE — LOU TUR: Brand: MEDLINE INDUSTRIES, INC.

## (undated) DEVICE — LN SMPL CO2 SHTRM SD STREAM W/M LUER

## (undated) DEVICE — THE SINGLE USE ETRAP – POLYP TRAP IS USED FOR SUCTION RETRIEVAL OF ENDOSCOPICALLY REMOVED POLYPS.: Brand: ETRAP

## (undated) DEVICE — PERCLOSE™ PROSTYLE™ SUTURE-MEDIATED CLOSURE AND REPAIR SYSTEM: Brand: PERCLOSE™ PROSTYLE™

## (undated) DEVICE — SENSR O2 OXIMAX FNGR ADHS A/ 1P/U

## (undated) DEVICE — ADAPT CLN SCPE ENDO PORPOISE BX/50 DISP

## (undated) DEVICE — SYRINGE, LUER SLIP, STERILE, 60ML: Brand: MEDLINE

## (undated) DEVICE — CATH DIAG IMPULSE PIG 5F 100CM

## (undated) DEVICE — SUREFIT, DUAL DISPERSIVE ELECTRODE, CONTACT QUALITY MONITOR: Brand: SUREFIT

## (undated) DEVICE — 1071 S-DRP URO STLE-GAMA 10/BX,4X/C: Brand: STERI-DRAPE™

## (undated) DEVICE — GW INQW FIX/CORE PTFE J/3MM .035 260CM

## (undated) DEVICE — TUBING, SUCTION, 1/4" X 20', STRAIGHT: Brand: MEDLINE INDUSTRIES, INC.

## (undated) DEVICE — PINNACLE INTRODUCER SHEATH: Brand: PINNACLE

## (undated) DEVICE — SENSR O2 OXIMAX FNGR A/ 18IN NONSTR

## (undated) DEVICE — CATH COUVALAIRE SIMPLASTIC 3WY 24F 30CC